# Patient Record
Sex: FEMALE | Race: WHITE | Employment: OTHER | URBAN - METROPOLITAN AREA
[De-identification: names, ages, dates, MRNs, and addresses within clinical notes are randomized per-mention and may not be internally consistent; named-entity substitution may affect disease eponyms.]

---

## 2017-01-05 ENCOUNTER — TELEPHONE (OUTPATIENT)
Dept: FAMILY MEDICINE CLINIC | Age: 47
End: 2017-01-05

## 2017-01-09 NOTE — TELEPHONE ENCOUNTER
Medication: Proventil HFA, dose: 2 puffs, how often: every 4 hours as needed for wheezing, current number of medication days provided: 90, refill per application. Lot# Quantity 4, Lot [id number], EXP 01/2018 . This medication was received and verified for the following 1. Correct Patient, 2. Correct Diagnosis, 3. Correct Drug, 4. Correct route, and no current allergy to medication. Please contact patient to come  their medications.      Edel Mccoy, MSN,RN,[facility]

## 2017-02-14 ENCOUNTER — TELEPHONE (OUTPATIENT)
Dept: SLEEP MEDICINE | Age: 47
End: 2017-02-14

## 2017-02-15 ENCOUNTER — TELEPHONE (OUTPATIENT)
Dept: SLEEP MEDICINE | Age: 47
End: 2017-02-15

## 2017-02-22 ENCOUNTER — TELEPHONE (OUTPATIENT)
Dept: FAMILY MEDICINE CLINIC | Age: 47
End: 2017-02-22

## 2017-02-22 NOTE — TELEPHONE ENCOUNTER
Medication: Caduet 10/10mg, dose: 1 tab, how often: daily, current number of medication days provided: 90, refill per application. Lot #[id number], EXP 04/2019 . This medication was received and verified for the following 1. Correct Patient, 2. Correct Diagnosis, 3. Correct Drug, 4. Correct route, and no current allergy to medication.

## 2017-02-23 DIAGNOSIS — I10 ESSENTIAL HYPERTENSION: Primary | ICD-10-CM

## 2017-04-11 ENCOUNTER — LAB ONLY (OUTPATIENT)
Dept: FAMILY MEDICINE CLINIC | Age: 47
End: 2017-04-11

## 2017-04-11 ENCOUNTER — HOSPITAL ENCOUNTER (OUTPATIENT)
Dept: LAB | Age: 47
Discharge: HOME | End: 2017-04-11

## 2017-04-11 ENCOUNTER — TELEPHONE (OUTPATIENT)
Dept: FAMILY MEDICINE CLINIC | Age: 47
End: 2017-04-11

## 2017-04-11 DIAGNOSIS — E78.5 DYSLIPIDEMIA: Primary | ICD-10-CM

## 2017-04-11 DIAGNOSIS — I10 ESSENTIAL HYPERTENSION: ICD-10-CM

## 2017-04-11 LAB
ANION GAP BLD CALC-SCNC: 7 MMOL/L (ref 3–18)
BUN SERPL-MCNC: 14 MG/DL (ref 7–18)
BUN/CREAT SERPL: 20 (ref 12–20)
CALCIUM SERPL-MCNC: 9.2 MG/DL (ref 8.5–10.1)
CHLORIDE SERPL-SCNC: 103 MMOL/L (ref 100–108)
CHOLEST SERPL-MCNC: 175 MG/DL
CO2 SERPL-SCNC: 29 MMOL/L (ref 21–32)
CREAT SERPL-MCNC: 0.69 MG/DL (ref 0.6–1.3)
GLUCOSE SERPL-MCNC: 86 MG/DL (ref 74–99)
HDLC SERPL-MCNC: 31 MG/DL (ref 40–60)
HDLC SERPL: 5.6 {RATIO} (ref 0–5)
LDLC SERPL CALC-MCNC: ABNORMAL MG/DL (ref 0–100)
LIPID PROFILE,FLP: ABNORMAL
POTASSIUM SERPL-SCNC: 4.1 MMOL/L (ref 3.5–5.5)
SODIUM SERPL-SCNC: 139 MMOL/L (ref 136–145)
TRIGL SERPL-MCNC: 441 MG/DL (ref ?–150)
VLDLC SERPL CALC-MCNC: ABNORMAL MG/DL

## 2017-04-11 PROCEDURE — 80048 BASIC METABOLIC PNL TOTAL CA: CPT | Performed by: NURSE PRACTITIONER

## 2017-04-11 PROCEDURE — 80061 LIPID PANEL: CPT | Performed by: NURSE PRACTITIONER

## 2017-04-11 NOTE — TELEPHONE ENCOUNTER
Medication: Proventil HFA, dose: 2 puffs, how often: every 4 hours as needed for wheezing, current number of medication days provided: 90, refill per application. Lot [id number], EXP 04/2018. This medication was received and verified for the following 1. Correct Patient, 2. Correct Diagnosis, 3. Correct Drug, 4. Correct route, and no current allergy to medication. Please contact patient to come  their medications.      Judy Jerome MSN,RN,AGNP-C     [facility]

## 2017-04-11 NOTE — PROGRESS NOTES
Patient name, date of birth and orders verified before specimen collection. Rt  arm is dry and intact. 23g butterfly  was utilized to collect specimen. Pt tolerated procedure well.

## 2017-04-13 NOTE — PROGRESS NOTES
Will review results with pt at 4/27/17 appt  1. CMP satisfactory   2. Triglycerides up to 441 from 129. Assess ETOH use, drug use, diet!!!  3. HDL low at 31. Down from 48   4. LDL not calculated.

## 2017-04-27 ENCOUNTER — OFFICE VISIT (OUTPATIENT)
Dept: FAMILY MEDICINE CLINIC | Age: 47
End: 2017-04-27

## 2017-04-27 VITALS
SYSTOLIC BLOOD PRESSURE: 152 MMHG | RESPIRATION RATE: 20 BRPM | DIASTOLIC BLOOD PRESSURE: 97 MMHG | HEIGHT: 62 IN | OXYGEN SATURATION: 96 % | BODY MASS INDEX: 36.62 KG/M2 | TEMPERATURE: 98.7 F | WEIGHT: 199 LBS | HEART RATE: 93 BPM

## 2017-04-27 DIAGNOSIS — Z91.14 NON COMPLIANCE W MEDICATION REGIMEN: ICD-10-CM

## 2017-04-27 DIAGNOSIS — I10 HTN, GOAL BELOW 140/90: ICD-10-CM

## 2017-04-27 DIAGNOSIS — F19.10 SUBSTANCE ABUSE (HCC): ICD-10-CM

## 2017-04-27 DIAGNOSIS — Z72.0 TOBACCO ABUSE: ICD-10-CM

## 2017-04-27 DIAGNOSIS — L71.9 ROSACEA: ICD-10-CM

## 2017-04-27 DIAGNOSIS — G47.9 SLEEPING DIFFICULTIES: ICD-10-CM

## 2017-04-27 DIAGNOSIS — I10 UNCONTROLLED HYPERTENSION: Primary | ICD-10-CM

## 2017-04-27 DIAGNOSIS — E78.1 HYPERTRIGLYCERIDEMIA: ICD-10-CM

## 2017-04-27 DIAGNOSIS — F31.10 MANIC BIPOLAR I DISORDER (HCC): ICD-10-CM

## 2017-04-27 RX ORDER — CLINDAMYCIN PHOSPHATE 10 UG/ML
LOTION TOPICAL 2 TIMES DAILY
Qty: 1 BOTTLE | Refills: 0 | Status: SHIPPED | OUTPATIENT
Start: 2017-04-27 | End: 2018-05-07 | Stop reason: SDUPTHER

## 2017-04-27 RX ORDER — MULTIVITAMIN
1 TABLET ORAL DAILY
COMMUNITY
End: 2019-01-30

## 2017-04-27 RX ORDER — CLONIDINE HYDROCHLORIDE 0.3 MG/1
0.3 TABLET ORAL 2 TIMES DAILY
Qty: 60 TAB | Refills: 3 | Status: SHIPPED | OUTPATIENT
Start: 2017-04-27 | End: 2017-08-16 | Stop reason: SDUPTHER

## 2017-04-27 RX ORDER — OMEGA-3-ACID ETHYL ESTERS 1 G/1
2 CAPSULE, LIQUID FILLED ORAL 2 TIMES DAILY
Qty: 360 CAP | Refills: 3 | Status: SHIPPED | COMMUNITY
Start: 2017-04-27 | End: 2017-07-24 | Stop reason: SDUPTHER

## 2017-04-27 RX ORDER — METOPROLOL TARTRATE 25 MG/1
12.5 TABLET, FILM COATED ORAL 2 TIMES DAILY
Qty: 30 TAB | Refills: 3 | Status: SHIPPED | OUTPATIENT
Start: 2017-04-27 | End: 2017-06-08

## 2017-04-27 RX ORDER — LISINOPRIL AND HYDROCHLOROTHIAZIDE 20; 25 MG/1; MG/1
1 TABLET ORAL DAILY
Qty: 30 TAB | Refills: 3 | Status: SHIPPED | OUTPATIENT
Start: 2017-04-27 | End: 2017-08-16 | Stop reason: SDUPTHER

## 2017-04-27 RX ORDER — OMEGA-3-ACID ETHYL ESTERS 1 G/1
2 CAPSULE, LIQUID FILLED ORAL 2 TIMES DAILY
Qty: 360 CAP | Refills: 3 | Status: SHIPPED | COMMUNITY
Start: 2017-04-27 | End: 2019-03-21 | Stop reason: SDUPTHER

## 2017-04-27 NOTE — MR AVS SNAPSHOT
Visit Information Date & Time Provider Department Dept. Phone Encounter #  
 4/27/2017 11:00 AM Ovidio Hunt NP 1998 [facility] [id number] Follow-up Instructions Return in about 2 weeks (around 5/11/2017). Upcoming Health Maintenance Date Due Pneumococcal 19-64 Medium Risk (1 of 1 - PPSV23) 6/22/1988 DTaP/Tdap/Td series (1 - Tdap) 6/22/1990 PAP AKA CERVICAL CYTOLOGY 11/21/2019 Allergies as of 4/27/2017  Review Complete On: 4/27/2017 By: Particia Fairly Severity Noted Reaction Type Reactions Iodinated Contrast Media - Oral And Iv Dye  08/16/2012    Shortness of Breath Current Immunizations  Reviewed on 10/18/2016 Name Date Influenza Vaccine (Quad) PF 10/13/2016 Not reviewed this visit You Were Diagnosed With   
  
 Codes Comments Uncontrolled hypertension    -  Primary ICD-10-CM: I10 
ICD-9-CM: 401.9 Sleeping difficulties     ICD-10-CM: G47.9 ICD-9-CM: 780.50 Substance abuse     ICD-10-CM: F19.10 ICD-9-CM: 305.90 Non compliance w medication regimen     ICD-10-CM: Z91.14 
ICD-9-CM: V15.81 Manic bipolar I disorder ([facility]     ICD-10-CM: F31.10 ICD-9-CM: 296.40 Tobacco abuse     ICD-10-CM: Z72.0 ICD-9-CM: 305.1 HTN, goal below 140/90     ICD-10-CM: I10 
ICD-9-CM: 401.9 Rosacea     ICD-10-CM: L71.9 ICD-9-CM: 695.3 Hypertriglyceridemia     ICD-10-CM: E78.1 ICD-9-CM: 272.1 Vitals BP Pulse Temp Resp Height(growth percentile) Weight(growth percentile) (!) 152/97 93 98.7 °F (37.1 °C) 20 5' 2\" (1.575 m) 199 lb (90.3 kg) SpO2 BMI OB Status Smoking Status 96% 36.4 kg/m2 Having regular periods Current Every Day Smoker BMI and BSA Data Body Mass Index Body Surface Area  
 36.4 kg/m 2 1.99 m 2 Preferred Pharmacy Pharmacy Name Phone [place] [phone] - [facility] 90. [phone] Your Updated Medication List  
  
   
This list is accurate as of: 4/27/17 11:48 AM.  Always use your most recent med list.  
  
  
  
  
 albuterol 90 mcg/actuation inhaler Commonly known as:  PROVENTIL HFA, VENTOLIN HFA, PROAIR HFA Take 2 Puffs by inhalation every four (4) hours as needed for Wheezing. amLODIPine-atorvastatin 10-10 mg per tablet Commonly known as:  CADUET Take 1 Tab by mouth daily. calcium-cholecalciferol (D3) tablet Commonly known as:  CALTRATE 600+D Take 1 Tab by mouth daily. clindamycin 1 % lotion Commonly known as:  CLEOCIN T Apply  to affected area two (2) times a day. use thin film on affected area  
  
 cloNIDine HCl 0.3 mg tablet Commonly known as:  CATAPRES Take 1 Tab by mouth two (2) times a day. Indications: hypertension  
  
 ergocalciferol 50,000 unit capsule Commonly known as:  VITAMIN D2 Take one capsule every 7 days till gone, then start taking over-the-counter Vitamin D3 2,000 units every day  
  
 lisinopril-hydroCHLOROthiazide 20-25 mg per tablet Commonly known as:  Coral Rower Take 1 Tab by mouth daily. Indications: hypertension  
  
 metoprolol tartrate 25 mg tablet Commonly known as:  LOPRESSOR Take 0.5 Tabs by mouth two (2) times a day. * omega-3 acid ethyl esters 1 gram capsule Commonly known as:  Cleophus Kipper Take 2 Caps by mouth two (2) times a day. * omega-3 acid ethyl esters 1 gram capsule Commonly known as:  Cleophus Kipper Take 2 Caps by mouth two (2) times a day. * Notice: This list has 2 medication(s) that are the same as other medications prescribed for you. Read the directions carefully, and ask your doctor or other care provider to review them with you. Prescriptions Printed Refills  
 omega-3 acid ethyl esters (LOVAZA) 1 gram capsule 3 Sig: Take 2 Caps by mouth two (2) times a day. Class: Program  
 Route: Oral  
  
Prescriptions Sent to Mail Order Refills omega-3 acid ethyl esters (LOVAZA) 1 gram capsule 3 Sig: Take 2 Caps by mouth two (2) times a day. Class: Program  
 Pharmacy: [address] Ph #: [phone] Route: Oral  
  
Prescriptions Sent to Pharmacy Refills  
 metoprolol tartrate (LOPRESSOR) 25 mg tablet 3 Sig: Take 0.5 Tabs by mouth two (2) times a day. Class: Normal  
 Pharmacy: [place] [address]. Ph #: [phone] Route: Oral  
 lisinopril-hydroCHLOROthiazide (PRINZIDE, ZESTORETIC) 20-25 mg per tablet 3 Sig: Take 1 Tab by mouth daily. Indications: hypertension Class: Normal  
 Pharmacy: [place] [address]. Ph #: [phone] Route: Oral  
 cloNIDine HCl (CATAPRES) 0.3 mg tablet 3 Sig: Take 1 Tab by mouth two (2) times a day. Indications: hypertension Class: Normal  
 Pharmacy: [place] [address]. Ph #: [phone] Route: Oral  
 clindamycin (CLEOCIN T) 1 % lotion 0 Sig: Apply  to affected area two (2) times a day. use thin film on affected area Class: Normal  
 Pharmacy: [place] [address]. Ph #: [phone] Route: Topical  
 omega-3 acid ethyl esters (LOVAZA) 1 gram capsule 3 Sig: Take 2 Caps by mouth two (2) times a day. Class: Program  
 Pharmacy: [address] Ph #: [phone] Route: Oral  
  
Follow-up Instructions Return in about 2 weeks (around 5/11/2017). Patient Instructions [address] [place] [address] Avenue: [phone] [facility] reminders! Foundation Operating Hours: These may change without notice. Mon- Wed 7am to 5pm. Closed for lunch 12-1pm 
Thurs 7am to 12pm 
Fridays closed NO SHOW POLICY ~ If a patient has 3 no shows for an appointment with the Provider, Mental Health Provider, or the Nurse Navigator in 6 months, they will be discharged from the practice for 6 months. Medication ordering will also be suspended. If the patient is discharged from the [facility], they can go to the [place] [address] where they can be seen for the primary needs plus obtain the same types of medications as they receive at the [facility]. To avoid being discharged, the patient must call the office at [phone] 24 hours prior to their appointment if they need to cancel, arrive to their appointments on time and come to all scheduled appointments. If the patient is discharged from the practice, they can apply to be re-established after 12 months. Lab work:  Unless you are instructed differently, please return to the office between the hours of 7 am and 10:30 am Monday through Thursday to have your labs drawn one week before your next scheduled PROVIDER visit. If you do not have an appointment to follow up on these results, please make one or plan to call the office if you do not hear from us to get the results. No news does not mean good news. Medications: If your medications are new or have changed, and you get your medications from the clinic pharmacy ([facility]), you MUST talk to the pharmacy staff to sign the new prescription applications. If you don't sign the applications we cannot get the medications for you. It usually takes 6-8 weeks for your medications to arrive. The Pharmacy staff will call you when your medications are available. You will have 30 days to come in and  your medications. If you don't  your medicines within those 30 days, those medicines will be placed on the self as samples and you will have to start all over again by completing the applications and waiting the 6-8 weeks for your medicines to arrive. This is firm and there will be no exceptions! ! The [facility] or [facility] will assist you with your medications if available but not all medications are available so some may be obtained through local pharmacies such as [facility] that has a large $4 list and [employer] that has many drugs for free or also for $4.  We will work hard to get the best medications for you that you can also afford. [facility] Medication pick-up times: 
Monday-Tuesday 1:00 -5:00 PM 
Wednesday- Thursday 8:00 -11:30 AM 
 
 
Feet Care: Local [facility] through [facility] Every second Tuesday of the month (except for holidays and election days) from 9am to 1 pm. The services provided by these ministry volunteers are free of charge with the option to donate. They will inspect your feet thoroughly, soak them for 10 minutes, cut and file your nails. They care for diabetics as well. Keep in mind this service is free and will be on a first come first serve basis. Bad teeth? Ask about the Dental Bus to get you in front of a local dentist. The bus leaves every other Wednesday for those on the list. (Ask about availability as these appointments are limited) Eye exams for Diabetics. Please let us know so we can add you to the list to see the eye doctor at [facility]. You will receive a free eye exam and free glasses if needed. Unfortunately, if you are not a diabetic, we do not have a free service for eye exams for you (yet!). We do have information on where to go to get a huge discount on eye exams and glasses. Sick visits: If you are sick and it is not an emergency call the office to see if you can schedule an appointment. Charges and cost items from the clinic:  Most of our orders are covered by [address] but there ARE SOME CHARGES for items such as radiology interpretations and anesthesiology during procedures and surgeries. Please make sure you have contacted the Advanced Patient Advocacy (APA) group to check on your payment options: [email or web address]. or come in and talk to them in person:  On 
 Tuesdays, we have Advanced Patient Advocacy is available Mon - Fri 8-4pm at [facility] on the first floor by the information desk. Their number is [phone]. It is important that you are screened in order to qualify for assistance and to avoid huge medical charges. The [facility] is not responsible for ANY charges you may accrue regardless of who ordered the medication, procedure, treatment or test. If you go to the Emergency Room, you WILL be charged! Behavior and emotional issues! It is stressful to be sick, have an illness, take medications, not have a job, not have medical insurance, have family issues or just getting older! Schedule an appointment with our mental health provider. She is in the office Mondays and Wednesdays from 8am to [place] [address] can also contact the following: The national suicide hotline (0-197-715-TYEQ or 3-387.781.9988) [facility] [address] [facility], [address] Walk- in hours Monday -Wednesday 8:30 am -11:15 AM 
2:15pm -4:15 pm 
 
[employer]) - [facility] [address] [facility], [address] [facility] [place] 
[phone] The [address] [place] [address] for Aging and The [facility], in collaboration with community partners, provides and advocates for resources and services to improve the employment, quality of life, security, and independence of older [address], [address] with disabilities, and their families. Department for Aging and Rehabilitative Services Street Location: [address] [place] [address] [place] Voice: [phone] Toll Free Number:[phone] Toll Free TTY: [phone] E-mail: [email or web address] Immunizations/Vaccination Routine Immunizations are provided for infants, children, teens, and adults at the [facility] FOR PHYSICAL REHABILITATION. Please bring all immunization records with you and present them at the time of registration. Phone [phone] Hours (Walk in) Monday, Tuesday, Wednesday and Friday 8:00 AM to 11:00 AM 
12:30 PM to 3:00 PM 
 
 
Drug and Alcohol Addiction Issues! It is hard to stop a poor habit but there is help out there. Please feel free to attend any other the following support groups to help you kick the habit or go to [facility] Emergency Department to be evaluated by the psychiatric team. Never give up!! AlAnon meetings: Radha [facility] MONDAY 7:30 PM JUST FOR TODAY [employer]-[facility]anjith [facility] [address] [facility] WEDNES[facility] 10:30 AM [employer] Tamie Anadarko [employer] OF [facility], ROOM [address] [place] [address]  
 
[facility] WEDNESDAY 8:00  Linda Richardson [place] [address] [facility] THURSDAY 8:00 PM KEEP IT SIMPLE [employer] [facility]ie [facility] [address] 8:00 PM LET IT BEGIN WITH ME [employer] [facility] Buddhism [facility] [address] [facility] FRIDAY 6;30 PM [employer] Buddhism [facility] [address] [facility] [address] [place] MONDAY 10:30  [place] [address] [facility] SATURDAY 8:00 PM [facility] SATURDAY NIGHT [employer] Al-Ranjith [place] [address] [facility] [place] MONDAY 7:00 PM MONDAY NIGHT AF[employer] Al-Anon SIENNA Kimberly Buddhism [facility] [address]  
 
[place] WEDNESDAY 8:00 PM [employer] Al-Anon [facility] Buddhism [facility] [address] [facility] 
 
  
Bipolar Disorder: Care Instructions Your Care Instructions Bipolar disorder is an illness that causes extreme mood changes, from times of very high energy (manic episodes) to times of depression. But many people with bipolar disorder show only the symptoms of depression. These moods may cause problems with your work, school, family life, friendships, and how well you function. This disease is also called manic-depression. There is no cure for bipolar disorder, but it can be helped with medicines. Counseling may also help. It is important to take your medicines exactly as prescribed, even when you feel well. You may need lifelong treatment. Follow-up care is a key part of your treatment and safety. Be sure to make and go to all appointments, and call your doctor if you are having problems. It's also a good idea to know your test results and keep a list of the medicines you take. How can you care for yourself at home? · Be safe with medicines. Take your medicines exactly as prescribed. Do not stop or change a medicine without talking to your doctor first. Del Lovelace and your doctor may need to try different combinations of medicines to find what works for you. · Take your medicines on schedule to keep your moods even. When you feel good, you may think that you do not need your medicines. But it is important to keep taking them. · Go to your counseling sessions. Call and talk with your counselor if you can't go to a session or if you don't think the sessions are helping. Do not just stop going. · Get at least 30 minutes of activity on most days of the week. Walking is a good choice. You also may want to do other things, such as running, swimming, or cycling. · Get enough sleep. Keep your room dark and quiet. Try to go to bed at the same time every night. · Eat a healthy diet. This includes whole grains, dairy, fruits, vegetables, and protein. Eat foods from each of these groups. · Try to lower your stress. Manage your time, build a strong support system, and lead a healthy lifestyle. To lower your stress, try physical activity, slow deep breathing, or getting a massage. · Do not use alcohol or illegal drugs. · Learn the early signs of your mood changes. You can then take steps to help yourself feel better. · Ask for help from friends and family when you need it.  You may need help with daily chores when you are depressed. When you are manic, you may need support to control your high energy levels. What should you do if someone in your family has bipolar disorder? · Learn about the disease and the signs that it is getting worse. · Remind your family member that you love him or her. · Make a plan with all family members about how to take care of your loved one when his or her symptoms are bad. · Talk about your fears and concerns and those of other family members. Seek counseling if needed. · Do not focus attention only on the person who is in treatment. · Remind yourself that it will take time for changes to occur. · Do not blame yourself for the disease. · Know your legal rights and the legal rights of your family member. Support groups or counselors can help you with this information. · Take care of yourself. Keep up with your own interests, such as your career, hobbies, and friends. Use exercise, positive self-talk, deep breathing, and other relaxing exercises to help lower your stress. · Give yourself time to grieve. You may need to deal with emotions such as anger, fear, and frustration. After you work through your feelings, you will be better able to care for yourself and your family. · If you are having a hard time with your feelings or with your relationship with your family member, talk with a counselor. When should you call for help? Call 911 anytime you think you may need emergency care. For example, call if: 
· You feel like hurting yourself or someone else. · Someone who has bipolar disorder displays dangerous behavior, and you think the person might hurt himself or herself or someone else. Call your doctor now or seek immediate medical care if: 
· You hear voices. · Someone you know has bipolar disorder and talks about suicide.  Keep the numbers for these national suicide hotlines: 4-283-399-TALK (4-741.104.6247) and 1-240-GNIQZCE (5-207.890.5152). If a suicide threat seems real, with a specific plan and a way to carry it out, stay with the person, or ask someone you trust to stay with the person, until you can get help. · Someone you know has bipolar disorder and: 
¨ Starts to give away possessions. ¨ Is using illegal drugs or drinking alcohol heavily. ¨ Talks or writes about death, including writing suicide notes or talking about guns, knives, or pills. ¨ Talks or writes about hurting someone else. ¨ Starts to spend a lot of time alone. ¨ Acts very aggressively or suddenly appears calm. ¨ Talks about beliefs that are not based in reality (delusions). Watch closely for changes in your health, and be sure to contact your doctor if: 
· You cannot go to your counseling sessions. Where can you learn more? Go to http://losCatchSquarezack.info/. Enter K052 in the search box to learn more about \"Bipolar Disorder: Care Instructions. \" Current as of: July 26, 2016 Content Version: 11.2 © 3738-7109 [employer]. Care instructions adapted under license by [employer] (which disclaims liability or warranty for this information). If you have questions about a medical condition or this instruction, always ask your healthcare professional. [address] any warranty or liability for your use of this information. Hyperlipidemia: After Your Visit Your Care Instructions Hyperlipidemia is too much fat in your blood. The body has several kinds of fat, including cholesterol and triglycerides. Your body needs fat for many things, such as making new cells. But too much fat in your blood increases your chances of having a heart attack or stroke. You may be able to lower your cholesterol and triglycerides with a heart-healthy diet, exercise, and if needed, medicine.  Your doctor may want you to try lifestyle changes first to see whether they lower the fat in your blood. You may need to take medicine if lifestyle changes do not lower the fat in your blood enough. Follow-up care is a key part of your treatment and safety. Be sure to make and go to all appointments, and call your doctor if you are having problems. Its also a good idea to know your test results and keep a list of the medicines you take. How can you care for yourself at home? Take your medicines · Take your medicines exactly as prescribed. Call your doctor if you think you are having a problem with your medicine. · If you take medicine to lower your cholesterol, go to follow-up visits. You will need to have blood tests. · Do not take large doses of niacin, which is a B vitamin, while taking medicine called statins. It may increase the chance of muscle pain and liver problems. · Talk to your doctor about avoiding grapefruit juice if you are taking statins. Grapefruit juice can raise the level of this medicine in your blood. This could increase side effects. Eat more fruits, vegetables, and fiber · Fruits and vegetables have lots of nutrients that help protect against heart disease, and they have littleif anyfat. Try to eat at least five servings a day. Dark green, deep orange, or yellow fruits and vegetables are healthy choices. · Keep carrots, celery, and other veggies handy for snacks. Buy fruit that is in season and store it where you can see it so that you will be tempted to eat it. Cook dishes that have a lot of veggies in them, such as stir-fries and soups. · Foods high in fiber may reduce your cholesterol and provide important vitamins and minerals. High-fiber foods include whole-grain cereals and breads, oatmeal, beans, brown rice, citrus fruits, and apples. · Buy whole-grain breads and cereals instead of white bread and pastries. Limit saturated fat · Read food labels and try to avoid saturated fat and trans fat. They increase your risk of heart disease. · Use olive or canola oil when you cook. Try cholesterol-lowering spreads, such as Benecol or Take Control. · Bake, broil, grill, or steam foods instead of frying them. · Limit the amount of high-fat meats you eat, including hot dogs and sausages. Cut out all visible fat when you prepare meat. · Eat fish, skinless poultry, and soy products such as tofu instead of high-fat meats. Soybeans may be especially good for your heart. Eat at least two servings of fish a week. Certain fish, such as salmon, contain omega-3 fatty acids, which may help reduce your risk of heart attack. · Choose low-fat or fat-free milk and dairy products. Get exercise, limit alcohol, and quit smoking · Get more exercise. Work with your doctor to set up an exercise program. Even if you can do only a small amount, exercise will help you get stronger, have more energy, and manage your weight and your stress. Walking is an easy way to get exercise. Gradually increase the amount you walk every day. Aim for at least 30 minutes on most days of the week. You also may want to swim, bike, or do other activities. · Limit alcohol to no more than 2 drinks a day for men and 1 drink a day for women. · Do not smoke. If you need help quitting, talk to your doctor about stop-smoking programs and medicines. These can increase your chances of quitting for good. When should you call for help? Call 911 anytime you think you may need emergency care. For example, call if: 
· You have symptoms of a heart attack. These may include: ¨ Chest pain or pressure, or a strange feeling in the chest. 
¨ Sweating. ¨ Shortness of breath. ¨ Nausea or vomiting. ¨ Pain, pressure, or a strange feeling in the back, neck, jaw, or upper belly or in one or both shoulders or arms. ¨ Lightheadedness or sudden weakness. ¨ A fast or irregular heartbeat.  
After you call 911, the  may tell you to chew 1 adult-strength or 2 to 4 low-dose aspirin. Wait for an ambulance. Do not try to drive yourself. · You have signs of a stroke. These may include: 
¨ Sudden numbness, paralysis, or weakness in your face, arm, or leg, especially on only one side of your body. ¨ New problems with walking or balance. ¨ Sudden vision changes. ¨ Drooling or slurred speech. ¨ New problems speaking or understanding simple statements, or feeling confused. ¨ A sudden, severe headache that is different from past headaches. · You passed out (lost consciousness). Call your doctor now or seek immediate medical care if: 
· You have muscle pain or weakness. Watch closely for changes in your health, and be sure to contact your doctor if: 
· You are very tired. · You have an upset stomach, gas, constipation, or belly pain or cramps. Where can you learn more? Go to Ohanae.be Enter C406 in the search box to learn more about \"Hyperlipidemia: After Your Visit. \"  
© 1549-9618 [employer]wise[employer]. Care instructions adapted under license by [employer] (which disclaims liability or warranty for this information). This care instruction is for use with your licensed healthcare professional. If you have questions about a medical condition or this instruction, always ask your healthcare professional. [address] any warranty or liability for your use of this information. Content Version: 5.1.714914; Last Revised: October 13, 2011 Please provide this summary of care documentation to your next provider. Your primary care clinician is listed as Mikel Reed. If you have any questions after today's visit, please call [phone].
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
## 2017-04-27 NOTE — PATIENT INSTRUCTIONS
[address] [place] [address] Avenue: [address] [place] [address] Operating Hours: These may change without notice. Mon- Wed 7am to 5pm. Closed for lunch 12-1pm  Thurs 7am to 12pm  Fridays closed     NO SHOW POLICY ~ If a patient has 3 no shows for an appointment with the Provider, Mental Health Provider, or the Nurse Navigator in 6 months, they will be discharged from the practice for 6 months. Medication ordering will also be suspended. If the patient is discharged from the [facility], they can go to the [place] [id number] where they can be seen for the primary needs plus obtain the same types of medications as they receive at the [facility]. To avoid being discharged, the patient must call the office at [phone] 24 hours prior to their appointment if they need to cancel, arrive to their appointments on time and come to all scheduled appointments. If the patient is discharged from the practice, they can apply to be re-established after 12 months. Lab work:  Unless you are instructed differently, please return to the office between the hours of 7 am and 10:30 am Monday through Thursday to have your labs drawn one week before your next scheduled PROVIDER visit. If you do not have an appointment to follow up on these results, please make one or plan to call the office if you do not hear from us to get the results. No news does not mean good news. Medications: If your medications are new or have changed, and you get your medications from the clinic pharmacy ([facility]), you MUST talk to the pharmacy staff to sign the new prescription applications. If you don't sign the applications we cannot get the medications for you. It usually takes 6-8 weeks for your medications to arrive. The Pharmacy staff will call you when your medications are available. You will have 30 days to come in and  your medications.  If you don't  your medicines within those 30 days, those medicines will be placed on the self as samples and you will have to start all over again by completing the applications and waiting the 6-8 weeks for your medicines to arrive. This is firm and there will be no exceptions! ! The [facility] or [facility] will assist you with your medications if available but not all medications are available so some may be obtained through local pharmacies such as [facility] that has a large $4 list and [facility] that has many drugs for free or also for $4.  We will work hard to get the best medications for you that you can also afford. [facility] Medication pick-up times:  Monday-Tuesday 1:00 -5:00 PM  Wednesday- Thursday 8:00 -11:30 AM      Feet Care: Local ministry through [facility]  Every second Tuesday of the month (except for holidays and election days) from 9am to 1 pm. The services provided by these ministry volunteers are free of charge with the option to donate. They will inspect your feet thoroughly, soak them for 10 minutes, cut and file your nails. They care for diabetics as well. Keep in mind this service is free and will be on a first come first serve basis. Bad teeth? Ask about the Dental Bus to get you in front of a local dentist. The bus leaves every other Wednesday for those on the list. (Ask about availability as these appointments are limited)    Eye exams for Diabetics. Please let us know so we can add you to the list to see the eye doctor at [facility]. You will receive a free eye exam and free glasses if needed. Unfortunately, if you are not a diabetic, we do not have a free service for eye exams for you (yet!). We do have information on where to go to get a huge discount on eye exams and glasses. Sick visits: If you are sick and it is not an emergency call the office to see if you can schedule an appointment.      Charges and cost items from the clinic:  Most of our orders are covered by [address] [place] [address] [place] but there ARE SOME CHARGES for items such as radiology interpretations and anesthesiology during procedures and surgeries. Please make sure you have contacted the Advanced Patient Advocacy (APA) group to check on your payment options: [email or web address]. or come in and talk to them in person: On  Tuesdays, we have Advanced Patient Advocacy is available Mon - Fri 8-4pm at [facility] on the first floor by the information desk. Their number is [phone]. It is important that you are screened in order to qualify for assistance and to avoid huge medical charges. The [facility] is not responsible for ANY charges you may accrue regardless of who ordered the medication, procedure, treatment or test. If you go to the Emergency Room, you WILL be charged! Behavior and emotional issues! It is stressful to be sick, have an illness, take medications, not have a job, not have medical insurance, have family issues or just getting older! Schedule an appointment with our mental health provider. She is in the office Mondays and Wednesdays from 8am to [address] [facility] can also contact the following: The national suicide hotline (1-120-702-BM or 3-421.904.6212)    [address] [facility], [address] [place] [address]    Walk- in hours Monday -Wednesday   8:30 am -11:15 AM  2:15pm -4:15 pm    [employer] [facility] [place]  [address], [address]  [phone]        The [place] Department for A[facility] and [facility], in collaboration with community partners, provides and advocates for resources and services to improve the employment, quality of life, security, and independence of older [address], [address] with disabilities, and their families.     Department for Aging and Rehabilitative Services  [address] Location: [address]   Voice: [address] [place]: [phone]  E-mail: [email or web address]      Immunizations/Vaccination  Routine Immunizations are provided for infants, children, teens, and adults at the [facility]. Please bring all immunization records with you and present them at the time of registration. Phone [phone] extension [phone]  Hours (Walk in)  Monday, Tuesday, Wednesday and Friday  8:00 AM to 11:00 AM  12:30 PM to 3:00 PM      Drug and Alcohol Addiction Issues! It is hard to stop a poor habit but there is help out there. Please feel free to attend any other the following support groups to help you kick the habit or go to [facility] Emergency Department to be evaluated by the psychiatric team. Never give up!! AlAnon meetings: Areli Stallworth [place]    [place] MONDAY 7:30 PM JUST FOR TODAY [employer]-Sarkisn [facility] [address] [facility] WEDNESDAY 10:30 AM [employer] Al-Ranjith McIntosh [employer] [facility], ROOM [address] [place] [address] WEDNESDAY 8:00  [address] 8:00 PM KEEP IT SIMPLE [employer] Al-Ranjith [facility] [address]     [facility] THURSDAY 8:00 PM LET IT BEGIN WITH ME [employer] Al-Anon [facility] 6;30 PM [place] PARENTS [employer] [address] [facility] [address] [facility]      [place] MONDAY 10:30 AM [employer] Al-Anon [facility] SATURDAY 8:00 PM [facility] SATURDAY NIGHT AFG Al-Anon [facility] 96 [facility] MONDAY 7:00 PM MONDAY NIGHT AFG Al-Anon [facility] [address]     [place] WEDNESDAY 8:00 PM [employer] Al-Anon [facility] [address]       Bipolar Disorder: Care Instructions  Your Care Instructions  Bipolar disorder is an illness that causes extreme mood changes, from times of very high energy (manic episodes) to times of depression. But many people with bipolar disorder show only the symptoms of depression. These moods may cause problems with your work, school, family life, friendships, and how well you function. This disease is also called manic-depression. There is no cure for bipolar disorder, but it can be helped with medicines. Counseling may also help. It is important to take your medicines exactly as prescribed, even when you feel well. You may need lifelong treatment. Follow-up care is a key part of your treatment and safety. Be sure to make and go to all appointments, and call your doctor if you are having problems. It's also a good idea to know your test results and keep a list of the medicines you take. How can you care for yourself at home? · Be safe with medicines. Take your medicines exactly as prescribed. Do not stop or change a medicine without talking to your doctor first. Grisel Campos and your doctor may need to try different combinations of medicines to find what works for you. · Take your medicines on schedule to keep your moods even. When you feel good, you may think that you do not need your medicines. But it is important to keep taking them. · Go to your counseling sessions. Call and talk with your counselor if you can't go to a session or if you don't think the sessions are helping. Do not just stop going. · Get at least 30 minutes of activity on most days of the week. Walking is a good choice. You also may want to do other things, such as running, swimming, or cycling. · Get enough sleep. Keep your room dark and quiet. Try to go to bed at the same time every night. · Eat a healthy diet. This includes whole grains, dairy, fruits, vegetables, and protein. Eat foods from each of these groups. · Try to lower your stress. Manage your time, build a strong support system, and lead a healthy lifestyle.  To lower your stress, try physical activity, slow deep breathing, or getting a massage. · Do not use alcohol or illegal drugs. · Learn the early signs of your mood changes. You can then take steps to help yourself feel better. · Ask for help from friends and family when you need it. You may need help with daily chores when you are depressed. When you are manic, you may need support to control your high energy levels. What should you do if someone in your family has bipolar disorder? · Learn about the disease and the signs that it is getting worse. · Remind your family member that you love him or her. · Make a plan with all family members about how to take care of your loved one when his or her symptoms are bad. · Talk about your fears and concerns and those of other family members. Seek counseling if needed. · Do not focus attention only on the person who is in treatment. · Remind yourself that it will take time for changes to occur. · Do not blame yourself for the disease. · Know your legal rights and the legal rights of your family member. Support groups or counselors can help you with this information. · Take care of yourself. Keep up with your own interests, such as your career, hobbies, and friends. Use exercise, positive self-talk, deep breathing, and other relaxing exercises to help lower your stress. · Give yourself time to grieve. You may need to deal with emotions such as anger, fear, and frustration. After you work through your feelings, you will be better able to care for yourself and your family. · If you are having a hard time with your feelings or with your relationship with your family member, talk with a counselor. When should you call for help? Call 911 anytime you think you may need emergency care. For example, call if:  · You feel like hurting yourself or someone else. · Someone who has bipolar disorder displays dangerous behavior, and you think the person might hurt himself or herself or someone else.   Call your doctor now or seek immediate medical care if:  · You hear voices. · Someone you know has bipolar disorder and talks about suicide. Keep the numbers for these national suicide hotlines: 0-392-115-TALK (9-642.761.7441) and 8-465-QWDJEBR (8-441.664.8706). If a suicide threat seems real, with a specific plan and a way to carry it out, stay with the person, or ask someone you trust to stay with the person, until you can get help. · Someone you know has bipolar disorder and:  ¨ Starts to give away possessions. ¨ Is using illegal drugs or drinking alcohol heavily. ¨ Talks or writes about death, including writing suicide notes or talking about guns, knives, or pills. ¨ Talks or writes about hurting someone else. ¨ Starts to spend a lot of time alone. ¨ Acts very aggressively or suddenly appears calm. ¨ Talks about beliefs that are not based in reality (delusions). Watch closely for changes in your health, and be sure to contact your doctor if:  · You cannot go to your counseling sessions. Where can you learn more? Go to http://losBlueroof 360zack.info/. Enter K052 in the search box to learn more about \"Bipolar Disorder: Care Instructions. \"  Current as of: July 26, 2016  Content Version: 11.2  © 5546-2581 [employer]. Care instructions adapted under license by [employer] (which disclaims liability or warranty for this information). If you have questions about a medical condition or this instruction, always ask your healthcare professional. [place] [address] any warranty or liability for your use of this information. Hyperlipidemia: After Your Visit  Your Care Instructions  Hyperlipidemia is too much fat in your blood. The body has several kinds of fat, including cholesterol and triglycerides. Your body needs fat for many things, such as making new cells. But too much fat in your blood increases your chances of having a heart attack or stroke.   You may be able to lower your cholesterol and triglycerides with a heart-healthy diet, exercise, and if needed, medicine. Your doctor may want you to try lifestyle changes first to see whether they lower the fat in your blood. You may need to take medicine if lifestyle changes do not lower the fat in your blood enough. Follow-up care is a key part of your treatment and safety. Be sure to make and go to all appointments, and call your doctor if you are having problems. Its also a good idea to know your test results and keep a list of the medicines you take. How can you care for yourself at home? Take your medicines  · Take your medicines exactly as prescribed. Call your doctor if you think you are having a problem with your medicine. · If you take medicine to lower your cholesterol, go to follow-up visits. You will need to have blood tests. · Do not take large doses of niacin, which is a B vitamin, while taking medicine called statins. It may increase the chance of muscle pain and liver problems. · Talk to your doctor about avoiding grapefruit juice if you are taking statins. Grapefruit juice can raise the level of this medicine in your blood. This could increase side effects. Eat more fruits, vegetables, and fiber  · Fruits and vegetables have lots of nutrients that help protect against heart disease, and they have little--if any--fat. Try to eat at least five servings a day. Dark green, deep orange, or yellow fruits and vegetables are healthy choices. · Keep carrots, celery, and other veggies handy for snacks. Buy fruit that is in season and store it where you can see it so that you will be tempted to eat it. Cook dishes that have a lot of veggies in them, such as stir-fries and soups. · Foods high in fiber may reduce your cholesterol and provide important vitamins and minerals. High-fiber foods include whole-grain cereals and breads, oatmeal, beans, brown rice, citrus fruits, and apples.   · Buy whole-grain breads and cereals instead of white bread and pastries. Limit saturated fat  · Read food labels and try to avoid saturated fat and trans fat. They increase your risk of heart disease. · Use olive or canola oil when you cook. Try cholesterol-lowering spreads, such as Benecol or Take Control. · Bake, broil, grill, or steam foods instead of frying them. · Limit the amount of high-fat meats you eat, including hot dogs and sausages. Cut out all visible fat when you prepare meat. · Eat fish, skinless poultry, and soy products such as tofu instead of high-fat meats. Soybeans may be especially good for your heart. Eat at least two servings of fish a week. Certain fish, such as salmon, contain omega-3 fatty acids, which may help reduce your risk of heart attack. · Choose low-fat or fat-free milk and dairy products. Get exercise, limit alcohol, and quit smoking  · Get more exercise. Work with your doctor to set up an exercise program. Even if you can do only a small amount, exercise will help you get stronger, have more energy, and manage your weight and your stress. Walking is an easy way to get exercise. Gradually increase the amount you walk every day. Aim for at least 30 minutes on most days of the week. You also may want to swim, bike, or do other activities. · Limit alcohol to no more than 2 drinks a day for men and 1 drink a day for women. · Do not smoke. If you need help quitting, talk to your doctor about stop-smoking programs and medicines. These can increase your chances of quitting for good. When should you call for help? Call 911 anytime you think you may need emergency care. For example, call if:  · You have symptoms of a heart attack. These may include:  ¨ Chest pain or pressure, or a strange feeling in the chest.  ¨ Sweating. ¨ Shortness of breath. ¨ Nausea or vomiting. ¨ Pain, pressure, or a strange feeling in the back, neck, jaw, or upper belly or in one or both shoulders or arms. ¨ Lightheadedness or sudden weakness.   ¨ A fast or irregular heartbeat. After you call 911, the  may tell you to chew 1 adult-strength or 2 to 4 low-dose aspirin. Wait for an ambulance. Do not try to drive yourself. · You have signs of a stroke. These may include:  ¨ Sudden numbness, paralysis, or weakness in your face, arm, or leg, especially on only one side of your body. ¨ New problems with walking or balance. ¨ Sudden vision changes. ¨ Drooling or slurred speech. ¨ New problems speaking or understanding simple statements, or feeling confused. ¨ A sudden, severe headache that is different from past headaches. · You passed out (lost consciousness). Call your doctor now or seek immediate medical care if:  · You have muscle pain or weakness. Watch closely for changes in your health, and be sure to contact your doctor if:  · You are very tired. · You have an upset stomach, gas, constipation, or belly pain or cramps. Where can you learn more? Go to FPSI.be  Enter C406 in the search box to learn more about \"Hyperlipidemia: After Your Visit. \"   © 5526-0970 [employer]. Care instructions adapted under license by Alok Hart (which disclaims liability or warranty for this information). This care instruction is for use with your licensed healthcare professional. If you have questions about a medical condition or this instruction, always ask your healthcare professional. [address] any warranty or liability for your use of this information.   Content Version: 4.4.042867; Last Revised: October 13, 2011

## 2017-04-27 NOTE — LETTER
4/27/2017 [facility] [address] [facility], [address] Ruslan Solis, 1970, is picking up the following medications ordered from the [facility] Program: SAMPLE:  LOVAZA 1  #[address] Patient's Signature: _____________________________ Today's Date: 4/27/2017

## 2017-05-30 ENCOUNTER — TELEPHONE (OUTPATIENT)
Dept: FAMILY MEDICINE CLINIC | Age: 47
End: 2017-05-30

## 2017-05-30 NOTE — TELEPHONE ENCOUNTER
Medication: Lovaza 1gm, dose:2  caps, how often: BID, current number of medication days provided: 90, refill per application. Lot #[id number], EXP 05/2018   This medication was received and verified for the following 1. Correct Patient, 2. Correct Diagnosis, 3. Correct Drug, 4. Correct route, and no current allergy to medication.     Marcy BarretoD

## 2017-06-08 ENCOUNTER — APPOINTMENT (OUTPATIENT)
Dept: GENERAL RADIOLOGY | Age: 47
End: 2017-06-08
Attending: EMERGENCY MEDICINE
Payer: SELF-PAY

## 2017-06-08 ENCOUNTER — OFFICE VISIT (OUTPATIENT)
Dept: FAMILY MEDICINE CLINIC | Age: 47
End: 2017-06-08

## 2017-06-08 ENCOUNTER — HOSPITAL ENCOUNTER (EMERGENCY)
Age: 47
Discharge: HOME | End: 2017-06-08
Attending: EMERGENCY MEDICINE
Payer: SELF-PAY

## 2017-06-08 VITALS
OXYGEN SATURATION: 98 % | BODY MASS INDEX: 37.68 KG/M2 | SYSTOLIC BLOOD PRESSURE: 164 MMHG | RESPIRATION RATE: 16 BRPM | DIASTOLIC BLOOD PRESSURE: 84 MMHG | WEIGHT: 206 LBS | HEART RATE: 50 BPM

## 2017-06-08 VITALS
HEART RATE: 43 BPM | OXYGEN SATURATION: 99 % | WEIGHT: 206 LBS | BODY MASS INDEX: 37.91 KG/M2 | SYSTOLIC BLOOD PRESSURE: 140 MMHG | RESPIRATION RATE: 14 BRPM | DIASTOLIC BLOOD PRESSURE: 63 MMHG | HEIGHT: 62 IN

## 2017-06-08 DIAGNOSIS — R03.0 ELEVATED BLOOD PRESSURE READING: ICD-10-CM

## 2017-06-08 DIAGNOSIS — R79.89 ELEVATED BRAIN NATRIURETIC PEPTIDE (BNP) LEVEL: ICD-10-CM

## 2017-06-08 DIAGNOSIS — R00.1 SINUS BRADYCARDIA: Primary | ICD-10-CM

## 2017-06-08 DIAGNOSIS — R00.1 BRADYCARDIA: Primary | ICD-10-CM

## 2017-06-08 LAB
ANION GAP BLD CALC-SCNC: 4 MMOL/L (ref 3–18)
BASOPHILS # BLD AUTO: 0 K/UL (ref 0–0.1)
BASOPHILS # BLD: 0 % (ref 0–2)
BNP SERPL-MCNC: 987 PG/ML (ref 0–450)
BUN SERPL-MCNC: 10 MG/DL (ref 7–18)
BUN/CREAT SERPL: 18 (ref 12–20)
CALCIUM SERPL-MCNC: 8.7 MG/DL (ref 8.5–10.1)
CHLORIDE SERPL-SCNC: 104 MMOL/L (ref 100–108)
CK MB CFR SERPL CALC: 2.9 % (ref 0–4)
CK MB SERPL-MCNC: 1.9 NG/ML (ref 5–25)
CK SERPL-CCNC: 66 U/L (ref 26–192)
CO2 SERPL-SCNC: 32 MMOL/L (ref 21–32)
CREAT SERPL-MCNC: 0.55 MG/DL (ref 0.6–1.3)
DIFFERENTIAL METHOD BLD: NORMAL
EOSINOPHIL # BLD: 0.1 K/UL (ref 0–0.4)
EOSINOPHIL NFR BLD: 2 % (ref 0–5)
ERYTHROCYTE [DISTWIDTH] IN BLOOD BY AUTOMATED COUNT: 12.5 % (ref 11.6–14.5)
GLUCOSE SERPL-MCNC: 107 MG/DL (ref 74–99)
HCT VFR BLD AUTO: 43.8 % (ref 35–45)
HGB BLD-MCNC: 15.2 G/DL (ref 12–16)
LYMPHOCYTES # BLD AUTO: 34 % (ref 21–52)
LYMPHOCYTES # BLD: 3 K/UL (ref 0.9–3.6)
MAGNESIUM SERPL-MCNC: 2.1 MG/DL (ref 1.6–2.6)
MCH RBC QN AUTO: 32.5 PG (ref 24–34)
MCHC RBC AUTO-ENTMCNC: 34.7 G/DL (ref 31–37)
MCV RBC AUTO: 93.8 FL (ref 74–97)
MONOCYTES # BLD: 0.6 K/UL (ref 0.05–1.2)
MONOCYTES NFR BLD AUTO: 7 % (ref 3–10)
NEUTS SEG # BLD: 5 K/UL (ref 1.8–8)
NEUTS SEG NFR BLD AUTO: 57 % (ref 40–73)
PLATELET # BLD AUTO: 225 K/UL (ref 135–420)
PMV BLD AUTO: 10.4 FL (ref 9.2–11.8)
POTASSIUM SERPL-SCNC: 3.7 MMOL/L (ref 3.5–5.5)
RBC # BLD AUTO: 4.67 M/UL (ref 4.2–5.3)
SODIUM SERPL-SCNC: 140 MMOL/L (ref 136–145)
TROPONIN I SERPL-MCNC: <0.02 NG/ML (ref 0–0.04)
WBC # BLD AUTO: 8.8 K/UL (ref 4.6–13.2)

## 2017-06-08 PROCEDURE — 93005 ELECTROCARDIOGRAM TRACING: CPT

## 2017-06-08 PROCEDURE — 83735 ASSAY OF MAGNESIUM: CPT | Performed by: EMERGENCY MEDICINE

## 2017-06-08 PROCEDURE — 85025 COMPLETE CBC W/AUTO DIFF WBC: CPT | Performed by: EMERGENCY MEDICINE

## 2017-06-08 PROCEDURE — 71020 XR CHEST PA LAT: CPT

## 2017-06-08 PROCEDURE — 83880 ASSAY OF NATRIURETIC PEPTIDE: CPT | Performed by: EMERGENCY MEDICINE

## 2017-06-08 PROCEDURE — 82550 ASSAY OF CK (CPK): CPT | Performed by: EMERGENCY MEDICINE

## 2017-06-08 PROCEDURE — 99284 EMERGENCY DEPT VISIT MOD MDM: CPT

## 2017-06-08 PROCEDURE — 80048 BASIC METABOLIC PNL TOTAL CA: CPT | Performed by: EMERGENCY MEDICINE

## 2017-06-08 NOTE — DISCHARGE INSTRUCTIONS
Elevated Blood Pressure: Care Instructions  Your Care Instructions    Blood pressure is a measure of how hard the blood pushes against the walls of your arteries. It's normal for blood pressure to go up and down throughout the day. But if it stays up over time, you have high blood pressure. Two numbers tell you your blood pressure. The first number is the systolic pressure. It shows how hard the blood pushes when your heart is pumping. The second number is the diastolic pressure. It shows how hard the blood pushes between heartbeats, when your heart is relaxed and filling with blood. An ideal blood pressure in adults is less than 120/80 (say \"120 over 80\"). High blood pressure is 140/90 or higher. You have high blood pressure if your top number is 140 or higher or your bottom number is 90 or higher, or both. The main test for high blood pressure is simple, fast, and painless. To diagnose high blood pressure, your doctor will test your blood pressure at different times. After testing your blood pressure, your doctor may ask you to test it again when you are home. If you are diagnosed with high blood pressure, you can work with your doctor to make a long-term plan to manage it. Follow-up care is a key part of your treatment and safety. Be sure to make and go to all appointments, and call your doctor if you are having problems. It's also a good idea to know your test results and keep a list of the medicines you take. How can you care for yourself at home? · Do not smoke. Smoking increases your risk for heart attack and stroke. If you need help quitting, talk to your doctor about stop-smoking programs and medicines. These can increase your chances of quitting for good. · Stay at a healthy weight. · Try to limit how much sodium you eat to less than 2,300 milligrams (mg) a day. Your doctor may ask you to try to eat less than 1,500 mg a day. · Be physically active.  Get at least 30 minutes of exercise on most days of the week. Walking is a good choice. You also may want to do other activities, such as running, swimming, cycling, or playing tennis or team sports. · Avoid or limit alcohol. Talk to your doctor about whether you can drink any alcohol. · Eat plenty of fruits, vegetables, and low-fat dairy products. Eat less saturated and total fats. · Learn how to check your blood pressure at home. When should you call for help? Call your doctor now or seek immediate medical care if:  · Your blood pressure is much higher than normal (such as 180/110 or higher). · You think high blood pressure is causing symptoms such as:  ¨ Severe headache. ¨ Blurry vision. Watch closely for changes in your health, and be sure to contact your doctor if:  · You do not get better as expected. Where can you learn more? Go to http://losRevettozack.info/. Enter V494 in the search box to learn more about \"Elevated Blood Pressure: Care Instructions. \"  Current as of: October 19, 2016  Content Version: 11.2  © 5522-3401 [employer]. Care instructions adapted under license by [employer] (which disclaims liability or warranty for this information). If you have questions about a medical condition or this instruction, always ask your healthcare professional. [address] any warranty or liability for your use of this information. Bradycardia: Care Instructions  Your Care Instructions    Bradycardia is a slow heart rate. If your heart beats too slowly, it can't supply your body with enough blood. This can make you weak or dizzy. Or it may make you pass out. Sometimes medicine can cause this problem. If this happens, your doctor may have you adjust one of your medicines. If a medicine is not the problem, your doctor may recommend a pacemaker. It is important to treat bradycardia so that you don't get more serious health problems.  Your doctor will want to see you on a routine schedule to make sure that your heartbeat is normal.  Follow-up care is a key part of your treatment and safety. Be sure to make and go to all appointments, and call your doctor if you are having problems. It's also a good idea to know your test results and keep a list of the medicines you take. How can you care for yourself at home? · Take your medicines exactly as prescribed. Call your doctor if you think you are having a problem with your medicine. If your bradycardia is caused by another disease, your doctor will try to treat the disease. If it is caused by heart medicines, he or she will adjust your medicines. · Make lifestyle changes to improve your heart health. ¨ To control your cholesterol, avoid foods with a lot of fat, saturated fat, or sodium. Try to eat more fiber. And if your doctor says it's okay, get some exercise on most days. ¨ Do not smoke. Smoking can make your heart condition worse. If you need help quitting, talk to your doctor about stop-smoking programs and medicines. These can increase your chances of quitting for good. ¨ Limit alcohol to 2 drinks a day for men and 1 drink a day for women. Too much alcohol can cause health problems. Pacemaker  If you have a pacemaker, you will get more specific information about it. Be sure to:  · Check your pulse as your doctor tells you. · Have your pacemaker checked as often as your doctor recommends. You may be able to do this over the phone or computer. · Avoid strong magnetic or electrical fields. These include wand metal detectors used in airports, MRIs, welding equipment, and generators. · You will be checked several times right after you get your pacemaker and when it is time to have the battery changed. Batteries last for 5 to 15 years. · You can talk on a cell phone. But keep it 6 inches away from your pacemaker. · Microwaves, TVs, radios, and kitchen and bathroom appliances won't harm you. When should you call for help?   Call 911 anytime you think you may need emergency care. For example, call if:  · You passed out (lost consciousness). · You have symptoms of a heart attack. These may include:  ¨ Chest pain or pressure, or a strange feeling in the chest.  ¨ Sweating. ¨ Shortness of breath. ¨ Nausea or vomiting. ¨ Pain, pressure, or a strange feeling in the back, neck, jaw, or upper belly or in one or both shoulders or arms. ¨ Lightheadedness or sudden weakness. ¨ A fast or irregular heartbeat. After you call 911, the  may tell you to chew 1 adult-strength or 2 to 4 low-dose aspirin. Wait for an ambulance. Do not try to drive yourself. · You have symptoms of a stroke. These may include:  ¨ Sudden numbness, tingling, weakness, or loss of movement in your face, arm, or leg, especially on only one side of your body. ¨ Sudden vision changes. ¨ Sudden trouble speaking. ¨ Sudden confusion or trouble understanding simple statements. ¨ Sudden problems with walking or balance. ¨ A sudden, severe headache that is different from past headaches. Call your doctor now or seek immediate medical care if:  · You are dizzy or lightheaded, or you feel like you may faint. · You have new or increased shortness of breath. · You had a pacemaker implanted and you have signs of infection, such as:  ¨ Increased pain, swelling, warmth, or redness. ¨ Red streaks leading from the cut (incision). ¨ Pus draining from the incision. ¨ A fever. Watch closely for changes in your health, and be sure to contact your doctor if you have any problems. Where can you learn more? Go to http://los-zack.info/. Enter J853 in the search box to learn more about \"Bradycardia: Care Instructions. \"  Current as of: January 27, 2016  Content Version: 11.2  © 8648-0160 [employer]. Care instructions adapted under license by [employer] (which disclaims liability or warranty for this information).  If you have questions about a medical condition or this instruction, always ask your healthcare professional. [place] [address] any warranty or liability for your use of this information.
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
## 2017-06-08 NOTE — PROGRESS NOTES
Patient here for BP check. Reports today SOB at night when she gets in bed x last couple of nights. She reports she episodes of having to catch her breath \"about 10 times a day\". Also has cough, left back pain and pain in axillary rib area. She admits to smoking crack \"couple of days ago\". She denies these symptoms at present. She has an apical HR of 48 today. Seen and evaluated by Ms Mariajose Jamison who directs Ms. Ozuna to go to ED for CP work up. Patient hesitant at first, but agrees to go to ED before S/S return. Report called to [place] in ED.

## 2017-06-09 LAB
ATRIAL RATE: 45 BPM
CALCULATED P AXIS, ECG09: 49 DEGREES
CALCULATED R AXIS, ECG10: -9 DEGREES
CALCULATED T AXIS, ECG11: 84 DEGREES
DIAGNOSIS, 93000: NORMAL
P-R INTERVAL, ECG05: 170 MS
Q-T INTERVAL, ECG07: 508 MS
QRS DURATION, ECG06: 116 MS
QTC CALCULATION (BEZET), ECG08: 439 MS
VENTRICULAR RATE, ECG03: 45 BPM

## 2017-06-13 ENCOUNTER — OFFICE VISIT (OUTPATIENT)
Dept: FAMILY MEDICINE CLINIC | Age: 47
End: 2017-06-13

## 2017-06-13 VITALS
WEIGHT: 198 LBS | BODY MASS INDEX: 36.44 KG/M2 | HEART RATE: 58 BPM | OXYGEN SATURATION: 98 % | TEMPERATURE: 98.1 F | HEIGHT: 62 IN | DIASTOLIC BLOOD PRESSURE: 78 MMHG | SYSTOLIC BLOOD PRESSURE: 119 MMHG

## 2017-06-13 DIAGNOSIS — I10 HTN, GOAL BELOW 140/90: ICD-10-CM

## 2017-06-13 DIAGNOSIS — R00.1 SINUS BRADYCARDIA: ICD-10-CM

## 2017-06-13 DIAGNOSIS — R79.89 ELEVATED BRAIN NATRIURETIC PEPTIDE (BNP) LEVEL: ICD-10-CM

## 2017-06-13 DIAGNOSIS — R94.31 PROLONGATION OF QRS COMPLEX ON ELECTROCARDIOGRAPHY: ICD-10-CM

## 2017-06-13 DIAGNOSIS — I51.7 LVH (LEFT VENTRICULAR HYPERTROPHY): ICD-10-CM

## 2017-06-13 DIAGNOSIS — Z72.0 TOBACCO ABUSE DISORDER: ICD-10-CM

## 2017-06-13 DIAGNOSIS — Z09 HOSPITAL DISCHARGE FOLLOW-UP: Primary | ICD-10-CM

## 2017-06-13 DIAGNOSIS — F14.10 COCAINE ABUSE (HCC): ICD-10-CM

## 2017-06-13 NOTE — PATIENT INSTRUCTIONS

## 2017-06-13 NOTE — LETTER
6/13/2017 [facility] [address], [address] Suleman Mcneil, 1970, is picking up the following medications ordered from the [facility]: LOVAZA 1  [address] Emoт[address]яна Shaper Patient's Signature: _____________________________ Today's Date: 6/13/2017
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
## 2017-06-13 NOTE — MR AVS SNAPSHOT
Visit Information Date & Time Provider Department Dept. Phone Encounter #  
 6/13/2017  9:30 AM Santana Poole NP [address] [id number] Follow-up Instructions Return if symptoms worsen or fail to improve. Your Appointments 8/2/2017  9:30 AM  
Follow Up with Santana Poole NP [address] [facility] ([address]) Appt Note: 3 mth f/u  
 [address] [facility] [id number]  
[address] [facility] [id number] Upcoming Health Maintenance Date Due Pneumococcal 19-64 Medium Risk (1 of 1 - PPSV23) 6/22/1988 DTaP/Tdap/Td series (1 - Tdap) 6/22/1990 INFLUENZA AGE 9 TO ADULT 8/1/2017 PAP AKA CERVICAL CYTOLOGY 11/21/2019 Allergies as of 6/13/2017  Review Complete On: 6/13/2017 By: Luc Lopez. Sarahi Leon LPN Severity Noted Reaction Type Reactions Iodinated Contrast Media - Oral And Iv Dye  08/16/2012    Shortness of Breath Current Immunizations  Reviewed on 10/18/2016 Name Date Influenza Vaccine (Quad) PF 10/13/2016 Not reviewed this visit You Were Diagnosed With   
  
 Codes Comments Elevated brain natriuretic peptide (BNP) level    -  Primary ICD-10-CM: R79.89 ICD-9-CM: 790.99   
 HTN, goal below 140/90     ICD-10-CM: I10 
ICD-9-CM: 401.9 Cocaine abuse     ICD-10-CM: F14.10 ICD-9-CM: 305.60 Hospital discharge follow-up     ICD-10-CM: [facility] ICD-9-CM: V67.59 Tobacco abuse disorder     ICD-10-CM: Z72.0 ICD-9-CM: 305.1 Sinus bradycardia     ICD-10-CM: R00.1 ICD-9-CM: 427.89 Vitals BP Pulse Temp Height(growth percentile) Weight(growth percentile) LMP  
 119/78 (!) 58 98.1 °F (36.7 °C) (Oral) 5' 2\" (1.575 m) 198 lb (89.8 kg) 05/13/2017 SpO2 BMI OB Status Smoking Status 98% 36.21 kg/m2 Having regular periods Current Every Day Smoker Vitals History BMI and BSA Data Body Mass Index Body Surface Area  
 36.21 kg/m 2 1.98 m 2 Preferred Pharmacy Pharmacy Name Phone [facility] [address] [phone] Your Updated Medication List  
  
   
This list is accurate as of: 6/13/17 10:02 AM.  Always use your most recent med list.  
  
  
  
  
 albuterol 90 mcg/actuation inhaler Commonly known as:  PROVENTIL HFA, VENTOLIN HFA, PROAIR HFA Take 2 Puffs by inhalation every four (4) hours as needed for Wheezing. amLODIPine-atorvastatin 10-10 mg per tablet Commonly known as:  CADUET Take 1 Tab by mouth daily. calcium-cholecalciferol (D3) tablet Commonly known as:  CALTRATE 600+D Take 1 Tab by mouth daily. clindamycin 1 % lotion Commonly known as:  CLEOCIN T Apply  to affected area two (2) times a day. use thin film on affected area  
  
 cloNIDine HCl 0.3 mg tablet Commonly known as:  CATAPRES Take 1 Tab by mouth two (2) times a day. Indications: hypertension  
  
 ergocalciferol 50,000 unit capsule Commonly known as:  VITAMIN D2 Take one capsule every 7 days till gone, then start taking over-the-counter Vitamin D3 2,000 units every day  
  
 lisinopril-hydroCHLOROthiazide 20-25 mg per tablet Commonly known as:  Rosaland Copas Take 1 Tab by mouth daily. Indications: hypertension * omega-3 acid ethyl esters 1 gram capsule Commonly known as:  Pattricia Ny Take 2 Caps by mouth two (2) times a day. * omega-3 acid ethyl esters 1 gram capsule Commonly known as:  Pattricia Ny Take 2 Caps by mouth two (2) times a day. * Notice: This list has 2 medication(s) that are the same as other medications prescribed for you. Read the directions carefully, and ask your doctor or other care provider to review them with you. Follow-up Instructions Return if symptoms worsen or fail to improve. To-Do List   
 06/13/2017 ECHO:  2D ECHO COMPLETE ADULT (TTE) W OR WO CONTR Patient Instructions High Blood Pressure: Care Instructions Your Care Instructions If your blood pressure is usually above 140/90, you have high blood pressure, or hypertension. That means the top number is 140 or higher or the bottom number is 90 or higher, or both. Despite what a lot of people think, high blood pressure usually doesn't cause headaches or make you feel dizzy or lightheaded. It usually has no symptoms. But it does increase your risk for heart attack, stroke, and kidney or eye damage. The higher your blood pressure, the more your risk increases. Your doctor will give you a goal for your blood pressure. Your goal will be based on your health and your age. An example of a goal is to keep your blood pressure below 140/90. Lifestyle changes, such as eating healthy and being active, are always important to help lower blood pressure. You might also take medicine to reach your blood pressure goal. 
Follow-up care is a key part of your treatment and safety. Be sure to make and go to all appointments, and call your doctor if you are having problems. It's also a good idea to know your test results and keep a list of the medicines you take. How can you care for yourself at home? Medical treatment · If you stop taking your medicine, your blood pressure will go back up. You may take one or more types of medicine to lower your blood pressure. Be safe with medicines. Take your medicine exactly as prescribed. Call your doctor if you think you are having a problem with your medicine. · Talk to your doctor before you start taking aspirin every day. Aspirin can help certain people lower their risk of a heart attack or stroke. But taking aspirin isn't right for everyone, because it can cause serious bleeding. · See your doctor regularly. You may need to see the doctor more often at first or until your blood pressure comes down. · If you are taking blood pressure medicine, talk to your doctor before you take decongestants or anti-inflammatory medicine, such as ibuprofen. Some of these medicines can raise blood pressure. · Learn how to check your blood pressure at home. Lifestyle changes · Stay at a healthy weight. This is especially important if you put on weight around the waist. Losing even 10 pounds can help you lower your blood pressure. · If your doctor recommends it, get more exercise. Walking is a good choice. Bit by bit, increase the amount you walk every day. Try for at least 30 minutes on most days of the week. You also may want to swim, bike, or do other activities. · Avoid or limit alcohol. Talk to your doctor about whether you can drink any alcohol. · Try to limit how much sodium you eat to less than 2,300 milligrams (mg) a day. Your doctor may ask you to try to eat less than 1,500 mg a day. · Eat plenty of fruits (such as bananas and oranges), vegetables, legumes, whole grains, and low-fat dairy products. · Lower the amount of saturated fat in your diet. Saturated fat is found in animal products such as milk, cheese, and meat. Limiting these foods may help you lose weight and also lower your risk for heart disease. · Do not smoke. Smoking increases your risk for heart attack and stroke. If you need help quitting, talk to your doctor about stop-smoking programs and medicines. These can increase your chances of quitting for good. When should you call for help? Call 911 anytime you think you may need emergency care. This may mean having symptoms that suggest that your blood pressure is causing a serious heart or blood vessel problem. Your blood pressure may be over 180/110. For example, call 911 if: 
· You have symptoms of a heart attack. These may include: ¨ Chest pain or pressure, or a strange feeling in the chest. 
¨ Sweating. ¨ Shortness of breath. ¨ Nausea or vomiting. ¨ Pain, pressure, or a strange feeling in the back, neck, jaw, or upper belly or in one or both shoulders or arms. ¨ Lightheadedness or sudden weakness. ¨ A fast or irregular heartbeat. · You have symptoms of a stroke. These may include: 
¨ Sudden numbness, tingling, weakness, or loss of movement in your face, arm, or leg, especially on only one side of your body. ¨ Sudden vision changes. ¨ Sudden trouble speaking. ¨ Sudden confusion or trouble understanding simple statements. ¨ Sudden problems with walking or balance. ¨ A sudden, severe headache that is different from past headaches. · You have severe back or belly pain. Do not wait until your blood pressure comes down on its own. Get help right away. Call your doctor now or seek immediate care if: 
· Your blood pressure is much higher than normal (such as 180/110 or higher), but you don't have symptoms. · You think high blood pressure is causing symptoms, such as: ¨ Severe headache. ¨ Blurry vision. Watch closely for changes in your health, and be sure to contact your doctor if: 
· Your blood pressure measures 140/90 or higher at least 2 times. That means the top number is 140 or higher or the bottom number is 90 or higher, or both. · You think you may be having side effects from your blood pressure medicine. · Your blood pressure is usually normal, but it goes above normal at least 2 times. Where can you learn more? Go to http://los-zack.info/. Enter K239 in the search box to learn more about \"High Blood Pressure: Care Instructions. \" Current as of: August 8, 2016 Content Version: 11.2 © 6773-3685 [employer]. Care instructions adapted under license by [employer] (which disclaims liability or warranty for this information).  If you have questions about a medical condition or this instruction, always ask your healthcare professional. [employer] disclaims any warranty or liability for your use of this information. Please provide this summary of care documentation to your next provider. Your primary care clinician is listed as Kandace Gutiérrez. If you have any questions after today's visit, please call [phone].

## 2017-06-16 ENCOUNTER — HOSPITAL ENCOUNTER (OUTPATIENT)
Dept: NON INVASIVE DIAGNOSTICS | Age: 47
Discharge: HOME | End: 2017-06-16
Attending: NURSE PRACTITIONER
Payer: SUBSIDIZED

## 2017-06-16 DIAGNOSIS — I10 HTN, GOAL BELOW 140/90: ICD-10-CM

## 2017-06-16 DIAGNOSIS — F14.10 COCAINE ABUSE (HCC): ICD-10-CM

## 2017-06-16 DIAGNOSIS — R79.89 ELEVATED BRAIN NATRIURETIC PEPTIDE (BNP) LEVEL: ICD-10-CM

## 2017-06-16 PROCEDURE — 93306 TTE W/DOPPLER COMPLETE: CPT

## 2017-06-20 NOTE — PROGRESS NOTES
Please let the patient know that her echocardiogram came back relatively normal. Her heart function is normal, only slightly enlarged which is common in people with history of poorly hypertension. Please f/u with cardiology for further input.

## 2017-06-20 NOTE — PROGRESS NOTES
Patient returned call and I informed her of echocardiogram results as relatively normal and heart function normal.   Informed patient heart is slightly enlarged and common in people with hx of poorly controlled HTN, per provider. Patient agrees to continue with cardiology appointment as scheduled.

## 2017-06-27 ENCOUNTER — TELEPHONE (OUTPATIENT)
Dept: FAMILY MEDICINE CLINIC | Age: 47
End: 2017-06-27

## 2017-06-29 NOTE — TELEPHONE ENCOUNTER
Medication: Proventil HFA, dose: 2 puffs, how often: every 4 hours as needed for wheezing, current number of medication days provided: 90, refill per application. Lot [id number], EXP 06/2018. This medication was received and verified for the following 1. Correct Patient, 2. Correct Diagnosis, 3. Correct Drug, 4. Correct route, and no current allergy to medication. Please contact patient to come  their medications.      Jessica Jon, MSN, RN, [facility]

## 2017-07-11 ENCOUNTER — OFFICE VISIT (OUTPATIENT)
Dept: FAMILY MEDICINE CLINIC | Age: 47
End: 2017-07-11

## 2017-07-11 ENCOUNTER — OFFICE VISIT (OUTPATIENT)
Dept: CARDIOLOGY CLINIC | Age: 47
End: 2017-07-11

## 2017-07-11 VITALS
HEIGHT: 62 IN | DIASTOLIC BLOOD PRESSURE: 63 MMHG | WEIGHT: 197 LBS | SYSTOLIC BLOOD PRESSURE: 117 MMHG | HEART RATE: 61 BPM | BODY MASS INDEX: 36.25 KG/M2

## 2017-07-11 VITALS
HEART RATE: 58 BPM | RESPIRATION RATE: 18 BRPM | SYSTOLIC BLOOD PRESSURE: 133 MMHG | DIASTOLIC BLOOD PRESSURE: 79 MMHG | OXYGEN SATURATION: 98 %

## 2017-07-11 DIAGNOSIS — F14.10 COCAINE ABUSE (HCC): ICD-10-CM

## 2017-07-11 DIAGNOSIS — R07.9 CHEST PAIN, UNSPECIFIED TYPE: Primary | ICD-10-CM

## 2017-07-11 DIAGNOSIS — E78.5 DYSLIPIDEMIA: ICD-10-CM

## 2017-07-11 DIAGNOSIS — Z01.30 BLOOD PRESSURE CHECK: Primary | ICD-10-CM

## 2017-07-11 DIAGNOSIS — R00.1 BRADYCARDIA: ICD-10-CM

## 2017-07-11 DIAGNOSIS — R94.31 ABNORMAL EKG: ICD-10-CM

## 2017-07-11 DIAGNOSIS — I51.7 LVH (LEFT VENTRICULAR HYPERTROPHY): ICD-10-CM

## 2017-07-11 NOTE — LETTER
Melody Michael 1970 7/11/2017 Dear Aggie Odonnell NP I had the pleasure of evaluating  Ms. Ozuna in office today. Below are the relevant portions of my assessment and plan of care. ICD-10-CM ICD-9-CM 1. Chest pain, unspecified type R07.9 786.50 SCHEDULE NUCLEAR STUDY ? angina,chest wall 2. Dyslipidemia E78.5 272.4   
 high tg and low hdl 
on lovaza 3. LVH (left ventricular hypertrophy) I51.7 429.3   
 mild lvh 4. Abnormal EKG R94.31 794.31   
 ? strain 
? ischemia 5. Cocaine abuse F14.10 305.60   
 discussed cessation Current Outpatient Prescriptions Medication Sig Dispense Refill  calcium-cholecalciferol, D3, (CALTRATE 600+D) tablet Take 1 Tab by mouth daily.  lisinopril-hydroCHLOROthiazide (PRINZIDE, ZESTORETIC) 20-25 mg per tablet Take 1 Tab by mouth daily. Indications: hypertension 30 Tab 3  cloNIDine HCl (CATAPRES) 0.3 mg tablet Take 1 Tab by mouth two (2) times a day. Indications: hypertension 60 Tab 3  
 clindamycin (CLEOCIN T) 1 % lotion Apply  to affected area two (2) times a day. use thin film on affected area 1 Bottle 0  
 omega-3 acid ethyl esters (LOVAZA) 1 gram capsule Take 2 Caps by mouth two (2) times a day. 360 Cap 3  
 ergocalciferol (VITAMIN D2) 50,000 unit capsule Take one capsule every 7 days till gone, then start taking over-the-counter Vitamin D3 2,000 units every day 4 Cap 2  
 albuterol (PROVENTIL HFA, VENTOLIN HFA, PROAIR HFA) 90 mcg/actuation inhaler Take 2 Puffs by inhalation every four (4) hours as needed for Wheezing. 6 Inhaler 3  
 amLODIPine-atorvastatin (CADUET) 10-10 mg per tablet Take 1 Tab by mouth daily. 90 Tab 3  
 omega-3 acid ethyl esters (LOVAZA) 1 gram capsule Take 2 Caps by mouth two (2) times a day. [place] Orders Placed This Encounter  SCHEDULE NUCLEAR STUDY  
  lexiscan stress test  
  Standing Status:   Future Standing Expiration Date:   7/11/2018 If you have questions, please do not hesitate to call me. I look forward to following Ms. Hernandezorestes along with you. Sincerely, Latrice Milan MD

## 2017-07-11 NOTE — MR AVS SNAPSHOT
Visit Information Date & Time Provider Department Dept. Phone Encounter #  
 7/11/2017 12:30 PM Margret Jimenez MD [facility] [address] [facility] [id number] Follow-up Instructions Return for F/u after tests. Your Appointments 7/28/2017  7:00 AM  
PROCEDURE with [place] NUC [facility] ([address]) Appt Note: rashida ahmadi matilda [address] [facility] [address]  
[address], [address] [address]  
  
    
 8/2/2017  9:30 AM  
Follow Up with Geetha Mejia NP [address] [facility] ([address]) Appt Note: 3 mth f/u  
 [facility] [address]  
[address] [facility] [address]  
  
    
 8/23/2017  8:30 AM  
Office Visit with Margret Jimenez MD  
[facility]) Appt Note: post nuc [address] [place] [address]  
[address], [address] [facility] [address] [place] [address] Upcoming Health Maintenance Date Due Pneumococcal 19-64 Medium Risk (1 of 1 - PPSV23) 6/22/1988 DTaP/Tdap/Td series (1 - Tdap) 6/22/1990 INFLUENZA AGE 9 TO ADULT 8/1/2017 PAP AKA CERVICAL CYTOLOGY 11/21/2019 Allergies as of 7/11/2017  Review Complete On: 7/11/2017 By: Margret Jimenez MD  
  
 Severity Noted Reaction Type Reactions Iodinated Contrast- Oral And Iv Dye  08/16/2012    Shortness of Breath Current Immunizations  Reviewed on 10/18/2016 Name Date Influenza Vaccine (Quad) PF 10/13/2016 Not reviewed this visit You Were Diagnosed With   
  
 Codes Comments Chest pain, unspecified type    -  Primary ICD-10-CM: R07.9 ICD-9-CM: 786.50 ? angina,chest wall Dyslipidemia     ICD-10-CM: E78.5 ICD-9-CM: 272.4 high tg and low hdl 
on lovaza LVH (left ventricular hypertrophy)     ICD-10-CM: I51.7 ICD-9-CM: 429.3 mild lvh Abnormal EKG     ICD-10-CM: R94.31 
ICD-9-CM: 794.31 ? strain 
? ischemia Cocaine abuse     ICD-10-CM: F14.10 ICD-9-CM: 305.60 discussed cessation Vitals BP Pulse Height(growth percentile) Weight(growth percentile) LMP BMI  
 117/63 61 5' 2\" (1.575 m) 197 lb (89.4 kg) 05/13/2017 36.03 kg/m2 OB Status Smoking Status Having regular periods Current Every Day Smoker Vitals History BMI and BSA Data Body Mass Index Body Surface Area 36.03 kg/m 2 1.98 m 2 Preferred Pharmacy Pharmacy Name Phone [facility] [address] [phone] Your Updated Medication List  
  
   
This list is accurate as of: 7/11/17 12:40 PM.  Always use your most recent med list.  
  
  
  
  
 albuterol 90 mcg/actuation inhaler Commonly known as:  PROVENTIL HFA, VENTOLIN HFA, PROAIR HFA Take 2 Puffs by inhalation every four (4) hours as needed for Wheezing. amLODIPine-atorvastatin 10-10 mg per tablet Commonly known as:  CADUET Take 1 Tab by mouth daily. calcium-cholecalciferol (D3) tablet Commonly known as:  CALTRATE 600+D Take 1 Tab by mouth daily. clindamycin 1 % lotion Commonly known as:  CLEOCIN T Apply  to affected area two (2) times a day. use thin film on affected area  
  
 cloNIDine HCl 0.3 mg tablet Commonly known as:  CATAPRES Take 1 Tab by mouth two (2) times a day. Indications: hypertension  
  
 ergocalciferol 50,000 unit capsule Commonly known as:  VITAMIN D2 Take one capsule every 7 days till gone, then start taking over-the-counter Vitamin D3 2,000 units every day  
  
 lisinopril-hydroCHLOROthiazide 20-25 mg per tablet Commonly known as:  Nate Boswell Take 1 Tab by mouth daily. Indications: hypertension * omega-3 acid ethyl esters 1 gram capsule Commonly known as:  Epimenio Leopoldo Take 2 Caps by mouth two (2) times a day. * omega-3 acid ethyl esters 1 gram capsule Commonly known as:  Fairmary Camel Take 2 Caps by mouth two (2) times a day. * Notice: This list has 2 medication(s) that are the same as other medications prescribed for you. Read the directions carefully, and ask your doctor or other care provider to review them with you. Follow-up Instructions Return for F/u after tests. To-Do List   
 07/18/2017 Nursing:  SCHEDULE NUCLEAR STUDY Please provide this summary of care documentation to your next provider. Your primary care clinician is listed as Mamta Lamar. If you have any questions after today's visit, please call [phone].

## 2017-07-11 NOTE — PROGRESS NOTES
Subjective:   Kenisha Pena is a 49 y.o. female with hypertension. Patient here for blood pressure check. Current Outpatient Prescriptions   Medication Sig Dispense Refill    calcium-cholecalciferol, D3, (CALTRATE 600+D) tablet Take 1 Tab by mouth daily.  lisinopril-hydroCHLOROthiazide (PRINZIDE, ZESTORETIC) 20-25 mg per tablet Take 1 Tab by mouth daily. Indications: hypertension 30 Tab 3    cloNIDine HCl (CATAPRES) 0.3 mg tablet Take 1 Tab by mouth two (2) times a day. Indications: hypertension 60 Tab 3    omega-3 acid ethyl esters (LOVAZA) 1 gram capsule Take 2 Caps by mouth two (2) times a day. 360 Cap 3    albuterol (PROVENTIL HFA, VENTOLIN HFA, PROAIR HFA) 90 mcg/actuation inhaler Take 2 Puffs by inhalation every four (4) hours as needed for Wheezing. 6 Inhaler 3    amLODIPine-atorvastatin (CADUET) 10-10 mg per tablet Take 1 Tab by mouth daily. 90 Tab 3    clindamycin (CLEOCIN T) 1 % lotion Apply  to affected area two (2) times a day. use thin film on affected area 1 Bottle 0    omega-3 acid ethyl esters (LOVAZA) 1 gram capsule Take 2 Caps by mouth two (2) times a day. 360 Cap 3    ergocalciferol (VITAMIN D2) 50,000 unit capsule Take one capsule every 7 days till gone, then start taking over-the-counter Vitamin D3 2,000 units every day 4 Cap 2      Hypertension ROS: taking medications as instructed, no medication side effects noted, no TIA's, no chest pain on exertion, no dyspnea on exertion, no swelling of ankles. New concerns: none. Objective:   Visit Vitals    /79    Pulse (!) 58    Resp 18    LMP 05/13/2017    SpO2 98%      Appearance alert, well appearing, and in no distress, oriented to person, place, and time and overweight. General exam BP noted to be well controlled today in office. Assessment:    Hypertension stable, needs to quit smoking. Plan:   reviewed diet, exercise and weight control. Patient has appt w/ Dr Arcenio Rivas at 12:30 today.

## 2017-07-11 NOTE — PROGRESS NOTES
HISTORY OF PRESENT ILLNESS  Mechelle Sunshine is a 49 y.o. female. New Patient   The history is provided by the patient. Associated symptoms include chest pain. Pertinent negatives include no abdominal pain, no headaches and no shortness of breath. Slow Heart Rate   The history is provided by the patient. This is a recurrent problem. The problem has not changed since onset. Associated symptoms include chest pain. Pertinent negatives include no abdominal pain, no headaches and no shortness of breath. Nothing aggravates the symptoms. Chest Pain    The history is provided by the patient. This is a recurrent problem. The problem has not changed since onset. The problem occurs every several days. The pain is associated with exertion and rest. The pain is present in the substernal region. The pain is mild. The quality of the pain is described as dull. Pertinent negatives include no abdominal pain, no claudication, no cough, no dizziness, no fever, no headaches, no hemoptysis, no nausea, no orthopnea, no palpitations, no PND, no shortness of breath, no sputum production, no vomiting and no weakness. Review of Systems   Constitutional: Negative for chills and fever. HENT: Negative for nosebleeds. Eyes: Negative for blurred vision and double vision. Respiratory: Negative for cough, hemoptysis, sputum production, shortness of breath and wheezing. Cardiovascular: Positive for chest pain. Negative for palpitations, orthopnea, claudication, leg swelling and PND. Gastrointestinal: Negative for abdominal pain, heartburn, nausea and vomiting. Musculoskeletal: Negative for myalgias. Skin: Negative for rash. Neurological: Negative for dizziness, weakness and headaches. Endo/Heme/Allergies: Does not bruise/bleed easily.      Family History   Problem Relation Age of Onset    Hypertension Mother     Heart Attack Father        Past Medical History:   Diagnosis Date    Bronchitis     Cocaine abuse 3/26/2015  Dental caries     Depression     Depression 8/31/2016    Drug abuse     Elevated hematocrit ([facility] [address] 8/31/2016    ETOH abuse     H/O screening mammography 12/06/2016    No evidence of malignancy     HPV (human papilloma virus) infection 11/2016    The patient referred to [facility]     Hypertension     Mood swings (HCC)     Psychiatric disorder     depression, Bipolar    Tobacco abuse     Vitamin D deficiency        Past Surgical History:   Procedure Laterality Date    HX APPENDECTOMY      HX COLPOSCOPY  12/06/2016    Low-grade courtney/mild dysplasia RICK I    HX HERNIA REPAIR         Social History   Substance Use Topics    Smoking status: Current Every Day Smoker     Packs/day: 0.50     Years: 20.00     Types: Cigarettes    Smokeless tobacco: Never Used    Alcohol use 0.0 oz/week     3 - 6 Cans of beer per week      Comment: 3 or 4 beers weekly        Allergies   Allergen Reactions    Iodinated Contrast- Oral And Iv Dye Shortness of Breath       Prior to Admission medications    Medication Sig Start Date End Date Taking? Authorizing Provider   calcium-cholecalciferol, D3, (CALTRATE 600+D) tablet Take 1 Tab by mouth daily. Yes Historical Provider   lisinopril-hydroCHLOROthiazide (PRINZIDE, ZESTORETIC) 20-25 mg per tablet Take 1 Tab by mouth daily. Indications: hypertension 4/27/17  Yes Chuck Villarreal NP   cloNIDine HCl (CATAPRES) 0.3 mg tablet Take 1 Tab by mouth two (2) times a day. Indications: hypertension 4/27/17  Yes Chuck Villarreal NP   clindamycin (CLEOCIN T) 1 % lotion Apply  to affected area two (2) times a day. use thin film on affected area 4/27/17  Yes Chuck Villarreal NP   omega-3 acid ethyl esters (LOVAZA) 1 gram capsule Take 2 Caps by mouth two (2) times a day.  4/27/17  Yes Chuck Villarreal NP   ergocalciferol (VITAMIN D2) 50,000 unit capsule Take one capsule every 7 days till gone, then start taking over-the-counter Vitamin D3 2,000 units every day 11/28/16  Yes Kang MCDONALD [place], NP   albuterol (PROVENTIL HFA, VENTOLIN HFA, PROAIR HFA) 90 mcg/actuation inhaler Take 2 Puffs by inhalation every four (4) hours as needed for Wheezing. 11/28/16  Yes Rosales Prom, NP   amLODIPine-atorvastatin (CADUET) 10-10 mg per tablet Take 1 Tab by mouth daily. 10/13/16  Yes Rosales Prom, NP   omega-3 acid ethyl esters (LOVAZA) 1 gram capsule Take 2 Caps by mouth two (2) times a day. 4/27/17   Rosales Prom, NP         Visit Vitals    /63    Pulse 61    Ht 5' 2\" (1.575 m)    Wt 89.4 kg (197 lb)    LMP 05/13/2017    BMI 36.03 kg/m2         Physical Exam   Constitutional: She is oriented to person, place, and time. She appears well-developed and well-nourished. HENT:   Head: Normocephalic and atraumatic. Eyes: Conjunctivae are normal.   Neck: Neck supple. No JVD present. No tracheal deviation present. No thyromegaly present. Cardiovascular: Normal rate and regular rhythm. PMI is not displaced. Exam reveals no gallop, no S3 and no decreased pulses. No murmur heard. Pulmonary/Chest: No respiratory distress. She has no wheezes. She has no rales. She exhibits no tenderness. Abdominal: Soft. There is no tenderness. Musculoskeletal: She exhibits no edema. Neurological: She is alert and oriented to person, place, and time. Skin: Skin is warm. Psychiatric: She has a normal mood and affect. Ms. Yumiko Garcia has a reminder for a \"due or due soon\" health maintenance. I have asked that she contact her primary care provider for follow-up on this health maintenance. I have personally reviewed patients ekg done at other facility. SUMMARY:6/2017  Left ventricle: Systolic function was normal. Ejection fraction was  estimated in the range of 55 % to 60 %. No obvious wall motion  abnormalities identified in the views obtained. Wall thickness was mildly  to moderately increased. There was mild concentric hypertrophy. Assessment         ICD-10-CM ICD-9-CM    1. Chest pain, unspecified type R07.9 786.50 SCHEDULE NUCLEAR STUDY    ? angina,chest wall   2. Dyslipidemia E78.5 272.4     high tg and low hdl  on lovaza   3. LVH (left ventricular hypertrophy) I51.7 429.3     mild lvh   4. Abnormal EKG R94.31 794.31     ? strain  ? ischemia   5. Cocaine abuse F14.10 305.60     discussed cessation       There are no discontinued medications. Orders Placed This Encounter    SCHEDULE NUCLEAR STUDY     lexiscan stress test     Standing Status:   Future     Standing Expiration Date:   7/11/2018       Follow-up Disposition:  Return for F/u after tests.

## 2017-07-20 ENCOUNTER — TELEPHONE (OUTPATIENT)
Dept: FAMILY MEDICINE CLINIC | Age: 47
End: 2017-07-20

## 2017-07-23 ENCOUNTER — HOSPITAL ENCOUNTER (EMERGENCY)
Age: 47
Discharge: HOME | End: 2017-07-23
Attending: EMERGENCY MEDICINE
Payer: SUBSIDIZED

## 2017-07-23 VITALS
OXYGEN SATURATION: 97 % | RESPIRATION RATE: 17 BRPM | TEMPERATURE: 97.3 F | DIASTOLIC BLOOD PRESSURE: 74 MMHG | SYSTOLIC BLOOD PRESSURE: 118 MMHG | WEIGHT: 193.4 LBS | HEART RATE: 69 BPM | BODY MASS INDEX: 35.37 KG/M2

## 2017-07-23 DIAGNOSIS — R21 RASH: Primary | ICD-10-CM

## 2017-07-23 PROCEDURE — 99282 EMERGENCY DEPT VISIT SF MDM: CPT

## 2017-07-23 RX ORDER — METHYLPREDNISOLONE 4 MG/1
TABLET ORAL
Qty: 1 DOSE PACK | Refills: 0 | Status: SHIPPED | OUTPATIENT
Start: 2017-07-23 | End: 2017-08-16 | Stop reason: ALTCHOICE

## 2017-07-23 RX ORDER — DIPHENHYDRAMINE HCL 25 MG
CAPSULE ORAL
Qty: 16 CAP | Refills: 0 | Status: SHIPPED | OUTPATIENT
Start: 2017-07-23 | End: 2017-08-16
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
## 2017-07-23 NOTE — DISCHARGE INSTRUCTIONS

## 2017-07-24 NOTE — TELEPHONE ENCOUNTER
Medication: Caduet 10/10, dose: 1 tab, how often: daily, current number of medication days provided: 90, refill per application. Lot #:  # [id number], EXP 07/2019. This medication was received and verified for the following 1. Correct Patient, 2. Correct Diagnosis, 3. Correct Drug, 4. Correct route, and no current allergy to medication. Please contact patient to come  their medications.      Shady Shah, MSN,RN,[facility]

## 2017-08-03 DIAGNOSIS — E78.5 DYSLIPIDEMIA: Primary | ICD-10-CM

## 2017-08-03 DIAGNOSIS — I10 HTN, GOAL BELOW 140/90: ICD-10-CM

## 2017-08-08 ENCOUNTER — HOSPITAL ENCOUNTER (OUTPATIENT)
Dept: LAB | Age: 47
Discharge: HOME | End: 2017-08-08

## 2017-08-08 ENCOUNTER — LAB ONLY (OUTPATIENT)
Dept: FAMILY MEDICINE CLINIC | Age: 47
End: 2017-08-08

## 2017-08-08 DIAGNOSIS — E78.5 DYSLIPIDEMIA: ICD-10-CM

## 2017-08-08 DIAGNOSIS — Z91.89 COMPLIANCE WITH MEDICATION REGIMEN: ICD-10-CM

## 2017-08-08 DIAGNOSIS — I10 HTN, GOAL BELOW 140/90: ICD-10-CM

## 2017-08-08 DIAGNOSIS — E78.5 DYSLIPIDEMIA: Primary | ICD-10-CM

## 2017-08-08 LAB
ALBUMIN SERPL BCP-MCNC: 3.6 G/DL (ref 3.4–5)
ALBUMIN/GLOB SERPL: 1.1 {RATIO} (ref 0.8–1.7)
ALP SERPL-CCNC: 69 U/L (ref 45–117)
ALT SERPL-CCNC: 25 U/L (ref 13–56)
ANION GAP BLD CALC-SCNC: 7 MMOL/L (ref 3–18)
AST SERPL W P-5'-P-CCNC: 12 U/L (ref 15–37)
BILIRUB SERPL-MCNC: 0.3 MG/DL (ref 0.2–1)
BUN SERPL-MCNC: 10 MG/DL (ref 7–18)
BUN/CREAT SERPL: 17 (ref 12–20)
CALCIUM SERPL-MCNC: 9.2 MG/DL (ref 8.5–10.1)
CHLORIDE SERPL-SCNC: 100 MMOL/L (ref 100–108)
CHOLEST SERPL-MCNC: 187 MG/DL
CO2 SERPL-SCNC: 32 MMOL/L (ref 21–32)
CREAT SERPL-MCNC: 0.59 MG/DL (ref 0.6–1.3)
GLOBULIN SER CALC-MCNC: 3.2 G/DL (ref 2–4)
GLUCOSE SERPL-MCNC: 80 MG/DL (ref 74–99)
HDLC SERPL-MCNC: 37 MG/DL (ref 40–60)
HDLC SERPL: 5.1 {RATIO} (ref 0–5)
LDLC SERPL CALC-MCNC: 108.4 MG/DL (ref 0–100)
LIPID PROFILE,FLP: ABNORMAL
MAGNESIUM SERPL-MCNC: 2.5 MG/DL (ref 1.6–2.6)
POTASSIUM SERPL-SCNC: 3.7 MMOL/L (ref 3.5–5.5)
PROT SERPL-MCNC: 6.8 G/DL (ref 6.4–8.2)
SODIUM SERPL-SCNC: 139 MMOL/L (ref 136–145)
TRIGL SERPL-MCNC: 208 MG/DL (ref ?–150)
VLDLC SERPL CALC-MCNC: 41.6 MG/DL

## 2017-08-08 PROCEDURE — 83735 ASSAY OF MAGNESIUM: CPT | Performed by: NURSE PRACTITIONER

## 2017-08-08 PROCEDURE — 80053 COMPREHEN METABOLIC PANEL: CPT | Performed by: NURSE PRACTITIONER

## 2017-08-08 PROCEDURE — 80061 LIPID PANEL: CPT | Performed by: NURSE PRACTITIONER

## 2017-08-09 ENCOUNTER — TELEPHONE (OUTPATIENT)
Dept: FAMILY MEDICINE CLINIC | Age: 47
End: 2017-08-09

## 2017-08-10 NOTE — TELEPHONE ENCOUNTER
Medication: Lovaza 1gm, dose: 2 caps, how often: BID, current number of medication days provided: 90, refill per application. Lot #: [id number], EXP 08/2018. This medication was received and verified for the following 1. Correct Patient, 2. Correct Diagnosis, 3. Correct Drug, 4. Correct route, and no current allergy to medication. Please contact patient to come  their medications.      Bhavna Noble MSN, RN, FNP-C     [facility]

## 2017-08-16 ENCOUNTER — CLINICAL SUPPORT (OUTPATIENT)
Dept: CARDIOLOGY CLINIC | Age: 47
End: 2017-08-16

## 2017-08-16 ENCOUNTER — OFFICE VISIT (OUTPATIENT)
Dept: FAMILY MEDICINE CLINIC | Age: 47
End: 2017-08-16

## 2017-08-16 VITALS
DIASTOLIC BLOOD PRESSURE: 74 MMHG | WEIGHT: 192.8 LBS | HEIGHT: 62 IN | RESPIRATION RATE: 20 BRPM | TEMPERATURE: 98.6 F | SYSTOLIC BLOOD PRESSURE: 116 MMHG | BODY MASS INDEX: 35.48 KG/M2 | OXYGEN SATURATION: 98 % | HEART RATE: 53 BPM

## 2017-08-16 DIAGNOSIS — E78.5 DYSLIPIDEMIA: ICD-10-CM

## 2017-08-16 DIAGNOSIS — R07.9 CHEST PAIN, UNSPECIFIED: Primary | ICD-10-CM

## 2017-08-16 DIAGNOSIS — R07.9 CHEST PAIN, UNSPECIFIED: ICD-10-CM

## 2017-08-16 DIAGNOSIS — R94.31 NONSPECIFIC ABNORMAL ELECTROCARDIOGRAM (ECG) (EKG): ICD-10-CM

## 2017-08-16 DIAGNOSIS — E55.9 VITAMIN D DEFICIENCY: ICD-10-CM

## 2017-08-16 DIAGNOSIS — Z72.0 TOBACCO ABUSE: ICD-10-CM

## 2017-08-16 DIAGNOSIS — F31.9 BIPOLAR DISORDER WITH DEPRESSION (HCC): ICD-10-CM

## 2017-08-16 DIAGNOSIS — R29.818 SUSPECTED SLEEP APNEA: ICD-10-CM

## 2017-08-16 DIAGNOSIS — I10 HTN, GOAL BELOW 140/90: Primary | ICD-10-CM

## 2017-08-16 DIAGNOSIS — R06.83 SNORING: ICD-10-CM

## 2017-08-16 DIAGNOSIS — F14.10 COCAINE ABUSE (HCC): ICD-10-CM

## 2017-08-16 DIAGNOSIS — E78.5 OTHER AND UNSPECIFIED HYPERLIPIDEMIA: ICD-10-CM

## 2017-08-16 RX ORDER — OLANZAPINE AND FLUOXETINE 3; 25 MG/1; MG/1
1 CAPSULE ORAL EVERY EVENING
Qty: 90 CAP | Refills: 0 | Status: SHIPPED | COMMUNITY
Start: 2017-08-16 | End: 2017-11-15 | Stop reason: SDUPTHER

## 2017-08-16 RX ORDER — CLONIDINE HYDROCHLORIDE 0.3 MG/1
0.3 TABLET ORAL 2 TIMES DAILY
Qty: 60 TAB | Refills: 3 | Status: SHIPPED | OUTPATIENT
Start: 2017-08-16 | End: 2018-05-07 | Stop reason: SDUPTHER

## 2017-08-16 RX ORDER — OLANZAPINE AND FLUOXETINE 3; 25 MG/1; MG/1
1 CAPSULE ORAL EVERY EVENING
Qty: 90 CAP | Refills: 3 | Status: SHIPPED | COMMUNITY
Start: 2017-08-16 | End: 2018-06-14

## 2017-08-16 RX ORDER — AMLODIPINE BESYLATE AND ATORVASTATIN CALCIUM 10; 20 MG/1; MG/1
1 TABLET, FILM COATED ORAL DAILY
Qty: 90 TAB | Refills: 0 | Status: SHIPPED | COMMUNITY
Start: 2017-08-16 | End: 2017-08-28 | Stop reason: SDUPTHER

## 2017-08-16 RX ORDER — AMLODIPINE BESYLATE AND ATORVASTATIN CALCIUM 10; 20 MG/1; MG/1
1 TABLET, FILM COATED ORAL DAILY
Qty: 90 TAB | Refills: 3 | Status: SHIPPED | COMMUNITY
Start: 2017-08-16 | End: 2017-11-15 | Stop reason: DRUGHIGH

## 2017-08-16 RX ORDER — LISINOPRIL AND HYDROCHLOROTHIAZIDE 20; 25 MG/1; MG/1
1 TABLET ORAL DAILY
Qty: 30 TAB | Refills: 3 | Status: SHIPPED | OUTPATIENT
Start: 2017-08-16 | End: 2017-11-15 | Stop reason: ALTCHOICE

## 2017-08-16 NOTE — MR AVS SNAPSHOT
Visit Information Date & Time Provider Department Dept. Phone Encounter #  
 8/16/2017 11:00 AM Gopal Voss NP [facility] [id number] Follow-up Instructions Return in about 3 months (around 11/16/2017), or if symptoms worsen or fail to improve. Your Appointments 8/23/2017  8:30 AM  
Office Visit with Christopher Gottlieb MD  
[facility]) Appt Note: post nuc [address], [address] [facility] [address]  
[address], [address] [address]  
  
    
 9/8/2017  9:45 AM  
Office Visit with Christopher Gottlieb MD  
[facility]) Appt Note: post nuc [address], [address] [facility] [id number] [phone]  
  
    
 11/15/2017  8:00 AM  
Follow Up with Gopal Voss NP [address] [facility] ([facility]) Appt Note: 3 mth follow up  
 [address] [facility] [address]  
[address] [facility] [address] Upcoming Health Maintenance Date Due DTaP/Tdap/Td series (1 - Tdap) 6/22/1990 INFLUENZA AGE 9 TO ADULT 8/1/2017 Pneumococcal 19-64 Medium Risk (1 of 1 - PPSV23) 12/16/2017* PAP AKA CERVICAL CYTOLOGY 11/21/2019 *Topic was postponed. The date shown is not the original due date. Allergies as of 8/16/2017  Review Complete On: 8/16/2017 By: Gopal Voss NP Severity Noted Reaction Type Reactions Iodinated Contrast- Oral And Iv Dye  08/16/2012    Shortness of Breath Current Immunizations  Reviewed on 10/18/2016 Name Date Influenza Vaccine (Quad) PF 10/13/2016 Not reviewed this visit You Were Diagnosed With   
  
 Codes Comments HTN, goal below 140/90    -  Primary ICD-10-CM: I10 
ICD-9-CM: 401.9 Dyslipidemia     ICD-10-CM: E78.5 ICD-9-CM: 272.4 Bipolar disorder with depression ([facility] [address]     ICD-10-CM: F31.30 ICD-9-CM: 296.50 Cocaine abuse     ICD-10-CM: F14.10 ICD-9-CM: 305.60 Vitamin D deficiency     ICD-10-CM: E55.9 ICD-9-CM: 268.9 Tobacco abuse     ICD-10-CM: Z72.0 ICD-9-CM: 305.1 Snoring     ICD-10-CM: R06.83 
ICD-9-CM: 786.09 Suspected sleep apnea     ICD-10-CM: G47.30 ICD-9-CM: 780.57 Vitals BP Pulse Temp Resp Height(growth percentile) Weight(growth percentile) 116/74 (BP 1 Location: Left arm, BP Patient Position: Sitting) (!) 53 98.6 °F (37 °C) (Oral) 20 5' 2\" (1.575 m) 192 lb 12.8 oz (87.5 kg) LMP SpO2 BMI OB Status Smoking Status 07/20/2017 98% 35.26 kg/m2 Having regular periods Current Every Day Smoker BMI and BSA Data Body Mass Index Body Surface Area  
 35.26 kg/m 2 1.96 m 2 Preferred Pharmacy Pharmacy Name Phone [facility] - [address] [phone] Your Updated Medication List  
  
   
This list is accurate as of: 8/16/17 11:49 AM.  Always use your most recent med list.  
  
  
  
  
 albuterol 90 mcg/actuation inhaler Commonly known as:  PROVENTIL HFA, VENTOLIN HFA, PROAIR HFA Take 2 Puffs by inhalation every four (4) hours as needed for Wheezing. * amLODIPine-atorvastatin 10-20 mg per tablet Commonly known as:  CADUET Take 1 Tab by mouth daily. * amLODIPine-atorvastatin 10-20 mg per tablet Commonly known as:  CADUET Take 1 Tab by mouth daily. calcium-cholecalciferol (D3) tablet Commonly known as:  CALTRATE 600+D Take 1 Tab by mouth daily. clindamycin 1 % lotion Commonly known as:  CLEOCIN T Apply  to affected area two (2) times a day. use thin film on affected area  
  
 cloNIDine HCl 0.3 mg tablet Commonly known as:  CATAPRES Take 1 Tab by mouth two (2) times a day. Indications: hypertension  
  
 ergocalciferol 50,000 unit capsule Commonly known as:  VITAMIN D2  
 Take one capsule every 7 days till gone, then start taking over-the-counter Vitamin D3 2,000 units every day  
  
 lisinopril-hydroCHLOROthiazide 20-25 mg per tablet Commonly known as:  Aura Job Take 1 Tab by mouth daily. Indications: hypertension * OLANZapine-FLUoxetine 3-25 mg per capsule Commonly known as:  SYMBYAX Take 1 Cap by mouth every evening. Indications: DEPRESSION ASSOCIATED WITH BIPOLAR DISORDER  
  
 * OLANZapine-FLUoxetine 3-25 mg per capsule Commonly known as:  SYMBYAX Take 1 Cap by mouth every evening. Indications: DEPRESSION ASSOCIATED WITH BIPOLAR DISORDER  
  
 omega-3 acid ethyl esters 1 gram capsule Commonly known as:  Nick Real Take 2 Caps by mouth two (2) times a day. * Notice: This list has 4 medication(s) that are the same as other medications prescribed for you. Read the directions carefully, and ask your doctor or other care provider to review them with you. Prescriptions Printed Refills OLANZapine-FLUoxetine (SYMBYAX) 3-25 mg per capsule 3 Sig: Take 1 Cap by mouth every evening. Indications: DEPRESSION ASSOCIATED WITH BIPOLAR DISORDER Class: Program  
 Route: Oral  
 amLODIPine-atorvastatin (CADUET) 10-20 mg per tablet 3 Sig: Take 1 Tab by mouth daily. Class: Program  
 Route: Oral  
  
Prescriptions Sent to Mail Order Refills OLANZapine-FLUoxetine (SYMBYAX) 3-25 mg per capsule 3 Sig: Take 1 Cap by mouth every evening. Indications: DEPRESSION ASSOCIATED WITH BIPOLAR DISORDER Class: Program  
 Pharmacy: [address], [address] [facility]. Ph #: [phone] Route: Oral  
 amLODIPine-atorvastatin (CADUET) 10-20 mg per tablet 3 Sig: Take 1 Tab by mouth daily. Class: Program  
 Pharmacy: [address], [address] [facility]. Ph #: [phone] Route: Oral  
  
Prescriptions Sent to Pharmacy Refills OLANZapine-FLUoxetine (SYMBYAX) 3-25 mg per capsule 3 Sig: Take 1 Cap by mouth every evening. Indications: DEPRESSION ASSOCIATED WITH BIPOLAR DISORDER Class: Program  
 Pharmacy: [address]. Ph #: [phone] Route: Oral  
 amLODIPine-atorvastatin (CADUET) 10-20 mg per tablet 3 Sig: Take 1 Tab by mouth daily. Class: Program  
 Pharmacy: [address], [address] [place] [address]. Ph #: [phone] Route: Oral  
 lisinopril-hydroCHLOROthiazide (PRINZIDE, ZESTORETIC) 20-25 mg per tablet 3 Sig: Take 1 Tab by mouth daily. Indications: hypertension Class: Normal  
 Pharmacy: [place] [address]. Ph #: [phone] Route: Oral  
 cloNIDine HCl (CATAPRES) 0.3 mg tablet 3 Sig: Take 1 Tab by mouth two (2) times a day. Indications: hypertension Class: Normal  
 Pharmacy: [place] [address]. Ph #: [phone] Route: Oral  
  
We Performed the Following REFERRAL TO NEUROLOGY [UFH36 Custom] Comments: For sleep stud, positive sleep apnea screening. STOP BANG 5 Follow-up Instructions Return in about 3 months (around 11/16/2017), or if symptoms worsen or fail to improve. Referral Information Referral ID Referred By Referred To  
  
 [id number] Danica MCDONALD Not Available Visits Status Start Date End Date 1 New Request 8/16/17 8/16/18 If your referral has a status of pending review or denied, additional information will be sent to support the outcome of this decision. Patient Instructions High Cholesterol: Care Instructions Your Care Instructions Cholesterol is a type of fat in your blood. It is needed for many body functions, such as making new cells. Cholesterol is made by your body. It also comes from food you eat.  High cholesterol means that you have too much of the fat in your blood. This raises your risk of a heart attack and stroke. LDL and HDL are part of your total cholesterol. LDL is the \"bad\" cholesterol. High LDL can raise your risk for heart disease, heart attack, and stroke. HDL is the \"good\" cholesterol. It helps clear bad cholesterol from the body. High HDL is linked with a lower risk of heart disease, heart attack, and stroke. Your cholesterol levels help your doctor find out your risk for having a heart attack or stroke. You and your doctor can talk about whether you need to lower your risk and what treatment is best for you. A heart-healthy lifestyle along with medicines can help lower your cholesterol and your risk. The way you choose to lower your risk will depend on how high your risk is for heart attack and stroke. It will also depend on how you feel about taking medicines. Follow-up care is a key part of your treatment and safety. Be sure to make and go to all appointments, and call your doctor if you are having problems. It's also a good idea to know your test results and keep a list of the medicines you take. How can you care for yourself at home? · Eat a variety of foods every day. Good choices include fruits, vegetables, whole grains (like oatmeal), dried beans and peas, nuts and seeds, soy products (like tofu), and fat-free or low-fat dairy products. · Replace butter, margarine, and hydrogenated or partially hydrogenated oils with olive and canola oils. (Canola oil margarine without trans fat is fine.) · Replace red meat with fish, poultry, and soy protein (like tofu). · Limit processed and packaged foods like chips, crackers, and cookies. · Bake, broil, or steam foods. Don't cole them. · Be physically active. Get at least 30 minutes of exercise on most days of the week. Walking is a good choice. You also may want to do other activities, such as running, swimming, cycling, or playing tennis or team sports. · Stay at a healthy weight or lose weight by making the changes in eating and physical activity listed above. Losing just a small amount of weight, even 5 to 10 pounds, can reduce your risk for having a heart attack or stroke. · Do not smoke. When should you call for help? Watch closely for changes in your health, and be sure to contact your doctor if: 
· You need help making lifestyle changes. · You have questions about your medicine. Where can you learn more? Go to http://los-zack.info/. Enter A051 in the search box to learn more about \"High Cholesterol: Care Instructions. \" Current as of: April 3, 2017 Content Version: 11.3 © 9523-6717 [employer]. Care instructions adapted under license by [employer] (which disclaims liability or warranty for this information). If you have questions about a medical condition or this instruction, always ask your healthcare professional. [place] [address] any warranty or liability for your use of this information. Olanzapine/Fluoxetine (By mouth) Fluoxetine Hydrochloride (zerc-IA-b-teen chino-droe-KLOR-laya), Olanzapine (wo-NBA-wu-peen) Treats depression. This medicine contains an SSRI. Brand Name(s): Symbyax There may be other brand names for this medicine. When This Medicine Should Not Be Used: This medicine is not right for everyone. Do not use it if you have had an allergic reaction to olanzapine or fluoxetine. How to Use This Medicine:  
Capsule · Take your medicine as directed. Your dose may need to be changed several times to find what works best for you. It is best to take this medicine in the evening. · This medicine should come with a Medication Guide. Ask your pharmacist for a copy if you do not have one. · Missed dose: Take a dose as soon as you remember. If it is almost time for your next dose, wait until then and take a regular dose.  Do not take extra medicine to make up for a missed dose. · Store the medicine in a closed container at room temperature, away from heat, moisture, and direct light. Drugs and Foods to Avoid: Ask your doctor or pharmacist before using any other medicine, including over-the-counter medicines, vitamins, and herbal products. · Do not use this medicine together with pimozide or thioridazine. Do not use this medicine within 14 days of using an MAO inhibitor (MAOI), and do not start an MAOI for at least 5 weeks after you stop using fluoxetine. · Some foods and medicines can affect how olanzapine/fluoxetine works. Tell your doctor if you are also using any of the following: 
¨ Buspirone, digitoxin, dolasetron, fentanyl, levodopa, lithium, mefloquine, methadone, pentamidine, probucol, To's wort, tacrolimus, tramadol, tryptophan, or vinblastine ¨ Other medicine to treat depression (such as fluvoxamine), medicine to treat mental illness, triptan medicine to treat migraine headaches, medicine for seizures (such as carbamazepine, phenytoin), blood pressure medicine, medicine for heart rhythm problems, an antibiotic, a diuretic (water pill), an NSAID pain or arthritis medicine (such as aspirin, celecoxib, diclofenac, ibuprofen, naproxen), or a blood thinner (such as warfarin) · Do not drink alcohol while you are using this medicine. · Tell your doctor if you use anything else that makes you sleepy. Some examples are allergy medicine, narcotic pain medicine, and alcohol. Warnings While Using This Medicine: · Tell your doctor if you are pregnant, or if you have liver disease, bleeding problems, diabetes, high cholesterol, glaucoma, prostate problems, or a history of breast cancer, tasia, seizures, or severe constipation. Make sure your doctor knows if you have had a heart rhythm problem (such as QT prolongation), or if you have had a heart attack, heart failure, low blood pressure, or a stroke. · Do not breastfeed while you are using this medicine. · For some children, teenagers, and young adults, this medicine may increase mental or emotional problems. This may lead to thoughts of suicide and violence. Talk with your doctor right away if you have any thoughts or behavior changes that concern you. Tell your doctor if you or anyone in your family has a history of bipolar disorder or suicide attempts. · This medicine may cause the following problems: 
¨ Neuroleptic malignant syndrome (a nerve and muscle problem) ¨ High blood sugar, cholesterol, or triglyceride levels ¨ Serotonin syndrome (may be life-threatening when used with certain other medicines) ¨ Tardive dyskinesia (a muscle problem that may become permanent) ¨ Higher risk of bleeding ¨ Low sodium levels in the blood ¨ Heart rhythm changes · This medicine may make you dizzy or drowsy. Do not drive or do anything else that could be dangerous until you know how this medicine affects you. · This medicine may make it more difficult for your body to cool down. Be careful to not become overheated during exercise or hot weather, because you could have heat stroke. · Do not stop using this medicine suddenly. Your doctor will need to slowly decrease your dose before you stop it completely. · Your doctor will do lab tests at regular visits to check on the effects of this medicine. Keep all appointments. · Keep all medicine out of the reach of children. Never share your medicine with anyone. Possible Side Effects While Using This Medicine:  
Call your doctor right away if you notice any of these side effects: · Allergic reaction: Itching or hives, swelling in your face or hands, swelling or tingling in your mouth or throat, chest tightness, trouble breathing · Anxiety, restlessness, fever, sweating, muscle spasms, nausea, vomiting, diarrhea, seeing or hearing things that are not there · Confusion, weakness, and muscle stiffness or twitching · Eye pain, trouble seeing · Fast, pounding, or uneven heartbeat · Feeling very thirsty or hungry, change in how much or how often you urinate · Fever, chills, cough, sore throat, and body aches · Jerky muscle movement you cannot control (often in your face, tongue, or jaw) · Lightheadedness, dizziness, or fainting · Seizures or tremors · Trouble sleeping, unusual dreams · Unusual behavior, thoughts of hurting yourself or others · Unusual bleeding or bruising If you notice these less serious side effects, talk with your doctor: · Blurred vision · Sexual problems · Sleepiness or unusual drowsiness · Trouble swallowing · Weight gain, increased appetite If you notice other side effects that you think are caused by this medicine, tell your doctor. Call your doctor for medical advice about side effects. You may report side effects to FDA at 4-295-FDA-6205 © 2017 [facility] Information is for End User's use only and may not be sold, redistributed or otherwise used for commercial purposes. The above information is an  only. It is not intended as medical advice for individual conditions or treatments. Talk to your doctor, nurse or pharmacist before following any medical regimen to see if it is safe and effective for you. Depression Treatment: Care Instructions Your Care Instructions Depression is a condition that affects the way you feel, think, and act. It causes symptoms such as low energy, loss of interest in daily activities, and sadness or grouchiness that goes on for a long time. Depression is very common and affects men and women of all ages. Depression is a medical illness caused by changes in the natural chemicals in your brain. It is not a character flaw, and it does not mean that you are a bad or weak person. It does not mean that you are going crazy. It is important to know that depression can be treated.  Medicines, counseling, and self-care can all help. Many people do not get help because they are embarrassed or think that they will get over the depression on their own. But some people do not get better without treatment. Follow-up care is a key part of your treatment and safety. Be sure to make and go to all appointments, and call your doctor if you are having problems. It's also a good idea to know your test results and keep a list of the medicines you take. How can you care for yourself at home? Learn about antidepressant medicines Antidepressant medicines can improve or end the symptoms of depression. You may need to take the medicine for at least 6 months, and often longer. Keep taking your medicine even if you feel better. If you stop taking it too soon, your symptoms may come back or get worse. You may start to feel better within 1 to 3 weeks of taking antidepressant medicine. But it can take as many as 6 to 8 weeks to see more improvement. Talk to your doctor if you have problems with your medicine or if you do not notice any improvement after 3 weeks. Antidepressants can make you feel tired, dizzy, or nervous. Some people have dry mouth, constipation, headaches, sexual problems, an upset stomach, or diarrhea. Many of these side effects are mild and go away on their own after you take the medicine for a few weeks. Some may last longer. Talk to your doctor if side effects bother you too much. You might be able to try a different medicine. If you are pregnant or breastfeeding, talk to your doctor about what medicines you can take. Learn about counseling In many cases, counseling can work as well as medicines to treat mild to moderate depression. Counseling is done by licensed mental health providers, such as psychologists, social workers, and some types of nurses. It can be done in one-on-one sessions or in a group setting. Many people find group sessions helpful. Cognitive-behavioral therapy is a type of counseling. In this treatment therapy, you learn how to see and change unhelpful thinking styles that may be adding to your depression. Counseling and medicines often work well when used together. To manage depression · Be physically active. Getting 30 minutes of exercise each day is good for your body and your mind. Begin slowly if it is hard for you to get started. If you already exercise, keep it up. · Plan something pleasant for yourself every day. Include activities that you have enjoyed in the past. 
· Get enough sleep. Talk to your doctor if you have problems sleeping. · Eat a balanced diet. If you do not feel hungry, eat small snacks rather than large meals. · Do not drink alcohol, use illegal drugs, or take medicines that your doctor has not prescribed for you. They may interfere with your treatment. · Spend time with family and friends. It may help to speak openly about your depression with people you trust. 
· Take your medicines exactly as prescribed. Call your doctor if you think you are having a problem with your medicine. · Do not make major life decisions while you are depressed. Depression may change the way you think. You will be able to make better decisions after you feel better. · Think positively. Challenge negative thoughts with statements such as \"I am hopeful\"; \"Things will get better\"; and \"I can ask for the help I need. \" Write down these statements and read them often, even if you don't believe them yet. · Be patient with yourself. It took time for your depression to develop, and it will take time for your symptoms to improve. Do not take on too much or be too hard on yourself. · Learn all you can about depression from written and online materials. · Check out behavioral health classes to learn more about dealing with depression.  
· Keep the numbers for these national suicide hotlines: 6-448-821-TALK (9-311-487-405.654.8848) and 7-372-PZWOSAA (5-923.358.5248). If you or someone you know talks about suicide or feeling hopeless, get help right away. When should you call for help? Call 911 anytime you think you may need emergency care. For example, call if: 
· You feel you cannot stop from hurting yourself or someone else. Call your doctor now or seek immediate medical care if: 
· You hear voices. · You feel much more depressed. Watch closely for changes in your health, and be sure to contact your doctor if: 
· You are having problems with your depression medicine. · You are not getting better as expected. Where can you learn more? Go to http://los-zack.info/. Enter R068 in the search box to learn more about \"Depression Treatment: Care Instructions. \" Current as of: July 26, 2016 Content Version: 11.3 © 1094-1308 [employer]. Care instructions adapted under license by [employer] (which disclaims liability or warranty for this information). If you have questions about a medical condition or this instruction, always ask your healthcare professional. [address] any warranty or liability for your use of this information. Please provide this summary of care documentation to your next provider. Your primary care clinician is listed as Christian Ellis. If you have any questions after today's visit, please call [phone].

## 2017-08-16 NOTE — PATIENT INSTRUCTIONS
High Cholesterol: Care Instructions  Your Care Instructions  Cholesterol is a type of fat in your blood. It is needed for many body functions, such as making new cells. Cholesterol is made by your body. It also comes from food you eat. High cholesterol means that you have too much of the fat in your blood. This raises your risk of a heart attack and stroke. LDL and HDL are part of your total cholesterol. LDL is the \"bad\" cholesterol. High LDL can raise your risk for heart disease, heart attack, and stroke. HDL is the \"good\" cholesterol. It helps clear bad cholesterol from the body. High HDL is linked with a lower risk of heart disease, heart attack, and stroke. Your cholesterol levels help your doctor find out your risk for having a heart attack or stroke. You and your doctor can talk about whether you need to lower your risk and what treatment is best for you. A heart-healthy lifestyle along with medicines can help lower your cholesterol and your risk. The way you choose to lower your risk will depend on how high your risk is for heart attack and stroke. It will also depend on how you feel about taking medicines. Follow-up care is a key part of your treatment and safety. Be sure to make and go to all appointments, and call your doctor if you are having problems. It's also a good idea to know your test results and keep a list of the medicines you take. How can you care for yourself at home? · Eat a variety of foods every day. Good choices include fruits, vegetables, whole grains (like oatmeal), dried beans and peas, nuts and seeds, soy products (like tofu), and fat-free or low-fat dairy products. · Replace butter, margarine, and hydrogenated or partially hydrogenated oils with olive and canola oils. (Canola oil margarine without trans fat is fine.)  · Replace red meat with fish, poultry, and soy protein (like tofu). · Limit processed and packaged foods like chips, crackers, and cookies.   · Bake, broil, or steam foods. Don't cole them. · Be physically active. Get at least 30 minutes of exercise on most days of the week. Walking is a good choice. You also may want to do other activities, such as running, swimming, cycling, or playing tennis or team sports. · Stay at a healthy weight or lose weight by making the changes in eating and physical activity listed above. Losing just a small amount of weight, even 5 to 10 pounds, can reduce your risk for having a heart attack or stroke. · Do not smoke. When should you call for help? Watch closely for changes in your health, and be sure to contact your doctor if:  · You need help making lifestyle changes. · You have questions about your medicine. Where can you learn more? Go to http://los-zack.info/. Enter D089 in the search box to learn more about \"High Cholesterol: Care Instructions. \"  Current as of: April 3, 2017  Content Version: 11.3  © 2704-2514 [employer]. Care instructions adapted under license by [employer] (which disclaims liability or warranty for this information). If you have questions about a medical condition or this instruction, always ask your healthcare professional. [place] [address] any warranty or liability for your use of this information. Olanzapine/Fluoxetine (By mouth)   Fluoxetine Hydrochloride (souu-RQ-f-teen chino-droe-KLOR-laya), Olanzapine (lg-CPE-ha-peen)  Treats depression. This medicine contains an SSRI. Brand Name(s): Symbyax   There may be other brand names for this medicine. When This Medicine Should Not Be Used: This medicine is not right for everyone. Do not use it if you have had an allergic reaction to olanzapine or fluoxetine. How to Use This Medicine:   Capsule  · Take your medicine as directed. Your dose may need to be changed several times to find what works best for you. It is best to take this medicine in the evening.   · This medicine should come with a Medication Guide. Ask your pharmacist for a copy if you do not have one. · Missed dose: Take a dose as soon as you remember. If it is almost time for your next dose, wait until then and take a regular dose. Do not take extra medicine to make up for a missed dose. · Store the medicine in a closed container at room temperature, away from heat, moisture, and direct light. Drugs and Foods to Avoid:   Ask your doctor or pharmacist before using any other medicine, including over-the-counter medicines, vitamins, and herbal products. · Do not use this medicine together with pimozide or thioridazine. Do not use this medicine within 14 days of using an MAO inhibitor (MAOI), and do not start an MAOI for at least 5 weeks after you stop using fluoxetine. · Some foods and medicines can affect how olanzapine/fluoxetine works. Tell your doctor if you are also using any of the following:  ¨ Buspirone, digitoxin, dolasetron, fentanyl, levodopa, lithium, mefloquine, methadone, pentamidine, probucol, To's wort, tacrolimus, tramadol, tryptophan, or vinblastine  ¨ Other medicine to treat depression (such as fluvoxamine), medicine to treat mental illness, triptan medicine to treat migraine headaches, medicine for seizures (such as carbamazepine, phenytoin), blood pressure medicine, medicine for heart rhythm problems, an antibiotic, a diuretic (water pill), an NSAID pain or arthritis medicine (such as aspirin, celecoxib, diclofenac, ibuprofen, naproxen), or a blood thinner (such as warfarin)  · Do not drink alcohol while you are using this medicine. · Tell your doctor if you use anything else that makes you sleepy. Some examples are allergy medicine, narcotic pain medicine, and alcohol.   Warnings While Using This Medicine:   · Tell your doctor if you are pregnant, or if you have liver disease, bleeding problems, diabetes, high cholesterol, glaucoma, prostate problems, or a history of breast cancer, tasia, seizures, or severe constipation. Make sure your doctor knows if you have had a heart rhythm problem (such as QT prolongation), or if you have had a heart attack, heart failure, low blood pressure, or a stroke. · Do not breastfeed while you are using this medicine. · For some children, teenagers, and young adults, this medicine may increase mental or emotional problems. This may lead to thoughts of suicide and violence. Talk with your doctor right away if you have any thoughts or behavior changes that concern you. Tell your doctor if you or anyone in your family has a history of bipolar disorder or suicide attempts. · This medicine may cause the following problems:  ¨ Neuroleptic malignant syndrome (a nerve and muscle problem)  ¨ High blood sugar, cholesterol, or triglyceride levels  ¨ Serotonin syndrome (may be life-threatening when used with certain other medicines)  ¨ Tardive dyskinesia (a muscle problem that may become permanent)  ¨ Higher risk of bleeding  ¨ Low sodium levels in the blood  ¨ Heart rhythm changes  · This medicine may make you dizzy or drowsy. Do not drive or do anything else that could be dangerous until you know how this medicine affects you. · This medicine may make it more difficult for your body to cool down. Be careful to not become overheated during exercise or hot weather, because you could have heat stroke. · Do not stop using this medicine suddenly. Your doctor will need to slowly decrease your dose before you stop it completely. · Your doctor will do lab tests at regular visits to check on the effects of this medicine. Keep all appointments. · Keep all medicine out of the reach of children. Never share your medicine with anyone.   Possible Side Effects While Using This Medicine:   Call your doctor right away if you notice any of these side effects:  · Allergic reaction: Itching or hives, swelling in your face or hands, swelling or tingling in your mouth or throat, chest tightness, trouble breathing  · Anxiety, restlessness, fever, sweating, muscle spasms, nausea, vomiting, diarrhea, seeing or hearing things that are not there  · Confusion, weakness, and muscle stiffness or twitching  · Eye pain, trouble seeing  · Fast, pounding, or uneven heartbeat  · Feeling very thirsty or hungry, change in how much or how often you urinate  · Fever, chills, cough, sore throat, and body aches  · Jerky muscle movement you cannot control (often in your face, tongue, or jaw)  · Lightheadedness, dizziness, or fainting  · Seizures or tremors  · Trouble sleeping, unusual dreams  · Unusual behavior, thoughts of hurting yourself or others  · Unusual bleeding or bruising  If you notice these less serious side effects, talk with your doctor:   · Blurred vision  · Sexual problems  · Sleepiness or unusual drowsiness  · Trouble swallowing  · Weight gain, increased appetite  If you notice other side effects that you think are caused by this medicine, tell your doctor. Call your doctor for medical advice about side effects. You may report side effects to FDA at 2-172-FDA-4418  © 2017 [facility] Information is for End User's use only and may not be sold, redistributed or otherwise used for commercial purposes. The above information is an  only. It is not intended as medical advice for individual conditions or treatments. Talk to your doctor, nurse or pharmacist before following any medical regimen to see if it is safe and effective for you. Depression Treatment: Care Instructions  Your Care Instructions  Depression is a condition that affects the way you feel, think, and act. It causes symptoms such as low energy, loss of interest in daily activities, and sadness or grouchiness that goes on for a long time. Depression is very common and affects men and women of all ages. Depression is a medical illness caused by changes in the natural chemicals in your brain.  It is not a character flaw, and it does not mean that you are a bad or weak person. It does not mean that you are going crazy. It is important to know that depression can be treated. Medicines, counseling, and self-care can all help. Many people do not get help because they are embarrassed or think that they will get over the depression on their own. But some people do not get better without treatment. Follow-up care is a key part of your treatment and safety. Be sure to make and go to all appointments, and call your doctor if you are having problems. It's also a good idea to know your test results and keep a list of the medicines you take. How can you care for yourself at home? Learn about antidepressant medicines  Antidepressant medicines can improve or end the symptoms of depression. You may need to take the medicine for at least 6 months, and often longer. Keep taking your medicine even if you feel better. If you stop taking it too soon, your symptoms may come back or get worse. You may start to feel better within 1 to 3 weeks of taking antidepressant medicine. But it can take as many as 6 to 8 weeks to see more improvement. Talk to your doctor if you have problems with your medicine or if you do not notice any improvement after 3 weeks. Antidepressants can make you feel tired, dizzy, or nervous. Some people have dry mouth, constipation, headaches, sexual problems, an upset stomach, or diarrhea. Many of these side effects are mild and go away on their own after you take the medicine for a few weeks. Some may last longer. Talk to your doctor if side effects bother you too much. You might be able to try a different medicine. If you are pregnant or breastfeeding, talk to your doctor about what medicines you can take. Learn about counseling  In many cases, counseling can work as well as medicines to treat mild to moderate depression.  Counseling is done by licensed mental health providers, such as psychologists, social workers, and some types of nurses. It can be done in one-on-one sessions or in a group setting. Many people find group sessions helpful. Cognitive-behavioral therapy is a type of counseling. In this treatment therapy, you learn how to see and change unhelpful thinking styles that may be adding to your depression. Counseling and medicines often work well when used together. To manage depression  · Be physically active. Getting 30 minutes of exercise each day is good for your body and your mind. Begin slowly if it is hard for you to get started. If you already exercise, keep it up. · Plan something pleasant for yourself every day. Include activities that you have enjoyed in the past.  · Get enough sleep. Talk to your doctor if you have problems sleeping. · Eat a balanced diet. If you do not feel hungry, eat small snacks rather than large meals. · Do not drink alcohol, use illegal drugs, or take medicines that your doctor has not prescribed for you. They may interfere with your treatment. · Spend time with family and friends. It may help to speak openly about your depression with people you trust.  · Take your medicines exactly as prescribed. Call your doctor if you think you are having a problem with your medicine. · Do not make major life decisions while you are depressed. Depression may change the way you think. You will be able to make better decisions after you feel better. · Think positively. Challenge negative thoughts with statements such as \"I am hopeful\"; \"Things will get better\"; and \"I can ask for the help I need. \" Write down these statements and read them often, even if you don't believe them yet. · Be patient with yourself. It took time for your depression to develop, and it will take time for your symptoms to improve. Do not take on too much or be too hard on yourself. · Learn all you can about depression from written and online materials.   · Check out behavioral health classes to learn more about dealing with depression. · Keep the numbers for these national suicide hotlines: 7-325-834-TALK (3-982.426.8755) and 6-149-AMASCDV (7-232.451.9256). If you or someone you know talks about suicide or feeling hopeless, get help right away. When should you call for help? Call 911 anytime you think you may need emergency care. For example, call if:  · You feel you cannot stop from hurting yourself or someone else. Call your doctor now or seek immediate medical care if:  · You hear voices. · You feel much more depressed. Watch closely for changes in your health, and be sure to contact your doctor if:  · You are having problems with your depression medicine. · You are not getting better as expected. Where can you learn more? Go to http://los-zack.info/. Enter D446 in the search box to learn more about \"Depression Treatment: Care Instructions. \"  Current as of: July 26, 2016  Content Version: 11.3  © 7173-9121 "[employer]. Care instructions adapted under license by [employer] (which disclaims liability or warranty for this information). If you have questions about a medical condition or this instruction, always ask your healthcare professional. [address] any warranty or liability for your use of this information.

## 2017-08-16 NOTE — LETTER
8/16/2017 [facility] [address] [facility] [place], [address] Dirk Corbett, 1970, is picking up the following medications ordered from the [facility]: LOVAZA 1 GM #360 STOCK:  CADUET 10/20 MG #30 AND SYMBYAX 3/25 MG #30. 
 
Mariza Veloz NILESH Patient's Signature: _____________________________ Today's Date: 8/16/2017
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
## 2017-08-16 NOTE — PROGRESS NOTES
[facility]  [address], [facility], [address] [place] [address], [place], [address] [place] [address]  [phone] [facility]  [phone] Jame       Name: Fermín Martínez         MRN#: [id number]        YOB: 1970   Gender: female Ht:5'2\" Wt:193 lbs       . Date of Rest/Stress Images: 8/16/2017   Referring Physician: Santana Kern NP  Ordering Physician: Mechelle Alamo. Biju Swift MD, [facility]  Technologist: CASEY Cruz, C.N.M.T  Diagnosis:  1. Chest pain, unspecified    2. Nonspecific abnormal electrocardiogram (ECG) (EKG)    3. Other and unspecified hyperlipidemia          Rest/Stress Myoview SPECT Myocardial Perfusion Imaging with  Lexiscan Stress and gated SPECT Imaging      PROCEDURE:      Myocardial perfusion imaging was performed at rest approximately 30 mins following the intravenous injection,(Right hand ) of 12.1 mCi of Tc99m Myoview for evaluation of myocardial function and perfusion at rest.    Baseline Data:    Baseline EKG reveals sinus bradycardia, nonspecific ST-T changes. Baseline heart rate is 50. Baseline blood pressure is 140/82. Procedure: The patient was injected with 0.4 mg IV Lexiscan over a 30 second period. The patient had unusual feeling that improved with 100 mg of IV Aminophylline. No chest pain was noted. Heart rate increased from baseline to a heart rate of 90. Blood pressure increased to 170/90. Electrocardiogram showed baseline ST-T changes. No arrhythmia was noted. Diagnosis:   1. Nondiagnostic EKG portion of Lexiscan stress test.   2. Nuclear imaging report to follow. Pharmacological:  Patient was injected with . 4 mg/mL with Lexiscan intravenously over a period 10 to 20 sec. After pharmacologic stress, the patient was injected intravenously with 38.9 mCi of Tc99m Myoview. Gating post stress tomographic imaging performed approximately 45 minutes post tracer injection.  The data was reconstructed in the short, horizontal long and vertical long axis views and tomographic slices were generated. NUCLEAR IMAGING:    Findings:   1. Post-stress imaging in short axis, horizontal and vertical long axis views reveals normal isotope uptake in all areas. 2. Resting images also show normal isotope uptake in all areas. 3. Gated images show normal left ventricular size, wall motion and systolic function. Ejection fraction is 71%. Diagnosis:   1. Normal scan. 2. No evidence of significant fixed or reversible defect suggesting ischemia or myocardial infarction noted from this nuclear study. 3. Low risk scan. Thank you for the referral.    E-signed and Interpreting Physician:    Shonda Mireles.  Raven Pete MD, [facility] [place]     Date of interpretation: 8/16/2017  Date of final report: 8/16/2017

## 2017-08-17 ENCOUNTER — DOCUMENTATION ONLY (OUTPATIENT)
Dept: FAMILY MEDICINE CLINIC | Age: 47
End: 2017-08-17

## 2017-08-17 NOTE — PROGRESS NOTES
Called Pt to let her know that she is scheduled to see Neurology Dr. Elinor Luu 9/11/17 @ 9:45am.  Pt understand to call [facility]

## 2017-08-28 ENCOUNTER — OFFICE VISIT (OUTPATIENT)
Dept: FAMILY MEDICINE CLINIC | Age: 47
End: 2017-08-28

## 2017-08-28 VITALS
OXYGEN SATURATION: 96 % | HEIGHT: 62 IN | DIASTOLIC BLOOD PRESSURE: 75 MMHG | HEART RATE: 89 BPM | BODY MASS INDEX: 35.88 KG/M2 | TEMPERATURE: 98.1 F | SYSTOLIC BLOOD PRESSURE: 143 MMHG | RESPIRATION RATE: 16 BRPM | WEIGHT: 195 LBS

## 2017-08-28 DIAGNOSIS — I10 ESSENTIAL HYPERTENSION: ICD-10-CM

## 2017-08-28 DIAGNOSIS — B37.0 ORAL THRUSH: Primary | ICD-10-CM

## 2017-08-28 DIAGNOSIS — R05.3 PERSISTENT DRY COUGH: ICD-10-CM

## 2017-08-28 RX ORDER — OLMESARTAN MEDOXOMIL AND HYDROCHLOROTHIAZIDE 40/12.5 40; 12.5 MG/1; MG/1
1 TABLET ORAL DAILY
Qty: 90 TAB | Refills: 3 | Status: SHIPPED | COMMUNITY
Start: 2017-08-28 | End: 2018-05-07 | Stop reason: CLARIF

## 2017-08-28 RX ORDER — NYSTATIN 100000 [USP'U]/ML
500000 SUSPENSION ORAL 4 TIMES DAILY
Qty: 200 ML | Refills: 1 | Status: SHIPPED | OUTPATIENT
Start: 2017-08-28 | End: 2018-06-14
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
## 2017-08-28 NOTE — PATIENT INSTRUCTIONS
1. Return to office in 2 weeks after starting Benicar HCTZ for Blood pressure check. Candidiasis: Care Instructions  Your Care Instructions  Candidiasis (say \"drp-qdm-US-uh-antionette\") is a yeast infection. Yeast normally lives in your body. But it can cause problems if your body's defenses don't work as they should. Some medicines can increase your chance of getting a yeast infection. These include antibiotics, steroids, and cancer drugs. And some diseases like AIDS and diabetes can make you more likely to get yeast infections. There are different types of yeast infections. Yari Precise is a yeast infection in the mouth. It usually occurs in people with weak immune systems. It causes white patches inside the mouth and throat. Yeast infections of the skin usually occur in skin folds where the skin stays moist. They cause red, oozing patches on your skin. Babies can get these infections under the diaper. People who often wear gloves can get them on their hands. Many women get vaginal yeast infections. They are most common when women take antibiotics. These infections can cause the vagina to itch and burn. They also cause white discharge that looks like cottage cheese. In rare cases, yeast infects the blood. This can cause serious disease. This kind of infection is treated with medicine given through a needle into a vein (IV). After you start treatment, a yeast infection usually goes away quickly. But if your immune system is weak, the infection may come back. Tell your doctor if you get yeast infections often. Follow-up care is a key part of your treatment and safety. Be sure to make and go to all appointments, and call your doctor if you are having problems. It's also a good idea to know your test results and keep a list of the medicines you take. How can you care for yourself at home? · Take your medicines exactly as prescribed.  Call your doctor if you think you are having a problem with your medicine. · Use antibiotics only as directed by your doctor. · Eat yogurt with live cultures. It has bacteria called lactobacillus. It may help prevent some types of yeast infections. · Keep your skin clean and dry. Put powder on moist places. · If you are using a cream or suppository to treat a vaginal yeast infection, don't use condoms or a diaphragm. Use a different type of birth control. · Eat a healthy diet and get regular exercise. This will help keep your immune system strong. When should you call for help? Call your doctor now or seek immediate medical care if:  · You have a fever. · You are pregnant and have signs of a vaginal or urinary tract infection such as:  ¨ Severe itching in your vagina. ¨ Pain during sex or when you urinate. ¨ Unusual discharge from your vagina. ¨ A frequent urge to urinate. ¨ Urine that is cloudy or smells bad. Watch closely for changes in your health, and be sure to contact your doctor if:  · You do not get better as expected. Where can you learn more? Go to http://los-zack.info/. Enter M330 in the search box to learn more about \"Candidiasis: Care Instructions. \"  Current as of: October 13, 2016  Content Version: 11.3  © 1766-8270 [employer]. Care instructions adapted under license by [employer] (which disclaims liability or warranty for this information). If you have questions about a medical condition or this instruction, always ask your healthcare professional. [place] [address] any warranty or liability for your use of this information.

## 2017-08-28 NOTE — MR AVS SNAPSHOT
Visit Information Date & Time Provider Department Dept. Phone Encounter #  
 8/28/2017  3:00 PM Santana Kern NP [address] [facility] [id number] Follow-up Instructions Return in about 3 months (around 11/28/2017), or if symptoms worsen or fail to improve. Your Appointments 9/8/2017  9:45 AM  
Office Visit with Iam Edwards MD  
[facility] Appt Note: post nuc [address], [address] [facility] [address] [address], [address] [address] 9/11/2017 10:15 AM  
New Patient with Jody Murcia MD  
[employer] [address]) Appt Note: sleep study [address] [facility] [address] [phone]  
  
   
 [place] [address]  
  
    
 11/15/2017  8:00 AM  
Follow Up with Santana Kern NP [address] [facility] ([address]) Appt Note: 3 mth follow up  
 [address] [facility] [address]  
[address] [facility] [address] Upcoming Health Maintenance Date Due DTaP/Tdap/Td series (1 - Tdap) 6/22/1990 INFLUENZA AGE 9 TO ADULT 8/1/2017 Pneumococcal 19-64 Medium Risk (1 of 1 - PPSV23) 12/16/2017* PAP AKA CERVICAL CYTOLOGY 11/21/2019 *Topic was postponed. The date shown is not the original due date. Allergies as of 8/28/2017  Review Complete On: 8/28/2017 By: Santana Kern NP Severity Noted Reaction Type Reactions Iodinated Contrast- Oral And Iv Dye  08/16/2012    Shortness of Breath Current Immunizations  Reviewed on 10/18/2016 Name Date Influenza Vaccine (Quad) PF 10/13/2016 Not reviewed this visit You Were Diagnosed With   
  
 Codes Comments Oral thrush    -  Primary ICD-10-CM: B37.0 ICD-9-CM: 112.0 Persistent dry cough     ICD-10-CM: R05 ICD-9-CM: 786.2 Essential hypertension     ICD-10-CM: I10 
ICD-9-CM: 401.9 Vitals BP Pulse Temp Resp Height(growth percentile) Weight(growth percentile) 143/75 (BP 1 Location: Left arm, BP Patient Position: Sitting) 89 98.1 °F (36.7 °C) (Oral) 16 5' 2\" (1.575 m) 195 lb (88.5 kg) LMP SpO2 BMI OB Status Smoking Status 08/26/2017 96% 35.67 kg/m2 Having regular periods Current Every Day Smoker BMI and BSA Data Body Mass Index Body Surface Area  
 35.67 kg/m 2 1.97 m 2 Preferred Pharmacy Pharmacy Name Phone [facility] [address] [phone] Your Updated Medication List  
  
   
This list is accurate as of: 8/28/17  4:01 PM.  Always use your most recent med list.  
  
  
  
  
 albuterol 90 mcg/actuation inhaler Commonly known as:  PROVENTIL HFA, VENTOLIN HFA, PROAIR HFA Take 2 Puffs by inhalation every four (4) hours as needed for Wheezing. amLODIPine-atorvastatin 10-20 mg per tablet Commonly known as:  CADUET Take 1 Tab by mouth daily. calcium-cholecalciferol (D3) tablet Commonly known as:  CALTRATE 600+D Take 1 Tab by mouth daily. clindamycin 1 % lotion Commonly known as:  CLEOCIN T Apply  to affected area two (2) times a day. use thin film on affected area  
  
 cloNIDine HCl 0.3 mg tablet Commonly known as:  CATAPRES Take 1 Tab by mouth two (2) times a day. Indications: hypertension  
  
 ergocalciferol 50,000 unit capsule Commonly known as:  VITAMIN D2 Take one capsule every 7 days till gone, then start taking over-the-counter Vitamin D3 2,000 units every day  
  
 lisinopril-hydroCHLOROthiazide 20-25 mg per tablet Commonly known as:  Eulas Wallace Take 1 Tab by mouth daily. Indications: hypertension  
  
 nystatin 100,000 unit/mL suspension Commonly known as:  MYCOSTATIN  
 Take 5 mL by mouth four (4) times daily. swish and spit  Indications: ORAL CANDIDIASIS  
  
 * OLANZapine-FLUoxetine 3-25 mg per capsule Commonly known as:  SYMBYAX Take 1 Cap by mouth every evening. Indications: DEPRESSION ASSOCIATED WITH BIPOLAR DISORDER  
  
 * OLANZapine-FLUoxetine 3-25 mg per capsule Commonly known as:  SYMBYAX Take 1 Cap by mouth every evening. Indications: DEPRESSION ASSOCIATED WITH BIPOLAR DISORDER  
  
 olmesartan-hydroCHLOROthiazide 40-12.5 mg per tablet Commonly known as:  BENICAR HCT Take 1 Tab by mouth daily. omega-3 acid ethyl esters 1 gram capsule Commonly known as:  Williams Mealy Take 2 Caps by mouth two (2) times a day. * Notice: This list has 2 medication(s) that are the same as other medications prescribed for you. Read the directions carefully, and ask your doctor or other care provider to review them with you. Prescriptions Printed Refills  
 olmesartan-hydroCHLOROthiazide (BENICAR HCT) 40-12.5 mg per tablet 3 Sig: Take 1 Tab by mouth daily. Class: Program  
 Route: Oral  
  
Prescriptions Sent to Mail Order Refills  
 olmesartan-hydroCHLOROthiazide (BENICAR HCT) 40-12.5 mg per tablet 3 Sig: Take 1 Tab by mouth daily. Class: Program  
 Pharmacy: [address], [facility]. Ph #: [phone] Route: Oral  
  
Prescriptions Sent to Pharmacy Refills  
 nystatin (MYCOSTATIN) 100,000 unit/mL suspension 1 Sig: Take 5 mL by mouth four (4) times daily. swish and spit  Indications: ORAL CANDIDIASIS Class: Normal  
 [address] Ph #: [phone] Route: Oral  
 olmesartan-hydroCHLOROthiazide (BENICAR HCT) 40-12.5 mg per tablet 3 Sig: Take 1 Tab by mouth daily. Class: Program  
 Pharmacy: [place] [address] [facility]. Ph #: [phone] Route: Oral  
  
Follow-up Instructions Return in about 3 months (around 11/28/2017), or if symptoms worsen or fail to improve. Patient Instructions 1. Return to office in 2 weeks after starting Benicar HCTZ for Blood pressure check. Candidiasis: Care Instructions Your Care Instructions Candidiasis (say \"efj-avc-BT-uh-antionette\") is a yeast infection. Yeast normally lives in your body. But it can cause problems if your body's defenses don't work as they should. Some medicines can increase your chance of getting a yeast infection. These include antibiotics, steroids, and cancer drugs. And some diseases like AIDS and diabetes can make you more likely to get yeast infections. There are different types of yeast infections. Hobert Cellar is a yeast infection in the mouth. It usually occurs in people with weak immune systems. It causes white patches inside the mouth and throat. Yeast infections of the skin usually occur in skin folds where the skin stays moist. They cause red, oozing patches on your skin. Babies can get these infections under the diaper. People who often wear gloves can get them on their hands. Many women get vaginal yeast infections. They are most common when women take antibiotics. These infections can cause the vagina to itch and burn. They also cause white discharge that looks like cottage cheese. In rare cases, yeast infects the blood. This can cause serious disease. This kind of infection is treated with medicine given through a needle into a vein (IV). After you start treatment, a yeast infection usually goes away quickly. But if your immune system is weak, the infection may come back. Tell your doctor if you get yeast infections often. Follow-up care is a key part of your treatment and safety. Be sure to make and go to all appointments, and call your doctor if you are having problems. It's also a good idea to know your test results and keep a list of the medicines you take. How can you care for yourself at home? · Take your medicines exactly as prescribed. Call your doctor if you think you are having a problem with your medicine. · Use antibiotics only as directed by your doctor. · Eat yogurt with live cultures. It has bacteria called lactobacillus. It may help prevent some types of yeast infections. · Keep your skin clean and dry. Put powder on moist places. · If you are using a cream or suppository to treat a vaginal yeast infection, don't use condoms or a diaphragm. Use a different type of birth control. · Eat a healthy diet and get regular exercise. This will help keep your immune system strong. When should you call for help? Call your doctor now or seek immediate medical care if: 
· You have a fever. · You are pregnant and have signs of a vaginal or urinary tract infection such as: ¨ Severe itching in your vagina. ¨ Pain during sex or when you urinate. ¨ Unusual discharge from your vagina. ¨ A frequent urge to urinate. ¨ Urine that is cloudy or smells bad. Watch closely for changes in your health, and be sure to contact your doctor if: 
· You do not get better as expected. Where can you learn more? Go to http://los-zack.info/. Enter N560 in the search box to learn more about \"Candidiasis: Care Instructions. \" Current as of: October 13, 2016 Content Version: 11.3 © 9785-2253 [employer]. Care instructions adapted under license by [employer] (which disclaims liability or warranty for this information). If you have questions about a medical condition or this instruction, always ask your healthcare professional. [address] any warranty or liability for your use of this information. Please provide this summary of care documentation to your next provider. Your primary care clinician is listed as Oly Mclean. If you have any questions after today's visit, please call [phone].

## 2017-08-30 ENCOUNTER — TELEPHONE (OUTPATIENT)
Dept: FAMILY MEDICINE CLINIC | Age: 47
End: 2017-08-30

## 2017-08-30 NOTE — TELEPHONE ENCOUNTER
Medication: Caduet 5-10 mg, dose: 1 tab, how often: daily, current number of medication days provided: 90, refill per application. Lot #:  # [id number], EXP 09/2019. This medication was received and verified for the following 1. Correct Patient, 2. Correct Diagnosis, 3. Correct Drug, 4. Correct route, and no current allergy to medication. Please contact patient to come  their medications.      Pratibha Escobedo, MSN, RN, [facility]

## 2017-08-31 ENCOUNTER — TELEPHONE (OUTPATIENT)
Dept: FAMILY MEDICINE CLINIC | Age: 47
End: 2017-08-31

## 2017-08-31 NOTE — TELEPHONE ENCOUNTER
Medication: Symbyax 3-25 mg, dose: 1 tab, how often: qd , current number of medication days provided: 90, refill per application. Lot #: [id number], EXP 01/2020. This medication was received and verified for the following 1. Correct Patient, 2. Correct Diagnosis, 3. Correct Drug, 4. Correct route, and no current allergy to medication. Please contact patient to come  their medications.      Huber Brito MSN, RN, [facility]

## 2017-09-07 ENCOUNTER — TELEPHONE (OUTPATIENT)
Dept: FAMILY MEDICINE CLINIC | Age: 47
End: 2017-09-07

## 2017-09-07 NOTE — TELEPHONE ENCOUNTER
Medication: Benicar HCT 40-12.5 mg, dose: 1 tab, how often: daily, current number of medication days provided: 90, refill per application. Lot # [id number], EXP 04/2019  . This medication was received and verified for the following 1. Correct Patient, 2. Correct Diagnosis, 3. Correct Drug, 4. Correct route, and no current allergy to medication. Please contact patient to come  their medications.    The patient is to replace Prinzide with  Benicar HCTZ     Lissy Zepeda, MSN, RN, [facility]

## 2017-09-26 ENCOUNTER — HOSPITAL ENCOUNTER (EMERGENCY)
Age: 47
Discharge: LEFT BEFORE DISCHARGE | End: 2017-09-26
Attending: EMERGENCY MEDICINE
Payer: SUBSIDIZED

## 2017-09-26 LAB
ATRIAL RATE: 49 BPM
CALCULATED P AXIS, ECG09: 42 DEGREES
CALCULATED R AXIS, ECG10: -16 DEGREES
CALCULATED T AXIS, ECG11: 120 DEGREES
DIAGNOSIS, 93000: NORMAL
P-R INTERVAL, ECG05: 164 MS
Q-T INTERVAL, ECG07: 476 MS
QRS DURATION, ECG06: 118 MS
QTC CALCULATION (BEZET), ECG08: 429 MS
VENTRICULAR RATE, ECG03: 49 BPM

## 2017-09-26 PROCEDURE — 75810000275 HC EMERGENCY DEPT VISIT NO LEVEL OF CARE

## 2017-09-26 PROCEDURE — 93005 ELECTROCARDIOGRAM TRACING: CPT

## 2017-09-26 NOTE — ED TRIAGE NOTES
Pt arrived complaining of severe chest pain. States she suddenly woke up, jumped up and started having chest pain.  ekg immediately obtained. Pt immediately stating she is going to leave since the ekg does not show anything. Attempted to explain to patient that we need to obtain lab samples to rule out heart attack. Pt refusing states she is not going to lay in bed all day,  Pt continues to insist on leaving    Brought provider to triage to talk to patient. JENNIFER Emerson talked with patient explaining the needs to get lab results.   Pt refused to stay

## 2017-10-09 ENCOUNTER — OFFICE VISIT (OUTPATIENT)
Dept: NEUROLOGY | Age: 47
End: 2017-10-09

## 2017-10-09 VITALS
WEIGHT: 188 LBS | HEIGHT: 62 IN | RESPIRATION RATE: 14 BRPM | HEART RATE: 90 BPM | TEMPERATURE: 98 F | DIASTOLIC BLOOD PRESSURE: 100 MMHG | BODY MASS INDEX: 34.6 KG/M2 | OXYGEN SATURATION: 95 % | SYSTOLIC BLOOD PRESSURE: 148 MMHG

## 2017-10-09 DIAGNOSIS — F31.10 MANIC BIPOLAR I DISORDER (HCC): ICD-10-CM

## 2017-10-09 DIAGNOSIS — I10 ESSENTIAL HYPERTENSION: ICD-10-CM

## 2017-10-09 DIAGNOSIS — F51.3 SLEEP WALKING: ICD-10-CM

## 2017-10-09 DIAGNOSIS — R06.83 SNORING: ICD-10-CM

## 2017-10-09 DIAGNOSIS — F14.10 COCAINE ABUSE (HCC): ICD-10-CM

## 2017-10-09 DIAGNOSIS — G47.30 INSOMNIA W/ SLEEP APNEA: Primary | ICD-10-CM

## 2017-10-09 DIAGNOSIS — G47.00 INSOMNIA W/ SLEEP APNEA: Primary | ICD-10-CM

## 2017-10-09 NOTE — PATIENT INSTRUCTIONS
No driving drowsy     Insomnia: Care Instructions  Your Care Instructions  Insomnia is the inability to sleep well. It is a common problem for most people at some time. Insomnia may make it hard for you to get to sleep, stay asleep, or sleep as long as you need to. This can make you tired and grouchy during the day. It can also make you forgetful, less effective at work, and unhappy. Insomnia can be caused by conditions such as depression or anxiety. Pain can also affect your ability to sleep. When these problems are solved, the insomnia usually clears up. But sometimes bad sleep habits can cause insomnia. If insomnia is affecting your work or your enjoyment of life, you can take steps to improve your sleep. Follow-up care is a key part of your treatment and safety. Be sure to make and go to all appointments, and call your doctor if you are having problems. It's also a good idea to know your test results and keep a list of the medicines you take. How can you care for yourself at home? What to avoid  · Do not have drinks with caffeine, such as coffee or black tea, for 8 hours before bed. · Do not smoke or use other types of tobacco near bedtime. Nicotine is a stimulant and can keep you awake. · Avoid drinking alcohol late in the evening, because it can cause you to wake in the middle of the night. · Do not eat a big meal close to bedtime. If you are hungry, eat a light snack. · Do not drink a lot of water close to bedtime, because the need to urinate may wake you up during the night. · Do not read or watch TV in bed. Use the bed only for sleeping and sexual activity. What to try  · Go to bed at the same time every night, and wake up at the same time every morning. Do not take naps during the day. · Keep your bedroom quiet, dark, and cool. · Sleep on a comfortable pillow and mattress. · If watching the clock makes you anxious, turn it facing away from you so you cannot see the time.   · If you worry when you lie down, start a worry book. Well before bedtime, write down your worries, and then set the book and your concerns aside. · Try meditation or other relaxation techniques before you go to bed. · If you cannot fall asleep, get up and go to another room until you feel sleepy. Do something relaxing. Repeat your bedtime routine before you go to bed again. · Make your house quiet and calm about an hour before bedtime. Turn down the lights, turn off the TV, log off the computer, and turn down the volume on music. This can help you relax after a busy day. When should you call for help? Watch closely for changes in your health, and be sure to contact your doctor if:  · Your efforts to improve your sleep do not work. · Your insomnia gets worse. · You have been feeling down, depressed, or hopeless or have lost interest in things that you usually enjoy. Where can you learn more? Go to http://los-zack.info/. Enter P513 in the search box to learn more about \"Insomnia: Care Instructions. \"  Current as of: July 26, 2016  Content Version: 11.3  © 6598-0050 Healthwise, Incorporated. Care instructions adapted under license by [employer] (which disclaims liability or warranty for this information). If you have questions about a medical condition or this instruction, always ask your healthcare professional. [address] any warranty or liability for your use of this information.

## 2017-10-09 NOTE — MR AVS SNAPSHOT
Visit Information Date & Time Provider Department Dept. Phone Encounter #  
 10/9/2017 11:15 AM Andres Brock  [facility] [address] [address] [id number] Follow-up Instructions Return in about 1 month (around 11/9/2017). Follow-up and Disposition History Your Appointments 11/15/2017  8:00 AM  
Follow Up with Christiano Forbes NP [address] [facility] ([facility]) Appt Note: 3 mth follow up  
 [address] [facility] [id number]  
[address] [facility] [address]  
  
    
 12/13/2017  8:45 AM  
Follow Up with Andres Brock MD  
[facility]) Appt Note: 1mon f/u; sleep study [address] [id number] [phone]  
  
   
 [employer] [address] Upcoming Health Maintenance Date Due DTaP/Tdap/Td series (1 - Tdap) 6/22/1990 INFLUENZA AGE 9 TO ADULT 8/1/2017 Pneumococcal 19-64 Medium Risk (1 of 1 - PPSV23) 12/16/2017* PAP AKA CERVICAL CYTOLOGY 11/21/2019 *Topic was postponed. The date shown is not the original due date. Allergies as of 10/9/2017  Review Complete On: 10/9/2017 By: Gideon Chamberlain LPN Severity Noted Reaction Type Reactions Iodinated Contrast- Oral And Iv Dye  08/16/2012    Shortness of Breath Current Immunizations  Reviewed on 10/18/2016 Name Date Influenza Vaccine (Quad) PF 10/13/2016 Not reviewed this visit You Were Diagnosed With   
  
 Codes Comments Insomnia w/ sleep apnea    -  Primary ICD-10-CM: G47.00, G47.30 ICD-9-CM: 780.51 Snoring     ICD-10-CM: R06.83 
ICD-9-CM: 786.09 Sleep walking     ICD-10-CM: F51.3 ICD-9-CM: 307.46 Cocaine abuse     ICD-10-CM: F14.10 ICD-9-CM: 305.60 Manic bipolar I disorder ([facility]     ICD-10-CM: F31.10 ICD-9-CM: 296.40   
 Essential hypertension     ICD-10-CM: I10 
ICD-9-CM: 401.9 Vitals BP Pulse Temp Resp Height(growth percentile) Weight(growth percentile) (!) 148/100 (BP 1 Location: Left arm, BP Patient Position: Sitting) 90 98 °F (36.7 °C) (Oral) 14 5' 2\" (1.575 m) 188 lb (85.3 kg) LMP SpO2 BMI OB Status Smoking Status 10/05/2017 (Exact Date) 95% 34.39 kg/m2 Having regular periods Current Every Day Smoker Vitals History BMI and BSA Data Body Mass Index Body Surface Area  
 34.39 kg/m 2 1.93 m 2 Preferred Pharmacy Pharmacy Name Phone [facility] [address] [phone] Your Updated Medication List  
  
   
This list is accurate as of: 10/9/17 11:41 AM.  Always use your most recent med list.  
  
  
  
  
 albuterol 90 mcg/actuation inhaler Commonly known as:  PROVENTIL HFA, VENTOLIN HFA, PROAIR HFA Take 2 Puffs by inhalation every four (4) hours as needed for Wheezing. amLODIPine-atorvastatin 10-20 mg per tablet Commonly known as:  CADUET Take 1 Tab by mouth daily. calcium-cholecalciferol (D3) tablet Commonly known as:  CALTRATE 600+D Take 1 Tab by mouth daily. clindamycin 1 % lotion Commonly known as:  CLEOCIN T Apply  to affected area two (2) times a day. use thin film on affected area  
  
 cloNIDine HCl 0.3 mg tablet Commonly known as:  CATAPRES Take 1 Tab by mouth two (2) times a day. Indications: hypertension  
  
 ergocalciferol 50,000 unit capsule Commonly known as:  VITAMIN D2 Take one capsule every 7 days till gone, then start taking over-the-counter Vitamin D3 2,000 units every day  
  
 lisinopril-hydroCHLOROthiazide 20-25 mg per tablet Commonly known as:  Calvin Jeanmarie Take 1 Tab by mouth daily. Indications: hypertension  
  
 nystatin 100,000 unit/mL suspension Commonly known as:  MYCOSTATIN Take 5 mL by mouth four (4) times daily.  swish and spit  Indications: ORAL CANDIDIASIS  
  
 * OLANZapine-FLUoxetine 3-25 mg per capsule Commonly known as:  SYMBYAX Take 1 Cap by mouth every evening. Indications: DEPRESSION ASSOCIATED WITH BIPOLAR DISORDER  
  
 * OLANZapine-FLUoxetine 3-25 mg per capsule Commonly known as:  SYMBYAX Take 1 Cap by mouth every evening. Indications: DEPRESSION ASSOCIATED WITH BIPOLAR DISORDER  
  
 olmesartan-hydroCHLOROthiazide 40-12.5 mg per tablet Commonly known as:  BENICAR HCT Take 1 Tab by mouth daily. omega-3 acid ethyl esters 1 gram capsule Commonly known as:  Heena Chin Take 2 Caps by mouth two (2) times a day. * Notice: This list has 2 medication(s) that are the same as other medications prescribed for you. Read the directions carefully, and ask your doctor or other care provider to review them with you. Follow-up Instructions Return in about 1 month (around 11/9/2017). To-Do List   
 10/09/2017 Sleep Center:  SLEEP STUDY FULL Patient Instructions No driving drowsy Insomnia: Care Instructions Your Care Instructions Insomnia is the inability to sleep well. It is a common problem for most people at some time. Insomnia may make it hard for you to get to sleep, stay asleep, or sleep as long as you need to. This can make you tired and grouchy during the day. It can also make you forgetful, less effective at work, and unhappy. Insomnia can be caused by conditions such as depression or anxiety. Pain can also affect your ability to sleep. When these problems are solved, the insomnia usually clears up. But sometimes bad sleep habits can cause insomnia. If insomnia is affecting your work or your enjoyment of life, you can take steps to improve your sleep. Follow-up care is a key part of your treatment and safety. Be sure to make and go to all appointments, and call your doctor if you are having problems.  It's also a good idea to know your test results and keep a list of the medicines you take. How can you care for yourself at home? What to avoid · Do not have drinks with caffeine, such as coffee or black tea, for 8 hours before bed. · Do not smoke or use other types of tobacco near bedtime. Nicotine is a stimulant and can keep you awake. · Avoid drinking alcohol late in the evening, because it can cause you to wake in the middle of the night. · Do not eat a big meal close to bedtime. If you are hungry, eat a light snack. · Do not drink a lot of water close to bedtime, because the need to urinate may wake you up during the night. · Do not read or watch TV in bed. Use the bed only for sleeping and sexual activity. What to try · Go to bed at the same time every night, and wake up at the same time every morning. Do not take naps during the day. · Keep your bedroom quiet, dark, and cool. · Sleep on a comfortable pillow and mattress. · If watching the clock makes you anxious, turn it facing away from you so you cannot see the time. · If you worry when you lie down, start a worry book. Well before bedtime, write down your worries, and then set the book and your concerns aside. · Try meditation or other relaxation techniques before you go to bed. · If you cannot fall asleep, get up and go to another room until you feel sleepy. Do something relaxing. Repeat your bedtime routine before you go to bed again. · Make your house quiet and calm about an hour before bedtime. Turn down the lights, turn off the TV, log off the computer, and turn down the volume on music. This can help you relax after a busy day. When should you call for help? Watch closely for changes in your health, and be sure to contact your doctor if: 
· Your efforts to improve your sleep do not work. · Your insomnia gets worse. · You have been feeling down, depressed, or hopeless or have lost interest in things that you usually enjoy. Where can you learn more? Go to http://los-zack.info/. Enter P513 in the search box to learn more about \"Insomnia: Care Instructions. \" Current as of: July 26, 2016 Content Version: 11.3 © 1026-5150 [employer]. Care instructions adapted under license by [employer] (which disclaims liability or warranty for this information). If you have questions about a medical condition or this instruction, always ask your healthcare professional. [place] [address] any warranty or liability for your use of this information. Patient Instructions History Please provide this summary of care documentation to your next provider. Your primary care clinician is listed as Mandi Lyman. If you have any questions after today's visit, please call [phone].
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
## 2017-10-16 ENCOUNTER — OFFICE VISIT (OUTPATIENT)
Dept: FAMILY MEDICINE CLINIC | Age: 47
End: 2017-10-16

## 2017-10-16 VITALS
HEART RATE: 74 BPM | OXYGEN SATURATION: 95 % | RESPIRATION RATE: 18 BRPM | SYSTOLIC BLOOD PRESSURE: 137 MMHG | DIASTOLIC BLOOD PRESSURE: 86 MMHG

## 2017-10-16 DIAGNOSIS — Z01.30 BLOOD PRESSURE CHECK: Primary | ICD-10-CM

## 2017-10-16 NOTE — PROGRESS NOTES
Karren Buerger is a 49 y.o. female is here for blood pressure check. She reports she is taking medications daily as prescribed and does not miss doses. The importance of not skipping doses reinforced today if she wants to avoid complications like MI,CVA, renal  Disease and circulation problems. She does smoke.

## 2017-10-26 ENCOUNTER — TELEPHONE (OUTPATIENT)
Dept: FAMILY MEDICINE CLINIC | Age: 47
End: 2017-10-26

## 2017-10-26 NOTE — TELEPHONE ENCOUNTER
Medication: Lovaza 1gm, dose: 2 caps, how often: BID, current number of medication days provided: 90, refill per application. Lot #: [id number], EXP 10/2018. This medication was received and verified for the following 1. Correct Patient, 2. Correct Diagnosis, 3. Correct Drug, 4. Correct route, and no current allergy to medication. Please contact patient to come  their medications.      MARNIE Moran, RN, [facility]

## 2017-11-06 ENCOUNTER — LAB ONLY (OUTPATIENT)
Dept: FAMILY MEDICINE CLINIC | Age: 47
End: 2017-11-06

## 2017-11-06 ENCOUNTER — HOSPITAL ENCOUNTER (OUTPATIENT)
Dept: LAB | Age: 47
Discharge: HOME | End: 2017-11-06

## 2017-11-06 DIAGNOSIS — I10 HTN, GOAL BELOW 140/90: ICD-10-CM

## 2017-11-06 DIAGNOSIS — E78.5 DYSLIPIDEMIA: Primary | ICD-10-CM

## 2017-11-06 DIAGNOSIS — I10 ESSENTIAL HYPERTENSION: ICD-10-CM

## 2017-11-06 DIAGNOSIS — E78.5 DYSLIPIDEMIA: ICD-10-CM

## 2017-11-06 LAB
ALBUMIN SERPL-MCNC: 3.3 G/DL (ref 3.4–5)
ALBUMIN/GLOB SERPL: 1.1 {RATIO} (ref 0.8–1.7)
ALP SERPL-CCNC: 81 U/L (ref 45–117)
ALT SERPL-CCNC: 27 U/L (ref 13–56)
ANION GAP SERPL CALC-SCNC: 7 MMOL/L (ref 3–18)
AST SERPL-CCNC: 14 U/L (ref 15–37)
BILIRUB SERPL-MCNC: 0.1 MG/DL (ref 0.2–1)
BUN SERPL-MCNC: 15 MG/DL (ref 7–18)
BUN/CREAT SERPL: 25 (ref 12–20)
CALCIUM SERPL-MCNC: 8.2 MG/DL (ref 8.5–10.1)
CHLORIDE SERPL-SCNC: 108 MMOL/L (ref 100–108)
CHOLEST SERPL-MCNC: 120 MG/DL
CO2 SERPL-SCNC: 26 MMOL/L (ref 21–32)
CREAT SERPL-MCNC: 0.59 MG/DL (ref 0.6–1.3)
GLOBULIN SER CALC-MCNC: 3 G/DL (ref 2–4)
GLUCOSE SERPL-MCNC: 105 MG/DL (ref 74–99)
HDLC SERPL-MCNC: 38 MG/DL (ref 40–60)
HDLC SERPL: 3.2 {RATIO} (ref 0–5)
LDLC SERPL CALC-MCNC: 29.8 MG/DL (ref 0–100)
LIPID PROFILE,FLP: ABNORMAL
MAGNESIUM SERPL-MCNC: 2.2 MG/DL (ref 1.6–2.6)
POTASSIUM SERPL-SCNC: 4.3 MMOL/L (ref 3.5–5.5)
PROT SERPL-MCNC: 6.3 G/DL (ref 6.4–8.2)
SODIUM SERPL-SCNC: 141 MMOL/L (ref 136–145)
TRIGL SERPL-MCNC: 261 MG/DL (ref ?–150)
VLDLC SERPL CALC-MCNC: 52.2 MG/DL

## 2017-11-06 PROCEDURE — 80053 COMPREHEN METABOLIC PANEL: CPT | Performed by: NURSE PRACTITIONER

## 2017-11-06 PROCEDURE — 80061 LIPID PANEL: CPT | Performed by: NURSE PRACTITIONER

## 2017-11-06 PROCEDURE — 83735 ASSAY OF MAGNESIUM: CPT | Performed by: NURSE PRACTITIONER

## 2017-11-14 ENCOUNTER — TELEPHONE (OUTPATIENT)
Dept: FAMILY MEDICINE CLINIC | Age: 47
End: 2017-11-14

## 2017-11-14 NOTE — TELEPHONE ENCOUNTER
Medication: Benicar HCT 40-12.5 mg, dose: 1 tab, how often: daily, current number of medication days provided: 90, refill per application. Lot # [id number], EXP 04/2019. This medication was received and verified for the following 1. Correct Patient, 2. Correct Diagnosis, 3. Correct Drug, 4. Correct route, and no current allergy to medication. Please contact patient to come  their medications.      Adonay Odom, MSN, RN, FNP-C     [facility]

## 2017-11-15 ENCOUNTER — OFFICE VISIT (OUTPATIENT)
Dept: FAMILY MEDICINE CLINIC | Age: 47
End: 2017-11-15

## 2017-11-15 VITALS
TEMPERATURE: 98.5 F | RESPIRATION RATE: 20 BRPM | OXYGEN SATURATION: 97 % | DIASTOLIC BLOOD PRESSURE: 88 MMHG | BODY MASS INDEX: 35.33 KG/M2 | SYSTOLIC BLOOD PRESSURE: 135 MMHG | HEART RATE: 92 BPM | WEIGHT: 192 LBS | HEIGHT: 62 IN

## 2017-11-15 DIAGNOSIS — I10 HTN, GOAL BELOW 140/90: Primary | ICD-10-CM

## 2017-11-15 DIAGNOSIS — E66.9 OBESITY (BMI 35.0-39.9 WITHOUT COMORBIDITY): ICD-10-CM

## 2017-11-15 DIAGNOSIS — F14.10 COCAINE ABUSE (HCC): ICD-10-CM

## 2017-11-15 DIAGNOSIS — F32.A DEPRESSION, UNSPECIFIED DEPRESSION TYPE: ICD-10-CM

## 2017-11-15 DIAGNOSIS — Z23 ENCOUNTER FOR IMMUNIZATION: ICD-10-CM

## 2017-11-15 DIAGNOSIS — E78.5 DYSLIPIDEMIA: ICD-10-CM

## 2017-11-15 DIAGNOSIS — E78.1 HYPERTRIGLYCERIDEMIA: ICD-10-CM

## 2017-11-15 RX ORDER — AMLODIPINE BESYLATE AND ATORVASTATIN CALCIUM 10; 40 MG/1; MG/1
1 TABLET, FILM COATED ORAL DAILY
Qty: 90 TAB | Refills: 3 | Status: SHIPPED | COMMUNITY
Start: 2017-11-15 | End: 2019-02-21 | Stop reason: ALTCHOICE

## 2017-11-15 NOTE — PATIENT INSTRUCTIONS
Vaccine Information Statement    Influenza (Flu) Vaccine (Inactivated or Recombinant): What you need to know    Many Vaccine Information Statements are available in [place]ian and other languages. See www.immunize.org/vis  Hojas de Información Sobre Vacunas están disponibles en Español y en muchos otros idiomas. Visite www.immunize.org/vis    1. Why get vaccinated? Influenza (flu) is a contagious disease that spreads around the [place] every year, usually between October and May. Flu is caused by influenza viruses, and is spread mainly by coughing, sneezing, and close contact. Anyone can get flu. Flu strikes suddenly and can last several days. Symptoms vary by age, but can include:   fever/chills   sore throat   muscle aches   fatigue   cough   headache    runny or stuffy nose    Flu can also lead to pneumonia and blood infections, and cause diarrhea and seizures in children. If you have a medical condition, such as heart or lung disease, flu can make it worse. Flu is more dangerous for some people. Infants and young children, people 72years of age and older, pregnant women, and people with certain health conditions or a weakened immune system are at greatest risk. Each year thousands of people in the [facility] die from flu, and many more are hospitalized. Flu vaccine can:   keep you from getting flu,   make flu less severe if you do get it, and   keep you from spreading flu to your family and other people. 2. Inactivated and recombinant flu vaccines    A dose of flu vaccine is recommended every flu season. Children 6 months through 6years of age may need two doses during the same flu season. Everyone else needs only one dose each flu season.        Some inactivated flu vaccines contain a very small amount of a mercury-based preservative called thimerosal. Studies have not shown thimerosal in vaccines to be harmful, but flu vaccines that do not contain thimerosal are available. There is no live flu virus in flu shots. They cannot cause the flu. There are many flu viruses, and they are always changing. Each year a new flu vaccine is made to protect against three or four viruses that are likely to cause disease in the upcoming flu season. But even when the vaccine doesnt exactly match these viruses, it may still provide some protection    Flu vaccine cannot prevent:   flu that is caused by a virus not covered by the vaccine, or   illnesses that look like flu but are not. It takes about 2 weeks for protection to develop after vaccination, and protection lasts through the flu season. 3. Some people should not get this vaccine    Tell the person who is giving you the vaccine:     If you have any severe, life-threatening allergies. If you ever had a life-threatening allergic reaction after a dose of flu vaccine, or have a severe allergy to any part of this vaccine, you may be advised not to get vaccinated. Most, but not all, types of flu vaccine contain a small amount of egg protein.  If you ever had Guillain-Barré Syndrome (also called GBS). Some people with a history of GBS should not get this vaccine. This should be discussed with your doctor.  If you are not feeling well. It is usually okay to get flu vaccine when you have a mild illness, but you might be asked to come back when you feel better. 4. Risks of a vaccine reaction    With any medicine, including vaccines, there is a chance of reactions. These are usually mild and go away on their own, but serious reactions are also possible. Most people who get a flu shot do not have any problems with it.      Minor problems following a flu shot include:    soreness, redness, or swelling where the shot was given     hoarseness   sore, red or itchy eyes   cough   fever   aches   headache   itching   fatigue  If these problems occur, they usually begin soon after the shot and last 1 or 2 days. More serious problems following a flu shot can include the following:     There may be a small increased risk of Guillain-Barré Syndrome (GBS) after inactivated flu vaccine. This risk has been estimated at 1 or 2 additional cases per million people vaccinated. This is much lower than the risk of severe complications from flu, which can be prevented by flu vaccine.  Young children who get the flu shot along with pneumococcal vaccine (PCV13) and/or DTaP vaccine at the same time might be slightly more likely to have a seizure caused by fever. Ask your doctor for more information. Tell your doctor if a child who is getting flu vaccine has ever had a seizure. Problems that could happen after any injected vaccine:      People sometimes faint after a medical procedure, including vaccination. Sitting or lying down for about 15 minutes can help prevent fainting, and injuries caused by a fall. Tell your doctor if you feel dizzy, or have vision changes or ringing in the ears.  Some people get severe pain in the shoulder and have difficulty moving the arm where a shot was given. This happens very rarely.  Any medication can cause a severe allergic reaction. Such reactions from a vaccine are very rare, estimated at about 1 in a million doses, and would happen within a few minutes to a few hours after the vaccination. As with any medicine, there is a very remote chance of a vaccine causing a serious injury or death. The safety of vaccines is always being monitored. For more information, visit: www.cdc.gov/vaccinesafety/    5. What if there is a serious reaction? What should I look for?  Look for anything that concerns you, such as signs of a severe allergic reaction, very high fever, or unusual behavior.     Signs of a severe allergic reaction can include hives, swelling of the face and throat, difficulty breathing, a fast heartbeat, dizziness, and weakness - usually within a few minutes to a few hours after the vaccination. What should I do?  If you think it is a severe allergic reaction or other emergency that cant wait, call 9-1-1 and get the person to the nearest hospital. Otherwise, call your doctor.  Reactions should be reported to the Vaccine Adverse Event Reporting System (VAERS). Your doctor should file this report, or you can do it yourself through  the VAERS web site at www.vaers. [facility].gov, or by calling 6-348.536.1998. VAERS does not give medical advice. 6. The National Vaccine Injury Compensation Program    The [facility] Vaccine Injury Compensation Program (VICP) is a federal program that was created to compensate people who may have been injured by certain vaccines. Persons who believe they may have been injured by a vaccine can learn about the program and about filing a claim by calling 4-582.767.5856 or visiting the [employer] website at www.[facility].gov/vaccinecompensation. There is a time limit to file a claim for compensation. 7. How can I learn more?  Ask your healthcare provider. He or she can give you the vaccine package insert or suggest other sources of information.  Call your local or state health department.  Contact the Centers for Disease Control and Prevention (CDC):  - Call 2-463.681.6419 (1-800-CDC-INFO) or  - Visit CDCs website at www.cdc.gov/flu    Vaccine Information Statement   Inactivated Influenza Vaccine   8/7/2015  42 DELISA Henderson [id number]    Department of Health and Human Services  Centers for Disease Control and Prevention    Office Use Only       Depression and Chronic Disease: Care Instructions  Your Care Instructions    A chronic disease is one that you have for a long time. Some chronic diseases can be controlled, but they usually cannot be cured. Depression is common in people with chronic diseases, but it often goes unnoticed. Many people have concerns about seeking treatment for a mental health problem.  You may think it's a sign of weakness, or you don't want people to know about it. It's important to overcome these reasons for not seeking treatment. Treating depression or anxiety is good for your health. Follow-up care is a key part of your treatment and safety. Be sure to make and go to all appointments, and call your doctor if you are having problems. It's also a good idea to know your test results and keep a list of the medicines you take. How can you care for yourself at home? Watch for symptoms of depression  The symptoms of depression are often subtle at first. You may think they are caused by your disease rather than depression. Or you may think it is normal to be depressed when you have a chronic disease. If you are depressed you may:  · Feel sad or hopeless. · Feel guilty or worthless. · Not enjoy the things you used to enjoy. · Feel hopeless, as though life is not worth living. · Have trouble thinking or remembering. · Have low energy, and you may not eat or sleep well. · Pull away from others. · Think often about death or killing yourself. (Keep the numbers for these national suicide hotlines: 2-548-539-TALK [1-603.344.4616] and 2-692-LMHSKHJ [1-690.180.4881]. )  Get treatment  By treating your depression, you can feel more hopeful and have more energy. If you feel better, you may take better care of yourself, so your health may improve. · Talk to your doctor if you have any changes in mood during treatment for your disease. · Ask your doctor for help. Counseling, antidepressant medicine, or a combination of the two can help most people with depression. Often a combination works best. Counseling can also help you cope with having a chronic disease. When should you call for help? Call 911 anytime you think you may need emergency care. For example, call if:  ? · You feel like hurting yourself or someone else. ? · Someone you know has depression and is about to attempt or is attempting suicide.    ?Call your doctor now or seek immediate medical care if:  ? · You hear voices. ? · Someone you know has depression and:  ¨ Starts to give away his or her possessions. ¨ Uses illegal drugs or drinks alcohol heavily. ¨ Talks or writes about death, including writing suicide notes or talking about guns, knives, or pills. ¨ Starts to spend a lot of time alone. ¨ Acts very aggressively or suddenly appears calm. ? Watch closely for changes in your health, and be sure to contact your doctor if:  ? · You do not get better as expected. Where can you learn more? Go to http://los-zack.info/. Enter M758 in the search box to learn more about \"Depression and Chronic Disease: Care Instructions. \"  Current as of: May 12, 2017  Content Version: 11.4  © 9148-7112 Healthwise, Incorporated. Care instructions adapted under license by [employer] (which disclaims liability or warranty for this information). If you have questions about a medical condition or this instruction, always ask your healthcare professional. [address] any warranty or liability for your use of this information.

## 2017-11-15 NOTE — PROGRESS NOTES
11/15/17    PCP: Ida Ramirez NP    Chief Complaint   Patient presents with    Follow Up Chronic Condition    Immunization/Injection        HISTORY OF PRESENT ILLNESS  Nilay Haider  is a 49 y.o. female whom presents for Follow Up Chronic Condition and Immunization/Injection    HPI  Cardiovascular Review:  The patient has hypertension and hyperlipidemia. Diet and Lifestyle: not attempting to follow a low fat, low cholesterol diet, smoker Cigarettes  Home BP Monitoring: is not measured at home. Pertinent ROS: taking medications as instructed, no medication side effects noted, no TIA's, no chest pain on exertion, no dyspnea on exertion, no swelling of ankles. COPD Review:  The patient is being seen for follow up of COPD and tobacco abuse. Oxygen: She currently is not on home oxygen therapy. Symptoms: stable on meds. Patient uses 2 pillows at night. Patient does smoke cigarettes. Depression Review:  Patient is seen for followup of depression. Treatment includes Symbyax and Goes to [facility] for treatment. Ongoing symptoms include depressed mood and psychomotor agitation. She denies recurrent thoughts of death, suicidal thoughts without plan, suicidal thoughts with specific plan and suicidal attempt. She experiences the following side effects from the treatment: none. Current Outpatient Prescriptions   Medication Sig Dispense Refill    amLODIPine-atorvastatin (CADUET) 10-40 mg per tablet Take 1 Tab by mouth daily. 90 Tab 3    olmesartan-hydroCHLOROthiazide (BENICAR HCT) 40-12.5 mg per tablet Take 1 Tab by mouth daily. 90 Tab 3    OLANZapine-FLUoxetine (SYMBYAX) 3-25 mg per capsule Take 1 Cap by mouth every evening. Indications: DEPRESSION ASSOCIATED WITH BIPOLAR DISORDER 90 Cap 3    cloNIDine HCl (CATAPRES) 0.3 mg tablet Take 1 Tab by mouth two (2) times a day. Indications: hypertension 60 Tab 3    calcium-cholecalciferol, D3, (CALTRATE 600+D) tablet Take 1 Tab by mouth daily.       clindamycin (CLEOCIN T) 1 % lotion Apply  to affected area two (2) times a day. use thin film on affected area 1 Bottle 0    omega-3 acid ethyl esters (LOVAZA) 1 gram capsule Take 2 Caps by mouth two (2) times a day. 360 Cap 3    ergocalciferol (VITAMIN D2) 50,000 unit capsule Take one capsule every 7 days till gone, then start taking over-the-counter Vitamin D3 2,000 units every day 4 Cap 2    albuterol (PROVENTIL HFA, VENTOLIN HFA, PROAIR HFA) 90 mcg/actuation inhaler Take 2 Puffs by inhalation every four (4) hours as needed for Wheezing. 6 Inhaler 3    nystatin (MYCOSTATIN) 100,000 unit/mL suspension Take 5 mL by mouth four (4) times daily.  swish and spit  Indications: ORAL CANDIDIASIS 200 mL 1     Allergies   Allergen Reactions    Iodinated Contrast- Oral And Iv Dye Shortness of Breath     Past Medical History:   Diagnosis Date    Bronchitis     Cocaine abuse 3/26/2015    Dental caries     Depression     Depression 8/31/2016    Drug abuse     Elevated hematocrit 8/31/2016    ETOH abuse     H/O screening mammography 12/06/2016    No evidence of malignancy     HPV (human papilloma virus) infection 11/2016    The patient referred to [facility]     Hypertension     Mood swings (HCC)     Psychiatric disorder     depression, Bipolar    Tobacco abuse     Vitamin D deficiency      Past Surgical History:   Procedure Laterality Date    HX APPENDECTOMY      HX COLPOSCOPY  12/06/2016    Low-grade courtney/mild dysplasia RICK I    HX HERNIA REPAIR       Family History   Problem Relation Age of Onset    Hypertension Mother     Heart Attack Father      Social History   Substance Use Topics    Smoking status: Current Every Day Smoker     Packs/day: 0.50     Years: 20.00     Types: Cigarettes    Smokeless tobacco: Never Used    Alcohol use 0.0 oz/week     3 - 6 Cans of beer per week      Comment: 3 or 4 beers weekly       Lab Results  Component Value Date/Time   WBC 8.8 06/08/2017 10:25 AM   HGB 15.2 06/08/2017 10:25 AM   HCT 43.8 06/08/2017 10:25 AM   PLATELET 568 34/20/6245 10:25 AM   MCV 93.8 06/08/2017 10:25 AM   Lab Results  Component Value Date/Time   Hemoglobin A1c 5.2 11/17/2014 07:16 AM   Glucose 105 11/06/2017 07:46 AM   LDL, calculated 29.8 11/06/2017 07:46 AM   Creatinine 0.59 11/06/2017 07:46 AM      Lab Results  Component Value Date/Time   Cholesterol, total 120 11/06/2017 07:46 AM   HDL Cholesterol 38 11/06/2017 07:46 AM   LDL, calculated 29.8 11/06/2017 07:46 AM   Triglyceride 261 11/06/2017 07:46 AM   CHOL/HDL Ratio 3.2 11/06/2017 07:46 AM     Lab Results   Component Value Date/Time    Sodium 141 11/06/2017 07:46 AM    Potassium 4.3 11/06/2017 07:46 AM    Chloride 108 11/06/2017 07:46 AM    CO2 26 11/06/2017 07:46 AM    Anion gap 7 11/06/2017 07:46 AM    Glucose 105 11/06/2017 07:46 AM    BUN 15 11/06/2017 07:46 AM    Creatinine 0.59 11/06/2017 07:46 AM    BUN/Creatinine ratio 25 11/06/2017 07:46 AM    GFR est AA >60 11/06/2017 07:46 AM    GFR est non-AA >60 11/06/2017 07:46 AM    Calcium 8.2 11/06/2017 07:46 AM    Bilirubin, total 0.1 11/06/2017 07:46 AM    ALT (SGPT) 27 11/06/2017 07:46 AM    AST (SGOT) 14 11/06/2017 07:46 AM    Alk. phosphatase 81 11/06/2017 07:46 AM    Protein, total 6.3 11/06/2017 07:46 AM    Albumin 3.3 11/06/2017 07:46 AM    Globulin 3.0 11/06/2017 07:46 AM    A-G Ratio 1.1 11/06/2017 07:46 AM                 Visit Vitals    /88    Pulse 92    Temp 98.5 °F (36.9 °C)    Resp 20    Ht 5' 2\" (1.575 m)    Wt 192 lb (87.1 kg)    SpO2 97%    BMI 35.12 kg/m2       Pain Scale: /10    Pain Location:     Review of Systems   Constitutional: Negative. HENT: Negative. Eyes: Negative. Respiratory: Negative. Cardiovascular: Negative. Gastrointestinal: Negative. Genitourinary: Negative. Musculoskeletal: Negative. Skin: Negative. Neurological: Negative. Endo/Heme/Allergies: Negative.     Psychiatric/Behavioral: Positive for depression (States her mood is up and down. Currently seeing therapist at [place] [id number] and will be seeing psychiatry) and substance abuse (Cocaine). Negative for hallucinations, memory loss and suicidal ideas. The patient is not nervous/anxious and does not have insomnia. Pt expresses that she is losing interest in activities and does not wish to be bothered with people. She is depressed and wishes to be closer to her family in [place], however financial constraints are preventing migration. She does say she is expecting her first grand daughter and is excited about that. She is currently still using cocaine periodically but she reports she is starting to get \"Sick\" of using and is optimistic about quitting. Physical Exam   Constitutional: She is oriented to person, place, and time and well-developed, well-nourished, and in no distress. HENT:   Head: Normocephalic. Eyes: Pupils are equal, round, and reactive to light. Neck: Normal range of motion. Neck supple. Cardiovascular: Normal rate, regular rhythm and normal heart sounds. Pulmonary/Chest: Effort normal and breath sounds normal.   Abdominal: Soft. Bowel sounds are normal.   Neurological: She is alert and oriented to person, place, and time. Skin: Skin is warm and dry. Psychiatric: Affect normal. She exhibits a depressed mood. Vitals reviewed. Radiology reports:     ASSESSMENT and PLAN    ICD-10-CM ICD-9-CM    1. HTN, goal below 140/90 I10 401.9    2. Encounter for immunization Z23 V03.89 INFLUENZA VIRUS VAC QUAD,SPLIT,PRESV FREE SYRINGE IM   3. Dyslipidemia E78.5 272.4 amLODIPine-atorvastatin (CADUET) 10-40 mg per tablet   4. Depression, unspecified depression type F32.9 311    5. Cocaine abuse F14.10 305.60    6. Hypertriglyceridemia E78.1 272.1 amLODIPine-atorvastatin (CADUET) 10-40 mg per tablet   7. Obesity (BMI 35.0-39.9 without comorbidity) E66.9 278.00      Diagnoses and all orders for this visit:    1. HTN, goal below 140/90    2.  Encounter for immunization  -     Influenza virus vaccine (QUADRIVALENT PRES FREE SYRINGE) IM (29166)    3. Dyslipidemia  -     amLODIPine-atorvastatin (CADUET) 10-40 mg per tablet; Take 1 Tab by mouth daily. 4. Depression, unspecified depression type  - Discussed that cocaine use can bring about emotional and mood disorders, and it is in her best interest to stop using if it is affecting her this way. She is encouraged to continue to se therapist at [place] [id number] and Psychiatry as well. 5. Cocaine abuse  -Instructed pt on the importance of avoiding the use of illicit drugs as these drugs may negatively affect his health. Cocaine constricts blood vessels, dilates pupils, and increases body temperature, heart rate, and blood pressure. It can also cause headaches and gastrointestinal complications such as abdominal pain and nausea. Because cocaine tends to decrease appetite, chronic users can become malnourished as well. Heart attacks or strokes and even death can occur when using drugs. Regular snorting of cocaine can lead to loss of sense of smell, nosebleeds, problems with swallowing, hoarseness, and a chronically runny nose. Ingesting cocaine by the mouth can cause severe bowel gangrene as a result of reduced blood flow. Injecting cocaine can bring about severe allergic reactions and increased risk for jayden HIV, hepatitis C, and other blood-borne diseases. 6. Hypertriglyceridemia  -     amLODIPine-atorvastatin (CADUET) 10-40 mg per tablet; Take 1 Tab by mouth daily. 7. Obesity (BMI 35.0-39.9 without comorbidity)      Follow-up Disposition:  Return in about 5 months (around 4/15/2018), or if symptoms worsen or fail to improve.   reviewed diet, exercise and weight control  very strongly urged to quit smoking to reduce cardiovascular risk     Call [facility] for Financial Assistance    Medications Discontinued During This Encounter   Medication Reason    OLANZapine-FLUoxetine (SYMBYAX) 3-25 mg per capsule Duplicate Order    amLODIPine-atorvastatin (CADUET) 10-20 mg per tablet Dose Adjustment    lisinopril-hydroCHLOROthiazide (PRINZIDE, ZESTORETIC) 20-25 mg per tablet Alternate Therapy

## 2017-11-15 NOTE — PROGRESS NOTES
Luiz Caal is a 49 y.o. female who presents for routine immunizations. She denies any symptoms , reactions or allergies that would exclude them from being immunized today. Risks and adverse reactions were discussed and the VIS was given to them. All questions were addressed. She was observed for 15 min post injection. There were no reactions observed.     Checo Méndez

## 2017-11-21 ENCOUNTER — TELEPHONE (OUTPATIENT)
Dept: FAMILY MEDICINE CLINIC | Age: 47
End: 2017-11-21

## 2017-11-21 NOTE — TELEPHONE ENCOUNTER
Medication: Proventil HFA, dose: 2 puffs, how often: every 4 hours as needed for wheezing, current number of medication days provided: 90, refill per application. Lot# [id number], EXP 11/2018 . This medication was received and verified for the following 1. Correct Patient, 2. Correct Diagnosis, 3. Correct Drug, 4. Correct route, and no current allergy to medication. Please contact patient to come  their medications.      Kayy Lara MSN, RN, P-[facility]

## 2017-11-30 ENCOUNTER — TELEPHONE (OUTPATIENT)
Dept: SLEEP MEDICINE | Age: 47
End: 2017-11-30

## 2017-11-30 NOTE — TELEPHONE ENCOUNTER
Cannot leave message on VM and patient contact number not in-service no e-mail will send patient letter in mail.

## 2017-12-04 ENCOUNTER — TELEPHONE (OUTPATIENT)
Dept: FAMILY MEDICINE CLINIC | Age: 47
End: 2017-12-04

## 2017-12-04 NOTE — TELEPHONE ENCOUNTER
Medication: Caduet 10-20 mg, dose: 1 tab, how often: daily, current number of medication days provided: 90, refill per application. Lot #:  # [id number], EXP.11/19. This medication was received and verified for the following 1. Correct Patient, 2. Correct Diagnosis, 3. Correct Drug, 4. Correct route, and no current allergy to medication. Pt to continue 10-20 mg until her Caduet 10-40 mg comes in via patient assistance.

## 2017-12-11 ENCOUNTER — DOCUMENTATION ONLY (OUTPATIENT)
Dept: FAMILY MEDICINE CLINIC | Age: 47
End: 2017-12-11

## 2018-01-10 ENCOUNTER — TELEPHONE (OUTPATIENT)
Dept: FAMILY MEDICINE CLINIC | Age: 48
End: 2018-01-10

## 2018-01-11 NOTE — TELEPHONE ENCOUNTER
Medication: Caduet 10-40 mg, dose: 1 tab, how often: daily, current number of medication days provided: 90, refill per application. Lot #:  # [id number], EXP 03/2020. This medication was received and verified for the following 1. Correct Patient, 2. Correct Diagnosis, 3. Correct Drug, 4. Correct route, and no current allergy to medication. Medication: Symbyax 3-25 mg, dose: 1 tab, how often: qd , current number of medication days provided: 90, refill per application. Lot #: [id number], EXP 01/2020. This medication was received and verified for the following 1. Correct Patient, 2. Correct Diagnosis, 3. Correct Drug, 4. Correct route, and no current allergy to medication. Please contact patient to come  their medications.      Mirian Vaughan, MSN, RN, P-C     [facility]

## 2018-01-27 ENCOUNTER — APPOINTMENT (OUTPATIENT)
Dept: GENERAL RADIOLOGY | Age: 48
End: 2018-01-27
Attending: EMERGENCY MEDICINE
Payer: SUBSIDIZED

## 2018-01-27 ENCOUNTER — APPOINTMENT (OUTPATIENT)
Dept: CT IMAGING | Age: 48
End: 2018-01-27
Attending: EMERGENCY MEDICINE
Payer: SUBSIDIZED

## 2018-01-27 ENCOUNTER — HOSPITAL ENCOUNTER (EMERGENCY)
Age: 48
Discharge: HOME | End: 2018-01-28
Attending: EMERGENCY MEDICINE
Payer: SUBSIDIZED

## 2018-01-27 DIAGNOSIS — R07.9 CHEST PAIN, UNSPECIFIED TYPE: Primary | ICD-10-CM

## 2018-01-27 LAB
ANION GAP SERPL CALC-SCNC: 4 MMOL/L (ref 3–18)
APPEARANCE UR: ABNORMAL
BACTERIA URNS QL MICRO: ABNORMAL /HPF
BASOPHILS # BLD: 0.1 K/UL (ref 0–0.06)
BASOPHILS NFR BLD: 0 % (ref 0–2)
BILIRUB UR QL: NEGATIVE
BNP SERPL-MCNC: 63 PG/ML (ref 0–450)
BUN SERPL-MCNC: 13 MG/DL (ref 7–18)
BUN/CREAT SERPL: 14 (ref 12–20)
CALCIUM SERPL-MCNC: 8.7 MG/DL (ref 8.5–10.1)
CHLORIDE SERPL-SCNC: 104 MMOL/L (ref 100–108)
CK MB CFR SERPL CALC: NORMAL % (ref 0–4)
CK MB SERPL-MCNC: <1 NG/ML (ref 5–25)
CK SERPL-CCNC: 34 U/L (ref 26–192)
CO2 SERPL-SCNC: 31 MMOL/L (ref 21–32)
COLOR UR: YELLOW
CREAT SERPL-MCNC: 0.94 MG/DL (ref 0.6–1.3)
DIFFERENTIAL METHOD BLD: ABNORMAL
EOSINOPHIL # BLD: 0.2 K/UL (ref 0–0.4)
EOSINOPHIL NFR BLD: 1 % (ref 0–5)
EPITH CASTS URNS QL MICRO: ABNORMAL /LPF (ref 0–5)
ERYTHROCYTE [DISTWIDTH] IN BLOOD BY AUTOMATED COUNT: 12.6 % (ref 11.6–14.5)
GLUCOSE BLD STRIP.AUTO-MCNC: 126 MG/DL (ref 70–110)
GLUCOSE SERPL-MCNC: 115 MG/DL (ref 74–99)
GLUCOSE UR STRIP.AUTO-MCNC: NEGATIVE MG/DL
HCG UR QL: NEGATIVE
HCT VFR BLD AUTO: 48.5 % (ref 35–45)
HGB BLD-MCNC: 17 G/DL (ref 12–16)
HGB UR QL STRIP: NEGATIVE
KETONES UR QL STRIP.AUTO: NEGATIVE MG/DL
LEUKOCYTE ESTERASE UR QL STRIP.AUTO: ABNORMAL
LYMPHOCYTES # BLD: 4 K/UL (ref 0.9–3.6)
LYMPHOCYTES NFR BLD: 32 % (ref 21–52)
MAGNESIUM SERPL-MCNC: 2.3 MG/DL (ref 1.6–2.6)
MCH RBC QN AUTO: 32.8 PG (ref 24–34)
MCHC RBC AUTO-ENTMCNC: 35.1 G/DL (ref 31–37)
MCV RBC AUTO: 93.4 FL (ref 74–97)
MONOCYTES # BLD: 1.2 K/UL (ref 0.05–1.2)
MONOCYTES NFR BLD: 9 % (ref 3–10)
MUCOUS THREADS URNS QL MICRO: ABNORMAL /LPF
NEUTS SEG # BLD: 7.2 K/UL (ref 1.8–8)
NEUTS SEG NFR BLD: 58 % (ref 40–73)
NITRITE UR QL STRIP.AUTO: NEGATIVE
PH UR STRIP: 5.5 [PH] (ref 5–8)
PLATELET # BLD AUTO: 247 K/UL (ref 135–420)
PMV BLD AUTO: 9.5 FL (ref 9.2–11.8)
POTASSIUM SERPL-SCNC: 3.7 MMOL/L (ref 3.5–5.5)
PROT UR STRIP-MCNC: NEGATIVE MG/DL
RBC # BLD AUTO: 5.19 M/UL (ref 4.2–5.3)
RBC #/AREA URNS HPF: ABNORMAL /HPF (ref 0–5)
SODIUM SERPL-SCNC: 139 MMOL/L (ref 136–145)
SP GR UR REFRACTOMETRY: 1.02 (ref 1–1.03)
TROPONIN I SERPL-MCNC: <0.02 NG/ML (ref 0–0.04)
UROBILINOGEN UR QL STRIP.AUTO: 1 EU/DL (ref 0.2–1)
WBC # BLD AUTO: 12.5 K/UL (ref 4.6–13.2)
WBC URNS QL MICRO: ABNORMAL /HPF (ref 0–4)

## 2018-01-27 PROCEDURE — 70450 CT HEAD/BRAIN W/O DYE: CPT

## 2018-01-27 PROCEDURE — 99285 EMERGENCY DEPT VISIT HI MDM: CPT

## 2018-01-27 PROCEDURE — 82962 GLUCOSE BLOOD TEST: CPT

## 2018-01-27 PROCEDURE — 84484 ASSAY OF TROPONIN QUANT: CPT | Performed by: EMERGENCY MEDICINE

## 2018-01-27 PROCEDURE — 93005 ELECTROCARDIOGRAM TRACING: CPT

## 2018-01-27 PROCEDURE — 85025 COMPLETE CBC W/AUTO DIFF WBC: CPT | Performed by: EMERGENCY MEDICINE

## 2018-01-27 PROCEDURE — 81001 URINALYSIS AUTO W/SCOPE: CPT | Performed by: EMERGENCY MEDICINE

## 2018-01-27 PROCEDURE — 83880 ASSAY OF NATRIURETIC PEPTIDE: CPT | Performed by: EMERGENCY MEDICINE

## 2018-01-27 PROCEDURE — 71045 X-RAY EXAM CHEST 1 VIEW: CPT

## 2018-01-27 PROCEDURE — 80307 DRUG TEST PRSMV CHEM ANLYZR: CPT | Performed by: EMERGENCY MEDICINE

## 2018-01-27 PROCEDURE — 81025 URINE PREGNANCY TEST: CPT | Performed by: EMERGENCY MEDICINE

## 2018-01-27 PROCEDURE — 83735 ASSAY OF MAGNESIUM: CPT | Performed by: EMERGENCY MEDICINE

## 2018-01-27 PROCEDURE — 80048 BASIC METABOLIC PNL TOTAL CA: CPT | Performed by: EMERGENCY MEDICINE

## 2018-01-28 VITALS
OXYGEN SATURATION: 93 % | DIASTOLIC BLOOD PRESSURE: 45 MMHG | TEMPERATURE: 98.7 F | HEIGHT: 62 IN | RESPIRATION RATE: 12 BRPM | SYSTOLIC BLOOD PRESSURE: 96 MMHG | HEART RATE: 65 BPM | BODY MASS INDEX: 36.68 KG/M2 | WEIGHT: 199.31 LBS

## 2018-01-28 LAB
AMPHET UR QL SCN: NEGATIVE
ATRIAL RATE: 74 BPM
BARBITURATES UR QL SCN: NEGATIVE
BENZODIAZ UR QL: NEGATIVE
CALCULATED P AXIS, ECG09: 52 DEGREES
CALCULATED R AXIS, ECG10: -17 DEGREES
CALCULATED T AXIS, ECG11: 85 DEGREES
CANNABINOIDS UR QL SCN: NEGATIVE
CK MB CFR SERPL CALC: 3.4 % (ref 0–4)
CK MB SERPL-MCNC: 1 NG/ML (ref 5–25)
CK SERPL-CCNC: 29 U/L (ref 26–192)
COCAINE UR QL SCN: POSITIVE
DIAGNOSIS, 93000: NORMAL
HDSCOM,HDSCOM: ABNORMAL
METHADONE UR QL: NEGATIVE
OPIATES UR QL: NEGATIVE
P-R INTERVAL, ECG05: 168 MS
PCP UR QL: NEGATIVE
Q-T INTERVAL, ECG07: 422 MS
QRS DURATION, ECG06: 100 MS
QTC CALCULATION (BEZET), ECG08: 468 MS
TROPONIN I SERPL-MCNC: <0.02 NG/ML (ref 0–0.04)
VENTRICULAR RATE, ECG03: 74 BPM

## 2018-01-28 PROCEDURE — 82550 ASSAY OF CK (CPK): CPT | Performed by: EMERGENCY MEDICINE

## 2018-01-28 NOTE — ED NOTES
Entered room to draw labs. Pt says \"oh no he told me I'd be gone by 1\". Informed pt that additional labs were ordered. Refusing at this time. Requesting to speak with doctor.

## 2018-01-28 NOTE — ED PROVIDER NOTES
EMERGENCY DEPARTMENT HISTORY AND PHYSICAL EXAM    9:43 PM      Date: 1/27/2018  Patient Name: iL Brasher    History of Presenting Illness     Chief Complaint   Patient presents with    Numbness         History Provided By: Patient    Chief Complaint: Numbness  Duration: 1 Days  Timing:  Intermittent  Location: Face and left fingertips   Quality: tingling  Severity: N/A  Modifying Factors: None  Associated Symptoms: nausea and vomiting       Additional History (Context): Li Brasher is a 49 y.o. female with HTN, ETOH abuse, depression, cocaine abuse, and HPV who presents with intermittent numbness in her face and fingertips for the past day. In the last hour the sensation returned so she came to the ED. Pt states that she recently increased her dosage of Caduet from 20 to 40 mg and has since felt \"funny. \" She describes the sensation as numbness and tingling in her face (primarily her lips) and her left fingertips. The only other symptoms pt has are nausea and vomiting. Diaphoresis was denied by pt despite feeling like her feeling like \"my face is on fire. \" The has smoked 3 packs a day for the past 30 years. No other concerns, modifying factors, or symptoms were expressed by the pt at this time. PCP: Aristeo Oconnell NP    Current Outpatient Prescriptions   Medication Sig Dispense Refill    amLODIPine-atorvastatin (CADUET) 10-40 mg per tablet Take 1 Tab by mouth daily. 90 Tab 3    nystatin (MYCOSTATIN) 100,000 unit/mL suspension Take 5 mL by mouth four (4) times daily. swish and spit  Indications: ORAL CANDIDIASIS 200 mL 1    olmesartan-hydroCHLOROthiazide (BENICAR HCT) 40-12.5 mg per tablet Take 1 Tab by mouth daily. 90 Tab 3    OLANZapine-FLUoxetine (SYMBYAX) 3-25 mg per capsule Take 1 Cap by mouth every evening. Indications: DEPRESSION ASSOCIATED WITH BIPOLAR DISORDER 90 Cap 3    cloNIDine HCl (CATAPRES) 0.3 mg tablet Take 1 Tab by mouth two (2) times a day.  Indications: hypertension 60 Tab 3  calcium-cholecalciferol, D3, (CALTRATE 600+D) tablet Take 1 Tab by mouth daily.  clindamycin (CLEOCIN T) 1 % lotion Apply  to affected area two (2) times a day. use thin film on affected area 1 Bottle 0    omega-3 acid ethyl esters (LOVAZA) 1 gram capsule Take 2 Caps by mouth two (2) times a day. 360 Cap 3    ergocalciferol (VITAMIN D2) 50,000 unit capsule Take one capsule every 7 days till gone, then start taking over-the-counter Vitamin D3 2,000 units every day 4 Cap 2    albuterol (PROVENTIL HFA, VENTOLIN HFA, PROAIR HFA) 90 mcg/actuation inhaler Take 2 Puffs by inhalation every four (4) hours as needed for Wheezing. 6 Inhaler 3       Past History     Past Medical History:  Past Medical History:   Diagnosis Date    Bronchitis     Cocaine abuse 3/26/2015    Dental caries     Depression     Depression 8/31/2016    Drug abuse     Elevated hematocrit 8/31/2016    ETOH abuse     H/O screening mammography 12/06/2016    No evidence of malignancy     HPV (human papilloma virus) infection 11/2016    The patient referred to [facility]     Hypertension     Mood swings (HCC)     Psychiatric disorder     depression, Bipolar    Tobacco abuse     Vitamin D deficiency        Past Surgical History:  Past Surgical History:   Procedure Laterality Date    HX APPENDECTOMY      HX COLPOSCOPY  12/06/2016    Low-grade courtney/mild dysplasia RICK I    HX HERNIA REPAIR         Family History:  Family History   Problem Relation Age of Onset    Hypertension Mother     Heart Attack Father        Social History:  Social History   Substance Use Topics    Smoking status: Current Every Day Smoker     Packs/day: 0.50     Years: 20.00     Types: Cigarettes    Smokeless tobacco: Never Used    Alcohol use 0.0 oz/week     3 - 6 Cans of beer per week      Comment: 3 or 4 beers weekly        Allergies:   Allergies   Allergen Reactions    Iodinated Contrast- Oral And Iv Dye Shortness of Breath         Review of Systems     Review of Systems   Constitutional: Negative for chills, diaphoresis, fatigue and fever. HENT: Negative for congestion, ear pain, rhinorrhea and sore throat. Eyes: Negative for pain, redness and itching. Respiratory: Negative for cough, chest tightness and shortness of breath. Cardiovascular: Negative for chest pain, palpitations and leg swelling. Gastrointestinal: Positive for nausea and vomiting. Negative for abdominal pain and diarrhea. Genitourinary: Negative for decreased urine volume, dysuria, flank pain, hematuria and pelvic pain. Musculoskeletal: Negative for arthralgias, back pain, joint swelling and myalgias. Skin: Negative for color change, pallor and rash. Neurological: Positive for numbness (tingling in face and fingertips of left hand). Negative for dizziness, weakness and headaches. Hematological: Negative for adenopathy. Does not bruise/bleed easily. All other systems reviewed and are negative. Physical Exam     Visit Vitals    BP (!) 146/97 (BP 1 Location: Left arm, BP Patient Position: At rest;Sitting)    Pulse 78    Temp 98.3 °F (36.8 °C)    Resp 16    Ht 5' 2\" (1.575 m)    Wt 90.4 kg (199 lb 5 oz)    SpO2 97%    BMI 36.45 kg/m2       Physical Exam   Constitutional: She is oriented to person, place, and time. She appears well-developed and well-nourished. HENT:   Head: Normocephalic and atraumatic. Right Ear: External ear normal.   Left Ear: External ear normal.   Nose: Nose normal.   Mouth/Throat: Oropharynx is clear and moist.   Eyes: Conjunctivae and EOM are normal. Pupils are equal, round, and reactive to light. Neck: Normal range of motion. Neck supple. Cardiovascular: Regular rhythm, normal heart sounds and intact distal pulses. Pulmonary/Chest: Effort normal and breath sounds normal. No respiratory distress. She has no wheezes. She has no rales. She exhibits no tenderness. Lungs clear bilaterally in all fields. Abdominal: Soft.  Bowel sounds are normal. She exhibits no distension and no mass. There is no tenderness. There is no rebound and no guarding. Abdomen soft and nontender upon palpation. Musculoskeletal: Normal range of motion. She exhibits no edema. No edema in extremities. Neurological: She is alert and oriented to person, place, and time. Skin: Skin is warm and dry. No rash noted. No erythema. Psychiatric: She has a normal mood and affect. Her behavior is normal. Judgment normal.   Nursing note and vitals reviewed. Diagnostic Study Results     Labs -  Recent Results (from the past 12 hour(s))   GLUCOSE, POC    Collection Time: 01/27/18  9:08 PM   Result Value Ref Range    Glucose (POC) 126 (H) 70 - 110 mg/dL   CBC WITH AUTOMATED DIFF    Collection Time: 01/27/18  9:09 PM   Result Value Ref Range    WBC 12.5 4.6 - 13.2 K/uL    RBC 5.19 4.20 - 5.30 M/uL    HGB 17.0 (H) 12.0 - 16.0 g/dL    HCT 48.5 (H) 35.0 - 45.0 %    MCV 93.4 74.0 - 97.0 FL    MCH 32.8 24.0 - 34.0 PG    MCHC 35.1 31.0 - 37.0 g/dL    RDW 12.6 11.6 - 14.5 %    PLATELET 695 194 - 961 K/uL    MPV 9.5 9.2 - 11.8 FL    NEUTROPHILS 58 40 - 73 %    LYMPHOCYTES 32 21 - 52 %    MONOCYTES 9 3 - 10 %    EOSINOPHILS 1 0 - 5 %    BASOPHILS 0 0 - 2 %    ABS. NEUTROPHILS 7.2 1.8 - 8.0 K/UL    ABS. LYMPHOCYTES 4.0 (H) 0.9 - 3.6 K/UL    ABS. MONOCYTES 1.2 0.05 - 1.2 K/UL    ABS. EOSINOPHILS 0.2 0.0 - 0.4 K/UL    ABS.  BASOPHILS 0.1 (H) 0.0 - 0.06 K/UL    DF AUTOMATED     METABOLIC PANEL, BASIC    Collection Time: 01/27/18  9:09 PM   Result Value Ref Range    Sodium 139 136 - 145 mmol/L    Potassium 3.7 3.5 - 5.5 mmol/L    Chloride 104 100 - 108 mmol/L    CO2 31 21 - 32 mmol/L    Anion gap 4 3.0 - 18 mmol/L    Glucose 115 (H) 74 - 99 mg/dL    BUN 13 7.0 - 18 MG/DL    Creatinine 0.94 0.6 - 1.3 MG/DL    BUN/Creatinine ratio 14 12 - 20      GFR est AA >60 >60 ml/min/1.73m2    GFR est non-AA >60 >60 ml/min/1.73m2    Calcium 8.7 8.5 - 10.1 MG/DL   MAGNESIUM    Collection Time: 01/27/18 9:09 PM   Result Value Ref Range    Magnesium 2.3 1.6 - 2.6 mg/dL   CARDIAC PANEL,(CK, CKMB & TROPONIN)    Collection Time: 01/27/18  9:09 PM   Result Value Ref Range    CK 34 26 - 192 U/L    CK - MB <1.0 <3.6 ng/ml    CK-MB Index  0.0 - 4.0 %     CALCULATION NOT PERFORMED WHEN RESULT IS BELOW LINEAR LIMIT    Troponin-I, Qt. <0.02 0.0 - 0.045 NG/ML   NT-PRO BNP    Collection Time: 01/27/18  9:09 PM   Result Value Ref Range    NT pro-BNP 63 0 - 450 PG/ML   EKG, 12 LEAD, INITIAL    Collection Time: 01/27/18  9:16 PM   Result Value Ref Range    Ventricular Rate 74 BPM    Atrial Rate 74 BPM    P-R Interval 168 ms    QRS Duration 100 ms    Q-T Interval 422 ms    QTC Calculation (Bezet) 468 ms    Calculated P Axis 52 degrees    Calculated R Axis -17 degrees    Calculated T Axis 85 degrees    Diagnosis       Normal sinus rhythm  Voltage criteria for left ventricular hypertrophy  Cannot rule out Septal infarct , age undetermined  T wave abnormality, consider lateral ischemia  Abnormal ECG  When compared with ECG of 26-SEP-2017 05:26,  Vent. rate has increased BY  25 BPM         Radiologic Studies -   CT HEAD WO CONT   Final Result   IMPRESSION: No acute abnormalities. No acute hemorrhage. XR CHEST PORT    (Results Pending)         Medical Decision Making   I am the first provider for this patient. I reviewed the vital signs, available nursing notes, past medical history, past surgical history, family history and social history. Provider Notes (Medical Decision Making): 49year old female, not feeling well for one day, presents with a one hour history of circumoral numbness, left hand numbness. Hypertensive , smoker, cocaine use , elevated cholesterol are among the risk factors I will begin the workup, obtain labs and diagnostics, treat as indicated and follow the the patient's condition and response. Vital Signs-Reviewed the patient's vital signs.   Visit Vitals    BP (!) 146/97 (BP 1 Location: Left arm, BP Patient Position: At rest;Sitting)    Pulse 78    Temp 98.3 °F (36.8 °C)    Resp 16    Ht 5' 2\" (1.575 m)    Wt 90.4 kg (199 lb 5 oz)    SpO2 97%    BMI 36.45 kg/m2     EKG: Interpreted by the EP. Time Interpreted: 9:16 PM    Rate: 74 BPM   Rhythm: NSR   Interpretation: No STEMI    Records Reviewed: Nursing Notes and Old Medical Records (Time of Review: 9:43 PM)    ED Course: Progress Notes, Reevaluation, and Consults:  9:35 PM Progress note: Cat scan showed no acute abnormalities. 1:26 AM Progress note: Pt doesn't want to stay for 2nd Troponin test. Pt is aware of risks agreed to have labs drawn prior to leaving. Pt has the right to leave. Will call her if labs results are abnormal.    Diagnosis     Clinical Impression: No diagnosis found. Disposition: Discharged    Follow-up Information     None           Patient's Medications   Start Taking    No medications on file   Continue Taking    ALBUTEROL (PROVENTIL HFA, VENTOLIN HFA, PROAIR HFA) 90 MCG/ACTUATION INHALER    Take 2 Puffs by inhalation every four (4) hours as needed for Wheezing. AMLODIPINE-ATORVASTATIN (CADUET) 10-40 MG PER TABLET    Take 1 Tab by mouth daily. CALCIUM-CHOLECALCIFEROL, D3, (CALTRATE 600+D) TABLET    Take 1 Tab by mouth daily. CLINDAMYCIN (CLEOCIN T) 1 % LOTION    Apply  to affected area two (2) times a day. use thin film on affected area    CLONIDINE HCL (CATAPRES) 0.3 MG TABLET    Take 1 Tab by mouth two (2) times a day. Indications: hypertension    ERGOCALCIFEROL (VITAMIN D2) 50,000 UNIT CAPSULE    Take one capsule every 7 days till gone, then start taking over-the-counter Vitamin D3 2,000 units every day    NYSTATIN (MYCOSTATIN) 100,000 UNIT/ML SUSPENSION    Take 5 mL by mouth four (4) times daily. swish and spit  Indications: ORAL CANDIDIASIS    OLANZAPINE-FLUOXETINE (SYMBYAX) 3-25 MG PER CAPSULE    Take 1 Cap by mouth every evening.  Indications: DEPRESSION ASSOCIATED WITH BIPOLAR DISORDER OLMESARTAN-HYDROCHLOROTHIAZIDE (BENICAR HCT) 40-12.5 MG PER TABLET    Take 1 Tab by mouth daily. OMEGA-3 ACID ETHYL ESTERS (LOVAZA) 1 GRAM CAPSULE    Take 2 Caps by mouth two (2) times a day. These Medications have changed    No medications on file   Stop Taking    No medications on file     _______________________________    Attestations:  Herber Antunez MD acting as a scribe for and in the presence of Elizabeth Sanchez MD      January 27, 2018 at 9:43 PM       Provider Attestation:      I personally performed the services described in the documentation, reviewed the documentation, as recorded by the scribe in my presence, and it accurately and completely records my words and actions.  January 27, 2018 at 9:43 PM - Elizabeth Sanchez MD    _______________________________

## 2018-01-28 NOTE — ED TRIAGE NOTES
Patient arrived to ED complaining of numbness in her lips since yesterday. Patient reports the dosage was recently changed on her blood pressure medicine. Patient also reports recently using cocaine.

## 2018-01-28 NOTE — DISCHARGE INSTRUCTIONS
Chest Pain: Care Instructions  Your Care Instructions    There are many things that can cause chest pain. Some are not serious and will get better on their own in a few days. But some kinds of chest pain need more testing and treatment. Your doctor may have recommended a follow-up visit in the next 8 to 12 hours. If you are not getting better, you may need more tests or treatment. Even though your doctor has released you, you still need to watch for any problems. The doctor carefully checked you, but sometimes problems can develop later. If you have new symptoms or if your symptoms do not get better, get medical care right away. If you have worse or different chest pain or pressure that lasts more than 5 minutes or you passed out (lost consciousness), call 911 or seek other emergency help right away. A medical visit is only one step in your treatment. Even if you feel better, you still need to do what your doctor recommends, such as going to all suggested follow-up appointments and taking medicines exactly as directed. This will help you recover and help prevent future problems. How can you care for yourself at home? · Rest until you feel better. · Take your medicine exactly as prescribed. Call your doctor if you think you are having a problem with your medicine. · Do not drive after taking a prescription pain medicine. When should you call for help? Call 911 if:  ? · You passed out (lost consciousness). ? · You have severe difficulty breathing. ? · You have symptoms of a heart attack. These may include:  ¨ Chest pain or pressure, or a strange feeling in your chest.  ¨ Sweating. ¨ Shortness of breath. ¨ Nausea or vomiting. ¨ Pain, pressure, or a strange feeling in your back, neck, jaw, or upper belly or in one or both shoulders or arms. ¨ Lightheadedness or sudden weakness. ¨ A fast or irregular heartbeat.   After you call 911, the  may tell you to chew 1 adult-strength or 2 to 4 low-dose aspirin. Wait for an ambulance. Do not try to drive yourself. ?Call your doctor today if:  ? · You have any trouble breathing. ? · Your chest pain gets worse. ? · You are dizzy or lightheaded, or you feel like you may faint. ? · You are not getting better as expected. ? · You are having new or different chest pain. Where can you learn more? Go to http://los-zack.info/. Enter A120 in the search box to learn more about \"Chest Pain: Care Instructions. \"  Current as of: March 20, 2017  Content Version: 11.4  © 6989-0260 [email or web address]. Care instructions adapted under license by [employer] (which disclaims liability or warranty for this information). If you have questions about a medical condition or this instruction, always ask your healthcare professional. [address] any warranty or liability for your use of this information. Numbness and Tingling: Care Instructions  Your Care Instructions    Many things can cause numbness or tingling. Swelling may put pressure on a nerve. This could cause you to lose feeling or have a pins-and-needles sensation on part of your body. Nerves may be damaged from trauma, toxins, or diseases, such as diabetes or multiple sclerosis (MS). Sometimes, though, the cause is not clear. If there is no clear reason for your symptoms, and you are not having any other symptoms, your doctor may suggest watching and waiting for a while to see if the numbness or tingling goes away on its own. Your doctor may want you to have blood or nerve tests to find the cause of your symptoms. Follow-up care is a key part of your treatment and safety. Be sure to make and go to all appointments, and call your doctor if you are having problems. It's also a good idea to know your test results and keep a list of the medicines you take. How can you care for yourself at home?   · If your doctor prescribes medicine, take it exactly as directed. Call your doctor if you think you are having a problem with your medicine. · If you have any swelling, put ice or a cold pack on the area for 10 to 20 minutes at a time. Put a thin cloth between the ice and your skin. When should you call for help? Call 911 anytime you think you may need emergency care. For example, call if:  ? · You have weakness, numbness, or tingling in both legs. ? · You lose bowel or bladder control. ? · You have symptoms of a stroke. These may include:  ¨ Sudden numbness, tingling, weakness, or loss of movement in your face, arm, or leg, especially on only one side of your body. ¨ Sudden vision changes. ¨ Sudden trouble speaking. ¨ Sudden confusion or trouble understanding simple statements. ¨ Sudden problems with walking or balance. ¨ A sudden, severe headache that is different from past headaches. ? Watch closely for changes in your health, and be sure to contact your doctor if you have any problems, or if:  ? · You do not get better as expected. Where can you learn more? Go to http://los-zack.info/. Enter I411 in the search box to learn more about \"Numbness and Tingling: Care Instructions. \"  Current as of: October 14, 2016  Content Version: 11.4  © 2211-7647 [employer]. Care instructions adapted under license by [employer] (which disclaims liability or warranty for this information). If you have questions about a medical condition or this instruction, always ask your healthcare professional. [place] [address] any warranty or liability for your use of this information.

## 2018-01-29 ENCOUNTER — TELEPHONE (OUTPATIENT)
Dept: FAMILY MEDICINE CLINIC | Age: 48
End: 2018-01-29

## 2018-01-29 NOTE — TELEPHONE ENCOUNTER
Patient states that she went to the ER over the weekend and has stop taking her Caduet because she feels that the higher dosage may have made her sick. She wants to know if she can go back to the lower dosage?

## 2018-01-30 NOTE — TELEPHONE ENCOUNTER
Spoke with patient and advised to keep taking dose as ordered at this time per Ms. Burroughs. Pt declines making appt to have BP check today stating \"I'll call you. \" She denies questions at time of call.

## 2018-02-05 ENCOUNTER — TELEPHONE (OUTPATIENT)
Dept: FAMILY MEDICINE CLINIC | Age: 48
End: 2018-02-05

## 2018-02-06 NOTE — TELEPHONE ENCOUNTER
Medication: Lovaza 1gm, dose: 2 caps, how often: BID, current number of medication days provided: 90, refill per application. Lot #: [id number], EXP 06/2020. This medication was received and verified for the following 1. Correct Patient, 2. Correct Diagnosis, 3. Correct Drug, 4. Correct route, and no current allergy to medication. Please contact patient to come  their medications.      MARNIE Abreu, RN, P-[facility]

## 2018-02-07 ENCOUNTER — TELEPHONE (OUTPATIENT)
Dept: FAMILY MEDICINE CLINIC | Age: 48
End: 2018-02-07

## 2018-02-15 ENCOUNTER — TELEPHONE (OUTPATIENT)
Dept: FAMILY MEDICINE CLINIC | Age: 48
End: 2018-02-15

## 2018-02-15 NOTE — TELEPHONE ENCOUNTER
Medication: Benicar HCT 40-12.5 mg, dose: 1 tab, how often: daily, current number of medication days provided: 90, refill per application. Lot # [id number], EXP 04/2019. This medication was received and verified for the following 1. Correct Patient, 2. Correct Diagnosis, 3. Correct Drug, 4. Correct route, and no current allergy to medication. Please contact patient to come  their medications.      Raman Banks, MSN, RN, FNP-C     [facility]

## 2018-03-28 ENCOUNTER — TELEPHONE (OUTPATIENT)
Dept: FAMILY MEDICINE CLINIC | Age: 48
End: 2018-03-28

## 2018-03-29 NOTE — TELEPHONE ENCOUNTER
Medication: Caduet 10/40, dose: 1 tab, how often: daily, current number of medication days provided: 90, refill per application. Lot #:  # [id number], EXP 11/2019. This medication was received and verified for the following 1. Correct Patient, 2. Correct Diagnosis, 3. Correct Drug, 4. Correct route, and no current allergy to medication. Please contact patient to come  their medications.      Azeem Pickard, MSN,RN,[facility]

## 2018-04-09 ENCOUNTER — TELEPHONE (OUTPATIENT)
Dept: FAMILY MEDICINE CLINIC | Age: 48
End: 2018-04-09

## 2018-04-10 NOTE — TELEPHONE ENCOUNTER
Medication: Benicar HCT 40/12.5 mg, dose: 1 tab, how often: daily, current number of medication days provided: 90, refill per application. Lot # [id number], EXP 04/2019. This medication was received and verified for the following 1. Correct Patient, 2. Correct Diagnosis, 3. Correct Drug, 4. Correct route, and no current allergy to medication. Please contact patient to come  their medications.      Reece Stroud, MSN,RN,[facility]

## 2018-04-30 ENCOUNTER — HOSPITAL ENCOUNTER (OUTPATIENT)
Dept: LAB | Age: 48
Discharge: HOME | End: 2018-04-30

## 2018-04-30 ENCOUNTER — LAB ONLY (OUTPATIENT)
Dept: FAMILY MEDICINE CLINIC | Age: 48
End: 2018-04-30

## 2018-04-30 DIAGNOSIS — F32.A DEPRESSION, UNSPECIFIED DEPRESSION TYPE: ICD-10-CM

## 2018-04-30 DIAGNOSIS — E78.5 DYSLIPIDEMIA: ICD-10-CM

## 2018-04-30 DIAGNOSIS — I10 ESSENTIAL HYPERTENSION: Primary | ICD-10-CM

## 2018-04-30 DIAGNOSIS — I10 HTN, GOAL BELOW 140/90: ICD-10-CM

## 2018-04-30 LAB
ALBUMIN SERPL-MCNC: 3.9 G/DL (ref 3.4–5)
ALBUMIN/GLOB SERPL: 1.3 {RATIO} (ref 0.8–1.7)
ALP SERPL-CCNC: 75 U/L (ref 45–117)
ALT SERPL-CCNC: 27 U/L (ref 13–56)
ANION GAP SERPL CALC-SCNC: 5 MMOL/L (ref 3–18)
AST SERPL-CCNC: 15 U/L (ref 15–37)
BASOPHILS # BLD: 0 K/UL (ref 0–0.1)
BASOPHILS NFR BLD: 0 % (ref 0–2)
BILIRUB SERPL-MCNC: 0.5 MG/DL (ref 0.2–1)
BUN SERPL-MCNC: 13 MG/DL (ref 7–18)
BUN/CREAT SERPL: 22 (ref 12–20)
CALCIUM SERPL-MCNC: 9 MG/DL (ref 8.5–10.1)
CHLORIDE SERPL-SCNC: 107 MMOL/L (ref 100–108)
CHOLEST SERPL-MCNC: 111 MG/DL
CO2 SERPL-SCNC: 27 MMOL/L (ref 21–32)
CREAT SERPL-MCNC: 0.59 MG/DL (ref 0.6–1.3)
DIFFERENTIAL METHOD BLD: ABNORMAL
EOSINOPHIL # BLD: 0.1 K/UL (ref 0–0.4)
EOSINOPHIL NFR BLD: 1 % (ref 0–5)
ERYTHROCYTE [DISTWIDTH] IN BLOOD BY AUTOMATED COUNT: 13 % (ref 11.6–14.5)
GLOBULIN SER CALC-MCNC: 2.9 G/DL (ref 2–4)
GLUCOSE SERPL-MCNC: 96 MG/DL (ref 74–99)
HCG UR QL: NEGATIVE
HCT VFR BLD AUTO: 50.4 % (ref 35–45)
HDLC SERPL-MCNC: 41 MG/DL (ref 40–60)
HDLC SERPL: 2.7 {RATIO} (ref 0–5)
HGB BLD-MCNC: 17.2 G/DL (ref 12–16)
LDLC SERPL CALC-MCNC: 40.6 MG/DL (ref 0–100)
LIPID PROFILE,FLP: NORMAL
LYMPHOCYTES # BLD: 3.3 K/UL (ref 0.9–3.6)
LYMPHOCYTES NFR BLD: 24 % (ref 21–52)
MAGNESIUM SERPL-MCNC: 2.5 MG/DL (ref 1.6–2.6)
MCH RBC QN AUTO: 32 PG (ref 24–34)
MCHC RBC AUTO-ENTMCNC: 34.1 G/DL (ref 31–37)
MCV RBC AUTO: 93.9 FL (ref 74–97)
MONOCYTES # BLD: 1 K/UL (ref 0.05–1.2)
MONOCYTES NFR BLD: 7 % (ref 3–10)
NEUTS SEG # BLD: 9.4 K/UL (ref 1.8–8)
NEUTS SEG NFR BLD: 68 % (ref 40–73)
PLATELET # BLD AUTO: 248 K/UL (ref 135–420)
PMV BLD AUTO: 9.9 FL (ref 9.2–11.8)
POTASSIUM SERPL-SCNC: 4 MMOL/L (ref 3.5–5.5)
PROT SERPL-MCNC: 6.8 G/DL (ref 6.4–8.2)
RBC # BLD AUTO: 5.37 M/UL (ref 4.2–5.3)
SODIUM SERPL-SCNC: 139 MMOL/L (ref 136–145)
TRIGL SERPL-MCNC: 147 MG/DL (ref ?–150)
TSH SERPL DL<=0.05 MIU/L-ACNC: 0.54 UIU/ML (ref 0.36–3.74)
VLDLC SERPL CALC-MCNC: 29.4 MG/DL
WBC # BLD AUTO: 13.9 K/UL (ref 4.6–13.2)

## 2018-04-30 PROCEDURE — 80053 COMPREHEN METABOLIC PANEL: CPT | Performed by: NURSE PRACTITIONER

## 2018-04-30 PROCEDURE — 83735 ASSAY OF MAGNESIUM: CPT | Performed by: NURSE PRACTITIONER

## 2018-04-30 PROCEDURE — 84443 ASSAY THYROID STIM HORMONE: CPT | Performed by: NURSE PRACTITIONER

## 2018-04-30 PROCEDURE — 81025 URINE PREGNANCY TEST: CPT | Performed by: NURSE PRACTITIONER

## 2018-04-30 PROCEDURE — 80061 LIPID PANEL: CPT | Performed by: NURSE PRACTITIONER

## 2018-04-30 PROCEDURE — 85025 COMPLETE CBC W/AUTO DIFF WBC: CPT | Performed by: NURSE PRACTITIONER

## 2018-04-30 NOTE — PROGRESS NOTES
Will review results with pt at 5/2/18 appt  1. Cholesterol controlled  2. Erythrocytosis, consider referral to hematologists.

## 2018-05-07 ENCOUNTER — OFFICE VISIT (OUTPATIENT)
Dept: FAMILY MEDICINE CLINIC | Age: 48
End: 2018-05-07

## 2018-05-07 VITALS
BODY MASS INDEX: 36.66 KG/M2 | HEIGHT: 62 IN | RESPIRATION RATE: 16 BRPM | DIASTOLIC BLOOD PRESSURE: 79 MMHG | HEART RATE: 75 BPM | TEMPERATURE: 97.7 F | OXYGEN SATURATION: 96 % | SYSTOLIC BLOOD PRESSURE: 134 MMHG | WEIGHT: 199.2 LBS

## 2018-05-07 DIAGNOSIS — Z72.0 TOBACCO ABUSE: ICD-10-CM

## 2018-05-07 DIAGNOSIS — L71.9 ROSACEA: ICD-10-CM

## 2018-05-07 DIAGNOSIS — E78.5 DYSLIPIDEMIA: ICD-10-CM

## 2018-05-07 DIAGNOSIS — I10 HTN, GOAL BELOW 140/90: Primary | ICD-10-CM

## 2018-05-07 DIAGNOSIS — D75.1 ERYTHROCYTOSIS: ICD-10-CM

## 2018-05-07 DIAGNOSIS — R01.1 HEART MURMUR: ICD-10-CM

## 2018-05-07 DIAGNOSIS — I49.8 FLUTTERING HEART: ICD-10-CM

## 2018-05-07 DIAGNOSIS — F14.10 COCAINE ABUSE (HCC): ICD-10-CM

## 2018-05-07 RX ORDER — LOSARTAN POTASSIUM AND HYDROCHLOROTHIAZIDE 12.5; 1 MG/1; MG/1
1 TABLET ORAL DAILY
Qty: 90 TAB | Refills: 3 | Status: SHIPPED | OUTPATIENT
Start: 2018-05-07 | End: 2018-06-14

## 2018-05-07 RX ORDER — CLINDAMYCIN PHOSPHATE 10 UG/ML
LOTION TOPICAL 2 TIMES DAILY
Qty: 1 BOTTLE | Refills: 2 | Status: SHIPPED | OUTPATIENT
Start: 2018-05-07 | End: 2018-08-06

## 2018-05-07 RX ORDER — MIRTAZAPINE 30 MG/1
TABLET, FILM COATED ORAL
COMMUNITY
End: 2020-02-21

## 2018-05-07 RX ORDER — CLONIDINE HYDROCHLORIDE 0.3 MG/1
0.3 TABLET ORAL 2 TIMES DAILY
Qty: 60 TAB | Refills: 3 | Status: SHIPPED | OUTPATIENT
Start: 2018-05-07 | End: 2019-02-26

## 2018-05-07 RX ORDER — LAMOTRIGINE 100 MG/1
200 TABLET ORAL DAILY
COMMUNITY

## 2018-05-07 NOTE — LETTER
5/7/2018 [facility] [address], [address] Leonel Mares, 1970, is picking up the following medications ordered from the [facility]: STOCK:  PROVENTIL HFA #1 Padmini Malave Patient's Signature: _____________________________ Today's Date: 5/7/2018

## 2018-05-07 NOTE — MR AVS SNAPSHOT
[address] [facility] [address] 
[phone] Patient: Zandra Lainez MRN: [id number] :1970 Visit Information Date & Time Provider Department Dept. Phone Encounter #  
 2018  2:30 PM Mera Jang NP [address] [facility] [id number] Follow-up Instructions Return in about 3 months (around 2018), or if symptoms worsen or fail to improve. Your Appointments 2018 11:00 AM  
Follow Up with Mera Jang NP [facility] ([facility]) Appt Note: 3 mth follow up  
 [address] [facility] [address]  
[address] [facility] [address] Upcoming Health Maintenance Date Due Pneumococcal 19-64 Medium Risk (1 of 1 - PPSV23) 1988 DTaP/Tdap/Td series (1 - Tdap) 1990 Influenza Age 5 to Adult 2018 PAP AKA CERVICAL CYTOLOGY 2019 Allergies as of 2018  Review Complete On: 2018 By: Nathaly Fiore. Heavenly Clifford, LPN Severity Noted Reaction Type Reactions Iodinated Contrast- Oral And Iv Dye  2012    Shortness of Breath Current Immunizations  Reviewed on 11/15/2017 Name Date Influenza Vaccine (Quad) PF 11/15/2017  7:50 AM, 10/13/2016 Not reviewed this visit You Were Diagnosed With   
  
 Codes Comments HTN, goal below 140/90    -  Primary ICD-10-CM: I10 
ICD-9-CM: 401.9 Erythrocytosis     ICD-10-CM: D75.1 ICD-9-CM: 289.0 Dyslipidemia     ICD-10-CM: E78.5 ICD-9-CM: 272.4 Cocaine abuse     ICD-10-CM: F14.10 ICD-9-CM: 305.60 Tobacco abuse     ICD-10-CM: Z72.0 ICD-9-CM: 305.1 Rosacea     ICD-10-CM: L71.9 ICD-9-CM: 695.3 Heart murmur     ICD-10-CM: R01.1 ICD-9-CM: 242. 2 Vitals BP Pulse Temp Resp Height(growth percentile) Weight(growth percentile) 134/79 (BP 1 Location: Left arm, BP Patient Position: Sitting) 75 97.7 °F (36.5 °C) (Oral) 16 5' 2\" (1.575 m) 199 lb 3.2 oz (90.4 kg) LMP SpO2 BMI OB Status Smoking Status 03/12/2018 96% 36.43 kg/m2 Having regular periods Current Every Day Smoker Vitals History BMI and BSA Data Body Mass Index Body Surface Area  
 36.43 kg/m 2 1.99 m 2 Preferred Pharmacy Pharmacy Name Phone [address] [place] [address]. [phone] Your Updated Medication List  
  
   
This list is accurate as of 5/7/18  3:05 PM.  Always use your most recent med list.  
  
  
  
  
 albuterol 90 mcg/actuation inhaler Commonly known as:  PROVENTIL HFA, VENTOLIN HFA, PROAIR HFA Take 2 Puffs by inhalation every four (4) hours as needed for Wheezing. amLODIPine-atorvastatin 10-40 mg per tablet Commonly known as:  CADUET Take 1 Tab by mouth daily. calcium-cholecalciferol (D3) tablet Commonly known as:  CALTRATE 600+D Take 1 Tab by mouth daily. clindamycin 1 % lotion Commonly known as:  CLEOCIN T Apply  to affected area two (2) times a day. use thin film on affected area  
  
 cloNIDine HCl 0.3 mg tablet Commonly known as:  CATAPRES Take 1 Tab by mouth two (2) times a day. Indications: hypertension  
  
 ergocalciferol 50,000 unit capsule Commonly known as:  VITAMIN D2 Take one capsule every 7 days till gone, then start taking over-the-counter Vitamin D3 2,000 units every day  
  
 lamoTRIgine 100 mg tablet Commonly known as: LaMICtal  
Take 200 mg by mouth daily. losartan-hydroCHLOROthiazide 100-12.5 mg per tablet Commonly known as:  HYZAAR Take 1 Tab by mouth daily. Indications: hypertension  
  
 mirtazapine 30 mg tablet Commonly known as:  Benekristya Abel Take  by mouth nightly. For sleep  
  
 nystatin 100,000 unit/mL suspension Commonly known as:  MYCOSTATIN  
 Take 5 mL by mouth four (4) times daily. swish and spit  Indications: ORAL CANDIDIASIS  
  
 OLANZapine-FLUoxetine 3-25 mg per capsule Commonly known as:  SYMBYAX Take 1 Cap by mouth every evening. Indications: DEPRESSION ASSOCIATED WITH BIPOLAR DISORDER  
  
 omega-3 acid ethyl esters 1 gram capsule Commonly known as:  Mickeal Bessemer Take 2 Caps by mouth two (2) times a day. Prescriptions Printed Refills  
 losartan-hydroCHLOROthiazide (HYZAAR) 100-12.5 mg per tablet 3 Sig: Take 1 Tab by mouth daily. Indications: hypertension Class: Print Route: Oral  
  
Prescriptions Sent to Pharmacy Refills  
 cloNIDine HCl (CATAPRES) 0.3 mg tablet 3 Sig: Take 1 Tab by mouth two (2) times a day. Indications: hypertension Class: Normal  
 Pharmacy: [facility] [address] MIRI MARTINEZ [address] [place] [address]. Ph #: [phone] Route: Oral  
 clindamycin (CLEOCIN T) 1 % lotion 2 Sig: Apply  to affected area two (2) times a day. use thin film on affected area Class: Normal  
 Pharmacy: [facility] [address] MIRI MARTINEZ [address] [place] [address]. Ph #: [phone] Route: Topical  
  
We Performed the Following REFERRAL TO HEMATOLOGY [ZEG30 Custom] Comments:  
 Please evaluate patient for erythrocytosis Follow-up Instructions Return in about 3 months (around 8/7/2018), or if symptoms worsen or fail to improve. Referral Information Referral ID Referred By Referred To  
  
 [id number] Gini MCDONALD Not Available Visits Status Start Date End Date 1 New Request 5/7/18 5/7/19 If your referral has a status of pending review or denied, additional information will be sent to support the outcome of this decision. Introducing [facility]! Willadean Saint introduces [facility] patient portal. Now you can access parts of your medical record, email your doctor's office, and request medication refills online.    
 
1. In your internet browser, go to [email or web address] 2. Click on the First Time User? Click Here link in the Sign In box. You will see the New Member Sign Up page. 3. Enter your [employer] Access Code exactly as it appears below. You will not need to use this code after youve completed the sign-up process. If you do not sign up before the expiration date, you must request a new code. · [employer] Access Code: [email or web address] Expires: 8/5/2018  2:59 PM 
 
4. Enter the last four digits of your Social Security Number (xxxx) and Date of Birth (mm/dd/yyyy) as indicated and click Submit. You will be taken to the next sign-up page. 5. Create a Applauset ID. This will be your [employer] login ID and cannot be changed, so think of one that is secure and easy to remember. 6. Create a [employer] password. You can change your password at any time. 7. Enter your Password Reset Question and Answer. This can be used at a later time if you forget your password. 8. Enter your e-mail address. You will receive e-mail notification when new information is available in [address] 9. Click Sign Up. You can now view and download portions of your medical record. 10. Click the Download Summary menu link to download a portable copy of your medical information. If you have questions, please visit the Frequently Asked Questions section of the [employer] website. Remember, [employer] is NOT to be used for urgent needs. For medical emergencies, dial 911. Now available from your iPhone and Android! Please provide this summary of care documentation to your next provider. Your primary care clinician is listed as Marifer Puri. If you have any questions after today's visit, please call [phone].

## 2018-05-07 NOTE — PROGRESS NOTES
Subjective:     Chloe Angulo is a 49 y.o. female who presents for follow up of hypertension, hyperlipidemia and obesity. Diet and Lifestyle: not attempting to follow a low fat, low cholesterol diet, Current smoker and uses cocaine regularly   Home BP Monitoring: is not measured at home    Cardiovascular ROS: taking medications as instructed, no medication side effects noted, no TIA's, no chest pain on exertion, noting some chest pains described as Fluttering ( Has been evaluated by cardiology and no etiology found), no dyspnea on exertion, no swelling of ankles. New concerns:  Pt with erythrocytosis with elevated Hematocrit and HGB, Complains of fatigue, dizziness, facial flushing, and heart palpitations. She has no previous work up from Hem onc. Risk factors is that she continues to smoke heavily and she does illicit drugs. Benicar HCT no longer available via patient assistance and the patient will need alternative treatment. Current Outpatient Prescriptions   Medication Sig Dispense Refill    lamoTRIgine (LAMICTAL) 100 mg tablet Take 200 mg by mouth daily.  mirtazapine (REMERON) 30 mg tablet Take  by mouth nightly. For sleep      losartan-hydroCHLOROthiazide (HYZAAR) 100-12.5 mg per tablet Take 1 Tab by mouth daily. Indications: hypertension 90 Tab 3    cloNIDine HCl (CATAPRES) 0.3 mg tablet Take 1 Tab by mouth two (2) times a day. Indications: hypertension 60 Tab 3    clindamycin (CLEOCIN T) 1 % lotion Apply  to affected area two (2) times a day. use thin film on affected area 1 Bottle 2    amLODIPine-atorvastatin (CADUET) 10-40 mg per tablet Take 1 Tab by mouth daily. 90 Tab 3    omega-3 acid ethyl esters (LOVAZA) 1 gram capsule Take 2 Caps by mouth two (2) times a day. 360 Cap 3    albuterol (PROVENTIL HFA, VENTOLIN HFA, PROAIR HFA) 90 mcg/actuation inhaler Take 2 Puffs by inhalation every four (4) hours as needed for Wheezing.  6 Inhaler 3    nystatin (MYCOSTATIN) 100,000 unit/mL suspension Take 5 mL by mouth four (4) times daily. swish and spit  Indications: ORAL CANDIDIASIS 200 mL 1    OLANZapine-FLUoxetine (SYMBYAX) 3-25 mg per capsule Take 1 Cap by mouth every evening. Indications: DEPRESSION ASSOCIATED WITH BIPOLAR DISORDER 90 Cap 3    calcium-cholecalciferol, D3, (CALTRATE 600+D) tablet Take 1 Tab by mouth daily.       ergocalciferol (VITAMIN D2) 50,000 unit capsule Take one capsule every 7 days till gone, then start taking over-the-counter Vitamin D3 2,000 units every day 4 Cap 2     Allergies   Allergen Reactions    Iodinated Contrast- Oral And Iv Dye Shortness of Breath     Past Medical History:   Diagnosis Date    Bronchitis     Cocaine abuse 3/26/2015    Dental caries     Depression     Depression 8/31/2016    Drug abuse     Elevated hematocrit 8/31/2016    ETOH abuse     H/O screening mammography 12/06/2016    No evidence of malignancy     HPV (human papilloma virus) infection 11/2016    The patient referred to [facility]     Hypertension     Mood swings (HCC)     Psychiatric disorder     depression, Bipolar    Tobacco abuse     Vitamin D deficiency      Past Surgical History:   Procedure Laterality Date    HX APPENDECTOMY      HX COLPOSCOPY  12/06/2016    Low-grade courtney/mild dysplasia RICK I    HX HERNIA REPAIR       Family History   Problem Relation Age of Onset    Hypertension Mother     Heart Attack Father      Social History   Substance Use Topics    Smoking status: Current Every Day Smoker     Packs/day: 0.50     Years: 20.00     Types: Cigarettes    Smokeless tobacco: Never Used    Alcohol use 0.0 oz/week     3 - 6 Cans of beer per week      Comment: 3 or 4 beers weekly         Lab Results  Component Value Date/Time   WBC 13.9 (H) 04/30/2018 08:57 AM   HGB 17.2 (H) 04/30/2018 08:57 AM   HCT 50.4 (H) 04/30/2018 08:57 AM   PLATELET 680 31/53/8416 08:57 AM   MCV 93.9 04/30/2018 08:57 AM     Lab Results  Component Value Date/Time   Hemoglobin A1c 5.2 11/17/2014 07:16 AM   Glucose 96 04/30/2018 08:57 AM   Glucose (POC) 126 (H) 01/27/2018 09:08 PM   LDL, calculated 40.6 04/30/2018 08:57 AM   Creatinine 0.59 (L) 04/30/2018 08:57 AM      Lab Results  Component Value Date/Time   Cholesterol, total 111 04/30/2018 08:57 AM   HDL Cholesterol 41 04/30/2018 08:57 AM   LDL, calculated 40.6 04/30/2018 08:57 AM   Triglyceride 147 04/30/2018 08:57 AM   CHOL/HDL Ratio 2.7 04/30/2018 08:57 AM     Lab Results  Component Value Date/Time   TSH 0.54 04/30/2018 08:57 AM   T4, Free 0.9 06/07/2010 03:30 PM      Lab Results   Component Value Date/Time    Sodium 139 04/30/2018 08:57 AM    Potassium 4.0 04/30/2018 08:57 AM    Chloride 107 04/30/2018 08:57 AM    CO2 27 04/30/2018 08:57 AM    Anion gap 5 04/30/2018 08:57 AM    Glucose 96 04/30/2018 08:57 AM    BUN 13 04/30/2018 08:57 AM    Creatinine 0.59 (L) 04/30/2018 08:57 AM    BUN/Creatinine ratio 22 (H) 04/30/2018 08:57 AM    GFR est AA >60 04/30/2018 08:57 AM    GFR est non-AA >60 04/30/2018 08:57 AM    Calcium 9.0 04/30/2018 08:57 AM    Bilirubin, total 0.5 04/30/2018 08:57 AM    ALT (SGPT) 27 04/30/2018 08:57 AM    AST (SGOT) 15 04/30/2018 08:57 AM    Alk.  phosphatase 75 04/30/2018 08:57 AM    Protein, total 6.8 04/30/2018 08:57 AM    Albumin 3.9 04/30/2018 08:57 AM    Globulin 2.9 04/30/2018 08:57 AM    A-G Ratio 1.3 04/30/2018 08:57 AM         Review of Systems, additional:  Constitutional: positive for fatigue  Eyes: negative  Ears, nose, mouth, throat, and face: negative  Respiratory: positive for dyspnea on exertion or chronic bronchitis  Cardiovascular: positive for palpitations  Gastrointestinal: negative  Genitourinary:negative  Integument/breast: negative  Hematologic/lymphatic: negative for easy bruising, bleeding, lymphadenopathy, petechiae, blood in stool or urine, coughing up blood and epistaxis  Neurological: negative  Behavioral/Psych: negative for SI/HI    Objective:     Visit Vitals    /79 (BP 1 Location: Left arm, BP Patient Position: Sitting)    Pulse 75    Temp 97.7 °F (36.5 °C) (Oral)    Resp 16    Ht 5' 2\" (1.575 m)    Wt 199 lb 3.2 oz (90.4 kg)    LMP 2018    SpO2 96%    BMI 36.43 kg/m2     Appearance: alert, well appearing, and in no distress. General exam: CVS exam BP noted to be well controlled today in office, CVS exam  - normal rate and regular rhythm, new systolic murmur  [place] 3/6 at 2nd left intercostal space, peripheral vascular exam pedal pulses normal both DP's and PT's, color, temperature, sensation in feet normal, no lesions, neurological exam alert, oriented, normal speech, no focal findings or movement disorder noted. Lab review: labs reviewed, I note that CBC abnormal.     2017  4:41 PM - Vinod, Card Result In   Narrative   [address] [place] [address] Dr  [address] [place], [phone]    Transthoracic Echocardiogram    Patient: Samantha Joaquin  MRN: [id number]  ACCT #: [de-identified]  : 1970  Age: 51 years  Gender: Female  Height: 62 in  Weight: 197.6 lb  BSA: 1.9 m-sq  BP: 119 / 78 mmHg  Study date: 2017  Status: Routine  Location: [address] [place] [address] #: [id number]    Allergies: IODINATED CONTRAST- ORAL AND IV DYE    Referring_Ordering Physician: Tamia Chery. Jose Cline NP  Interpreting Group: [address] [place] [address]  Interpreting Physician: Nella Lew MD  Technologist: Charlene Feldman. Donna Cruz    SUMMARY:  Left ventricle: Systolic function was normal. Ejection fraction was  estimated in the range of 55 % to 60 %. No obvious wall motion  abnormalities identified in the views obtained. Wall thickness was mildly  to moderately increased. There was mild concentric hypertrophy. INDICATIONS: Dyspnea/shortness of breath, Hypertension. HISTORY: Prior history: Substance abuse/Bradycardia Risk factors:  hypertension and a history of current cigarette use (within the last  month). PROCEDURE: This was a routine study.  The study included complete 2D  imaging, M-mode, complete spectral Doppler, and color Doppler. The heart  rate was 67 bpm, at the start of the study. Systolic blood pressure was  119 mmHg, at the start of the study. Diastolic blood pressure was 78 mmHg,  at the start of the study. Images were obtained from the parasternal,  apical, subcostal, and suprasternal notch acoustic windows. Image quality  was adequate. LEFT VENTRICLE: Size was normal. Systolic function was normal. Ejection  fraction was estimated in the range of 55 % to 60 %. No obvious wall  motion abnormalities identified in the views obtained. Wall thickness was  mildly to moderately increased. There was mild concentric hypertrophy. DOPPLER: Left ventricular diastolic function parameters were normal for  the patient's age. RIGHT VENTRICLE: The size was normal. Systolic function was normal.    LEFT ATRIUM: Size was normal. LA volume index was 25 ml/m-sq. RIGHT ATRIUM: Size was normal.    MITRAL VALVE: Normal valve structure. There was minimal calcification of  the posterior leaflet. DOPPLER: There was no evidence for stenosis. There  was no regurgitation. AORTIC VALVE: The valve was trileaflet. Leaflets exhibited normal  thickness and normal cuspal separation. DOPPLER: There was no stenosis. There was no regurgitation. TRICUSPID VALVE: Normal valve structure. DOPPLER: There was no evidence  for tricuspid stenosis. There was no regurgitation. PULMONIC VALVE: Leaflets exhibited normal thickness, no calcification, and  normal cuspal separation. DOPPLER: There was no regurgitation. AORTA: The root exhibited normal size. PULMONARY ARTERY: The size was normal.    SYSTEMIC VEINS: IVC: The inferior vena cava was normal in size. PERICARDIUM: No significant pericardial effusion identified. MEASUREMENT TABLES    2D measurements  Right atrium   (Reference normals)  Area sys   8 cm-sq   (8.3-19. 5)    Doppler measurements  Left ventricle   (Reference normals)  Ea, lat tobias, tiss DP   6 cm/s   (--)  Ea, med tobias, tiss DP   5 cm/s   (--)    SYSTEM MEASUREMENT TABLES    2D  Ao Diam: 2.9 cm  IVSd: 1.52 cm  LVIDd: 4.59 cm  LVIDs: 2.99 cm  LVPWd: 1.39 cm  SV(Teich): 62.23 ml  EDV(Teich): 96.92 ml  ESV(Cube): 26.71 ml  ESV(Teich): 34.69 ml  LAAs A2C: 18.19 cm2  LAAs A4C: 15.04 cm2  LAESV A-L A2C: 50.11 ml  LAESV A-L A4C: 40.59 ml  LAESV Index (A-L): 25.84 ml/m2  LAESV MOD A2C: 49.67 ml  LAESV MOD A4C: 38.33 ml  LAESV(A-L): 49.1 ml  LALs A2C: 5.6 cm  LALs A4C: 4.73 cm  LVOT Diam: 2.11 cm    PW  LVOT VTI: 22.37 cm  LVOT Vmax: 1.15 m/s  LVOT Vmean: 0.84 m/s  MV A Christopher: 0.53 m/s  MV Dec Pershing: 3.78 m/s2  MV DecT: 198.9 ms  MV E Christopher: 0.75 m/s  MV E/A Ratio: 1.41  LVOT Env. Ti: 267.45 ms  LVOT maxP.25 mmHG  LVOT meanPG: 3.07 mmHG  LVSI Dopp: 41.24 ml/m2  LVSV Dopp: 78.36 ml    Prepared and E-signed by    Alen Thomson MD  Signed 2017 16:41:34       2018  6:58 PM - Vinod, Card Result In   Component Results EKG  Component Value Ref Range & Units Status   Ventricular Rate 74 BPM Final   Atrial Rate 74 BPM Final   P-R Interval 168 ms Final   QRS Duration 100 ms Final   Q-T Interval 422 ms Final   QTC Calculation (Bezet) 468 ms Final   Calculated P Axis 52 degrees Final   Calculated R Axis -17 degrees Final   Calculated T Axis 85 degrees Final   Diagnosis   Final   Normal sinus rhythm   Voltage criteria for left ventricular hypertrophy   T wave abnormality, consider lateral ischemia   Abnormal ECG   When compared with ECG of 26-SEP-2017 05:26,   Vent. rate has increased BY  25 BPM   Confirmed by Jade Arzate ([id number]) on 2018 6:58:39 PM        Assessment/Plan:     hypertension stable.   reviewed diet, exercise and weight control  very strongly urged to quit smoking to reduce cardiovascular risk  copy of written low fat low cholesterol diet provided and reviewed  cardiovascular risk and specific lipid/LDL goals reviewed  reviewed medications and side effects in detail  reviewed potential future medication changes and side effects. ICD-10-CM ICD-9-CM    1. HTN, goal below 140/90 I10 401.9 losartan-hydroCHLOROthiazide (HYZAAR) 100-12.5 mg per tablet      cloNIDine HCl (CATAPRES) 0.3 mg tablet   2. Erythrocytosis D75.1 289.0 REFERRAL TO HEMATOLOGY   3. Dyslipidemia E78.5 272.4    4. Cocaine abuse F14.10 305.60    5. Tobacco abuse Z72.0 305.1 cloNIDine HCl (CATAPRES) 0.3 mg tablet   6. Rosacea L71.9 695.3 clindamycin (CLEOCIN T) 1 % lotion   7. Heart murmur R01.1 785.2 REFERRAL TO HEMATOLOGY     Diagnoses and all orders for this visit:    1. HTN, goal below 140/90  - losartan-hydroCHLOROthiazide (HYZAAR) 100-12.5 mg per tablet; Take 1 Tab by mouth daily. Indications: hypertension  Dispense: 90 Tab; Refill: 3  - cloNIDine HCl (CATAPRES) 0.3 mg tablet; Take 1 Tab by mouth two (2) times a day. Indications: hypertension  Dispense: 60 Tab; Refill: 3    2. Erythrocytosis  - Pt symptomatic with fluttering of heart, facial flushing, dizziness, fatigue, and new murmur. Refer to Hem/Onc for treatment   -Recommend smoking cessation and cessation of illicit drugs.   - REFERRAL TO HEMATOLOGY    3. Dyslipidemia  The current medical regimen is effective;  continue present plan and medications. 4. Cocaine abuse  Instructed pt on the importance of avoiding the use of illicit drugs as these drugs may negatively affect his health. Cocaine constricts blood vessels, dilates pupils, and increases body temperature, heart rate, and blood pressure. It can also cause headaches and gastrointestinal complications such as abdominal pain and nausea. Because cocaine tends to decrease appetite, chronic users can become malnourished as well. Heart attacks or strokes and even death can occur when using drugs. Regular snorting of cocaine can lead to loss of sense of smell, nosebleeds, problems with swallowing, hoarseness, and a chronically runny nose.  Ingesting cocaine by the mouth can cause severe bowel gangrene as a result of reduced blood flow. Injecting cocaine can bring about severe allergic reactions and increased risk for jayden HIV, hepatitis C, and other blood-borne diseases. 5. Tobacco abuse  -The patient was counseled on the dangers of tobacco use, and was advised to quit. Reviewed strategies to maximize success, including removing cigarettes and smoking materials from environment, stress management, substitution of other forms of reinforcement and support of family/friends. - cloNIDine HCl (CATAPRES) 0.3 mg tablet; Take 1 Tab by mouth two (2) times a day. Indications: hypertension  Dispense: 60 Tab; Refill: 3    6. Rosacea  - clindamycin (CLEOCIN T) 1 % lotion; Apply  to affected area two (2) times a day. use thin film on affected area  Dispense: 1 Bottle; Refill: 2    7. Heart murmur  - New murmur, ? physiologic secondary to her erythrocytosis. She has had a TTE in 2017 that was normal, normal stress test, and recent ECG in 1/2018 was stable. SHe denies any IV drug use and is typically a good historian. Endocarditis low on suspicion. Will refer to Hem onc for treatment and re-evaluate at next visit. If patient still symptomatic will consider repeat Echocardiogram and EKG  - REFERRAL TO HEMATOLOGY    8. Fluttering heart  - Erythrocytosis vs Cocaine use. -Discussed side effects of illicit drugs and her needing to stop. She is being [facility] at [facility]. Medications Discontinued During This Encounter   Medication Reason    olmesartan-hydroCHLOROthiazide (BENICAR HCT) 40-12.5 mg per tablet Formulary Change    cloNIDine HCl (CATAPRES) 0.3 mg tablet Reorder    clindamycin (CLEOCIN T) 1 % lotion Reorder         Follow-up Disposition:  Return in 3 months (on 8/7/2018). Labs not needed if Hem/Onc draws them within 3-4 weeks of appt.

## 2018-05-15 ENCOUNTER — APPOINTMENT (OUTPATIENT)
Dept: GENERAL RADIOLOGY | Age: 48
End: 2018-05-15
Attending: EMERGENCY MEDICINE
Payer: SUBSIDIZED

## 2018-05-15 ENCOUNTER — HOSPITAL ENCOUNTER (EMERGENCY)
Age: 48
Discharge: HOME | End: 2018-05-15
Attending: EMERGENCY MEDICINE | Admitting: EMERGENCY MEDICINE
Payer: SUBSIDIZED

## 2018-05-15 ENCOUNTER — TELEPHONE (OUTPATIENT)
Dept: FAMILY MEDICINE CLINIC | Age: 48
End: 2018-05-15

## 2018-05-15 VITALS
TEMPERATURE: 98.1 F | HEART RATE: 58 BPM | DIASTOLIC BLOOD PRESSURE: 76 MMHG | OXYGEN SATURATION: 97 % | SYSTOLIC BLOOD PRESSURE: 136 MMHG | RESPIRATION RATE: 12 BRPM

## 2018-05-15 DIAGNOSIS — F12.90 MARIJUANA SMOKER: ICD-10-CM

## 2018-05-15 DIAGNOSIS — R07.89 ATYPICAL CHEST PAIN: Primary | ICD-10-CM

## 2018-05-15 DIAGNOSIS — I10 ELEVATED BLOOD PRESSURE READING WITH DIAGNOSIS OF HYPERTENSION: ICD-10-CM

## 2018-05-15 DIAGNOSIS — F14.10 COCAINE ABUSE (HCC): ICD-10-CM

## 2018-05-15 LAB
AMPHET UR QL SCN: NEGATIVE
ANION GAP SERPL CALC-SCNC: 6 MMOL/L (ref 3–18)
ATRIAL RATE: 74 BPM
BARBITURATES UR QL SCN: NEGATIVE
BASOPHILS # BLD: 0 K/UL (ref 0–0.06)
BASOPHILS NFR BLD: 0 % (ref 0–2)
BENZODIAZ UR QL: NEGATIVE
BUN SERPL-MCNC: 17 MG/DL (ref 7–18)
BUN/CREAT SERPL: 29 (ref 12–20)
CALCIUM SERPL-MCNC: 8.3 MG/DL (ref 8.5–10.1)
CALCULATED P AXIS, ECG09: 45 DEGREES
CALCULATED R AXIS, ECG10: -22 DEGREES
CALCULATED T AXIS, ECG11: 58 DEGREES
CANNABINOIDS UR QL SCN: POSITIVE
CHLORIDE SERPL-SCNC: 107 MMOL/L (ref 100–108)
CK MB CFR SERPL CALC: 3.3 % (ref 0–4)
CK MB SERPL-MCNC: 1.3 NG/ML (ref 5–25)
CK SERPL-CCNC: 39 U/L (ref 26–192)
CO2 SERPL-SCNC: 28 MMOL/L (ref 21–32)
COCAINE UR QL SCN: POSITIVE
CREAT SERPL-MCNC: 0.59 MG/DL (ref 0.6–1.3)
D DIMER PPP FEU-MCNC: <0.27 UG/ML(FEU)
DIAGNOSIS, 93000: NORMAL
DIFFERENTIAL METHOD BLD: ABNORMAL
EOSINOPHIL # BLD: 0.2 K/UL (ref 0–0.4)
EOSINOPHIL NFR BLD: 2 % (ref 0–5)
ERYTHROCYTE [DISTWIDTH] IN BLOOD BY AUTOMATED COUNT: 12.7 % (ref 11.6–14.5)
GLUCOSE SERPL-MCNC: 168 MG/DL (ref 74–99)
HCT VFR BLD AUTO: 46.6 % (ref 35–45)
HDSCOM,HDSCOM: ABNORMAL
HGB BLD-MCNC: 16.7 G/DL (ref 12–16)
LYMPHOCYTES # BLD: 3 K/UL (ref 0.9–3.6)
LYMPHOCYTES NFR BLD: 29 % (ref 21–52)
MCH RBC QN AUTO: 33.1 PG (ref 24–34)
MCHC RBC AUTO-ENTMCNC: 35.8 G/DL (ref 31–37)
MCV RBC AUTO: 92.3 FL (ref 74–97)
METHADONE UR QL: NEGATIVE
MONOCYTES # BLD: 0.9 K/UL (ref 0.05–1.2)
MONOCYTES NFR BLD: 8 % (ref 3–10)
NEUTS SEG # BLD: 6.1 K/UL (ref 1.8–8)
NEUTS SEG NFR BLD: 61 % (ref 40–73)
OPIATES UR QL: NEGATIVE
P-R INTERVAL, ECG05: 158 MS
PCP UR QL: NEGATIVE
PLATELET # BLD AUTO: 247 K/UL (ref 135–420)
PMV BLD AUTO: 9.7 FL (ref 9.2–11.8)
POTASSIUM SERPL-SCNC: 3.7 MMOL/L (ref 3.5–5.5)
Q-T INTERVAL, ECG07: 412 MS
QRS DURATION, ECG06: 108 MS
QTC CALCULATION (BEZET), ECG08: 457 MS
RBC # BLD AUTO: 5.05 M/UL (ref 4.2–5.3)
SODIUM SERPL-SCNC: 141 MMOL/L (ref 136–145)
TROPONIN I SERPL-MCNC: <0.02 NG/ML (ref 0–0.04)
TROPONIN I SERPL-MCNC: <0.02 NG/ML (ref 0–0.04)
VENTRICULAR RATE, ECG03: 74 BPM
WBC # BLD AUTO: 10.1 K/UL (ref 4.6–13.2)

## 2018-05-15 PROCEDURE — 80307 DRUG TEST PRSMV CHEM ANLYZR: CPT | Performed by: EMERGENCY MEDICINE

## 2018-05-15 PROCEDURE — 71045 X-RAY EXAM CHEST 1 VIEW: CPT

## 2018-05-15 PROCEDURE — 74011000250 HC RX REV CODE- 250: Performed by: EMERGENCY MEDICINE

## 2018-05-15 PROCEDURE — 85379 FIBRIN DEGRADATION QUANT: CPT | Performed by: EMERGENCY MEDICINE

## 2018-05-15 PROCEDURE — 96375 TX/PRO/DX INJ NEW DRUG ADDON: CPT

## 2018-05-15 PROCEDURE — 85025 COMPLETE CBC W/AUTO DIFF WBC: CPT | Performed by: EMERGENCY MEDICINE

## 2018-05-15 PROCEDURE — 82550 ASSAY OF CK (CPK): CPT | Performed by: EMERGENCY MEDICINE

## 2018-05-15 PROCEDURE — 80048 BASIC METABOLIC PNL TOTAL CA: CPT | Performed by: EMERGENCY MEDICINE

## 2018-05-15 PROCEDURE — 93005 ELECTROCARDIOGRAM TRACING: CPT

## 2018-05-15 PROCEDURE — 74011250636 HC RX REV CODE- 250/636: Performed by: EMERGENCY MEDICINE

## 2018-05-15 PROCEDURE — 99285 EMERGENCY DEPT VISIT HI MDM: CPT

## 2018-05-15 PROCEDURE — 96374 THER/PROPH/DIAG INJ IV PUSH: CPT

## 2018-05-15 RX ORDER — FAMOTIDINE 10 MG/ML
20 INJECTION INTRAVENOUS
Status: COMPLETED | OUTPATIENT
Start: 2018-05-15 | End: 2018-05-15

## 2018-05-15 RX ORDER — KETOROLAC TROMETHAMINE 30 MG/ML
30 INJECTION, SOLUTION INTRAMUSCULAR; INTRAVENOUS
Status: COMPLETED | OUTPATIENT
Start: 2018-05-15 | End: 2018-05-15

## 2018-05-15 RX ADMIN — FAMOTIDINE 20 MG: 10 INJECTION, SOLUTION INTRAVENOUS at 10:48

## 2018-05-15 RX ADMIN — KETOROLAC TROMETHAMINE 30 MG: 30 INJECTION, SOLUTION INTRAMUSCULAR; INTRAVENOUS at 10:48

## 2018-05-15 NOTE — ED TRIAGE NOTES
Patient complains of continuous chest pressure for one week and chest pain that started this morning.

## 2018-05-15 NOTE — DISCHARGE INSTRUCTIONS
Learning About High Blood Pressure  What is high blood pressure? Blood pressure is a measure of how hard the blood pushes against the walls of your arteries. It's normal for blood pressure to go up and down throughout the day, but if it stays up, you have high blood pressure. Another name for high blood pressure is hypertension. Two numbers tell you your blood pressure. The first number is the systolic pressure. It shows how hard the blood pushes when your heart is pumping. The second number is the diastolic pressure. It shows how hard the blood pushes between heartbeats, when your heart is relaxed and filling with blood. A blood pressure of less than 120/80 (say \"120 over 80\") is ideal for an adult. High blood pressure is 140/90 or higher. You have high blood pressure if your top number is 140 or higher or your bottom number is 90 or higher, or both. Many people fall into the category in between, called prehypertension. People with prehypertension need to make lifestyle changes to bring their blood pressure down and help prevent or delay high blood pressure. What happens when you have high blood pressure? · Blood flows through your arteries with too much force. Over time, this damages the walls of your arteries. But you can't feel it. High blood pressure usually doesn't cause symptoms. · Fat and calcium start to build up in your arteries. This buildup is called plaque. Plaque makes your arteries narrower and stiffer. Blood can't flow through them as easily. · This lack of good blood flow starts to damage some of the organs in your body. This can lead to problems such as coronary artery disease and heart attack, heart failure, stroke, kidney failure, and eye damage. How can you prevent high blood pressure? · Stay at a healthy weight. · Try to limit how much sodium you eat to less than 2,300 milligrams (mg) a day.  If you limit your sodium to 1,500 mg a day, you can lower your blood pressure even more.  ¨ Buy foods that are labeled \"unsalted,\" \"sodium-free,\" or \"low-sodium. \" Foods labeled \"reduced-sodium\" and \"light sodium\" may still have too much sodium. ¨ Flavor your food with garlic, lemon juice, onion, vinegar, herbs, and spices instead of salt. Do not use soy sauce, steak sauce, onion salt, garlic salt, mustard, or ketchup on your food. ¨ Use less salt (or none) when recipes call for it. You can often use half the salt a recipe calls for without losing flavor. · Be physically active. Get at least 30 minutes of exercise on most days of the week. Walking is a good choice. You also may want to do other activities, such as running, swimming, cycling, or playing tennis or team sports. · Limit alcohol to 2 drinks a day for men and 1 drink a day for women. · Eat plenty of fruits, vegetables, and low-fat dairy products. Eat less saturated and total fats. How is high blood pressure treated? · Your doctor will suggest making lifestyle changes. For example, your doctor may ask you to eat healthy foods, quit smoking, lose extra weight, and be more active. · If lifestyle changes don't help enough or your blood pressure is very high, you will have to take medicine every day. Follow-up care is a key part of your treatment and safety. Be sure to make and go to all appointments, and call your doctor if you are having problems. It's also a good idea to know your test results and keep a list of the medicines you take. Where can you learn more? Go to http://los-zack.info/. Enter P501 in the search box to learn more about \"Learning About High Blood Pressure. \"  Current as of: September 21, 2016  Content Version: 11.4  © 4174-3591 [employer]. Care instructions adapted under license by [employer] (which disclaims liability or warranty for this information).  If you have questions about a medical condition or this instruction, always ask your healthcare professional. [employer], [facility] disclaims any warranty or liability for your use of this information. High Blood Pressure: Care Instructions  Your Care Instructions    If your blood pressure is usually above 140/90, you have high blood pressure, or hypertension. That means the top number is 140 or higher or the bottom number is 90 or higher, or both. Despite what a lot of people think, high blood pressure usually doesn't cause headaches or make you feel dizzy or lightheaded. It usually has no symptoms. But it does increase your risk for heart attack, stroke, and kidney or eye damage. The higher your blood pressure, the more your risk increases. Your doctor will give you a goal for your blood pressure. Your goal will be based on your health and your age. An example of a goal is to keep your blood pressure below 140/90. Lifestyle changes, such as eating healthy and being active, are always important to help lower blood pressure. You might also take medicine to reach your blood pressure goal.  Follow-up care is a key part of your treatment and safety. Be sure to make and go to all appointments, and call your doctor if you are having problems. It's also a good idea to know your test results and keep a list of the medicines you take. How can you care for yourself at home? Medical treatment  · If you stop taking your medicine, your blood pressure will go back up. You may take one or more types of medicine to lower your blood pressure. Be safe with medicines. Take your medicine exactly as prescribed. Call your doctor if you think you are having a problem with your medicine. · Talk to your doctor before you start taking aspirin every day. Aspirin can help certain people lower their risk of a heart attack or stroke. But taking aspirin isn't right for everyone, because it can cause serious bleeding. · See your doctor regularly.  You may need to see the doctor more often at first or until your blood pressure comes down.  · If you are taking blood pressure medicine, talk to your doctor before you take decongestants or anti-inflammatory medicine, such as ibuprofen. Some of these medicines can raise blood pressure. · Learn how to check your blood pressure at home. Lifestyle changes  · Stay at a healthy weight. This is especially important if you put on weight around the waist. Losing even 10 pounds can help you lower your blood pressure. · If your doctor recommends it, get more exercise. Walking is a good choice. Bit by bit, increase the amount you walk every day. Try for at least 30 minutes on most days of the week. You also may want to swim, bike, or do other activities. · Avoid or limit alcohol. Talk to your doctor about whether you can drink any alcohol. · Try to limit how much sodium you eat to less than 2,300 milligrams (mg) a day. Your doctor may ask you to try to eat less than 1,500 mg a day. · Eat plenty of fruits (such as bananas and oranges), vegetables, legumes, whole grains, and low-fat dairy products. · Lower the amount of saturated fat in your diet. Saturated fat is found in animal products such as milk, cheese, and meat. Limiting these foods may help you lose weight and also lower your risk for heart disease. · Do not smoke. Smoking increases your risk for heart attack and stroke. If you need help quitting, talk to your doctor about stop-smoking programs and medicines. These can increase your chances of quitting for good. When should you call for help? Call 911 anytime you think you may need emergency care. This may mean having symptoms that suggest that your blood pressure is causing a serious heart or blood vessel problem. Your blood pressure may be over 180/110. ? For example, call 911 if:  ? · You have symptoms of a heart attack. These may include:  ¨ Chest pain or pressure, or a strange feeling in the chest.  ¨ Sweating. ¨ Shortness of breath. ¨ Nausea or vomiting.   ¨ Pain, pressure, or a strange feeling in the back, neck, jaw, or upper belly or in one or both shoulders or arms. ¨ Lightheadedness or sudden weakness. ¨ A fast or irregular heartbeat. ? · You have symptoms of a stroke. These may include:  ¨ Sudden numbness, tingling, weakness, or loss of movement in your face, arm, or leg, especially on only one side of your body. ¨ Sudden vision changes. ¨ Sudden trouble speaking. ¨ Sudden confusion or trouble understanding simple statements. ¨ Sudden problems with walking or balance. ¨ A sudden, severe headache that is different from past headaches. ? · You have severe back or belly pain. ?Do not wait until your blood pressure comes down on its own. Get help right away. ?Call your doctor now or seek immediate care if:  ? · Your blood pressure is much higher than normal (such as 180/110 or higher), but you don't have symptoms. ? · You think high blood pressure is causing symptoms, such as:  ¨ Severe headache. ¨ Blurry vision. ? Watch closely for changes in your health, and be sure to contact your doctor if:  ? · Your blood pressure measures 140/90 or higher at least 2 times. That means the top number is 140 or higher or the bottom number is 90 or higher, or both. ? · You think you may be having side effects from your blood pressure medicine. ? · Your blood pressure is usually normal, but it goes above normal at least 2 times. Where can you learn more? Go to http://los-zack.info/. Enter M120 in the search box to learn more about \"High Blood Pressure: Care Instructions. \"  Current as of: September 21, 2016  Content Version: 11.4  © 7237-3204 "[employer]. Care instructions adapted under license by [employer] (which disclaims liability or warranty for this information).  If you have questions about a medical condition or this instruction, always ask your healthcare professional. [facility] any warranty or liability for your use of this information. Chest Pain: Care Instructions  Your Care Instructions    There are many things that can cause chest pain. Some are not serious and will get better on their own in a few days. But some kinds of chest pain need more testing and treatment. Your doctor may have recommended a follow-up visit in the next 8 to 12 hours. If you are not getting better, you may need more tests or treatment. Even though your doctor has released you, you still need to watch for any problems. The doctor carefully checked you, but sometimes problems can develop later. If you have new symptoms or if your symptoms do not get better, get medical care right away. If you have worse or different chest pain or pressure that lasts more than 5 minutes or you passed out (lost consciousness), call 911 or seek other emergency help right away. A medical visit is only one step in your treatment. Even if you feel better, you still need to do what your doctor recommends, such as going to all suggested follow-up appointments and taking medicines exactly as directed. This will help you recover and help prevent future problems. How can you care for yourself at home? · Rest until you feel better. · Take your medicine exactly as prescribed. Call your doctor if you think you are having a problem with your medicine. · Do not drive after taking a prescription pain medicine. When should you call for help? Call 911 if:  ? · You passed out (lost consciousness). ? · You have severe difficulty breathing. ? · You have symptoms of a heart attack. These may include:  ¨ Chest pain or pressure, or a strange feeling in your chest.  ¨ Sweating. ¨ Shortness of breath. ¨ Nausea or vomiting. ¨ Pain, pressure, or a strange feeling in your back, neck, jaw, or upper belly or in one or both shoulders or arms. ¨ Lightheadedness or sudden weakness. ¨ A fast or irregular heartbeat.   After you call 911, the  may tell you to chew 1 adult-strength or 2 to 4 low-dose aspirin. Wait for an ambulance. Do not try to drive yourself. ?Call your doctor today if:  ? · You have any trouble breathing. ? · Your chest pain gets worse. ? · You are dizzy or lightheaded, or you feel like you may faint. ? · You are not getting better as expected. ? · You are having new or different chest pain. Where can you learn more? Go to http://los-zack.info/. Enter A120 in the search box to learn more about \"Chest Pain: Care Instructions. \"  Current as of: March 20, 2017  Content Version: 11.4  © 7236-1051 [employer]. Care instructions adapted under license by [employer] (which disclaims liability or warranty for this information). If you have questions about a medical condition or this instruction, always ask your healthcare professional. [place] [address] any warranty or liability for your use of this information. Marijuana Use: Care Instructions  Your Care Instructions  During your exam, traces of marijuana were found in your body. The active chemical in marijuana is THC. THC usually can be found in urine for a few days after marijuana is used. If use is heavy, THC may be found for weeks after use has stopped. In the [place], marijuana laws vary widely. The drug is legal in some states, mainly as a medical treatment. But marijuana possession is still a crime under federal law. Many employers have strict rules about drug use. Many do drug testing. A positive drug test might cause you to lose your job. Or it might keep you from getting hired. Follow-up care is a key part of your treatment and safety. Be sure to make and go to all appointments, and call your doctor if you are having problems. It's also a good idea to know your test results and keep a list of the medicines you take. How can you care for yourself at home? · If you use marijuana, use it safely.  Do not drive or operate heavy equipment. Using marijuana may affect your judgment and coordination. It can also increase your risk of being in a car crash. · Make sure you understand the laws in your area. Using marijuana may cause legal problems. · Know your employer's policies. When should you call for help? Watch closely for changes in your health, and contact your doctor if you think you have a problem with marijuana use. Where can you learn more? Go to http://los-zack.info/. Enter T587 in the search box to learn more about \"Marijuana Use: Care Instructions. \"  Current as of: November 3, 2016  Content Version: 11.4  © 8528-1551 [employer]. Care instructions adapted under license by [employer] (which disclaims liability or warranty for this information). If you have questions about a medical condition or this instruction, always ask your healthcare professional. [address] any warranty or liability for your use of this information.

## 2018-05-15 NOTE — TELEPHONE ENCOUNTER
Medication: Symbyax 3-25 mg, dose: 1 tab, how often: qd , current number of medication days provided: 90, refill per application. Lot #: [id number], EXP 01/2020. This medication was received and verified for the following 1. Correct Patient, 2. Correct Diagnosis, 3. Correct Drug, 4. Correct route, and no current allergy to medication. Please contact patient to come  their medications.      Hailee So, MSN, RN, P-C     [facility]

## 2018-05-15 NOTE — ED PROVIDER NOTES
EMERGENCY DEPARTMENT HISTORY AND PHYSICAL EXAM    9:13 AM      Date: 5/15/2018  Patient Name: Chloe Angulo    History of Presenting Illness     Chief Complaint   Patient presents with    Chest Pain     chest pressure for one week          History Provided By: Patient    Chief Complaint: CP, SOB, dizziness  Duration:  Days (x5-6)  Timing:  Intermittent and Constant  Location: Chest  Quality: Sharp and Pressure  Severity: N/A  Modifying Factors: SOB exacerbated by exertion  Associated Symptoms: Exhibits chronic cough; denies vertigo, lightheadedness    Additional History (Context): Chloe Angulo is a 49 y.o. female with HTN, depresion, and bipolar who presents to the ED c/o constant chest pressure onset 5-6 days ago with intermittent episodes of sharp pain. Pt has been to the ED for similar sx and is followed by a cardiologist (Dr. Emmett Prado) however at her last appointment she was told a cardiac follow up was not needed. Pt had an abnormally high RBC however and was instructed to follow up on that this week. Pt notes episodes of sharp pain last a few minutes and are accompanied by SOB and dizziness. Pt was in the shower this morning when the SOB and dizziness began, both of which are worse today and intermittent. Dizziness is described as being \"of balance\"; pt denies vertigo. SOB is exacerbated by exertion. Pt experienced mild acid reflux last night but has never been diagnosed with GERD. Pt additionally has a chronic, constant, productive cough however she denies lightheadedness, abdominal pain, nausea, emesis, and diarrhea. Pt admits to smoking cigarettes.     PCP: Herber Barksdale NP      Past History     Past Medical History:  Past Medical History:   Diagnosis Date    Bronchitis     Cocaine abuse 3/26/2015    Dental caries     Depression     Depression 8/31/2016    Drug abuse     Elevated hematocrit 8/31/2016    ETOH abuse     H/O screening mammography 12/06/2016    No evidence of malignancy     HPV (human papilloma virus) infection 11/2016    The patient referred to [facility]     Hypertension     Mood swings (HCC)     Psychiatric disorder     depression, Bipolar    Tobacco abuse     Vitamin D deficiency        Past Surgical History:  Past Surgical History:   Procedure Laterality Date    HX APPENDECTOMY      HX COLPOSCOPY  12/06/2016    Low-grade courtney/mild dysplasia RICK I    HX HERNIA REPAIR         Family History:  Family History   Problem Relation Age of Onset    Hypertension Mother     Heart Attack Father        Social History:  Social History   Substance Use Topics    Smoking status: Current Every Day Smoker     Packs/day: 0.50     Years: 20.00     Types: Cigarettes    Smokeless tobacco: Never Used    Alcohol use 0.0 oz/week     3 - 6 Cans of beer per week      Comment: 3 or 4 beers weekly        Allergies: Allergies   Allergen Reactions    Iodinated Contrast- Oral And Iv Dye Shortness of Breath         Review of Systems       Review of Systems   Constitutional: Negative for fever. HENT: Negative for sore throat. Eyes: Negative for redness. Respiratory: Positive for cough and shortness of breath. Negative for wheezing. Cardiovascular: Positive for chest pain. Gastrointestinal: Negative for abdominal pain, diarrhea, nausea and vomiting. Genitourinary: Negative for dysuria. Musculoskeletal: Negative for neck stiffness. Skin: Negative for pallor. Neurological: Positive for dizziness. Negative for light-headedness and headaches. Hematological: Does not bruise/bleed easily. All other systems reviewed and are negative. Physical Exam     Visit Vitals    /76    Pulse (!) 58    Temp 98.1 °F (36.7 °C)    Resp 12    SpO2 97%       Physical Exam   Constitutional: She is oriented to person, place, and time. She appears well-developed and well-nourished. No distress. HENT:   Head: Normocephalic and atraumatic.    Mouth/Throat: Oropharynx is clear and moist.   Eyes: Conjunctivae are normal. Pupils are equal, round, and reactive to light. No scleral icterus. Neck: Normal range of motion. Neck supple. Cardiovascular: Intact distal pulses. Murmur heard. Systolic murmur is present with a grade of 2/6   Capillary refill < 3 seconds  No lower extremity edema   Pulmonary/Chest: Effort normal and breath sounds normal. No respiratory distress. She has no wheezes. She exhibits no tenderness. Lungs clear, equal breath sounds bilaterally   Abdominal: Soft. Bowel sounds are normal. She exhibits no distension. There is no tenderness. Musculoskeletal: Normal range of motion. She exhibits no edema. Right lower leg: She exhibits no tenderness. Left lower leg: She exhibits no tenderness. Lymphadenopathy:     She has no cervical adenopathy. Neurological: She is alert and oriented to person, place, and time. No cranial nerve deficit. She exhibits normal muscle tone. Coordination normal.   Strength 5/5  EOMI  No nystagmus  No facial droop   Skin: Skin is warm and dry. She is not diaphoretic. Psychiatric: Thought content normal.   Nursing note and vitals reviewed.         Diagnostic Study Results     Labs -  Recent Results (from the past 12 hour(s))   EKG, 12 LEAD, INITIAL    Collection Time: 05/15/18  9:00 AM   Result Value Ref Range    Ventricular Rate 74 BPM    Atrial Rate 74 BPM    P-R Interval 158 ms    QRS Duration 108 ms    Q-T Interval 412 ms    QTC Calculation (Bezet) 457 ms    Calculated P Axis 45 degrees    Calculated R Axis -22 degrees    Calculated T Axis 58 degrees    Diagnosis       Normal sinus rhythm  Voltage criteria for left ventricular hypertrophy Abnormal ECG  Abnormal ECG  When compared with ECG of 27-JAN-2018 21:16,  No significant change was found    Confirmed by Matthew Herring ([id number]) on 5/15/2018 9:31:23 AM     CBC WITH AUTOMATED DIFF    Collection Time: 05/15/18  9:40 AM   Result Value Ref Range    WBC 10.1 4.6 - 13.2 K/uL    RBC 5.05 4.20 - 5.30 M/uL    HGB 16.7 (H) 12.0 - 16.0 g/dL    HCT 46.6 (H) 35.0 - 45.0 %    MCV 92.3 74.0 - 97.0 FL    MCH 33.1 24.0 - 34.0 PG    MCHC 35.8 31.0 - 37.0 g/dL    RDW 12.7 11.6 - 14.5 %    PLATELET 447 538 - 713 K/uL    MPV 9.7 9.2 - 11.8 FL    NEUTROPHILS 61 40 - 73 %    LYMPHOCYTES 29 21 - 52 %    MONOCYTES 8 3 - 10 %    EOSINOPHILS 2 0 - 5 %    BASOPHILS 0 0 - 2 %    ABS. NEUTROPHILS 6.1 1.8 - 8.0 K/UL    ABS. LYMPHOCYTES 3.0 0.9 - 3.6 K/UL    ABS. MONOCYTES 0.9 0.05 - 1.2 K/UL    ABS. EOSINOPHILS 0.2 0.0 - 0.4 K/UL    ABS. BASOPHILS 0.0 0.0 - 0.06 K/UL    DF AUTOMATED     METABOLIC PANEL, BASIC    Collection Time: 05/15/18  9:40 AM   Result Value Ref Range    Sodium 141 136 - 145 mmol/L    Potassium 3.7 3.5 - 5.5 mmol/L    Chloride 107 100 - 108 mmol/L    CO2 28 21 - 32 mmol/L    Anion gap 6 3.0 - 18 mmol/L    Glucose 168 (H) 74 - 99 mg/dL    BUN 17 7.0 - 18 MG/DL    Creatinine 0.59 (L) 0.6 - 1.3 MG/DL    BUN/Creatinine ratio 29 (H) 12 - 20      GFR est AA >60 >60 ml/min/1.73m2    GFR est non-AA >60 >60 ml/min/1.73m2    Calcium 8.3 (L) 8.5 - 10.1 MG/DL   CARDIAC PANEL,(CK, CKMB & TROPONIN)    Collection Time: 05/15/18  9:40 AM   Result Value Ref Range    CK 39 26 - 192 U/L    CK - MB 1.3 <3.6 ng/ml    CK-MB Index 3.3 0.0 - 4.0 %    Troponin-I, Qt. <0.02 0.0 - 0.045 NG/ML   D DIMER    Collection Time: 05/15/18  9:40 AM   Result Value Ref Range    D DIMER <0.27 <0.46 ug/ml(FEU)   DRUG SCREEN, URINE    Collection Time: 05/15/18 11:49 AM   Result Value Ref Range    BENZODIAZEPINES NEGATIVE  NEG      BARBITURATES NEGATIVE  NEG      THC (TH-CANNABINOL) POSITIVE (A) NEG      OPIATES NEGATIVE  NEG      PCP(PHENCYCLIDINE) NEGATIVE  NEG      COCAINE POSITIVE (A) NEG      AMPHETAMINES NEGATIVE  NEG      METHADONE NEGATIVE  NEG      HDSCOM (NOTE)        Radiologic Studies -   XR CHEST PORT   Final Result   IMPRESSION:    Hypoinflation, without evidence of acute cardiopulmonary process.          Medical Decision Making   I am the first provider for this patient. I reviewed the vital signs, available nursing notes, past medical history, past surgical history, family history and social history. Vital Signs-Reviewed the patient's vital signs. Pulse Oximetry Analysis -  96% on room air    Cardiac Monitor:  Rate: 71  Rhythm:  Normal Sinus Rhythm     EKG: Interpreted by the EP. Initially viewed by other ED physician   Time Interpreted: 9:20 AM   Rate: 74   Rhythm: Normal Sinus Rhythm    Interpretation: Left axis deviation. No ST elevation or depression. Has criteria for LVH    Records Reviewed: Nursing Notes and Old Medical Records (Time of Review: 9:13 AM)    Provider Notes (Medical Decision Making): ddx musculoskeletal, gerd, anxiety, history of cocaine abuse, cardiac, pe, metabolic, neoplasm    Labs, ekg, cxr, give toradol and pepcid  MDM    Medications   famotidine (PF) (PEPCID) injection 20 mg (20 mg IntraVENous Given 5/15/18 1048)   ketorolac (TORADOL) injection 30 mg (30 mg IntraVENous Given 5/15/18 1048)       Procedures:    8/16/17 Stress test:  Diagnosis:   1. Nondiagnostic EKG portion of Lexiscan stress test.   2. Nuclear imaging report to follow. Impression   Findings:   1. Post-stress imaging in short axis, horizontal and vertical long axis views reveals normal isotope uptake in all areas. 2. Resting images also show normal isotope uptake in all areas. 3. Gated images show normal left ventricular size, wall motion and systolic function. Ejection fraction is 71%. Diagnosis:   1. Normal scan. 2. No evidence of significant fixed or reversible defect suggesting ischemia or myocardial infarction noted from this nuclear study. 3. Low risk scan. ED Course: Progress Notes, Reevaluation, and Consults:  WBC wnl  Cr wnl  ddimer wnl    +cocaine, and THC    Labs otw reassuring  Repeat trop (-)    Discussed risks of drug abuse including MI and death. She fully understood. Will give fu CSB, pcp and cardio.     I have reassessed the patient. I have discussed the workup, results and plan with the patient and patient is in agreement. Patient is feeling much better, asking to go home. Patient was discharge in stable condition. Patient was given outpatient follow up. Patient is to return to emergency department if any new or worsening condition. Diagnosis     Clinical Impression:   1. Atypical chest pain    2. Cocaine abuse    3. Marijuana smoker    4. Elevated blood pressure reading with diagnosis of hypertension        Disposition: Discharge    Follow-up Information     Follow up With Details Comments [address] CLAUDY Bauman Call in 2 days  [address] [address]      Migel Madden MD Call in 1 day  [address] [facility] [address] [address]  [address] Call in 1 day  [address] [place] [facility] EMERGENCY DEPT  As needed, If symptoms worsen [address] [address]  [phone]           Patient's Medications   Start Taking    No medications on file   Continue Taking    ALBUTEROL (PROVENTIL HFA, VENTOLIN HFA, PROAIR HFA) 90 MCG/ACTUATION INHALER    Take 2 Puffs by inhalation every four (4) hours as needed for Wheezing. AMLODIPINE-ATORVASTATIN (CADUET) 10-40 MG PER TABLET    Take 1 Tab by mouth daily. CALCIUM-CHOLECALCIFEROL, D3, (CALTRATE 600+D) TABLET    Take 1 Tab by mouth daily. CLINDAMYCIN (CLEOCIN T) 1 % LOTION    Apply  to affected area two (2) times a day. use thin film on affected area    CLONIDINE HCL (CATAPRES) 0.3 MG TABLET    Take 1 Tab by mouth two (2) times a day. Indications: hypertension    ERGOCALCIFEROL (VITAMIN D2) 50,000 UNIT CAPSULE    Take one capsule every 7 days till gone, then start taking over-the-counter Vitamin D3 2,000 units every day    LAMOTRIGINE (LAMICTAL) 100 MG TABLET    Take 200 mg by mouth daily. LOSARTAN-HYDROCHLOROTHIAZIDE (HYZAAR) 100-12.5 MG PER TABLET    Take 1 Tab by mouth daily. Indications: hypertension    MIRTAZAPINE (REMERON) 30 MG TABLET    Take  by mouth nightly. For sleep    NYSTATIN (MYCOSTATIN) 100,000 UNIT/ML SUSPENSION    Take 5 mL by mouth four (4) times daily. swish and spit  Indications: ORAL CANDIDIASIS    OLANZAPINE-FLUOXETINE (SYMBYAX) 3-25 MG PER CAPSULE    Take 1 Cap by mouth every evening. Indications: DEPRESSION ASSOCIATED WITH BIPOLAR DISORDER    OMEGA-3 ACID ETHYL ESTERS (LOVAZA) 1 GRAM CAPSULE    Take 2 Caps by mouth two (2) times a day. These Medications have changed    No medications on file   Stop Taking    No medications on file     _______________________________    Attestations:  [address] [place] [address] acting as a scribe for and in the presence of Judi Cleary DO      May 15, 2018 at 9:24 AM       Provider Attestation:      I personally performed the services described in the documentation, reviewed the documentation, as recorded by the scribe in my presence, and it accurately and completely records my words and actions.  May 15, 2018 at 9:24 AM - Judi Cleary DO    _______________________________

## 2018-05-22 ENCOUNTER — OFFICE VISIT (OUTPATIENT)
Dept: ONCOLOGY | Age: 48
End: 2018-05-22

## 2018-05-22 ENCOUNTER — HOSPITAL ENCOUNTER (OUTPATIENT)
Dept: LAB | Age: 48
Discharge: HOME | End: 2018-05-22
Payer: SUBSIDIZED

## 2018-05-22 ENCOUNTER — HOSPITAL ENCOUNTER (OUTPATIENT)
Dept: ONCOLOGY | Age: 48
Discharge: HOME | End: 2018-05-22

## 2018-05-22 VITALS
WEIGHT: 197 LBS | BODY MASS INDEX: 36.25 KG/M2 | HEART RATE: 78 BPM | DIASTOLIC BLOOD PRESSURE: 80 MMHG | HEIGHT: 62 IN | SYSTOLIC BLOOD PRESSURE: 133 MMHG | TEMPERATURE: 97.8 F

## 2018-05-22 DIAGNOSIS — D75.1 SECONDARY POLYCYTHEMIA: ICD-10-CM

## 2018-05-22 DIAGNOSIS — D75.1 ERYTHROCYTOSIS: ICD-10-CM

## 2018-05-22 DIAGNOSIS — D75.1 ERYTHROCYTOSIS: Primary | ICD-10-CM

## 2018-05-22 LAB
ALBUMIN SERPL-MCNC: 4.1 G/DL (ref 3.4–5)
ALBUMIN/GLOB SERPL: 1.2 {RATIO} (ref 0.8–1.7)
ALP SERPL-CCNC: 88 U/L (ref 45–117)
ALT SERPL-CCNC: 28 U/L (ref 13–56)
ANION GAP SERPL CALC-SCNC: 8 MMOL/L (ref 3–18)
AST SERPL-CCNC: 16 U/L (ref 15–37)
BASO+EOS+MONOS # BLD AUTO: 0.8 K/UL (ref 0–2.3)
BASO+EOS+MONOS # BLD AUTO: 7 % (ref 0.1–17)
BILIRUB SERPL-MCNC: 0.5 MG/DL (ref 0.2–1)
BUN SERPL-MCNC: 11 MG/DL (ref 7–18)
BUN/CREAT SERPL: 16 (ref 12–20)
CALCIUM SERPL-MCNC: 9.2 MG/DL (ref 8.5–10.1)
CHLORIDE SERPL-SCNC: 105 MMOL/L (ref 100–108)
CO2 SERPL-SCNC: 26 MMOL/L (ref 21–32)
CREAT SERPL-MCNC: 0.67 MG/DL (ref 0.6–1.3)
DIFFERENTIAL METHOD BLD: ABNORMAL
ERYTHROCYTE [DISTWIDTH] IN BLOOD BY AUTOMATED COUNT: 12.1 % (ref 11.5–14.5)
FERRITIN SERPL-MCNC: 94 NG/ML (ref 8–388)
GLOBULIN SER CALC-MCNC: 3.3 G/DL (ref 2–4)
GLUCOSE SERPL-MCNC: 148 MG/DL (ref 74–99)
HCT VFR BLD AUTO: 50.5 % (ref 36–48)
HGB BLD-MCNC: 17.5 G/DL (ref 12–16)
IRON SATN MFR SERPL: 43 %
IRON SERPL-MCNC: 130 UG/DL (ref 50–175)
LYMPHOCYTES # BLD: 3.6 K/UL (ref 1.1–5.9)
LYMPHOCYTES NFR BLD: 32 % (ref 14–44)
MCH RBC QN AUTO: 32.3 PG (ref 25–35)
MCHC RBC AUTO-ENTMCNC: 34.7 G/DL (ref 31–37)
MCV RBC AUTO: 93.3 FL (ref 78–102)
NEUTS SEG # BLD: 6.8 K/UL (ref 1.8–9.5)
NEUTS SEG NFR BLD: 61 % (ref 40–70)
PLATELET # BLD AUTO: 287 K/UL (ref 135–420)
POTASSIUM SERPL-SCNC: 3.7 MMOL/L (ref 3.5–5.5)
PROT SERPL-MCNC: 7.4 G/DL (ref 6.4–8.2)
RBC # BLD AUTO: 5.41 M/UL (ref 4.1–5.1)
SODIUM SERPL-SCNC: 139 MMOL/L (ref 136–145)
TIBC SERPL-MCNC: 304 UG/DL (ref 250–450)
WBC # BLD AUTO: 11.2 K/UL (ref 4.5–13)

## 2018-05-22 PROCEDURE — 81270 JAK2 GENE: CPT | Performed by: INTERNAL MEDICINE

## 2018-05-22 PROCEDURE — 82728 ASSAY OF FERRITIN: CPT | Performed by: INTERNAL MEDICINE

## 2018-05-22 PROCEDURE — 80053 COMPREHEN METABOLIC PANEL: CPT | Performed by: INTERNAL MEDICINE

## 2018-05-22 PROCEDURE — 83540 ASSAY OF IRON: CPT | Performed by: INTERNAL MEDICINE

## 2018-05-22 PROCEDURE — 36415 COLL VENOUS BLD VENIPUNCTURE: CPT | Performed by: INTERNAL MEDICINE

## 2018-05-22 PROCEDURE — 82668 ASSAY OF ERYTHROPOIETIN: CPT | Performed by: INTERNAL MEDICINE

## 2018-05-22 NOTE — PATIENT INSTRUCTIONS
Polycythemia: Care Instructions  Your Care Instructions    Polycythemia (say \"paw-david-sy-THEE-mariama-uh) is an abnormal increase in red blood cells. It happens when the tissue inside your bones (bone marrow) makes too much blood. It also can occur if your blood does not have enough liquid, or plasma. This can make the number of red blood cells seem higher than normal. The extra red blood cells make your blood thicker than normal. This may raise your risk for blood clots that can cause heart attacks or strokes. Clots can form in the deep veins of the body, a condition called deep vein thrombosis. Or, a clot can travel through the blood to a lung (a pulmonary embolism). Your doctor may treat you by taking out some of your blood (phlebotomy). The process is like donating blood. Your doctor may even recommend that you donate blood. You may take pills to stop your body from making red blood cells. You also will get treatment for any other conditions that may cause your body to make too many red blood cells. Follow-up care is a key part of your treatment and safety. Be sure to make and go to all appointments, and call your doctor if you are having problems. It's also a good idea to know your test results and keep a list of the medicines you take. How can you care for yourself at home? · Be safe with medicines. Take your medicines exactly as prescribed. Call your doctor if you think you are having a problem with your medicine. · Drink plenty of fluids, enough so that your urine is light yellow or clear like water, before and after you have blood removed. If you have kidney, heart, or liver disease and have to limit fluids, talk with your doctor before you increase the amount of fluids you drink. · Take it easy after you have had blood removed. Do not do vigorous exercise. · If your doctor recommends aspirin, take it exactly as prescribed.  Call your doctor if you think you are having a problem with your medicine. · Do not smoke. Smoking increases the risk of blood clots and may reduce the amount of oxygen in your blood. If you need help quitting, talk to your doctor about stop-smoking programs and medicines. These can increase your chances of quitting for good. · Take an antihistamine, such as a nondrowsy one like loratadine (Claritin) or one that might make you sleepy like diphenhydramine (Benadryl), if your skin is itchy. Some people who have this condition have itching. · Wear medical alert jewelry that lists your clotting problem. You can buy this at most drugstores. When should you call for help? Call 911 anytime you think you may need emergency care. For example, call if:  ? · You have sudden chest pain and shortness of breath, or you cough up blood. ? · You have symptoms of a stroke. These may include:  ¨ Sudden numbness, tingling, weakness, or loss of movement in your face, arm, or leg, especially on only one side of your body. ¨ Sudden vision changes. ¨ Sudden trouble speaking. ¨ Sudden confusion or trouble understanding simple statements. ¨ Sudden problems with walking or balance. ¨ A sudden, severe headache that is different from past headaches. ? · You have symptoms of a heart attack. These may include:  ¨ Chest pain or pressure, or a strange feeling in the chest.  ¨ Sweating. ¨ Shortness of breath. ¨ Nausea or vomiting. ¨ Pain, pressure, or a strange feeling in the back, neck, jaw, or upper belly or in one or both shoulders or arms. ¨ Lightheadedness or sudden weakness. ¨ A fast or irregular heartbeat. After you call 911, the  may tell you to chew 1 adult-strength or 2 to 4 low-dose aspirin. Wait for an ambulance. Do not try to drive yourself. ?Call your doctor now or seek immediate medical care if:  ? · You have signs of a blood clot, such as:  ¨ Pain in your calf, back of knee, thigh, or groin. ¨ Redness and swelling in your leg or groin. ? Watch closely for changes in your health, and be sure to contact your doctor if you have any problems. Where can you learn more? Go to http://los-zack.info/. Enter P169 in the search box to learn more about \"Polycythemia: Care Instructions. \"  Current as of: October 13, 2016  Content Version: 11.4  © 0096-9165 [employer]. Care instructions adapted under license by [employer] (which disclaims liability or warranty for this information). If you have questions about a medical condition or this instruction, always ask your healthcare professional. [address] any warranty or liability for your use of this information.
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
## 2018-05-22 NOTE — MR AVS SNAPSHOT
Sarina Rivera 
 
 
 [facility] [address] [facility] [address] 
[phone] Patient: Michael Greenberg MRN: [id number] :1970 Visit Information Date & Time Provider Department Dept. Phone Encounter #  
 2018 11:30 AM Vince Vizcaino 71 Office [phone] Your Appointments 2018 12:45 PM  
Office Visit with Bob Merlos MD  
[facility]) Appt Note: mv er f/u for chest pressure/offered sooner in suff but refused [address] [facility], [address] [facility] [address]  
[address] [facility], [address] [address]  
  
    
 2018 10:45 AM  
Office Visit with Shruti Fernandes MD  
[place] [address] ([facility]) Appt Note: LAB RESULTS  
 [facility] [address] [facility] [address]  
[address] [facility] [address]  
  
    
 2018 11:00 AM  
Follow Up with Zay Pritchett NP [address] [facility] ([facility]) Appt Note: 3 mth follow up  
 [address] [facility] [address]  
[address] [facility] [address] Upcoming Health Maintenance Date Due DTaP/Tdap/Td series (1 - Tdap) 1990 Pneumococcal 19-64 Medium Risk (1 of 1 - PPSV23) 2019* Influenza Age 5 to Adult 2018 PAP AKA CERVICAL CYTOLOGY 2019 *Topic was postponed. The date shown is not the original due date. Allergies as of 2018  Review Complete On: 5/15/2018 By: Austyn Norwood RN Severity Noted Reaction Type Reactions Iodinated Contrast- Oral And Iv Dye  2012    Shortness of Breath Current Immunizations  Reviewed on 11/15/2017 Name Date Influenza Vaccine (Quad) PF 11/15/2017  7:50 AM, 10/13/2016 Not reviewed this visit You Were Diagnosed With   
  
 Codes Comments Erythrocytosis    -  Primary ICD-10-CM: D75.1 ICD-9-CM: 289.0 Secondary polycythemia     ICD-10-CM: D75.1 ICD-9-CM: 086. 0 Vitals BP Pulse Temp Height(growth percentile) Weight(growth percentile) BMI  
 133/80 78 97.8 °F (36.6 °C) (Oral) 5' 2\" (1.575 m) 197 lb (89.4 kg) 36.03 kg/m2 OB Status Smoking Status Having regular periods Current Every Day Smoker Vitals History BMI and BSA Data Body Mass Index Body Surface Area 36.03 kg/m 2 1.98 m 2 Preferred Pharmacy Pharmacy Name Phone [address] [phone] Your Updated Medication List  
  
   
This list is accurate as of 5/22/18 12:26 PM.  Always use your most recent med list.  
  
  
  
  
 albuterol 90 mcg/actuation inhaler Commonly known as:  PROVENTIL HFA, VENTOLIN HFA, PROAIR HFA Take 2 Puffs by inhalation every four (4) hours as needed for Wheezing. amLODIPine-atorvastatin 10-40 mg per tablet Commonly known as:  CADUET Take 1 Tab by mouth daily. calcium-cholecalciferol (D3) tablet Commonly known as:  CALTRATE 600+D Take 1 Tab by mouth daily. clindamycin 1 % lotion Commonly known as:  CLEOCIN T Apply  to affected area two (2) times a day. use thin film on affected area  
  
 cloNIDine HCl 0.3 mg tablet Commonly known as:  CATAPRES Take 1 Tab by mouth two (2) times a day. Indications: hypertension  
  
 ergocalciferol 50,000 unit capsule Commonly known as:  VITAMIN D2 Take one capsule every 7 days till gone, then start taking over-the-counter Vitamin D3 2,000 units every day  
  
 lamoTRIgine 100 mg tablet Commonly known as: LaMICtal  
Take 200 mg by mouth daily. losartan-hydroCHLOROthiazide 100-12.5 mg per tablet Commonly known as:  HYZAAR Take 1 Tab by mouth daily. Indications: hypertension  
  
 mirtazapine 30 mg tablet Commonly known as:  Rito Jimenez  
 Take  by mouth nightly. For sleep  
  
 multivitamin, tx-iron-ca-min 9 mg iron-400 mcg Tab tablet Commonly known as:  THERA-M w/ IRON Take 1 Tab by mouth daily. nystatin 100,000 unit/mL suspension Commonly known as:  MYCOSTATIN Take 5 mL by mouth four (4) times daily. swish and spit  Indications: ORAL CANDIDIASIS  
  
 OLANZapine-FLUoxetine 3-25 mg per capsule Commonly known as:  SYMBYAX Take 1 Cap by mouth every evening. Indications: DEPRESSION ASSOCIATED WITH BIPOLAR DISORDER  
  
 omega-3 acid ethyl esters 1 gram capsule Commonly known as:  Nick Real Take 2 Caps by mouth two (2) times a day. To-Do List   
 05/22/2018 Lab:  ERYTHROPOIETIN   
  
 05/22/2018 Lab:  FERRITIN   
  
 05/22/2018 Lab:  IRON PROFILE   
  
 05/22/2018 Lab:  JAK2 MUTATION ANALYSIS   
  
 05/22/2018 Lab:  METABOLIC PANEL, COMPREHENSIVE Patient Instructions Polycythemia: Care Instructions Your Care Instructions Polycythemia (say \"paw-david-sy-THEE-mariama-uh) is an abnormal increase in red blood cells. It happens when the tissue inside your bones (bone marrow) makes too much blood. It also can occur if your blood does not have enough liquid, or plasma. This can make the number of red blood cells seem higher than normal. The extra red blood cells make your blood thicker than normal. This may raise your risk for blood clots that can cause heart attacks or strokes. Clots can form in the deep veins of the body, a condition called deep vein thrombosis. Or, a clot can travel through the blood to a lung (a pulmonary embolism). Your doctor may treat you by taking out some of your blood (phlebotomy). The process is like donating blood. Your doctor may even recommend that you donate blood. You may take pills to stop your body from making red blood cells. You also will get treatment for any other conditions that may cause your body to make too many red blood cells. Follow-up care is a key part of your treatment and safety. Be sure to make and go to all appointments, and call your doctor if you are having problems. It's also a good idea to know your test results and keep a list of the medicines you take. How can you care for yourself at home? · Be safe with medicines. Take your medicines exactly as prescribed. Call your doctor if you think you are having a problem with your medicine. · Drink plenty of fluids, enough so that your urine is light yellow or clear like water, before and after you have blood removed. If you have kidney, heart, or liver disease and have to limit fluids, talk with your doctor before you increase the amount of fluids you drink. · Take it easy after you have had blood removed. Do not do vigorous exercise. · If your doctor recommends aspirin, take it exactly as prescribed. Call your doctor if you think you are having a problem with your medicine. · Do not smoke. Smoking increases the risk of blood clots and may reduce the amount of oxygen in your blood. If you need help quitting, talk to your doctor about stop-smoking programs and medicines. These can increase your chances of quitting for good. · Take an antihistamine, such as a nondrowsy one like loratadine (Claritin) or one that might make you sleepy like diphenhydramine (Benadryl), if your skin is itchy. Some people who have this condition have itching. · Wear medical alert jewelry that lists your clotting problem. You can buy this at most drugstores. When should you call for help? Call 911 anytime you think you may need emergency care. For example, call if: 
? · You have sudden chest pain and shortness of breath, or you cough up blood. ? · You have symptoms of a stroke. These may include: 
¨ Sudden numbness, tingling, weakness, or loss of movement in your face, arm, or leg, especially on only one side of your body. ¨ Sudden vision changes. ¨ Sudden trouble speaking. ¨ Sudden confusion or trouble understanding simple statements. ¨ Sudden problems with walking or balance. ¨ A sudden, severe headache that is different from past headaches. ? · You have symptoms of a heart attack. These may include: ¨ Chest pain or pressure, or a strange feeling in the chest. 
¨ Sweating. ¨ Shortness of breath. ¨ Nausea or vomiting. ¨ Pain, pressure, or a strange feeling in the back, neck, jaw, or upper belly or in one or both shoulders or arms. ¨ Lightheadedness or sudden weakness. ¨ A fast or irregular heartbeat. After you call 911, the  may tell you to chew 1 adult-strength or 2 to 4 low-dose aspirin. Wait for an ambulance. Do not try to drive yourself. ?Call your doctor now or seek immediate medical care if: 
? · You have signs of a blood clot, such as: 
¨ Pain in your calf, back of knee, thigh, or groin. ¨ Redness and swelling in your leg or groin. ? Watch closely for changes in your health, and be sure to contact your doctor if you have any problems. Where can you learn more? Go to http://los-zack.info/. Enter Q614 in the search box to learn more about \"Polycythemia: Care Instructions. \" Current as of: October 13, 2016 Content Version: 11.4 © 2981-7832 [employer]. Care instructions adapted under license by [employer] (which disclaims liability or warranty for this information). If you have questions about a medical condition or this instruction, always ask your healthcare professional. [place] [address] any warranty or liability for your use of this information. Introducing [facility]! Bebe Ovalle introduces [employer] patient portal. Now you can access parts of your medical record, email your doctor's office, and request medication refills online. 1. In your internet browser, go to [email or web address] 2. Click on the First Time User? Click Here link in the Sign In box. You will see the New Member Sign Up page. 3. Enter your [employer] Access Code exactly as it appears below. You will not need to use this code after youve completed the sign-up process. If you do not sign up before the expiration date, you must request a new code. · [employer] Access Code: [email or web address] Expires: 8/5/2018  2:59 PM 
 
4. Enter the last four digits of your Social Security Number (xxxx) and Date of Birth (mm/dd/yyyy) as indicated and click Submit. You will be taken to the next sign-up page. 5. Create a [employer] ID. This will be your [employer] login ID and cannot be changed, so think of one that is secure and easy to remember. 6. Create a [employer] password. You can change your password at any time. 7. Enter your Password Reset Question and Answer. This can be used at a later time if you forget your password. 8. Enter your e-mail address. You will receive e-mail notification when new information is available in [address] 9. Click Sign Up. You can now view and download portions of your medical record. 10. Click the Download Summary menu link to download a portable copy of your medical information. If you have questions, please visit the Frequently Asked Questions section of the [employer] website. Remember, [employer] is NOT to be used for urgent needs. For medical emergencies, dial 911. Now available from your iPhone and Android! Please provide this summary of care documentation to your next provider. Your primary care clinician is listed as Christian Ellis. If you have any questions after today's visit, please call [phone].

## 2018-05-23 ENCOUNTER — TELEPHONE (OUTPATIENT)
Dept: FAMILY MEDICINE CLINIC | Age: 48
End: 2018-05-23

## 2018-05-23 LAB — EPO SERPL-ACNC: 6.4 MIU/ML (ref 2.6–18.5)

## 2018-05-23 NOTE — TELEPHONE ENCOUNTER
Patients would like to have a fit test done, she has a family history of colon cancer and she is concerned.

## 2018-05-25 ENCOUNTER — HOSPITAL ENCOUNTER (EMERGENCY)
Age: 48
Discharge: LEFT BEFORE DISCHARGE | End: 2018-05-25
Attending: EMERGENCY MEDICINE
Payer: SUBSIDIZED

## 2018-05-25 VITALS
BODY MASS INDEX: 36.03 KG/M2 | RESPIRATION RATE: 16 BRPM | WEIGHT: 197 LBS | SYSTOLIC BLOOD PRESSURE: 139 MMHG | DIASTOLIC BLOOD PRESSURE: 99 MMHG | OXYGEN SATURATION: 95 % | HEART RATE: 92 BPM | TEMPERATURE: 98.4 F

## 2018-05-25 LAB
BACKGROUND: 489207: NORMAL
DIRECTOR REVIEW: 489204: NORMAL
JAK2 P.V617F BLD/T QL: NORMAL

## 2018-05-25 PROCEDURE — 93005 ELECTROCARDIOGRAM TRACING: CPT

## 2018-05-25 PROCEDURE — 75810000275 HC EMERGENCY DEPT VISIT NO LEVEL OF CARE

## 2018-05-25 NOTE — ED TRIAGE NOTES
C/o chest pain X 2 weeks and abdominal pain that started  yesterday that pt states radiates across her abdomen

## 2018-05-26 LAB
ATRIAL RATE: 82 BPM
CALCULATED P AXIS, ECG09: 47 DEGREES
CALCULATED R AXIS, ECG10: -47 DEGREES
CALCULATED T AXIS, ECG11: 95 DEGREES
DIAGNOSIS, 93000: NORMAL
P-R INTERVAL, ECG05: 162 MS
Q-T INTERVAL, ECG07: 422 MS
QRS DURATION, ECG06: 100 MS
QTC CALCULATION (BEZET), ECG08: 493 MS
VENTRICULAR RATE, ECG03: 82 BPM

## 2018-05-28 ENCOUNTER — APPOINTMENT (OUTPATIENT)
Dept: GENERAL RADIOLOGY | Age: 48
End: 2018-05-28
Attending: PHYSICIAN ASSISTANT
Payer: SUBSIDIZED

## 2018-05-28 ENCOUNTER — HOSPITAL ENCOUNTER (EMERGENCY)
Age: 48
Discharge: HOME | End: 2018-05-29
Attending: EMERGENCY MEDICINE
Payer: SUBSIDIZED

## 2018-05-28 DIAGNOSIS — E87.6 HYPOKALEMIA: ICD-10-CM

## 2018-05-28 DIAGNOSIS — R07.9 ACUTE CHEST PAIN: Primary | ICD-10-CM

## 2018-05-28 LAB
ANION GAP SERPL CALC-SCNC: 9 MMOL/L (ref 3–18)
BASOPHILS # BLD: 0 K/UL (ref 0–0.1)
BASOPHILS NFR BLD: 0 % (ref 0–2)
BUN SERPL-MCNC: 9 MG/DL (ref 7–18)
BUN/CREAT SERPL: 13 (ref 12–20)
CALCIUM SERPL-MCNC: 8.7 MG/DL (ref 8.5–10.1)
CHLORIDE SERPL-SCNC: 103 MMOL/L (ref 100–108)
CK MB CFR SERPL CALC: 3.7 % (ref 0–4)
CK MB SERPL-MCNC: 1.7 NG/ML (ref 5–25)
CK SERPL-CCNC: 46 U/L (ref 26–192)
CO2 SERPL-SCNC: 28 MMOL/L (ref 21–32)
CREAT SERPL-MCNC: 0.68 MG/DL (ref 0.6–1.3)
DIFFERENTIAL METHOD BLD: ABNORMAL
EOSINOPHIL # BLD: 0.2 K/UL (ref 0–0.4)
EOSINOPHIL NFR BLD: 1 % (ref 0–5)
ERYTHROCYTE [DISTWIDTH] IN BLOOD BY AUTOMATED COUNT: 12.5 % (ref 11.6–14.5)
GLUCOSE SERPL-MCNC: 105 MG/DL (ref 74–99)
HCT VFR BLD AUTO: 46.7 % (ref 35–45)
HGB BLD-MCNC: 16.8 G/DL (ref 12–16)
LYMPHOCYTES # BLD: 4.3 K/UL (ref 0.9–3.6)
LYMPHOCYTES NFR BLD: 26 % (ref 21–52)
MCH RBC QN AUTO: 32.8 PG (ref 24–34)
MCHC RBC AUTO-ENTMCNC: 36 G/DL (ref 31–37)
MCV RBC AUTO: 91.2 FL (ref 74–97)
MONOCYTES # BLD: 1.1 K/UL (ref 0.05–1.2)
MONOCYTES NFR BLD: 7 % (ref 3–10)
NEUTS SEG # BLD: 10.9 K/UL (ref 1.8–8)
NEUTS SEG NFR BLD: 66 % (ref 40–73)
PLATELET # BLD AUTO: 284 K/UL (ref 135–420)
PMV BLD AUTO: 9.7 FL (ref 9.2–11.8)
POTASSIUM SERPL-SCNC: 3 MMOL/L (ref 3.5–5.5)
RBC # BLD AUTO: 5.12 M/UL (ref 4.2–5.3)
SODIUM SERPL-SCNC: 140 MMOL/L (ref 136–145)
TROPONIN I SERPL-MCNC: <0.02 NG/ML (ref 0–0.04)
WBC # BLD AUTO: 16.4 K/UL (ref 4.6–13.2)

## 2018-05-28 PROCEDURE — 99284 EMERGENCY DEPT VISIT MOD MDM: CPT

## 2018-05-28 PROCEDURE — 96360 HYDRATION IV INFUSION INIT: CPT

## 2018-05-28 PROCEDURE — 85025 COMPLETE CBC W/AUTO DIFF WBC: CPT | Performed by: PHYSICIAN ASSISTANT

## 2018-05-28 PROCEDURE — 74011250637 HC RX REV CODE- 250/637: Performed by: EMERGENCY MEDICINE

## 2018-05-28 PROCEDURE — 93005 ELECTROCARDIOGRAM TRACING: CPT

## 2018-05-28 PROCEDURE — 82550 ASSAY OF CK (CPK): CPT | Performed by: PHYSICIAN ASSISTANT

## 2018-05-28 PROCEDURE — 74011250636 HC RX REV CODE- 250/636: Performed by: EMERGENCY MEDICINE

## 2018-05-28 PROCEDURE — 71046 X-RAY EXAM CHEST 2 VIEWS: CPT

## 2018-05-28 PROCEDURE — 80048 BASIC METABOLIC PNL TOTAL CA: CPT | Performed by: PHYSICIAN ASSISTANT

## 2018-05-28 RX ORDER — IPRATROPIUM BROMIDE AND ALBUTEROL SULFATE 2.5; .5 MG/3ML; MG/3ML
3 SOLUTION RESPIRATORY (INHALATION)
Status: DISCONTINUED | OUTPATIENT
Start: 2018-05-28 | End: 2018-05-29 | Stop reason: HOSPADM

## 2018-05-28 RX ORDER — POTASSIUM CHLORIDE 20 MEQ/1
40 TABLET, EXTENDED RELEASE ORAL
Status: COMPLETED | OUTPATIENT
Start: 2018-05-28 | End: 2018-05-28

## 2018-05-28 RX ORDER — GUAIFENESIN 100 MG/5ML
324 LIQUID (ML) ORAL
Status: DISCONTINUED | OUTPATIENT
Start: 2018-05-28 | End: 2018-05-28

## 2018-05-28 RX ORDER — POTASSIUM CHLORIDE 20 MEQ/1
20 TABLET, EXTENDED RELEASE ORAL 2 TIMES DAILY
Qty: 6 TAB | Refills: 0 | Status: SHIPPED | OUTPATIENT
Start: 2018-05-28 | End: 2018-05-31

## 2018-05-28 RX ADMIN — SODIUM CHLORIDE 1000 ML: 900 INJECTION, SOLUTION INTRAVENOUS at 22:57

## 2018-05-28 RX ADMIN — POTASSIUM CHLORIDE 40 MEQ: 1500 TABLET, EXTENDED RELEASE ORAL at 22:57

## 2018-05-29 VITALS
SYSTOLIC BLOOD PRESSURE: 132 MMHG | BODY MASS INDEX: 36.25 KG/M2 | HEART RATE: 73 BPM | OXYGEN SATURATION: 95 % | TEMPERATURE: 97.1 F | DIASTOLIC BLOOD PRESSURE: 77 MMHG | WEIGHT: 197 LBS | RESPIRATION RATE: 16 BRPM | HEIGHT: 62 IN

## 2018-05-29 LAB
ATRIAL RATE: 75 BPM
CALCULATED P AXIS, ECG09: 50 DEGREES
CALCULATED R AXIS, ECG10: -26 DEGREES
CALCULATED T AXIS, ECG11: 91 DEGREES
DIAGNOSIS, 93000: NORMAL
P-R INTERVAL, ECG05: 170 MS
Q-T INTERVAL, ECG07: 428 MS
QRS DURATION, ECG06: 110 MS
QTC CALCULATION (BEZET), ECG08: 477 MS
VENTRICULAR RATE, ECG03: 75 BPM

## 2018-05-29 NOTE — ED NOTES
EDMD in and reviewed all testing results and poc for discharge to home. Questions encouraged and answered.   Patient verbalized an understanding

## 2018-05-29 NOTE — ED TRIAGE NOTES
Pt states that her chest feels full with a lot of pressure on the left side with onset 2 hours PTA. Pt reports that she has been having intermittent chest  Pain. Pt is seeing Dr. Medhat Sung.

## 2018-05-29 NOTE — ED NOTES
Patient resting quietly on stretcher. Awaiting completion of testing to be resulted.   Patient is currently without complaints or needs

## 2018-05-29 NOTE — ED PROVIDER NOTES
EMERGENCY DEPARTMENT HISTORY AND PHYSICAL EXAM    9:46 PM      Date: 5/28/2018  Patient Name: Minal Sears    History of Presenting Illness     Chief Complaint   Patient presents with    Chest Pain         History Provided By: Patient    Chief Complaint: Chest Pain  Duration: 2 Weeks  Timing:  Intermittent  Location: N/A  Quality: Pressure  Severity: Moderate  Modifying Factors: No worsening or alleviating factors. Pt tried nothing for this PTA. Associated Symptoms: SOB, left flank pain (not currently), lower back pain      Additional History (Context): Minal Sears is a 49 y.o. female with hypertension who presents with moderate, intermittent, pressure type chest pain, onset 2 weeks ago, with associated symptoms of SOB, left flank pain (not currently), lower back pain. No worsening or alleviating factors. Pt tried nothing for this PTA. Pt states that she was sitting in bed tonight when she had sudden chest pain and SOB, Pt states that this has been going on the past couple of weeks. Pt states she had a small amount of steak for dinner, and denies food being related to current symptoms. Pt stated she was recently referred to an Oncologist for elevated Red Blood Cell count. Pt has NKDA. Pt denies any dysuria, urination frequency vaginal discharge or any other symptoms or complaints at this time. Pt is a current smoker at 1/2 pack per day, drinks 3-4 cans of beer per week, and uses cocaine. PCP: Jocelyn Miller NP    Current Facility-Administered Medications   Medication Dose Route Frequency Provider Last Rate Last Dose    albuterol-ipratropium (DUO-NEB) 2.5 MG-0.5 MG/3 ML  3 mL Nebulization NOW Britt Cowart MD         Current Outpatient Prescriptions   Medication Sig Dispense Refill    potassium chloride (K-DUR, KLOR-CON) 20 mEq tablet Take 1 Tab by mouth two (2) times a day for 3 days.  6 Tab 0    multivitamin, tx-iron-ca-min (THERA-M W/ IRON) 9 mg iron-400 mcg tab tablet Take 1 Tab by mouth daily.      lamoTRIgine (LAMICTAL) 100 mg tablet Take 200 mg by mouth daily.  mirtazapine (REMERON) 30 mg tablet Take  by mouth nightly. For sleep      losartan-hydroCHLOROthiazide (HYZAAR) 100-12.5 mg per tablet Take 1 Tab by mouth daily. Indications: hypertension 90 Tab 3    cloNIDine HCl (CATAPRES) 0.3 mg tablet Take 1 Tab by mouth two (2) times a day. Indications: hypertension 60 Tab 3    clindamycin (CLEOCIN T) 1 % lotion Apply  to affected area two (2) times a day. use thin film on affected area 1 Bottle 2    amLODIPine-atorvastatin (CADUET) 10-40 mg per tablet Take 1 Tab by mouth daily. 90 Tab 3    nystatin (MYCOSTATIN) 100,000 unit/mL suspension Take 5 mL by mouth four (4) times daily. swish and spit  Indications: ORAL CANDIDIASIS 200 mL 1    OLANZapine-FLUoxetine (SYMBYAX) 3-25 mg per capsule Take 1 Cap by mouth every evening. Indications: DEPRESSION ASSOCIATED WITH BIPOLAR DISORDER 90 Cap 3    calcium-cholecalciferol, D3, (CALTRATE 600+D) tablet Take 1 Tab by mouth daily.  omega-3 acid ethyl esters (LOVAZA) 1 gram capsule Take 2 Caps by mouth two (2) times a day. 360 Cap 3    ergocalciferol (VITAMIN D2) 50,000 unit capsule Take one capsule every 7 days till gone, then start taking over-the-counter Vitamin D3 2,000 units every day 4 Cap 2    albuterol (PROVENTIL HFA, VENTOLIN HFA, PROAIR HFA) 90 mcg/actuation inhaler Take 2 Puffs by inhalation every four (4) hours as needed for Wheezing.  6 Inhaler 3       Past History     Past Medical History:  Past Medical History:   Diagnosis Date    Bronchitis     Cocaine abuse 3/26/2015    Dental caries     Depression     Depression 8/31/2016    Drug abuse     Elevated hematocrit 8/31/2016    ETOH abuse     H/O screening mammography 12/06/2016    No evidence of malignancy     HPV (human papilloma virus) infection 11/2016    The patient [facility]     Hypertension     Ill-defined condition     Mood swings ([facility] [address]     Psychiatric disorder     depression, Bipolar    Tobacco abuse     Vitamin D deficiency        Past Surgical History:  Past Surgical History:   Procedure Laterality Date    HX APPENDECTOMY      HX COLPOSCOPY  12/06/2016    Low-grade courtney/mild dysplasia RICK I    HX HERNIA REPAIR         Family History:  Family History   Problem Relation Age of Onset    Hypertension Mother     Heart Attack Father     Hypertension Father     Cancer Father      skin    Cancer Sister      skin    Bipolar Disorder Sister     Cancer Brother      skin    Cancer Maternal Aunt      colon       Social History:  Social History   Substance Use Topics    Smoking status: Current Every Day Smoker     Packs/day: 0.50     Years: 20.00     Types: Cigarettes    Smokeless tobacco: Never Used    Alcohol use 0.0 oz/week     3 - 6 Cans of beer per week      Comment: 3 or 4 beers weekly        Allergies: Allergies   Allergen Reactions    Iodinated Contrast- Oral And Iv Dye Shortness of Breath         Review of Systems       Review of Systems   Constitutional: Negative for chills and fever. Respiratory: Positive for shortness of breath. Negative for cough. Cardiovascular: Positive for chest pain. Gastrointestinal: Negative for diarrhea, nausea and vomiting. Genitourinary: Positive for flank pain (left flank (not currently)). Negative for difficulty urinating, dysuria, frequency and vaginal discharge. Musculoskeletal: Positive for back pain (lower back pain). Neurological: Negative for light-headedness and headaches. All other systems reviewed and are negative.         Physical Exam     Visit Vitals    /77    Pulse 73    Temp 97.1 °F (36.2 °C)    Resp 16    Ht 5' 2\" (1.575 m)    Wt 89.4 kg (197 lb)    SpO2 95%    BMI 36.03 kg/m2         Physical Exam      Diagnostic Study Results     Labs -  Recent Results (from the past 12 hour(s))   EKG, 12 LEAD, INITIAL    Collection Time: 05/28/18  9:22 PM   Result Value Ref Range Ventricular Rate 75 BPM    Atrial Rate 75 BPM    P-R Interval 170 ms    QRS Duration 110 ms    Q-T Interval 428 ms    QTC Calculation (Bezet) 477 ms    Calculated P Axis 50 degrees    Calculated R Axis -26 degrees    Calculated T Axis 91 degrees    Diagnosis       Normal sinus rhythm  Moderate voltage criteria for LVH, may be normal variant  T wave abnormality, consider lateral ischemia  Abnormal ECG  When compared with ECG of 25-MAY-2018 15:46,  Minimal criteria for Septal infarct are no longer present     CBC WITH AUTOMATED DIFF    Collection Time: 05/28/18  9:29 PM   Result Value Ref Range    WBC 16.4 (H) 4.6 - 13.2 K/uL    RBC 5.12 4.20 - 5.30 M/uL    HGB 16.8 (H) 12.0 - 16.0 g/dL    HCT 46.7 (H) 35.0 - 45.0 %    MCV 91.2 74.0 - 97.0 FL    MCH 32.8 24.0 - 34.0 PG    MCHC 36.0 31.0 - 37.0 g/dL    RDW 12.5 11.6 - 14.5 %    PLATELET 711 003 - 759 K/uL    MPV 9.7 9.2 - 11.8 FL    NEUTROPHILS 66 40 - 73 %    LYMPHOCYTES 26 21 - 52 %    MONOCYTES 7 3 - 10 %    EOSINOPHILS 1 0 - 5 %    BASOPHILS 0 0 - 2 %    ABS. NEUTROPHILS 10.9 (H) 1.8 - 8.0 K/UL    ABS. LYMPHOCYTES 4.3 (H) 0.9 - 3.6 K/UL    ABS. MONOCYTES 1.1 0.05 - 1.2 K/UL    ABS. EOSINOPHILS 0.2 0.0 - 0.4 K/UL    ABS.  BASOPHILS 0.0 0.0 - 0.1 K/UL    DF AUTOMATED     METABOLIC PANEL, BASIC    Collection Time: 05/28/18  9:29 PM   Result Value Ref Range    Sodium 140 136 - 145 mmol/L    Potassium 3.0 (L) 3.5 - 5.5 mmol/L    Chloride 103 100 - 108 mmol/L    CO2 28 21 - 32 mmol/L    Anion gap 9 3.0 - 18 mmol/L    Glucose 105 (H) 74 - 99 mg/dL    BUN 9 7.0 - 18 MG/DL    Creatinine 0.68 0.6 - 1.3 MG/DL    BUN/Creatinine ratio 13 12 - 20      GFR est AA >60 >60 ml/min/1.73m2    GFR est non-AA >60 >60 ml/min/1.73m2    Calcium 8.7 8.5 - 10.1 MG/DL   CARDIAC PANEL,(CK, CKMB & TROPONIN)    Collection Time: 05/28/18  9:29 PM   Result Value Ref Range    CK 46 26 - 192 U/L    CK - MB 1.7 <3.6 ng/ml    CK-MB Index 3.7 0.0 - 4.0 %    Troponin-I, Qt. <0.02 0.0 - 0.045 NG/ML Radiologic Studies -   XR CHEST PA LAT    (Results Pending)         Medical Decision Making   I am the first provider for this patient. I reviewed the vital signs, available nursing notes, past medical history, past surgical history, family history and social history. Vital Signs-Reviewed the patient's vital signs. Pulse Oximetry Analysis -  94% on room air (Interpretation)non hypoxic      Records Reviewed: Nursing Notes and Old Medical Records (Time of Review: 9:46 PM)    ED Course: Progress Notes, Reevaluation, and Consults:    Physical exam:  General:  Well-developed, well-nourished, no apparent distress. Head:  Normocephalic atraumatic. Eyes:  Pupils midrange extraocular movements intact. No pallor or conjunctival injection. Nose:  No rhinorrhea, inspection grossly normal.    Ears:  Grossly normal to inspection, no discharge. Mouth:  Mucous membranes moist, no appreciable intraoral lesion. Neck/Back:  Trachea midline, no asymmetry. Chest:  Grossly normal inspection, symmetric chest rise. Pulmonary:  Clear to auscultation bilaterally no wheezes rhonchi or rales. Cardiovascular:  S1-S2 no murmurs rubs or gallops. Abdomen: Soft, nontender, nondistended no guarding rebound or peritoneal signs. Extremities:  Grossly normal to inspection, peripheral pulses intact   no pain on palpation of calves, no asymmetry  Neurologic:  Alert and oriented no appreciable focal neurologic deficit. Skin:  Warm and dry  Psychiatric:  Grossly normal mood and affect. Nursing note reviewed, vital signs reviewed. ED course:  Patient presents with chest pressure, nonradiating and intermittent and not provoked by exercise, may have been provoked by a small piece of steak today.   Atypical for ACS per will evaluate, no shortness breath, no concern for PE, she does have possible polycythemia currently managed by hematology and was referred to cardiology    EKG done at 2120 hrs.: Normal sinus rhythm heart rate 75 intervals are significant for prolonged restoration 110 nonspecific intraventricular delay, there is LVH with repolarization abnormality and no ST changes, there are T-wave inversions high lateral leads, these T-wave inversions were present on prior EKG May 25, 2018    Troponin negative, labs unremarkable except for potassium 3.0 placed orally. She was reevaluated, her symptoms have slightly decreased. He the persistence of the symptoms, and unlikely to require repeat troponin which was offered a further observation and repeat troponin, chest risks and benefits of the CTA for ruling out blood clot however she has no tachycardia and hypoxia no pleuritic chest pains was unlikely, she did not feel that a CT a was necessary and I agree with her. She was referred to cardiology we'll offer breathing treatment the rest of her IV fluids and she'll be discharged   with strict return precautions    Patient's history, physical exam and laboratory evaluations were reviewed. Had discussion with the patient about the possible etiologies of chest pain. Heart score: 3    Patient was felt to be low risk for major adverse cardiac event, was  informed about the risks and benefits and alternatives of inpatient versus outpatient workup. At this time patient is stable for outpatient management including follow-up with primary care physician/cardiology for reevaluation within 72 hours. Patient is aware that no evaluation in the emergency department can ensure 0% risk, patient will return to the emergency department with any concerns. Disposition:    Discharged home      Portions of this chart were created with Dragon medical speech to text program.   Unrecognized errors may be present. Diagnosis     Clinical Impression:   1. Acute chest pain    2.  Hypokalemia        Disposition: Discharged    Follow-up Information     Follow up With Details Comments Contact Info    Migel Madden MD Call in 2 days  [address]      [facility] EMERGENCY DEPT  As needed, If symptoms worsen [address] [facility] [address] [place] [address]           Discharge Medication List as of 5/28/2018 11:22 PM      START taking these medications    Details   potassium chloride (K-DUR, KLOR-CON) 20 mEq tablet Take 1 Tab by mouth two (2) times a day for 3 days. , Print, Disp-6 Tab, R-0         CONTINUE these medications which have NOT CHANGED    Details   multivitamin, tx-iron-ca-min (THERA-M W/ IRON) 9 mg iron-400 mcg tab tablet Take 1 Tab by mouth daily. , Historical Med      lamoTRIgine (LAMICTAL) 100 mg tablet Take 200 mg by mouth daily. , Historical Med      mirtazapine (REMERON) 30 mg tablet Take  by mouth nightly. For sleep, Historical Med      losartan-hydroCHLOROthiazide (HYZAAR) 100-12.5 mg per tablet Take 1 Tab by mouth daily. Indications: hypertension, Print, Disp-90 Tab, R-3      cloNIDine HCl (CATAPRES) 0.3 mg tablet Take 1 Tab by mouth two (2) times a day. Indications: hypertension, Normal, Disp-60 Tab, R-3      clindamycin (CLEOCIN T) 1 % lotion Apply  to affected area two (2) times a day. use thin film on affected area, Normal, Disp-1 Bottle, R-2      amLODIPine-atorvastatin (CADUET) 10-40 mg per tablet Take 1 Tab by mouth daily. , Program, Disp-90 Tab, R-3      nystatin (MYCOSTATIN) 100,000 unit/mL suspension Take 5 mL by mouth four (4) times daily. swish and spit  Indications: ORAL CANDIDIASIS, Normal, Disp-200 mL, R-1      OLANZapine-FLUoxetine (SYMBYAX) 3-25 mg per capsule Take 1 Cap by mouth every evening. Indications: DEPRESSION ASSOCIATED WITH BIPOLAR DISORDER, Program, Disp-90 Cap, R-3      calcium-cholecalciferol, D3, (CALTRATE 600+D) tablet Take 1 Tab by mouth daily. , Historical Med      omega-3 acid ethyl esters (LOVAZA) 1 gram capsule Take 2 Caps by mouth two (2) times a day., Program, Disp-360 Cap, R-3      ergocalciferol (VITAMIN D2) 50,000 unit capsule Take one capsule every 7 days till gone, then start taking over-the-counter Vitamin D3 2,000 units every day, Normal, Disp-4 Cap, R-2      albuterol (PROVENTIL HFA, VENTOLIN HFA, PROAIR HFA) 90 mcg/actuation inhaler Take 2 Puffs by inhalation every four (4) hours as needed for Wheezing., Program, Disp-6 Inhaler, R-3           _______________________________    Attestations:  Brittany Hudson Dr acting as a scribe for and in the presence of Donald Pena MD      May 28, 2018 at 9:46 PM       Provider Attestation:      I personally performed the services described in the documentation, reviewed the documentation, as recorded by the scribe in my presence, and it accurately and completely records my words and actions.  May 28, 2018 at 9:46 PM - Donald Pena MD    _______________________________

## 2018-05-29 NOTE — ED NOTES
I performed a brief evaluation, including history and physical, of the patient here in triage and I have determined that pt will need further treatment and evaluation from the main side ER physician. I have placed initial orders to help in expediting patients care. CP and SOB started two hours ago. Used cocaine today. Smokes and has HTN.    May 28, 2018 at 9:16 PM - JENNIFER Delgado

## 2018-05-29 NOTE — ED NOTES
Reviewed discharge instructions with patient including new medications. Questions encouraged and answered.   Patient verbalized an  understanding

## 2018-05-29 NOTE — DISCHARGE INSTRUCTIONS
Chest Pain: Care Instructions  Your Care Instructions    There are many things that can cause chest pain. Some are not serious and will get better on their own in a few days. But some kinds of chest pain need more testing and treatment. Your doctor may have recommended a follow-up visit in the next 8 to 12 hours. If you are not getting better, you may need more tests or treatment. Even though your doctor has released you, you still need to watch for any problems. The doctor carefully checked you, but sometimes problems can develop later. If you have new symptoms or if your symptoms do not get better, get medical care right away. If you have worse or different chest pain or pressure that lasts more than 5 minutes or you passed out (lost consciousness), call 911 or seek other emergency help right away. A medical visit is only one step in your treatment. Even if you feel better, you still need to do what your doctor recommends, such as going to all suggested follow-up appointments and taking medicines exactly as directed. This will help you recover and help prevent future problems. How can you care for yourself at home? · Rest until you feel better. · Take your medicine exactly as prescribed. Call your doctor if you think you are having a problem with your medicine. · Do not drive after taking a prescription pain medicine. When should you call for help? Call 911 if:  ? · You passed out (lost consciousness). ? · You have severe difficulty breathing. ? · You have symptoms of a heart attack. These may include:  ¨ Chest pain or pressure, or a strange feeling in your chest.  ¨ Sweating. ¨ Shortness of breath. ¨ Nausea or vomiting. ¨ Pain, pressure, or a strange feeling in your back, neck, jaw, or upper belly or in one or both shoulders or arms. ¨ Lightheadedness or sudden weakness. ¨ A fast or irregular heartbeat.   After you call 911, the  may tell you to chew 1 adult-strength or 2 to 4 low-dose aspirin. Wait for an ambulance. Do not try to drive yourself. ?Call your doctor today if:  ? · You have any trouble breathing. ? · Your chest pain gets worse. ? · You are dizzy or lightheaded, or you feel like you may faint. ? · You are not getting better as expected. ? · You are having new or different chest pain. Where can you learn more? Go to http://los-zack.info/. Enter A120 in the search box to learn more about \"Chest Pain: Care Instructions. \"  Current as of: March 20, 2017  Content Version: 11.4  © 7791-8298 [employer]. Care instructions adapted under license by [employer] (which disclaims liability or warranty for this information). If you have questions about a medical condition or this instruction, always ask your healthcare professional. [address] any warranty or liability for your use of this information.

## 2018-05-29 NOTE — TELEPHONE ENCOUNTER
Pt has been informed that she would be responsible for any test regarding fit test and colon screening.

## 2018-05-29 NOTE — ED NOTES
Received patient in room 20. Patient appears in no distress. Cardiac monitor applied. Bedside ekg in progress. Reviewed pov with patient. Questions encouraged and answered.   Patient verbalized an understanding

## 2018-06-05 ENCOUNTER — OFFICE VISIT (OUTPATIENT)
Dept: ONCOLOGY | Age: 48
End: 2018-06-05

## 2018-06-05 VITALS
WEIGHT: 198.8 LBS | DIASTOLIC BLOOD PRESSURE: 78 MMHG | TEMPERATURE: 98.4 F | BODY MASS INDEX: 36.36 KG/M2 | HEART RATE: 76 BPM | SYSTOLIC BLOOD PRESSURE: 130 MMHG

## 2018-06-05 DIAGNOSIS — D75.1 SECONDARY POLYCYTHEMIA: Primary | ICD-10-CM

## 2018-06-05 DIAGNOSIS — D75.1 ERYTHROCYTOSIS: ICD-10-CM

## 2018-06-05 NOTE — PATIENT INSTRUCTIONS
Polycythemia: Care Instructions  Your Care Instructions    Polycythemia (say \"paw-david-sy-THEE-mariama-uh) is an abnormal increase in red blood cells. It happens when the tissue inside your bones (bone marrow) makes too much blood. It also can occur if your blood does not have enough liquid, or plasma. This can make the number of red blood cells seem higher than normal. The extra red blood cells make your blood thicker than normal. This may raise your risk for blood clots that can cause heart attacks or strokes. Clots can form in the deep veins of the body, a condition called deep vein thrombosis. Or, a clot can travel through the blood to a lung (a pulmonary embolism). Your doctor may treat you by taking out some of your blood (phlebotomy). The process is like donating blood. Your doctor may even recommend that you donate blood. You may take pills to stop your body from making red blood cells. You also will get treatment for any other conditions that may cause your body to make too many red blood cells. Follow-up care is a key part of your treatment and safety. Be sure to make and go to all appointments, and call your doctor if you are having problems. It's also a good idea to know your test results and keep a list of the medicines you take. How can you care for yourself at home? · Be safe with medicines. Take your medicines exactly as prescribed. Call your doctor if you think you are having a problem with your medicine. · Drink plenty of fluids, enough so that your urine is light yellow or clear like water, before and after you have blood removed. If you have kidney, heart, or liver disease and have to limit fluids, talk with your doctor before you increase the amount of fluids you drink. · Take it easy after you have had blood removed. Do not do vigorous exercise. · If your doctor recommends aspirin, take it exactly as prescribed.  Call your doctor if you think you are having a problem with your medicine. · Do not smoke. Smoking increases the risk of blood clots and may reduce the amount of oxygen in your blood. If you need help quitting, talk to your doctor about stop-smoking programs and medicines. These can increase your chances of quitting for good. · Take an antihistamine, such as a nondrowsy one like loratadine (Claritin) or one that might make you sleepy like diphenhydramine (Benadryl), if your skin is itchy. Some people who have this condition have itching. · Wear medical alert jewelry that lists your clotting problem. You can buy this at most drugstores. When should you call for help? Call 911 anytime you think you may need emergency care. For example, call if:  ? · You have sudden chest pain and shortness of breath, or you cough up blood. ? · You have symptoms of a stroke. These may include:  ¨ Sudden numbness, tingling, weakness, or loss of movement in your face, arm, or leg, especially on only one side of your body. ¨ Sudden vision changes. ¨ Sudden trouble speaking. ¨ Sudden confusion or trouble understanding simple statements. ¨ Sudden problems with walking or balance. ¨ A sudden, severe headache that is different from past headaches. ? · You have symptoms of a heart attack. These may include:  ¨ Chest pain or pressure, or a strange feeling in the chest.  ¨ Sweating. ¨ Shortness of breath. ¨ Nausea or vomiting. ¨ Pain, pressure, or a strange feeling in the back, neck, jaw, or upper belly or in one or both shoulders or arms. ¨ Lightheadedness or sudden weakness. ¨ A fast or irregular heartbeat. After you call 911, the  may tell you to chew 1 adult-strength or 2 to 4 low-dose aspirin. Wait for an ambulance. Do not try to drive yourself. ?Call your doctor now or seek immediate medical care if:  ? · You have signs of a blood clot, such as:  ¨ Pain in your calf, back of knee, thigh, or groin. ¨ Redness and swelling in your leg or groin. ? Watch closely for changes in your health, and be sure to contact your doctor if you have any problems. Where can you learn more? Go to http://los-zack.info/. Enter B703 in the search box to learn more about \"Polycythemia: Care Instructions. \"  Current as of: October 13, 2016  Content Version: 11.4  © 6461-4544 [employer]. Care instructions adapted under license by [employer] (which disclaims liability or warranty for this information). If you have questions about a medical condition or this instruction, always ask your healthcare professional. [address] any warranty or liability for your use of this information.

## 2018-06-05 NOTE — MR AVS SNAPSHOT
[address] [facility] [address] with [facility] [address] 
[phone] Patient: Mago Barrera MRN: [id number] :1970 Visit Information Date & Time Provider Department Dept. Phone Encounter #  
 2018 10:45 AM Krystyna Sidhu MD [facility]  [phone] [id number] Follow-up Instructions Return in about 8 weeks (around 2018). Your Appointments 2018  9:30 AM  
Office Visit with Latrice Milan MD  
[facility]) Appt Note: mv er f/u for chest pressure/offered sooner in suff but refused; mv er f/u for chest pressure/offered sooner in suff but refused pt r/s  
 [address] [facility], [address] Merg[address] with [facility] [address]  
[address], [address] [address]  
  
    
 2018 11:00 AM  
Office Visit with MD Jasmin Peacock [facility]  [facility]) Appt Note: OV  
 [facility] [address] with [facility] [address]  
[address] [address]  
  
    
 2018 11:00 AM  
Follow Up with Daja Downing NP [address] [facility] ([facility]) Appt Note: 3 mth follow up  
 [address] [facility] [address]  
[address] [facility] [address] Upcoming Health Maintenance Date Due DTaP/Tdap/Td series (1 - Tdap) 1990 Pneumococcal 19-64 Medium Risk (1 of 1 - PPSV23) 2019* Influenza Age 5 to Adult 2018 PAP AKA CERVICAL CYTOLOGY 2019 *Topic was postponed. The date shown is not the original due date. Allergies as of 2018  Review Complete On: 2018 By: Krystyna Sidhu MD  
  
 Severity Noted Reaction Type Reactions Iodinated Contrast- Oral And Iv Dye  08/16/2012    Shortness of Breath Current Immunizations  Reviewed on 11/15/2017 Name Date Influenza Vaccine (Quad) PF 11/15/2017  7:50 AM, 10/13/2016 Not reviewed this visit You Were Diagnosed With   
  
 Codes Comments Secondary polycythemia    -  Primary ICD-10-CM: D75.1 ICD-9-CM: 289.0 Erythrocytosis     ICD-10-CM: D75.1 ICD-9-CM: 494. 0 Vitals BP Pulse Temp Weight(growth percentile) BMI OB Status 130/78 76 98.4 °F (36.9 °C) (Oral) 198 lb 12.8 oz (90.2 kg) 36.36 kg/m2 Having regular periods Smoking Status Current Every Day Smoker Vitals History BMI and BSA Data Body Mass Index Body Surface Area  
 36.36 kg/m 2 1.99 m 2 Preferred Pharmacy Pharmacy Name Phone [address] [facility] [address] [phone] Your Updated Medication List  
  
   
This list is accurate as of 6/5/18 11:43 AM.  Always use your most recent med list.  
  
  
  
  
 albuterol 90 mcg/actuation inhaler Commonly known as:  PROVENTIL HFA, VENTOLIN HFA, PROAIR HFA Take 2 Puffs by inhalation every four (4) hours as needed for Wheezing. amLODIPine-atorvastatin 10-40 mg per tablet Commonly known as:  CADUET Take 1 Tab by mouth daily. calcium-cholecalciferol (D3) tablet Commonly known as:  CALTRATE 600+D Take 1 Tab by mouth daily. clindamycin 1 % lotion Commonly known as:  CLEOCIN T Apply  to affected area two (2) times a day. use thin film on affected area  
  
 cloNIDine HCl 0.3 mg tablet Commonly known as:  CATAPRES Take 1 Tab by mouth two (2) times a day. Indications: hypertension  
  
 ergocalciferol 50,000 unit capsule Commonly known as:  VITAMIN D2 Take one capsule every 7 days till gone, then start taking over-the-counter Vitamin D3 2,000 units every day  
  
 lamoTRIgine 100 mg tablet Commonly known as:   LaMICtal  
 Take 200 mg by mouth daily. losartan-hydroCHLOROthiazide 100-12.5 mg per tablet Commonly known as:  HYZAAR Take 1 Tab by mouth daily. Indications: hypertension  
  
 mirtazapine 30 mg tablet Commonly known as:  Unk Lust Take  by mouth nightly. For sleep  
  
 multivitamin, tx-iron-ca-min 9 mg iron-400 mcg Tab tablet Commonly known as:  THERA-M w/ IRON Take 1 Tab by mouth daily. nystatin 100,000 unit/mL suspension Commonly known as:  MYCOSTATIN Take 5 mL by mouth four (4) times daily. swish and spit  Indications: ORAL CANDIDIASIS  
  
 OLANZapine-FLUoxetine 3-25 mg per capsule Commonly known as:  SYMBYAX Take 1 Cap by mouth every evening. Indications: DEPRESSION ASSOCIATED WITH BIPOLAR DISORDER  
  
 omega-3 acid ethyl esters 1 gram capsule Commonly known as:  Raydell Bane Take 2 Caps by mouth two (2) times a day. Follow-up Instructions Return in about 8 weeks (around 7/31/2018). Patient Instructions Polycythemia: Care Instructions Your Care Instructions Polycythemia (say \"paw-david-sy-THEE-mariama-uh) is an abnormal increase in red blood cells. It happens when the tissue inside your bones (bone marrow) makes too much blood. It also can occur if your blood does not have enough liquid, or plasma. This can make the number of red blood cells seem higher than normal. The extra red blood cells make your blood thicker than normal. This may raise your risk for blood clots that can cause heart attacks or strokes. Clots can form in the deep veins of the body, a condition called deep vein thrombosis. Or, a clot can travel through the blood to a lung (a pulmonary embolism). Your doctor may treat you by taking out some of your blood (phlebotomy). The process is like donating blood. Your doctor may even recommend that you donate blood. You may take pills to stop your body from making red blood cells.  You also will get treatment for any other conditions that may cause your body to make too many red blood cells. Follow-up care is a key part of your treatment and safety. Be sure to make and go to all appointments, and call your doctor if you are having problems. It's also a good idea to know your test results and keep a list of the medicines you take. How can you care for yourself at home? · Be safe with medicines. Take your medicines exactly as prescribed. Call your doctor if you think you are having a problem with your medicine. · Drink plenty of fluids, enough so that your urine is light yellow or clear like water, before and after you have blood removed. If you have kidney, heart, or liver disease and have to limit fluids, talk with your doctor before you increase the amount of fluids you drink. · Take it easy after you have had blood removed. Do not do vigorous exercise. · If your doctor recommends aspirin, take it exactly as prescribed. Call your doctor if you think you are having a problem with your medicine. · Do not smoke. Smoking increases the risk of blood clots and may reduce the amount of oxygen in your blood. If you need help quitting, talk to your doctor about stop-smoking programs and medicines. These can increase your chances of quitting for good. · Take an antihistamine, such as a nondrowsy one like loratadine (Claritin) or one that might make you sleepy like diphenhydramine (Benadryl), if your skin is itchy. Some people who have this condition have itching. · Wear medical alert jewelry that lists your clotting problem. You can buy this at most drugstores. When should you call for help? Call 911 anytime you think you may need emergency care. For example, call if: 
? · You have sudden chest pain and shortness of breath, or you cough up blood. ? · You have symptoms of a stroke. These may include: 
¨ Sudden numbness, tingling, weakness, or loss of movement in your face, arm, or leg, especially on only one side of your body. ¨ Sudden vision changes. ¨ Sudden trouble speaking. ¨ Sudden confusion or trouble understanding simple statements. ¨ Sudden problems with walking or balance. ¨ A sudden, severe headache that is different from past headaches. ? · You have symptoms of a heart attack. These may include: ¨ Chest pain or pressure, or a strange feeling in the chest. 
¨ Sweating. ¨ Shortness of breath. ¨ Nausea or vomiting. ¨ Pain, pressure, or a strange feeling in the back, neck, jaw, or upper belly or in one or both shoulders or arms. ¨ Lightheadedness or sudden weakness. ¨ A fast or irregular heartbeat. After you call 911, the  may tell you to chew 1 adult-strength or 2 to 4 low-dose aspirin. Wait for an ambulance. Do not try to drive yourself. ?Call your doctor now or seek immediate medical care if: 
? · You have signs of a blood clot, such as: 
¨ Pain in your calf, back of knee, thigh, or groin. ¨ Redness and swelling in your leg or groin. ? Watch closely for changes in your health, and be sure to contact your doctor if you have any problems. Where can you learn more? Go to http://los-zack.info/. Enter U096 in the search box to learn more about \"Polycythemia: Care Instructions. \" Current as of: October 13, 2016 Content Version: 11.4 © 1470-0438 [employer]. Care instructions adapted under license by [employer] (which disclaims liability or warranty for this information). If you have questions about a medical condition or this instruction, always ask your healthcare professional. [place] [address] any warranty or liability for your use of this information. Introducing [facility]! Willadean Saint introduces Allclasses patient portal. Now you can access parts of your medical record, email your doctor's office, and request medication refills online. 1. In your internet browser, go to [email or web address] 2. Click on the First Time User? Click Here link in the Sign In box. You will see the New Member Sign Up page. 3. Enter your [email or web address] Access Code exactly as it appears below. You will not need to use this code after youve completed the sign-up process. If you do not sign up before the expiration date, you must request a new code. · [email or web address] Access Code: [email or web address] Expires: 8/5/2018  2:59 PM 
 
4. Enter the last four digits of your Social Security Number (xxxx) and Date of Birth (mm/dd/yyyy) as indicated and click Submit. You will be taken to the next sign-up page. 5. Create a [email or web address] ID. This will be your [email or web address] login ID and cannot be changed, so think of one that is secure and easy to remember. 6. Create a [email or web address] password. You can change your password at any time. 7. Enter your Password Reset Question and Answer. This can be used at a later time if you forget your password. 8. Enter your e-mail address. You will receive e-mail notification when new information is available in [address] 9. Click Sign Up. You can now view and download portions of your medical record. 10. Click the Download Summary menu link to download a portable copy of your medical information. If you have questions, please visit the Frequently Asked Questions section of the [email or web address] website. Remember, [email or web address] is NOT to be used for urgent needs. For medical emergencies, dial 911. Now available from your iPhone and Android! Please provide this summary of care documentation to your next provider. Your primary care clinician is listed as Jocelyn Miller. If you have any questions after today's visit, please call [phone].

## 2018-06-05 NOTE — PROGRESS NOTES
Hematology/medical oncology progress note    6/5/2018  Ruth Freitas  YOB: 1970    PCP:Irais Nazario NP    Diagnosis: Secondary polycythemia/erythrocytosis    I have explained to Ms. Ozuna that her CBC on 5/22/2018 revealed a WBC count of 11.2, hemoglobin 17.5 g/dL, hematocrit 50%, and the platelet count was 645,690. Iron studies revealed that she had a ferritin level which was normal and 94 ng/mL, iron was 130 mcg/dL and her iron saturation was 43%. The erythropoietin level was normal at 6.4. A JA K2 mutation analysis was negative. The patient is a chronic user of tobacco in the form of cigarettes stating that she smokes about a half pack of cigarettes per day. I have explained to the patient that the findings are suggestive of secondary polycythemia most likely related to her chronic use of tobacco.  Therapeutic intervention is warranted since her hematocrit exceeds 50%. Phlebotomy will be offered and started as soon as possible with plans to provide therapeutic phlebotomy with removal of about 300 cc of whole blood once weekly until we reduce her hematocrit down to about 42%. Following each therapeutic phlebotomy session she will receive an equivalent volume of IV normal saline. The patient had her questions answered to her satisfaction. I have reviewed in detail the risk and benefit profile of the therapeutic phlebotomy procedure with the patient. Total time 30 minutes, greater than 50% of the time was in counseling and coordination of care. Adrián Whitney MD, [address]

## 2018-06-06 ENCOUNTER — TELEPHONE (OUTPATIENT)
Dept: ONCOLOGY | Age: 48
End: 2018-06-06

## 2018-06-06 NOTE — TELEPHONE ENCOUNTER
Patient called requesting DX & TREATMENT PLAN to provide to [employer]. Patient said she was told by the [facility] office she must obtain this herself. Notes are not yet signed so they cannot be printed and given to the patient yet, so patient will wait and call again in a few days.

## 2018-06-07 ENCOUNTER — TELEPHONE (OUTPATIENT)
Dept: ONCOLOGY | Age: 48
End: 2018-06-07

## 2018-06-14 ENCOUNTER — CLINICAL SUPPORT (OUTPATIENT)
Dept: ONCOLOGY | Age: 48
End: 2018-06-14

## 2018-06-14 ENCOUNTER — HOSPITAL ENCOUNTER (OUTPATIENT)
Dept: INFUSION THERAPY | Age: 48
Discharge: HOME | End: 2018-06-14
Payer: SUBSIDIZED

## 2018-06-14 ENCOUNTER — HOSPITAL ENCOUNTER (OUTPATIENT)
Dept: ONCOLOGY | Age: 48
Discharge: HOME | End: 2018-06-14

## 2018-06-14 VITALS
HEART RATE: 76 BPM | SYSTOLIC BLOOD PRESSURE: 142 MMHG | RESPIRATION RATE: 16 BRPM | DIASTOLIC BLOOD PRESSURE: 83 MMHG | TEMPERATURE: 97.1 F

## 2018-06-14 DIAGNOSIS — D75.1 POLYCYTHEMIA: Primary | ICD-10-CM

## 2018-06-14 DIAGNOSIS — D75.1 POLYCYTHEMIA: ICD-10-CM

## 2018-06-14 LAB
BASO+EOS+MONOS # BLD AUTO: 1.2 K/UL (ref 0–2.3)
BASO+EOS+MONOS # BLD AUTO: 14 % (ref 0.1–17)
DIFFERENTIAL METHOD BLD: ABNORMAL
ERYTHROCYTE [DISTWIDTH] IN BLOOD BY AUTOMATED COUNT: 12.6 % (ref 11.5–14.5)
HCT VFR BLD AUTO: 50.7 % (ref 36–48)
HGB BLD-MCNC: 17.7 G/DL (ref 12–16)
LYMPHOCYTES # BLD: 3.4 K/UL (ref 1.1–5.9)
LYMPHOCYTES NFR BLD: 39 % (ref 14–44)
MCH RBC QN AUTO: 32.6 PG (ref 25–35)
MCHC RBC AUTO-ENTMCNC: 34.9 G/DL (ref 31–37)
MCV RBC AUTO: 93.4 FL (ref 78–102)
NEUTS SEG # BLD: 4.1 K/UL (ref 1.8–9.5)
NEUTS SEG NFR BLD: 47 % (ref 40–70)
PLATELET # BLD AUTO: 226 K/UL (ref 140–440)
RBC # BLD AUTO: 5.43 M/UL (ref 4.1–5.1)
WBC # BLD AUTO: 8.7 K/UL (ref 4.5–13)

## 2018-06-14 PROCEDURE — 36415 COLL VENOUS BLD VENIPUNCTURE: CPT

## 2018-06-14 PROCEDURE — 74011250636 HC RX REV CODE- 250/636: Performed by: INTERNAL MEDICINE

## 2018-06-14 PROCEDURE — 99195 PHLEBOTOMY: CPT

## 2018-06-14 RX ORDER — SODIUM CHLORIDE 9 MG/ML
500 INJECTION, SOLUTION INTRAVENOUS CONTINUOUS
Status: DISPENSED | OUTPATIENT
Start: 2018-06-14 | End: 2018-06-15

## 2018-06-14 RX ADMIN — SODIUM CHLORIDE 500 ML: 900 INJECTION, SOLUTION INTRAVENOUS at 14:36

## 2018-06-21 ENCOUNTER — CLINICAL SUPPORT (OUTPATIENT)
Dept: ONCOLOGY | Age: 48
End: 2018-06-21

## 2018-06-21 ENCOUNTER — HOSPITAL ENCOUNTER (OUTPATIENT)
Dept: INFUSION THERAPY | Age: 48
Discharge: HOME | End: 2018-06-21
Payer: SUBSIDIZED

## 2018-06-21 ENCOUNTER — HOSPITAL ENCOUNTER (OUTPATIENT)
Dept: ONCOLOGY | Age: 48
Discharge: HOME | End: 2018-06-21

## 2018-06-21 VITALS
SYSTOLIC BLOOD PRESSURE: 105 MMHG | HEART RATE: 78 BPM | TEMPERATURE: 97.1 F | RESPIRATION RATE: 16 BRPM | DIASTOLIC BLOOD PRESSURE: 72 MMHG

## 2018-06-21 DIAGNOSIS — D75.1 POLYCYTHEMIA: ICD-10-CM

## 2018-06-21 DIAGNOSIS — D75.1 POLYCYTHEMIA: Primary | ICD-10-CM

## 2018-06-21 LAB
BASO+EOS+MONOS # BLD AUTO: 1.1 K/UL (ref 0–2.3)
BASO+EOS+MONOS # BLD AUTO: 10 % (ref 0.1–17)
DIFFERENTIAL METHOD BLD: ABNORMAL
ERYTHROCYTE [DISTWIDTH] IN BLOOD BY AUTOMATED COUNT: 12.6 % (ref 11.5–14.5)
HCT VFR BLD AUTO: 47.4 % (ref 36–48)
HGB BLD-MCNC: 16.3 G/DL (ref 12–16)
LYMPHOCYTES # BLD: 3.9 K/UL (ref 1.1–5.9)
LYMPHOCYTES NFR BLD: 35 % (ref 14–44)
MCH RBC QN AUTO: 32.2 PG (ref 25–35)
MCHC RBC AUTO-ENTMCNC: 34.4 G/DL (ref 31–37)
MCV RBC AUTO: 93.7 FL (ref 78–102)
NEUTS SEG # BLD: 6.2 K/UL (ref 1.8–9.5)
NEUTS SEG NFR BLD: 55 % (ref 40–70)
PLATELET # BLD AUTO: 307 K/UL (ref 140–440)
RBC # BLD AUTO: 5.06 M/UL (ref 4.1–5.1)
WBC # BLD AUTO: 11.2 K/UL (ref 4.5–13)

## 2018-06-21 PROCEDURE — 74011250636 HC RX REV CODE- 250/636: Performed by: INTERNAL MEDICINE

## 2018-06-21 PROCEDURE — 99195 PHLEBOTOMY: CPT

## 2018-06-21 PROCEDURE — 36415 COLL VENOUS BLD VENIPUNCTURE: CPT

## 2018-06-21 RX ORDER — SODIUM CHLORIDE 9 MG/ML
500 INJECTION, SOLUTION INTRAVENOUS CONTINUOUS
Status: DISPENSED | OUTPATIENT
Start: 2018-06-21 | End: 2018-06-22

## 2018-06-21 RX ORDER — SODIUM CHLORIDE 0.9 % (FLUSH) 0.9 %
10-40 SYRINGE (ML) INJECTION AS NEEDED
Status: DISCONTINUED | OUTPATIENT
Start: 2018-06-21 | End: 2018-06-25 | Stop reason: HOSPADM

## 2018-06-21 RX ADMIN — SODIUM CHLORIDE 500 ML: 900 INJECTION, SOLUTION INTRAVENOUS at 14:03

## 2018-06-21 NOTE — PROGRESS NOTES
[facility] Progress Note    Date: 2018    Name: Cortney Larson    MRN: [id number]         : 1970      Ms. Ozuna was assessed and education was provided. Ms. Ozuna's vitals were reviewed and patient was observed for 5 minutes prior to treatment. Visit Vitals    /64 (BP 1 Location: Left arm, BP Patient Position: At rest;Sitting)    Pulse 78    Temp 97.6 °F (36.4 °C)    Resp 16       Lab results were obtained and reviewed. Recent Results (from the past 12 hour(s))   CBC WITH 3 PART DIFF    Collection Time: 18  1:19 PM   Result Value Ref Range    WBC 11.2 4.5 - 13.0 K/uL    RBC 5.06 4.10 - 5.10 M/uL    HGB 16.3 (HH) 12.0 - 16.0 g/dL    HCT 47.4 36 - 48 %    MCV 93.7 78 - 102 FL    MCH 32.2 25.0 - 35.0 PG    MCHC 34.4 31 - 37 g/dL    RDW 12.6 11.5 - 14.5 %    PLATELET 592 362 - 771 K/uL    NEUTROPHILS 55 40 - 70 %    MIXED CELLS 10 0.1 - 17 %    LYMPHOCYTES 35 14 - 44 %    ABS. NEUTROPHILS 6.2 1.8 - 9.5 K/UL    ABS. MIXED CELLS 1.1 0.0 - 2.3 K/uL    ABS. LYMPHOCYTES 3.9 1.1 - 5.9 K/UL    DF AUTOMATED         Critical HBG 16.3/16.4 reported to Jolene Moise NP, and no orders given. Therapeutic phlebotomy started at 1340 and stopped at 1403. Approx 500 ml blood removed from patient. Pt given 500 ml NS IV.    Ms. Naeem Martin tolerated the infusion, and had no complaints. Patient armband removed and shredded. Ms. Naeem Martin was discharged from [place] [address] in stable condition at 1450. She is to return on 18 at 1300 for her next appointment for CBC/therapeutic phlebotomy weekly. She will not be at her appt next week on 18 because she will be out of town.     Marcus Ivan RN  2018

## 2018-06-23 ENCOUNTER — APPOINTMENT (OUTPATIENT)
Dept: GENERAL RADIOLOGY | Age: 48
End: 2018-06-23
Attending: EMERGENCY MEDICINE
Payer: SUBSIDIZED

## 2018-06-23 ENCOUNTER — HOSPITAL ENCOUNTER (EMERGENCY)
Age: 48
Discharge: LEFT BEFORE DISCHARGE | End: 2018-06-23
Attending: EMERGENCY MEDICINE
Payer: SUBSIDIZED

## 2018-06-23 VITALS
OXYGEN SATURATION: 99 % | SYSTOLIC BLOOD PRESSURE: 128 MMHG | RESPIRATION RATE: 20 BRPM | WEIGHT: 190 LBS | HEART RATE: 82 BPM | HEIGHT: 62 IN | BODY MASS INDEX: 34.96 KG/M2 | TEMPERATURE: 98.2 F | DIASTOLIC BLOOD PRESSURE: 84 MMHG

## 2018-06-23 LAB
ALBUMIN SERPL-MCNC: 3.8 G/DL (ref 3.4–5)
ALBUMIN/GLOB SERPL: 1.1 {RATIO} (ref 0.8–1.7)
ALP SERPL-CCNC: 72 U/L (ref 45–117)
ALT SERPL-CCNC: 28 U/L (ref 13–56)
ANION GAP SERPL CALC-SCNC: 7 MMOL/L (ref 3–18)
AST SERPL-CCNC: 16 U/L (ref 15–37)
ATRIAL RATE: 95 BPM
BASOPHILS # BLD: 0.1 K/UL (ref 0–0.06)
BASOPHILS NFR BLD: 0 % (ref 0–2)
BILIRUB SERPL-MCNC: 0.5 MG/DL (ref 0.2–1)
BUN SERPL-MCNC: 15 MG/DL (ref 7–18)
BUN/CREAT SERPL: 18 (ref 12–20)
CALCIUM SERPL-MCNC: 8.8 MG/DL (ref 8.5–10.1)
CALCULATED P AXIS, ECG09: 51 DEGREES
CALCULATED R AXIS, ECG10: -16 DEGREES
CALCULATED T AXIS, ECG11: 68 DEGREES
CHLORIDE SERPL-SCNC: 106 MMOL/L (ref 100–108)
CK MB CFR SERPL CALC: 2.6 % (ref 0–4)
CK MB SERPL-MCNC: 2.4 NG/ML (ref 5–25)
CK SERPL-CCNC: 94 U/L (ref 26–192)
CO2 SERPL-SCNC: 27 MMOL/L (ref 21–32)
CREAT SERPL-MCNC: 0.82 MG/DL (ref 0.6–1.3)
DIAGNOSIS, 93000: NORMAL
DIFFERENTIAL METHOD BLD: ABNORMAL
EOSINOPHIL # BLD: 0.2 K/UL (ref 0–0.4)
EOSINOPHIL NFR BLD: 1 % (ref 0–5)
ERYTHROCYTE [DISTWIDTH] IN BLOOD BY AUTOMATED COUNT: 12.9 % (ref 11.6–14.5)
GLOBULIN SER CALC-MCNC: 3.6 G/DL (ref 2–4)
GLUCOSE SERPL-MCNC: 151 MG/DL (ref 74–99)
HCT VFR BLD AUTO: 44.4 % (ref 35–45)
HGB BLD-MCNC: 15.3 G/DL (ref 12–16)
LYMPHOCYTES # BLD: 4 K/UL (ref 0.9–3.6)
LYMPHOCYTES NFR BLD: 32 % (ref 21–52)
MCH RBC QN AUTO: 32.1 PG (ref 24–34)
MCHC RBC AUTO-ENTMCNC: 34.5 G/DL (ref 31–37)
MCV RBC AUTO: 93.3 FL (ref 74–97)
MONOCYTES # BLD: 1.1 K/UL (ref 0.05–1.2)
MONOCYTES NFR BLD: 8 % (ref 3–10)
NEUTS SEG # BLD: 7.2 K/UL (ref 1.8–8)
NEUTS SEG NFR BLD: 59 % (ref 40–73)
P-R INTERVAL, ECG05: 158 MS
PLATELET # BLD AUTO: 312 K/UL (ref 135–420)
PMV BLD AUTO: 9.5 FL (ref 9.2–11.8)
POTASSIUM SERPL-SCNC: 3.4 MMOL/L (ref 3.5–5.5)
PROT SERPL-MCNC: 7.4 G/DL (ref 6.4–8.2)
Q-T INTERVAL, ECG07: 406 MS
QRS DURATION, ECG06: 100 MS
QTC CALCULATION (BEZET), ECG08: 510 MS
RBC # BLD AUTO: 4.76 M/UL (ref 4.2–5.3)
SODIUM SERPL-SCNC: 140 MMOL/L (ref 136–145)
TROPONIN I SERPL-MCNC: <0.02 NG/ML (ref 0–0.04)
TSH SERPL DL<=0.05 MIU/L-ACNC: 0.45 UIU/ML (ref 0.36–3.74)
VENTRICULAR RATE, ECG03: 95 BPM
WBC # BLD AUTO: 12.5 K/UL (ref 4.6–13.2)

## 2018-06-23 PROCEDURE — 82550 ASSAY OF CK (CPK): CPT | Performed by: EMERGENCY MEDICINE

## 2018-06-23 PROCEDURE — 80053 COMPREHEN METABOLIC PANEL: CPT | Performed by: EMERGENCY MEDICINE

## 2018-06-23 PROCEDURE — 75810000275 HC EMERGENCY DEPT VISIT NO LEVEL OF CARE

## 2018-06-23 PROCEDURE — 93005 ELECTROCARDIOGRAM TRACING: CPT

## 2018-06-23 PROCEDURE — 85025 COMPLETE CBC W/AUTO DIFF WBC: CPT | Performed by: EMERGENCY MEDICINE

## 2018-06-23 PROCEDURE — 84443 ASSAY THYROID STIM HORMONE: CPT | Performed by: EMERGENCY MEDICINE

## 2018-06-23 NOTE — ED NOTES
Attempted to bring patient back to [address] [place] [address] room 5. Pt does not answer. Pt is currently not in the lobby or in XRAY or RW.

## 2018-06-23 NOTE — ED TRIAGE NOTES
intermittetn heart racing with activity .  Pt crying, pt states \"i think its related to my polycythemia\"

## 2018-06-27 ENCOUNTER — TELEPHONE (OUTPATIENT)
Dept: ONCOLOGY | Age: 48
End: 2018-06-27

## 2018-06-27 NOTE — TELEPHONE ENCOUNTER
Pt wants to be sure it's safe for her to continue taking LORAZEPAM 1mg which was prescribed by her psych doctor. .. , she is concerned about any drug interaction and wants someone to call her back at [phone] she is in [place] for a time. Please call her as soon as you can, she said.

## 2018-06-28 ENCOUNTER — APPOINTMENT (OUTPATIENT)
Dept: INFUSION THERAPY | Age: 48
End: 2018-06-28
Payer: SUBSIDIZED

## 2018-07-02 ENCOUNTER — TELEPHONE (OUTPATIENT)
Dept: FAMILY MEDICINE CLINIC | Age: 48
End: 2018-07-02

## 2018-07-02 NOTE — TELEPHONE ENCOUNTER
Medication: Proventil HFA, dose: 2 puffs, how often: every 4 hours as needed for wheezing, current number of medication days provided: 90, refill per application. Lot# [id number], EXP 07/2019   . This medication was received and verified for the following 1. Correct Patient, 2. Correct Diagnosis, 3. Correct Drug, 4. Correct route, and no current allergy to medication. Please contact patient to come  their medications.      MARNIE Tolentino, RN, [facility]

## 2018-07-03 NOTE — PROGRESS NOTES
Ms. Quyen Tanner was called and made aware, that her [facility] appointment that was scheduled for Thursday, 7-5-18 at 1300, has been changed to Friday, 7-6-18 at 0900, and she agreed to the new appointment date and time.

## 2018-07-05 ENCOUNTER — HOSPITAL ENCOUNTER (OUTPATIENT)
Dept: INFUSION THERAPY | Age: 48
End: 2018-07-05
Payer: SELF-PAY

## 2018-07-06 ENCOUNTER — HOSPITAL ENCOUNTER (OUTPATIENT)
Dept: INFUSION THERAPY | Age: 48
Discharge: HOME | End: 2018-07-06
Payer: SELF-PAY

## 2018-07-06 ENCOUNTER — HOSPITAL ENCOUNTER (OUTPATIENT)
Dept: ONCOLOGY | Age: 48
Discharge: HOME | End: 2018-07-06

## 2018-07-06 ENCOUNTER — CLINICAL SUPPORT (OUTPATIENT)
Dept: ONCOLOGY | Age: 48
End: 2018-07-06

## 2018-07-06 VITALS
DIASTOLIC BLOOD PRESSURE: 74 MMHG | HEART RATE: 66 BPM | SYSTOLIC BLOOD PRESSURE: 108 MMHG | TEMPERATURE: 97.7 F | RESPIRATION RATE: 16 BRPM

## 2018-07-06 DIAGNOSIS — D75.1 POLYCYTHEMIA: ICD-10-CM

## 2018-07-06 DIAGNOSIS — D75.1 POLYCYTHEMIA: Primary | ICD-10-CM

## 2018-07-06 LAB
BASO+EOS+MONOS # BLD AUTO: 0.3 K/UL (ref 0–2.3)
BASO+EOS+MONOS # BLD AUTO: 3 % (ref 0.1–17)
DIFFERENTIAL METHOD BLD: NORMAL
ERYTHROCYTE [DISTWIDTH] IN BLOOD BY AUTOMATED COUNT: 12.5 % (ref 11.5–14.5)
HCT VFR BLD AUTO: 43.1 % (ref 36–48)
HGB BLD-MCNC: 14.7 G/DL (ref 12–16)
LYMPHOCYTES # BLD: 3 K/UL (ref 1.1–5.9)
LYMPHOCYTES NFR BLD: 31 % (ref 14–44)
MCH RBC QN AUTO: 32.2 PG (ref 25–35)
MCHC RBC AUTO-ENTMCNC: 34.1 G/DL (ref 31–37)
MCV RBC AUTO: 94.5 FL (ref 78–102)
NEUTS SEG # BLD: 6.6 K/UL (ref 1.8–9.5)
NEUTS SEG NFR BLD: 66 % (ref 40–70)
PLATELET # BLD AUTO: 259 K/UL (ref 140–440)
RBC # BLD AUTO: 4.56 M/UL (ref 4.1–5.1)
WBC # BLD AUTO: 9.9 K/UL (ref 4.5–13)

## 2018-07-06 PROCEDURE — 99195 PHLEBOTOMY: CPT

## 2018-07-06 PROCEDURE — 36415 COLL VENOUS BLD VENIPUNCTURE: CPT

## 2018-07-06 PROCEDURE — 74011250636 HC RX REV CODE- 250/636: Performed by: INTERNAL MEDICINE

## 2018-07-06 RX ORDER — SODIUM CHLORIDE 9 MG/ML
500 INJECTION, SOLUTION INTRAVENOUS CONTINUOUS
Status: DISPENSED | OUTPATIENT
Start: 2018-07-06 | End: 2018-07-07

## 2018-07-06 RX ADMIN — SODIUM CHLORIDE 500 ML: 900 INJECTION, SOLUTION INTRAVENOUS at 11:34

## 2018-07-06 NOTE — PROGRESS NOTES
[facility] Progress Note    Date: 2018    Name: Samantha Seen    MRN: [id number]         : 1970      Ms. Ozuna was assessed and education was provided. Ms. Ozuna's vitals were reviewed and patient was observed for 5 minutes prior to treatment. Visit Vitals    /70 (BP 1 Location: Right arm, BP Patient Position: At rest;Sitting)    Pulse 72    Temp 97.8 °F (36.6 °C)    Resp 16    Breastfeeding No       Lab results were obtained and reviewed. Recent Results (from the past 12 hour(s))   CBC WITH 3 PART DIFF    Collection Time: 18 10:40 AM   Result Value Ref Range    WBC 9.9 4.5 - 13.0 K/uL    RBC 4.56 4.10 - 5.10 M/uL    HGB 14.7 12.0 - 16.0 g/dL    HCT 43.1 36 - 48 %    MCV 94.5 78 - 102 FL    MCH 32.2 25.0 - 35.0 PG    MCHC 34.1 31 - 37 g/dL    RDW 12.5 11.5 - 14.5 %    PLATELET 452 390 - 092 K/uL    NEUTROPHILS 66 40 - 70 %    MIXED CELLS 3 0.1 - 17 %    LYMPHOCYTES 31 14 - 44 %    ABS. NEUTROPHILS 6.6 1.8 - 9.5 K/UL    ABS. MIXED CELLS 0.3 0.0 - 2.3 K/uL    ABS. LYMPHOCYTES 3.0 1.1 - 5.9 K/UL    DF AUTOMATED       Today's HCT = 43.1. Therapeutic phlebotomy started at 1055 and ended at 1134 removing approx 500 ml blood from patient. 500 ml NS IV given to patient. Patient armband removed and shredded. Ms. Latoya Aleman was discharged from [place] [address] in stable condition at 1215. She is to return on 18 at 1300 for her next appointment for CBC/Phlebotomy.     Ira Fofana RN  2018

## 2018-07-12 ENCOUNTER — HOSPITAL ENCOUNTER (OUTPATIENT)
Dept: INFUSION THERAPY | Age: 48
End: 2018-07-12
Payer: SELF-PAY

## 2018-07-20 ENCOUNTER — HOSPITAL ENCOUNTER (OUTPATIENT)
Dept: ONCOLOGY | Age: 48
Discharge: HOME | End: 2018-07-20

## 2018-07-20 ENCOUNTER — HOSPITAL ENCOUNTER (OUTPATIENT)
Dept: INFUSION THERAPY | Age: 48
Discharge: HOME | End: 2018-07-20
Payer: SELF-PAY

## 2018-07-20 ENCOUNTER — CLINICAL SUPPORT (OUTPATIENT)
Dept: ONCOLOGY | Age: 48
End: 2018-07-20

## 2018-07-20 VITALS
SYSTOLIC BLOOD PRESSURE: 111 MMHG | RESPIRATION RATE: 16 BRPM | HEART RATE: 77 BPM | TEMPERATURE: 98.4 F | DIASTOLIC BLOOD PRESSURE: 72 MMHG

## 2018-07-20 VITALS — RESPIRATION RATE: 16 BRPM | TEMPERATURE: 98.4 F

## 2018-07-20 DIAGNOSIS — D75.1 POLYCYTHEMIA: Primary | ICD-10-CM

## 2018-07-20 DIAGNOSIS — D75.1 POLYCYTHEMIA: ICD-10-CM

## 2018-07-20 LAB
BASO+EOS+MONOS # BLD AUTO: 0.8 K/UL (ref 0–2.3)
BASO+EOS+MONOS # BLD AUTO: 7 % (ref 0.1–17)
DIFFERENTIAL METHOD BLD: NORMAL
ERYTHROCYTE [DISTWIDTH] IN BLOOD BY AUTOMATED COUNT: 12.3 % (ref 11.5–14.5)
HCT VFR BLD AUTO: 43.2 % (ref 36–48)
HGB BLD-MCNC: 14.8 G/DL (ref 12–16)
LYMPHOCYTES # BLD: 4.1 K/UL (ref 1.1–5.9)
LYMPHOCYTES NFR BLD: 36 % (ref 14–44)
MCH RBC QN AUTO: 32.2 PG (ref 25–35)
MCHC RBC AUTO-ENTMCNC: 34.3 G/DL (ref 31–37)
MCV RBC AUTO: 94.1 FL (ref 78–102)
NEUTS SEG # BLD: 6.7 K/UL (ref 1.8–9.5)
NEUTS SEG NFR BLD: 57 % (ref 40–70)
PLATELET # BLD AUTO: 309 K/UL (ref 140–440)
RBC # BLD AUTO: 4.59 M/UL (ref 4.1–5.1)
WBC # BLD AUTO: 11.6 K/UL (ref 4.5–13)

## 2018-07-20 PROCEDURE — 36415 COLL VENOUS BLD VENIPUNCTURE: CPT

## 2018-07-20 PROCEDURE — 99195 PHLEBOTOMY: CPT

## 2018-07-20 PROCEDURE — 74011250636 HC RX REV CODE- 250/636: Performed by: INTERNAL MEDICINE

## 2018-07-20 RX ORDER — SODIUM CHLORIDE 9 MG/ML
500 INJECTION, SOLUTION INTRAVENOUS CONTINUOUS
Status: DISCONTINUED | OUTPATIENT
Start: 2018-07-20 | End: 2018-07-20

## 2018-07-20 RX ADMIN — SODIUM CHLORIDE 500 ML: 900 INJECTION, SOLUTION INTRAVENOUS at 13:50

## 2018-07-20 NOTE — PROGRESS NOTES
[facility] Progress Note    Date: 2018    Name: Mandy Krueger    MRN: [id number]         : 1970      Ms. Ozuna was assessed and education was provided. Ms. Ozuna's vitals were reviewed and patient was observed for 5 minutes prior to treatment. Visit Vitals    /72 (BP 1 Location: Left arm, BP Patient Position: At rest;Sitting)    Pulse 77    Temp 98.4 °F (36.9 °C)    Resp 16       Lab results were obtained and reviewed. Recent Results (from the past 12 hour(s))   CBC WITH 3 PART DIFF    Collection Time: 18  1:16 PM   Result Value Ref Range    WBC 11.6 4.5 - 13.0 K/uL    RBC 4.59 4. 10 - 5.10 M/uL    HGB 14.8 12.0 - 16.0 g/dL    HCT 43.2 36 - 48 %    MCV 94.1 78 - 102 FL    MCH 32.2 25.0 - 35.0 PG    MCHC 34.3 31 - 37 g/dL    RDW 12.3 11.5 - 14.5 %    PLATELET 318 536 - 238 K/uL    NEUTROPHILS 57 40 - 70 %    MIXED CELLS 7 0.1 - 17 %    LYMPHOCYTES 36 14 - 44 %    ABS. NEUTROPHILS 6.7 1.8 - 9.5 K/UL    ABS. MIXED CELLS 0.8 0.0 - 2.3 K/uL    ABS. LYMPHOCYTES 4.1 1.1 - 5.9 K/UL    DF AUTOMATED         Today's HCT = 43.2. Therapeutic phlebotomy started at 1334 and stopped at 1349 removing approx 500 ml blood from patient. 500 ml NS IV given to patient. Patient armband removed and shredded. Ms. Shaniqua Ivan was discharged from [place] [address] in stable condition at 1430. She is to return on 18 at 1000 for her next appointment for CBC/Phlebotomy.     Anmol Cherry RN  2018  5:08 PM

## 2018-07-24 ENCOUNTER — HOSPITAL ENCOUNTER (EMERGENCY)
Age: 48
Discharge: HOME | End: 2018-07-24
Attending: EMERGENCY MEDICINE
Payer: SELF-PAY

## 2018-07-24 ENCOUNTER — APPOINTMENT (OUTPATIENT)
Dept: GENERAL RADIOLOGY | Age: 48
End: 2018-07-24
Attending: EMERGENCY MEDICINE
Payer: SELF-PAY

## 2018-07-24 VITALS
DIASTOLIC BLOOD PRESSURE: 64 MMHG | HEIGHT: 62 IN | RESPIRATION RATE: 13 BRPM | BODY MASS INDEX: 34.96 KG/M2 | OXYGEN SATURATION: 94 % | WEIGHT: 190 LBS | SYSTOLIC BLOOD PRESSURE: 104 MMHG | HEART RATE: 72 BPM | TEMPERATURE: 98 F

## 2018-07-24 DIAGNOSIS — R07.89 ATYPICAL CHEST PAIN: Primary | ICD-10-CM

## 2018-07-24 LAB
ALBUMIN SERPL-MCNC: 3.6 G/DL (ref 3.4–5)
ALBUMIN/GLOB SERPL: 1.2 {RATIO} (ref 0.8–1.7)
ALP SERPL-CCNC: 70 U/L (ref 45–117)
ALT SERPL-CCNC: 20 U/L (ref 13–56)
AMPHET UR QL SCN: NEGATIVE
ANION GAP SERPL CALC-SCNC: 7 MMOL/L (ref 3–18)
AST SERPL-CCNC: 12 U/L (ref 15–37)
ATRIAL RATE: 59 BPM
ATRIAL RATE: 80 BPM
BARBITURATES UR QL SCN: NEGATIVE
BASOPHILS # BLD: 0 K/UL (ref 0–0.1)
BASOPHILS NFR BLD: 0 % (ref 0–2)
BENZODIAZ UR QL: NEGATIVE
BILIRUB SERPL-MCNC: 0.2 MG/DL (ref 0.2–1)
BUN SERPL-MCNC: 12 MG/DL (ref 7–18)
BUN/CREAT SERPL: 20 (ref 12–20)
CALCIUM SERPL-MCNC: 8.1 MG/DL (ref 8.5–10.1)
CALCULATED P AXIS, ECG09: 45 DEGREES
CALCULATED P AXIS, ECG09: 54 DEGREES
CALCULATED R AXIS, ECG10: -21 DEGREES
CALCULATED R AXIS, ECG10: -4 DEGREES
CALCULATED T AXIS, ECG11: 69 DEGREES
CALCULATED T AXIS, ECG11: 78 DEGREES
CANNABINOIDS UR QL SCN: POSITIVE
CHLORIDE SERPL-SCNC: 106 MMOL/L (ref 100–108)
CK MB CFR SERPL CALC: 2.6 % (ref 0–4)
CK MB CFR SERPL CALC: NORMAL % (ref 0–4)
CK MB SERPL-MCNC: 1.5 NG/ML (ref 5–25)
CK MB SERPL-MCNC: <1 NG/ML (ref 5–25)
CK SERPL-CCNC: 46 U/L (ref 26–192)
CK SERPL-CCNC: 57 U/L (ref 26–192)
CO2 SERPL-SCNC: 28 MMOL/L (ref 21–32)
COCAINE UR QL SCN: POSITIVE
CREAT SERPL-MCNC: 0.6 MG/DL (ref 0.6–1.3)
DIAGNOSIS, 93000: NORMAL
DIAGNOSIS, 93000: NORMAL
DIFFERENTIAL METHOD BLD: ABNORMAL
EOSINOPHIL # BLD: 0.1 K/UL (ref 0–0.4)
EOSINOPHIL NFR BLD: 2 % (ref 0–5)
ERYTHROCYTE [DISTWIDTH] IN BLOOD BY AUTOMATED COUNT: 12.7 % (ref 11.6–14.5)
ETHANOL SERPL-MCNC: <3 MG/DL (ref 0–3)
GLOBULIN SER CALC-MCNC: 3.1 G/DL (ref 2–4)
GLUCOSE SERPL-MCNC: 131 MG/DL (ref 74–99)
HCG UR QL: NEGATIVE
HCT VFR BLD AUTO: 37.7 % (ref 35–45)
HDSCOM,HDSCOM: ABNORMAL
HGB BLD-MCNC: 12.8 G/DL (ref 12–16)
LYMPHOCYTES # BLD: 2.6 K/UL (ref 0.9–3.6)
LYMPHOCYTES NFR BLD: 27 % (ref 21–52)
MAGNESIUM SERPL-MCNC: 2.4 MG/DL (ref 1.6–2.6)
MCH RBC QN AUTO: 32.1 PG (ref 24–34)
MCHC RBC AUTO-ENTMCNC: 34 G/DL (ref 31–37)
MCV RBC AUTO: 94.5 FL (ref 74–97)
METHADONE UR QL: NEGATIVE
MONOCYTES # BLD: 0.9 K/UL (ref 0.05–1.2)
MONOCYTES NFR BLD: 9 % (ref 3–10)
NEUTS SEG # BLD: 6 K/UL (ref 1.8–8)
NEUTS SEG NFR BLD: 62 % (ref 40–73)
OPIATES UR QL: NEGATIVE
P-R INTERVAL, ECG05: 166 MS
P-R INTERVAL, ECG05: 178 MS
PCP UR QL: NEGATIVE
PLATELET # BLD AUTO: 277 K/UL (ref 135–420)
PMV BLD AUTO: 9.2 FL (ref 9.2–11.8)
POTASSIUM SERPL-SCNC: 3.3 MMOL/L (ref 3.5–5.5)
PROT SERPL-MCNC: 6.7 G/DL (ref 6.4–8.2)
Q-T INTERVAL, ECG07: 420 MS
Q-T INTERVAL, ECG07: 460 MS
QRS DURATION, ECG06: 106 MS
QRS DURATION, ECG06: 110 MS
QTC CALCULATION (BEZET), ECG08: 455 MS
QTC CALCULATION (BEZET), ECG08: 484 MS
RBC # BLD AUTO: 3.99 M/UL (ref 4.2–5.3)
SODIUM SERPL-SCNC: 141 MMOL/L (ref 136–145)
TROPONIN I SERPL-MCNC: <0.02 NG/ML (ref 0–0.04)
TROPONIN I SERPL-MCNC: <0.02 NG/ML (ref 0–0.04)
VENTRICULAR RATE, ECG03: 59 BPM
VENTRICULAR RATE, ECG03: 80 BPM
WBC # BLD AUTO: 9.6 K/UL (ref 4.6–13.2)

## 2018-07-24 PROCEDURE — 80307 DRUG TEST PRSMV CHEM ANLYZR: CPT | Performed by: EMERGENCY MEDICINE

## 2018-07-24 PROCEDURE — 85025 COMPLETE CBC W/AUTO DIFF WBC: CPT | Performed by: EMERGENCY MEDICINE

## 2018-07-24 PROCEDURE — 96360 HYDRATION IV INFUSION INIT: CPT

## 2018-07-24 PROCEDURE — 93005 ELECTROCARDIOGRAM TRACING: CPT

## 2018-07-24 PROCEDURE — 82550 ASSAY OF CK (CPK): CPT | Performed by: EMERGENCY MEDICINE

## 2018-07-24 PROCEDURE — 99284 EMERGENCY DEPT VISIT MOD MDM: CPT

## 2018-07-24 PROCEDURE — 81025 URINE PREGNANCY TEST: CPT | Performed by: EMERGENCY MEDICINE

## 2018-07-24 PROCEDURE — 74011250636 HC RX REV CODE- 250/636: Performed by: EMERGENCY MEDICINE

## 2018-07-24 PROCEDURE — 71045 X-RAY EXAM CHEST 1 VIEW: CPT

## 2018-07-24 PROCEDURE — 83735 ASSAY OF MAGNESIUM: CPT | Performed by: EMERGENCY MEDICINE

## 2018-07-24 PROCEDURE — 80053 COMPREHEN METABOLIC PANEL: CPT | Performed by: EMERGENCY MEDICINE

## 2018-07-24 RX ORDER — NAPROXEN 500 MG/1
500 TABLET ORAL 2 TIMES DAILY WITH MEALS
Qty: 20 TAB | Refills: 0 | Status: SHIPPED | OUTPATIENT
Start: 2018-07-24 | End: 2018-08-06

## 2018-07-24 RX ADMIN — SODIUM CHLORIDE 1000 ML: 900 INJECTION, SOLUTION INTRAVENOUS at 06:05

## 2018-07-24 NOTE — DISCHARGE INSTRUCTIONS
Chest Pain: Care Instructions  Your Care Instructions    There are many things that can cause chest pain. Some are not serious and will get better on their own in a few days. But some kinds of chest pain need more testing and treatment. Your doctor may have recommended a follow-up visit in the next 8 to 12 hours. If you are not getting better, you may need more tests or treatment. Even though your doctor has released you, you still need to watch for any problems. The doctor carefully checked you, but sometimes problems can develop later. If you have new symptoms or if your symptoms do not get better, get medical care right away. If you have worse or different chest pain or pressure that lasts more than 5 minutes or you passed out (lost consciousness), call 911 or seek other emergency help right away. A medical visit is only one step in your treatment. Even if you feel better, you still need to do what your doctor recommends, such as going to all suggested follow-up appointments and taking medicines exactly as directed. This will help you recover and help prevent future problems. How can you care for yourself at home? · Rest until you feel better. · Take your medicine exactly as prescribed. Call your doctor if you think you are having a problem with your medicine. · Do not drive after taking a prescription pain medicine. When should you call for help? Call 911 if:    · You passed out (lost consciousness).     · You have severe difficulty breathing.     · You have symptoms of a heart attack. These may include:  ¨ Chest pain or pressure, or a strange feeling in your chest.  ¨ Sweating. ¨ Shortness of breath. ¨ Nausea or vomiting. ¨ Pain, pressure, or a strange feeling in your back, neck, jaw, or upper belly or in one or both shoulders or arms. ¨ Lightheadedness or sudden weakness. ¨ A fast or irregular heartbeat.   After you call 911, the  may tell you to chew 1 adult-strength or 2 to 4 low-dose aspirin. Wait for an ambulance. Do not try to drive yourself.    Call your doctor today if:    · You have any trouble breathing.     · Your chest pain gets worse.     · You are dizzy or lightheaded, or you feel like you may faint.     · You are not getting better as expected.     · You are having new or different chest pain. Where can you learn more? Go to http://los-zack.info/. Enter A120 in the search box to learn more about \"Chest Pain: Care Instructions. \"  Current as of: November 20, 2017  Content Version: 11.7  © 1196-4520 [employer]. Care instructions adapted under license by [employer] (which disclaims liability or warranty for this information). If you have questions about a medical condition or this instruction, always ask your healthcare professional. [address] any warranty or liability for your use of this information.

## 2018-07-24 NOTE — ED TRIAGE NOTES
Patient arrived to the ED complaining of chest pain x 1 day. Patient reported to ED staff that she tried using marijuana and cocaine today to relieve her pain without relief.

## 2018-07-24 NOTE — ED NOTES
Bedside and Verbal shift change report given to Lucy Ford RN  (oncoming nurse) by Eddi Lawson RN  (offgoing nurse). Report included the following information SBAR, ED Summary and MAR.
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
## 2018-07-24 NOTE — ED NOTES
7:12 AM :Pt care assumed from Dr. Cristo Shaffer , ED provider. Pt complaint(s), current treatment plan, progression and available diagnostic results have been discussed thoroughly. Rounding occurred: yes  Intended Disposition: TBD   Pending diagnostic reports and/or labs (please list): Repeat Cardiac Enzymes     Repeat-EKG showed Sinus Bradycardia with a rate of 59bpm. With no ST elevations or depression and non specific T wave changes. 8:55 AM 7/24/2018    9:40 AM  Patient's repeat-Troponin was negative. Patient was discharged in stable condition. Patient was reassessed and feeling improved. Patient was discharged with Naprosyn. Patient is to return to emergency department if any new or worsening condition. 9:42 AM, 7/24/2018       Scribe Attestation     [employer] acting as a scribe for and in the presence of Bessie Boyer DO      July 24, 2018 at [facility] AM       Provider Attestation:      I personally performed the services described in the documentation, reviewed the documentation, as recorded by the scribe in my presence, and it accurately and completely records my words and actions.  July 24, 2018 at [address] [place] [address], DO

## 2018-07-26 ENCOUNTER — DOCUMENTATION ONLY (OUTPATIENT)
Dept: FAMILY MEDICINE CLINIC | Age: 48
End: 2018-07-26

## 2018-07-27 ENCOUNTER — HOSPITAL ENCOUNTER (OUTPATIENT)
Dept: INFUSION THERAPY | Age: 48
End: 2018-07-27
Payer: SELF-PAY

## 2018-08-06 ENCOUNTER — HOSPITAL ENCOUNTER (OUTPATIENT)
Dept: LAB | Age: 48
Discharge: HOME | End: 2018-08-06

## 2018-08-06 ENCOUNTER — OFFICE VISIT (OUTPATIENT)
Dept: FAMILY MEDICINE CLINIC | Age: 48
End: 2018-08-06

## 2018-08-06 VITALS
HEART RATE: 80 BPM | WEIGHT: 194 LBS | TEMPERATURE: 98.4 F | RESPIRATION RATE: 16 BRPM | DIASTOLIC BLOOD PRESSURE: 81 MMHG | HEIGHT: 62 IN | OXYGEN SATURATION: 98 % | SYSTOLIC BLOOD PRESSURE: 131 MMHG | BODY MASS INDEX: 35.7 KG/M2

## 2018-08-06 DIAGNOSIS — R73.9 BLOOD GLUCOSE ELEVATED: ICD-10-CM

## 2018-08-06 DIAGNOSIS — R00.2 HEART PALPITATIONS: ICD-10-CM

## 2018-08-06 DIAGNOSIS — F41.9 ANXIETY: ICD-10-CM

## 2018-08-06 DIAGNOSIS — K21.9 GASTROESOPHAGEAL REFLUX DISEASE, ESOPHAGITIS PRESENCE NOT SPECIFIED: ICD-10-CM

## 2018-08-06 DIAGNOSIS — I10 ESSENTIAL HYPERTENSION: Primary | ICD-10-CM

## 2018-08-06 DIAGNOSIS — I10 HTN, GOAL BELOW 140/90: ICD-10-CM

## 2018-08-06 DIAGNOSIS — H10.33 ACUTE CONJUNCTIVITIS OF BOTH EYES, UNSPECIFIED ACUTE CONJUNCTIVITIS TYPE: ICD-10-CM

## 2018-08-06 DIAGNOSIS — E87.6 HYPOKALEMIA: ICD-10-CM

## 2018-08-06 DIAGNOSIS — Z72.0 TOBACCO ABUSE: ICD-10-CM

## 2018-08-06 DIAGNOSIS — F14.10 COCAINE ABUSE (HCC): ICD-10-CM

## 2018-08-06 LAB
EST. AVERAGE GLUCOSE BLD GHB EST-MCNC: 103 MG/DL
HBA1C MFR BLD: 5.2 % (ref 4.2–5.6)

## 2018-08-06 PROCEDURE — 83036 HEMOGLOBIN GLYCOSYLATED A1C: CPT | Performed by: NURSE PRACTITIONER

## 2018-08-06 RX ORDER — POLYMYXIN B SULFATE AND TRIMETHOPRIM 1; 10000 MG/ML; [USP'U]/ML
1 SOLUTION OPHTHALMIC EVERY 4 HOURS
Qty: 1 BOTTLE | Refills: 0 | Status: SHIPPED | OUTPATIENT
Start: 2018-08-06 | End: 2018-08-16

## 2018-08-06 RX ORDER — LOSARTAN POTASSIUM AND HYDROCHLOROTHIAZIDE 12.5; 1 MG/1; MG/1
1 TABLET ORAL DAILY
COMMUNITY
End: 2018-09-17 | Stop reason: SDUPTHER

## 2018-08-06 RX ORDER — LORAZEPAM 1 MG/1
TABLET ORAL
COMMUNITY
End: 2022-02-04

## 2018-08-06 RX ORDER — RANITIDINE 150 MG/1
150 TABLET, FILM COATED ORAL 2 TIMES DAILY
Qty: 60 TAB | Refills: 0 | Status: SHIPPED | OUTPATIENT
Start: 2018-08-06 | End: 2018-08-06 | Stop reason: SDUPTHER

## 2018-08-06 RX ORDER — POTASSIUM CHLORIDE 20 MEQ/1
20 TABLET, EXTENDED RELEASE ORAL DAILY
Qty: 30 TAB | Refills: 3 | Status: SHIPPED | OUTPATIENT
Start: 2018-08-06 | End: 2018-08-27 | Stop reason: SDUPTHER

## 2018-08-06 RX ORDER — RANITIDINE 150 MG/1
150 TABLET, FILM COATED ORAL 2 TIMES DAILY
Qty: 60 TAB | Refills: 0 | Status: SHIPPED | OUTPATIENT
Start: 2018-08-06 | End: 2018-08-27 | Stop reason: SDUPTHER

## 2018-08-06 NOTE — LETTER
8/6/2018 [facility] [address], [address] Jorge Bolls, 1970, is picking up the following medications ordered from the [facility]: 
 
Geronimo Delarosa #4 Stacey Almanzar Patient's Signature: _____________________________ Today's Date: 8/6/2018
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
## 2018-08-07 ENCOUNTER — TELEPHONE (OUTPATIENT)
Dept: FAMILY MEDICINE CLINIC | Age: 48
End: 2018-08-07

## 2018-08-07 NOTE — TELEPHONE ENCOUNTER
Medication: Caduet 10-40 mg, dose: 1 tab, how often: daily, current number of medication days provided: 90, refill per application. Lot #:  # [id number], EXP 10/2020. This medication was received and verified for the following 1. Correct Patient, 2. Correct Diagnosis, 3. Correct Drug, 4. Correct route, and no current allergy to medication. Please contact patient to come  their medications.      Johan Brantley, MSN, RN, FNP-C     [facility]

## 2018-08-21 ENCOUNTER — OFFICE VISIT (OUTPATIENT)
Dept: ONCOLOGY | Age: 48
End: 2018-08-21

## 2018-08-21 ENCOUNTER — HOSPITAL ENCOUNTER (OUTPATIENT)
Dept: ONCOLOGY | Age: 48
Discharge: HOME | End: 2018-08-21

## 2018-08-21 DIAGNOSIS — D75.1 SECONDARY POLYCYTHEMIA: ICD-10-CM

## 2018-08-21 DIAGNOSIS — D75.1 SECONDARY POLYCYTHEMIA: Primary | ICD-10-CM

## 2018-08-24 ENCOUNTER — HOSPITAL ENCOUNTER (EMERGENCY)
Age: 48
Discharge: HOME | End: 2018-08-24
Attending: EMERGENCY MEDICINE
Payer: SUBSIDIZED

## 2018-08-24 ENCOUNTER — HOSPITAL ENCOUNTER (OUTPATIENT)
Dept: INFUSION THERAPY | Age: 48
End: 2018-08-24

## 2018-08-24 VITALS
HEART RATE: 95 BPM | TEMPERATURE: 98.9 F | DIASTOLIC BLOOD PRESSURE: 73 MMHG | SYSTOLIC BLOOD PRESSURE: 128 MMHG | OXYGEN SATURATION: 99 % | RESPIRATION RATE: 16 BRPM

## 2018-08-24 DIAGNOSIS — B37.0 THRUSH, ORAL: Primary | ICD-10-CM

## 2018-08-24 PROCEDURE — 99282 EMERGENCY DEPT VISIT SF MDM: CPT

## 2018-08-24 RX ORDER — NYSTATIN 100000 [USP'U]/ML
500000 SUSPENSION ORAL 4 TIMES DAILY
Qty: 140 ML | Refills: 0 | Status: SHIPPED | OUTPATIENT
Start: 2018-08-24 | End: 2018-08-31

## 2018-08-24 RX ORDER — LIDOCAINE HYDROCHLORIDE 20 MG/ML
15 SOLUTION OROPHARYNGEAL AS NEEDED
Qty: 1 BOTTLE | Refills: 0 | Status: SHIPPED | OUTPATIENT
Start: 2018-08-24 | End: 2018-10-15
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
## 2018-08-24 NOTE — DISCHARGE INSTRUCTIONS
Candidiasis: Care Instructions  Your Care Instructions  Candidiasis (say \"vcv-hya-ET-uh-antionette\") is a yeast infection. Yeast normally lives in your body. But it can cause problems if your body's defenses don't work as they should. Some medicines can increase your chance of getting a yeast infection. These include antibiotics, steroids, and cancer drugs. And some diseases like AIDS and diabetes can make you more likely to get yeast infections. There are different types of yeast infections. Francisco Hart is a yeast infection in the mouth. It usually occurs in people with weak immune systems. It causes white patches inside the mouth and throat. Yeast infections of the skin usually occur in skin folds where the skin stays moist. They cause red, oozing patches on your skin. Babies can get these infections under the diaper. People who often wear gloves can get them on their hands. Many women get vaginal yeast infections. They are most common when women take antibiotics. These infections can cause the vagina to itch and burn. They also cause white discharge that looks like cottage cheese. In rare cases, yeast infects the blood. This can cause serious disease. This kind of infection is treated with medicine given through a needle into a vein (IV). After you start treatment, a yeast infection usually goes away quickly. But if your immune system is weak, the infection may come back. Tell your doctor if you get yeast infections often. Follow-up care is a key part of your treatment and safety. Be sure to make and go to all appointments, and call your doctor if you are having problems. It's also a good idea to know your test results and keep a list of the medicines you take. How can you care for yourself at home? · Take your medicines exactly as prescribed. Call your doctor if you think you are having a problem with your medicine. · Use antibiotics only as directed by your doctor. · Eat yogurt with live cultures.  It has bacteria called lactobacillus. It may help prevent some types of yeast infections. · Keep your skin clean and dry. Put powder on moist places. · If you are using a cream or suppository to treat a vaginal yeast infection, don't use condoms or a diaphragm. Use a different type of birth control. · Eat a healthy diet and get regular exercise. This will help keep your immune system strong. When should you call for help? Watch closely for changes in your health, and be sure to contact your doctor if:    · You do not get better as expected. Where can you learn more? Go to http://los-zack.info/. Enter W697 in the search box to learn more about \"Candidiasis: Care Instructions. \"  Current as of: October 6, 2017  Content Version: 11.7  © 2269-7655 [employer]. Care instructions adapted under license by [employer] (which disclaims liability or warranty for this information). If you have questions about a medical condition or this instruction, always ask your healthcare professional. [address] any warranty or liability for your use of this information.

## 2018-08-24 NOTE — PROGRESS NOTES
I called Ms. Ozuna on Friday, 8-24-18 at 1700, and left her a message on her voicemail to make her aware, that her hospitals appointment scheduled for Tuesday, 8-28-18 has been changed from 0900 to 1400. (And, her office visit with Dr. Osmany Del Rio will follow the [facility] appointment at [phone]).

## 2018-08-26 ENCOUNTER — APPOINTMENT (OUTPATIENT)
Dept: GENERAL RADIOLOGY | Age: 48
End: 2018-08-26
Attending: PHYSICIAN ASSISTANT
Payer: SUBSIDIZED

## 2018-08-26 ENCOUNTER — HOSPITAL ENCOUNTER (EMERGENCY)
Age: 48
Discharge: HOME | End: 2018-08-26
Attending: EMERGENCY MEDICINE
Payer: SUBSIDIZED

## 2018-08-26 ENCOUNTER — HOSPITAL ENCOUNTER (EMERGENCY)
Age: 48
Discharge: HOME | End: 2018-08-26
Attending: EMERGENCY MEDICINE | Admitting: EMERGENCY MEDICINE
Payer: SUBSIDIZED

## 2018-08-26 VITALS
BODY MASS INDEX: 34.75 KG/M2 | RESPIRATION RATE: 14 BRPM | TEMPERATURE: 98.1 F | WEIGHT: 190 LBS | SYSTOLIC BLOOD PRESSURE: 127 MMHG | DIASTOLIC BLOOD PRESSURE: 52 MMHG | HEART RATE: 50 BPM | OXYGEN SATURATION: 98 %

## 2018-08-26 VITALS
HEART RATE: 59 BPM | TEMPERATURE: 98.2 F | SYSTOLIC BLOOD PRESSURE: 139 MMHG | OXYGEN SATURATION: 100 % | RESPIRATION RATE: 18 BRPM | DIASTOLIC BLOOD PRESSURE: 96 MMHG

## 2018-08-26 DIAGNOSIS — R10.13 ABDOMINAL PAIN, EPIGASTRIC: ICD-10-CM

## 2018-08-26 DIAGNOSIS — N30.00 ACUTE CYSTITIS WITHOUT HEMATURIA: ICD-10-CM

## 2018-08-26 DIAGNOSIS — E87.6 HYPOKALEMIA: ICD-10-CM

## 2018-08-26 DIAGNOSIS — R07.89 ATYPICAL CHEST PAIN: Primary | ICD-10-CM

## 2018-08-26 DIAGNOSIS — R00.2 PALPITATIONS: Primary | ICD-10-CM

## 2018-08-26 LAB
ALBUMIN SERPL-MCNC: 3.4 G/DL (ref 3.4–5)
ALBUMIN SERPL-MCNC: 3.7 G/DL (ref 3.4–5)
ALBUMIN/GLOB SERPL: 1 {RATIO} (ref 0.8–1.7)
ALBUMIN/GLOB SERPL: 1 {RATIO} (ref 0.8–1.7)
ALP SERPL-CCNC: 68 U/L (ref 45–117)
ALP SERPL-CCNC: 74 U/L (ref 45–117)
ALT SERPL-CCNC: 21 U/L (ref 13–56)
ALT SERPL-CCNC: 21 U/L (ref 13–56)
AMPHET UR QL SCN: NEGATIVE
ANION GAP SERPL CALC-SCNC: 7 MMOL/L (ref 3–18)
ANION GAP SERPL CALC-SCNC: 8 MMOL/L (ref 3–18)
APPEARANCE UR: ABNORMAL
AST SERPL-CCNC: 11 U/L (ref 15–37)
AST SERPL-CCNC: 13 U/L (ref 15–37)
ATRIAL RATE: 69 BPM
BACTERIA URNS QL MICRO: ABNORMAL /HPF
BARBITURATES UR QL SCN: NEGATIVE
BASOPHILS # BLD: 0 K/UL (ref 0–0.1)
BASOPHILS # BLD: 0.1 K/UL (ref 0–0.1)
BASOPHILS NFR BLD: 0 % (ref 0–2)
BASOPHILS NFR BLD: 1 % (ref 0–2)
BENZODIAZ UR QL: NEGATIVE
BILIRUB SERPL-MCNC: 0.2 MG/DL (ref 0.2–1)
BILIRUB SERPL-MCNC: 0.2 MG/DL (ref 0.2–1)
BILIRUB UR QL: NEGATIVE
BUN SERPL-MCNC: 8 MG/DL (ref 7–18)
BUN SERPL-MCNC: 9 MG/DL (ref 7–18)
BUN/CREAT SERPL: 12 (ref 12–20)
BUN/CREAT SERPL: 15 (ref 12–20)
CALCIUM SERPL-MCNC: 8.3 MG/DL (ref 8.5–10.1)
CALCIUM SERPL-MCNC: 8.4 MG/DL (ref 8.5–10.1)
CALCULATED P AXIS, ECG09: 34 DEGREES
CALCULATED R AXIS, ECG10: -27 DEGREES
CALCULATED T AXIS, ECG11: 45 DEGREES
CANNABINOIDS UR QL SCN: POSITIVE
CHLORIDE SERPL-SCNC: 101 MMOL/L (ref 100–108)
CHLORIDE SERPL-SCNC: 106 MMOL/L (ref 100–108)
CK MB CFR SERPL CALC: 3 % (ref 0–4)
CK MB CFR SERPL CALC: 3.3 % (ref 0–4)
CK MB SERPL-MCNC: 1.1 NG/ML (ref 5–25)
CK MB SERPL-MCNC: 1.4 NG/ML (ref 5–25)
CK SERPL-CCNC: 37 U/L (ref 26–192)
CK SERPL-CCNC: 43 U/L (ref 26–192)
CO2 SERPL-SCNC: 29 MMOL/L (ref 21–32)
CO2 SERPL-SCNC: 30 MMOL/L (ref 21–32)
COCAINE UR QL SCN: POSITIVE
COLOR UR: YELLOW
CREAT SERPL-MCNC: 0.61 MG/DL (ref 0.6–1.3)
CREAT SERPL-MCNC: 0.67 MG/DL (ref 0.6–1.3)
DIAGNOSIS, 93000: NORMAL
DIFFERENTIAL METHOD BLD: ABNORMAL
DIFFERENTIAL METHOD BLD: ABNORMAL
EOSINOPHIL # BLD: 0.1 K/UL (ref 0–0.4)
EOSINOPHIL # BLD: 0.2 K/UL (ref 0–0.4)
EOSINOPHIL NFR BLD: 1 % (ref 0–5)
EOSINOPHIL NFR BLD: 2 % (ref 0–5)
EPITH CASTS URNS QL MICRO: ABNORMAL /LPF (ref 0–5)
ERYTHROCYTE [DISTWIDTH] IN BLOOD BY AUTOMATED COUNT: 12.5 % (ref 11.6–14.5)
ERYTHROCYTE [DISTWIDTH] IN BLOOD BY AUTOMATED COUNT: 12.6 % (ref 11.6–14.5)
GLOBULIN SER CALC-MCNC: 3.5 G/DL (ref 2–4)
GLOBULIN SER CALC-MCNC: 3.8 G/DL (ref 2–4)
GLUCOSE SERPL-MCNC: 135 MG/DL (ref 74–99)
GLUCOSE SERPL-MCNC: 142 MG/DL (ref 74–99)
GLUCOSE UR STRIP.AUTO-MCNC: NEGATIVE MG/DL
HCT VFR BLD AUTO: 42.3 % (ref 35–45)
HCT VFR BLD AUTO: 46.1 % (ref 35–45)
HDSCOM,HDSCOM: ABNORMAL
HGB BLD-MCNC: 14.4 G/DL (ref 12–16)
HGB BLD-MCNC: 15.4 G/DL (ref 12–16)
HGB UR QL STRIP: NEGATIVE
KETONES UR QL STRIP.AUTO: NEGATIVE MG/DL
LEUKOCYTE ESTERASE UR QL STRIP.AUTO: ABNORMAL
LYMPHOCYTES # BLD: 2.8 K/UL (ref 0.9–3.6)
LYMPHOCYTES # BLD: 3.8 K/UL (ref 0.9–3.6)
LYMPHOCYTES NFR BLD: 31 % (ref 21–52)
LYMPHOCYTES NFR BLD: 39 % (ref 21–52)
MAGNESIUM SERPL-MCNC: 2.3 MG/DL (ref 1.6–2.6)
MAGNESIUM SERPL-MCNC: 2.3 MG/DL (ref 1.6–2.6)
MCH RBC QN AUTO: 30.3 PG (ref 24–34)
MCH RBC QN AUTO: 30.3 PG (ref 24–34)
MCHC RBC AUTO-ENTMCNC: 33.4 G/DL (ref 31–37)
MCHC RBC AUTO-ENTMCNC: 34 G/DL (ref 31–37)
MCV RBC AUTO: 88.9 FL (ref 74–97)
MCV RBC AUTO: 90.6 FL (ref 74–97)
METHADONE UR QL: NEGATIVE
MONOCYTES # BLD: 0.8 K/UL (ref 0.05–1.2)
MONOCYTES # BLD: 0.9 K/UL (ref 0.05–1.2)
MONOCYTES NFR BLD: 10 % (ref 3–10)
MONOCYTES NFR BLD: 9 % (ref 3–10)
NEUTS SEG # BLD: 4.8 K/UL (ref 1.8–8)
NEUTS SEG # BLD: 5.3 K/UL (ref 1.8–8)
NEUTS SEG NFR BLD: 48 % (ref 40–73)
NEUTS SEG NFR BLD: 59 % (ref 40–73)
NITRITE UR QL STRIP.AUTO: NEGATIVE
OPIATES UR QL: POSITIVE
P-R INTERVAL, ECG05: 166 MS
PCP UR QL: NEGATIVE
PH UR STRIP: 7 [PH] (ref 5–8)
PLATELET # BLD AUTO: 320 K/UL (ref 135–420)
PLATELET # BLD AUTO: 337 K/UL (ref 135–420)
PMV BLD AUTO: 9.7 FL (ref 9.2–11.8)
PMV BLD AUTO: 9.8 FL (ref 9.2–11.8)
POTASSIUM SERPL-SCNC: 3 MMOL/L (ref 3.5–5.5)
POTASSIUM SERPL-SCNC: 3.1 MMOL/L (ref 3.5–5.5)
PROT SERPL-MCNC: 6.9 G/DL (ref 6.4–8.2)
PROT SERPL-MCNC: 7.5 G/DL (ref 6.4–8.2)
PROT UR STRIP-MCNC: NEGATIVE MG/DL
Q-T INTERVAL, ECG07: 448 MS
QRS DURATION, ECG06: 118 MS
QTC CALCULATION (BEZET), ECG08: 480 MS
RBC # BLD AUTO: 4.76 M/UL (ref 4.2–5.3)
RBC # BLD AUTO: 5.09 M/UL (ref 4.2–5.3)
RBC #/AREA URNS HPF: ABNORMAL /HPF (ref 0–5)
SODIUM SERPL-SCNC: 139 MMOL/L (ref 136–145)
SODIUM SERPL-SCNC: 142 MMOL/L (ref 136–145)
SP GR UR REFRACTOMETRY: 1.01 (ref 1–1.03)
TROPONIN I BLD-MCNC: <0.04 NG/ML (ref 0–0.08)
TROPONIN I SERPL-MCNC: <0.02 NG/ML (ref 0–0.04)
TROPONIN I SERPL-MCNC: <0.02 NG/ML (ref 0–0.04)
UROBILINOGEN UR QL STRIP.AUTO: 0.2 EU/DL (ref 0.2–1)
VENTRICULAR RATE, ECG03: 69 BPM
WBC # BLD AUTO: 9.2 K/UL (ref 4.6–13.2)
WBC # BLD AUTO: 9.7 K/UL (ref 4.6–13.2)
WBC URNS QL MICRO: ABNORMAL /HPF (ref 0–4)

## 2018-08-26 PROCEDURE — 96360 HYDRATION IV INFUSION INIT: CPT

## 2018-08-26 PROCEDURE — 83735 ASSAY OF MAGNESIUM: CPT | Performed by: PHYSICIAN ASSISTANT

## 2018-08-26 PROCEDURE — 99285 EMERGENCY DEPT VISIT HI MDM: CPT

## 2018-08-26 PROCEDURE — 81001 URINALYSIS AUTO W/SCOPE: CPT | Performed by: PHYSICIAN ASSISTANT

## 2018-08-26 PROCEDURE — 74011250637 HC RX REV CODE- 250/637: Performed by: EMERGENCY MEDICINE

## 2018-08-26 PROCEDURE — 96374 THER/PROPH/DIAG INJ IV PUSH: CPT

## 2018-08-26 PROCEDURE — 80307 DRUG TEST PRSMV CHEM ANLYZR: CPT | Performed by: PHYSICIAN ASSISTANT

## 2018-08-26 PROCEDURE — 93005 ELECTROCARDIOGRAM TRACING: CPT

## 2018-08-26 PROCEDURE — 96361 HYDRATE IV INFUSION ADD-ON: CPT

## 2018-08-26 PROCEDURE — 84484 ASSAY OF TROPONIN QUANT: CPT | Performed by: PHYSICIAN ASSISTANT

## 2018-08-26 PROCEDURE — 83735 ASSAY OF MAGNESIUM: CPT | Performed by: EMERGENCY MEDICINE

## 2018-08-26 PROCEDURE — 80053 COMPREHEN METABOLIC PANEL: CPT | Performed by: PHYSICIAN ASSISTANT

## 2018-08-26 PROCEDURE — 80053 COMPREHEN METABOLIC PANEL: CPT | Performed by: EMERGENCY MEDICINE

## 2018-08-26 PROCEDURE — 71046 X-RAY EXAM CHEST 2 VIEWS: CPT

## 2018-08-26 PROCEDURE — 74011250636 HC RX REV CODE- 250/636: Performed by: EMERGENCY MEDICINE

## 2018-08-26 PROCEDURE — 82550 ASSAY OF CK (CPK): CPT | Performed by: PHYSICIAN ASSISTANT

## 2018-08-26 PROCEDURE — 85025 COMPLETE CBC W/AUTO DIFF WBC: CPT | Performed by: PHYSICIAN ASSISTANT

## 2018-08-26 PROCEDURE — 74011250636 HC RX REV CODE- 250/636: Performed by: PHYSICIAN ASSISTANT

## 2018-08-26 RX ORDER — SODIUM CHLORIDE 9 MG/ML
1000 INJECTION, SOLUTION INTRAVENOUS ONCE
Status: COMPLETED | OUTPATIENT
Start: 2018-08-26 | End: 2018-08-26

## 2018-08-26 RX ORDER — NITROFURANTOIN 25; 75 MG/1; MG/1
100 CAPSULE ORAL 2 TIMES DAILY
Qty: 6 CAP | Refills: 0 | Status: SHIPPED | OUTPATIENT
Start: 2018-08-26 | End: 2018-08-29

## 2018-08-26 RX ORDER — POTASSIUM CHLORIDE 20 MEQ/1
40 TABLET, EXTENDED RELEASE ORAL
Status: COMPLETED | OUTPATIENT
Start: 2018-08-26 | End: 2018-08-26

## 2018-08-26 RX ORDER — POTASSIUM CHLORIDE 1.5 G/1.77G
20 POWDER, FOR SOLUTION ORAL DAILY
Qty: 7 PACKET | Refills: 0 | Status: SHIPPED | OUTPATIENT
Start: 2018-08-26 | End: 2018-08-27

## 2018-08-26 RX ORDER — MORPHINE SULFATE 2 MG/ML
2 INJECTION, SOLUTION INTRAMUSCULAR; INTRAVENOUS
Status: COMPLETED | OUTPATIENT
Start: 2018-08-26 | End: 2018-08-26

## 2018-08-26 RX ADMIN — POTASSIUM CHLORIDE 40 MEQ: 20 TABLET, EXTENDED RELEASE ORAL at 18:04

## 2018-08-26 RX ADMIN — Medication 2 MG: at 09:40

## 2018-08-26 RX ADMIN — SODIUM CHLORIDE 1000 ML/HR: 900 INJECTION, SOLUTION INTRAVENOUS at 09:40

## 2018-08-26 RX ADMIN — SODIUM CHLORIDE 1000 ML: 900 INJECTION, SOLUTION INTRAVENOUS at 16:51

## 2018-08-26 NOTE — ED TRIAGE NOTES
Patient arrived from home c/o chest pain under left breast. Patient admits to using cocaine and some alcohol for the past few days.  Patient denies cardiac hx at this time

## 2018-08-26 NOTE — ED PROVIDER NOTES
EMERGENCY DEPARTMENT HISTORY AND PHYSICAL EXAM    Date: 8/26/2018  Patient Name: Christopher Lucas    History of Presenting Illness     Chief Complaint   Patient presents with    Chest Pain         History Provided By: Patient    Chief Complaint: chest apin  Duration: 1 Days  Timing:  Acute  Location: chest upper abdomen  Quality: Burning  Severity: Moderate  Modifying Factors: started after '3 days of coke and weed'  Associated Symptoms: denies any other associated signs or symptoms      Additional History (Context): Christopher Lucas is a 51 y.o. female with hypertension and drug adn alcohol abuse who presents with chest pain and upper abdominal pain x1 day. PT states she ahs recent [place] of 3 days of coke and weed. PCP: Kev Murdock NP    Current Outpatient Prescriptions   Medication Sig Dispense Refill    nitrofurantoin, macrocrystal-monohydrate, (MACROBID) 100 mg capsule Take 1 Cap by mouth two (2) times a day for 3 days. 6 Cap 0    nystatin (MYCOSTATIN) 100,000 unit/mL suspension Take 5 mL by mouth four (4) times daily for 7 days. swish and spit 140 mL 0    lidocaine (LIDOCAINE VISCOUS) 2 % solution Take 15 mL by mouth as needed for Pain. 1 Bottle 0    LORazepam (ATIVAN) 1 mg tablet Take  by mouth every four (4) hours as needed for Anxiety.  losartan-hydroCHLOROthiazide (HYZAAR) 100-12.5 mg per tablet Take 1 Tab by mouth daily.  raNITIdine (ZANTAC) 150 mg tablet Take 1 Tab by mouth two (2) times a day. 60 Tab 0    potassium chloride (K-DUR, KLOR-CON) 20 mEq tablet Take 1 Tab by mouth daily. 30 Tab 3    multivitamin, tx-iron-ca-min (THERA-M W/ IRON) 9 mg iron-400 mcg tab tablet Take 1 Tab by mouth daily.  lamoTRIgine (LAMICTAL) 100 mg tablet Take 200 mg by mouth daily.  mirtazapine (REMERON) 30 mg tablet Take  by mouth nightly. For sleep      cloNIDine HCl (CATAPRES) 0.3 mg tablet Take 1 Tab by mouth two (2) times a day.  Indications: hypertension 60 Tab 3    amLODIPine-atorvastatin (CADUET) 10-40 mg per tablet Take 1 Tab by mouth daily. 90 Tab 3    calcium-cholecalciferol, D3, (CALTRATE 600+D) tablet Take 1 Tab by mouth daily.  omega-3 acid ethyl esters (LOVAZA) 1 gram capsule Take 2 Caps by mouth two (2) times a day. 360 Cap 3    ergocalciferol (VITAMIN D2) 50,000 unit capsule Take one capsule every 7 days till gone, then start taking over-the-counter Vitamin D3 2,000 units every day 4 Cap 2    albuterol (PROVENTIL HFA, VENTOLIN HFA, PROAIR HFA) 90 mcg/actuation inhaler Take 2 Puffs by inhalation every four (4) hours as needed for Wheezing.  6 Inhaler 3       Past History     Past Medical History:  Past Medical History:   Diagnosis Date    Bronchitis     Cocaine abuse 3/26/2015    Dental caries     Depression     Depression 8/31/2016    Drug abuse     Elevated hematocrit 8/31/2016    ETOH abuse     H/O screening mammography 12/06/2016    No evidence of malignancy     HPV (human papilloma virus) infection 11/2016    The patient referred to [facility]     Hypertension     Ill-defined condition     Mood swings (HCC)     Polycythemia 2018    Psychiatric disorder     depression, Bipolar    Tobacco abuse     Vitamin D deficiency        Past Surgical History:  Past Surgical History:   Procedure Laterality Date    HX APPENDECTOMY      HX COLPOSCOPY  12/06/2016    Low-grade courtnye/mild dysplasia RICK I    HX HERNIA REPAIR         Family History:  Family History   Problem Relation Age of Onset    Hypertension Mother     Heart Attack Father     Hypertension Father     Cancer Father      skin    Cancer Sister      skin    Bipolar Disorder Sister     Cancer Brother      skin    Cancer Maternal Aunt      colon       Social History:  Social History   Substance Use Topics    Smoking status: Current Every Day Smoker     Packs/day: 0.50     Years: 20.00     Types: Cigarettes    Smokeless tobacco: Never Used    Alcohol use 0.0 oz/week     3 - 6 Cans of beer per week      Comment: 3 or 4 beers weekly        Allergies: Allergies   Allergen Reactions    Iodinated Contrast- Oral And Iv Dye Shortness of Breath         Review of Systems   Review of Systems   Constitutional: Negative for fatigue and fever. HENT: Negative for congestion. Eyes: Negative for pain. Respiratory: Negative for cough and shortness of breath. Cardiovascular: Positive for chest pain. Gastrointestinal: Positive for abdominal pain. Negative for nausea and vomiting. Genitourinary: Negative for dysuria. Musculoskeletal: Negative for back pain. Skin: Negative for wound. Neurological: Negative for dizziness and headaches. All other systems reviewed and are negative. All Other Systems Negative  Physical Exam     Vitals:    08/26/18 1100 08/26/18 1115 08/26/18 1130 08/26/18 1138   BP: 144/81  150/66 150/66   Pulse: (!) 51 (!) 54 74 (!) 50   Resp: 14 14 14 13   Temp:    98.2 °F (36.8 °C)   SpO2: 98% 100% 99% 98%     Physical Exam   Constitutional: She is oriented to person, place, and time. She appears well-developed and well-nourished. No distress. Pt is tearful on exam   HENT:   Head: Normocephalic. Nose: Nose normal.   Eyes: Conjunctivae are normal. Pupils are equal, round, and reactive to light. Neck: Normal range of motion. Cardiovascular: Normal rate, regular rhythm and normal heart sounds. No murmur heard. Pulmonary/Chest: Effort normal and breath sounds normal. No respiratory distress. She has no wheezes. Abdominal: Normal appearance and bowel sounds are normal. There is tenderness in the epigastric area. There is no rigidity, no rebound, no guarding, no CVA tenderness, no tenderness at McBurney's point and negative Dominguez's sign. Musculoskeletal: Normal range of motion. Neurological: She is alert and oriented to person, place, and time. Skin: Skin is warm and dry. Psychiatric: She has a normal mood and affect.  Her behavior is normal.            Diagnostic Study Results     Labs -     Recent Results (from the past 12 hour(s))   CARDIAC PANEL,(CK, CKMB & TROPONIN)    Collection Time: 08/26/18  9:28 AM   Result Value Ref Range    CK 37 26 - 192 U/L    CK - MB 1.1 <3.6 ng/ml    CK-MB Index 3.0 0.0 - 4.0 %    Troponin-I, Qt. <0.02 0.0 - 0.045 NG/ML   CBC WITH AUTOMATED DIFF    Collection Time: 08/26/18  9:28 AM   Result Value Ref Range    WBC 9.2 4.6 - 13.2 K/uL    RBC 5.09 4. 20 - 5.30 M/uL    HGB 15.4 12.0 - 16.0 g/dL    HCT 46.1 (H) 35.0 - 45.0 %    MCV 90.6 74.0 - 97.0 FL    MCH 30.3 24.0 - 34.0 PG    MCHC 33.4 31.0 - 37.0 g/dL    RDW 12.5 11.6 - 14.5 %    PLATELET 491 414 - 464 K/uL    MPV 9.8 9.2 - 11.8 FL    NEUTROPHILS 59 40 - 73 %    LYMPHOCYTES 31 21 - 52 %    MONOCYTES 9 3 - 10 %    EOSINOPHILS 1 0 - 5 %    BASOPHILS 0 0 - 2 %    ABS. NEUTROPHILS 5.3 1.8 - 8.0 K/UL    ABS. LYMPHOCYTES 2.8 0.9 - 3.6 K/UL    ABS. MONOCYTES 0.8 0.05 - 1.2 K/UL    ABS. EOSINOPHILS 0.1 0.0 - 0.4 K/UL    ABS. BASOPHILS 0.0 0.0 - 0.1 K/UL    DF AUTOMATED     METABOLIC PANEL, COMPREHENSIVE    Collection Time: 08/26/18  9:28 AM   Result Value Ref Range    Sodium 139 136 - 145 mmol/L    Potassium 3.1 (L) 3.5 - 5.5 mmol/L    Chloride 101 100 - 108 mmol/L    CO2 30 21 - 32 mmol/L    Anion gap 8 3.0 - 18 mmol/L    Glucose 135 (H) 74 - 99 mg/dL    BUN 9 7.0 - 18 MG/DL    Creatinine 0.61 0.6 - 1.3 MG/DL    BUN/Creatinine ratio 15 12 - 20      GFR est AA >60 >60 ml/min/1.73m2    GFR est non-AA >60 >60 ml/min/1.73m2    Calcium 8.4 (L) 8.5 - 10.1 MG/DL    Bilirubin, total 0.2 0.2 - 1.0 MG/DL    ALT (SGPT) 21 13 - 56 U/L    AST (SGOT) 13 (L) 15 - 37 U/L    Alk.  phosphatase 74 45 - 117 U/L    Protein, total 7.5 6.4 - 8.2 g/dL    Albumin 3.7 3.4 - 5.0 g/dL    Globulin 3.8 2.0 - 4.0 g/dL    A-G Ratio 1.0 0.8 - 1.7     MAGNESIUM    Collection Time: 08/26/18  9:28 AM   Result Value Ref Range    Magnesium 2.3 1.6 - 2.6 mg/dL   URINALYSIS W/ RFLX MICROSCOPIC    Collection Time: 08/26/18  9:32 AM   Result Value Ref Range    Color YELLOW      Appearance CLOUDY      Specific gravity 1.006 1.005 - 1.030      pH (UA) 7.0 5.0 - 8.0      Protein NEGATIVE  NEG mg/dL    Glucose NEGATIVE  NEG mg/dL    Ketone NEGATIVE  NEG mg/dL    Bilirubin NEGATIVE  NEG      Blood NEGATIVE  NEG      Urobilinogen 0.2 0.2 - 1.0 EU/dL    Nitrites NEGATIVE  NEG      Leukocyte Esterase MODERATE (A) NEG     DRUG SCREEN, URINE    Collection Time: 08/26/18  9:32 AM   Result Value Ref Range    BENZODIAZEPINES NEGATIVE  NEG      BARBITURATES NEGATIVE  NEG      THC (TH-CANNABINOL) POSITIVE (A) NEG      OPIATES POSITIVE (A) NEG      PCP(PHENCYCLIDINE) NEGATIVE  NEG      COCAINE POSITIVE (A) NEG      AMPHETAMINES NEGATIVE  NEG      METHADONE NEGATIVE  NEG      HDSCOM (NOTE)    URINE MICROSCOPIC ONLY    Collection Time: 08/26/18  9:32 AM   Result Value Ref Range    WBC 15 to 25 0 - 4 /hpf    RBC 0 to 1 0 - 5 /hpf    Epithelial cells 3+ 0 - 5 /lpf    Bacteria 1+ (A) NEG /hpf   POC TROPONIN-I    Collection Time: 08/26/18 11:38 AM   Result Value Ref Range    Troponin-I (POC) <0.04 0.00 - 0.08 ng/mL       Radiologic Studies -   XR CHEST PA LAT   Final Result        CT Results  (Last 48 hours)    None        CXR Results  (Last 48 hours)               08/26/18 0956  XR CHEST PA LAT Final result    Impression:  IMPRESSION:       Negative chest.       Narrative:  EXAM: CHEST PA AND LATERAL       CLINICAL HISTORY/INDICATION:  chest pain       COMPARISON: Chest x-ray July 24, 2018. TECHNIQUE: PA and lateral views        FINDINGS:         The cardiac and mediastinal silhouette is normal.  The lungs are clear. The   costophrenic angles are sharply defined. Pulmonary vascularity is normal. No   bony abnormalities are seen. Medical Decision Making   I am the first provider for this patient. I reviewed the vital signs, available nursing notes, past medical history, past surgical history, family history and social history.     Vital Signs-Reviewed the patient's vital signs. Pulse Oximetry Analysis - 98% on RA  EKG Results     Procedure 720 Value Units Date/Time    EKG, 12 LEAD, INITIAL [[id number]]     Order Status:  Sent         Labs Reviewed   CBC WITH AUTOMATED DIFF - Abnormal; Notable for the following:        Result Value    HCT 46.1 (*)     All other components within normal limits   METABOLIC PANEL, COMPREHENSIVE - Abnormal; Notable for the following:     Potassium 3.1 (*)     Glucose 135 (*)     Calcium 8.4 (*)     AST (SGOT) 13 (*)     All other components within normal limits   URINALYSIS W/ RFLX MICROSCOPIC - Abnormal; Notable for the following:     Leukocyte Esterase MODERATE (*)     All other components within normal limits   DRUG SCREEN, URINE - Abnormal; Notable for the following:     THC (TH-CANNABINOL) POSITIVE (*)     OPIATES POSITIVE (*)     COCAINE POSITIVE (*)     All other components within normal limits   URINE MICROSCOPIC ONLY - Abnormal; Notable for the following:     Bacteria 1+ (*)     All other components within normal limits   CARDIAC PANEL,(CK, CKMB & TROPONIN)   MAGNESIUM   POC TROPONIN-I       Records Reviewed: Old Medical Records    Procedures:  Procedures    Provider Notes (Medical Decision Making):     Pt  requests to leave stating she has significant personal problems at home. Dr Cassidy Gomez evaluated pt and agrees with pland to discharge pt now. WIll treat for UTI and encourage follow up for drug/alcohol abuse. MED RECONCILIATION:  No current facility-administered medications for this encounter. Current Outpatient Prescriptions   Medication Sig    nitrofurantoin, macrocrystal-monohydrate, (MACROBID) 100 mg capsule Take 1 Cap by mouth two (2) times a day for 3 days.  nystatin (MYCOSTATIN) 100,000 unit/mL suspension Take 5 mL by mouth four (4) times daily for 7 days. swish and spit    lidocaine (LIDOCAINE VISCOUS) 2 % solution Take 15 mL by mouth as needed for Pain.     LORazepam (ATIVAN) 1 mg tablet Take  by mouth every four (4) hours as needed for Anxiety.  losartan-hydroCHLOROthiazide (HYZAAR) 100-12.5 mg per tablet Take 1 Tab by mouth daily.  raNITIdine (ZANTAC) 150 mg tablet Take 1 Tab by mouth two (2) times a day.  potassium chloride (K-DUR, KLOR-CON) 20 mEq tablet Take 1 Tab by mouth daily.  multivitamin, tx-iron-ca-min (THERA-M W/ IRON) 9 mg iron-400 mcg tab tablet Take 1 Tab by mouth daily.  lamoTRIgine (LAMICTAL) 100 mg tablet Take 200 mg by mouth daily.  mirtazapine (REMERON) 30 mg tablet Take  by mouth nightly. For sleep    cloNIDine HCl (CATAPRES) 0.3 mg tablet Take 1 Tab by mouth two (2) times a day. Indications: hypertension    amLODIPine-atorvastatin (CADUET) 10-40 mg per tablet Take 1 Tab by mouth daily.  calcium-cholecalciferol, D3, (CALTRATE 600+D) tablet Take 1 Tab by mouth daily.  omega-3 acid ethyl esters (LOVAZA) 1 gram capsule Take 2 Caps by mouth two (2) times a day.  ergocalciferol (VITAMIN D2) 50,000 unit capsule Take one capsule every 7 days till gone, then start taking over-the-counter Vitamin D3 2,000 units every day    albuterol (PROVENTIL HFA, VENTOLIN HFA, PROAIR HFA) 90 mcg/actuation inhaler Take 2 Puffs by inhalation every four (4) hours as needed for Wheezing. Disposition:  discharge    DISCHARGE NOTE:     Pt has been reexamined. Patient has no new complaints, changes, or physical findings. Care plan outlined and precautions discussed. Results of visitwere reviewed with the patient. All medications were reviewed with the patient; will d/c home with antibiotics. All of pt's questions and concerns were addressed. Patient was instructed and agrees to follow up with PCP, as well as to return to the ED upon further deterioration. Patient is ready to go home.     Follow-up Information     Follow up With Details Comments [address] NP Call As needed, follow up [address]  [phone] [facility] EMERGENCY DEPT  If symptoms worsen [address] Call As needed [address]  [phone]          Current Discharge Medication List      START taking these medications    Details   nitrofurantoin, macrocrystal-monohydrate, (MACROBID) 100 mg capsule Take 1 Cap by mouth two (2) times a day for 3 days. Qty: 6 Cap, Refills: 0         CONTINUE these medications which have NOT CHANGED    Details   nystatin (MYCOSTATIN) 100,000 unit/mL suspension Take 5 mL by mouth four (4) times daily for 7 days. swish and spit  Qty: 140 mL, Refills: 0      lidocaine (LIDOCAINE VISCOUS) 2 % solution Take 15 mL by mouth as needed for Pain. Qty: 1 Bottle, Refills: 0      LORazepam (ATIVAN) 1 mg tablet Take  by mouth every four (4) hours as needed for Anxiety. losartan-hydroCHLOROthiazide (HYZAAR) 100-12.5 mg per tablet Take 1 Tab by mouth daily. raNITIdine (ZANTAC) 150 mg tablet Take 1 Tab by mouth two (2) times a day. Qty: 60 Tab, Refills: 0    Associated Diagnoses: Gastroesophageal reflux disease, esophagitis presence not specified      potassium chloride (K-DUR, KLOR-CON) 20 mEq tablet Take 1 Tab by mouth daily. Qty: 30 Tab, Refills: 3    Associated Diagnoses: Heart palpitations; Hypokalemia      multivitamin, tx-iron-ca-min (THERA-M W/ IRON) 9 mg iron-400 mcg tab tablet Take 1 Tab by mouth daily. lamoTRIgine (LAMICTAL) 100 mg tablet Take 200 mg by mouth daily. mirtazapine (REMERON) 30 mg tablet Take  by mouth nightly. For sleep      cloNIDine HCl (CATAPRES) 0.3 mg tablet Take 1 Tab by mouth two (2) times a day. Indications: hypertension  Qty: 60 Tab, Refills: 3    Associated Diagnoses: HTN, goal below 140/90; Tobacco abuse      amLODIPine-atorvastatin (CADUET) 10-40 mg per tablet Take 1 Tab by mouth daily. Qty: 90 Tab, Refills: 3    Associated Diagnoses: Dyslipidemia;  Hypertriglyceridemia calcium-cholecalciferol, D3, (CALTRATE 600+D) tablet Take 1 Tab by mouth daily. omega-3 acid ethyl esters (LOVAZA) 1 gram capsule Take 2 Caps by mouth two (2) times a day. Qty: 360 Cap, Refills: 3    Associated Diagnoses: Hypertriglyceridemia      ergocalciferol (VITAMIN D2) 50,000 unit capsule Take one capsule every 7 days till gone, then start taking over-the-counter Vitamin D3 2,000 units every day  Qty: 4 Cap, Refills: 2    Associated Diagnoses: Vitamin D deficiency      albuterol (PROVENTIL HFA, VENTOLIN HFA, PROAIR HFA) 90 mcg/actuation inhaler Take 2 Puffs by inhalation every four (4) hours as needed for Wheezing. Qty: 6 Inhaler, Refills: 3    Associated Diagnoses: Tobacco abuse; Shortness of breath           I   Diagnosis     Clinical Impression:   1. Atypical chest pain    2. Abdominal pain, epigastric    3.  Acute cystitis without hematuria

## 2018-08-26 NOTE — DISCHARGE INSTRUCTIONS
Hypokalemia: Care Instructions  Your Care Instructions    Hypokalemia (say \"[id number]-SANDEEP-mariama-uh\") is a low level of potassium. The heart, muscles, kidneys, and nervous system all need potassium to work well. This problem has many different causes. Kidney problems, diet, and medicines like diuretics and laxatives can cause it. So can vomiting or diarrhea. In some cases, cancer is the cause. Your doctor may do tests to find the cause of your low potassium levels. You may need medicines to bring your potassium levels back to normal. You may also need regular blood tests to check your potassium. If you have very low potassium, you may need intravenous (IV) medicines. You also may need tests to check the electrical activity of your heart. Heart problems caused by low potassium levels can be very serious. Follow-up care is a key part of your treatment and safety. Be sure to make and go to all appointments, and call your doctor if you are having problems. It's also a good idea to know your test results and keep a list of the medicines you take. How can you care for yourself at home? · If your doctor recommends it, eat foods that have a lot of potassium. These include fresh fruits, juices, and vegetables. They also include nuts, beans, and milk. · Be safe with medicines. If your doctor prescribes medicines or potassium supplements, take them exactly as directed. Call your doctor if you have any problems with your medicines. · Get your potassium levels tested as often as your doctor tells you. When should you call for help? Call 911 anytime you think you may need emergency care. For example, call if:    · You feel like your heart is missing beats. Heart problems caused by low potassium can cause death.     · You passed out (lost consciousness).     · You have a seizure.    Call your doctor now or seek immediate medical care if:    · You feel weak or unusually tired.     · You have severe arm or leg cramps.   · You have tingling or numbness.     · You feel sick to your stomach, or you vomit.     · You have belly cramps.     · You feel bloated or constipated.     · You have to urinate a lot.     · You feel very thirsty most of the time.     · You are dizzy or lightheaded, or you feel like you may faint.     · You feel depressed, or you lose touch with reality.    Watch closely for changes in your health, and be sure to contact your doctor if:    · You do not get better as expected. Where can you learn more? Go to http://los-zack.info/. Enter G358 in the search box to learn more about \"Hypokalemia: Care Instructions. \"  Current as of: May 12, 2017  Content Version: 11.7  © 1066-8250 [employer]. Care instructions adapted under license by [employer] (which disclaims liability or warranty for this information). If you have questions about a medical condition or this instruction, always ask your healthcare professional. [place] [address] any warranty or liability for your use of this information. Palpitations: Care Instructions  Your Care Instructions    Heart palpitations are the uncomfortable sensation that your heart is beating fast or irregularly. You might feel pounding or fluttering in your chest. It might feel like your heart is skipping a beat. Although palpitations may be caused by a heart problem, they also occur because of stress, fatigue, or use of alcohol, caffeine, or nicotine. Many medicines, including diet pills, antihistamines, decongestants, and some herbal products, can cause heart palpitations. Nearly everyone has palpitations from time to time. Depending on your symptoms, your doctor may need to do more tests to try to find the cause of your palpitations. Follow-up care is a key part of your treatment and safety. Be sure to make and go to all appointments, and call your doctor if you are having problems.  It's also a good idea to know your test results and keep a list of the medicines you take. How can you care for yourself at home? · Avoid caffeine, nicotine, and excess alcohol. · Do not take illegal drugs, such as methamphetamines and cocaine. · Do not take weight loss or diet medicines unless you talk with your doctor first.  · Get plenty of sleep. · Do not overeat. · If you have palpitations again, take deep breaths and try to relax. This may slow a racing heart. · If you start to feel lightheaded, lie down to avoid injuries that might result if you pass out and fall down. · Keep a record of your palpitations and bring it to your next doctor's appointment. Write down:  ¨ The date and time. ¨ Your pulse. (If your heart is beating fast, it may be hard to count your pulse.)  ¨ What you were doing when the palpitations started. ¨ How long the palpitations lasted. ¨ Any other symptoms. · If an activity causes palpitations, slow down or stop. Talk to your doctor before you do that activity again. · Take your medicines exactly as prescribed. Call your doctor if you think you are having a problem with your medicine. When should you call for help? Call 911 anytime you think you may need emergency care. For example, call if:    · You passed out (lost consciousness).     · You have symptoms of a heart attack. These may include:  ¨ Chest pain or pressure, or a strange feeling in the chest.  ¨ Sweating. ¨ Shortness of breath. ¨ Pain, pressure, or a strange feeling in the back, neck, jaw, or upper belly or in one or both shoulders or arms. ¨ Lightheadedness or sudden weakness. ¨ A fast or irregular heartbeat. After you call 911, the  may tell you to chew 1 adult-strength or 2 to 4 low-dose aspirin. Wait for an ambulance. Do not try to drive yourself.     · You have symptoms of a stroke.  These may include:  ¨ Sudden numbness, tingling, weakness, or loss of movement in your face, arm, or leg, especially on only one side of your body. ¨ Sudden vision changes. ¨ Sudden trouble speaking. ¨ Sudden confusion or trouble understanding simple statements. ¨ Sudden problems with walking or balance. ¨ A sudden, severe headache that is different from past headaches.    Call your doctor now or seek immediate medical care if:    · You have heart palpitations and:  ¨ Are dizzy or lightheaded, or you feel like you may faint. ¨ Have new or increased shortness of breath.    Watch closely for changes in your health, and be sure to contact your doctor if:    · You continue to have heart palpitations. Where can you learn more? Go to http://los-zack.info/. Enter R508 in the search box to learn more about \"Palpitations: Care Instructions. \"  Current as of: December 6, 2017  Content Version: 11.7  © 0219-2286 [employer]. Care instructions adapted under license by [employer] (which disclaims liability or warranty for this information). If you have questions about a medical condition or this instruction, always ask your healthcare professional. [place] [address] any warranty or liability for your use of this information.

## 2018-08-26 NOTE — DISCHARGE INSTRUCTIONS
Abdominal Pain: Care Instructions  Your Care Instructions    Abdominal pain has many possible causes. Some aren't serious and get better on their own in a few days. Others need more testing and treatment. If your pain continues or gets worse, you need to be rechecked and may need more tests to find out what is wrong. You may need surgery to correct the problem. Don't ignore new symptoms, such as fever, nausea and vomiting, urination problems, pain that gets worse, and dizziness. These may be signs of a more serious problem. Your doctor may have recommended a follow-up visit in the next 8 to 12 hours. If you are not getting better, you may need more tests or treatment. The doctor has checked you carefully, but problems can develop later. If you notice any problems or new symptoms, get medical treatment right away. Follow-up care is a key part of your treatment and safety. Be sure to make and go to all appointments, and call your doctor if you are having problems. It's also a good idea to know your test results and keep a list of the medicines you take. How can you care for yourself at home? · Rest until you feel better. · To prevent dehydration, drink plenty of fluids, enough so that your urine is light yellow or clear like water. Choose water and other caffeine-free clear liquids until you feel better. If you have kidney, heart, or liver disease and have to limit fluids, talk with your doctor before you increase the amount of fluids you drink. · If your stomach is upset, eat mild foods, such as rice, dry toast or crackers, bananas, and applesauce. Try eating several small meals instead of two or three large ones. · Wait until 48 hours after all symptoms have gone away before you have spicy foods, alcohol, and drinks that contain caffeine. · Do not eat foods that are high in fat. · Avoid anti-inflammatory medicines such as aspirin, ibuprofen (Advil, Motrin), and naproxen (Aleve).  These can cause stomach upset. Talk to your doctor if you take daily aspirin for another health problem. When should you call for help? Call 911 anytime you think you may need emergency care. For example, call if:    · You passed out (lost consciousness).     · You pass maroon or very bloody stools.     · You vomit blood or what looks like coffee grounds.     · You have new, severe belly pain.    Call your doctor now or seek immediate medical care if:    · Your pain gets worse, especially if it becomes focused in one area of your belly.     · You have a new or higher fever.     · Your stools are black and look like tar, or they have streaks of blood.     · You have unexpected vaginal bleeding.     · You have symptoms of a urinary tract infection. These may include:  ¨ Pain when you urinate. ¨ Urinating more often than usual.  ¨ Blood in your urine.     · You are dizzy or lightheaded, or you feel like you may faint.    Watch closely for changes in your health, and be sure to contact your doctor if:    · You are not getting better after 1 day (24 hours). Where can you learn more? Go to http://losAirtimezack.info/. Enter L198 in the search box to learn more about \"Abdominal Pain: Care Instructions. \"  Current as of: November 20, 2017  Content Version: 11.7  © 7954-5746 [employer]. Care instructions adapted under license by [employer] (which disclaims liability or warranty for this information). If you have questions about a medical condition or this instruction, always ask your healthcare professional. [place] [address] any warranty or liability for your use of this information. Chest Pain: Care Instructions  Your Care Instructions    There are many things that can cause chest pain. Some are not serious and will get better on their own in a few days. But some kinds of chest pain need more testing and treatment.  Your doctor may have recommended a follow-up visit in the next 8 to 12 hours. If you are not getting better, you may need more tests or treatment. Even though your doctor has released you, you still need to watch for any problems. The doctor carefully checked you, but sometimes problems can develop later. If you have new symptoms or if your symptoms do not get better, get medical care right away. If you have worse or different chest pain or pressure that lasts more than 5 minutes or you passed out (lost consciousness), call 911 or seek other emergency help right away. A medical visit is only one step in your treatment. Even if you feel better, you still need to do what your doctor recommends, such as going to all suggested follow-up appointments and taking medicines exactly as directed. This will help you recover and help prevent future problems. How can you care for yourself at home? · Rest until you feel better. · Take your medicine exactly as prescribed. Call your doctor if you think you are having a problem with your medicine. · Do not drive after taking a prescription pain medicine. When should you call for help? Call 911 if:    · You passed out (lost consciousness).     · You have severe difficulty breathing.     · You have symptoms of a heart attack. These may include:  ¨ Chest pain or pressure, or a strange feeling in your chest.  ¨ Sweating. ¨ Shortness of breath. ¨ Nausea or vomiting. ¨ Pain, pressure, or a strange feeling in your back, neck, jaw, or upper belly or in one or both shoulders or arms. ¨ Lightheadedness or sudden weakness. ¨ A fast or irregular heartbeat. After you call 911, the  may tell you to chew 1 adult-strength or 2 to 4 low-dose aspirin. Wait for an ambulance.  Do not try to drive yourself.    Call your doctor today if:    · You have any trouble breathing.     · Your chest pain gets worse.     · You are dizzy or lightheaded, or you feel like you may faint.     · You are not getting better as expected.     · You are having new or different chest pain. Where can you learn more? Go to http://los-zack.info/. Enter A120 in the search box to learn more about \"Chest Pain: Care Instructions. \"  Current as of: November 20, 2017  Content Version: 11.7  © 3453-7341 [employer]. Care instructions adapted under license by [facility] (which disclaims liability or warranty for this information). If you have questions about a medical condition or this instruction, always ask your healthcare professional. [place] [address] any warranty or liability for your use of this information. Urinary Tract Infection in Women: Care Instructions  Your Care Instructions    A urinary tract infection, or UTI, is a general term for an infection anywhere between the kidneys and the urethra (where urine comes out). Most UTIs are bladder infections. They often cause pain or burning when you urinate. UTIs are caused by bacteria and can be cured with antibiotics. Be sure to complete your treatment so that the infection goes away. Follow-up care is a key part of your treatment and safety. Be sure to make and go to all appointments, and call your doctor if you are having problems. It's also a good idea to know your test results and keep a list of the medicines you take. How can you care for yourself at home? · Take your antibiotics as directed. Do not stop taking them just because you feel better. You need to take the full course of antibiotics. · Drink extra water and other fluids for the next day or two. This may help wash out the bacteria that are causing the infection. (If you have kidney, heart, or liver disease and have to limit fluids, talk with your doctor before you increase your fluid intake.)  · Avoid drinks that are carbonated or have caffeine. They can irritate the bladder. · Urinate often. Try to empty your bladder each time.   · To relieve pain, take a hot bath or lay a heating pad set on low over your lower belly or genital area. Never go to sleep with a heating pad in place. To prevent UTIs  · Drink plenty of water each day. This helps you urinate often, which clears bacteria from your system. (If you have kidney, heart, or liver disease and have to limit fluids, talk with your doctor before you increase your fluid intake.)  · Urinate when you need to. · Urinate right after you have sex. · Change sanitary pads often. · Avoid douches, bubble baths, feminine hygiene sprays, and other feminine hygiene products that have deodorants. · After going to the bathroom, wipe from front to back. When should you call for help? Call your doctor now or seek immediate medical care if:    · Symptoms such as fever, chills, nausea, or vomiting get worse or appear for the first time.     · You have new pain in your back just below your rib cage. This is called flank pain.     · There is new blood or pus in your urine.     · You have any problems with your antibiotic medicine.    Watch closely for changes in your health, and be sure to contact your doctor if:    · You are not getting better after taking an antibiotic for 2 days.     · Your symptoms go away but then come back. Where can you learn more? Go to http://los-zack.info/. Enter O872 in the search box to learn more about \"Urinary Tract Infection in Women: Care Instructions. \"  Current as of: May 12, 2017  Content Version: 11.7  © 2145-2927 [employer], [facility]. Care instructions adapted under license by [employer] (which disclaims liability or warranty for this information). If you have questions about a medical condition or this instruction, always ask your healthcare professional. [address] any warranty or liability for your use of this information.

## 2018-08-26 NOTE — ED PROVIDER NOTES
EMERGENCY DEPARTMENT HISTORY AND PHYSICAL EXAM    4:51 PM      Date: 8/26/2018  Patient Name: Rossy Matos    History of Presenting Illness     Chief Complaint   Patient presents with    Palpitations    Nausea             Additional History (Context): 4:55 PM Rossy Matos is a 51 y.o. female with h/o HTN, etoh abuse, depression, bipolar disorder, cocaine abuse, HPV who presents to ED complaining of moderate intermittent episodes of palpitations with associated sx of nausea and lightheadedness. Pt denies smoking or drinking since her last visit. She says at bedside \"It is getting to be a lot today. \" No modifying or aggravating factors were reported. No other concerns or symptoms at this time. PCP: Dale Eisenberg NP    Chief Complaint: Palpitations  Duration: n/a  Timing:  Intermittent Episodes, \"couple times an hour\"  Location: n/a  Quality: \"flutter\"  Severity: Moderate  Modifying Factors: No modifying or aggravating factors were reported. Associated Symptoms: nausea, lightheadedness      Current Facility-Administered Medications   Medication Dose Route Frequency Provider Last Rate Last Dose    sodium chloride 0.9 % bolus infusion 1,000 mL  1,000 mL IntraVENous ONCE Sasha Carroll MD         Current Outpatient Prescriptions   Medication Sig Dispense Refill    nitrofurantoin, macrocrystal-monohydrate, (MACROBID) 100 mg capsule Take 1 Cap by mouth two (2) times a day for 3 days. 6 Cap 0    nystatin (MYCOSTATIN) 100,000 unit/mL suspension Take 5 mL by mouth four (4) times daily for 7 days. swish and spit 140 mL 0    lidocaine (LIDOCAINE VISCOUS) 2 % solution Take 15 mL by mouth as needed for Pain. 1 Bottle 0    LORazepam (ATIVAN) 1 mg tablet Take  by mouth every four (4) hours as needed for Anxiety.  losartan-hydroCHLOROthiazide (HYZAAR) 100-12.5 mg per tablet Take 1 Tab by mouth daily.  raNITIdine (ZANTAC) 150 mg tablet Take 1 Tab by mouth two (2) times a day.  60 Tab 0    potassium chloride (K-DUR, KLOR-CON) 20 mEq tablet Take 1 Tab by mouth daily. 30 Tab 3    multivitamin, tx-iron-ca-min (THERA-M W/ IRON) 9 mg iron-400 mcg tab tablet Take 1 Tab by mouth daily.  lamoTRIgine (LAMICTAL) 100 mg tablet Take 200 mg by mouth daily.  mirtazapine (REMERON) 30 mg tablet Take  by mouth nightly. For sleep      cloNIDine HCl (CATAPRES) 0.3 mg tablet Take 1 Tab by mouth two (2) times a day. Indications: hypertension 60 Tab 3    amLODIPine-atorvastatin (CADUET) 10-40 mg per tablet Take 1 Tab by mouth daily. 90 Tab 3    calcium-cholecalciferol, D3, (CALTRATE 600+D) tablet Take 1 Tab by mouth daily.  omega-3 acid ethyl esters (LOVAZA) 1 gram capsule Take 2 Caps by mouth two (2) times a day. 360 Cap 3    ergocalciferol (VITAMIN D2) 50,000 unit capsule Take one capsule every 7 days till gone, then start taking over-the-counter Vitamin D3 2,000 units every day 4 Cap 2    albuterol (PROVENTIL HFA, VENTOLIN HFA, PROAIR HFA) 90 mcg/actuation inhaler Take 2 Puffs by inhalation every four (4) hours as needed for Wheezing.  6 Inhaler 3       Past History     Past Medical History:  Past Medical History:   Diagnosis Date    Bronchitis     Cocaine abuse 3/26/2015    Dental caries     Depression     Depression 8/31/2016    Drug abuse     Elevated hematocrit 8/31/2016    ETOH abuse     H/O screening mammography 12/06/2016    No evidence of malignancy     HPV (human papilloma virus) infection 11/2016    The patient [facility]     Hypertension     Ill-defined condition     Mood swings (HCC)     Polycythemia 2018    Psychiatric disorder     depression, Bipolar    Tobacco abuse     Vitamin D deficiency        Past Surgical History:  Past Surgical History:   Procedure Laterality Date    HX APPENDECTOMY      HX COLPOSCOPY  12/06/2016    Low-grade courtney/mild dysplasia RICK I    HX HERNIA REPAIR         Family History:  Family History   Problem Relation Age of Onset    Hypertension Mother     Heart Attack Father     Hypertension Father     Cancer Father      skin    Cancer Sister      skin    Bipolar Disorder Sister     Cancer Brother      skin    Cancer Maternal Aunt      colon       Social History:  Social History   Substance Use Topics    Smoking status: Current Every Day Smoker     Packs/day: 0.50     Years: 20.00     Types: Cigarettes    Smokeless tobacco: Never Used    Alcohol use 0.0 oz/week     3 - 6 Cans of beer per week      Comment: 3 or 4 beers weekly        Allergies: Allergies   Allergen Reactions    Iodinated Contrast- Oral And Iv Dye Shortness of Breath         Review of Systems     Review of Systems   Constitutional: Negative for fever. Cardiovascular: Positive for palpitations. Gastrointestinal: Positive for nausea. Neurological: Positive for light-headedness. All other systems reviewed and are negative. Physical Exam     Visit Vitals    /82 (BP 1 Location: Left arm, BP Patient Position: At rest)    Pulse 62    Temp 98.1 °F (36.7 °C)    Resp 18    Wt 86.2 kg (190 lb)    SpO2 97%    BMI 34.75 kg/m2       Physical Exam   Constitutional: She is oriented to person, place, and time. She appears well-developed. HENT:   Head: Normocephalic and atraumatic. Eyes: EOM are normal. Pupils are equal, round, and reactive to light. Neck: Normal range of motion. Neck supple. Cardiovascular: Normal rate, regular rhythm and normal heart sounds. Exam reveals no friction rub. No murmur heard. Pulmonary/Chest: Effort normal and breath sounds normal. No respiratory distress. She has no wheezes. Abdominal: Soft. She exhibits no distension. There is no tenderness. There is no rebound and no guarding. Musculoskeletal: Normal range of motion. Neurological: She is alert and oriented to person, place, and time. Skin: Skin is warm and dry. Psychiatric: She has a normal mood and affect.  Her behavior is normal. Thought content normal.         Diagnostic Study Results         Medical Decision Making     Patient is after using marijuana and cocaine. She has had persistent chest pain overnight troponin was negative. The pain was unrelenting  At that time no suspicion of PE or dissection. She now presents with palpitations and some nausea no pain. We will recheck troponin and EKG     she did EKG shows sinus at 69 with normal axis normal intervals there is no ST elevation or depression or hypertrophy QRS is 118 QTC of 480. Patient mildly low potassium was repleted here and put on 20 twice a day for a week kidney  function is normal palpitatins here with nl HR. Diagnosis     No diagnosis found. _______________________________    Attestations:  Scribe Attestation     Amelia Augustine acting as a scribe for and in the presence of Mee Lozano MD      August 26, 2018 at 4:51 PM       Provider Attestation:      I personally performed the services described in the documentation, reviewed the documentation, as recorded by the scribe in my presence, and it accurately and completely records my words and actions.  August 26, 2018 at 4:51 PM - Mee Lozano MD    _______________________________

## 2018-08-27 ENCOUNTER — OFFICE VISIT (OUTPATIENT)
Dept: FAMILY MEDICINE CLINIC | Age: 48
End: 2018-08-27

## 2018-08-27 VITALS
RESPIRATION RATE: 18 BRPM | HEART RATE: 70 BPM | OXYGEN SATURATION: 93 % | SYSTOLIC BLOOD PRESSURE: 118 MMHG | TEMPERATURE: 98.6 F | DIASTOLIC BLOOD PRESSURE: 80 MMHG | WEIGHT: 192.2 LBS | BODY MASS INDEX: 35.37 KG/M2 | HEIGHT: 62 IN

## 2018-08-27 DIAGNOSIS — E87.6 HYPOKALEMIA: ICD-10-CM

## 2018-08-27 DIAGNOSIS — Z72.0 TOBACCO ABUSE: Primary | ICD-10-CM

## 2018-08-27 DIAGNOSIS — R00.2 HEART PALPITATIONS: ICD-10-CM

## 2018-08-27 DIAGNOSIS — R13.10 DYSPHAGIA, UNSPECIFIED TYPE: ICD-10-CM

## 2018-08-27 DIAGNOSIS — F14.10 COCAINE ABUSE (HCC): ICD-10-CM

## 2018-08-27 DIAGNOSIS — F12.10 MARIJUANA ABUSE: ICD-10-CM

## 2018-08-27 DIAGNOSIS — K21.9 GASTROESOPHAGEAL REFLUX DISEASE, ESOPHAGITIS PRESENCE NOT SPECIFIED: ICD-10-CM

## 2018-08-27 DIAGNOSIS — Z91.14 NONCOMPLIANCE WITH MEDICATION REGIMEN: ICD-10-CM

## 2018-08-27 LAB
ATRIAL RATE: 53 BPM
CALCULATED P AXIS, ECG09: 53 DEGREES
CALCULATED R AXIS, ECG10: -12 DEGREES
CALCULATED T AXIS, ECG11: 42 DEGREES
DIAGNOSIS, 93000: NORMAL
P-R INTERVAL, ECG05: 170 MS
Q-T INTERVAL, ECG07: 466 MS
QRS DURATION, ECG06: 114 MS
QTC CALCULATION (BEZET), ECG08: 437 MS
VENTRICULAR RATE, ECG03: 53 BPM

## 2018-08-27 RX ORDER — POTASSIUM CHLORIDE 20 MEQ/1
20 TABLET, EXTENDED RELEASE ORAL DAILY
Qty: 30 TAB | Refills: 3 | Status: SHIPPED | OUTPATIENT
Start: 2018-08-27 | End: 2019-09-11

## 2018-08-27 RX ORDER — RANITIDINE 150 MG/1
150 TABLET, FILM COATED ORAL 2 TIMES DAILY
Qty: 60 TAB | Refills: 1 | Status: SHIPPED | OUTPATIENT
Start: 2018-08-27 | End: 2018-10-15

## 2018-08-27 NOTE — MR AVS SNAPSHOT
[address] [facility] [address] [phone] Patient: Rossy Matos MRN: [id number] :1970 Visit Information Date & Time Provider Department Dept. Phone Encounter #  
 2018 10:00 AM Dale Eisenberg NP  [facility] [id number] Follow-up Instructions Return in about 1 week (around 9/3/2018), or if symptoms worsen or fail to improve. Your Appointments 2018  9:00 AM  
Nurse Visit with Jasimn BEG[address] ([address] [facility]) Appt Note: [address] [facility] [address] [facility] [address]  
[phone]  
  
   
 [address]  
  
    
 2018  2:45 PM  
Office Visit with MD Jasmin Gonzalez Doctors  [facility]) Appt Note: OV; O/V 8 WEEK CHECK; R/S from 18 out of [place] [address] [address] [facility] [address]  
[phone]  
  
   
 [address] 2018 12:45 PM  
Office Visit with Antonieta Child MD  
[facility]) Appt Note: past due [address] [facility], [address] [facility] [address]  
[address] [facility], [address] [place] [address] Upcoming Health Maintenance Date Due DTaP/Tdap/Td series (1 - Tdap) 1990 Influenza Age 5 to Adult 2018 Pneumococcal 19-64 Medium Risk (1 of 1 - PPSV23) 2019* PAP AKA CERVICAL CYTOLOGY 2019 *Topic was postponed. The date shown is not the original due date. Allergies as of 2018  Review Complete On: 2018 By: Sandy Tim. Wendi Mitchell LPN Severity Noted Reaction Type Reactions Iodinated Contrast- Oral And Iv Dye  2012    Shortness of Breath Current Immunizations  Reviewed on 7/20/2018 Name Date Influenza Vaccine (Quad) PF 11/15/2017  7:50 AM, 10/13/2016 Not reviewed this visit You Were Diagnosed With   
  
 Codes Comments Tobacco abuse    -  Primary ICD-10-CM: Z72.0 ICD-9-CM: 305.1 Cocaine abuse     ICD-10-CM: F14.10 ICD-9-CM: 305.60 Marijuana abuse     ICD-10-CM: F12.10 ICD-9-CM: 305.20 Hypokalemia     ICD-10-CM: E87.6 ICD-9-CM: 276.8 Gastroesophageal reflux disease, esophagitis presence not specified     ICD-10-CM: K21.9 ICD-9-CM: 530.81 Dysphagia, unspecified type     ICD-10-CM: R13.10 ICD-9-CM: 787.20 Noncompliance with medication regimen     ICD-10-CM: Z91.14 
ICD-9-CM: V15.81 Heart palpitations     ICD-10-CM: R00.2 ICD-9-CM: 785.1 Vitals BP Pulse Temp Resp Height(growth percentile) Weight(growth percentile) 118/80 (BP 1 Location: Left arm, BP Patient Position: Sitting) 70 98.6 °F (37 °C) (Oral) 18 5' 2\" (1.575 m) 192 lb 3.2 oz (87.2 kg) LMP SpO2 BMI OB Status Smoking Status 08/10/2018 93% 35.15 kg/m2 Having regular periods Current Every Day Smoker Vitals History BMI and BSA Data Body Mass Index Body Surface Area  
 35.15 kg/m 2 1.95 m 2 Preferred Pharmacy Pharmacy Name Phone [facility] [address] [place], [place] [phone] Your Updated Medication List  
  
   
This list is accurate as of 8/27/18 10:32 AM.  Always use your most recent med list.  
  
  
  
  
 albuterol 90 mcg/actuation inhaler Commonly known as:  PROVENTIL HFA, VENTOLIN HFA, PROAIR HFA Take 2 Puffs by inhalation every four (4) hours as needed for Wheezing. amLODIPine-atorvastatin 10-40 mg per tablet Commonly known as:  CADUET Take 1 Tab by mouth daily. calcium-cholecalciferol (D3) tablet Commonly known as:  CALTRATE 600+D Take 1 Tab by mouth daily. cloNIDine HCl 0.3 mg tablet Commonly known as:  CATAPRES Take 1 Tab by mouth two (2) times a day. Indications: hypertension  
  
 ergocalciferol 50,000 unit capsule Commonly known as:  VITAMIN D2 Take one capsule every 7 days till gone, then start taking over-the-counter Vitamin D3 2,000 units every day  
  
 lamoTRIgine 100 mg tablet Commonly known as: LaMICtal  
Take 200 mg by mouth daily. lidocaine 2 % solution Commonly known as:  LIDOCAINE VISCOUS Take 15 mL by mouth as needed for Pain. LORazepam 1 mg tablet Commonly known as:  ATIVAN Take  by mouth every four (4) hours as needed for Anxiety. losartan-hydroCHLOROthiazide 100-12.5 mg per tablet Commonly known as:  HYZAAR Take 1 Tab by mouth daily. mirtazapine 30 mg tablet Commonly known as:  Antione Crisostomo Take  by mouth nightly. For sleep  
  
 multivitamin, tx-iron-ca-min 9 mg iron-400 mcg Tab tablet Commonly known as:  THERA-M w/ IRON Take 1 Tab by mouth daily. nitrofurantoin (macrocrystal-monohydrate) 100 mg capsule Commonly known as:  MACROBID Take 1 Cap by mouth two (2) times a day for 3 days. nystatin 100,000 unit/mL suspension Commonly known as:  MYCOSTATIN Take 5 mL by mouth four (4) times daily for 7 days. swish and spit  
  
 omega-3 acid ethyl esters 1 gram capsule Commonly known as:  Luellen Marines Take 2 Caps by mouth two (2) times a day. potassium chloride 20 mEq tablet Commonly known as:  K-DUR, KLOR-CON Take 1 Tab by mouth daily. raNITIdine 150 mg tablet Commonly known as:  ZANTAC Take 1 Tab by mouth two (2) times a day. Prescriptions Sent to Pharmacy Refills  
 raNITIdine (ZANTAC) 150 mg tablet 1 Sig: Take 1 Tab by mouth two (2) times a day. Class: Normal  
 Pharmacy: [address] . Ph #: [phone] Route: Oral  
 potassium chloride (K-DUR, KLOR-CON) 20 mEq tablet 3 Sig: Take 1 Tab by mouth daily. Class: Normal  
 Pharmacy: [address] [facility] Nani Hernandez [address] [facility] #: [phone] Route: Oral  
  
Follow-up Instructions Return in about 1 week (around 9/3/2018), or if symptoms worsen or fail to improve. To-Do List   
 08/27/2018 Imaging:  XR BA SWALLOW ESOPHOGRAM   
  
 08/28/2018  2:00 PM  
  Appointment with [address] at [place] [address] ([phone])  
  
 08/30/2018 8:30 AM  
  Appointment with [facility] DX RM 1 at [address] ([phone]) OUTSIDE FILMS  - Any outside films related to the study being scheduled should be brought with you on the day of the exam.  If this cannot be done there may be a delay in the reading of the study. MEDICATIONS  - Patient must bring a complete list of all medications currently taking to include prescriptions, over-the-counter meds, herbals, vitamins & any dietary supplements  NPO  - Patient must be NPO (Nothing to eat/drink) after Midnight day prior to exam except water sips with meds. ORAL CONTRAST/PREP  - Oral contrast is administered at the time of service Patient Instructions Learning About Swallowing Problems What are swallowing problems? Certain health problems that affect the nervous system can cause trouble swallowing. These conditions include stroke, ALS (also known as Pao Gehrig's disease), Parkinson's disease, and multiple sclerosis. The muscles and nerves that help move food through the throat and esophagus may not work right. Growths, such as cancer, and other problems with your esophagus can also make it hard to swallow. The esophagus is the tube that leads from your throat to your stomach. How are swallowing problems diagnosed? A doctor or speech therapist will examine you to check for swallowing problems. You may get swallowing tests to check how well your throat muscles work.  For these tests, you swallow a special liquid that helps the doctor see your throat and esophagus on an X-ray or video screen. Other tests use a thin, flexible tube called a scope to check for problems with your esophagus. The doctor puts the scope in your mouth and down your throat to look at your esophagus. What are the symptoms? Symptoms of swallowing problems may include: · Trouble getting food or liquids to go down on the first try. · Gagging, choking, or coughing when you swallow. · Having food or liquids come back up through your throat, mouth, or nose after you swallow. · Feeling like foods or liquids are stuck in some part of your throat or chest. 
· Pain when you swallow. How are swallowing problems treated? How swallowing problems are treated depends on the cause. The main goals of treatment will be to help you eat and swallow safely and get good nutrition. This is important for your health and quality of life. You may learn exercises to train your throat muscles to work together so you're able to swallow better. Learning certain ways to put food in your mouth or to position your head while eating may also help. Your doctor or a speech therapist may recommend changes to your diet to help make it easier to swallow. You may need to avoid certain foods or liquids. You also may need to change the thickness of foods or liquids in your diet. To eat and swallow safely, follow any instructions you get from your doctor or therapist. These ideas may help: 
· Sit upright when eating, drinking, and taking pills. · Take small bites of food. Chew completely and swallow before taking another bite. · Take small sips of liquids. · If eating makes you tired, eat smaller but more frequent meals. · Tip your chin down when there is food in your mouth. Where can you learn more? Go to http://los-zack.info/. Enter [id number] in the search box to learn more about \"Learning About Swallowing Problems. \" Current as of: November 20, 2017 Content Version: 11.7 © 9005-3828 [employer]. Care instructions adapted under license by [employer] (which disclaims liability or warranty for this information). If you have questions about a medical condition or this instruction, always ask your healthcare professional. [address] any warranty or liability for your use of this information. Introducing [facility]! Albino Marian introduces [employer] patient portal. Now you can access parts of your medical record, email your doctor's office, and request medication refills online. 1. In your internet browser, go to [email or web address] 2. Click on the First Time User? Click Here link in the Sign In box. You will see the New Member Sign Up page. 3. Enter your [employer] Access Code exactly as it appears below. You will not need to use this code after youve completed the sign-up process. If you do not sign up before the expiration date, you must request a new code. · [employer] Access Code: [email or web address] Expires: 11/1/2018  5:17 AM 
 
4. Enter the last four digits of your Social Security Number (xxxx) and Date of Birth (mm/dd/yyyy) as indicated and click Submit. You will be taken to the next sign-up page. 5. Create a [employer] ID. This will be your [employer] login ID and cannot be changed, so think of one that is secure and easy to remember. 6. Create a [employer] password. You can change your password at any time. 7. Enter your Password Reset Question and Answer. This can be used at a later time if you forget your password. 8. Enter your e-mail address. You will receive e-mail notification when new information is available in [address] 9. Click Sign Up. You can now view and download portions of your medical record. 10. Click the Download Summary menu link to download a portable copy of your medical information. If you have questions, please visit the Frequently Asked Questions section of the [employer] website. Remember, [employer] is NOT to be used for urgent needs. For medical emergencies, dial 911. Now available from your iPhone and Android! Please provide this summary of care documentation to your next provider. Your primary care clinician is listed as Quyen Galarza. If you have any questions after today's visit, please call [phone].

## 2018-08-27 NOTE — PROGRESS NOTES
HISTORY OF PRESENT ILLNESS    Janene Yu is a 51y.o. year old female comes in today to be evaluated and treated for:  Dysphagia     Ms. Honey Montgomery returns today to f/u on her dysphagia. She has a history of Tobacco abuse, Polycythemia, Cocaine use, HTN, and bipolar disorder. She states that she has difficulty swallowing sometimes. She denies any wt loss. Associated symptoms include nausea, and epigastric discomfort. She reports her epigastric symptoms were worse at night. She presented to ED 1 week ago and was treated for thrush for tongue discomfort. She was prescribed Zantac at last appt, however she did not fill medication. She also had a recent admission to ED for palpitations and CP after using cocaine and marijuana. She was also hypokalemia, she has not been compliant with Potassium supplementation. She states she never filled the prescription but has been eating bananas. ED work up was negative for cardiac event, but K was 3.0 and UDS was positive for cocaine and THC. Allergies   Allergen Reactions    Iodinated Contrast- Oral And Iv Dye Shortness of Breath     Current Outpatient Prescriptions   Medication Sig Dispense Refill    raNITIdine (ZANTAC) 150 mg tablet Take 1 Tab by mouth two (2) times a day. 60 Tab 1    potassium chloride (K-DUR, KLOR-CON) 20 mEq tablet Take 1 Tab by mouth daily. 30 Tab 3    nystatin (MYCOSTATIN) 100,000 unit/mL suspension Take 5 mL by mouth four (4) times daily for 7 days. swish and spit 140 mL 0    lidocaine (LIDOCAINE VISCOUS) 2 % solution Take 15 mL by mouth as needed for Pain. 1 Bottle 0    LORazepam (ATIVAN) 1 mg tablet Take  by mouth every four (4) hours as needed for Anxiety.  losartan-hydroCHLOROthiazide (HYZAAR) 100-12.5 mg per tablet Take 1 Tab by mouth daily.  multivitamin, tx-iron-ca-min (THERA-M W/ IRON) 9 mg iron-400 mcg tab tablet Take 1 Tab by mouth daily.  lamoTRIgine (LAMICTAL) 100 mg tablet Take 200 mg by mouth daily.       mirtazapine (REMERON) 30 mg tablet Take  by mouth nightly. For sleep      cloNIDine HCl (CATAPRES) 0.3 mg tablet Take 1 Tab by mouth two (2) times a day. Indications: hypertension 60 Tab 3    amLODIPine-atorvastatin (CADUET) 10-40 mg per tablet Take 1 Tab by mouth daily. 90 Tab 3    omega-3 acid ethyl esters (LOVAZA) 1 gram capsule Take 2 Caps by mouth two (2) times a day. 360 Cap 3    ergocalciferol (VITAMIN D2) 50,000 unit capsule Take one capsule every 7 days till gone, then start taking over-the-counter Vitamin D3 2,000 units every day 4 Cap 2    albuterol (PROVENTIL HFA, VENTOLIN HFA, PROAIR HFA) 90 mcg/actuation inhaler Take 2 Puffs by inhalation every four (4) hours as needed for Wheezing. 6 Inhaler 3    nitrofurantoin, macrocrystal-monohydrate, (MACROBID) 100 mg capsule Take 1 Cap by mouth two (2) times a day for 3 days. 6 Cap 0    calcium-cholecalciferol, D3, (CALTRATE 600+D) tablet Take 1 Tab by mouth daily.        Past Medical History:   Diagnosis Date    Bronchitis     Cocaine abuse 3/26/2015    Dental caries     Depression     Depression 8/31/2016    Drug abuse     Elevated hematocrit 8/31/2016    ETOH abuse     H/O screening mammography 12/06/2016    No evidence of malignancy     HPV (human papilloma virus) infection 11/2016    The patient referred to [facility]     Hypertension     Ill-defined condition     Mood swings (HCC)     Polycythemia 2018    Psychiatric disorder     depression, Bipolar    Tobacco abuse     Vitamin D deficiency        ROS:  Review of Systems - History obtained from chart review and the patient  General ROS: negative for - weight gain or weight loss  Psychological ROS: negative  Ophthalmic ROS: negative  ENT ROS: negative for - sore throat or vocal changes  Respiratory ROS: no cough, shortness of breath, or wheezing  negative for - stridor  Cardiovascular ROS: no chest pain or dyspnea on exertion  Gastrointestinal ROS: positive for - heartburn and swallowing difficulty/pain  negative for - abdominal pain, appetite loss, blood in stools, change in bowel habits or change in stools  Genito-Urinary ROS: no dysuria, trouble voiding, or hematuria  Neurological ROS: no TIA or stroke symptoms        Objective:  Visit Vitals    /80 (BP 1 Location: Left arm, BP Patient Position: Sitting)    Pulse 70    Temp 98.6 °F (37 °C) (Oral)    Resp 18    Ht 5' 2\" (1.575 m)    Wt 192 lb 3.2 oz (87.2 kg)    LMP 08/10/2018    SpO2 93%    BMI 35.15 kg/m2     General appearance - alert, well appearing, and in no distress  Neck - supple, no significant adenopathy  Chest - clear to auscultation, no wheezes, rales or rhonchi, symmetric air entry  Heart - normal rate, regular rhythm, normal S1, S2, no murmurs, rubs, clicks or gallops        Assessment/Plan:     ICD-10-CM ICD-9-CM    1. Tobacco abuse Z72.0 305.1    2. Cocaine abuse F14.10 305.60    3. Marijuana abuse F12.10 305.20    4. Hypokalemia E87.6 276.8 potassium chloride (K-DUR, KLOR-CON) 20 mEq tablet   5. Gastroesophageal reflux disease, esophagitis presence not specified K21.9 530.81 raNITIdine (ZANTAC) 150 mg tablet   6. Dysphagia, unspecified type R13.10 787.20 XR BA SWALLOW ESOPHOGRAM   7. Noncompliance with medication regimen Z91.14 V15.81    8. Heart palpitations R00.2 785.1 potassium chloride (K-DUR, KLOR-CON) 20 mEq tablet     - Discussed with patient that her palpitations may be related to her hypokalemia but also her continue cocaine use. She was given a new script of Potassium Chloride to fill. Stressed the importance of compliance with treatment.      - Pt with dysphagia, believe some of her symptoms are related to GERD/gastritis, she was given a nother RX for Zantac and discussed benefits of smoking cessation, avoidance of irritants, as well as small meals.     - Discussed with patient that with her continued drug an tobacco use, we are only putting a temporary bandaid on issues and that she may continuously have complaints despite our efforts due to her not stopping the aggravating factor.     -We will get a barium esophagram to check for structural issues and to see if there is some sore of obstruction. RTO in 1 week to discuss results. I have discussed the diagnosis with the patient and the intended plan as seen in the above orders. The patient has received an after-visit summary and questions were answered concerning future plans. I have discussed medication side effects and warnings with the patient as well. Patient agreeable with above plan and verbalizes understanding. Follow-up Disposition:  Return in about 1 week (around 9/3/2018), or if symptoms worsen or fail to improve.

## 2018-08-27 NOTE — PATIENT INSTRUCTIONS
Learning About Swallowing Problems  What are swallowing problems? Certain health problems that affect the nervous system can cause trouble swallowing. These conditions include stroke, ALS (also known as Pao Gehrig's disease), Parkinson's disease, and multiple sclerosis. The muscles and nerves that help move food through the throat and esophagus may not work right. Growths, such as cancer, and other problems with your esophagus can also make it hard to swallow. The esophagus is the tube that leads from your throat to your stomach. How are swallowing problems diagnosed? A doctor or speech therapist will examine you to check for swallowing problems. You may get swallowing tests to check how well your throat muscles work. For these tests, you swallow a special liquid that helps the doctor see your throat and esophagus on an X-ray or video screen. Other tests use a thin, flexible tube called a scope to check for problems with your esophagus. The doctor puts the scope in your mouth and down your throat to look at your esophagus. What are the symptoms? Symptoms of swallowing problems may include:  · Trouble getting food or liquids to go down on the first try. · Gagging, choking, or coughing when you swallow. · Having food or liquids come back up through your throat, mouth, or nose after you swallow. · Feeling like foods or liquids are stuck in some part of your throat or chest.  · Pain when you swallow. How are swallowing problems treated? How swallowing problems are treated depends on the cause. The main goals of treatment will be to help you eat and swallow safely and get good nutrition. This is important for your health and quality of life. You may learn exercises to train your throat muscles to work together so you're able to swallow better. Learning certain ways to put food in your mouth or to position your head while eating may also help.   Your doctor or a speech therapist may recommend changes to your diet to help make it easier to swallow. You may need to avoid certain foods or liquids. You also may need to change the thickness of foods or liquids in your diet. To eat and swallow safely, follow any instructions you get from your doctor or therapist. These ideas may help:  · Sit upright when eating, drinking, and taking pills. · Take small bites of food. Chew completely and swallow before taking another bite. · Take small sips of liquids. · If eating makes you tired, eat smaller but more frequent meals. · Tip your chin down when there is food in your mouth. Where can you learn more? Go to http://los-zack.info/. Enter [id number] in the search box to learn more about \"Learning About Swallowing Problems. \"  Current as of: November 20, 2017  Content Version: 11.7  © 5310-1751 [employer]. Care instructions adapted under license by "[employer]." (which disclaims liability or warranty for this information). If you have questions about a medical condition or this instruction, always ask your healthcare professional. [place] [address] any warranty or liability for your use of this information.

## 2018-08-27 NOTE — LETTER
8/27/2018 [facility] [address] [facility], [address] Luiz Caal, 1970, is picking up the following medications ordered from the [facility]: 
 
CADUET 10/40 MG #90 Guanaco Dejesus Patient's Signature: _____________________________ Today's Date: 8/27/2018

## 2018-08-28 ENCOUNTER — TELEPHONE (OUTPATIENT)
Dept: FAMILY MEDICINE CLINIC | Age: 48
End: 2018-08-28

## 2018-08-28 ENCOUNTER — HOSPITAL ENCOUNTER (OUTPATIENT)
Dept: LAB | Age: 48
Discharge: HOME | End: 2018-08-28
Payer: SUBSIDIZED

## 2018-08-28 ENCOUNTER — HOSPITAL ENCOUNTER (OUTPATIENT)
Dept: INFUSION THERAPY | Age: 48
Discharge: HOME | End: 2018-08-28
Payer: SELF-PAY

## 2018-08-28 ENCOUNTER — OFFICE VISIT (OUTPATIENT)
Dept: ONCOLOGY | Age: 48
End: 2018-08-28

## 2018-08-28 ENCOUNTER — HOSPITAL ENCOUNTER (OUTPATIENT)
Dept: ONCOLOGY | Age: 48
Discharge: HOME | End: 2018-08-28

## 2018-08-28 VITALS
RESPIRATION RATE: 18 BRPM | OXYGEN SATURATION: 98 % | TEMPERATURE: 97 F | HEART RATE: 70 BPM | DIASTOLIC BLOOD PRESSURE: 83 MMHG | SYSTOLIC BLOOD PRESSURE: 137 MMHG

## 2018-08-28 VITALS
WEIGHT: 195 LBS | DIASTOLIC BLOOD PRESSURE: 83 MMHG | HEART RATE: 70 BPM | RESPIRATION RATE: 17 BRPM | SYSTOLIC BLOOD PRESSURE: 137 MMHG | BODY MASS INDEX: 35.67 KG/M2 | TEMPERATURE: 97 F

## 2018-08-28 DIAGNOSIS — D75.1 ERYTHROCYTOSIS: ICD-10-CM

## 2018-08-28 DIAGNOSIS — D75.1 POLYCYTHEMIA: Primary | ICD-10-CM

## 2018-08-28 DIAGNOSIS — D75.1 SECONDARY POLYCYTHEMIA: ICD-10-CM

## 2018-08-28 DIAGNOSIS — D75.1 POLYCYTHEMIA: ICD-10-CM

## 2018-08-28 LAB
ALBUMIN SERPL-MCNC: 3.7 G/DL (ref 3.4–5)
ALBUMIN/GLOB SERPL: 1.3 {RATIO} (ref 0.8–1.7)
ALP SERPL-CCNC: 70 U/L (ref 45–117)
ALT SERPL-CCNC: 21 U/L (ref 13–56)
ANION GAP SERPL CALC-SCNC: 10 MMOL/L (ref 3–18)
AST SERPL-CCNC: 8 U/L (ref 15–37)
BASO+EOS+MONOS # BLD AUTO: 0.6 K/UL (ref 0–2.3)
BASO+EOS+MONOS # BLD AUTO: 8 % (ref 0.1–17)
BILIRUB SERPL-MCNC: 0.2 MG/DL (ref 0.2–1)
BUN SERPL-MCNC: 5 MG/DL (ref 7–18)
BUN/CREAT SERPL: 7 (ref 12–20)
CALCIUM SERPL-MCNC: 9 MG/DL (ref 8.5–10.1)
CHLORIDE SERPL-SCNC: 104 MMOL/L (ref 100–108)
CO2 SERPL-SCNC: 30 MMOL/L (ref 21–32)
CREAT SERPL-MCNC: 0.67 MG/DL (ref 0.6–1.3)
DIFFERENTIAL METHOD BLD: NORMAL
ERYTHROCYTE [DISTWIDTH] IN BLOOD BY AUTOMATED COUNT: 11.8 % (ref 11.5–14.5)
GLOBULIN SER CALC-MCNC: 2.9 G/DL (ref 2–4)
GLUCOSE SERPL-MCNC: 103 MG/DL (ref 74–99)
HCT VFR BLD AUTO: 43.6 % (ref 36–48)
HGB BLD-MCNC: 14.1 G/DL (ref 12–16)
LYMPHOCYTES # BLD: 3.4 K/UL (ref 1.1–5.9)
LYMPHOCYTES NFR BLD: 44 % (ref 14–44)
MCH RBC QN AUTO: 29.7 PG (ref 25–35)
MCHC RBC AUTO-ENTMCNC: 32.3 G/DL (ref 31–37)
MCV RBC AUTO: 92 FL (ref 78–102)
NEUTS SEG # BLD: 3.6 K/UL (ref 1.8–9.5)
NEUTS SEG NFR BLD: 48 % (ref 40–70)
PLATELET # BLD AUTO: 340 K/UL (ref 140–440)
POTASSIUM SERPL-SCNC: 3.7 MMOL/L (ref 3.5–5.5)
PROT SERPL-MCNC: 6.6 G/DL (ref 6.4–8.2)
RBC # BLD AUTO: 4.74 M/UL (ref 4.1–5.1)
SODIUM SERPL-SCNC: 144 MMOL/L (ref 136–145)
WBC # BLD AUTO: 7.6 K/UL (ref 4.5–13)

## 2018-08-28 PROCEDURE — 36415 COLL VENOUS BLD VENIPUNCTURE: CPT

## 2018-08-28 PROCEDURE — 80053 COMPREHEN METABOLIC PANEL: CPT | Performed by: INTERNAL MEDICINE

## 2018-08-28 RX ORDER — SODIUM CHLORIDE 0.9 % (FLUSH) 0.9 %
10-40 SYRINGE (ML) INJECTION AS NEEDED
Status: DISCONTINUED | OUTPATIENT
Start: 2018-08-28 | End: 2018-09-01 | Stop reason: HOSPADM

## 2018-08-28 RX ORDER — SODIUM CHLORIDE 9 MG/ML
500 INJECTION, SOLUTION INTRAVENOUS ONCE
Status: DISPENSED | OUTPATIENT
Start: 2018-08-28 | End: 2018-08-29

## 2018-08-28 NOTE — PROGRESS NOTES
[facility] Progress Note    Date: 2018    Name: Risa Live    MRN: [id number]         : 1970      Ms. Ozuna was assessed and education was provided. Ms. Ozuna's vitals were reviewed and patient was observed for 5 minutes prior to treatment. Visit Vitals    /83 (BP 1 Location: Left arm, BP Patient Position: Sitting)    Pulse 70    Temp 97 °F (36.1 °C)    Resp 18    SpO2 98%    Breastfeeding No      CBC obtained via right AC x 1 attempt. Lab results were obtained and reviewed. Recent Results (from the past 12 hour(s))   CBC WITH 3 PART DIFF    Collection Time: 18  2:15 PM   Result Value Ref Range    WBC 7.6 4.5 - 13.0 K/uL    RBC 4.74 4.10 - 5.10 M/uL    HGB 14.1 12.0 - 16.0 g/dL    HCT 43.6 36 - 48 %    MCV 92.0 78 - 102 FL    MCH 29.7 25.0 - 35.0 PG    MCHC 32.3 31 - 37 g/dL    RDW 11.8 11.5 - 14.5 %    PLATELET 281 307 - 316 K/uL    NEUTROPHILS 48 40 - 70 %    MIXED CELLS 8 0.1 - 17 %    LYMPHOCYTES 44 14 - 44 %    ABS. NEUTROPHILS 3.6 1.8 - 9.5 K/UL    ABS. MIXED CELLS 0.6 0.0 - 2.3 K/uL    ABS. LYMPHOCYTES 3.4 1.1 - 5.9 K/UL    DF AUTOMATED         Today's HCT = 43.6. Therapeutic phlebotomy HELD per orders. Patients HCT on 18 37.7 at her ER visit. Patient armband removed and shredded. Ms. Maximus Velazco was discharged from [place] [address] in stable condition at 1450. She is to return on 18 at 1300 for her next appointment for CBC/Phlebotomy.     Aurea Moyer  2018  5:08 PM
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
## 2018-08-28 NOTE — PATIENT INSTRUCTIONS
Polycythemia: Care Instructions  Your Care Instructions    Polycythemia (say \"paw-david-sy-THEE-mariama-uh) is an abnormal increase in red blood cells. It happens when the tissue inside your bones (bone marrow) makes too much blood. It also can occur if your blood does not have enough liquid, or plasma. This can make the number of red blood cells seem higher than normal. The extra red blood cells make your blood thicker than normal. This may raise your risk for blood clots that can cause heart attacks or strokes. Clots can form in the deep veins of the body, a condition called deep vein thrombosis. Or, a clot can travel through the blood to a lung (a pulmonary embolism). Your doctor may treat you by taking out some of your blood (phlebotomy). The process is like donating blood. Your doctor may even recommend that you donate blood. You may take pills to stop your body from making red blood cells. You also will get treatment for any other conditions that may cause your body to make too many red blood cells. Follow-up care is a key part of your treatment and safety. Be sure to make and go to all appointments, and call your doctor if you are having problems. It's also a good idea to know your test results and keep a list of the medicines you take. How can you care for yourself at home? · Be safe with medicines. Take your medicines exactly as prescribed. Call your doctor if you think you are having a problem with your medicine. · Drink plenty of fluids, enough so that your urine is light yellow or clear like water, before and after you have blood removed. If you have kidney, heart, or liver disease and have to limit fluids, talk with your doctor before you increase the amount of fluids you drink. · Take it easy after you have had blood removed. Do not do vigorous exercise. · If your doctor recommends aspirin, take it exactly as prescribed.  Call your doctor if you think you are having a problem with your medicine. · Do not smoke. Smoking increases the risk of blood clots and may reduce the amount of oxygen in your blood. If you need help quitting, talk to your doctor about stop-smoking programs and medicines. These can increase your chances of quitting for good. · Take an antihistamine, such as a nondrowsy one like loratadine (Claritin) or one that might make you sleepy like diphenhydramine (Benadryl), if your skin is itchy. Some people who have this condition have itching. · Wear medical alert jewelry that lists your clotting problem. You can buy this at most drugstores. When should you call for help? Call 911 anytime you think you may need emergency care. For example, call if:    · You have sudden chest pain and shortness of breath, or you cough up blood.     · You have symptoms of a stroke. These may include:  ¨ Sudden numbness, tingling, weakness, or loss of movement in your face, arm, or leg, especially on only one side of your body. ¨ Sudden vision changes. ¨ Sudden trouble speaking. ¨ Sudden confusion or trouble understanding simple statements. ¨ Sudden problems with walking or balance. ¨ A sudden, severe headache that is different from past headaches.     · You have symptoms of a heart attack. These may include:  ¨ Chest pain or pressure, or a strange feeling in the chest.  ¨ Sweating. ¨ Shortness of breath. ¨ Nausea or vomiting. ¨ Pain, pressure, or a strange feeling in the back, neck, jaw, or upper belly or in one or both shoulders or arms. ¨ Lightheadedness or sudden weakness. ¨ A fast or irregular heartbeat. After you call 911, the  may tell you to chew 1 adult-strength or 2 to 4 low-dose aspirin. Wait for an ambulance. Do not try to drive yourself.    Call your doctor now or seek immediate medical care if:    · You have signs of a blood clot, such as:  ¨ Pain in your calf, back of knee, thigh, or groin.   ¨ Redness and swelling in your leg or groin.    Watch closely for changes in your health, and be sure to contact your doctor if you have any problems. Where can you learn more? Go to http://los-zack.info/. Enter D528 in the search box to learn more about \"Polycythemia: Care Instructions. \"  Current as of: October 9, 2017  Content Version: 11.7  © 8623-2775 [employer]. Care instructions adapted under license by [employer] (which disclaims liability or warranty for this information). If you have questions about a medical condition or this instruction, always ask your healthcare professional. [address] any warranty or liability for your use of this information.

## 2018-08-28 NOTE — TELEPHONE ENCOUNTER
Returned patient's call and informed her that the most recent HA1C resulted in 5.2% with reference range of 4.2-5.6 %. Per provider result note, informed patient that A1C came back normal. She does not have diabetes. The patient voiced understanding.

## 2018-08-28 NOTE — MR AVS SNAPSHOT
[address] [facility] [address] [facility] [address] 
[phone] Patient: Josseline Reddy MRN: [id number] :1970 Visit Information Date & Time Provider Department Dept. Phone Encounter #  
 2018  2:45 PM Adeola Peng MD [facility]  [phone] [id number] Follow-up Instructions Return in about 2 months (around 10/28/2018). Your Appointments 2018  2:00 PM  
Follow Up with Tatiana Olson NP [address] [facility] ([facility]) Appt Note: 1 week follow up/ No labs needed/Test results [address] [facility] [id number] [address] [facility] [address]  
  
    
 2018 12:45 PM  
Office Visit with Frantz Davis MD  
[facility]) Appt Note: past due [address] [facility], [address] [facility] [address]  
[phone]  
  
   
 [address]  
  
    
 10/30/2018  1:30 PM  
Office Visit with MD Jasmin Man[facility]  [facility]) Appt Note: OV  
 [facility] [address] [facility] [address]  
[phone]  
  
   
 [facility] [address] Upcoming Health Maintenance Date Due DTaP/Tdap/Td series (1 - Tdap) 1990 Influenza Age 5 to Adult 2018 Pneumococcal 19-64 Medium Risk (1 of 1 - PPSV23) 2019* PAP AKA CERVICAL CYTOLOGY 2019 *Topic was postponed. The date shown is not the original due date. Allergies as of 2018  Review Complete On: 2018 By: Adeola Peng MD  
  
 Severity Noted Reaction Type Reactions Iodinated Contrast- Oral And Iv Dye  2012    Shortness of Breath Current Immunizations  Reviewed on 2018 Name Date Influenza Vaccine (Quad) PF 11/15/2017  7:50 AM, 10/13/2016 Not reviewed this visit You Were Diagnosed With   
  
 Codes Comments Polycythemia    -  Primary ICD-10-CM: D75.1 ICD-9-CM: 238.4 Secondary polycythemia     ICD-10-CM: D75.1 ICD-9-CM: 289.0 Erythrocytosis     ICD-10-CM: D75.1 ICD-9-CM: 588. 0 Vitals BP Pulse Temp Resp Weight(growth percentile) LMP  
 137/83 (BP 1 Location: Left arm, BP Patient Position: Sitting) 70 97 °F (36.1 °C) (Oral) 17 195 lb (88.5 kg) 08/10/2018 BMI OB Status Smoking Status 35.67 kg/m2 Having regular periods Current Every Day Smoker BMI and BSA Data Body Mass Index Body Surface Area  
 35.67 kg/m 2 1.97 m 2 Preferred Pharmacy Pharmacy Name Phone [facility] Dejesus [address] [place] [address]. [phone] Your Updated Medication List  
  
   
This list is accurate as of 8/28/18  3:52 PM.  Always use your most recent med list.  
  
  
  
  
 albuterol 90 mcg/actuation inhaler Commonly known as:  PROVENTIL HFA, VENTOLIN HFA, PROAIR HFA Take 2 Puffs by inhalation every four (4) hours as needed for Wheezing. amLODIPine-atorvastatin 10-40 mg per tablet Commonly known as:  CADUET Take 1 Tab by mouth daily. calcium-cholecalciferol (D3) tablet Commonly known as:  CALTRATE 600+D Take 1 Tab by mouth daily. cloNIDine HCl 0.3 mg tablet Commonly known as:  CATAPRES Take 1 Tab by mouth two (2) times a day. Indications: hypertension  
  
 ergocalciferol 50,000 unit capsule Commonly known as:  VITAMIN D2 Take one capsule every 7 days till gone, then start taking over-the-counter Vitamin D3 2,000 units every day  
  
 lamoTRIgine 100 mg tablet Commonly known as: LaMICtal  
Take 200 mg by mouth daily. lidocaine 2 % solution Commonly known as:  LIDOCAINE VISCOUS Take 15 mL by mouth as needed for Pain. LORazepam 1 mg tablet Commonly known as:  ATIVAN Take  by mouth every four (4) hours as needed for Anxiety. losartan-hydroCHLOROthiazide 100-12.5 mg per tablet Commonly known as:  HYZAAR Take 1 Tab by mouth daily. mirtazapine 30 mg tablet Commonly known as:  Charyl Belfast Take  by mouth nightly. For sleep  
  
 multivitamin, tx-iron-ca-min 9 mg iron-400 mcg Tab tablet Commonly known as:  THERA-M w/ IRON Take 1 Tab by mouth daily. nitrofurantoin (macrocrystal-monohydrate) 100 mg capsule Commonly known as:  MACROBID Take 1 Cap by mouth two (2) times a day for 3 days. nystatin 100,000 unit/mL suspension Commonly known as:  MYCOSTATIN Take 5 mL by mouth four (4) times daily for 7 days. swish and spit  
  
 omega-3 acid ethyl esters 1 gram capsule Commonly known as:  Brynda Kirks Take 2 Caps by mouth two (2) times a day. potassium chloride 20 mEq tablet Commonly known as:  K-DUR, KLOR-CON Take 1 Tab by mouth daily. raNITIdine 150 mg tablet Commonly known as:  ZANTAC Take 1 Tab by mouth two (2) times a day. We Performed the Following COMPLETE CBC & AUTO DIFF WBC [53770 CPT(R)] Follow-up Instructions Return in about 2 months (around 10/28/2018). To-Do List   
 08/28/2018 Lab:  CBC WITH 3 PART DIFF   
  
 08/28/2018 Lab:  METABOLIC PANEL, COMPREHENSIVE   
  
 08/30/2018  8:30 AM  
  Appointment with [facility] 1 at [address] [facility] ([phone]) OUTSIDE FILMS  - Any outside films related to the study being scheduled should be brought with you on the day of the exam.  If this cannot be done there may be a delay in the reading of the study. MEDICATIONS  - Patient must bring a complete list of all medications currently taking to include prescriptions, over-the-counter meds, herbals, vitamins & any dietary supplements  NPO  - Patient must be NPO (Nothing to eat/drink) after Midnight day prior to exam except water sips with meds.   ORAL CONTRAST/PREP  - Oral contrast is administered at the time of service 09/25/2018 1:00 PM  
  Appointment with [address] at [place] [address] ([phone]) Patient Instructions Polycythemia: Care Instructions Your Care Instructions Polycythemia (say \"paw-david-sy-JEAN-PIERRE-mariama-uh) is an abnormal increase in red blood cells. It happens when the tissue inside your bones (bone marrow) makes too much blood. It also can occur if your blood does not have enough liquid, or plasma. This can make the number of red blood cells seem higher than normal. The extra red blood cells make your blood thicker than normal. This may raise your risk for blood clots that can cause heart attacks or strokes. Clots can form in the deep veins of the body, a condition called deep vein thrombosis. Or, a clot can travel through the blood to a lung (a pulmonary embolism). Your doctor may treat you by taking out some of your blood (phlebotomy). The process is like donating blood. Your doctor may even recommend that you donate blood. You may take pills to stop your body from making red blood cells. You also will get treatment for any other conditions that may cause your body to make too many red blood cells. Follow-up care is a key part of your treatment and safety. Be sure to make and go to all appointments, and call your doctor if you are having problems. It's also a good idea to know your test results and keep a list of the medicines you take. How can you care for yourself at home? · Be safe with medicines. Take your medicines exactly as prescribed. Call your doctor if you think you are having a problem with your medicine. · Drink plenty of fluids, enough so that your urine is light yellow or clear like water, before and after you have blood removed. If you have kidney, heart, or liver disease and have to limit fluids, talk with your doctor before you increase the amount of fluids you drink. · Take it easy after you have had blood removed. Do not do vigorous exercise. · If your doctor recommends aspirin, take it exactly as prescribed. Call your doctor if you think you are having a problem with your medicine. · Do not smoke. Smoking increases the risk of blood clots and may reduce the amount of oxygen in your blood. If you need help quitting, talk to your doctor about stop-smoking programs and medicines. These can increase your chances of quitting for good. · Take an antihistamine, such as a nondrowsy one like loratadine (Claritin) or one that might make you sleepy like diphenhydramine (Benadryl), if your skin is itchy. Some people who have this condition have itching. · Wear medical alert jewelry that lists your clotting problem. You can buy this at most drugstores. When should you call for help? Call 911 anytime you think you may need emergency care. For example, call if: 
  · You have sudden chest pain and shortness of breath, or you cough up blood.  
  · You have symptoms of a stroke. These may include: 
¨ Sudden numbness, tingling, weakness, or loss of movement in your face, arm, or leg, especially on only one side of your body. ¨ Sudden vision changes. ¨ Sudden trouble speaking. ¨ Sudden confusion or trouble understanding simple statements. ¨ Sudden problems with walking or balance. ¨ A sudden, severe headache that is different from past headaches.  
  · You have symptoms of a heart attack. These may include: ¨ Chest pain or pressure, or a strange feeling in the chest. 
¨ Sweating. ¨ Shortness of breath. ¨ Nausea or vomiting. ¨ Pain, pressure, or a strange feeling in the back, neck, jaw, or upper belly or in one or both shoulders or arms. ¨ Lightheadedness or sudden weakness. ¨ A fast or irregular heartbeat. After you call 911, the  may tell you to chew 1 adult-strength or 2 to 4 low-dose aspirin. Wait for an ambulance. Do not try to drive yourself.  Call your doctor now or seek immediate medical care if: 
  · You have signs of a blood clot, such as: 
¨ Pain in your calf, back of knee, thigh, or groin. ¨ Redness and swelling in your leg or groin.  
 Watch closely for changes in your health, and be sure to contact your doctor if you have any problems. Where can you learn more? Go to http://los-zack.info/. Enter W797 in the search box to learn more about \"Polycythemia: Care Instructions. \" Current as of: October 9, 2017 Content Version: 11.7 © 1987-6197 [employer]. Care instructions adapted under license by [employer] (which disclaims liability or warranty for this information). If you have questions about a medical condition or this instruction, always ask your healthcare professional. [address] any warranty or liability for your use of this information. Introducing [facility] [place] [facility]! Margie Daniel introduces [employer] Touch patient portal. Now you can access parts of your medical record, email your doctor's office, and request medication refills online. 1. In your internet browser, go to [email or web address] 2. Click on the First Time User? Click Here link in the Sign In box. You will see the New Member Sign Up page. 3. Enter your G[employer] Touch Access Code exactly as it appears below. You will not need to use this code after youve completed the sign-up process. If you do not sign up before the expiration date, you must request a new code. · [employer] Touch Access Code: [email or web address] Expires: 11/1/2018  5:17 AM 
 
4. Enter the last four digits of your Social Security Number (xxxx) and Date of Birth (mm/dd/yyyy) as indicated and click Submit. You will be taken to the next sign-up page. 5. Create a Shadow Puppett ID. This will be your [employer] Touch login ID and cannot be changed, so think of one that is secure and easy to remember. 6. Create a WorldMate password. You can change your password at any time. 7. Enter your Password Reset Question and Answer. This can be used at a later time if you forget your password. 8. Enter your e-mail address. You will receive e-mail notification when new information is available in [address] 9. Click Sign Up. You can now view and download portions of your medical record. 10. Click the Download Summary menu link to download a portable copy of your medical information. If you have questions, please visit the Frequently Asked Questions section of the [employer] website. Remember, WorldMate is NOT to be used for urgent needs. For medical emergencies, dial 911. Now available from your iPhone and Android! Please provide this summary of care documentation to your next provider. Your primary care clinician is listed as Paul Hernandez. If you have any questions after today's visit, please call [phone].

## 2018-08-30 ENCOUNTER — HOSPITAL ENCOUNTER (OUTPATIENT)
Dept: GENERAL RADIOLOGY | Age: 48
Discharge: HOME | End: 2018-08-30
Attending: NURSE PRACTITIONER

## 2018-08-30 DIAGNOSIS — R13.10 DYSPHAGIA, UNSPECIFIED TYPE: ICD-10-CM

## 2018-08-30 PROCEDURE — 74011000255 HC RX REV CODE- 255: Performed by: NURSE PRACTITIONER

## 2018-08-30 PROCEDURE — 74011000250 HC RX REV CODE- 250: Performed by: NURSE PRACTITIONER

## 2018-08-30 PROCEDURE — 74220 X-RAY XM ESOPHAGUS 1CNTRST: CPT

## 2018-08-30 RX ADMIN — BARIUM SULFATE 20 G: 960 POWDER, FOR SUSPENSION ORAL at 09:00

## 2018-08-30 RX ADMIN — ANTACID/ANTIFLATULENT 4 G: 380; 550; 10; 10 GRANULE, EFFERVESCENT ORAL at 09:00

## 2018-08-30 RX ADMIN — BARIUM SULFATE 135 ML: 980 POWDER, FOR SUSPENSION ORAL at 09:00

## 2018-08-30 RX ADMIN — BARIUM SULFATE 700 MG: 700 TABLET ORAL at 09:00

## 2018-09-04 ENCOUNTER — OFFICE VISIT (OUTPATIENT)
Dept: FAMILY MEDICINE CLINIC | Age: 48
End: 2018-09-04

## 2018-09-04 VITALS
DIASTOLIC BLOOD PRESSURE: 88 MMHG | HEIGHT: 62 IN | BODY MASS INDEX: 35.3 KG/M2 | RESPIRATION RATE: 18 BRPM | TEMPERATURE: 98.7 F | SYSTOLIC BLOOD PRESSURE: 146 MMHG | WEIGHT: 191.8 LBS | HEART RATE: 91 BPM | OXYGEN SATURATION: 97 %

## 2018-09-04 DIAGNOSIS — K44.9 HIATAL HERNIA: ICD-10-CM

## 2018-09-04 DIAGNOSIS — R13.10 DYSPHAGIA, UNSPECIFIED TYPE: ICD-10-CM

## 2018-09-04 DIAGNOSIS — K22.2 SCHATZKI'S RING: Primary | ICD-10-CM

## 2018-09-04 NOTE — MR AVS SNAPSHOT
[address] [facility] [address] [phone] Patient: Shelli Villa MRN: [id number] :1970 Visit Information Date & Time Provider Department Dept. Phone Encounter #  
 2018  2:00 PM Yamini Jones NP [facility] [phone] [id number] Follow-up Instructions Return if symptoms worsen or fail to improve. Your Appointments 2018 12:45 PM  
Office Visit with Maggie Castaneda MD  
[facility]) Appt Note: past due [address], [address] [facility] [address] [phone]  
  
   
 [address], [address]  
  
    
 10/30/2018  1:30 PM  
Office Visit with Nata Everett MD  
[address] [facility]) Appt Note: OV  
 [facility] [address] [facility] [address] [facility] [address]  
[phone]  
  
   
 [facility] [address]  
  
    
 2019 11:00 AM  
Follow Up with Yamini Jones NP [address] [facility]) Appt Note: 5 month follow up  
 [address] [facility] [address]  
[address] [facility] [address] Upcoming Health Maintenance Date Due DTaP/Tdap/Td series (1 - Tdap) 1990 Influenza Age 5 to Adult 2018 Pneumococcal 19-64 Medium Risk (1 of 1 - PPSV23) 2019* PAP AKA CERVICAL CYTOLOGY 2019 *Topic was postponed. The date shown is not the original due date. Allergies as of 2018  Review Complete On: 2018 By: Bayard Kehr. [place] Shelling, LPN Severity Noted Reaction Type Reactions Iodinated Contrast- Oral And Iv Dye  2012    Shortness of Breath Current Immunizations  Reviewed on 2018 Name Date Influenza Vaccine (Quad) PF 11/15/2017  7:50 AM, 10/13/2016 Not reviewed this visit You Were Diagnosed With   
  
 Codes Comments Schatzki's ring    -  Primary ICD-10-CM: K22.2 ICD-9-CM: 750.3 Hiatal hernia     ICD-10-CM: K44.9 ICD-9-CM: 867. 3 Dysphagia, unspecified type     ICD-10-CM: R13.10 ICD-9-CM: 787.20 Vitals BP Pulse Temp Resp Height(growth percentile) Weight(growth percentile) 146/88 (BP 1 Location: Left arm, BP Patient Position: Sitting) 91 98.7 °F (37.1 °C) (Oral) 18 5' 2\" (1.575 m) 191 lb 12.8 oz (87 kg) LMP SpO2 BMI OB Status Smoking Status 08/10/2018 97% 35.08 kg/m2 Having regular periods Current Every Day Smoker BMI and BSA Data Body Mass Index Body Surface Area 35.08 kg/m 2 1.95 m 2 Preferred Pharmacy Pharmacy Name Phone [address] [phone] Your Updated Medication List  
  
   
This list is accurate as of 9/4/18  2:21 PM.  Always use your most recent med list.  
  
  
  
  
 albuterol 90 mcg/actuation inhaler Commonly known as:  PROVENTIL HFA, VENTOLIN HFA, PROAIR HFA Take 2 Puffs by inhalation every four (4) hours as needed for Wheezing. amLODIPine-atorvastatin 10-40 mg per tablet Commonly known as:  CADUET Take 1 Tab by mouth daily. calcium-cholecalciferol (D3) tablet Commonly known as:  CALTRATE 600+D Take 1 Tab by mouth daily. cloNIDine HCl 0.3 mg tablet Commonly known as:  CATAPRES Take 1 Tab by mouth two (2) times a day. Indications: hypertension  
  
 ergocalciferol 50,000 unit capsule Commonly known as:  VITAMIN D2 Take one capsule every 7 days till gone, then start taking over-the-counter Vitamin D3 2,000 units every day  
  
 lamoTRIgine 100 mg tablet Commonly known as: LaMICtal  
Take 200 mg by mouth daily. lidocaine 2 % solution Commonly known as:  LIDOCAINE VISCOUS  
 Take 15 mL by mouth as needed for Pain. LORazepam 1 mg tablet Commonly known as:  ATIVAN Take  by mouth every four (4) hours as needed for Anxiety. losartan-hydroCHLOROthiazide 100-12.5 mg per tablet Commonly known as:  HYZAAR Take 1 Tab by mouth daily. mirtazapine 30 mg tablet Commonly known as:  Vill[place]ueva Sitter Take  by mouth nightly. For sleep  
  
 multivitamin, tx-iron-ca-min 9 mg iron-400 mcg Tab tablet Commonly known as:  THERA-M w/ IRON Take 1 Tab by mouth daily. omega-3 acid ethyl esters 1 gram capsule Commonly known as:  Marielos [place] Take 2 Caps by mouth two (2) times a day. potassium chloride 20 mEq tablet Commonly known as:  K-DUR, KLOR-CON Take 1 Tab by mouth daily. raNITIdine 150 mg tablet Commonly known as:  ZANTAC Take 1 Tab by mouth two (2) times a day. We Performed the Following REFERRAL TO GASTROENTEROLOGY [MKK20 Custom] Follow-up Instructions Return if symptoms worsen or fail to improve. To-Do List   
 09/25/2018 1:00 PM  
  Appointment with [address] [place] [address] [address] at [place] [address] ([phone])  
  
 03/23/2019 Lab:  METABOLIC PANEL, BASIC Referral Information Referral ID Referred By Referred To  
  
 [id number] Jay MCDONALD Not Available Visits Status Start Date End Date 1 New Request 9/4/18 9/4/19 If your referral has a status of pending review or denied, additional information will be sent to support the outcome of this decision. Patient Instructions Hiatal Hernia: Care Instructions Your Care Instructions A hiatal hernia occurs when part of the stomach bulges into the chest cavity. A hiatal hernia may allow stomach acid and juices to back up into the esophagus (acid reflux). This can cause a feeling of burning, warmth, heat, or pain behind the breastbone.  This feeling may often occur after you eat, soon after you lie down, or when you bend forward, and it may come and go. You also may have a sour taste in your mouth. These symptoms are commonly known as heartburn or reflux. But not all hiatal hernias cause symptoms. Follow-up care is a key part of your treatment and safety. Be sure to make and go to all appointments, and call your doctor if you are having problems. It's also a good idea to know your test results and keep a list of the medicines you take. How can you care for yourself at home? · Take your medicines exactly as prescribed. Call your doctor if you think you are having a problem with your medicine. · Do not take aspirin or other nonsteroidal anti-inflammatory drugs (NSAIDs), such as ibuprofen (Advil, Motrin) or naproxen (Aleve), unless your doctor says it is okay. Ask your doctor what you can take for pain. · Your doctor may recommend over-the-counter medicine. For mild or occasional indigestion, antacids such as Tums, Gaviscon, Maalox, or Mylanta may help. Your doctor also may recommend over-the-counter acid reducers, such as famotidine (Pepcid AC), cimetidine (Tagamet HB), ranitidine (Zantac 75 and Zantac 150), or omeprazole (Prilosec). Read and follow all instructions on the label. If you use these medicines often, talk with your doctor. · Change your eating habits. ¨ It's best to eat several small meals instead of two or three large meals. ¨ After you eat, wait 2 to 3 hours before you lie down. Late-night snacks aren't a good idea. ¨ Chocolate, mint, and alcohol can make heartburn worse. They relax the valve between the esophagus and the stomach. ¨ Spicy foods, foods that have a lot of acid (like tomatoes and oranges), and coffee can make heartburn symptoms worse in some people. If your symptoms are worse after you eat a certain food, you may want to stop eating that food to see if your symptoms get better.  
· Do not smoke or chew tobacco. 
 · If you get heartburn at night, raise the head of your bed 6 to 8 inches by putting the frame on blocks or placing a foam wedge under the head of your mattress. (Adding extra pillows does not work.) · Do not wear tight clothing around your middle. · Lose weight if you need to. Losing just 5 to 10 pounds can help. When should you call for help? Call your doctor now or seek immediate medical care if: 
  · You have new or worse belly pain.  
  · You are vomiting.  
 Watch closely for changes in your health, and be sure to contact your doctor if: 
  · You have new or worse symptoms of indigestion.  
  · You have trouble or pain swallowing.  
  · You are losing weight.  
  · You do not get better as expected. Where can you learn more? Go to http://los-zack.info/. Enter Q582 in the search box to learn more about \"Hiatal Hernia: Care Instructions. \" Current as of: May 12, 2017 Content Version: 11.7 © 0054-5552 [employer]. Care instructions adapted under license by [employer] (which disclaims liability or warranty for this information). If you have questions about a medical condition or this instruction, always ask your healthcare professional. [address] any warranty or liability for your use of this information. Learning About Schatzki's Ring What is Schatzki's ring? A Schatzki's ring is a ring of tissue that forms inside the esophagus, the tube that carries food and liquid to your stomach. This ring makes the esophagus narrow in one area, close to where it meets the stomach. It can make it hard to swallow. You may feel like food gets stuck in your esophagus. Doctors aren't sure exactly what causes these rings. The ring is also something you can be born with. How is Schatzki's ring diagnosed?  
Your doctor may check your esophagus if you are having trouble swallowing or if you feel like food is getting stuck. The doctor will use a tool called an endoscope, or scope. It's a thin, flexible, lighted viewing tool. It goes into the mouth and down the throat. Your doctor can use it to check for any problems. The scope can also be used to take a sample of tissue to test (biopsy). You might need an X-ray. For the X-ray, you may need to swallow a substance, such as barium, that makes it easier to see what happens in your esophagus. How is Schatzki's ring treated? A Schatzki's ring is usually treated with a procedure called esophageal dilation. Dilation can open up narrow areas of the esophagus. Before the procedure, you will get medicines through a needle in your vein (IV) in your arm or hand. These medicines reduce pain and will make you feel relaxed and drowsy. Your throat will also be numbed. You may not remember much about the treatment. The doctor will guide a balloon or a plastic tool for widening (dilator) down your throat and into your esophagus. The dilator is used to widen any narrow area. To guide the dilator, the doctor may use a scope. Or he or she may use a thin wire as a guide. After the procedure, you will be observed for 1 to 2 hours until the medicines wear off. If your throat was numbed before the test, you should not eat or drink until your throat is no longer numb. When you are fully recovered, you can go home. You will not be able to drive or operate machinery for 12 hours after the test. Your doctor will tell you when you can go back to your usual diet and activities. Do not drink alcohol for 12 to 24 hours after the test. 
You may still need to treat some symptoms of GERD. Your doctor may give you information about that. Follow-up care is a key part of your treatment and safety. Be sure to make and go to all appointments, and call your doctor if you are having problems.  It's also a good idea to know your test results and keep a list of the medicines you take. Where can you learn more? Go to http://los-zack.info/. Enter [id number] in the search box to learn more about \"Learning About Stevie's Ring. \" Current as of: September 28, 2017 Content Version: 11.7 © 6728-7618 [employer]. Care instructions adapted under license by [employer] (which disclaims liability or warranty for this information). If you have questions about a medical condition or this instruction, always ask your healthcare professional. [address] any warranty or liability for your use of this information. Introducing [facility]! [employer] introduces eigital patient portal. Now you can access parts of your medical record, email your doctor's office, and request medication refills online. 1. In your internet browser, go to [email or web address] 2. Click on the First Time User? Click Here link in the Sign In box. You will see the New Member Sign Up page. 3. Enter your eigital Access Code exactly as it appears below. You will not need to use this code after youve completed the sign-up process. If you do not sign up before the expiration date, you must request a new code. · eigital Access Code: [email or web address] Expires: 11/1/2018  5:17 AM 
 
4. Enter the last four digits of your Social Security Number (xxxx) and Date of Birth (mm/dd/yyyy) as indicated and click Submit. You will be taken to the next sign-up page. 5. Create a [employer] ID. This will be your eigital login ID and cannot be changed, so think of one that is secure and easy to remember. 6. Create a eigital password. You can change your password at any time. 7. Enter your Password Reset Question and Answer. This can be used at a later time if you forget your password. 8. Enter your e-mail address. You will receive e-mail notification when new information is available in [address] 9. Click Sign Up. You can now view and download portions of your medical record. 10. Click the Download Summary menu link to download a portable copy of your medical information. If you have questions, please visit the Frequently Asked Questions section of the Divergence website. Remember, Divergence is NOT to be used for urgent needs. For medical emergencies, dial 911. Now available from your iPhone and Android! Please provide this summary of care documentation to your next provider. Your primary care clinician is listed as Tara Pope. If you have any questions after today's visit, please call [phone].
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
## 2018-09-04 NOTE — PATIENT INSTRUCTIONS
Hiatal Hernia: Care Instructions  Your Care Instructions  A hiatal hernia occurs when part of the stomach bulges into the chest cavity. A hiatal hernia may allow stomach acid and juices to back up into the esophagus (acid reflux). This can cause a feeling of burning, warmth, heat, or pain behind the breastbone. This feeling may often occur after you eat, soon after you lie down, or when you bend forward, and it may come and go. You also may have a sour taste in your mouth. These symptoms are commonly known as heartburn or reflux. But not all hiatal hernias cause symptoms. Follow-up care is a key part of your treatment and safety. Be sure to make and go to all appointments, and call your doctor if you are having problems. It's also a good idea to know your test results and keep a list of the medicines you take. How can you care for yourself at home? · Take your medicines exactly as prescribed. Call your doctor if you think you are having a problem with your medicine. · Do not take aspirin or other nonsteroidal anti-inflammatory drugs (NSAIDs), such as ibuprofen (Advil, Motrin) or naproxen (Aleve), unless your doctor says it is okay. Ask your doctor what you can take for pain. · Your doctor may recommend over-the-counter medicine. For mild or occasional indigestion, antacids such as Tums, Gaviscon, Maalox, or Mylanta may help. Your doctor also may recommend over-the-counter acid reducers, such as famotidine (Pepcid AC), cimetidine (Tagamet HB), ranitidine (Zantac 75 and Zantac 150), or omeprazole (Prilosec). Read and follow all instructions on the label. If you use these medicines often, talk with your doctor. · Change your eating habits. ¨ It's best to eat several small meals instead of two or three large meals. ¨ After you eat, wait 2 to 3 hours before you lie down. Late-night snacks aren't a good idea. ¨ Chocolate, mint, and alcohol can make heartburn worse.  They relax the valve between the esophagus and the stomach. ¨ Spicy foods, foods that have a lot of acid (like tomatoes and oranges), and coffee can make heartburn symptoms worse in some people. If your symptoms are worse after you eat a certain food, you may want to stop eating that food to see if your symptoms get better. · Do not smoke or chew tobacco.  · If you get heartburn at night, raise the head of your bed 6 to 8 inches by putting the frame on blocks or placing a foam wedge under the head of your mattress. (Adding extra pillows does not work.)  · Do not wear tight clothing around your middle. · Lose weight if you need to. Losing just 5 to 10 pounds can help. When should you call for help? Call your doctor now or seek immediate medical care if:    · You have new or worse belly pain.     · You are vomiting.    Watch closely for changes in your health, and be sure to contact your doctor if:    · You have new or worse symptoms of indigestion.     · You have trouble or pain swallowing.     · You are losing weight.     · You do not get better as expected. Where can you learn more? Go to http://los-zack.info/. Enter A606 in the search box to learn more about \"Hiatal Hernia: Care Instructions. \"  Current as of: May 12, 2017  Content Version: 11.7  © 0994-5128 [employer]. Care instructions adapted under license by [employer] (which disclaims liability or warranty for this information). If you have questions about a medical condition or this instruction, always ask your healthcare professional. [place] [address] any warranty or liability for your use of this information. Learning About Schatzki's Ring  What is Schatzki's ring? A Schatzki's ring is a ring of tissue that forms inside the esophagus, the tube that carries food and liquid to your stomach. This ring makes the esophagus narrow in one area, close to where it meets the stomach. It can make it hard to swallow.  You may feel like food gets stuck in your esophagus. Doctors aren't sure exactly what causes these rings. The ring is also something you can be born with. How is Schatzki's ring diagnosed? Your doctor may check your esophagus if you are having trouble swallowing or if you feel like food is getting stuck. The doctor will use a tool called an endoscope, or scope. It's a thin, flexible, lighted viewing tool. It goes into the mouth and down the throat. Your doctor can use it to check for any problems. The scope can also be used to take a sample of tissue to test (biopsy). You might need an X-ray. For the X-ray, you may need to swallow a substance, such as barium, that makes it easier to see what happens in your esophagus. How is Schatzki's ring treated? A Schatzki's ring is usually treated with a procedure called esophageal dilation. Dilation can open up narrow areas of the esophagus. Before the procedure, you will get medicines through a needle in your vein (IV) in your arm or hand. These medicines reduce pain and will make you feel relaxed and drowsy. Your throat will also be numbed. You may not remember much about the treatment. The doctor will guide a balloon or a plastic tool for widening (dilator) down your throat and into your esophagus. The dilator is used to widen any narrow area. To guide the dilator, the doctor may use a scope. Or he or she may use a thin wire as a guide. After the procedure, you will be observed for 1 to 2 hours until the medicines wear off. If your throat was numbed before the test, you should not eat or drink until your throat is no longer numb. When you are fully recovered, you can go home. You will not be able to drive or operate machinery for 12 hours after the test. Your doctor will tell you when you can go back to your usual diet and activities. Do not drink alcohol for 12 to 24 hours after the test.  You may still need to treat some symptoms of GERD.  Your doctor may give you information about that. Follow-up care is a key part of your treatment and safety. Be sure to make and go to all appointments, and call your doctor if you are having problems. It's also a good idea to know your test results and keep a list of the medicines you take. Where can you learn more? Go to http://los-zack.info/. Enter [id number] in the search box to learn more about \"Learning About Schatzki's Ring. \"  Current as of: September 28, 2017  Content Version: 11.7  © 7381-5461 [employer]. Care instructions adapted under license by [employer] (which disclaims liability or warranty for this information). If you have questions about a medical condition or this instruction, always ask your healthcare professional. [address] any warranty or liability for your use of this information.

## 2018-09-08 ENCOUNTER — HOSPITAL ENCOUNTER (EMERGENCY)
Age: 48
Discharge: HOME | End: 2018-09-08
Attending: EMERGENCY MEDICINE
Payer: SUBSIDIZED

## 2018-09-08 VITALS
BODY MASS INDEX: 34.96 KG/M2 | TEMPERATURE: 98.1 F | OXYGEN SATURATION: 95 % | WEIGHT: 190 LBS | HEIGHT: 62 IN | SYSTOLIC BLOOD PRESSURE: 124 MMHG | HEART RATE: 70 BPM | DIASTOLIC BLOOD PRESSURE: 76 MMHG | RESPIRATION RATE: 15 BRPM

## 2018-09-08 DIAGNOSIS — R00.2 PALPITATIONS: Primary | ICD-10-CM

## 2018-09-08 DIAGNOSIS — E87.6 HYPOKALEMIA: ICD-10-CM

## 2018-09-08 DIAGNOSIS — D75.1 SECONDARY POLYCYTHEMIA: ICD-10-CM

## 2018-09-08 LAB
ANION GAP SERPL CALC-SCNC: 5 MMOL/L (ref 3–18)
BASOPHILS # BLD: 0 K/UL (ref 0–0.1)
BASOPHILS NFR BLD: 0 % (ref 0–2)
BUN SERPL-MCNC: 10 MG/DL (ref 7–18)
BUN/CREAT SERPL: 14 (ref 12–20)
CALCIUM SERPL-MCNC: 8.3 MG/DL (ref 8.5–10.1)
CHLORIDE SERPL-SCNC: 105 MMOL/L (ref 100–108)
CO2 SERPL-SCNC: 31 MMOL/L (ref 21–32)
CREAT SERPL-MCNC: 0.72 MG/DL (ref 0.6–1.3)
DIFFERENTIAL METHOD BLD: ABNORMAL
EOSINOPHIL # BLD: 0.2 K/UL (ref 0–0.4)
EOSINOPHIL NFR BLD: 1 % (ref 0–5)
ERYTHROCYTE [DISTWIDTH] IN BLOOD BY AUTOMATED COUNT: 12.9 % (ref 11.6–14.5)
GLUCOSE SERPL-MCNC: 138 MG/DL (ref 74–99)
HCT VFR BLD AUTO: 43.7 % (ref 35–45)
HGB BLD-MCNC: 14.7 G/DL (ref 12–16)
LYMPHOCYTES # BLD: 4.1 K/UL (ref 0.9–3.6)
LYMPHOCYTES NFR BLD: 29 % (ref 21–52)
MAGNESIUM SERPL-MCNC: 2.1 MG/DL (ref 1.6–2.6)
MCH RBC QN AUTO: 29.7 PG (ref 24–34)
MCHC RBC AUTO-ENTMCNC: 33.6 G/DL (ref 31–37)
MCV RBC AUTO: 88.3 FL (ref 74–97)
MONOCYTES # BLD: 0.9 K/UL (ref 0.05–1.2)
MONOCYTES NFR BLD: 6 % (ref 3–10)
NEUTS SEG # BLD: 9 K/UL (ref 1.8–8)
NEUTS SEG NFR BLD: 64 % (ref 40–73)
PLATELET # BLD AUTO: 332 K/UL (ref 135–420)
PMV BLD AUTO: 9.9 FL (ref 9.2–11.8)
POTASSIUM SERPL-SCNC: 3.3 MMOL/L (ref 3.5–5.5)
RBC # BLD AUTO: 4.95 M/UL (ref 4.2–5.3)
SODIUM SERPL-SCNC: 141 MMOL/L (ref 136–145)
WBC # BLD AUTO: 14.2 K/UL (ref 4.6–13.2)

## 2018-09-08 PROCEDURE — 93005 ELECTROCARDIOGRAM TRACING: CPT

## 2018-09-08 PROCEDURE — 85025 COMPLETE CBC W/AUTO DIFF WBC: CPT

## 2018-09-08 PROCEDURE — 80048 BASIC METABOLIC PNL TOTAL CA: CPT

## 2018-09-08 PROCEDURE — 99285 EMERGENCY DEPT VISIT HI MDM: CPT

## 2018-09-08 PROCEDURE — 74011250637 HC RX REV CODE- 250/637: Performed by: EMERGENCY MEDICINE

## 2018-09-08 PROCEDURE — 83735 ASSAY OF MAGNESIUM: CPT

## 2018-09-08 RX ORDER — POTASSIUM CHLORIDE 20 MEQ/1
40 TABLET, EXTENDED RELEASE ORAL
Status: COMPLETED | OUTPATIENT
Start: 2018-09-08 | End: 2018-09-08

## 2018-09-08 RX ADMIN — POTASSIUM CHLORIDE 40 MEQ: 20 TABLET, EXTENDED RELEASE ORAL at 23:10

## 2018-09-09 LAB
ATRIAL RATE: 72 BPM
CALCULATED P AXIS, ECG09: 53 DEGREES
CALCULATED R AXIS, ECG10: -25 DEGREES
CALCULATED T AXIS, ECG11: 65 DEGREES
DIAGNOSIS, 93000: NORMAL
P-R INTERVAL, ECG05: 166 MS
Q-T INTERVAL, ECG07: 430 MS
QRS DURATION, ECG06: 114 MS
QTC CALCULATION (BEZET), ECG08: 470 MS
VENTRICULAR RATE, ECG03: 72 BPM

## 2018-09-09 NOTE — ED NOTES
I have reviewed discharge instructions with the patient. The patient verbalized understanding. No further questions at this time. Patient ambulated to the [facility] with a steady gait for discharge.

## 2018-09-09 NOTE — DISCHARGE INSTRUCTIONS
Continue to take your potassium supplementation and be sure to make your consult appointment with the heart doctor.

## 2018-09-09 NOTE — ED TRIAGE NOTES
Pt complains of palpitations that started a couple hours ago while lying down. Also complains of pain to left upper quadrant where she has a hernia.

## 2018-09-09 NOTE — ED PROVIDER NOTES
EMERGENCY DEPARTMENT HISTORY AND PHYSICAL EXAM    8:36 PM      Date: 9/8/2018  Patient Name: Tara Gagnon    History of Presenting Illness     Chief Complaint   Patient presents with    Palpitations         History Provided By: Patient    Chief Complaint: Palpitations  Duration:  Hours  Timing:  Intermittent, now resolved  Quality: Fluttering  Severity: Moderate  Modifying Factors: Palpitations seem to occur with hernia bulging. Associated Symptoms: denies any other associated signs or symptoms      Additional History (Context): Tara Gagnon is a 51 y.o. female with a history of HTN, depression, polycythemia, appendectomy, and hiatal hernia, who presents to the ED with complaint of palpitations onset two hours. Patient describes her palpitations as a fluttering sensation, which has resolved since presenting to the ED. She states that her symptoms began while napping this evening. Patient reports that she has had intermittent episodes of palpitations over the past two months. She has not yet followed up with a Cardiologist, but states that she has an appointment with Dr. Nga Peng at the end of September. Patient notes that she was recently diagnosed with a hiatal hernia and her palpitations seem to occur when her hernia bulges out. Patient denies associated chest pain, shortness of breath, nausea, vomiting, or diaphoresis. She makes no further complaints. PCP: Nayely Hurd NP    Current Facility-Administered Medications   Medication Dose Route Frequency Provider Last Rate Last Dose    potassium chloride (K-DUR, KLOR-CON) SR tablet 40 mEq  40 mEq Oral NOW Ban Juárez MD         Current Outpatient Prescriptions   Medication Sig Dispense Refill    raNITIdine (ZANTAC) 150 mg tablet Take 1 Tab by mouth two (2) times a day. 60 Tab 1    potassium chloride (K-DUR, KLOR-CON) 20 mEq tablet Take 1 Tab by mouth daily.  30 Tab 3    lidocaine (LIDOCAINE VISCOUS) 2 % solution Take 15 mL by mouth as needed for Pain. 1 Bottle 0    LORazepam (ATIVAN) 1 mg tablet Take  by mouth every four (4) hours as needed for Anxiety.  losartan-hydroCHLOROthiazide (HYZAAR) 100-12.5 mg per tablet Take 1 Tab by mouth daily.  multivitamin, tx-iron-ca-min (THERA-M W/ IRON) 9 mg iron-400 mcg tab tablet Take 1 Tab by mouth daily.  lamoTRIgine (LAMICTAL) 100 mg tablet Take 200 mg by mouth daily.  mirtazapine (REMERON) 30 mg tablet Take  by mouth nightly. For sleep      cloNIDine HCl (CATAPRES) 0.3 mg tablet Take 1 Tab by mouth two (2) times a day. Indications: hypertension 60 Tab 3    amLODIPine-atorvastatin (CADUET) 10-40 mg per tablet Take 1 Tab by mouth daily. 90 Tab 3    calcium-cholecalciferol, D3, (CALTRATE 600+D) tablet Take 1 Tab by mouth daily.  omega-3 acid ethyl esters (LOVAZA) 1 gram capsule Take 2 Caps by mouth two (2) times a day. 360 Cap 3    ergocalciferol (VITAMIN D2) 50,000 unit capsule Take one capsule every 7 days till gone, then start taking over-the-counter Vitamin D3 2,000 units every day 4 Cap 2    albuterol (PROVENTIL HFA, VENTOLIN HFA, PROAIR HFA) 90 mcg/actuation inhaler Take 2 Puffs by inhalation every four (4) hours as needed for Wheezing.  6 Inhaler 3       Past History     Past Medical History:  Past Medical History:   Diagnosis Date    Bronchitis     Cocaine abuse 3/26/2015    Dental caries     Depression     Depression 8/31/2016    Drug abuse     Elevated hematocrit 8/31/2016    ETOH abuse     H/O screening mammography 12/06/2016    No evidence of malignancy     HPV (human papilloma virus) infection 11/2016    The patient referred to [facility]     Hypertension     Ill-defined condition     Mood swings ([facility] [address]     Polycythemia 2018    Psychiatric disorder     depression, Bipolar    Tobacco abuse     Vitamin D deficiency        Past Surgical History:  Past Surgical History:   Procedure Laterality Date    HX APPENDECTOMY      HX COLPOSCOPY  12/06/2016    Low-grade courtney/mild dysplasia RICK I    HX HERNIA REPAIR         Family History:  Family History   Problem Relation Age of Onset    Hypertension Mother     Heart Attack Father     Hypertension Father     Cancer Father      skin    Cancer Sister      skin    Bipolar Disorder Sister     Cancer Brother      skin    Cancer Maternal Aunt      colon       Social History:  Social History   Substance Use Topics    Smoking status: Current Every Day Smoker     Packs/day: 0.50     Years: 20.00     Types: Cigarettes    Smokeless tobacco: Never Used    Alcohol use 0.0 oz/week     3 - 6 Cans of beer per week      Comment: 3 or 4 beers weekly        Allergies: Allergies   Allergen Reactions    Iodinated Contrast- Oral And Iv Dye Shortness of Breath         Review of Systems       Review of Systems   Constitutional: Negative for diaphoresis and fever. Respiratory: Negative for shortness of breath. Cardiovascular: Positive for palpitations. Negative for chest pain and leg swelling. Gastrointestinal: Negative for abdominal pain, nausea and vomiting. All other systems reviewed and are negative. Physical Exam     Visit Vitals    /76    Pulse 70    Temp 98.1 °F (36.7 °C)    Resp 15    Ht 5' 2\" (1.575 m)    Wt 86.2 kg (190 lb)    LMP 08/10/2018    SpO2 95%    BMI 34.75 kg/m2         Physical Exam   Constitutional: She is oriented to person, place, and time. She appears well-developed and well-nourished. No distress. HENT:   Head: Normocephalic and atraumatic. Right Ear: External ear normal. No tenderness. Left Ear: Hearing and external ear normal. No tenderness. Nose: Nose normal. No rhinorrhea. Mouth/Throat: Oropharynx is clear and moist and mucous membranes are normal. No oropharyngeal exudate. Eyes: Conjunctivae are normal. Pupils are equal, round, and reactive to light. Neck: Normal range of motion. Neck supple. No tracheal deviation present. No thyromegaly present.    Cardiovascular: Normal rate, regular rhythm, S1 normal, S2 normal, normal heart sounds and intact distal pulses. Exam reveals no gallop and no friction rub. No murmur heard. Pulmonary/Chest: Effort normal and breath sounds normal. No respiratory distress. She has no wheezes. She has no rhonchi. She has no rales. Right breast exhibits no tenderness. Left breast exhibits no tenderness. Abdominal: Soft. Normal appearance and bowel sounds are normal. She exhibits no distension. There is no tenderness. Musculoskeletal: Normal range of motion. She exhibits no edema, tenderness or deformity. Neurological: She is alert and oriented to person, place, and time. She has normal strength and normal reflexes. No cranial nerve deficit or sensory deficit. Gait normal.   Skin: Skin is warm, dry and intact. No rash noted. No pallor. Psychiatric: She has a normal mood and affect. Her behavior is normal. Thought content normal. She expresses no homicidal and no suicidal ideation. Nursing note and vitals reviewed. Diagnostic Study Results     Labs -  Recent Results (from the past 12 hour(s))   CBC WITH AUTOMATED DIFF    Collection Time: 09/08/18  8:15 PM   Result Value Ref Range    WBC 14.2 (H) 4.6 - 13.2 K/uL    RBC 4.95 4.20 - 5.30 M/uL    HGB 14.7 12.0 - 16.0 g/dL    HCT 43.7 35.0 - 45.0 %    MCV 88.3 74.0 - 97.0 FL    MCH 29.7 24.0 - 34.0 PG    MCHC 33.6 31.0 - 37.0 g/dL    RDW 12.9 11.6 - 14.5 %    PLATELET 020 217 - 776 K/uL    MPV 9.9 9.2 - 11.8 FL    NEUTROPHILS 64 40 - 73 %    LYMPHOCYTES 29 21 - 52 %    MONOCYTES 6 3 - 10 %    EOSINOPHILS 1 0 - 5 %    BASOPHILS 0 0 - 2 %    ABS. NEUTROPHILS 9.0 (H) 1.8 - 8.0 K/UL    ABS. LYMPHOCYTES 4.1 (H) 0.9 - 3.6 K/UL    ABS. MONOCYTES 0.9 0.05 - 1.2 K/UL    ABS. EOSINOPHILS 0.2 0.0 - 0.4 K/UL    ABS.  BASOPHILS 0.0 0.0 - 0.1 K/UL    DF AUTOMATED     METABOLIC PANEL, BASIC    Collection Time: 09/08/18  8:15 PM   Result Value Ref Range    Sodium 141 136 - 145 mmol/L    Potassium 3.3 (L) 3.5 - 5.5 mmol/L    Chloride 105 100 - 108 mmol/L    CO2 31 21 - 32 mmol/L    Anion gap 5 3.0 - 18 mmol/L    Glucose 138 (H) 74 - 99 mg/dL    BUN 10 7.0 - 18 MG/DL    Creatinine 0.72 0.6 - 1.3 MG/DL    BUN/Creatinine ratio 14 12 - 20      GFR est AA >60 >60 ml/min/1.73m2    GFR est non-AA >60 >60 ml/min/1.73m2    Calcium 8.3 (L) 8.5 - 10.1 MG/DL   MAGNESIUM    Collection Time: 09/08/18  8:15 PM   Result Value Ref Range    Magnesium 2.1 1.6 - 2.6 mg/dL       Radiologic Studies -   No orders to display         Medical Decision Making   I am the first provider for this patient. I reviewed the vital signs, available nursing notes, past medical history, past surgical history, family history and social history. Vital Signs-Reviewed the patient's vital signs. EKG: Interpreted by the EP. Time Interpreted: 8:08 PM   Rate: 72 bpm    Rhythm: Normal Sinus Rhythm   Interpretation: Does not appear ischemic. Records Reviewed: Nursing Notes, Old Medical Records, Previous electrocardiograms and Previous Laboratory Studies (Time of Review: 8:36 PM)    ED Course: Progress Notes, Reevaluation, and Consults:    Palpitations x months, no chest pain  Unremarkable exam  Labs reassuring. Replete K  No arrhtymia on monitor or EKG    Keep appointment with cards  Precautions for return        Diagnosis     Clinical Impression:   1. Palpitations    2. Hypokalemia    3. Secondary polycythemia        Disposition: Discharge    Follow-up Information     Follow up With Details Comments [address] Bashir NP   [address] [facility] [address]      [facility] EMERGENCY DEPT  As needed, If symptoms worsen [address] [facility] [address]  [phone]           Patient's Medications   Start Taking    No medications on file   Continue Taking    ALBUTEROL (PROVENTIL HFA, VENTOLIN HFA, PROAIR HFA) 90 MCG/ACTUATION INHALER    Take 2 Puffs by inhalation every four (4) hours as needed for Wheezing. AMLODIPINE-ATORVASTATIN (CADUET) 10-40 MG PER TABLET    Take 1 Tab by mouth daily. CALCIUM-CHOLECALCIFEROL, D3, (CALTRATE 600+D) TABLET    Take 1 Tab by mouth daily. CLONIDINE HCL (CATAPRES) 0.3 MG TABLET    Take 1 Tab by mouth two (2) times a day. Indications: hypertension    ERGOCALCIFEROL (VITAMIN D2) 50,000 UNIT CAPSULE    Take one capsule every 7 days till gone, then start taking over-the-counter Vitamin D3 2,000 units every day    LAMOTRIGINE (LAMICTAL) 100 MG TABLET    Take 200 mg by mouth daily. LIDOCAINE (LIDOCAINE VISCOUS) 2 % SOLUTION    Take 15 mL by mouth as needed for Pain. LORAZEPAM (ATIVAN) 1 MG TABLET    Take  by mouth every four (4) hours as needed for Anxiety. LOSARTAN-HYDROCHLOROTHIAZIDE (HYZAAR) 100-12.5 MG PER TABLET    Take 1 Tab by mouth daily. MIRTAZAPINE (REMERON) 30 MG TABLET    Take  by mouth nightly. For sleep    MULTIVITAMIN, TX-IRON-CA-MIN (THERA-M W/ IRON) 9 MG IRON-400 MCG TAB TABLET    Take 1 Tab by mouth daily. OMEGA-3 ACID ETHYL ESTERS (LOVAZA) 1 GRAM CAPSULE    Take 2 Caps by mouth two (2) times a day. POTASSIUM CHLORIDE (K-DUR, KLOR-CON) 20 MEQ TABLET    Take 1 Tab by mouth daily. RANITIDINE (ZANTAC) 150 MG TABLET    Take 1 Tab by mouth two (2) times a day. These Medications have changed    No medications on file   Stop Taking    No medications on file     _______________________________    Scribe Attestation:     Uma Lares, acting as a scribe for and in the presence of Toni Acosta MD      September 08, 2018 at 8:36 PM       Provider Attestation:      I personally performed the services described in the documentation, reviewed the documentation, as recorded by the scribe in my presence, and it accurately and completely records my words and actions.  September 08, 2018 at 8:36 PM - Toni Acosta MD      _______________________________

## 2018-09-17 DIAGNOSIS — I10 ESSENTIAL HYPERTENSION: Primary | ICD-10-CM

## 2018-09-17 RX ORDER — LOSARTAN POTASSIUM AND HYDROCHLOROTHIAZIDE 12.5; 1 MG/1; MG/1
1 TABLET ORAL DAILY
Qty: 30 TAB | Refills: 0 | Status: SHIPPED | OUTPATIENT
Start: 2018-09-17 | End: 2019-05-15 | Stop reason: SDUPTHER

## 2018-09-17 NOTE — TELEPHONE ENCOUNTER
Requested Prescriptions     Signed Prescriptions Disp Refills    losartan-hydroCHLOROthiazide (HYZAAR) 100-12.5 mg per tablet 30 Tab 0     Sig: Take 1 Tab by mouth daily. Authorizing Provider: Amy      Will give 30 day supply only. Will send this note to Danni Jha to address if she wants to continue medication therapy and to order refills.

## 2018-09-26 ENCOUNTER — APPOINTMENT (OUTPATIENT)
Dept: GENERAL RADIOLOGY | Age: 48
End: 2018-09-26
Attending: EMERGENCY MEDICINE
Payer: SUBSIDIZED

## 2018-09-26 ENCOUNTER — HOSPITAL ENCOUNTER (EMERGENCY)
Age: 48
Discharge: HOME | End: 2018-09-27
Attending: EMERGENCY MEDICINE
Payer: SUBSIDIZED

## 2018-09-26 DIAGNOSIS — R00.2 PALPITATIONS: Primary | ICD-10-CM

## 2018-09-26 LAB
ALBUMIN SERPL-MCNC: 3.5 G/DL (ref 3.4–5)
ALBUMIN/GLOB SERPL: 1.1 {RATIO} (ref 0.8–1.7)
ALP SERPL-CCNC: 73 U/L (ref 45–117)
ALT SERPL-CCNC: 15 U/L (ref 13–56)
ANION GAP SERPL CALC-SCNC: 7 MMOL/L (ref 3–18)
AST SERPL-CCNC: 8 U/L (ref 15–37)
BASOPHILS # BLD: 0 K/UL (ref 0–0.1)
BASOPHILS NFR BLD: 0 % (ref 0–2)
BILIRUB SERPL-MCNC: 0.2 MG/DL (ref 0.2–1)
BUN SERPL-MCNC: 11 MG/DL (ref 7–18)
BUN/CREAT SERPL: 18 (ref 12–20)
CALCIUM SERPL-MCNC: 8.4 MG/DL (ref 8.5–10.1)
CHLORIDE SERPL-SCNC: 108 MMOL/L (ref 100–108)
CK MB CFR SERPL CALC: 2 % (ref 0–4)
CK MB SERPL-MCNC: 1.4 NG/ML (ref 5–25)
CK SERPL-CCNC: 70 U/L (ref 26–192)
CO2 SERPL-SCNC: 26 MMOL/L (ref 21–32)
CREAT SERPL-MCNC: 0.6 MG/DL (ref 0.6–1.3)
DIFFERENTIAL METHOD BLD: ABNORMAL
EOSINOPHIL # BLD: 0.2 K/UL (ref 0–0.4)
EOSINOPHIL NFR BLD: 2 % (ref 0–5)
ERYTHROCYTE [DISTWIDTH] IN BLOOD BY AUTOMATED COUNT: 13.6 % (ref 11.6–14.5)
GLOBULIN SER CALC-MCNC: 3.2 G/DL (ref 2–4)
GLUCOSE SERPL-MCNC: 122 MG/DL (ref 74–99)
HCT VFR BLD AUTO: 42.3 % (ref 35–45)
HGB BLD-MCNC: 14.3 G/DL (ref 12–16)
LIPASE SERPL-CCNC: 112 U/L (ref 73–393)
LYMPHOCYTES # BLD: 3.7 K/UL (ref 0.9–3.6)
LYMPHOCYTES NFR BLD: 34 % (ref 21–52)
MCH RBC QN AUTO: 29.1 PG (ref 24–34)
MCHC RBC AUTO-ENTMCNC: 33.8 G/DL (ref 31–37)
MCV RBC AUTO: 86.2 FL (ref 74–97)
MONOCYTES # BLD: 1.1 K/UL (ref 0.05–1.2)
MONOCYTES NFR BLD: 10 % (ref 3–10)
NEUTS SEG # BLD: 5.8 K/UL (ref 1.8–8)
NEUTS SEG NFR BLD: 54 % (ref 40–73)
PLATELET # BLD AUTO: 273 K/UL (ref 135–420)
PMV BLD AUTO: 9.8 FL (ref 9.2–11.8)
POTASSIUM SERPL-SCNC: 3.4 MMOL/L (ref 3.5–5.5)
PROT SERPL-MCNC: 6.7 G/DL (ref 6.4–8.2)
RBC # BLD AUTO: 4.91 M/UL (ref 4.2–5.3)
SODIUM SERPL-SCNC: 141 MMOL/L (ref 136–145)
TROPONIN I SERPL-MCNC: <0.02 NG/ML (ref 0–0.04)
WBC # BLD AUTO: 10.9 K/UL (ref 4.6–13.2)

## 2018-09-26 PROCEDURE — 83690 ASSAY OF LIPASE: CPT | Performed by: EMERGENCY MEDICINE

## 2018-09-26 PROCEDURE — 80053 COMPREHEN METABOLIC PANEL: CPT | Performed by: EMERGENCY MEDICINE

## 2018-09-26 PROCEDURE — 93005 ELECTROCARDIOGRAM TRACING: CPT

## 2018-09-26 PROCEDURE — 85025 COMPLETE CBC W/AUTO DIFF WBC: CPT | Performed by: EMERGENCY MEDICINE

## 2018-09-26 PROCEDURE — 82550 ASSAY OF CK (CPK): CPT | Performed by: EMERGENCY MEDICINE

## 2018-09-26 PROCEDURE — 71045 X-RAY EXAM CHEST 1 VIEW: CPT

## 2018-09-26 PROCEDURE — 99285 EMERGENCY DEPT VISIT HI MDM: CPT

## 2018-09-27 ENCOUNTER — TELEPHONE (OUTPATIENT)
Dept: ONCOLOGY | Age: 48
End: 2018-09-27

## 2018-09-27 VITALS
HEART RATE: 78 BPM | DIASTOLIC BLOOD PRESSURE: 82 MMHG | TEMPERATURE: 98.2 F | OXYGEN SATURATION: 95 % | RESPIRATION RATE: 22 BRPM | SYSTOLIC BLOOD PRESSURE: 143 MMHG

## 2018-09-27 LAB
ATRIAL RATE: 65 BPM
CALCULATED P AXIS, ECG09: 54 DEGREES
CALCULATED R AXIS, ECG10: -27 DEGREES
CALCULATED T AXIS, ECG11: 65 DEGREES
DIAGNOSIS, 93000: NORMAL
P-R INTERVAL, ECG05: 162 MS
Q-T INTERVAL, ECG07: 452 MS
QRS DURATION, ECG06: 120 MS
QTC CALCULATION (BEZET), ECG08: 470 MS
TROPONIN I SERPL-MCNC: <0.02 NG/ML (ref 0–0.04)
VENTRICULAR RATE, ECG03: 65 BPM

## 2018-09-27 PROCEDURE — 84484 ASSAY OF TROPONIN QUANT: CPT | Performed by: EMERGENCY MEDICINE

## 2018-09-27 NOTE — ED PROVIDER NOTES
EMERGENCY DEPARTMENT HISTORY AND PHYSICAL EXAM    9:27 PM      Date: 9/26/2018  Patient Name: Tomy Stroud    History of Presenting Illness     Chief Complaint   Patient presents with    Palpitations         History Provided By: Patient    Chief Complaint: palpitations  Duration:  all day  Timing:  Worsening  Location: heart  Severity: Mild  Associated Symptoms: left sided numbness and back pain. Denies dysuria      Additional History (Context): Tomy Stroud is a 51 y.o. female with HTN, HPV,, CHF who presents with worsening mild heart palpitations that has been going on all day. Pt smokes. Associated sx are left sided numbness and back pain. Denies dysuria. PCP: Gus Prieto NP    Current Outpatient Prescriptions   Medication Sig Dispense Refill    losartan-hydroCHLOROthiazide (HYZAAR) 100-12.5 mg per tablet Take 1 Tab by mouth daily. 30 Tab 0    raNITIdine (ZANTAC) 150 mg tablet Take 1 Tab by mouth two (2) times a day. 60 Tab 1    potassium chloride (K-DUR, KLOR-CON) 20 mEq tablet Take 1 Tab by mouth daily. 30 Tab 3    lidocaine (LIDOCAINE VISCOUS) 2 % solution Take 15 mL by mouth as needed for Pain. 1 Bottle 0    LORazepam (ATIVAN) 1 mg tablet Take  by mouth every four (4) hours as needed for Anxiety.  multivitamin, tx-iron-ca-min (THERA-M W/ IRON) 9 mg iron-400 mcg tab tablet Take 1 Tab by mouth daily.  lamoTRIgine (LAMICTAL) 100 mg tablet Take 200 mg by mouth daily.  mirtazapine (REMERON) 30 mg tablet Take  by mouth nightly. For sleep      cloNIDine HCl (CATAPRES) 0.3 mg tablet Take 1 Tab by mouth two (2) times a day. Indications: hypertension 60 Tab 3    amLODIPine-atorvastatin (CADUET) 10-40 mg per tablet Take 1 Tab by mouth daily. 90 Tab 3    calcium-cholecalciferol, D3, (CALTRATE 600+D) tablet Take 1 Tab by mouth daily.  omega-3 acid ethyl esters (LOVAZA) 1 gram capsule Take 2 Caps by mouth two (2) times a day.  360 Cap 3    ergocalciferol (VITAMIN D2) 50,000 unit capsule Take one capsule every 7 days till gone, then start taking over-the-counter Vitamin D3 2,000 units every day 4 Cap 2    albuterol (PROVENTIL HFA, VENTOLIN HFA, PROAIR HFA) 90 mcg/actuation inhaler Take 2 Puffs by inhalation every four (4) hours as needed for Wheezing. 6 Inhaler 3       Past History     Past Medical History:  Past Medical History:   Diagnosis Date    Bronchitis     Cocaine abuse 3/26/2015    Dental caries     Depression     Depression 8/31/2016    Drug abuse     Elevated hematocrit 8/31/2016    ETOH abuse     H/O screening mammography 12/06/2016    No evidence of malignancy     HPV (human papilloma virus) infection 11/2016    The patient [facility]     Hypertension     Ill-defined condition     Mood swings (HCC)     Polycythemia 2018    Psychiatric disorder     depression, Bipolar    Tobacco abuse     Vitamin D deficiency        Past Surgical History:  Past Surgical History:   Procedure Laterality Date    HX APPENDECTOMY      HX COLPOSCOPY  12/06/2016    Low-grade courtney/mild dysplasia RICK I    HX HERNIA REPAIR         Family History:  Family History   Problem Relation Age of Onset    Hypertension Mother     Heart Attack Father     Hypertension Father     Cancer Father      skin    Cancer Sister      skin    Bipolar Disorder Sister     Cancer Brother      skin    Cancer Maternal Aunt      colon       Social History:  Social History   Substance Use Topics    Smoking status: Current Every Day Smoker     Packs/day: 0.50     Years: 20.00     Types: Cigarettes    Smokeless tobacco: Never Used    Alcohol use 0.0 oz/week     3 - 6 Cans of beer per week      Comment: 3 or 4 beers weekly        Allergies: Allergies   Allergen Reactions    Iodinated Contrast- Oral And Iv Dye Shortness of Breath         Review of Systems       Review of Systems   Cardiovascular: Positive for palpitations. Genitourinary: Negative for dysuria.    Musculoskeletal: Positive for back pain.   Neurological: Positive for numbness. All other systems reviewed and are negative. Physical Exam     Visit Vitals    /76    Pulse 74    Resp 15    SpO2 97%         Physical Exam  Physical Exam:  . Patient Vitals for the past 12 hrs:   Pulse Resp BP SpO2   09/26/18 2130 74 15 139/76 97 %   09/26/18 2120 70 11 146/75 98 %     Gen: Well developed, well nourished 51 y.o. female Hypertensive  HEENT: Normocephalic, atraumatic  Respiratory: No accessory muscle use No wheeze, No rales, No rhonchi. Normal chest wall excursion. No subcutaneous air, no rib crepitus  Cardiovascular: Regular rhythm and rate, Normal pulses, Normal perfusion. No edema  Gastrointestinal: Non distended, Non tender, No masses. No ascites. No organomegaly. No evidence of trauma  Musculoskeletal: Full range of motion at all other tested joints. No joint effusions. Neuro: Normal strength, Normal sensation. Normal speech. No ataxia. Cranial Nerves II-XII normal as tested. Skin: No rash, petechia or purpura. Warm and dry Peritoneal dialysis catheter  Psyche: No suicidal ideation, No homicidal ideation. No hallucinations. Organized thoughts.   Heme: Normal  : Deferred        Diagnostic Study Results     Labs -  Recent Results (from the past 12 hour(s))   EKG, 12 LEAD, INITIAL    Collection Time: 09/26/18  9:17 PM   Result Value Ref Range    Ventricular Rate 65 BPM    Atrial Rate 65 BPM    P-R Interval 162 ms    QRS Duration 120 ms    Q-T Interval 452 ms    QTC Calculation (Bezet) 470 ms    Calculated P Axis 54 degrees    Calculated R Axis -27 degrees    Calculated T Axis 65 degrees    Diagnosis       Normal sinus rhythm with sinus arrhythmia  Left ventricular hypertrophy with QRS widening  Abnormal ECG  When compared with ECG of 08-SEP-2018 20:08,  No significant change was found     CBC WITH AUTOMATED DIFF    Collection Time: 09/26/18  9:24 PM   Result Value Ref Range    WBC 10.9 4.6 - 13.2 K/uL    RBC 4.91 4.20 - 5.30 M/uL HGB 14.3 12.0 - 16.0 g/dL    HCT 42.3 35.0 - 45.0 %    MCV 86.2 74.0 - 97.0 FL    MCH 29.1 24.0 - 34.0 PG    MCHC 33.8 31.0 - 37.0 g/dL    RDW 13.6 11.6 - 14.5 %    PLATELET 485 478 - 526 K/uL    MPV 9.8 9.2 - 11.8 FL    NEUTROPHILS 54 40 - 73 %    LYMPHOCYTES 34 21 - 52 %    MONOCYTES 10 3 - 10 %    EOSINOPHILS 2 0 - 5 %    BASOPHILS 0 0 - 2 %    ABS. NEUTROPHILS 5.8 1.8 - 8.0 K/UL    ABS. LYMPHOCYTES 3.7 (H) 0.9 - 3.6 K/UL    ABS. MONOCYTES 1.1 0.05 - 1.2 K/UL    ABS. EOSINOPHILS 0.2 0.0 - 0.4 K/UL    ABS. BASOPHILS 0.0 0.0 - 0.1 K/UL    DF AUTOMATED     METABOLIC PANEL, COMPREHENSIVE    Collection Time: 09/26/18  9:24 PM   Result Value Ref Range    Sodium 141 136 - 145 mmol/L    Potassium 3.4 (L) 3.5 - 5.5 mmol/L    Chloride 108 100 - 108 mmol/L    CO2 26 21 - 32 mmol/L    Anion gap 7 3.0 - 18 mmol/L    Glucose 122 (H) 74 - 99 mg/dL    BUN 11 7.0 - 18 MG/DL    Creatinine 0.60 0.6 - 1.3 MG/DL    BUN/Creatinine ratio 18 12 - 20      GFR est AA >60 >60 ml/min/1.73m2    GFR est non-AA >60 >60 ml/min/1.73m2    Calcium 8.4 (L) 8.5 - 10.1 MG/DL    Bilirubin, total 0.2 0.2 - 1.0 MG/DL    ALT (SGPT) 15 13 - 56 U/L    AST (SGOT) 8 (L) 15 - 37 U/L    Alk. phosphatase 73 45 - 117 U/L    Protein, total 6.7 6.4 - 8.2 g/dL    Albumin 3.5 3.4 - 5.0 g/dL    Globulin 3.2 2.0 - 4.0 g/dL    A-G Ratio 1.1 0.8 - 1.7     CARDIAC PANEL,(CK, CKMB & TROPONIN)    Collection Time: 09/26/18  9:24 PM   Result Value Ref Range    CK 70 26 - 192 U/L    CK - MB 1.4 <3.6 ng/ml    CK-MB Index 2.0 0.0 - 4.0 %    Troponin-I, Qt. <0.02 0.0 - 0.045 NG/ML   LIPASE    Collection Time: 09/26/18  9:24 PM   Result Value Ref Range    Lipase 112 73 - 393 U/L       Radiologic Studies -   XR CHEST SNGL V   Final Result            Medical Decision Making   I am the first provider for this patient. I reviewed the vital signs, available nursing notes, past medical history, past surgical history, family history and social history.     Vital Signs-Reviewed the patient's vital signs. EKG: Interpreted by the EP. Time Interpreted: 9:13   Rate: 65   Rhythm: Normal Sinus Rhythm    Interpretation:. LVH     Records Reviewed: Nursing Notes (Time of Review: 9:27 PM)    ED Course: Progress Notes, Reevaluation, and Consults:  Patient refused further treatment and observation. I answered all outstanding questions, and counseled the patient that they may return at any time if they change their mind about wanting further treatment and observation. Risks explained and voiced understanding of those risks. Low risk for outpatient management. Diagnosis     Clinical Impression:   Problem List Items Addressed This Visit     None      Visit Diagnoses     Palpitations    -  Primary        Home or Self Care  9/27/2018  2:06 AM    Discharge Medications:   Discharge Medication List as of 9/27/2018  1:38 AM            Disposition:   Home or Self Care  9/27/2018  2:06 AM    Discharge Medications:   Discharge Medication List as of 9/27/2018  1:38 AM              Follow-up Information     None           Patient's Medications   Start Taking    No medications on file   Continue Taking    ALBUTEROL (PROVENTIL HFA, VENTOLIN HFA, PROAIR HFA) 90 MCG/ACTUATION INHALER    Take 2 Puffs by inhalation every four (4) hours as needed for Wheezing. AMLODIPINE-ATORVASTATIN (CADUET) 10-40 MG PER TABLET    Take 1 Tab by mouth daily. CALCIUM-CHOLECALCIFEROL, D3, (CALTRATE 600+D) TABLET    Take 1 Tab by mouth daily. CLONIDINE HCL (CATAPRES) 0.3 MG TABLET    Take 1 Tab by mouth two (2) times a day. Indications: hypertension    ERGOCALCIFEROL (VITAMIN D2) 50,000 UNIT CAPSULE    Take one capsule every 7 days till gone, then start taking over-the-counter Vitamin D3 2,000 units every day    LAMOTRIGINE (LAMICTAL) 100 MG TABLET    Take 200 mg by mouth daily. LIDOCAINE (LIDOCAINE VISCOUS) 2 % SOLUTION    Take 15 mL by mouth as needed for Pain.     LORAZEPAM (ATIVAN) 1 MG TABLET    Take  by mouth every four (4) hours as needed for Anxiety. LOSARTAN-HYDROCHLOROTHIAZIDE (HYZAAR) 100-12.5 MG PER TABLET    Take 1 Tab by mouth daily. MIRTAZAPINE (REMERON) 30 MG TABLET    Take  by mouth nightly. For sleep    MULTIVITAMIN, TX-IRON-CA-MIN (THERA-M W/ IRON) 9 MG IRON-400 MCG TAB TABLET    Take 1 Tab by mouth daily. OMEGA-3 ACID ETHYL ESTERS (LOVAZA) 1 GRAM CAPSULE    Take 2 Caps by mouth two (2) times a day. POTASSIUM CHLORIDE (K-DUR, KLOR-CON) 20 MEQ TABLET    Take 1 Tab by mouth daily. RANITIDINE (ZANTAC) 150 MG TABLET    Take 1 Tab by mouth two (2) times a day. These Medications have changed    No medications on file   Stop Taking    No medications on file     _______________________________    Attestations:  [address] acting as a scribe for and in the presence of Mae Nolen MD      September 26, 2018 at 9:27 PM       Provider Attestation:      I personally performed the services described in the documentation, reviewed the documentation, as recorded by the scribe in my presence, and it accurately and completely records my words and actions.  September 26, 2018 at 9:27 PM - Geronimo Still MD    _______________________________

## 2018-09-27 NOTE — DISCHARGE INSTRUCTIONS
Palpitations: Care Instructions  Your Care Instructions    Heart palpitations are the uncomfortable sensation that your heart is beating fast or irregularly. You might feel pounding or fluttering in your chest. It might feel like your heart is skipping a beat. Although palpitations may be caused by a heart problem, they also occur because of stress, fatigue, or use of alcohol, caffeine, or nicotine. Many medicines, including diet pills, antihistamines, decongestants, and some herbal products, can cause heart palpitations. Nearly everyone has palpitations from time to time. Depending on your symptoms, your doctor may need to do more tests to try to find the cause of your palpitations. Follow-up care is a key part of your treatment and safety. Be sure to make and go to all appointments, and call your doctor if you are having problems. It's also a good idea to know your test results and keep a list of the medicines you take. How can you care for yourself at home? · Avoid caffeine, nicotine, and excess alcohol. · Do not take illegal drugs, such as methamphetamines and cocaine. · Do not take weight loss or diet medicines unless you talk with your doctor first.  · Get plenty of sleep. · Do not overeat. · If you have palpitations again, take deep breaths and try to relax. This may slow a racing heart. · If you start to feel lightheaded, lie down to avoid injuries that might result if you pass out and fall down. · Keep a record of your palpitations and bring it to your next doctor's appointment. Write down:  ¨ The date and time. ¨ Your pulse. (If your heart is beating fast, it may be hard to count your pulse.)  ¨ What you were doing when the palpitations started. ¨ How long the palpitations lasted. ¨ Any other symptoms. · If an activity causes palpitations, slow down or stop. Talk to your doctor before you do that activity again. · Take your medicines exactly as prescribed.  Call your doctor if you think you are having a problem with your medicine. When should you call for help? Call 911 anytime you think you may need emergency care. For example, call if:    · You passed out (lost consciousness).     · You have symptoms of a heart attack. These may include:  ¨ Chest pain or pressure, or a strange feeling in the chest.  ¨ Sweating. ¨ Shortness of breath. ¨ Pain, pressure, or a strange feeling in the back, neck, jaw, or upper belly or in one or both shoulders or arms. ¨ Lightheadedness or sudden weakness. ¨ A fast or irregular heartbeat. After you call 911, the  may tell you to chew 1 adult-strength or 2 to 4 low-dose aspirin. Wait for an ambulance. Do not try to drive yourself.     · You have symptoms of a stroke. These may include:  ¨ Sudden numbness, tingling, weakness, or loss of movement in your face, arm, or leg, especially on only one side of your body. ¨ Sudden vision changes. ¨ Sudden trouble speaking. ¨ Sudden confusion or trouble understanding simple statements. ¨ Sudden problems with walking or balance. ¨ A sudden, severe headache that is different from past headaches.    Call your doctor now or seek immediate medical care if:    · You have heart palpitations and:  ¨ Are dizzy or lightheaded, or you feel like you may faint. ¨ Have new or increased shortness of breath.    Watch closely for changes in your health, and be sure to contact your doctor if:    · You continue to have heart palpitations. Where can you learn more? Go to http://los-zack.info/. Enter R508 in the search box to learn more about \"Palpitations: Care Instructions. \"  Current as of: December 6, 2017  Content Version: 11.7  © 9570-9459 [employer]. Care instructions adapted under license by [employer] (which disclaims liability or warranty for this information).  If you have questions about a medical condition or this instruction, always ask your healthcare professional. [employer] disclaims any warranty or liability for your use of this information.

## 2018-09-27 NOTE — TELEPHONE ENCOUNTER
Returned call, NA. [place] [facility] [place] not been set up.

## 2018-09-27 NOTE — TELEPHONE ENCOUNTER
Pt asked about her diagnosis, she said she was already out of work on a disability for a prior diagnosis, then when she came to see Dr. Olesya Shipman she was diagnosed with something else and has questions about this. She is trying to distinguish between the two, and wants to know how long this second dx would have kept her out of work on its own. Please call her at [phone] she said.

## 2018-09-27 NOTE — ED TRIAGE NOTES
Patient arrived to the ED complaining of palpitations throughout the day, getting worse this evening. Patient also complaining of pain from her hernia and burning and watering to her left eye.

## 2018-09-30 ENCOUNTER — HOSPITAL ENCOUNTER (EMERGENCY)
Age: 48
Discharge: LEFT BEFORE DISCHARGE | End: 2018-09-30
Attending: EMERGENCY MEDICINE
Payer: SUBSIDIZED

## 2018-09-30 VITALS
DIASTOLIC BLOOD PRESSURE: 104 MMHG | OXYGEN SATURATION: 97 % | RESPIRATION RATE: 18 BRPM | WEIGHT: 190 LBS | TEMPERATURE: 98.2 F | SYSTOLIC BLOOD PRESSURE: 148 MMHG | HEART RATE: 79 BPM | BODY MASS INDEX: 34.75 KG/M2

## 2018-09-30 PROCEDURE — 75810000275 HC EMERGENCY DEPT VISIT NO LEVEL OF CARE

## 2018-09-30 PROCEDURE — 93005 ELECTROCARDIOGRAM TRACING: CPT

## 2018-09-30 NOTE — ED TRIAGE NOTES
Patient complains of chest pain x 2 months but it is getting worse. Patient states that her left arm pain and tingling and cramping in yesterday.

## 2018-10-01 ENCOUNTER — HOSPITAL ENCOUNTER (EMERGENCY)
Age: 48
Discharge: HOME | End: 2018-10-02
Attending: EMERGENCY MEDICINE | Admitting: EMERGENCY MEDICINE
Payer: SUBSIDIZED

## 2018-10-01 ENCOUNTER — APPOINTMENT (OUTPATIENT)
Dept: GENERAL RADIOLOGY | Age: 48
End: 2018-10-01
Attending: EMERGENCY MEDICINE
Payer: SUBSIDIZED

## 2018-10-01 DIAGNOSIS — R07.9 ACUTE CHEST PAIN: Primary | ICD-10-CM

## 2018-10-01 DIAGNOSIS — F19.10 POLYSUBSTANCE ABUSE (HCC): ICD-10-CM

## 2018-10-01 LAB
ALBUMIN SERPL-MCNC: 4 G/DL (ref 3.4–5)
ALBUMIN/GLOB SERPL: 1.1 {RATIO} (ref 0.8–1.7)
ALP SERPL-CCNC: 83 U/L (ref 45–117)
ALT SERPL-CCNC: 18 U/L (ref 13–56)
ANION GAP SERPL CALC-SCNC: 7 MMOL/L (ref 3–18)
AST SERPL-CCNC: 12 U/L (ref 15–37)
BASOPHILS # BLD: 0 K/UL (ref 0–0.1)
BASOPHILS NFR BLD: 0 % (ref 0–2)
BILIRUB SERPL-MCNC: 0.2 MG/DL (ref 0.2–1)
BUN SERPL-MCNC: 10 MG/DL (ref 7–18)
BUN/CREAT SERPL: 14 (ref 12–20)
CALCIUM SERPL-MCNC: 8.8 MG/DL (ref 8.5–10.1)
CHLORIDE SERPL-SCNC: 104 MMOL/L (ref 100–108)
CK MB CFR SERPL CALC: 2.4 % (ref 0–4)
CK MB SERPL-MCNC: 1.2 NG/ML (ref 5–25)
CK SERPL-CCNC: 49 U/L (ref 26–192)
CO2 SERPL-SCNC: 29 MMOL/L (ref 21–32)
CREAT SERPL-MCNC: 0.7 MG/DL (ref 0.6–1.3)
DIFFERENTIAL METHOD BLD: ABNORMAL
EOSINOPHIL # BLD: 0.1 K/UL (ref 0–0.4)
EOSINOPHIL NFR BLD: 1 % (ref 0–5)
ERYTHROCYTE [DISTWIDTH] IN BLOOD BY AUTOMATED COUNT: 13.8 % (ref 11.6–14.5)
GLOBULIN SER CALC-MCNC: 3.7 G/DL (ref 2–4)
GLUCOSE SERPL-MCNC: 98 MG/DL (ref 74–99)
HCT VFR BLD AUTO: 48 % (ref 35–45)
HGB BLD-MCNC: 16 G/DL (ref 12–16)
LYMPHOCYTES # BLD: 3.3 K/UL (ref 0.9–3.6)
LYMPHOCYTES NFR BLD: 29 % (ref 21–52)
MCH RBC QN AUTO: 28.7 PG (ref 24–34)
MCHC RBC AUTO-ENTMCNC: 33.3 G/DL (ref 31–37)
MCV RBC AUTO: 86.2 FL (ref 74–97)
MONOCYTES # BLD: 0.9 K/UL (ref 0.05–1.2)
MONOCYTES NFR BLD: 8 % (ref 3–10)
NEUTS SEG # BLD: 7.1 K/UL (ref 1.8–8)
NEUTS SEG NFR BLD: 62 % (ref 40–73)
PLATELET # BLD AUTO: 295 K/UL (ref 135–420)
PMV BLD AUTO: 9.8 FL (ref 9.2–11.8)
POTASSIUM SERPL-SCNC: 3.8 MMOL/L (ref 3.5–5.5)
PROT SERPL-MCNC: 7.7 G/DL (ref 6.4–8.2)
RBC # BLD AUTO: 5.57 M/UL (ref 4.2–5.3)
SODIUM SERPL-SCNC: 140 MMOL/L (ref 136–145)
TROPONIN I SERPL-MCNC: <0.02 NG/ML (ref 0–0.04)
WBC # BLD AUTO: 11.3 K/UL (ref 4.6–13.2)

## 2018-10-01 PROCEDURE — 71045 X-RAY EXAM CHEST 1 VIEW: CPT

## 2018-10-01 PROCEDURE — 74011250637 HC RX REV CODE- 250/637: Performed by: EMERGENCY MEDICINE

## 2018-10-01 PROCEDURE — 85025 COMPLETE CBC W/AUTO DIFF WBC: CPT | Performed by: EMERGENCY MEDICINE

## 2018-10-01 PROCEDURE — 82550 ASSAY OF CK (CPK): CPT | Performed by: EMERGENCY MEDICINE

## 2018-10-01 PROCEDURE — 93005 ELECTROCARDIOGRAM TRACING: CPT

## 2018-10-01 PROCEDURE — 80053 COMPREHEN METABOLIC PANEL: CPT | Performed by: EMERGENCY MEDICINE

## 2018-10-01 PROCEDURE — 99284 EMERGENCY DEPT VISIT MOD MDM: CPT

## 2018-10-01 RX ORDER — GUAIFENESIN 100 MG/5ML
81 LIQUID (ML) ORAL
Status: COMPLETED | OUTPATIENT
Start: 2018-10-01 | End: 2018-10-01

## 2018-10-01 RX ORDER — GUAIFENESIN 100 MG/5ML
324 LIQUID (ML) ORAL
Status: DISCONTINUED | OUTPATIENT
Start: 2018-10-01 | End: 2018-10-01

## 2018-10-01 RX ADMIN — ASPIRIN 81 MG CHEWABLE TABLET 81 MG: 81 TABLET CHEWABLE at 22:59

## 2018-10-02 VITALS
HEIGHT: 62 IN | WEIGHT: 190 LBS | DIASTOLIC BLOOD PRESSURE: 69 MMHG | BODY MASS INDEX: 34.96 KG/M2 | SYSTOLIC BLOOD PRESSURE: 142 MMHG | RESPIRATION RATE: 19 BRPM | HEART RATE: 72 BPM | TEMPERATURE: 97.6 F | OXYGEN SATURATION: 100 %

## 2018-10-02 LAB
ATRIAL RATE: 74 BPM
ATRIAL RATE: 84 BPM
CALCULATED P AXIS, ECG09: 47 DEGREES
CALCULATED P AXIS, ECG09: 48 DEGREES
CALCULATED R AXIS, ECG10: -27 DEGREES
CALCULATED R AXIS, ECG10: -36 DEGREES
CALCULATED T AXIS, ECG11: 81 DEGREES
CALCULATED T AXIS, ECG11: 82 DEGREES
DIAGNOSIS, 93000: NORMAL
DIAGNOSIS, 93000: NORMAL
P-R INTERVAL, ECG05: 156 MS
P-R INTERVAL, ECG05: 162 MS
Q-T INTERVAL, ECG07: 412 MS
Q-T INTERVAL, ECG07: 434 MS
QRS DURATION, ECG06: 116 MS
QRS DURATION, ECG06: 98 MS
QTC CALCULATION (BEZET), ECG08: 481 MS
QTC CALCULATION (BEZET), ECG08: 486 MS
VENTRICULAR RATE, ECG03: 74 BPM
VENTRICULAR RATE, ECG03: 84 BPM

## 2018-10-02 NOTE — ED NOTES
Yady Umanzor RN reviewed discharge instructions with the patient. The patient verbalized understanding. Patient armband removed and given to patient to take home.   Patient was informed of the privacy risks if armband lost or stolen

## 2018-10-02 NOTE — DISCHARGE INSTRUCTIONS
Chest Pain: Care Instructions  Your Care Instructions    There are many things that can cause chest pain. Some are not serious and will get better on their own in a few days. But some kinds of chest pain need more testing and treatment. Your doctor may have recommended a follow-up visit in the next 8 to 12 hours. If you are not getting better, you may need more tests or treatment. Even though your doctor has released you, you still need to watch for any problems. The doctor carefully checked you, but sometimes problems can develop later. If you have new symptoms or if your symptoms do not get better, get medical care right away. If you have worse or different chest pain or pressure that lasts more than 5 minutes or you passed out (lost consciousness), call 911 or seek other emergency help right away. A medical visit is only one step in your treatment. Even if you feel better, you still need to do what your doctor recommends, such as going to all suggested follow-up appointments and taking medicines exactly as directed. This will help you recover and help prevent future problems. How can you care for yourself at home? · Rest until you feel better. · Take your medicine exactly as prescribed. Call your doctor if you think you are having a problem with your medicine. · Do not drive after taking a prescription pain medicine. When should you call for help? Call 911 if:    · You passed out (lost consciousness).     · You have severe difficulty breathing.     · You have symptoms of a heart attack. These may include:  ¨ Chest pain or pressure, or a strange feeling in your chest.  ¨ Sweating. ¨ Shortness of breath. ¨ Nausea or vomiting. ¨ Pain, pressure, or a strange feeling in your back, neck, jaw, or upper belly or in one or both shoulders or arms. ¨ Lightheadedness or sudden weakness. ¨ A fast or irregular heartbeat.   After you call 911, the  may tell you to chew 1 adult-strength or 2 to 4 low-dose aspirin. Wait for an ambulance. Do not try to drive yourself.    Call your doctor today if:    · You have any trouble breathing.     · Your chest pain gets worse.     · You are dizzy or lightheaded, or you feel like you may faint.     · You are not getting better as expected.     · You are having new or different chest pain. Where can you learn more? Go to http://los-zack.info/. Enter A120 in the search box to learn more about \"Chest Pain: Care Instructions. \"  Current as of: November 20, 2017  Content Version: 11.7  © 3982-3968 [employer]. Care instructions adapted under license by [employer] (which disclaims liability or warranty for this information). If you have questions about a medical condition or this instruction, always ask your healthcare professional. [address] any warranty or liability for your use of this information.
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
## 2018-10-02 NOTE — ED PROVIDER NOTES
EMERGENCY DEPARTMENT HISTORY AND PHYSICAL EXAM    10:50 PM      Date: 10/1/2018  Patient Name: Stepan Day    History of Presenting Illness     Chief Complaint   Patient presents with    Chest Pain         History Provided By: Patient    Chief Complaint: left sided CP  Duration:  Hours  Timing:  Intermittent  Location: chest  Quality: Aching and Sharp  Severity: Moderate  Modifying Factors: none   Associated Symptoms: productive cough      Additional History (Context): Stepan Day is a 51 y.o. female with HTN, ETOH abuse, cocaine abuse, and HPV who presents with intermittent episodes of left sided CP that started about 2 hrs PTA. Also notes having productive cough with \"icky\" sputum for the past couple days. He CP started after being in argument. Took 3 81mg Asprin with some mild relief. Had some beer tonight. Hx of drug abuse; last used cocaine mixed with marijuana a couple days ago. Reports fullness under her chest but she believes this is due to her hernia that needs to be prepared; has been having this sensation intermittently for a while. Has an appointment with cardiologist in 1.5 weeks. Before this episode of pain she was fine. Current smoker. Denies SOB, fever, chills, N/V, abdominal pain, back pain, neck pain, weakness, numbness, or any other associated sx. No other complaints or concerns. PCP: Shukri Roberts NP    Current Outpatient Prescriptions   Medication Sig Dispense Refill    losartan-hydroCHLOROthiazide (HYZAAR) 100-12.5 mg per tablet Take 1 Tab by mouth daily. 30 Tab 0    raNITIdine (ZANTAC) 150 mg tablet Take 1 Tab by mouth two (2) times a day. 60 Tab 1    potassium chloride (K-DUR, KLOR-CON) 20 mEq tablet Take 1 Tab by mouth daily. 30 Tab 3    lidocaine (LIDOCAINE VISCOUS) 2 % solution Take 15 mL by mouth as needed for Pain. 1 Bottle 0    LORazepam (ATIVAN) 1 mg tablet Take  by mouth every four (4) hours as needed for Anxiety.       multivitamin, tx-iron-ca-min (THERA-M W/ IRON) 9 mg iron-400 mcg tab tablet Take 1 Tab by mouth daily.  lamoTRIgine (LAMICTAL) 100 mg tablet Take 200 mg by mouth daily.  mirtazapine (REMERON) 30 mg tablet Take  by mouth nightly. For sleep      cloNIDine HCl (CATAPRES) 0.3 mg tablet Take 1 Tab by mouth two (2) times a day. Indications: hypertension 60 Tab 3    amLODIPine-atorvastatin (CADUET) 10-40 mg per tablet Take 1 Tab by mouth daily. 90 Tab 3    calcium-cholecalciferol, D3, (CALTRATE 600+D) tablet Take 1 Tab by mouth daily.  omega-3 acid ethyl esters (LOVAZA) 1 gram capsule Take 2 Caps by mouth two (2) times a day. 360 Cap 3    ergocalciferol (VITAMIN D2) 50,000 unit capsule Take one capsule every 7 days till gone, then start taking over-the-counter Vitamin D3 2,000 units every day 4 Cap 2    albuterol (PROVENTIL HFA, VENTOLIN HFA, PROAIR HFA) 90 mcg/actuation inhaler Take 2 Puffs by inhalation every four (4) hours as needed for Wheezing.  6 Inhaler 3       Past History     Past Medical History:  Past Medical History:   Diagnosis Date    Bronchitis     Cocaine abuse ([facility] [address] 3/26/2015    Dental caries     Depression     Depression 8/31/2016    Drug abuse ([facility] [address]     Elevated hematocrit 8/31/2016    ETOH abuse     H/O screening mammography 12/06/2016    No evidence of malignancy     HPV (human papilloma virus) infection 11/2016    The patient referred to [facility]     Hypertension     Ill-defined condition     Mood swings     Polycythemia 2018    Psychiatric disorder     depression, Bipolar    Tobacco abuse     Vitamin D deficiency        Past Surgical History:  Past Surgical History:   Procedure Laterality Date    HX APPENDECTOMY      HX COLPOSCOPY  12/06/2016    Low-grade courtney/mild dysplasia RICK I    HX HERNIA REPAIR         Family History:  Family History   Problem Relation Age of Onset    Hypertension Mother     Heart Attack Father     Hypertension Father     Cancer Father      skin    Cancer Sister      skin    Bipolar Disorder Sister     Cancer Brother      skin    Cancer Maternal Aunt      colon       Social History:  Social History   Substance Use Topics    Smoking status: Current Every Day Smoker     Packs/day: 0.50     Years: 20.00     Types: Cigarettes    Smokeless tobacco: Never Used    Alcohol use 0.0 oz/week     3 - 6 Cans of beer per week      Comment: 3 or 4 beers weekly        Allergies: Allergies   Allergen Reactions    Iodinated Contrast- Oral And Iv Dye Shortness of Breath         Review of Systems     Review of Systems   Constitutional: Negative for chills, fatigue and fever. HENT: Negative. Negative for sore throat. Eyes: Negative. Respiratory: Positive for cough. Negative for shortness of breath. Cardiovascular: Positive for chest pain. Negative for palpitations. Gastrointestinal: Negative for abdominal pain, nausea and vomiting. Genitourinary: Negative for dysuria. Musculoskeletal: Negative. Skin: Negative. Neurological: Negative for dizziness, weakness, light-headedness, numbness and headaches. Psychiatric/Behavioral: Negative. All other systems reviewed and are negative. Visit Vitals    /69    Pulse 72    Temp 97.6 °F (36.4 °C)    Resp 19    Ht 5' 2\" (1.575 m)    Wt 86.2 kg (190 lb)    SpO2 100%    BMI 34.75 kg/m2           Physical Exam / Medical Decision Making   I am the first provider for this patient. I reviewed the vital signs, available nursing notes, past medical history, past surgical history, family history and social history. Vital Signs-Reviewed the patient's vital signs. Physical exam:  General:  Well-developed, well-nourished, no respiratory distress    Head:  Normocephalic atraumatic. Eyes:  Pupils midrange extraocular movements intact. No pallor or conjunctival injection. Nose:  No rhinorrhea, inspection grossly normal.    Ears:  Grossly normal to inspection, no discharge.     Mouth:  Mucous membranes moist, no appreciable intraoral lesion. Neck/Back:  Trachea midline, no asymmetry. Chest:  Grossly normal inspection, symmetric chest rise. Pulmonary:  Clear to auscultation bilaterally no wheezes rhonchi or rales. Cardiovascular:  S1-S2 no murmurs rubs or gallops. Abdomen: Soft, nontender, nondistended no guarding rebound or peritoneal signs. Extremities:  Grossly normal to inspection, peripheral pulses intact    Neurologic:  Alert and oriented no appreciable focal neurologic deficit    Skin:  Warm and dry  Psychiatric: Tearful  Nursing note reviewed, vital signs reviewed. ED course:  Patient presents with recurrent chest pain center of the chest, atypical.  History of polysubstance abuse, she had onset of pain approximately 30 minutes after a verbal argument. Here she is afebrile and not tachycardic saturation normal on room air, doubtful pneumonia or pneumothorax or pulmonary embolism, will rule out atypical ACS    Monitor normal sinus rhythm    EKG done at 2018 hrs. normal sinus rhythm heart rate 84 intervals within normal limits no ST changes, T-wave inversions in aVL with poor R-wave progression, typical LVH pattern    Chest x-ray per my interpretation no pneumothorax or infiltrate    Labs unremarkable troponin negative    Patient's history, physical exam and laboratory evaluations were reviewed. Had discussion with the patient about the possible etiologies of chest pain. Heart score: 2    Patient was felt to be low risk for major adverse cardiac event, was  informed about the risks and benefits and alternatives of inpatient versus outpatient workup. At this time patient is stable for outpatient management including follow-up with primary care physician/cardiology for reevaluation within 72 hours. Patient is aware that no evaluation in the emergency department can ensure 0% risk, patient will return to the emergency department with any concerns.     Disposition:    Discharged home      Portions of this chart were created with Dragon medical speech to text program.   Unrecognized errors may be present. Diagnostic Study Results     Labs -  Recent Results (from the past 12 hour(s))   EKG, 12 LEAD, INITIAL    Collection Time: 10/01/18 10:18 PM   Result Value Ref Range    Ventricular Rate 84 BPM    Atrial Rate 84 BPM    P-R Interval 162 ms    QRS Duration 98 ms    Q-T Interval 412 ms    QTC Calculation (Bezet) 486 ms    Calculated P Axis 48 degrees    Calculated R Axis -27 degrees    Calculated T Axis 81 degrees    Diagnosis       Normal sinus rhythm  Voltage criteria for left ventricular hypertrophy  Cannot rule out Septal infarct , age undetermined  Abnormal ECG  When compared with ECG of 30-SEP-2018 19:04,  QRS duration has decreased  Minimal criteria for Septal infarct are now present     CARDIAC PANEL,(CK, CKMB & TROPONIN)    Collection Time: 10/01/18 10:23 PM   Result Value Ref Range    CK 49 26 - 192 U/L    CK - MB 1.2 <3.6 ng/ml    CK-MB Index 2.4 0.0 - 4.0 %    Troponin-I, Qt. <0.02 0.0 - 2.015 NG/ML   METABOLIC PANEL, COMPREHENSIVE    Collection Time: 10/01/18 10:23 PM   Result Value Ref Range    Sodium 140 136 - 145 mmol/L    Potassium 3.8 3.5 - 5.5 mmol/L    Chloride 104 100 - 108 mmol/L    CO2 29 21 - 32 mmol/L    Anion gap 7 3.0 - 18 mmol/L    Glucose 98 74 - 99 mg/dL    BUN 10 7.0 - 18 MG/DL    Creatinine 0.70 0.6 - 1.3 MG/DL    BUN/Creatinine ratio 14 12 - 20      GFR est AA >60 >60 ml/min/1.73m2    GFR est non-AA >60 >60 ml/min/1.73m2    Calcium 8.8 8.5 - 10.1 MG/DL    Bilirubin, total 0.2 0.2 - 1.0 MG/DL    ALT (SGPT) 18 13 - 56 U/L    AST (SGOT) 12 (L) 15 - 37 U/L    Alk.  phosphatase 83 45 - 117 U/L    Protein, total 7.7 6.4 - 8.2 g/dL    Albumin 4.0 3.4 - 5.0 g/dL    Globulin 3.7 2.0 - 4.0 g/dL    A-G Ratio 1.1 0.8 - 1.7     CBC WITH AUTOMATED DIFF    Collection Time: 10/01/18 10:23 PM   Result Value Ref Range    WBC 11.3 4.6 - 13.2 K/uL    RBC 5.57 (H) 4.20 - 5.30 M/uL    HGB 16.0 12.0 - 16.0 g/dL    HCT 48.0 (H) 35.0 - 45.0 %    MCV 86.2 74.0 - 97.0 FL    MCH 28.7 24.0 - 34.0 PG    MCHC 33.3 31.0 - 37.0 g/dL    RDW 13.8 11.6 - 14.5 %    PLATELET 252 544 - 763 K/uL    MPV 9.8 9.2 - 11.8 FL    NEUTROPHILS 62 40 - 73 %    LYMPHOCYTES 29 21 - 52 %    MONOCYTES 8 3 - 10 %    EOSINOPHILS 1 0 - 5 %    BASOPHILS 0 0 - 2 %    ABS. NEUTROPHILS 7.1 1.8 - 8.0 K/UL    ABS. LYMPHOCYTES 3.3 0.9 - 3.6 K/UL    ABS. MONOCYTES 0.9 0.05 - 1.2 K/UL    ABS. EOSINOPHILS 0.1 0.0 - 0.4 K/UL    ABS. BASOPHILS 0.0 0.0 - 0.1 K/UL    DF AUTOMATED         Radiologic Studies -   XR CHEST PORT    (Results Pending)       Diagnosis     Clinical Impression:   1. Acute chest pain    2. Polysubstance abuse ([address]            Follow-up Information     Follow up With Details Comments Contact Info    Yamil Kate MD Call in 2 days  [address] [facility] [address] [facility] EMERGENCY DEPT  As needed, If symptoms worsen [phone] [id number]  [phone]           Discharge Medication List as of 10/1/2018 11:30 PM      CONTINUE these medications which have NOT CHANGED    Details   losartan-hydroCHLOROthiazide (HYZAAR) 100-12.5 mg per tablet Take 1 Tab by mouth daily. , Normal, Disp-30 Tab, R-0      raNITIdine (ZANTAC) 150 mg tablet Take 1 Tab by mouth two (2) times a day., Normal, Disp-60 Tab, R-1      potassium chloride (K-DUR, KLOR-CON) 20 mEq tablet Take 1 Tab by mouth daily. , Normal, Disp-30 Tab, R-3      lidocaine (LIDOCAINE VISCOUS) 2 % solution Take 15 mL by mouth as needed for Pain., Print, Disp-1 Bottle, R-0      LORazepam (ATIVAN) 1 mg tablet Take  by mouth every four (4) hours as needed for Anxiety. , Historical Med      multivitamin, tx-iron-ca-min (THERA-M W/ IRON) 9 mg iron-400 mcg tab tablet Take 1 Tab by mouth daily. , Historical Med      lamoTRIgine (LAMICTAL) 100 mg tablet Take 200 mg by mouth daily. , Historical Med mirtazapine (REMERON) 30 mg tablet Take  by mouth nightly. For sleep, Historical Med      cloNIDine HCl (CATAPRES) 0.3 mg tablet Take 1 Tab by mouth two (2) times a day. Indications: hypertension, Normal, Disp-60 Tab, R-3      amLODIPine-atorvastatin (CADUET) 10-40 mg per tablet Take 1 Tab by mouth daily. , Program, Disp-90 Tab, R-3      calcium-cholecalciferol, D3, (CALTRATE 600+D) tablet Take 1 Tab by mouth daily. , Historical Med      omega-3 acid ethyl esters (LOVAZA) 1 gram capsule Take 2 Caps by mouth two (2) times a day., Program, Disp-360 Cap, R-3      ergocalciferol (VITAMIN D2) 50,000 unit capsule Take one capsule every 7 days till gone, then start taking over-the-counter Vitamin D3 2,000 units every day, Normal, Disp-4 Cap, R-2      albuterol (PROVENTIL HFA, VENTOLIN HFA, PROAIR HFA) 90 mcg/actuation inhaler Take 2 Puffs by inhalation every four (4) hours as needed for Wheezing., Program, Disp-6 Inhaler, R-3           _______________________________    Attestations:  Rudy Vidales [address] acting as a scribe for and in the presence of Ariel Gupta MD      October 01, 2018 at 10:50 PM       Provider Attestation:      I personally performed the services described in the documentation, reviewed the documentation, as recorded by the scribe in my presence, and it accurately and completely records my words and actions.  October 01, 2018 at 10:50 PM - Ariel Gupta MD    _______________________________

## 2018-10-02 NOTE — ED NOTES
Pt alert and oriented in bed pt states that she took 3 baby asprin and those help with the chest pain in the past

## 2018-10-05 ENCOUNTER — HOSPITAL ENCOUNTER (EMERGENCY)
Age: 48
Discharge: HOME | End: 2018-10-05
Attending: EMERGENCY MEDICINE
Payer: SUBSIDIZED

## 2018-10-05 ENCOUNTER — APPOINTMENT (OUTPATIENT)
Dept: GENERAL RADIOLOGY | Age: 48
End: 2018-10-05
Attending: EMERGENCY MEDICINE
Payer: SUBSIDIZED

## 2018-10-05 VITALS
RESPIRATION RATE: 14 BRPM | OXYGEN SATURATION: 97 % | HEART RATE: 72 BPM | DIASTOLIC BLOOD PRESSURE: 76 MMHG | TEMPERATURE: 98.7 F | SYSTOLIC BLOOD PRESSURE: 134 MMHG

## 2018-10-05 DIAGNOSIS — R07.9 ACUTE CHEST PAIN: ICD-10-CM

## 2018-10-05 DIAGNOSIS — R10.13 ABDOMINAL PAIN, EPIGASTRIC: Primary | ICD-10-CM

## 2018-10-05 LAB
ALBUMIN SERPL-MCNC: 3.6 G/DL (ref 3.4–5)
ALBUMIN/GLOB SERPL: 1.1 {RATIO} (ref 0.8–1.7)
ALP SERPL-CCNC: 80 U/L (ref 45–117)
ALT SERPL-CCNC: 17 U/L (ref 13–56)
ANION GAP SERPL CALC-SCNC: 7 MMOL/L (ref 3–18)
AST SERPL-CCNC: 11 U/L (ref 15–37)
ATRIAL RATE: 89 BPM
BASOPHILS # BLD: 0 K/UL (ref 0–0.1)
BASOPHILS NFR BLD: 0 % (ref 0–2)
BILIRUB DIRECT SERPL-MCNC: 0.1 MG/DL (ref 0–0.2)
BILIRUB SERPL-MCNC: 0.3 MG/DL (ref 0.2–1)
BUN SERPL-MCNC: 14 MG/DL (ref 7–18)
BUN/CREAT SERPL: 24 (ref 12–20)
CALCIUM SERPL-MCNC: 8.8 MG/DL (ref 8.5–10.1)
CALCULATED P AXIS, ECG09: 54 DEGREES
CALCULATED R AXIS, ECG10: -50 DEGREES
CALCULATED T AXIS, ECG11: 81 DEGREES
CHLORIDE SERPL-SCNC: 106 MMOL/L (ref 100–108)
CK MB CFR SERPL CALC: NORMAL % (ref 0–4)
CK MB SERPL-MCNC: <1 NG/ML (ref 5–25)
CK SERPL-CCNC: 39 U/L (ref 26–192)
CO2 SERPL-SCNC: 27 MMOL/L (ref 21–32)
CREAT SERPL-MCNC: 0.58 MG/DL (ref 0.6–1.3)
DIAGNOSIS, 93000: NORMAL
DIFFERENTIAL METHOD BLD: ABNORMAL
EOSINOPHIL # BLD: 0.1 K/UL (ref 0–0.4)
EOSINOPHIL NFR BLD: 1 % (ref 0–5)
ERYTHROCYTE [DISTWIDTH] IN BLOOD BY AUTOMATED COUNT: 13.8 % (ref 11.6–14.5)
GLOBULIN SER CALC-MCNC: 3.4 G/DL (ref 2–4)
GLUCOSE SERPL-MCNC: 118 MG/DL (ref 74–99)
HCG SERPL QL: NEGATIVE
HCT VFR BLD AUTO: 46.8 % (ref 35–45)
HGB BLD-MCNC: 15.7 G/DL (ref 12–16)
LACTATE BLD-SCNC: 0.7 MMOL/L (ref 0.4–2)
LIPASE SERPL-CCNC: 105 U/L (ref 73–393)
LYMPHOCYTES # BLD: 2.7 K/UL (ref 0.9–3.6)
LYMPHOCYTES NFR BLD: 28 % (ref 21–52)
MCH RBC QN AUTO: 29.5 PG (ref 24–34)
MCHC RBC AUTO-ENTMCNC: 33.5 G/DL (ref 31–37)
MCV RBC AUTO: 88 FL (ref 74–97)
MONOCYTES # BLD: 0.7 K/UL (ref 0.05–1.2)
MONOCYTES NFR BLD: 7 % (ref 3–10)
NEUTS SEG # BLD: 6.2 K/UL (ref 1.8–8)
NEUTS SEG NFR BLD: 64 % (ref 40–73)
P-R INTERVAL, ECG05: 154 MS
PLATELET # BLD AUTO: 254 K/UL (ref 135–420)
PMV BLD AUTO: 10 FL (ref 9.2–11.8)
POTASSIUM SERPL-SCNC: 3.5 MMOL/L (ref 3.5–5.5)
PROT SERPL-MCNC: 7 G/DL (ref 6.4–8.2)
Q-T INTERVAL, ECG07: 404 MS
QRS DURATION, ECG06: 116 MS
QTC CALCULATION (BEZET), ECG08: 491 MS
RBC # BLD AUTO: 5.32 M/UL (ref 4.2–5.3)
SODIUM SERPL-SCNC: 140 MMOL/L (ref 136–145)
TROPONIN I SERPL-MCNC: <0.02 NG/ML (ref 0–0.04)
VENTRICULAR RATE, ECG03: 89 BPM
WBC # BLD AUTO: 9.7 K/UL (ref 4.6–13.2)

## 2018-10-05 PROCEDURE — 84703 CHORIONIC GONADOTROPIN ASSAY: CPT | Performed by: EMERGENCY MEDICINE

## 2018-10-05 PROCEDURE — 85025 COMPLETE CBC W/AUTO DIFF WBC: CPT | Performed by: EMERGENCY MEDICINE

## 2018-10-05 PROCEDURE — 74011250637 HC RX REV CODE- 250/637: Performed by: EMERGENCY MEDICINE

## 2018-10-05 PROCEDURE — 83605 ASSAY OF LACTIC ACID: CPT

## 2018-10-05 PROCEDURE — 82550 ASSAY OF CK (CPK): CPT | Performed by: EMERGENCY MEDICINE

## 2018-10-05 PROCEDURE — 93005 ELECTROCARDIOGRAM TRACING: CPT

## 2018-10-05 PROCEDURE — 99285 EMERGENCY DEPT VISIT HI MDM: CPT

## 2018-10-05 PROCEDURE — 83690 ASSAY OF LIPASE: CPT | Performed by: EMERGENCY MEDICINE

## 2018-10-05 PROCEDURE — 80076 HEPATIC FUNCTION PANEL: CPT | Performed by: EMERGENCY MEDICINE

## 2018-10-05 PROCEDURE — 80048 BASIC METABOLIC PNL TOTAL CA: CPT | Performed by: EMERGENCY MEDICINE

## 2018-10-05 PROCEDURE — 71045 X-RAY EXAM CHEST 1 VIEW: CPT

## 2018-10-05 RX ORDER — PANTOPRAZOLE SODIUM 40 MG/1
40 TABLET, DELAYED RELEASE ORAL DAILY
Qty: 20 TAB | Refills: 0 | Status: SHIPPED | OUTPATIENT
Start: 2018-10-05 | End: 2018-10-15

## 2018-10-05 RX ORDER — GUAIFENESIN 100 MG/5ML
324 LIQUID (ML) ORAL
Status: COMPLETED | OUTPATIENT
Start: 2018-10-05 | End: 2018-10-05

## 2018-10-05 RX ADMIN — ASPIRIN 81 MG CHEWABLE TABLET 324 MG: 81 TABLET CHEWABLE at 07:23

## 2018-10-05 NOTE — DISCHARGE INSTRUCTIONS
Abdominal Pain: Care Instructions  Your Care Instructions    Abdominal pain has many possible causes. Some aren't serious and get better on their own in a few days. Others need more testing and treatment. If your pain continues or gets worse, you need to be rechecked and may need more tests to find out what is wrong. You may need surgery to correct the problem. Don't ignore new symptoms, such as fever, nausea and vomiting, urination problems, pain that gets worse, and dizziness. These may be signs of a more serious problem. Your doctor may have recommended a follow-up visit in the next 8 to 12 hours. If you are not getting better, you may need more tests or treatment. The doctor has checked you carefully, but problems can develop later. If you notice any problems or new symptoms, get medical treatment right away. Follow-up care is a key part of your treatment and safety. Be sure to make and go to all appointments, and call your doctor if you are having problems. It's also a good idea to know your test results and keep a list of the medicines you take. How can you care for yourself at home? · Rest until you feel better. · To prevent dehydration, drink plenty of fluids, enough so that your urine is light yellow or clear like water. Choose water and other caffeine-free clear liquids until you feel better. If you have kidney, heart, or liver disease and have to limit fluids, talk with your doctor before you increase the amount of fluids you drink. · If your stomach is upset, eat mild foods, such as rice, dry toast or crackers, bananas, and applesauce. Try eating several small meals instead of two or three large ones. · Wait until 48 hours after all symptoms have gone away before you have spicy foods, alcohol, and drinks that contain caffeine. · Do not eat foods that are high in fat. · Avoid anti-inflammatory medicines such as aspirin, ibuprofen (Advil, Motrin), and naproxen (Aleve).  These can cause stomach upset. Talk to your doctor if you take daily aspirin for another health problem. When should you call for help? Call 911 anytime you think you may need emergency care. For example, call if:    · You passed out (lost consciousness).     · You pass maroon or very bloody stools.     · You vomit blood or what looks like coffee grounds.     · You have new, severe belly pain.    Call your doctor now or seek immediate medical care if:    · Your pain gets worse, especially if it becomes focused in one area of your belly.     · You have a new or higher fever.     · Your stools are black and look like tar, or they have streaks of blood.     · You have unexpected vaginal bleeding.     · You have symptoms of a urinary tract infection. These may include:  ¨ Pain when you urinate. ¨ Urinating more often than usual.  ¨ Blood in your urine.     · You are dizzy or lightheaded, or you feel like you may faint.    Watch closely for changes in your health, and be sure to contact your doctor if:    · You are not getting better after 1 day (24 hours). Where can you learn more? Go to http://losSkuRunzack.info/. Enter T224 in the search box to learn more about \"Abdominal Pain: Care Instructions. \"  Current as of: November 20, 2017  Content Version: 11.8  © 6132-2383 [employer]. Care instructions adapted under license by [employer] (which disclaims liability or warranty for this information). If you have questions about a medical condition or this instruction, always ask your healthcare professional. [place] [address] any warranty or liability for your use of this information. Chest Pain: Care Instructions  Your Care Instructions    There are many things that can cause chest pain. Some are not serious and will get better on their own in a few days. But some kinds of chest pain need more testing and treatment.  Your doctor may have recommended a follow-up visit in the next 8 to 12 hours. If you are not getting better, you may need more tests or treatment. Even though your doctor has released you, you still need to watch for any problems. The doctor carefully checked you, but sometimes problems can develop later. If you have new symptoms or if your symptoms do not get better, get medical care right away. If you have worse or different chest pain or pressure that lasts more than 5 minutes or you passed out (lost consciousness), call 911 or seek other emergency help right away. A medical visit is only one step in your treatment. Even if you feel better, you still need to do what your doctor recommends, such as going to all suggested follow-up appointments and taking medicines exactly as directed. This will help you recover and help prevent future problems. How can you care for yourself at home? · Rest until you feel better. · Take your medicine exactly as prescribed. Call your doctor if you think you are having a problem with your medicine. · Do not drive after taking a prescription pain medicine. When should you call for help? Call 911 if:    · You passed out (lost consciousness).     · You have severe difficulty breathing.     · You have symptoms of a heart attack. These may include:  ¨ Chest pain or pressure, or a strange feeling in your chest.  ¨ Sweating. ¨ Shortness of breath. ¨ Nausea or vomiting. ¨ Pain, pressure, or a strange feeling in your back, neck, jaw, or upper belly or in one or both shoulders or arms. ¨ Lightheadedness or sudden weakness. ¨ A fast or irregular heartbeat. After you call 911, the  may tell you to chew 1 adult-strength or 2 to 4 low-dose aspirin. Wait for an ambulance.  Do not try to drive yourself.    Call your doctor today if:    · You have any trouble breathing.     · Your chest pain gets worse.     · You are dizzy or lightheaded, or you feel like you may faint.     · You are not getting better as expected.     · You are having new or different chest pain. Where can you learn more? Go to http://los-zack.info/. Enter A120 in the search box to learn more about \"Chest Pain: Care Instructions. \"  Current as of: November 20, 2017  Content Version: 11.8  © 9982-7596 [employer]. Care instructions adapted under license by [email or web address] (which disclaims liability or warranty for this information). If you have questions about a medical condition or this instruction, always ask your healthcare professional. [address] any warranty or liability for your use of this information.

## 2018-10-05 NOTE — ED NOTES
Assumed care of patient, pt resting comfortably on stretcher at this itme, states pain is 6/10, pt states pain is epigastric and states she was recently dx with hiatal hernia, a&ox4, attached to cardiac monitor, lights turned off and door closed for comfort, call bell within reach.

## 2018-10-05 NOTE — ED PROVIDER NOTES
EMERGENCY DEPARTMENT HISTORY AND PHYSICAL EXAM    Date: 10/5/2018  Patient Name: Laura Patten    History of Presenting Illness     Chief Complaint   Patient presents with    Chest Pain    Abdominal Pain         History Provided By: Patient    Chief Complaint: abdominal and chest pain  Duration: 3 Days  Timing:  Acute and Constant  Location: upper abd and central chest  Quality: Aching  Severity: Moderate  Modifying Factors: has hiatal hernia  Associated Symptoms: nausea      Additional History (Context): Laura Patten is a 51 y.o. female with hypertension, hyperlipidemia, obesity and polysubstance abuse who presents with upper abd pain and chest pain that has been present intermittently for the past several weeks, now having same pains constantly for past few days. Has been to ED previously for these symptoms but returned today b/c symptoms are persistent which is changed for her. Does have h/o HTN, hypercholesterolemia. Last stress test over a year ago; has appt with clinic cardiologist 10/15/18. Takes ASA for past few days. Is compliant with her regular meds. Takes Zantac and has been using NSAIDS lately. Denies melena, hematochezia, dysuria, fever. Denies h/o pancreatitis, recent ETOH. Smokes 1 ppd. LNMP last week. PCP: Fili Roblero NP    Current Outpatient Prescriptions   Medication Sig Dispense Refill    pantoprazole (PROTONIX) 40 mg tablet Take 1 Tab by mouth daily for 20 days. 20 Tab 0    losartan-hydroCHLOROthiazide (HYZAAR) 100-12.5 mg per tablet Take 1 Tab by mouth daily. 30 Tab 0    raNITIdine (ZANTAC) 150 mg tablet Take 1 Tab by mouth two (2) times a day. 60 Tab 1    potassium chloride (K-DUR, KLOR-CON) 20 mEq tablet Take 1 Tab by mouth daily. 30 Tab 3    lidocaine (LIDOCAINE VISCOUS) 2 % solution Take 15 mL by mouth as needed for Pain. 1 Bottle 0    LORazepam (ATIVAN) 1 mg tablet Take  by mouth every four (4) hours as needed for Anxiety.       multivitamin, tx-iron-ca-min (THERA-M W/ IRON) 9 mg iron-400 mcg tab tablet Take 1 Tab by mouth daily.  lamoTRIgine (LAMICTAL) 100 mg tablet Take 200 mg by mouth daily.  mirtazapine (REMERON) 30 mg tablet Take  by mouth nightly. For sleep      cloNIDine HCl (CATAPRES) 0.3 mg tablet Take 1 Tab by mouth two (2) times a day. Indications: hypertension 60 Tab 3    amLODIPine-atorvastatin (CADUET) 10-40 mg per tablet Take 1 Tab by mouth daily. 90 Tab 3    calcium-cholecalciferol, D3, (CALTRATE 600+D) tablet Take 1 Tab by mouth daily.  omega-3 acid ethyl esters (LOVAZA) 1 gram capsule Take 2 Caps by mouth two (2) times a day. 360 Cap 3    ergocalciferol (VITAMIN D2) 50,000 unit capsule Take one capsule every 7 days till gone, then start taking over-the-counter Vitamin D3 2,000 units every day 4 Cap 2    albuterol (PROVENTIL HFA, VENTOLIN HFA, PROAIR HFA) 90 mcg/actuation inhaler Take 2 Puffs by inhalation every four (4) hours as needed for Wheezing.  6 Inhaler 3       Past History     Past Medical History:  Past Medical History:   Diagnosis Date    Bronchitis     Cocaine abuse ([facility] [address] 3/26/2015    Dental caries     Depression     Depression 8/31/2016    Drug abuse ([facility] [address]     Elevated hematocrit 8/31/2016    ETOH abuse     H/O screening mammography 12/06/2016    No evidence of malignancy     HPV (human papilloma virus) infection 11/2016    The patient [facility]     Hypertension     Ill-defined condition     Mood swings     Polycythemia 2018    Psychiatric disorder     depression, Bipolar    Tobacco abuse     Vitamin D deficiency        Past Surgical History:  Past Surgical History:   Procedure Laterality Date    HX APPENDECTOMY      HX COLPOSCOPY  12/06/2016    Low-grade courtney/mild dysplasia RICK I    HX HERNIA REPAIR         Family History:  Family History   Problem Relation Age of Onset    Hypertension Mother     Heart Attack Father     Hypertension Father     Cancer Father      skin    Cancer Sister      skin    Bipolar Disorder Sister     Cancer Brother      skin    Cancer Maternal Aunt      colon       Social History:  Social History   Substance Use Topics    Smoking status: Current Every Day Smoker     Packs/day: 0.50     Years: 20.00     Types: Cigarettes    Smokeless tobacco: Never Used    Alcohol use 0.0 oz/week     3 - 6 Cans of beer per week      Comment: 3 or 4 beers weekly        Allergies: Allergies   Allergen Reactions    Iodinated Contrast- Oral And Iv Dye Shortness of Breath         Review of Systems   Review of Systems   Constitutional: Negative for appetite change and fever. Respiratory: Negative for cough and shortness of breath. Cardiovascular: Positive for chest pain. Gastrointestinal: Positive for abdominal pain. Negative for constipation, diarrhea, nausea and vomiting. All other systems reviewed and are negative. All Other Systems Negative  Physical Exam     Vitals:    10/05/18 0720 10/05/18 0721 10/05/18 0722 10/05/18 0730   BP: 125/75  125/75 (!) 131/91   Pulse:  65 69 65   Resp:  14 14 14   Temp:       SpO2:  96% 95% 96%     Physical Exam   Constitutional: She is oriented to person, place, and time. She appears well-developed. HENT:   Head: Normocephalic and atraumatic. Eyes: Pupils are equal, round, and reactive to light. Neck: No JVD present. No tracheal deviation present. No thyromegaly present. Cardiovascular: Normal rate, regular rhythm, normal heart sounds and intact distal pulses. Exam reveals no gallop and no friction rub. No murmur heard. Equal radial pulses   Pulmonary/Chest: Effort normal and breath sounds normal. No stridor. No respiratory distress. She has no wheezes. She has no rales. She exhibits no tenderness. Abdominal: Soft. She exhibits no distension and no mass. There is tenderness. There is no rebound and no guarding. +epigastric TTP   Musculoskeletal: She exhibits no edema or tenderness.    Lymphadenopathy:     She has no cervical adenopathy. Neurological: She is alert and oriented to person, place, and time. Skin: Skin is warm and dry. No rash noted. No erythema. No pallor. Psychiatric: She has a normal mood and affect. Her behavior is normal. Thought content normal.   Nursing note and vitals reviewed. Diagnostic Study Results     Labs -     Recent Results (from the past 12 hour(s))   EKG, 12 LEAD, INITIAL    Collection Time: 10/05/18  6:00 AM   Result Value Ref Range    Ventricular Rate 89 BPM    Atrial Rate 89 BPM    P-R Interval 154 ms    QRS Duration 116 ms    Q-T Interval 404 ms    QTC Calculation (Bezet) 491 ms    Calculated P Axis 54 degrees    Calculated R Axis -50 degrees    Calculated T Axis 81 degrees    Diagnosis       Normal sinus rhythm  Left anterior fascicular block  Left ventricular hypertrophy with QRS widening  Cannot rule out Septal infarct (cited on or before 01-OCT-2018)  Abnormal ECG  When compared with ECG of 01-OCT-2018 22:18,  No significant change was found     CBC WITH AUTOMATED DIFF    Collection Time: 10/05/18  7:20 AM   Result Value Ref Range    WBC 9.7 4.6 - 13.2 K/uL    RBC 5.32 (H) 4.20 - 5.30 M/uL    HGB 15.7 12.0 - 16.0 g/dL    HCT 46.8 (H) 35.0 - 45.0 %    MCV 88.0 74.0 - 97.0 FL    MCH 29.5 24.0 - 34.0 PG    MCHC 33.5 31.0 - 37.0 g/dL    RDW 13.8 11.6 - 14.5 %    PLATELET 300 644 - 915 K/uL    MPV 10.0 9.2 - 11.8 FL    NEUTROPHILS 64 40 - 73 %    LYMPHOCYTES 28 21 - 52 %    MONOCYTES 7 3 - 10 %    EOSINOPHILS 1 0 - 5 %    BASOPHILS 0 0 - 2 %    ABS. NEUTROPHILS 6.2 1.8 - 8.0 K/UL    ABS. LYMPHOCYTES 2.7 0.9 - 3.6 K/UL    ABS. MONOCYTES 0.7 0.05 - 1.2 K/UL    ABS. EOSINOPHILS 0.1 0.0 - 0.4 K/UL    ABS.  BASOPHILS 0.0 0.0 - 0.1 K/UL    DF AUTOMATED     METABOLIC PANEL, BASIC    Collection Time: 10/05/18  7:20 AM   Result Value Ref Range    Sodium 140 136 - 145 mmol/L    Potassium 3.5 3.5 - 5.5 mmol/L    Chloride 106 100 - 108 mmol/L    CO2 27 21 - 32 mmol/L    Anion gap 7 3.0 - 18 mmol/L    Glucose 118 (H) 74 - 99 mg/dL    BUN 14 7.0 - 18 MG/DL    Creatinine 0.58 (L) 0.6 - 1.3 MG/DL    BUN/Creatinine ratio 24 (H) 12 - 20      GFR est AA >60 >60 ml/min/1.73m2    GFR est non-AA >60 >60 ml/min/1.73m2    Calcium 8.8 8.5 - 10.1 MG/DL   HEPATIC FUNCTION PANEL    Collection Time: 10/05/18  7:20 AM   Result Value Ref Range    Protein, total 7.0 6.4 - 8.2 g/dL    Albumin 3.6 3.4 - 5.0 g/dL    Globulin 3.4 2.0 - 4.0 g/dL    A-G Ratio 1.1 0.8 - 1.7      Bilirubin, total 0.3 0.2 - 1.0 MG/DL    Bilirubin, direct 0.1 0.0 - 0.2 MG/DL    Alk. phosphatase 80 45 - 117 U/L    AST (SGOT) 11 (L) 15 - 37 U/L    ALT (SGPT) 17 13 - 56 U/L   CARDIAC PANEL,(CK, CKMB & TROPONIN)    Collection Time: 10/05/18  7:20 AM   Result Value Ref Range    CK 39 26 - 192 U/L    CK - MB <1.0 <3.6 ng/ml    CK-MB Index  0.0 - 4.0 %     CALCULATION NOT PERFORMED WHEN RESULT IS BELOW LINEAR LIMIT    Troponin-I, Qt. <0.02 0.0 - 0.045 NG/ML   LIPASE    Collection Time: 10/05/18  7:20 AM   Result Value Ref Range    Lipase 105 73 - 393 U/L   HCG QL SERUM    Collection Time: 10/05/18  7:20 AM   Result Value Ref Range    HCG, Ql. NEGATIVE  NEG     POC LACTIC ACID    Collection Time: 10/05/18  7:29 AM   Result Value Ref Range    Lactic Acid (POC) 0.7 0.4 - 2.0 mmol/L       Radiologic Studies -   XR CHEST PORT   Final Result        CT Results  (Last 48 hours)    None        CXR Results  (Last 48 hours)               10/05/18 0645  XR CHEST PORT Final result    Impression:  IMPRESSION:     Mild bibasilar streaky atelectasis. Otherwise, no evidence of acute   cardiopulmonary process. Narrative:  AP CHEST, PORTABLE       INDICATION: Chest pain       COMPARISON: Prior chest x-rays, most recent 10/1/2018       FINDINGS:       The cardiac silhouette is normal in size. The pulmonary vasculature is   unremarkable. Mild bibasilar streaky atelectasis. No focal consolidation,   pleural effusion, or pneumothorax.  No acute osseous abnormalities are   identified. Medical Decision Making   I am the first provider for this patient. I reviewed the vital signs, available nursing notes, past medical history, past surgical history, family history and social history. Vital Signs-Reviewed the patient's vital signs. Records Reviewed: Nursing Notes, Old Medical Records, Previous electrocardiograms, Previous Radiology Studies and Previous Laboratory Studies    Procedures:  EKG  Date/Time: 10/5/2018 6:38 AM  Performed by: Pearl Cortes by: Von Watkins     Interpretation:     Interpretation: abnormal    Rate:     ECG rate:  89    ECG rate assessment: normal    Rhythm:     Rhythm: sinus rhythm    Ectopy:     Ectopy: none    QRS:     QRS axis:  Normal    QRS intervals:  Normal  Conduction:     Conduction: abnormal      Abnormal conduction: LAFB    ST segments:     ST segments:  Normal  T waves:     T waves: normal    Other findings:     Other findings: LVH          Provider Notes (Medical Decision Making): get labs, treat pain. CXR, labs show nothing acute for constant pain in epigastrium. Instructed to stop NSAIDS and take PPI till f/up with cardiology on 10/15/18. MED RECONCILIATION:  No current facility-administered medications for this encounter. Current Outpatient Prescriptions   Medication Sig    pantoprazole (PROTONIX) 40 mg tablet Take 1 Tab by mouth daily for 20 days.  losartan-hydroCHLOROthiazide (HYZAAR) 100-12.5 mg per tablet Take 1 Tab by mouth daily.  raNITIdine (ZANTAC) 150 mg tablet Take 1 Tab by mouth two (2) times a day.  potassium chloride (K-DUR, KLOR-CON) 20 mEq tablet Take 1 Tab by mouth daily.  lidocaine (LIDOCAINE VISCOUS) 2 % solution Take 15 mL by mouth as needed for Pain.  LORazepam (ATIVAN) 1 mg tablet Take  by mouth every four (4) hours as needed for Anxiety.     multivitamin, tx-iron-ca-min (THERA-M W/ IRON) 9 mg iron-400 mcg tab tablet Take 1 Tab by mouth daily.  lamoTRIgine (LAMICTAL) 100 mg tablet Take 200 mg by mouth daily.  mirtazapine (REMERON) 30 mg tablet Take  by mouth nightly. For sleep    cloNIDine HCl (CATAPRES) 0.3 mg tablet Take 1 Tab by mouth two (2) times a day. Indications: hypertension    amLODIPine-atorvastatin (CADUET) 10-40 mg per tablet Take 1 Tab by mouth daily.  calcium-cholecalciferol, D3, (CALTRATE 600+D) tablet Take 1 Tab by mouth daily.  omega-3 acid ethyl esters (LOVAZA) 1 gram capsule Take 2 Caps by mouth two (2) times a day.  ergocalciferol (VITAMIN D2) 50,000 unit capsule Take one capsule every 7 days till gone, then start taking over-the-counter Vitamin D3 2,000 units every day    albuterol (PROVENTIL HFA, VENTOLIN HFA, PROAIR HFA) 90 mcg/actuation inhaler Take 2 Puffs by inhalation every four (4) hours as needed for Wheezing. Disposition:  home    DISCHARGE NOTE:   8:08 AM    Pt has been reexamined. Patient has no new complaints, changes, or physical findings. Care plan outlined and precautions discussed. Results of labs, CXR, EKG were reviewed with the patient. All medications were reviewed with the patient; will d/c home with protonix. All of pt's questions and concerns were addressed. Patient was instructed and agrees to follow up with PCP, as well as to return to the ED upon further deterioration. Patient is ready to go home. Follow-up Information     Follow up With Details Comments [address] Abernathy NP Schedule an appointment as soon as possible for a visit today  [address] [place] [address] [phone]      [facility] EMERGENCY DEPT  If symptoms worsen return immediately [address] [place] [address]  [phone]          Current Discharge Medication List      START taking these medications    Details   pantoprazole (PROTONIX) 40 mg tablet Take 1 Tab by mouth daily for 20 days.   Qty: 20 Tab, Refills: 0                 Diagnosis     Clinical Impression: 1. Abdominal pain, epigastric    2.  Acute chest pain

## 2018-10-05 NOTE — ED NOTES
Pt arrived to the ED with co CP and or ABD pain on the left side that radiates to her right side. Pt rates the pain 8/10 described as a pressure that started 3 days ago that worsened tonight affecting pt's sleep. Pt A&Ox4 and ambulatory.

## 2018-10-05 NOTE — ED NOTES
Pt discharged per MD order. Pt verbalized understanding of discharge instructions and follow up care. Prescription education given. Soft diet discussed. Pt ambulated independently out of ED.

## 2018-10-12 ENCOUNTER — HOSPITAL ENCOUNTER (OUTPATIENT)
Dept: ONCOLOGY | Age: 48
Discharge: HOME | End: 2018-10-12

## 2018-10-12 ENCOUNTER — CLINICAL SUPPORT (OUTPATIENT)
Dept: ONCOLOGY | Age: 48
End: 2018-10-12

## 2018-10-12 ENCOUNTER — HOSPITAL ENCOUNTER (OUTPATIENT)
Dept: INFUSION THERAPY | Age: 48
Discharge: HOME | End: 2018-10-12
Payer: SUBSIDIZED

## 2018-10-12 VITALS
TEMPERATURE: 97.1 F | RESPIRATION RATE: 16 BRPM | DIASTOLIC BLOOD PRESSURE: 80 MMHG | SYSTOLIC BLOOD PRESSURE: 128 MMHG | HEART RATE: 72 BPM

## 2018-10-12 DIAGNOSIS — D75.1 POLYCYTHEMIA: ICD-10-CM

## 2018-10-12 DIAGNOSIS — D75.1 POLYCYTHEMIA: Primary | ICD-10-CM

## 2018-10-12 LAB
BASO+EOS+MONOS # BLD AUTO: 0.7 K/UL (ref 0–2.3)
BASO+EOS+MONOS # BLD AUTO: 6 % (ref 0.1–17)
DIFFERENTIAL METHOD BLD: ABNORMAL
ERYTHROCYTE [DISTWIDTH] IN BLOOD BY AUTOMATED COUNT: 13.5 % (ref 11.5–14.5)
HCT VFR BLD AUTO: 46.2 % (ref 36–48)
HGB BLD-MCNC: 15 G/DL (ref 12–16)
LYMPHOCYTES # BLD: 3.4 K/UL
LYMPHOCYTES NFR BLD: 34 % (ref 14–44)
MCH RBC QN AUTO: 28.5 PG (ref 25–35)
MCHC RBC AUTO-ENTMCNC: 32.5 G/DL (ref 31–37)
MCV RBC AUTO: 87.7 FL (ref 78–102)
NEUTS SEG # BLD: 5.9 K/UL (ref 1.8–9.5)
NEUTS SEG NFR BLD: 60 % (ref 40–70)
PLATELET # BLD AUTO: 292 K/UL (ref 140–440)
RBC # BLD AUTO: 5.27 M/UL (ref 4.1–5.1)
WBC # BLD AUTO: 10 K/UL (ref 4.5–13)

## 2018-10-12 PROCEDURE — 36415 COLL VENOUS BLD VENIPUNCTURE: CPT

## 2018-10-12 PROCEDURE — 99195 PHLEBOTOMY: CPT

## 2018-10-12 PROCEDURE — 74011250636 HC RX REV CODE- 250/636: Performed by: INTERNAL MEDICINE

## 2018-10-12 RX ORDER — SODIUM CHLORIDE 9 MG/ML
500 INJECTION, SOLUTION INTRAVENOUS CONTINUOUS
Status: DISPENSED | OUTPATIENT
Start: 2018-10-12 | End: 2018-10-13

## 2018-10-12 RX ADMIN — SODIUM CHLORIDE 500 ML: 9 INJECTION, SOLUTION INTRAVENOUS at 11:07

## 2018-10-12 NOTE — PROGRESS NOTES
[facility] Progress Note    Date: 2018    Name: Prema Edmonds    MRN: [id number]         : 1970      Ms. Ozuna was assessed and education was provided. Ms. Ozuna's vitals were reviewed and patient was observed for 5 minutes prior to treatment. Visit Vitals    /80 (BP 1 Location: Left arm, BP Patient Position: At rest;Sitting)    Pulse 72    Temp 97.1 °F (36.2 °C)    Resp 16    Breastfeeding No       Lab results were obtained and reviewed. Recent Results (from the past 12 hour(s))   CBC WITH 3 PART DIFF    Collection Time: 10/12/18 10:20 AM   Result Value Ref Range    WBC 10.0 4.5 - 13.0 K/uL    RBC 5.27 (H) 4.10 - 5.10 M/uL    HGB 15.0 12.0 - 16.0 g/dL    HCT 46.2 36 - 48 %    MCV 87.7 78 - 102 FL    MCH 28.5 25.0 - 35.0 PG    MCHC 32.5 31 - 37 g/dL    RDW 13.5 11.5 - 14.5 %    PLATELET 741 192 - 384 K/uL    NEUTROPHILS 60 40 - 70 %    MIXED CELLS 6 0.1 - 17 %    LYMPHOCYTES 34 14 - 44 %    ABS. NEUTROPHILS 5.9 1.8 - 9.5 K/UL    ABS. MIXED CELLS 0.7 0.0 - 2.3 K/uL    ABS. LYMPHOCYTES PENDING K/UL    DF AUTOMATED         Today's HCT = 46.2. Therapeutic phlebotomy started at 1041 and ended at 1107. Approx 500 ml blood removed from patient. 500 ml NS IV given to patient. Patient armband removed and shredded. Ms. Michelet Gómez was discharged from [place] [address] in stable condition at 1150. She is to return on 10/18/18 at 1500 for her next appointment for CBC/Phlebotomy.     Manolo Bhagat RN  2018  1:34 PM

## 2018-10-15 ENCOUNTER — OFFICE VISIT (OUTPATIENT)
Dept: CARDIOLOGY CLINIC | Age: 48
End: 2018-10-15

## 2018-10-15 VITALS
BODY MASS INDEX: 34.96 KG/M2 | DIASTOLIC BLOOD PRESSURE: 67 MMHG | SYSTOLIC BLOOD PRESSURE: 143 MMHG | HEART RATE: 68 BPM | HEIGHT: 62 IN | WEIGHT: 190 LBS

## 2018-10-15 DIAGNOSIS — I10 ESSENTIAL HYPERTENSION: Primary | ICD-10-CM

## 2018-10-15 DIAGNOSIS — E78.5 DYSLIPIDEMIA: ICD-10-CM

## 2018-10-15 DIAGNOSIS — I11.9 HYPERTENSIVE LEFT VENTRICULAR HYPERTROPHY, WITHOUT HEART FAILURE: ICD-10-CM

## 2018-10-15 DIAGNOSIS — R00.2 PALPITATION: ICD-10-CM

## 2018-10-15 NOTE — PROGRESS NOTES
Patient didn't bring medications, verbally reviewed    1. Have you been to the ER, urgent care clinic since your last visit? Hospitalized since your last visit? Yes When: 10/18 Where: [facility] Reason for visit: Hernia    2. Have you seen or consulted any other health care providers outside of the [address] [place] [address] since your last visit? Include any pap smears or colon screening.  No

## 2018-10-15 NOTE — MR AVS SNAPSHOT
[address] [facility] 
 
 
 [address] [facility], [address] [facility] [address] 
[phone] Patient: Ashley Figueredo MRN: [id number] :1970 Visit Information Date & Time Provider Department Dept. Phone Encounter #  
 10/15/2018 11:45 AM Miguel Angel Alarcon MD [facility] [address] [place] [address] [id number] Follow-up Instructions Return in about 3 months (around 1/15/2019). Your Appointments 10/30/2018  1:30 PM  
Office Visit with Corine Bernard MD  
[address] [facility]) Appt Note: OV  
 [address] [facility] [address]  
[address]  
  
    
 2019 11:30 AM  
Office Visit with Miguel Angel Alarcon MD  
[facility]) Appt Note: 3 months [address], [address] [facility] [address]  
[address], [address] [address]  
  
    
 2019 11:00 AM  
Follow Up with Gilles Kelley NP [address] [facility] ([address] [facility]) Appt Note: 5 month follow up  
 [address] [facility] [address]  
[address] [facility] [address] Upcoming Health Maintenance Date Due DTaP/Tdap/Td series (1 - Tdap) 1990 Influenza Age 5 to Adult 2018 Pneumococcal 19-64 Medium Risk (1 of 1 - PPSV23) 2019* PAP AKA CERVICAL CYTOLOGY 2019 *Topic was postponed. The date shown is not the original due date. Allergies as of 10/15/2018  Review Complete On: 10/15/2018 By: Miguel Angel Alarcon MD  
  
 Severity Noted Reaction Type Reactions Iodinated Contrast- Oral And Iv Dye  2012    Shortness of Breath Current Immunizations  Reviewed on 10/12/2018 Name Date Influenza Vaccine (Quad) PF 11/15/2017  7:50 AM, 10/13/2016 Not reviewed this visit You Were Diagnosed With   
  
 Codes Comments Essential hypertension    -  Primary ICD-10-CM: I10 
ICD-9-CM: 401.9 stable Dyslipidemia     ICD-10-CM: E78.5 ICD-9-CM: 272.4 on statibn 
lab with pcp Palpitation     ICD-10-CM: R00.2 ICD-9-CM: 785.1 discussed reduction in caffein and smokimg cessation Hypertensive left ventricular hypertrophy, without heart failure     ICD-10-CM: I11.9 ICD-9-CM: 402.90 mild to moderate lvh 
continue bp meds Vitals BP Pulse Height(growth percentile) Weight(growth percentile) BMI OB Status 143/67 68 5' 2\" (1.575 m) 190 lb (86.2 kg) 34.75 kg/m2 Having regular periods Smoking Status Current Every Day Smoker Vitals History BMI and BSA Data Body Mass Index Body Surface Area 34.75 kg/m 2 1.94 m 2 Preferred Pharmacy Pharmacy Name Phone [facility] [address] [facility] [phone] Your Updated Medication List  
  
   
This list is accurate as of 10/15/18 12:02 PM.  Always use your most recent med list.  
  
  
  
  
 albuterol 90 mcg/actuation inhaler Commonly known as:  PROVENTIL HFA, VENTOLIN HFA, PROAIR HFA Take 2 Puffs by inhalation every four (4) hours as needed for Wheezing. amLODIPine-atorvastatin 10-40 mg per tablet Commonly known as:  CADUET Take 1 Tab by mouth daily. calcium-cholecalciferol (D3) tablet Commonly known as:  CALTRATE 600+D Take 1 Tab by mouth daily. cloNIDine HCl 0.3 mg tablet Commonly known as:  CATAPRES Take 1 Tab by mouth two (2) times a day. Indications: hypertension  
  
 ergocalciferol 50,000 unit capsule Commonly known as:  VITAMIN D2 Take one capsule every 7 days till gone, then start taking over-the-counter Vitamin D3 2,000 units every day  
  
 lamoTRIgine 100 mg tablet Commonly known as: LaMICtal  
Take 200 mg by mouth daily. LORazepam 1 mg tablet Commonly known as:  ATIVAN Take  by mouth every four (4) hours as needed for Anxiety. losartan-hydroCHLOROthiazide 100-12.5 mg per tablet Commonly known as:  HYZAAR Take 1 Tab by mouth daily. mirtazapine 30 mg tablet Commonly known as:  Lynn Erp Take  by mouth nightly. For sleep  
  
 multivitamin, tx-iron-ca-min 9 mg iron-400 mcg Tab tablet Commonly known as:  THERA-M w/ IRON Take 1 Tab by mouth daily. omega-3 acid ethyl esters 1 gram capsule Commonly known as:  Jimbo Icelandic Take 2 Caps by mouth two (2) times a day. potassium chloride 20 mEq tablet Commonly known as:  K-DUR, KLOR-CON Take 1 Tab by mouth daily. Follow-up Instructions Return in about 3 months (around 1/15/2019). To-Do List   
 10/18/2018 3:00 PM  
  Appointment with [address] [place] [address] [address] at [place] [address] ([phone]) Introducing [facility] & HEALTH [facility]! [facility] introduces [employer] patient portal. Now you can access parts of your medical record, email your doctor's office, and request medication refills online. 1. In your internet browser, go to [email or web address] 2. Click on the First Time User? Click Here link in the Sign In box. You will see the New Member Sign Up page. 3. Enter your [employer] Access Code exactly as it appears below. You will not need to use this code after youve completed the sign-up process. If you do not sign up before the expiration date, you must request a new code. · [employer] Access Code: [email or web address] Expires: 11/1/2018  5:17 AM 
 
4. Enter the last four digits of your Social Security Number (xxxx) and Date of Birth (mm/dd/yyyy) as indicated and click Submit. You will be taken to the next sign-up page. 5. Create a [employer] ID. This will be your [employer] login ID and cannot be changed, so think of one that is secure and easy to remember. 6. Create a [employer] password. You can change your password at any time. 7. Enter your Password Reset Question and Answer. This can be used at a later time if you forget your password. 8. Enter your e-mail address. You will receive e-mail notification when new information is available in [address] 9. Click Sign Up. You can now view and download portions of your medical record. 10. Click the Download Summary menu link to download a portable copy of your medical information. If you have questions, please visit the Frequently Asked Questions section of the [employer] website. Remember, [employer] is NOT to be used for urgent needs. For medical emergencies, dial 911. Now available from your iPhone and Android! Please provide this summary of care documentation to your next provider. Your primary care clinician is listed as Reyna Tan. If you have any questions after today's visit, please call [phone].

## 2018-10-15 NOTE — PROGRESS NOTES
HISTORY OF PRESENT ILLNESS  Johan Reese is a 51 y.o. female. HPI Comments: Recent er visit with atypical non anginal pain. Hypertension   The history is provided by the patient. This is a chronic problem. The problem occurs constantly. The problem has not changed since onset. Chest Pain (Angina)    The history is provided by the patient. This is a recurrent problem. The problem has not changed since onset. The problem occurs every several days. The pain is associated with exertion and rest. The pain is present in the substernal region. The pain is mild. The quality of the pain is described as dull. Pertinent negatives include no claudication, no cough, no dizziness, no fever, no hemoptysis, no nausea, no orthopnea, no palpitations, no PND, no sputum production, no vomiting and no weakness. Palpitations    The history is provided by the patient. This is a recurrent problem. The problem has not changed since onset. The problem occurs every several days. The problem is associated with nothing. Pertinent negatives include no fever, no claudication, no orthopnea, no PND, no nausea, no vomiting, no dizziness, no weakness, no cough, no hemoptysis and no sputum production. Her past medical history is significant for hypertension. Review of Systems   Constitutional: Negative for chills and fever. HENT: Negative for nosebleeds. Eyes: Negative for blurred vision and double vision. Respiratory: Negative for cough, hemoptysis, sputum production and wheezing. Cardiovascular: Negative for palpitations, orthopnea, claudication, leg swelling and PND. Gastrointestinal: Negative for heartburn, nausea and vomiting. Musculoskeletal: Negative for myalgias. Skin: Negative for rash. Neurological: Negative for dizziness and weakness. Endo/Heme/Allergies: Does not bruise/bleed easily.      Family History   Problem Relation Age of Onset    Hypertension Mother     Heart Attack Father     Hypertension Father     Cancer Father      skin    Cancer Sister      skin    Bipolar Disorder Sister     Cancer Brother      skin    Cancer Maternal Aunt      colon       Past Medical History:   Diagnosis Date    Bronchitis     Cocaine abuse ([facility] [address] 3/26/2015    Dental caries     Depression     Depression 8/31/2016    Drug abuse ([facility] [address]     Elevated hematocrit 8/31/2016    ETOH abuse     H/O screening mammography 12/06/2016    No evidence of malignancy     HPV (human papilloma virus) infection 11/2016    The patient referred to L     Hypertension     Hypertensive left ventricular hypertrophy, without heart failure 10/15/2018    mild to moderate lvh continue bp meds    Ill-defined condition     Mood swings     Polycythemia 2018    Psychiatric disorder     depression, Bipolar    Tobacco abuse     Vitamin D deficiency        Past Surgical History:   Procedure Laterality Date    HX APPENDECTOMY      HX COLPOSCOPY  12/06/2016    Low-grade courtney/mild dysplasia RICK I    HX HERNIA REPAIR         Social History   Substance Use Topics    Smoking status: Current Every Day Smoker     Packs/day: 0.50     Years: 20.00     Types: Cigarettes    Smokeless tobacco: Never Used    Alcohol use No       Allergies   Allergen Reactions    Iodinated Contrast- Oral And Iv Dye Shortness of Breath       Prior to Admission medications    Medication Sig Start Date End Date Taking? Authorizing Provider   losartan-hydroCHLOROthiazide (HYZAAR) 100-12.5 mg per tablet Take 1 Tab by mouth daily. 9/17/18  Yes Ogdenroxanne Calderon, CLAUDY   potassium chloride (K-DUR, KLOR-CON) 20 mEq tablet Take 1 Tab by mouth daily. 8/27/18  Yes Fabian Donovan NP   LORazepam (ATIVAN) 1 mg tablet Take  by mouth every four (4) hours as needed for Anxiety. Yes Historical Provider   multivitamin, tx-iron-ca-min (THERA-M W/ IRON) 9 mg iron-400 mcg tab tablet Take 1 Tab by mouth daily.    Yes Historical Provider   lamoTRIgine (LAMICTAL) 100 mg tablet Take 200 mg by mouth daily. Yes Historical Provider   mirtazapine (REMERON) 30 mg tablet Take  by mouth nightly. For sleep   Yes Historical Provider   cloNIDine HCl (CATAPRES) 0.3 mg tablet Take 1 Tab by mouth two (2) times a day. Indications: hypertension 5/7/18  Yes Adair Lamar NP   amLODIPine-atorvastatin (CADUET) 10-40 mg per tablet Take 1 Tab by mouth daily. 11/15/17  Yes Adair Lamar NP   calcium-cholecalciferol, D3, (CALTRATE 600+D) tablet Take 1 Tab by mouth daily. Yes Historical Provider   omega-3 acid ethyl esters (LOVAZA) 1 gram capsule Take 2 Caps by mouth two (2) times a day. 4/27/17  Yes Adair Lamar NP   ergocalciferol (VITAMIN D2) 50,000 unit capsule Take one capsule every 7 days till gone, then start taking over-the-counter Vitamin D3 2,000 units every day 11/28/16  Yes Adair Lamar NP   albuterol (PROVENTIL HFA, VENTOLIN HFA, PROAIR HFA) 90 mcg/actuation inhaler Take 2 Puffs by inhalation every four (4) hours as needed for Wheezing. 11/28/16  Yes Adair Lamar NP         Visit Vitals    /67    Pulse 68    Ht 5' 2\" (1.575 m)    Wt 86.2 kg (190 lb)    BMI 34.75 kg/m2         Physical Exam   Constitutional: She is oriented to person, place, and time. She appears well-developed and well-nourished. HENT:   Head: Normocephalic and atraumatic. Eyes: Conjunctivae are normal.   Neck: Neck supple. No JVD present. No tracheal deviation present. No thyromegaly present. Cardiovascular: Normal rate and regular rhythm. PMI is not displaced. Exam reveals no gallop, no S3 and no decreased pulses. No murmur heard. Pulmonary/Chest: No respiratory distress. She has no wheezes. She has no rales. She exhibits no tenderness. Abdominal: Soft. There is no tenderness. Musculoskeletal: She exhibits no edema. Neurological: She is alert and oriented to person, place, and time. Skin: Skin is warm. Psychiatric: She has a normal mood and affect.        Ms. Flory Hoover has a reminder for a \"due or due soon\" health maintenance. I have asked that she contact her primary care provider for follow-up on this health maintenance. I have personally reviewed patients ekg done at other facility. SUMMARY:2017  Left ventricle: Systolic function was normal. Ejection fraction was  estimated in the range of 55 % to 60 %. No obvious wall motion  abnormalities identified in the views obtained. Wall thickness was mildly  to moderately increased. There was mild concentric hypertrophy. NUCLEAR IMAGIN2017    Findings:   1. Post-stress imaging in short axis, horizontal and vertical long axis views reveals normal isotope uptake in all areas. 2. Resting images also show normal isotope uptake in all areas. 3. Gated images show normal left ventricular size, wall motion and systolic function. Ejection fraction is 71%. Diagnosis:   1. Normal scan. 2. No evidence of significant fixed or reversible defect suggesting ischemia or myocardial infarction noted from this nuclear study. 3. Low risk scan. Assessment         ICD-10-CM ICD-9-CM    1. Essential hypertension I10 401.9     stable   2. Dyslipidemia E78.5 272.4     on statibn  lab with pcp   3. Palpitation R00.2 785.1     discussed reduction in caffein and smokimg cessation   4. Hypertensive left ventricular hypertrophy, without heart failure I11.9 402.90     mild to moderate lvh  continue bp meds       Medications Discontinued During This Encounter   Medication Reason    lidocaine (LIDOCAINE VISCOUS) 2 % solution Not A Current Medication    raNITIdine (ZANTAC) 150 mg tablet Not A Current Medication    pantoprazole (PROTONIX) 40 mg tablet Not A Current Medication       No orders of the defined types were placed in this encounter. Follow-up Disposition:  Return in about 3 months (around 1/15/2019).

## 2018-10-15 NOTE — LETTER
Kaileyl Fleischer 1970 
 
10/15/2018 Dear Yassine Fontaine NP I had the pleasure of evaluating  Ms. Ozuna in office today. Below are the relevant portions of my assessment and plan of care. ICD-10-CM ICD-9-CM 1. Essential hypertension I10 401.9   
 stable 2. Dyslipidemia E78.5 272.4   
 on statibn 
lab with pcp 3. Palpitation R00.2 785.1   
 discussed reduction in caffein and smokimg cessation 4. Hypertensive left ventricular hypertrophy, without heart failure I11.9 402.90   
 mild to moderate lvh 
continue bp meds Current Outpatient Prescriptions Medication Sig Dispense Refill  losartan-hydroCHLOROthiazide (HYZAAR) 100-12.5 mg per tablet Take 1 Tab by mouth daily. 30 Tab 0  
 potassium chloride (K-DUR, KLOR-CON) 20 mEq tablet Take 1 Tab by mouth daily. 30 Tab 3  
 LORazepam (ATIVAN) 1 mg tablet Take  by mouth every four (4) hours as needed for Anxiety.  multivitamin, tx-iron-ca-min (THERA-M W/ IRON) 9 mg iron-400 mcg tab tablet Take 1 Tab by mouth daily.  lamoTRIgine (LAMICTAL) 100 mg tablet Take 200 mg by mouth daily.  mirtazapine (REMERON) 30 mg tablet Take  by mouth nightly. For sleep  cloNIDine HCl (CATAPRES) 0.3 mg tablet Take 1 Tab by mouth two (2) times a day. Indications: hypertension 60 Tab 3  
 amLODIPine-atorvastatin (CADUET) 10-40 mg per tablet Take 1 Tab by mouth daily. 90 Tab 3  
 calcium-cholecalciferol, D3, (CALTRATE 600+D) tablet Take 1 Tab by mouth daily.  omega-3 acid ethyl esters (LOVAZA) 1 gram capsule Take 2 Caps by mouth two (2) times a day. 360 Cap 3  
 ergocalciferol (VITAMIN D2) 50,000 unit capsule Take one capsule every 7 days till gone, then start taking over-the-counter Vitamin D3 2,000 units every day 4 Cap 2  
 albuterol (PROVENTIL HFA, VENTOLIN HFA, PROAIR HFA) 90 mcg/actuation inhaler Take 2 Puffs by inhalation every four (4) hours as needed for Wheezing.  6 Inhaler 3  
 
 
 No orders of the defined types were placed in this encounter. If you have questions, please do not hesitate to call me. I look forward to following Ms. Ozuna along with you. Sincerely, Isaak Hamilton MD

## 2018-10-23 ENCOUNTER — TELEPHONE (OUTPATIENT)
Dept: FAMILY MEDICINE CLINIC | Age: 48
End: 2018-10-23

## 2018-10-24 NOTE — TELEPHONE ENCOUNTER
Medication: Proventil HFA, dose: 2 puffs, how often: every 4 hours as needed for wheezing, current number of medication days provided: 90, refill per application. Lot# [id number], EXP 10/2019 . This medication was received and verified for the following 1. Correct Patient, 2. Correct Diagnosis, 3. Correct Drug, 4. Correct route, and no current allergy to medication. Please contact patient to come  their medications.      Marybeth Ceja, MSN, RN, FNP-C     [facility]

## 2018-10-26 ENCOUNTER — HOSPITAL ENCOUNTER (OUTPATIENT)
Dept: ONCOLOGY | Age: 48
Discharge: HOME | End: 2018-10-26

## 2018-10-26 ENCOUNTER — CLINICAL SUPPORT (OUTPATIENT)
Dept: ONCOLOGY | Age: 48
End: 2018-10-26

## 2018-10-26 ENCOUNTER — HOSPITAL ENCOUNTER (OUTPATIENT)
Dept: INFUSION THERAPY | Age: 48
Discharge: HOME | End: 2018-10-26
Payer: SUBSIDIZED

## 2018-10-26 VITALS
DIASTOLIC BLOOD PRESSURE: 92 MMHG | SYSTOLIC BLOOD PRESSURE: 138 MMHG | HEART RATE: 64 BPM | TEMPERATURE: 97.8 F | RESPIRATION RATE: 16 BRPM

## 2018-10-26 DIAGNOSIS — D75.1 POLYCYTHEMIA: Primary | ICD-10-CM

## 2018-10-26 DIAGNOSIS — D75.1 POLYCYTHEMIA: ICD-10-CM

## 2018-10-26 LAB
BASO+EOS+MONOS # BLD AUTO: 1.3 K/UL (ref 0–2.3)
BASO+EOS+MONOS # BLD AUTO: 14 % (ref 0.1–17)
DIFFERENTIAL METHOD BLD: NORMAL
ERYTHROCYTE [DISTWIDTH] IN BLOOD BY AUTOMATED COUNT: 13.8 % (ref 11.5–14.5)
HCT VFR BLD AUTO: 44.5 % (ref 36–48)
HGB BLD-MCNC: 14 G/DL (ref 12–16)
LYMPHOCYTES # BLD: 3 K/UL (ref 1.1–5.9)
LYMPHOCYTES NFR BLD: 32 % (ref 14–44)
MCH RBC QN AUTO: 27.6 PG (ref 25–35)
MCHC RBC AUTO-ENTMCNC: 31.5 G/DL (ref 31–37)
MCV RBC AUTO: 87.6 FL (ref 78–102)
NEUTS SEG # BLD: 5.1 K/UL (ref 1.8–9.5)
NEUTS SEG NFR BLD: 55 % (ref 40–70)
PLATELET # BLD AUTO: 325 K/UL (ref 140–440)
RBC # BLD AUTO: 5.08 M/UL (ref 4.1–5.1)
WBC # BLD AUTO: 9.4 K/UL (ref 4.5–13)

## 2018-10-26 PROCEDURE — 74011250636 HC RX REV CODE- 250/636: Performed by: INTERNAL MEDICINE

## 2018-10-26 PROCEDURE — 99195 PHLEBOTOMY: CPT

## 2018-10-26 PROCEDURE — 36415 COLL VENOUS BLD VENIPUNCTURE: CPT

## 2018-10-26 RX ORDER — SODIUM CHLORIDE 9 MG/ML
500 INJECTION, SOLUTION INTRAVENOUS CONTINUOUS
Status: DISPENSED | OUTPATIENT
Start: 2018-10-26 | End: 2018-10-27

## 2018-10-26 RX ADMIN — SODIUM CHLORIDE 500 ML: 900 INJECTION, SOLUTION INTRAVENOUS at 11:42

## 2018-10-26 NOTE — PROGRESS NOTES
[facility] Progress Note    Date: 2018    Name: Hosea Marinelli    MRN: [id number]         : 1970      Ms. Ozuna was assessed and education was provided. Ms. Ozuna's vitals were reviewed and patient was observed for 5 minutes prior to treatment. Visit Vitals  /84 (BP 1 Location: Left arm, BP Patient Position: At rest;Sitting)   Pulse 75   Temp 99 °F (37.2 °C)   Resp 16       Lab results were obtained and reviewed. Recent Results (from the past 12 hour(s))   CBC WITH 3 PART DIFF    Collection Time: 10/26/18 11:08 AM   Result Value Ref Range    WBC 9.4 4.5 - 13.0 K/uL    RBC 5.08 4.10 - 5.10 M/uL    HGB 14.0 12.0 - 16.0 g/dL    HCT 44.5 36 - 48 %    MCV 87.6 78 - 102 FL    MCH 27.6 25.0 - 35.0 PG    MCHC 31.5 31 - 37 g/dL    RDW 13.8 11.5 - 14.5 %    PLATELET 291 811 - 196 K/uL    NEUTROPHILS 55 40 - 70 %    MIXED CELLS 14 0.1 - 17 %    LYMPHOCYTES 32 14 - 44 %    ABS. NEUTROPHILS 5.1 1.8 - 9.5 K/UL    ABS. MIXED CELLS 1.3 0.0 - 2.3 K/uL    ABS. LYMPHOCYTES 3.0 1.1 - 5.9 K/UL    DF AUTOMATED       Today's HCT = 44.5. Therapeutic phlebotomy started at 1123 and ended at 1142. Approx 500 ml blood removed from patient. 500 ml NS IV given to patient. Ms. Janet Adames tolerated the infusion, and had no complaints. Patient armband removed and shredded. Ms. Janet Adames was discharged from [place] [address] in stable condition at 1220. She is to return on 18 at 1000 for her next appointment for CBC/Phlebotomy, weekly status.     Yasmani Anthony RN  2018

## 2018-11-06 ENCOUNTER — TELEPHONE (OUTPATIENT)
Dept: FAMILY MEDICINE CLINIC | Age: 48
End: 2018-11-06

## 2018-11-07 NOTE — TELEPHONE ENCOUNTER
Medication: Caduet 10-40 mg, dose: 1 tab, how often: daily, current number of medication days provided: 90, refill per application. Lot #:  # [id number], EXP 04/2021. This medication was received and verified for the following 1. Correct Patient, 2. Correct Diagnosis, 3. Correct Drug, 4. Correct route, and no current allergy to medication. Please contact patient to come  their medications.      Adán Valle MSN, RN, FNP-C     [facility]

## 2018-11-09 ENCOUNTER — HOSPITAL ENCOUNTER (OUTPATIENT)
Dept: ONCOLOGY | Age: 48
Discharge: HOME | End: 2018-11-09

## 2018-11-09 ENCOUNTER — HOSPITAL ENCOUNTER (OUTPATIENT)
Dept: INFUSION THERAPY | Age: 48
Discharge: HOME | End: 2018-11-09
Payer: SUBSIDIZED

## 2018-11-09 ENCOUNTER — CLINICAL SUPPORT (OUTPATIENT)
Dept: ONCOLOGY | Age: 48
End: 2018-11-09

## 2018-11-09 VITALS
DIASTOLIC BLOOD PRESSURE: 87 MMHG | RESPIRATION RATE: 16 BRPM | HEART RATE: 82 BPM | SYSTOLIC BLOOD PRESSURE: 143 MMHG | TEMPERATURE: 97.9 F | OXYGEN SATURATION: 96 %

## 2018-11-09 DIAGNOSIS — D75.1 POLYCYTHEMIA: Primary | ICD-10-CM

## 2018-11-09 DIAGNOSIS — D75.1 POLYCYTHEMIA: ICD-10-CM

## 2018-11-09 LAB
BASO+EOS+MONOS # BLD AUTO: 0.8 K/UL (ref 0–2.3)
BASO+EOS+MONOS # BLD AUTO: 8 % (ref 0.1–17)
DIFFERENTIAL METHOD BLD: NORMAL
ERYTHROCYTE [DISTWIDTH] IN BLOOD BY AUTOMATED COUNT: 13.9 % (ref 11.5–14.5)
HCT VFR BLD AUTO: 38.9 % (ref 36–48)
HGB BLD-MCNC: 12.3 G/DL (ref 12–16)
LYMPHOCYTES # BLD: 2.8 K/UL (ref 1.1–5.9)
LYMPHOCYTES NFR BLD: 27 % (ref 14–44)
MCH RBC QN AUTO: 27.3 PG (ref 25–35)
MCHC RBC AUTO-ENTMCNC: 31.6 G/DL (ref 31–37)
MCV RBC AUTO: 86.3 FL (ref 78–102)
NEUTS SEG # BLD: 6.7 K/UL (ref 1.8–9.5)
NEUTS SEG NFR BLD: 65 % (ref 40–70)
PLATELET # BLD AUTO: 384 K/UL (ref 140–440)
RBC # BLD AUTO: 4.51 M/UL (ref 4.1–5.1)
WBC # BLD AUTO: 10.3 K/UL (ref 4.5–13)

## 2018-11-09 PROCEDURE — 99211 OFF/OP EST MAY X REQ PHY/QHP: CPT

## 2018-11-09 NOTE — PROGRESS NOTES
[facility] Progress Note    Date: 2018    Name: Char Milton    MRN: [id number]         : 1970      Ms. Ozuna was assessed and education was provided. Ms. Ozuna's vitals were reviewed and patient was observed for 5 minutes prior to treatment. Visit Vitals  /87 (BP 1 Location: Left arm, BP Patient Position: At rest;Sitting)   Pulse 82   Temp 97.9 °F (36.6 °C)   Resp 16   SpO2 96%   Breastfeeding? No       Lab results were obtained and reviewed. Recent Results (from the past 12 hour(s))   CBC WITH 3 PART DIFF    Collection Time: 18 10:12 AM   Result Value Ref Range    WBC 10.3 4.5 - 13.0 K/uL    RBC 4.51 4.10 - 5.10 M/uL    HGB 12.3 12.0 - 16.0 g/dL    HCT 38.9 36 - 48 %    MCV 86.3 78 - 102 FL    MCH 27.3 25.0 - 35.0 PG    MCHC 31.6 31 - 37 g/dL    RDW 13.9 11.5 - 14.5 %    PLATELET 847 356 - 213 K/uL    NEUTROPHILS 65 40 - 70 %    MIXED CELLS 8 0.1 - 17 %    LYMPHOCYTES 27 14 - 44 %    ABS. NEUTROPHILS 6.7 1.8 - 9.5 K/UL    ABS. MIXED CELLS 0.8 0.0 - 2.3 K/uL    ABS. LYMPHOCYTES 2.8 1.1 - 5.9 K/UL    DF AUTOMATED       PIV placed in right AC x1 attempt, blood drawn for CBC. Today's HCT = 38.9 Therapeutic phlebotomy held today, return to monthly status. PIV removed, gauze and coban placed. Ms. René Mijares tolerated had no complaints. Patient armband removed and shredded. Ms. René Mijares was discharged from [place] [address] in stable condition at 1030. She is to return on 18 at 1000 for her next appointment for CBC/Phlebotomy, monthly status.     Pretty Acevedo RN  2018  1056

## 2018-11-17 ENCOUNTER — HOSPITAL ENCOUNTER (EMERGENCY)
Age: 48
Discharge: LEFT BEFORE DISCHARGE | End: 2018-11-17
Attending: EMERGENCY MEDICINE
Payer: SUBSIDIZED

## 2018-11-17 VITALS
HEART RATE: 80 BPM | RESPIRATION RATE: 16 BRPM | SYSTOLIC BLOOD PRESSURE: 140 MMHG | BODY MASS INDEX: 36.8 KG/M2 | DIASTOLIC BLOOD PRESSURE: 85 MMHG | WEIGHT: 200 LBS | TEMPERATURE: 98.1 F | HEIGHT: 62 IN | OXYGEN SATURATION: 97 %

## 2018-11-17 LAB
ATRIAL RATE: 71 BPM
CALCULATED P AXIS, ECG09: 49 DEGREES
CALCULATED R AXIS, ECG10: -34 DEGREES
CALCULATED T AXIS, ECG11: 63 DEGREES
DIAGNOSIS, 93000: NORMAL
P-R INTERVAL, ECG05: 156 MS
Q-T INTERVAL, ECG07: 424 MS
QRS DURATION, ECG06: 108 MS
QTC CALCULATION (BEZET), ECG08: 460 MS
VENTRICULAR RATE, ECG03: 71 BPM

## 2018-11-17 PROCEDURE — 75810000275 HC EMERGENCY DEPT VISIT NO LEVEL OF CARE

## 2018-11-17 PROCEDURE — 93005 ELECTROCARDIOGRAM TRACING: CPT

## 2018-11-17 PROCEDURE — 99282 EMERGENCY DEPT VISIT SF MDM: CPT
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
## 2018-11-17 NOTE — ED TRIAGE NOTES
Pt arrives with c/o sob, able to speak in full sentences w/o any distress, pt is a 1/2ppd smoker, no wheezing or retractions, , also scheduled for procedure for hernia on 12/17

## 2018-11-18 NOTE — ED NOTES
Was informed by nurse that pt left without being seen by me. Nurse apparently placed discharge order with kinder discharge instructions; however I never saw this patient. She was not discharged, she eloped without being seen.      Signed By: Shailesh Reeder MD     November 17, 2018

## 2018-11-29 ENCOUNTER — HOSPITAL ENCOUNTER (EMERGENCY)
Age: 48
Discharge: LEFT BEFORE DISCHARGE | End: 2018-11-30
Attending: EMERGENCY MEDICINE | Admitting: EMERGENCY MEDICINE
Payer: SUBSIDIZED

## 2018-11-29 ENCOUNTER — CLINICAL SUPPORT (OUTPATIENT)
Dept: FAMILY MEDICINE CLINIC | Age: 48
End: 2018-11-29

## 2018-11-29 VITALS
TEMPERATURE: 98.1 F | SYSTOLIC BLOOD PRESSURE: 164 MMHG | BODY MASS INDEX: 34.75 KG/M2 | DIASTOLIC BLOOD PRESSURE: 95 MMHG | OXYGEN SATURATION: 97 % | WEIGHT: 190 LBS | RESPIRATION RATE: 16 BRPM | HEART RATE: 89 BPM

## 2018-11-29 DIAGNOSIS — Z23 ENCOUNTER FOR IMMUNIZATION: Primary | ICD-10-CM

## 2018-11-29 PROCEDURE — 93005 ELECTROCARDIOGRAM TRACING: CPT

## 2018-11-29 PROCEDURE — 75810000275 HC EMERGENCY DEPT VISIT NO LEVEL OF CARE

## 2018-11-29 NOTE — PROGRESS NOTES
Lorena Chacon is a 51 y.o. female who presents for routine immunizations. She denies any symptoms , reactions or allergies that would exclude them from being immunized today. Risks and adverse reactions were discussed and the VIS was given to them. All questions were addressed. She was observed for 15 min post injection. There were no reactions observed. Edwardo Sneed LPN

## 2018-11-29 NOTE — PATIENT INSTRUCTIONS
Vaccine Information Statement    Influenza (Flu) Vaccine (Inactivated or Recombinant): What you need to know    Many Vaccine Information Statements are available in Serbian and other languages. See www.immunize.org/vis  Hojas de Información Sobre Vacunas están disponibles en Español y en muchos otros idiomas. Visite www.immunize.org/vis    1. Why get vaccinated? Influenza (flu) is a contagious disease that spreads around the [place] every year, usually between October and May. Flu is caused by influenza viruses, and is spread mainly by coughing, sneezing, and close contact. Anyone can get flu. Flu strikes suddenly and can last several days. Symptoms vary by age, but can include:   fever/chills   sore throat   muscle aches   fatigue   cough   headache    runny or stuffy nose    Flu can also lead to pneumonia and blood infections, and cause diarrhea and seizures in children. If you have a medical condition, such as heart or lung disease, flu can make it worse. Flu is more dangerous for some people. Infants and young children, people 72years of age and older, pregnant women, and people with certain health conditions or a weakened immune system are at greatest risk. Each year thousands of people in the [facility] die from flu, and many more are hospitalized. Flu vaccine can:   keep you from getting flu,   make flu less severe if you do get it, and   keep you from spreading flu to your family and other people. 2. Inactivated and recombinant flu vaccines    A dose of flu vaccine is recommended every flu season. Children 6 months through 6years of age may need two doses during the same flu season. Everyone else needs only one dose each flu season.        Some inactivated flu vaccines contain a very small amount of a mercury-based preservative called thimerosal. Studies have not shown thimerosal in vaccines to be harmful, but flu vaccines that do not contain thimerosal are available. There is no live flu virus in flu shots. They cannot cause the flu. There are many flu viruses, and they are always changing. Each year a new flu vaccine is made to protect against three or four viruses that are likely to cause disease in the upcoming flu season. But even when the vaccine doesnt exactly match these viruses, it may still provide some protection    Flu vaccine cannot prevent:   flu that is caused by a virus not covered by the vaccine, or   illnesses that look like flu but are not. It takes about 2 weeks for protection to develop after vaccination, and protection lasts through the flu season. 3. Some people should not get this vaccine    Tell the person who is giving you the vaccine:     If you have any severe, life-threatening allergies. If you ever had a life-threatening allergic reaction after a dose of flu vaccine, or have a severe allergy to any part of this vaccine, you may be advised not to get vaccinated. Most, but not all, types of flu vaccine contain a small amount of egg protein.  If you ever had Guillain-Barré Syndrome (also called GBS). Some people with a history of GBS should not get this vaccine. This should be discussed with your doctor.  If you are not feeling well. It is usually okay to get flu vaccine when you have a mild illness, but you might be asked to come back when you feel better. 4. Risks of a vaccine reaction    With any medicine, including vaccines, there is a chance of reactions. These are usually mild and go away on their own, but serious reactions are also possible. Most people who get a flu shot do not have any problems with it.      Minor problems following a flu shot include:    soreness, redness, or swelling where the shot was given     hoarseness   sore, red or itchy eyes   cough   fever   aches   headache   itching   fatigue  If these problems occur, they usually begin soon after the shot and last 1 or 2 days. More serious problems following a flu shot can include the following:     There may be a small increased risk of Guillain-Barré Syndrome (GBS) after inactivated flu vaccine. This risk has been estimated at 1 or 2 additional cases per million people vaccinated. This is much lower than the risk of severe complications from flu, which can be prevented by flu vaccine.  Young children who get the flu shot along with pneumococcal vaccine (PCV13) and/or DTaP vaccine at the same time might be slightly more likely to have a seizure caused by fever. Ask your doctor for more information. Tell your doctor if a child who is getting flu vaccine has ever had a seizure. Problems that could happen after any injected vaccine:      People sometimes faint after a medical procedure, including vaccination. Sitting or lying down for about 15 minutes can help prevent fainting, and injuries caused by a fall. Tell your doctor if you feel dizzy, or have vision changes or ringing in the ears.  Some people get severe pain in the shoulder and have difficulty moving the arm where a shot was given. This happens very rarely.  Any medication can cause a severe allergic reaction. Such reactions from a vaccine are very rare, estimated at about 1 in a million doses, and would happen within a few minutes to a few hours after the vaccination. As with any medicine, there is a very remote chance of a vaccine causing a serious injury or death. The safety of vaccines is always being monitored. For more information, visit: www.cdc.gov/vaccinesafety/    5. What if there is a serious reaction? What should I look for?  Look for anything that concerns you, such as signs of a severe allergic reaction, very high fever, or unusual behavior.     Signs of a severe allergic reaction can include hives, swelling of the face and throat, difficulty breathing, a fast heartbeat, dizziness, and weakness - usually within a few minutes to a few hours after the vaccination. What should I do?  If you think it is a severe allergic reaction or other emergency that cant wait, call 9-1-1 and get the person to the nearest hospital. Otherwise, call your doctor.  Reactions should be reported to the Vaccine Adverse Event Reporting System (VAERS). Your doctor should file this report, or you can do it yourself through  the VAERS web site at www.vaers. [facility].gov, or by calling 1-494.124.2682. VAERS does not give medical advice. 6. The National Vaccine Injury Compensation Program    The [facility] Vaccine Injury Compensation Program (VICP) is a federal program that was created to compensate people who may have been injured by certain vaccines. Persons who believe they may have been injured by a vaccine can learn about the program and about filing a claim by calling 2-134.464.6976 or visiting the [employer] website at www.[facility].gov/vaccinecompensation. There is a time limit to file a claim for compensation. 7. How can I learn more?  Ask your healthcare provider. He or she can give you the vaccine package insert or suggest other sources of information.  Call your local or state health department.  Contact the Centers for Disease Control and Prevention (CDC):  - Call 6-496.319.6350 (1-800-CDC-INFO) or  - Visit CDCs website at www.cdc.gov/flu    Vaccine Information Statement   Inactivated Influenza Vaccine   8/7/2015  42 DELISA Desai [id number]    Department of Health and Human Services  Centers for Disease Control and Prevention    Office Use Only

## 2018-11-30 LAB
ATRIAL RATE: 83 BPM
CALCULATED P AXIS, ECG09: 42 DEGREES
CALCULATED R AXIS, ECG10: -24 DEGREES
CALCULATED T AXIS, ECG11: 68 DEGREES
DIAGNOSIS, 93000: NORMAL
P-R INTERVAL, ECG05: 158 MS
Q-T INTERVAL, ECG07: 400 MS
QRS DURATION, ECG06: 106 MS
QTC CALCULATION (BEZET), ECG08: 470 MS
VENTRICULAR RATE, ECG03: 83 BPM

## 2018-12-03 ENCOUNTER — OFFICE VISIT (OUTPATIENT)
Dept: FAMILY MEDICINE CLINIC | Age: 48
End: 2018-12-03

## 2018-12-03 VITALS
DIASTOLIC BLOOD PRESSURE: 85 MMHG | TEMPERATURE: 98 F | RESPIRATION RATE: 20 BRPM | HEART RATE: 91 BPM | HEIGHT: 62 IN | BODY MASS INDEX: 35.26 KG/M2 | OXYGEN SATURATION: 97 % | SYSTOLIC BLOOD PRESSURE: 147 MMHG | WEIGHT: 191.6 LBS

## 2018-12-03 DIAGNOSIS — E78.5 HYPERLIPIDEMIA, UNSPECIFIED HYPERLIPIDEMIA TYPE: ICD-10-CM

## 2018-12-03 DIAGNOSIS — Z72.0 TOBACCO ABUSE DISORDER: ICD-10-CM

## 2018-12-03 DIAGNOSIS — I73.9 LEFT LEG CLAUDICATION (HCC): ICD-10-CM

## 2018-12-03 DIAGNOSIS — R25.2 LEG CRAMPING: Primary | ICD-10-CM

## 2018-12-03 RX ORDER — LANOLIN ALCOHOL/MO/W.PET/CERES
400 CREAM (GRAM) TOPICAL DAILY
Qty: 30 TAB | Refills: 3 | Status: SHIPPED | OUTPATIENT
Start: 2018-12-03 | End: 2019-06-06 | Stop reason: SDDI

## 2018-12-06 ENCOUNTER — APPOINTMENT (OUTPATIENT)
Dept: GENERAL RADIOLOGY | Age: 48
End: 2018-12-06
Attending: PHYSICIAN ASSISTANT
Payer: SUBSIDIZED

## 2018-12-06 ENCOUNTER — HOSPITAL ENCOUNTER (EMERGENCY)
Age: 48
Discharge: LEFT BEFORE DISCHARGE | End: 2018-12-07
Attending: EMERGENCY MEDICINE
Payer: SUBSIDIZED

## 2018-12-06 VITALS
TEMPERATURE: 98.6 F | OXYGEN SATURATION: 95 % | DIASTOLIC BLOOD PRESSURE: 82 MMHG | BODY MASS INDEX: 34.96 KG/M2 | RESPIRATION RATE: 18 BRPM | HEIGHT: 62 IN | HEART RATE: 91 BPM | SYSTOLIC BLOOD PRESSURE: 134 MMHG | WEIGHT: 190 LBS

## 2018-12-06 DIAGNOSIS — Z53.21 ELOPED FROM EMERGENCY DEPARTMENT: ICD-10-CM

## 2018-12-06 DIAGNOSIS — M54.9 ACUTE BACK PAIN, UNSPECIFIED BACK LOCATION, UNSPECIFIED BACK PAIN LATERALITY: ICD-10-CM

## 2018-12-06 DIAGNOSIS — R07.9 CHEST PAIN, UNSPECIFIED TYPE: Primary | ICD-10-CM

## 2018-12-06 PROCEDURE — 93005 ELECTROCARDIOGRAM TRACING: CPT

## 2018-12-06 PROCEDURE — 99282 EMERGENCY DEPT VISIT SF MDM: CPT

## 2018-12-06 PROCEDURE — 71046 X-RAY EXAM CHEST 2 VIEWS: CPT

## 2018-12-06 RX ORDER — ALBUTEROL SULFATE 0.83 MG/ML
SOLUTION RESPIRATORY (INHALATION)
Status: DISCONTINUED
Start: 2018-12-06 | End: 2018-12-07 | Stop reason: HOSPADM

## 2018-12-06 RX ORDER — IPRATROPIUM BROMIDE AND ALBUTEROL SULFATE 2.5; .5 MG/3ML; MG/3ML
3 SOLUTION RESPIRATORY (INHALATION) ONCE
Status: DISCONTINUED | OUTPATIENT
Start: 2018-12-06 | End: 2018-12-07 | Stop reason: HOSPADM

## 2018-12-06 RX ORDER — MORPHINE SULFATE 2 MG/ML
2 INJECTION, SOLUTION INTRAMUSCULAR; INTRAVENOUS ONCE
Status: DISCONTINUED | OUTPATIENT
Start: 2018-12-06 | End: 2018-12-07 | Stop reason: HOSPADM

## 2018-12-07 ENCOUNTER — CLINICAL SUPPORT (OUTPATIENT)
Dept: ONCOLOGY | Age: 48
End: 2018-12-07

## 2018-12-07 ENCOUNTER — HOSPITAL ENCOUNTER (OUTPATIENT)
Dept: ONCOLOGY | Age: 48
Discharge: HOME | End: 2018-12-07

## 2018-12-07 ENCOUNTER — HOSPITAL ENCOUNTER (OUTPATIENT)
Dept: INFUSION THERAPY | Age: 48
Discharge: HOME | End: 2018-12-07
Payer: SUBSIDIZED

## 2018-12-07 VITALS
HEART RATE: 77 BPM | RESPIRATION RATE: 16 BRPM | DIASTOLIC BLOOD PRESSURE: 73 MMHG | TEMPERATURE: 98.6 F | SYSTOLIC BLOOD PRESSURE: 117 MMHG

## 2018-12-07 DIAGNOSIS — D75.1 POLYCYTHEMIA: Primary | ICD-10-CM

## 2018-12-07 DIAGNOSIS — D75.1 POLYCYTHEMIA: ICD-10-CM

## 2018-12-07 LAB
ATRIAL RATE: 92 BPM
BASO+EOS+MONOS # BLD AUTO: 0.8 K/UL (ref 0–2.3)
BASO+EOS+MONOS # BLD AUTO: 6 % (ref 0.1–17)
CALCULATED P AXIS, ECG09: 49 DEGREES
CALCULATED R AXIS, ECG10: -48 DEGREES
CALCULATED T AXIS, ECG11: 84 DEGREES
DIAGNOSIS, 93000: NORMAL
DIFFERENTIAL METHOD BLD: ABNORMAL
ERYTHROCYTE [DISTWIDTH] IN BLOOD BY AUTOMATED COUNT: 14.6 % (ref 11.5–14.5)
HCT VFR BLD AUTO: 41.6 % (ref 36–48)
HGB BLD-MCNC: 13.2 G/DL (ref 12–16)
LYMPHOCYTES # BLD: 2.8 K/UL (ref 1.1–5.9)
LYMPHOCYTES NFR BLD: 22 % (ref 14–44)
MCH RBC QN AUTO: 26.9 PG (ref 25–35)
MCHC RBC AUTO-ENTMCNC: 31.7 G/DL (ref 31–37)
MCV RBC AUTO: 84.7 FL (ref 78–102)
NEUTS SEG # BLD: 9.3 K/UL (ref 1.8–9.5)
NEUTS SEG NFR BLD: 72 % (ref 40–70)
P-R INTERVAL, ECG05: 156 MS
PLATELET # BLD AUTO: 359 K/UL (ref 140–440)
Q-T INTERVAL, ECG07: 386 MS
QRS DURATION, ECG06: 100 MS
QTC CALCULATION (BEZET), ECG08: 477 MS
RBC # BLD AUTO: 4.91 M/UL (ref 4.1–5.1)
VENTRICULAR RATE, ECG03: 92 BPM
WBC # BLD AUTO: 12.9 K/UL (ref 4.5–13)

## 2018-12-07 PROCEDURE — 36415 COLL VENOUS BLD VENIPUNCTURE: CPT

## 2018-12-07 NOTE — ED TRIAGE NOTES
Pt. States \"I woke up last night with very sharp chest pain that went away; then today it started back up with both shoulders hurting too\".  Denies any N/V.

## 2018-12-07 NOTE — PROGRESS NOTES
[facility] OPIC Progress Note    Date: 2018    Name: Ct Golden    MRN: [id number]         : 1970      Ms. Ozuna was assessed and education was provided. Ms. Ozuna's vitals were reviewed and patient was observed for 5 minutes prior to treatment. Visit Vitals  /73 (BP 1 Location: Right arm, BP Patient Position: At rest;Sitting)   Pulse 77   Temp 98.6 °F (37 °C)   Resp 16   Breastfeeding? No       Lab results were obtained and reviewed. Recent Results (from the past 12 hour(s))   CBC WITH 3 PART DIFF    Collection Time: 18 10:03 AM   Result Value Ref Range    WBC 12.9 4.5 - 13.0 K/uL    RBC 4.91 4.10 - 5.10 M/uL    HGB 13.2 12.0 - 16.0 g/dL    HCT 41.6 36 - 48 %    MCV 84.7 78 - 102 FL    MCH 26.9 25.0 - 35.0 PG    MCHC 31.7 31 - 37 g/dL    RDW 14.6 (H) 11.5 - 14.5 %    PLATELET 616 683 - 588 K/uL    NEUTROPHILS 72 (H) 40 - 70 %    MIXED CELLS 6 0.1 - 17 %    LYMPHOCYTES 22 14 - 44 %    ABS. NEUTROPHILS 9.3 1.8 - 9.5 K/UL    ABS. MIXED CELLS 0.8 0.0 - 2.3 K/uL    ABS. LYMPHOCYTES 2.8 1.1 - 5.9 K/UL    DF AUTOMATED         Today's HCT = 41.6. Therapeutic phlebotomy not indicated. Patient armband removed and shredded. Ms. Kerri House was discharged from [place] [address] in stable condition at 1030. She is to return on 19 at 1400 for her next appointment for CBC/Phlebotomy.     Sridevi Rivera RN  2018  10:33 AM

## 2018-12-07 NOTE — ED PROVIDER NOTES
Bianca Kev  [facility] EMERGENCY DEPT      10:39 PM    Date: 12/6/2018  Patient Name: Janes Melton    History of Presenting Illness     Chief Complaint   Patient presents with    Chest Pain     along w/shoulder and back pain     51 y.o. female with noted PMH as noted including Cocaine abuse, HTN, Hiatal hernia, CHF, and Bronchitis presents to the ED c/o chest pain and back pain onset 3 hours ago. Pt states the pain is sharp, moderate, and constant in her chest and mid back. She says she also has some soreness in her posterior neck. She has not taken anything for it. Notes having been woken up at midnight last night with chest pain that resolved spontaneously. Pt last used cocaine 6 days ago. States she currently feels nauseas. Denies numbness, weakness, fever, chills, cough, congestion, vomiting, abdominal pain, or other symptoms at this time. No other complaints. Nursing notes regarding the HPI and triage nursing notes were reviewed. Prior medical records were reviewed. Current Facility-Administered Medications   Medication Dose Route Frequency Provider Last Rate Last Dose    albuterol (PROVENTIL VENTOLIN) 2.5 mg /3 mL (0.083 %) nebulizer solution             albuterol-ipratropium (DUO-NEB) 2.5 MG-0.5 MG/3 ML  3 mL Nebulization ONCE Nithya Xiong PA        methylPREDNISolone (PF) (Solu-MEDROL) injection 125 mg  125 mg IntraVENous NOW Nithya Xiong PA        morphine injection 2 mg  2 mg IntraVENous ONCE Nithya Xiong PA         Current Outpatient Medications   Medication Sig Dispense Refill    magnesium oxide (MAG-OX) 400 mg tablet Take 1 Tab by mouth daily. 30 Tab 3    losartan-hydroCHLOROthiazide (HYZAAR) 100-12.5 mg per tablet Take 1 Tab by mouth daily. 30 Tab 0    potassium chloride (K-DUR, KLOR-CON) 20 mEq tablet Take 1 Tab by mouth daily. 30 Tab 3    LORazepam (ATIVAN) 1 mg tablet Take  by mouth every four (4) hours as needed for Anxiety.       multivitamin, tx-iron-ca-min (THERA-M W/ IRON) 9 mg iron-400 mcg tab tablet Take 1 Tab by mouth daily.  lamoTRIgine (LAMICTAL) 100 mg tablet Take 200 mg by mouth daily.  mirtazapine (REMERON) 30 mg tablet Take  by mouth nightly. For sleep      cloNIDine HCl (CATAPRES) 0.3 mg tablet Take 1 Tab by mouth two (2) times a day. Indications: hypertension 60 Tab 3    amLODIPine-atorvastatin (CADUET) 10-40 mg per tablet Take 1 Tab by mouth daily. 90 Tab 3    calcium-cholecalciferol, D3, (CALTRATE 600+D) tablet Take 1 Tab by mouth daily.  omega-3 acid ethyl esters (LOVAZA) 1 gram capsule Take 2 Caps by mouth two (2) times a day. 360 Cap 3    ergocalciferol (VITAMIN D2) 50,000 unit capsule Take one capsule every 7 days till gone, then start taking over-the-counter Vitamin D3 2,000 units every day 4 Cap 2    albuterol (PROVENTIL HFA, VENTOLIN HFA, PROAIR HFA) 90 mcg/actuation inhaler Take 2 Puffs by inhalation every four (4) hours as needed for Wheezing.  6 Inhaler 3       Past History     Past Medical History:  Past Medical History:   Diagnosis Date    Bronchitis     [facility] [address]     Cocaine abuse ([facility] [address] 3/26/2015    Dental caries     Depression     Depression 8/31/2016    Drug abuse ([facility] [address]     Dysphasia 10/17/2018    Elevated hematocrit 8/31/2016    ETOH abuse     H/O screening mammography 12/06/2016    No evidence of malignancy     HPV (human papilloma virus) infection 11/2016    The patient [facility] to [facility]     Hypertension     Hypertensive left ventricular hypertrophy, without heart failure 10/15/2018    mild to moderate lvh continue bp meds    Ill-defined condition     Marijuana use 10/17/2018    Mood swings     Polycythemia 2018    Psychiatric disorder     depression, Bipolar    Schatzki's ring 08/30/2018    Found on Barium Swallow    Sliding hiatal hernia 10/17/2018    Tobacco abuse     Vitamin D deficiency        Past Surgical History:  Past Surgical History:   Procedure Laterality Date    HX ADENOIDECTOMY      HX APPENDECTOMY      HX COLPOSCOPY  12/06/2016    Low-grade courtney/mild dysplasia RICK I    HX HERNIA REPAIR         Family History:  Family History   Problem Relation Age of Onset    Hypertension Mother     Heart Attack Father     Hypertension Father     Cancer Father         skin    Cancer Sister         skin    Bipolar Disorder Sister     Cancer Brother         skin    Cancer Maternal Aunt         colon       Social History:  Social History     Tobacco Use    Smoking status: Current Every Day Smoker     Packs/day: 0.50     Years: 20.00     Pack years: 10.00     Types: Cigarettes    Smokeless tobacco: Never Used   Substance Use Topics    Alcohol use: No    Drug use: Yes     Types: Cocaine     Comment: smokes cocaine-current user       Allergies: Allergies   Allergen Reactions    Iodinated Contrast- Oral And Iv Dye Shortness of Breath       Patient's primary care provider (as noted in EPIC):  Rodney Doran NP    Review of Systems   Constitutional:  Denies malaise, fever, chills. Head:  Denies injury. Neck:  + posterior neck pain. Chest:  Denies injury. Cardiac: + chest pain. Denies palpitations. Respiratory:  Denies cough, wheezing, difficulty breathing, shortness of breath. GI/ABD:  Denies injury, pain, distention, nausea, vomiting, diarrhea. :  Denies injury, pain, dysuria or urgency. Back: + mid back pain. Pelvis:  Denies injury or pain. Extremity/MS:  Denies injury or pain. Neuro:  Denies headache, LOC, dizziness, neurologic symptoms/deficits/paresthesias. Skin: Denies injury, rash, itching or skin changes. All other systems negative as reviewed.      Visit Vitals  /82 (BP 1 Location: Left arm, BP Patient Position: Sitting)   Pulse 91   Temp 98.6 °F (37 °C)   Resp 18   Ht 5' 2\" (1.575 m)   Wt 86.2 kg (190 lb)   SpO2 95%   BMI 34.75 kg/m²       PHYSICAL EXAM:    CONSTITUTIONAL:  Alert, in no apparent distress;  well developed;  well nourished. HEAD:  Normocephalic, atraumatic. EYES:  EOMI. Non-icteric sclera. Normal conjunctiva. ENTM:  Mouth: mucous membranes moist.  NECK:  Supple  RESPIRATORY:  Chest clear, equal breath sounds, good air movement. CARDIOVASCULAR:  Regular rate and rhythm. No murmurs, rubs, or gallops. Chest:  No rash, lesions, bruising. Focal reproducible tenderness to palpation. GI:  Normal bowel sounds, abdomen soft and non-tender. No rebound or guarding. BACK:  Non-tender. UPPER EXT:  Normal inspection. LOWER EXT:  No edema, no calf tenderness. Distal pulses intact. NEURO:  Moves all four extremities, and grossly normal motor exam.  SKIN:  No rashes;  Normal for age. PSYCH:  Alert and normal affect. DIFFERENTIAL DIAGNOSES/ MEDICAL DECISION MAKING:  Chest pain etiologies include acute cardiac events to include possible acute myocardial infarction, acute coronary syndrome, pneumonia, chest wall pain (myofascial/ musculoskeletal etiology), dissection, chronic obstructive pulmonary disease (copd), acute asthma exacerbation, congestive heart failure, acute bronchitis, pulmonary embolism, upper respiratory infection, referred abdominal pain, other etiologies, versus combination of the above.     ED COURSE:    Recent Results (from the past 12 hour(s))   EKG, 12 LEAD, INITIAL    Collection Time: 12/06/18  9:06 PM   Result Value Ref Range    Ventricular Rate 92 BPM    Atrial Rate 92 BPM    P-R Interval 156 ms    QRS Duration 100 ms    Q-T Interval 386 ms    QTC Calculation (Bezet) 477 ms    Calculated P Axis 49 degrees    Calculated R Axis -48 degrees    Calculated T Axis 84 degrees    Diagnosis       Normal sinus rhythm  Possible Left atrial enlargement  Left anterior fascicular block  Left ventricular hypertrophy with repolarization abnormality  Cannot rule out Septal infarct , age undetermined  Abnormal ECG  When compared with ECG of 29-NOV-2018 21:58,  Minimal criteria for Septal infarct are now present        22:30 informed by Marianna Mix RN that patient has eloped. I have checked the room as well and she is no longer present. Plan was to obtain labs and r/o several diagnoses including aortic dissection and a PE. Diagnosis:   1. Chest pain, unspecified type    2. Acute back pain, unspecified back location, unspecified back pain laterality    3. Eloped from emergency department      Disposition: Eloped       Medication List      ASK your doctor about these medications    albuterol 90 mcg/actuation inhaler  Commonly known as:  PROVENTIL HFA, VENTOLIN HFA, PROAIR HFA  Take 2 Puffs by inhalation every four (4) hours as needed for Wheezing. amLODIPine-atorvastatin 10-40 mg per tablet  Commonly known as:  CADUET  Take 1 Tab by mouth daily. calcium-cholecalciferol (D3) tablet  Commonly known as:  CALTRATE 600+D     cloNIDine HCl 0.3 mg tablet  Commonly known as:  CATAPRES  Take 1 Tab by mouth two (2) times a day. Indications: hypertension     ergocalciferol 50,000 unit capsule  Commonly known as:  VITAMIN D2  Take one capsule every 7 days till gone, then start taking over-the-counter Vitamin D3 2,000 units every day     lamoTRIgine 100 mg tablet  Commonly known as: LaMICtal     LORazepam 1 mg tablet  Commonly known as:  ATIVAN     losartan-hydroCHLOROthiazide 100-12.5 mg per tablet  Commonly known as:  HYZAAR  Take 1 Tab by mouth daily. magnesium oxide 400 mg tablet  Commonly known as:  MAG-OX  Take 1 Tab by mouth daily. mirtazapine 30 mg tablet  Commonly known as:  REMERON     multivitamin, tx-iron-ca-min 9 mg iron-400 mcg Tab tablet  Commonly known as:  THERA-M w/ IRON     omega-3 acid ethyl esters 1 gram capsule  Commonly known as:  LOVAZA  Take 2 Caps by mouth two (2) times a day. potassium chloride 20 mEq tablet  Commonly known as:  K-DUR, KLOR-CON  Take 1 Tab by mouth daily.           JENNIFER Dao

## 2018-12-11 ENCOUNTER — HOSPITAL ENCOUNTER (OUTPATIENT)
Dept: VASCULAR SURGERY | Age: 48
Discharge: HOME | End: 2018-12-11
Attending: NURSE PRACTITIONER

## 2018-12-11 DIAGNOSIS — I73.9 LEFT LEG CLAUDICATION (HCC): ICD-10-CM

## 2018-12-11 DIAGNOSIS — R25.2 LEG CRAMPING: ICD-10-CM

## 2018-12-11 LAB
LEFT ABI: 1.44
LEFT ANTERIOR TIBIAL: 180 MMHG
LEFT ARM BP: 141 MMHG
LEFT CALF PRESSURE: 183 MMHG
LEFT POSTERIOR TIBIAL: 203 MMHG
RIGHT ABI: 1.39
RIGHT ANTERIOR TIBIAL: 189 MMHG
RIGHT ARM BP: 138 MMHG
RIGHT CALF PRESSURE: 181 MMHG
RIGHT POSTERIOR TIBIAL: 196 MMHG

## 2018-12-11 PROCEDURE — 93923 UPR/LXTR ART STDY 3+ LVLS: CPT

## 2018-12-13 ENCOUNTER — OFFICE VISIT (OUTPATIENT)
Dept: FAMILY MEDICINE CLINIC | Age: 48
End: 2018-12-13

## 2018-12-13 VITALS
HEIGHT: 62 IN | TEMPERATURE: 98.8 F | OXYGEN SATURATION: 97 % | BODY MASS INDEX: 35.04 KG/M2 | DIASTOLIC BLOOD PRESSURE: 84 MMHG | WEIGHT: 190.4 LBS | SYSTOLIC BLOOD PRESSURE: 146 MMHG | HEART RATE: 84 BPM | RESPIRATION RATE: 20 BRPM

## 2018-12-13 DIAGNOSIS — R25.2 MUSCLE CRAMPING: Primary | ICD-10-CM

## 2018-12-13 NOTE — PATIENT INSTRUCTIONS
Muscle Cramps: Care Instructions  Your Care Instructions    A muscle cramp occurs when a muscle tightens up suddenly. A cramp often happens in the legs. A muscle cramp is also called a muscle spasm or a charley horse. Muscle cramps usually last less than a minute. However, the pain may last for several minutes. Leg cramps that occur at night may wake you up. Heavy exercise, dehydration, and being overweight can increase your risk of getting cramps. An imbalance of certain chemicals in your blood, called electrolytes, can also lead to muscle cramps. Pregnant women sometimes get muscle cramps during sleep. Muscle cramps can be treated by stretching and massaging the muscle. If cramps keep coming back, your doctor may prescribe medicine that relaxes your muscles. Follow-up care is a key part of your treatment and safety. Be sure to make and go to all appointments, and call your doctor if you are having problems. It's also a good idea to know your test results and keep a list of the medicines you take. How can you care for yourself at home? · Drink plenty of fluids to prevent dehydration. Choose water and other caffeine-free clear liquids until you feel better. If you have kidney, heart, or liver disease and have to limit fluids, talk with your doctor before you increase the amount of fluids you drink. · Stretch your muscles every day, especially before and after exercise and at bedtime. Regular stretching can relax your muscles and may prevent cramps. · Do not suddenly increase the amount of exercise you get. Increase your exercise a little each week. · When you get a cramp, stretch and massage the muscle. You can also take a warm shower or bath to relax the muscle. A heating pad placed on the muscle can also help. · Take a daily multivitamin supplement. · Ask your doctor if you can take an over-the-counter pain medicine, such as acetaminophen (Tylenol), ibuprofen (Advil, Motrin), or naproxen (Aleve). Be safe with medicines. Read and follow all instructions on the label. When should you call for help? Watch closely for changes in your health, and be sure to contact your doctor if:    · You get muscle cramps often that do not go away after home treatment.     · Your muscle cramps often wake you up at night.     · You do not get better as expected. Where can you learn more? Go to http://los-zack.info/. Enter K509 in the search box to learn more about \"Muscle Cramps: Care Instructions. \"  Current as of: November 29, 2017  Content Version: 11.8  © 1235-2156 [employer]. Care instructions adapted under license by [employer] (which disclaims liability or warranty for this information). If you have questions about a medical condition or this instruction, always ask your healthcare professional. [address] any warranty or liability for your use of this information.

## 2018-12-13 NOTE — PROGRESS NOTES
HISTORY OF PRESENT ILLNESS    Loli Sanchez is a 51y.o. year old female comes in today to discuss test results. Patient was sent for Arterial Duplex to evaluate for PAD for muscle cramping in Left lower extremity given her risk factors of HTN, Smoker, and HLD. Her tests results came back normal. She reports she never picked up Mag Ox. Other risk factors for her cramping may be related to her HCTZ use along with frequent soda/caffeine intake causing dehydration, frequent albuterol use, and statin therapy discussed with patient. Allergies   Allergen Reactions    Iodinated Contrast- Oral And Iv Dye Shortness of Breath     Current Outpatient Medications   Medication Sig Dispense Refill    losartan-hydroCHLOROthiazide (HYZAAR) 100-12.5 mg per tablet Take 1 Tab by mouth daily. 30 Tab 0    potassium chloride (K-DUR, KLOR-CON) 20 mEq tablet Take 1 Tab by mouth daily. 30 Tab 3    LORazepam (ATIVAN) 1 mg tablet Take  by mouth every four (4) hours as needed for Anxiety.  multivitamin, tx-iron-ca-min (THERA-M W/ IRON) 9 mg iron-400 mcg tab tablet Take 1 Tab by mouth daily.  lamoTRIgine (LAMICTAL) 100 mg tablet Take 200 mg by mouth daily.  mirtazapine (REMERON) 30 mg tablet Take  by mouth nightly. For sleep      cloNIDine HCl (CATAPRES) 0.3 mg tablet Take 1 Tab by mouth two (2) times a day. Indications: hypertension 60 Tab 3    amLODIPine-atorvastatin (CADUET) 10-40 mg per tablet Take 1 Tab by mouth daily. 90 Tab 3    calcium-cholecalciferol, D3, (CALTRATE 600+D) tablet Take 1 Tab by mouth daily.  omega-3 acid ethyl esters (LOVAZA) 1 gram capsule Take 2 Caps by mouth two (2) times a day.  360 Cap 3    ergocalciferol (VITAMIN D2) 50,000 unit capsule Take one capsule every 7 days till gone, then start taking over-the-counter Vitamin D3 2,000 units every day 4 Cap 2    albuterol (PROVENTIL HFA, VENTOLIN HFA, PROAIR HFA) 90 mcg/actuation inhaler Take 2 Puffs by inhalation every four (4) hours as needed for Wheezing. 6 Inhaler 3    magnesium oxide (MAG-OX) 400 mg tablet Take 1 Tab by mouth daily. 27 Tab 3     Past Medical History:   Diagnosis Date    Asthma     Bronchitis     [facility]     Cocaine abuse ([facility] [address] 3/26/2015    Dental caries     Depression     Depression 8/31/2016    Drug abuse ([facility] [address]     Dysphasia 10/17/2018    Elevated hematocrit 8/31/2016    ETOH abuse     H/O screening mammography 12/06/2016    No evidence of malignancy     HPV (human papilloma virus) infection 11/2016    The patient referred to [facility]     Hypertension     Hypertensive left ventricular hypertrophy, without heart failure 10/15/2018    mild to moderate lvh continue bp meds    Ill-defined condition     Marijuana use 10/17/2018    Mood swings     Polycythemia 2018    Polycythemia     Psychiatric disorder     depression, Bipolar    Schatzki's ring 08/30/2018    Found on Barium Swallow    Sliding hiatal hernia 10/17/2018    Tobacco abuse     Vitamin D deficiency        Objective:  Visit Vitals  /84   Pulse 84   Temp 98.8 °F (37.1 °C)   Resp 20   Ht 5' 2\" (1.575 m)   Wt 190 lb 6.4 oz (86.4 kg)   SpO2 97%   BMI 34.82 kg/m²     Physical Exam   Constitutional: She is well-developed, well-nourished, and in no distress. Cardiovascular: Normal rate. Pulmonary/Chest: Effort normal and breath sounds normal.   Musculoskeletal: She exhibits no edema. Neurological: She has normal motor skills. She displays normal reflexes. No sensory deficit. Lower Extremity Arterial Findings     Right Lower Arterial     Triphasic Doppler waveforms in the right proximal common femoral, proximal superficial femoral, proximal popliteal, posterior tibial and dorsalis pedis artery. Left Lower Arterial     Triphasic Doppler waveforms in the left proximal common femoral, proximal superficial femoral, posterior tibial and dorsalis pedis artery.    NAEL     The right resting NAEL is normal. The left resting NAEL is normal.         Assessment/Plan:     ICD-10-CM ICD-9-CM    1. Muscle cramping R25.2 729.82      Diagnoses and all orders for this visit:    1. Muscle cramping      Patient advised to stay hydrated, use albuterol when needed. And increase potassium in diet. Mag Ox will help with muscle cramping as well. I have discussed the diagnosis with the patient and the intended plan as seen in the above orders. The patient has received an after-visit summary and questions were answered concerning future plans. I have discussed medication side effects and warnings with the patient as well. Patient agreeable with above plan and verbalizes understanding. Follow-up Disposition:  Return if symptoms worsen or fail to improve.

## 2018-12-31 ENCOUNTER — APPOINTMENT (OUTPATIENT)
Dept: GENERAL RADIOLOGY | Age: 48
End: 2018-12-31
Attending: EMERGENCY MEDICINE
Payer: SUBSIDIZED

## 2018-12-31 ENCOUNTER — HOSPITAL ENCOUNTER (EMERGENCY)
Age: 48
Discharge: HOME | End: 2019-01-01
Attending: EMERGENCY MEDICINE | Admitting: EMERGENCY MEDICINE
Payer: SUBSIDIZED

## 2018-12-31 DIAGNOSIS — R07.9 CHEST PAIN, UNSPECIFIED TYPE: Primary | ICD-10-CM

## 2018-12-31 LAB
ALBUMIN SERPL-MCNC: 3.8 G/DL (ref 3.4–5)
ALBUMIN/GLOB SERPL: 1 {RATIO} (ref 0.8–1.7)
ALP SERPL-CCNC: 96 U/L (ref 45–117)
ALT SERPL-CCNC: 26 U/L (ref 13–56)
ANION GAP SERPL CALC-SCNC: 9 MMOL/L (ref 3–18)
AST SERPL-CCNC: 13 U/L (ref 15–37)
ATRIAL RATE: 80 BPM
BASOPHILS # BLD: 0.1 K/UL (ref 0–0.1)
BASOPHILS NFR BLD: 0 % (ref 0–2)
BILIRUB SERPL-MCNC: 0.4 MG/DL (ref 0.2–1)
BUN SERPL-MCNC: 15 MG/DL (ref 7–18)
BUN/CREAT SERPL: 22 (ref 12–20)
CALCIUM SERPL-MCNC: 8.6 MG/DL (ref 8.5–10.1)
CALCULATED R AXIS, ECG10: -153 DEGREES
CALCULATED T AXIS, ECG11: 107 DEGREES
CHLORIDE SERPL-SCNC: 105 MMOL/L (ref 100–108)
CO2 SERPL-SCNC: 26 MMOL/L (ref 21–32)
CREAT SERPL-MCNC: 0.68 MG/DL (ref 0.6–1.3)
DIAGNOSIS, 93000: NORMAL
DIFFERENTIAL METHOD BLD: ABNORMAL
EOSINOPHIL # BLD: 0.2 K/UL (ref 0–0.4)
EOSINOPHIL NFR BLD: 2 % (ref 0–5)
ERYTHROCYTE [DISTWIDTH] IN BLOOD BY AUTOMATED COUNT: 15.3 % (ref 11.6–14.5)
GLOBULIN SER CALC-MCNC: 3.7 G/DL (ref 2–4)
GLUCOSE SERPL-MCNC: 103 MG/DL (ref 74–99)
HCT VFR BLD AUTO: 44.4 % (ref 35–45)
HGB BLD-MCNC: 14.3 G/DL (ref 12–16)
LYMPHOCYTES # BLD: 4 K/UL (ref 0.9–3.6)
LYMPHOCYTES NFR BLD: 32 % (ref 21–52)
MCH RBC QN AUTO: 27.6 PG (ref 24–34)
MCHC RBC AUTO-ENTMCNC: 32.2 G/DL (ref 31–37)
MCV RBC AUTO: 85.5 FL (ref 74–97)
MONOCYTES # BLD: 1.2 K/UL (ref 0.05–1.2)
MONOCYTES NFR BLD: 10 % (ref 3–10)
NEUTS SEG # BLD: 7.1 K/UL (ref 1.8–8)
NEUTS SEG NFR BLD: 56 % (ref 40–73)
P-R INTERVAL, ECG05: 158 MS
PLATELET # BLD AUTO: 323 K/UL (ref 135–420)
PMV BLD AUTO: 10 FL (ref 9.2–11.8)
POTASSIUM SERPL-SCNC: 3.4 MMOL/L (ref 3.5–5.5)
PROT SERPL-MCNC: 7.5 G/DL (ref 6.4–8.2)
Q-T INTERVAL, ECG07: 410 MS
QRS DURATION, ECG06: 110 MS
QTC CALCULATION (BEZET), ECG08: 472 MS
RBC # BLD AUTO: 5.19 M/UL (ref 4.2–5.3)
SODIUM SERPL-SCNC: 140 MMOL/L (ref 136–145)
TROPONIN I SERPL-MCNC: <0.02 NG/ML (ref 0–0.04)
VENTRICULAR RATE, ECG03: 80 BPM
WBC # BLD AUTO: 12.6 K/UL (ref 4.6–13.2)

## 2018-12-31 PROCEDURE — 85025 COMPLETE CBC W/AUTO DIFF WBC: CPT

## 2018-12-31 PROCEDURE — 71045 X-RAY EXAM CHEST 1 VIEW: CPT

## 2018-12-31 PROCEDURE — 93005 ELECTROCARDIOGRAM TRACING: CPT

## 2018-12-31 PROCEDURE — 99283 EMERGENCY DEPT VISIT LOW MDM: CPT

## 2018-12-31 PROCEDURE — 80053 COMPREHEN METABOLIC PANEL: CPT

## 2018-12-31 PROCEDURE — 84484 ASSAY OF TROPONIN QUANT: CPT

## 2019-01-01 VITALS
TEMPERATURE: 97.1 F | RESPIRATION RATE: 16 BRPM | DIASTOLIC BLOOD PRESSURE: 88 MMHG | HEART RATE: 72 BPM | SYSTOLIC BLOOD PRESSURE: 145 MMHG | OXYGEN SATURATION: 98 %

## 2019-01-01 NOTE — ED TRIAGE NOTES
Pt arrived to ED for c/o left sided arm, chest, leg pain that started today at 12 am.  Pt has history of hypertension, blood thickening disorder.

## 2019-01-01 NOTE — ED PROVIDER NOTES
EMERGENCY DEPARTMENT HISTORY AND PHYSICAL EXAM    10:06 PM      Date: 12/31/2018  Patient Name: Jacinto Loco    History of Presenting Illness     Chief Complaint   Patient presents with    Chest Pain    Arm Pain    Leg Pain         History Provided By: Patient    Chief Complaint: Chest pain  Duration: 23 Hours  Timing:  Constant  Location: Left chest  Quality: Aching and radiating to back  Severity: Moderate  Modifying Factors: No modifying or aggravating factors were reported. Associated Symptoms: SOB, left upper back pain, left shoulder and arm pain, left leg pain      Additional History (Context): Jacinto Loco is a 51 y.o. female with hypertension and CHF, HPV, and Asthma who presents with constant, moderate, aching left chest pain onset 23 hours ago. She reports the pain radiates to her upper left back. Other associated symptoms include SOB, left shoulder and arm pain, and left leg pain. She states she has had similar symptoms intermittently for hte past 6-8 months. She does not report any modifying factors or medications taken for pain. She reports she regularly goes to infusion center and states she was told she has a hiatal hernia. She states she is not currently taking lasix. No other concerns or symptoms at this time. PCP: Yunior MCDONALD, NP    Current Outpatient Medications   Medication Sig Dispense Refill    magnesium oxide (MAG-OX) 400 mg tablet Take 1 Tab by mouth daily. 30 Tab 3    losartan-hydroCHLOROthiazide (HYZAAR) 100-12.5 mg per tablet Take 1 Tab by mouth daily. 30 Tab 0    potassium chloride (K-DUR, KLOR-CON) 20 mEq tablet Take 1 Tab by mouth daily. 30 Tab 3    LORazepam (ATIVAN) 1 mg tablet Take  by mouth every four (4) hours as needed for Anxiety.  multivitamin, tx-iron-ca-min (THERA-M W/ IRON) 9 mg iron-400 mcg tab tablet Take 1 Tab by mouth daily.  lamoTRIgine (LAMICTAL) 100 mg tablet Take 200 mg by mouth daily.       mirtazapine (REMERON) 30 mg tablet Take by mouth nightly. For sleep      cloNIDine HCl (CATAPRES) 0.3 mg tablet Take 1 Tab by mouth two (2) times a day. Indications: hypertension 60 Tab 3    amLODIPine-atorvastatin (CADUET) 10-40 mg per tablet Take 1 Tab by mouth daily. 90 Tab 3    calcium-cholecalciferol, D3, (CALTRATE 600+D) tablet Take 1 Tab by mouth daily.  omega-3 acid ethyl esters (LOVAZA) 1 gram capsule Take 2 Caps by mouth two (2) times a day. 360 Cap 3    ergocalciferol (VITAMIN D2) 50,000 unit capsule Take one capsule every 7 days till gone, then start taking over-the-counter Vitamin D3 2,000 units every day 4 Cap 2    albuterol (PROVENTIL HFA, VENTOLIN HFA, PROAIR HFA) 90 mcg/actuation inhaler Take 2 Puffs by inhalation every four (4) hours as needed for Wheezing.  6 Inhaler 3       Past History     Past Medical History:  Past Medical History:   Diagnosis Date    Asthma     Bronchitis     [facility]     Cocaine abuse ([facility] [address] 3/26/2015    Dental caries     Depression     Depression 8/31/2016    Drug abuse ([facility] [address]     Dysphasia 10/17/2018    Elevated hematocrit 8/31/2016    ETOH abuse     H/O screening mammography 12/06/2016    No evidence of malignancy     HPV (human papilloma virus) infection 11/2016    The patient referred to [facility]     Hypertension     Hypertensive left ventricular hypertrophy, without heart failure 10/15/2018    mild to moderate lvh continue bp meds    Ill-defined condition     Marijuana use 10/17/2018    Mood swings     Polycythemia 2018    Polycythemia     Psychiatric disorder     depression, Bipolar    Schatzki's ring 08/30/2018    Found on Barium Swallow    Sliding hiatal hernia 10/17/2018    Tobacco abuse     Vitamin D deficiency        Past Surgical History:  Past Surgical History:   Procedure Laterality Date    HX ADENOIDECTOMY      denies    HX APPENDECTOMY      HX COLPOSCOPY  12/06/2016    Low-grade courtney/mild dysplasia RICK I    HX HERNIA REPAIR      3x Family History:  Family History   Problem Relation Age of Onset    Hypertension Mother     Heart Attack Father     Hypertension Father     Cancer Father         skin    Cancer Sister         skin    Bipolar Disorder Sister     Cancer Brother         skin    Cancer Maternal Aunt         colon       Social History:  Social History     Tobacco Use    Smoking status: Current Every Day Smoker     Packs/day: 0.50     Years: 20.00     Pack years: 10.00     Types: Cigarettes    Smokeless tobacco: Never Used   Substance Use Topics    Alcohol use: No    Drug use: Yes     Types: Cocaine     Comment: smokes cocaine-current user       Allergies: Allergies   Allergen Reactions    Iodinated Contrast- Oral And Iv Dye Shortness of Breath         Review of Systems       Review of Systems   Constitutional: Negative for activity change and appetite change. HENT: Negative for congestion. Eyes: Negative for visual disturbance. Respiratory: Positive for shortness of breath. Negative for cough. Cardiovascular: Positive for chest pain. Gastrointestinal: Negative for abdominal pain, diarrhea, nausea and vomiting. Genitourinary: Negative for dysuria. Musculoskeletal: Positive for back pain (left upper). Negative for arthralgias and myalgias. Positive for Left arm and shoulder pain. Positive for left leg pain. Skin: Negative for rash. Neurological: Negative for weakness and numbness. All other systems reviewed and are negative. Physical Exam     Visit Vitals  /90 (BP 1 Location: Left arm, BP Patient Position: At rest)   Pulse 78   Temp 97.1 °F (36.2 °C)   Resp 16   SpO2 98%         Physical Exam   Constitutional: She is oriented to person, place, and time. She appears well-developed and well-nourished. HENT:   Head: Normocephalic and atraumatic. Mouth/Throat: Oropharynx is clear and moist.   Eyes: Conjunctivae are normal.   Neck: Normal range of motion. Neck supple.  No JVD present. Cardiovascular: Normal rate, regular rhythm, normal heart sounds and intact distal pulses. No murmur heard. Pulmonary/Chest: Effort normal and breath sounds normal.   Abdominal: Soft. Bowel sounds are normal. She exhibits no distension. There is no tenderness. Musculoskeletal: Normal range of motion. She exhibits no deformity. Lymphadenopathy:     She has no cervical adenopathy. Neurological: She is alert and oriented to person, place, and time. Coordination normal.   Skin: Skin is warm and dry. No rash noted. Psychiatric: She has a normal mood and affect. Nursing note and vitals reviewed. Diagnostic Study Results     Labs -  Recent Results (from the past 12 hour(s))   EKG, 12 LEAD, INITIAL    Collection Time: 12/31/18  8:57 PM   Result Value Ref Range    Ventricular Rate 80 BPM    Atrial Rate 80 BPM    P-R Interval 158 ms    QRS Duration 110 ms    Q-T Interval 410 ms    QTC Calculation (Bezet) 472 ms    Calculated R Axis -153 degrees    Calculated T Axis 107 degrees    Diagnosis       Normal sinus rhythm  likely limb lead reversal  Right superior axis deviation  Pulmonary disease pattern  Abnormal ECG  When compared with ECG of 06-DEC-2018 21:06,  Left anterior fascicular block is no longer present  Minimal criteria for Septal infarct are no longer present  Confirmed by Mily Lora ([id number]) on 12/31/2018 9:26:52 PM     CBC WITH AUTOMATED DIFF    Collection Time: 12/31/18  9:15 PM   Result Value Ref Range    WBC 12.6 4.6 - 13.2 K/uL    RBC 5.19 4.20 - 5.30 M/uL    HGB 14.3 12.0 - 16.0 g/dL    HCT 44.4 35.0 - 45.0 %    MCV 85.5 74.0 - 97.0 FL    MCH 27.6 24.0 - 34.0 PG    MCHC 32.2 31.0 - 37.0 g/dL    RDW 15.3 (H) 11.6 - 14.5 %    PLATELET 915 122 - 170 K/uL    MPV 10.0 9.2 - 11.8 FL    NEUTROPHILS 56 40 - 73 %    LYMPHOCYTES 32 21 - 52 %    MONOCYTES 10 3 - 10 %    EOSINOPHILS 2 0 - 5 %    BASOPHILS 0 0 - 2 %    ABS. NEUTROPHILS 7.1 1.8 - 8.0 K/UL    ABS.  LYMPHOCYTES 4.0 (H) 0.9 - 3.6 K/UL    ABS. MONOCYTES 1.2 0.05 - 1.2 K/UL    ABS. EOSINOPHILS 0.2 0.0 - 0.4 K/UL    ABS. BASOPHILS 0.1 0.0 - 0.1 K/UL    DF AUTOMATED     METABOLIC PANEL, COMPREHENSIVE    Collection Time: 12/31/18  9:15 PM   Result Value Ref Range    Sodium 140 136 - 145 mmol/L    Potassium 3.4 (L) 3.5 - 5.5 mmol/L    Chloride 105 100 - 108 mmol/L    CO2 26 21 - 32 mmol/L    Anion gap 9 3.0 - 18 mmol/L    Glucose 103 (H) 74 - 99 mg/dL    BUN 15 7.0 - 18 MG/DL    Creatinine 0.68 0.6 - 1.3 MG/DL    BUN/Creatinine ratio 22 (H) 12 - 20      GFR est AA >60 >60 ml/min/1.73m2    GFR est non-AA >60 >60 ml/min/1.73m2    Calcium 8.6 8.5 - 10.1 MG/DL    Bilirubin, total 0.4 0.2 - 1.0 MG/DL    ALT (SGPT) 26 13 - 56 U/L    AST (SGOT) 13 (L) 15 - 37 U/L    Alk. phosphatase 96 45 - 117 U/L    Protein, total 7.5 6.4 - 8.2 g/dL    Albumin 3.8 3.4 - 5.0 g/dL    Globulin 3.7 2.0 - 4.0 g/dL    A-G Ratio 1.0 0.8 - 1.7     TROPONIN I    Collection Time: 12/31/18  9:15 PM   Result Value Ref Range    Troponin-I, QT <0.02 0.0 - 0.045 NG/ML       Radiologic Studies -   XR CHEST PORT    (Results Pending)         Medical Decision Making   I am the first provider for this patient. I reviewed the vital signs, available nursing notes, past medical history, past surgical history, family history and social history. Vital Signs-Reviewed the patient's vital signs. Pulse Oximetry Analysis -  98% on room air, normal    Cardiac Monitor:  Rate: 78 BPM    EKG: Interpreted by the EP. Time Interpreted: 20:57  Normal sinus rhythm, rate 80 BPM, , QTc 472. No acute ST or T wave changes, no STEMI. Records Reviewed: Nursing Notes and Old Medical Records (Time of Review: 10:06 PM)    ED Course: Progress Notes, Reevaluation, and Consults:      Provider Notes (Medical Decision Making):  Camlia Boyer is 51 y.o. Female with hx of CHF who presents with left sided chest pain radiating to the back and associated left arm and leg pain.  She reports similar symptoms intermittently for the past 6-8 months. She has been seen by multiple specialists and in the ED for the same problem, with no significant findings. She has normal, symmetric pulses bilaterally, appears well, is not hypertensive at thsi time. Differential Diagnosis: Will consider ACS, GERD, Pericarditis/myocarditis, pleuritis, bronchitis or other respiratory infection, pneumothorax, pneumonia, MSK pain. Given the patient's history and exam, I do not suspect PE, aortic dissection, pancreatitis, esophageal rupture, pericardial tamponate, mediastenitis. Testing: CBC, CMP, Troponin I, EKG, CXR    Treatments: patient feels better here, no acute treatment needed at this time. All studies unremarkable. Patient will continue to f/u with outpatient providers. The patient will be discharged home. Findings were discussed at length and questions were answered. Information on all newly prescribed medications was given. the patient was instructed to follow-up with her PCP, or return to the Emergency Department with any worsened symptoms or concerns. Return precautions were given. Diagnosis     Clinical Impression:   1. Chest pain, unspecified type        Disposition: Discharge    Follow-up Information     Follow up With Specialties Details Why Contact Meg Yang NP Nurse Practitioner Schedule an appointment as soon as possible for a visit  [address] [place] [address]  [phone]      [facility] EMERGENCY DEPT Emergency Medicine  If symptoms worsen [address]  [phone]              Medication List      ASK your doctor about these medications    albuterol 90 mcg/actuation inhaler  Commonly known as:  PROVENTIL HFA, VENTOLIN HFA, PROAIR HFA  Take 2 Puffs by inhalation every four (4) hours as needed for Wheezing. amLODIPine-atorvastatin 10-40 mg per tablet  Commonly known as:  CADUET  Take 1 Tab by mouth daily.      calcium-cholecalciferol (D3) tablet  Commonly known as:  CALTRATE 600+D     cloNIDine HCl 0.3 mg tablet  Commonly known as:  CATAPRES  Take 1 Tab by mouth two (2) times a day. Indications: hypertension     ergocalciferol 50,000 unit capsule  Commonly known as:  VITAMIN D2  Take one capsule every 7 days till gone, then start taking over-the-counter Vitamin D3 2,000 units every day     lamoTRIgine 100 mg tablet  Commonly known as: LaMICtal     LORazepam 1 mg tablet  Commonly known as:  ATIVAN     losartan-hydroCHLOROthiazide 100-12.5 mg per tablet  Commonly known as:  HYZAAR  Take 1 Tab by mouth daily. magnesium oxide 400 mg tablet  Commonly known as:  MAG-OX  Take 1 Tab by mouth daily. mirtazapine 30 mg tablet  Commonly known as:  REMERON     multivitamin, tx-iron-ca-min 9 mg iron-400 mcg Tab tablet  Commonly known as:  THERA-M w/ IRON     omega-3 acid ethyl esters 1 gram capsule  Commonly known as:  LOVAZA  Take 2 Caps by mouth two (2) times a day. potassium chloride 20 mEq tablet  Commonly known as:  K-DUR, KLOR-CON  Take 1 Tab by mouth daily. _______________________________    Attestations:  Scribe Attestation     Julianna Cadena acting as a scribe for and in the presence of Paulina Whitney MD      December 31, 2018 at 10:06 PM       Provider Attestation:      I personally performed the services described in the documentation, reviewed the documentation, as recorded by the scribe in my presence, and it accurately and completely records my words and actions.  December 31, 2018 at 10:06 PM - Paulina Whitney MD    ______________________________  Rajani Sousa

## 2019-01-01 NOTE — DISCHARGE INSTRUCTIONS

## 2019-01-04 ENCOUNTER — HOSPITAL ENCOUNTER (OUTPATIENT)
Dept: ONCOLOGY | Age: 49
Discharge: HOME | End: 2019-01-04

## 2019-01-04 DIAGNOSIS — D75.1 POLYCYTHEMIA: ICD-10-CM

## 2019-01-07 ENCOUNTER — TELEPHONE (OUTPATIENT)
Dept: FAMILY MEDICINE CLINIC | Age: 49
End: 2019-01-07

## 2019-01-09 NOTE — TELEPHONE ENCOUNTER
Medication: Proventil HFA, dose: 2 puffs, how often: every 4 hours as needed for wheezing, current number of medication days provided: 90, refill per application. Lot# [id number], EXP 01/2020 . This medication was received and verified for the following 1. Correct Patient, 2. Correct Diagnosis, 3. Correct Drug, 4. Correct route, and no current allergy to medication. Please contact patient to come  their medications.      Audrey Holley MSN, RN, FNP-C     [facility]

## 2019-01-11 ENCOUNTER — HOSPITAL ENCOUNTER (OUTPATIENT)
Dept: INFUSION THERAPY | Age: 49
Discharge: HOME | End: 2019-01-11
Payer: MEDICAID

## 2019-01-11 ENCOUNTER — CLINICAL SUPPORT (OUTPATIENT)
Dept: ONCOLOGY | Age: 49
End: 2019-01-11

## 2019-01-11 ENCOUNTER — HOSPITAL ENCOUNTER (OUTPATIENT)
Dept: ONCOLOGY | Age: 49
Discharge: HOME | End: 2019-01-11

## 2019-01-11 VITALS
SYSTOLIC BLOOD PRESSURE: 109 MMHG | HEART RATE: 75 BPM | TEMPERATURE: 98.4 F | RESPIRATION RATE: 16 BRPM | DIASTOLIC BLOOD PRESSURE: 72 MMHG

## 2019-01-11 DIAGNOSIS — D75.1 POLYCYTHEMIA: ICD-10-CM

## 2019-01-11 DIAGNOSIS — D75.1 POLYCYTHEMIA: Primary | ICD-10-CM

## 2019-01-11 LAB
BASO+EOS+MONOS # BLD AUTO: 1 K/UL (ref 0–2.3)
BASO+EOS+MONOS NFR BLD AUTO: 8 % (ref 0.1–17)
DIFFERENTIAL METHOD BLD: ABNORMAL
ERYTHROCYTE [DISTWIDTH] IN BLOOD BY AUTOMATED COUNT: 14.4 % (ref 11.5–14.5)
HCT VFR BLD AUTO: 46.3 % (ref 36–48)
HGB BLD-MCNC: 14.6 G/DL (ref 12–16)
LYMPHOCYTES # BLD: 3.2 K/UL (ref 1.1–5.9)
LYMPHOCYTES NFR BLD: 28 % (ref 14–44)
MCH RBC QN AUTO: 27 PG (ref 25–35)
MCHC RBC AUTO-ENTMCNC: 31.5 G/DL (ref 31–37)
MCV RBC AUTO: 85.6 FL (ref 78–102)
NEUTS SEG # BLD: 7.6 K/UL (ref 1.8–9.5)
NEUTS SEG NFR BLD: 64 % (ref 40–70)
PLATELET # BLD AUTO: 304 K/UL (ref 140–440)
RBC # BLD AUTO: 5.41 M/UL (ref 4.1–5.1)
WBC # BLD AUTO: 11.8 K/UL (ref 4.5–13)

## 2019-01-11 PROCEDURE — 74011250636 HC RX REV CODE- 250/636: Performed by: INTERNAL MEDICINE

## 2019-01-11 PROCEDURE — 36415 COLL VENOUS BLD VENIPUNCTURE: CPT

## 2019-01-11 PROCEDURE — 99195 PHLEBOTOMY: CPT

## 2019-01-11 RX ORDER — SODIUM CHLORIDE 9 MG/ML
500 INJECTION, SOLUTION INTRAVENOUS AS NEEDED
Status: DISPENSED | OUTPATIENT
Start: 2019-01-11 | End: 2019-01-12

## 2019-01-11 RX ADMIN — SODIUM CHLORIDE 500 ML: 900 INJECTION, SOLUTION INTRAVENOUS at 11:05

## 2019-01-11 NOTE — PROGRESS NOTES
[facility] Progress Note    Date: 2019    Name: Alice Draper    MRN: [id number]         : 1970      Ms. Ozuna was assessed and education was provided. Ms. Ozuna's vitals were reviewed and patient was observed for 5 minutes prior to treatment. Visit Vitals  /72 (BP 1 Location: Right arm, BP Patient Position: At rest;Sitting)   Pulse 75   Temp 99.1 °F (37.3 °C)   Resp 16   Breastfeeding? No       Lab results were obtained and reviewed. Recent Results (from the past 12 hour(s))   CBC WITH 3 PART DIFF    Collection Time: 19 10:08 AM   Result Value Ref Range    WBC 11.8 4.5 - 13.0 K/uL    RBC 5.41 (H) 4.10 - 5.10 M/uL    HGB 14.6 12.0 - 16.0 g/dL    HCT 46.3 36 - 48 %    MCV 85.6 78 - 102 FL    MCH 27.0 25.0 - 35.0 PG    MCHC 31.5 31 - 37 g/dL    RDW 14.4 11.5 - 14.5 %    PLATELET 799 892 - 922 K/uL    NEUTROPHILS 64 40 - 70 %    MIXED CELLS 8 0.1 - 17 %    LYMPHOCYTES 28 14 - 44 %    ABS. NEUTROPHILS 7.6 1.8 - 9.5 K/UL    ABS. MIXED CELLS 1.0 0.0 - 2.3 K/uL    ABS. LYMPHOCYTES 3.2 1.1 - 5.9 K/UL    DF AUTOMATED       Today's HCT = 46.3. Therapeutic phlebotomy started at 1027 and stopped at 1105. Approx 500 ml blood removed from patient. 500 ml NS IV given to patient. Ms. Jannet Vasquez tolerated the [facility] phlebotomy, and had no complaints. Patient armband removed and shredded. Ms. Jannet Vasquez was discharged from [place] [address] in stable condition at 1145. She is to return on 19 at 1600 for her next appointment for CBC/Phlebotomy.     Jessika Portillo RN  2019

## 2019-01-18 ENCOUNTER — HOSPITAL ENCOUNTER (EMERGENCY)
Age: 49
Discharge: HOME | End: 2019-01-18
Attending: EMERGENCY MEDICINE
Payer: MEDICAID

## 2019-01-18 ENCOUNTER — APPOINTMENT (OUTPATIENT)
Dept: CT IMAGING | Age: 49
End: 2019-01-18
Attending: PHYSICIAN ASSISTANT
Payer: MEDICAID

## 2019-01-18 ENCOUNTER — HOSPITAL ENCOUNTER (EMERGENCY)
Dept: ONCOLOGY | Age: 49
Discharge: HOME | End: 2019-01-18
Payer: MEDICAID

## 2019-01-18 ENCOUNTER — HOSPITAL ENCOUNTER (OUTPATIENT)
Dept: INFUSION THERAPY | Age: 49
Discharge: HOME | End: 2019-01-18
Payer: MEDICAID

## 2019-01-18 VITALS
DIASTOLIC BLOOD PRESSURE: 83 MMHG | TEMPERATURE: 98.6 F | RESPIRATION RATE: 16 BRPM | SYSTOLIC BLOOD PRESSURE: 135 MMHG | HEART RATE: 60 BPM

## 2019-01-18 VITALS
TEMPERATURE: 98.8 F | DIASTOLIC BLOOD PRESSURE: 62 MMHG | RESPIRATION RATE: 16 BRPM | SYSTOLIC BLOOD PRESSURE: 148 MMHG | OXYGEN SATURATION: 97 % | HEART RATE: 80 BPM

## 2019-01-18 DIAGNOSIS — D75.1 SECONDARY POLYCYTHEMIA: ICD-10-CM

## 2019-01-18 DIAGNOSIS — N39.0 URINARY TRACT INFECTION, ACUTE: Primary | ICD-10-CM

## 2019-01-18 DIAGNOSIS — K80.20 CALCULUS OF GALLBLADDER WITHOUT CHOLECYSTITIS WITHOUT OBSTRUCTION: ICD-10-CM

## 2019-01-18 DIAGNOSIS — R10.9 LEFT FLANK PAIN: ICD-10-CM

## 2019-01-18 LAB
ALBUMIN SERPL-MCNC: 3.9 G/DL (ref 3.4–5)
ALBUMIN/GLOB SERPL: 1 {RATIO} (ref 0.8–1.7)
ALP SERPL-CCNC: 114 U/L (ref 45–117)
ALT SERPL-CCNC: 21 U/L (ref 13–56)
ANION GAP SERPL CALC-SCNC: 5 MMOL/L (ref 3–18)
APPEARANCE UR: ABNORMAL
AST SERPL-CCNC: 12 U/L (ref 15–37)
BACTERIA URNS QL MICRO: ABNORMAL /HPF
BASOPHILS # BLD: 0 K/UL (ref 0–0.1)
BASOPHILS NFR BLD: 0 % (ref 0–2)
BILIRUB SERPL-MCNC: 0.3 MG/DL (ref 0.2–1)
BILIRUB UR QL: NEGATIVE
BUN SERPL-MCNC: 11 MG/DL (ref 7–18)
BUN/CREAT SERPL: 15 (ref 12–20)
CALCIUM SERPL-MCNC: 8.9 MG/DL (ref 8.5–10.1)
CHLORIDE SERPL-SCNC: 105 MMOL/L (ref 100–108)
CO2 SERPL-SCNC: 29 MMOL/L (ref 21–32)
COLOR UR: YELLOW
CREAT SERPL-MCNC: 0.74 MG/DL (ref 0.6–1.3)
DIFFERENTIAL METHOD BLD: ABNORMAL
EOSINOPHIL # BLD: 0.1 K/UL (ref 0–0.4)
EOSINOPHIL NFR BLD: 1 % (ref 0–5)
EPITH CASTS URNS QL MICRO: ABNORMAL /LPF (ref 0–5)
ERYTHROCYTE [DISTWIDTH] IN BLOOD BY AUTOMATED COUNT: 15 % (ref 11.6–14.5)
GLOBULIN SER CALC-MCNC: 3.9 G/DL (ref 2–4)
GLUCOSE SERPL-MCNC: 124 MG/DL (ref 74–99)
GLUCOSE UR STRIP.AUTO-MCNC: NEGATIVE MG/DL
HCG UR QL: NEGATIVE
HCT VFR BLD AUTO: 44.3 % (ref 35–45)
HGB BLD-MCNC: 14.6 G/DL (ref 12–16)
HGB UR QL STRIP: ABNORMAL
KETONES UR QL STRIP.AUTO: NEGATIVE MG/DL
LEUKOCYTE ESTERASE UR QL STRIP.AUTO: ABNORMAL
LIPASE SERPL-CCNC: 97 U/L (ref 73–393)
LYMPHOCYTES # BLD: 1.7 K/UL (ref 0.9–3.6)
LYMPHOCYTES NFR BLD: 21 % (ref 21–52)
MCH RBC QN AUTO: 27.7 PG (ref 24–34)
MCHC RBC AUTO-ENTMCNC: 33 G/DL (ref 31–37)
MCV RBC AUTO: 83.9 FL (ref 74–97)
MONOCYTES # BLD: 0.5 K/UL (ref 0.05–1.2)
MONOCYTES NFR BLD: 6 % (ref 3–10)
MUCOUS THREADS URNS QL MICRO: ABNORMAL /LPF
NEUTS SEG # BLD: 5.6 K/UL (ref 1.8–8)
NEUTS SEG NFR BLD: 72 % (ref 40–73)
NITRITE UR QL STRIP.AUTO: NEGATIVE
PH UR STRIP: 5.5 [PH] (ref 5–8)
PLATELET # BLD AUTO: 331 K/UL (ref 135–420)
PMV BLD AUTO: 9.7 FL (ref 9.2–11.8)
POTASSIUM SERPL-SCNC: 3.7 MMOL/L (ref 3.5–5.5)
PROT SERPL-MCNC: 7.8 G/DL (ref 6.4–8.2)
PROT UR STRIP-MCNC: NEGATIVE MG/DL
RBC # BLD AUTO: 5.28 M/UL (ref 4.2–5.3)
RBC #/AREA URNS HPF: ABNORMAL /HPF (ref 0–5)
SODIUM SERPL-SCNC: 139 MMOL/L (ref 136–145)
SP GR UR REFRACTOMETRY: 1.02 (ref 1–1.03)
UROBILINOGEN UR QL STRIP.AUTO: 1 EU/DL (ref 0.2–1)
WBC # BLD AUTO: 7.9 K/UL (ref 4.6–13.2)
WBC URNS QL MICRO: ABNORMAL /HPF (ref 0–5)

## 2019-01-18 PROCEDURE — 81001 URINALYSIS AUTO W/SCOPE: CPT

## 2019-01-18 PROCEDURE — 99283 EMERGENCY DEPT VISIT LOW MDM: CPT

## 2019-01-18 PROCEDURE — 83690 ASSAY OF LIPASE: CPT

## 2019-01-18 PROCEDURE — 74176 CT ABD & PELVIS W/O CONTRAST: CPT

## 2019-01-18 PROCEDURE — 80053 COMPREHEN METABOLIC PANEL: CPT

## 2019-01-18 PROCEDURE — 96375 TX/PRO/DX INJ NEW DRUG ADDON: CPT

## 2019-01-18 PROCEDURE — 96361 HYDRATE IV INFUSION ADD-ON: CPT

## 2019-01-18 PROCEDURE — 81025 URINE PREGNANCY TEST: CPT

## 2019-01-18 PROCEDURE — 74011250636 HC RX REV CODE- 250/636: Performed by: INTERNAL MEDICINE

## 2019-01-18 PROCEDURE — 99195 PHLEBOTOMY: CPT

## 2019-01-18 PROCEDURE — 87086 URINE CULTURE/COLONY COUNT: CPT

## 2019-01-18 PROCEDURE — 85025 COMPLETE CBC W/AUTO DIFF WBC: CPT

## 2019-01-18 PROCEDURE — 74011250636 HC RX REV CODE- 250/636: Performed by: PHYSICIAN ASSISTANT

## 2019-01-18 PROCEDURE — 96374 THER/PROPH/DIAG INJ IV PUSH: CPT

## 2019-01-18 RX ORDER — FAMOTIDINE 20 MG/1
20 TABLET, FILM COATED ORAL 2 TIMES DAILY
Qty: 20 TAB | Refills: 0 | Status: SHIPPED | OUTPATIENT
Start: 2019-01-18 | End: 2019-01-28

## 2019-01-18 RX ORDER — KETOROLAC TROMETHAMINE 30 MG/ML
30 INJECTION, SOLUTION INTRAMUSCULAR; INTRAVENOUS
Status: COMPLETED | OUTPATIENT
Start: 2019-01-18 | End: 2019-01-18

## 2019-01-18 RX ORDER — SODIUM CHLORIDE 9 MG/ML
250 INJECTION, SOLUTION INTRAVENOUS CONTINUOUS
Status: DISCONTINUED | OUTPATIENT
Start: 2019-01-18 | End: 2019-01-18

## 2019-01-18 RX ORDER — ONDANSETRON 2 MG/ML
4 INJECTION INTRAMUSCULAR; INTRAVENOUS
Status: COMPLETED | OUTPATIENT
Start: 2019-01-18 | End: 2019-01-18

## 2019-01-18 RX ORDER — SODIUM CHLORIDE 9 MG/ML
500 INJECTION, SOLUTION INTRAVENOUS CONTINUOUS
Status: DISPENSED | OUTPATIENT
Start: 2019-01-18 | End: 2019-01-19

## 2019-01-18 RX ORDER — TRAMADOL HYDROCHLORIDE 50 MG/1
50 TABLET ORAL
Qty: 6 TAB | Refills: 0 | Status: SHIPPED | OUTPATIENT
Start: 2019-01-18 | End: 2019-02-26

## 2019-01-18 RX ORDER — CEPHALEXIN 250 MG/1
500 CAPSULE ORAL
Status: DISCONTINUED | OUTPATIENT
Start: 2019-01-18 | End: 2019-01-18 | Stop reason: HOSPADM

## 2019-01-18 RX ORDER — PROMETHAZINE HYDROCHLORIDE 25 MG/1
25 TABLET ORAL
Qty: 12 TAB | Refills: 0 | Status: SHIPPED | OUTPATIENT
Start: 2019-01-18 | End: 2019-01-30

## 2019-01-18 RX ORDER — FAMOTIDINE 10 MG/ML
20 INJECTION INTRAVENOUS
Status: COMPLETED | OUTPATIENT
Start: 2019-01-18 | End: 2019-01-18

## 2019-01-18 RX ORDER — CEPHALEXIN 500 MG/1
500 CAPSULE ORAL 2 TIMES DAILY
Qty: 14 CAP | Refills: 0 | Status: SHIPPED | OUTPATIENT
Start: 2019-01-18 | End: 2019-01-25

## 2019-01-18 RX ORDER — SODIUM CHLORIDE 9 MG/ML
1000 INJECTION, SOLUTION INTRAVENOUS ONCE
Status: COMPLETED | OUTPATIENT
Start: 2019-01-18 | End: 2019-01-18

## 2019-01-18 RX ADMIN — ONDANSETRON 4 MG: 2 INJECTION INTRAMUSCULAR; INTRAVENOUS at 07:03

## 2019-01-18 RX ADMIN — FAMOTIDINE 20 MG: 10 INJECTION INTRAVENOUS at 07:03

## 2019-01-18 RX ADMIN — KETOROLAC TROMETHAMINE 30 MG: 30 INJECTION, SOLUTION INTRAMUSCULAR; INTRAVENOUS at 08:11

## 2019-01-18 RX ADMIN — SODIUM CHLORIDE 1000 ML: 900 INJECTION, SOLUTION INTRAVENOUS at 07:03

## 2019-01-18 RX ADMIN — SODIUM CHLORIDE 500 ML: 9 INJECTION, SOLUTION INTRAVENOUS at 14:36

## 2019-01-18 NOTE — PROGRESS NOTES
[facility] Progress Note    Date: 2019    Name: Je Lainez    MRN: [id number]         : 1970      Ms. Ozuna was assessed and education was provided. Ms. Ozuna's vitals were reviewed and patient was observed for 5 minutes prior to treatment. Visit Vitals  /84 (BP 1 Location: Left arm, BP Patient Position: At rest;Sitting)   Pulse 70   Temp 98.9 °F (37.2 °C)   Resp 16       Lab results were obtained and reviewed. Recent Results (from the past 12 hour(s))   CBC WITH AUTOMATED DIFF    Collection Time: 19  6:00 AM   Result Value Ref Range    WBC 7.9 4.6 - 13.2 K/uL    RBC 5.28 4.20 - 5.30 M/uL    HGB 14.6 12.0 - 16.0 g/dL    HCT 44.3 35.0 - 45.0 %    MCV 83.9 74.0 - 97.0 FL    MCH 27.7 24.0 - 34.0 PG    MCHC 33.0 31.0 - 37.0 g/dL    RDW 15.0 (H) 11.6 - 14.5 %    PLATELET 580 071 - 858 K/uL    MPV 9.7 9.2 - 11.8 FL    NEUTROPHILS 72 40 - 73 %    LYMPHOCYTES 21 21 - 52 %    MONOCYTES 6 3 - 10 %    EOSINOPHILS 1 0 - 5 %    BASOPHILS 0 0 - 2 %    ABS. NEUTROPHILS 5.6 1.8 - 8.0 K/UL    ABS. LYMPHOCYTES 1.7 0.9 - 3.6 K/UL    ABS. MONOCYTES 0.5 0.05 - 1.2 K/UL    ABS. EOSINOPHILS 0.1 0.0 - 0.4 K/UL    ABS. BASOPHILS 0.0 0.0 - 0.1 K/UL    DF AUTOMATED     METABOLIC PANEL, COMPREHENSIVE    Collection Time: 19  6:00 AM   Result Value Ref Range    Sodium 139 136 - 145 mmol/L    Potassium 3.7 3.5 - 5.5 mmol/L    Chloride 105 100 - 108 mmol/L    CO2 29 21 - 32 mmol/L    Anion gap 5 3.0 - 18 mmol/L    Glucose 124 (H) 74 - 99 mg/dL    BUN 11 7.0 - 18 MG/DL    Creatinine 0.74 0.6 - 1.3 MG/DL    BUN/Creatinine ratio 15 12 - 20      GFR est AA >60 >60 ml/min/1.73m2    GFR est non-AA >60 >60 ml/min/1.73m2    Calcium 8.9 8.5 - 10.1 MG/DL    Bilirubin, total 0.3 0.2 - 1.0 MG/DL    ALT (SGPT) 21 13 - 56 U/L    AST (SGOT) 12 (L) 15 - 37 U/L    Alk.  phosphatase 114 45 - 117 U/L    Protein, total 7.8 6.4 - 8.2 g/dL    Albumin 3.9 3.4 - 5.0 g/dL    Globulin 3.9 2.0 - 4.0 g/dL    A-G Ratio 1.0 0.8 - 1.7     LIPASE    Collection Time: 01/18/19  6:00 AM   Result Value Ref Range    Lipase 97 73 - 393 U/L   URINALYSIS W/ RFLX MICROSCOPIC    Collection Time: 01/18/19  6:00 AM   Result Value Ref Range    Color YELLOW      Appearance CLOUDY      Specific gravity 1.020 1.005 - 1.030      pH (UA) 5.5 5.0 - 8.0      Protein NEGATIVE  NEG mg/dL    Glucose NEGATIVE  NEG mg/dL    Ketone NEGATIVE  NEG mg/dL    Bilirubin NEGATIVE  NEG      Blood LARGE (A) NEG      Urobilinogen 1.0 0.2 - 1.0 EU/dL    Nitrites NEGATIVE  NEG      Leukocyte Esterase MODERATE (A) NEG     HCG URINE, QL    Collection Time: 01/18/19  6:00 AM   Result Value Ref Range    HCG urine, QL NEGATIVE  NEG     URINE MICROSCOPIC ONLY    Collection Time: 01/18/19  6:00 AM   Result Value Ref Range    WBC 10 to 13 0 - 5 /hpf    RBC 1 to 2 0 - 5 /hpf    Epithelial cells 3+ 0 - 5 /lpf    Bacteria 1+ (A) NEG /hpf    Mucus 1+ (A) NEG /lpf     HCT today at 0600 was 44.3  Therapeutic phlebotomy started at 1401 and stopped at 1436. Approx 500 ml blood removed from patient. 500 ml NS IV given. Ms. Erica Martinez tolerated the procedure, and had no complaints. Patient armband removed and shredded. Ms. Erica Martinez was discharged from [place] [address] in stable condition at 1515. She is to return on 1/29/19 at 0900 for her next appointment.     Brooks Trevino RN  January 18, 2019

## 2019-01-18 NOTE — ED TRIAGE NOTES
Pt arrived to ED for c/o left sided abdominal pain, constipation x 2 days. Pt states that her pain has been constant, she's been having constipation had small bowel movements over the past 2 days with no relief after using OTC stool softeners.

## 2019-01-18 NOTE — DISCHARGE INSTRUCTIONS
Patient Education        Low-Fat Diet for Gallbladder Disease: Care Instructions  Your Care Instructions    When you eat, the gallbladder releases bile, which helps you digest the fat in food. If you have an inflamed gallbladder, this may cause pain. A low-fat diet may give your gallbladder a rest so you can start to heal. Your doctor and dietitian can help you make an eating plan that does not irritate your digestive system. Always talk with your doctor or dietitian before you make changes in your diet. Follow-up care is a key part of your treatment and safety. Be sure to make and go to all appointments, and call your doctor if you are having problems. It's also a good idea to know your test results and keep a list of the medicines you take. How can you care for yourself at home? · Eat many small meals and snacks each day instead of three large meals. · Choose lean meats. ? Eat no more than 5 to 6½ ounces of meat a day. ? Cut off all fat you can see. ? Eat chicken and turkey without the skin. ? Many types of fish, such as salmon, lake trout, tuna, and herring, provide healthy omega-3 fat. But, avoid fish canned in oil, such as sardines in olive oil. ? Bake, broil, or grill meats, poultry, or fish instead of frying them in butter or fat. · Drink or eat nonfat or low-fat milk, yogurt, cheese, or other milk products each day. ? Read the labels on cheeses, and choose those with less than 5 grams of fat an ounce. ? Try fat-free sour cream, cream cheese, or yogurt. ? Avoid cream soups and cream sauces on pasta. ? Eat low-fat ice cream, frozen yogurt, or sorbet. Avoid regular ice cream.  · Eat whole-grain cereals, breads, crackers, rice, or pasta. Avoid high-fat foods such as croissants, scones, biscuits, waffles, doughnuts, muffins, granola, and high-fat breads. · Flavor your foods with herbs and spices (such as basil, tarragon, or mint), fat-free sauces, or lemon juice instead of butter.  You can also use butter substitutes, fat-free mayonnaise, or fat-free dressing. · Try applesauce, prune puree, or mashed bananas to replace some or all of the fat when you bake. · Limit fats and oils, such as butter, margarine, mayonnaise, and salad dressing, to no more than 1 tablespoon a meal.  · Avoid high-fat foods, such as:  ? Chocolate, whole milk, ice cream, and processed cheese. ? Fried or buttered foods. ? Sausage, salami, and coffey. ? Cinnamon rolls, cakes, pies, cookies, and other pastries. ? Prepared snack foods, such as potato chips, nut and granola bars, and mixed nuts. ? Coconut and avocado. · Learn how to read food labels for serving sizes and ingredients. Fast-food and convenience-food meals often have lots of fat. Where can you learn more? Go to http://los-zack.info/. Enter N528 in the search box to learn more about \"Low-Fat Diet for Gallbladder Disease: Care Instructions. \"  Current as of: March 28, 2018  Content Version: 11.9  © 0793-3703 [employer]. Care instructions adapted under license by [employer] (which disclaims liability or warranty for this information). If you have questions about a medical condition or this instruction, always ask your healthcare professional. [place] [address] any warranty or liability for your use of this information. Patient Education        Urinary Tract Infection in Women: Care Instructions  Your Care Instructions    A urinary tract infection, or UTI, is a general term for an infection anywhere between the kidneys and the urethra (where urine comes out). Most UTIs are bladder infections. They often cause pain or burning when you urinate. UTIs are caused by bacteria and can be cured with antibiotics. Be sure to complete your treatment so that the infection goes away. Follow-up care is a key part of your treatment and safety.  Be sure to make and go to all appointments, and call your doctor if you are having problems. It's also a good idea to know your test results and keep a list of the medicines you take. How can you care for yourself at home? · Take your antibiotics as directed. Do not stop taking them just because you feel better. You need to take the full course of antibiotics. · Drink extra water and other fluids for the next day or two. This may help wash out the bacteria that are causing the infection. (If you have kidney, heart, or liver disease and have to limit fluids, talk with your doctor before you increase your fluid intake.)  · Avoid drinks that are carbonated or have caffeine. They can irritate the bladder. · Urinate often. Try to empty your bladder each time. · To relieve pain, take a hot bath or lay a heating pad set on low over your lower belly or genital area. Never go to sleep with a heating pad in place. To prevent UTIs  · Drink plenty of water each day. This helps you urinate often, which clears bacteria from your system. (If you have kidney, heart, or liver disease and have to limit fluids, talk with your doctor before you increase your fluid intake.)  · Urinate when you need to. · Urinate right after you have sex. · Change sanitary pads often. · Avoid douches, bubble baths, feminine hygiene sprays, and other feminine hygiene products that have deodorants. · After going to the bathroom, wipe from front to back. When should you call for help? Call your doctor now or seek immediate medical care if:    · Symptoms such as fever, chills, nausea, or vomiting get worse or appear for the first time.     · You have new pain in your back just below your rib cage.  This is called flank pain.     · There is new blood or pus in your urine.     · You have any problems with your antibiotic medicine.    Watch closely for changes in your health, and be sure to contact your doctor if:    · You are not getting better after taking an antibiotic for 2 days.     · Your symptoms go away but then come back. Where can you learn more? Go to http://los-zack.info/. Enter D142 in the search box to learn more about \"Urinary Tract Infection in Women: Care Instructions. \"  Current as of: March 20, 2018  Content Version: 11.9  © 7123-8940 [employer]. Care instructions adapted under license by [employer] (which disclaims liability or warranty for this information). If you have questions about a medical condition or this instruction, always ask your healthcare professional. [address] any warranty or liability for your use of this information.

## 2019-01-18 NOTE — ED PROVIDER NOTES
EMERGENCY DEPARTMENT HISTORY AND PHYSICAL EXAM    Date: 1/18/2019  Patient Name: Hayes Gaston    History of Presenting Illness     Chief Complaint   Patient presents with    Abdominal Pain    Constipation         History Provided By: Patient    Chief Complaint: flank pain  Duration: 12 Hours  Timing:  Intermittent  Location: left flank  Quality: Aching and Dull  Severity: 8 out of 10  Modifying Factors: none  Associated Symptoms: constipation      Additional History (Context): Hayes Gatson is a 51 y.o. female with asthma, bronchitis, CHF, depression, constipation, HTN who presents with left flank pain, constipation x last night. Denies N/V/D, hematuria, dysuria, fever, chills, hx of kidney stones, dizziness, or any other complaints or symptoms. Pt states she drank prune juice last night to help her go and had a small, soft BM. Denies etoh use. PCP: Prince Diego NP    Current Facility-Administered Medications   Medication Dose Route Frequency Provider Last Rate Last Dose    cephALEXin (KEFLEX) capsule 500 mg  500 mg Oral NOW Keily Chaidez PA         Current Outpatient Medications   Medication Sig Dispense Refill    cephALEXin (KEFLEX) 500 mg capsule Take 1 Cap by mouth two (2) times a day for 7 days. 14 Cap 0    traMADol (ULTRAM) 50 mg tablet Take 1 Tab by mouth every six (6) hours as needed for Pain. Max Daily Amount: 200 mg. 6 Tab 0    famotidine (PEPCID) 20 mg tablet Take 1 Tab by mouth two (2) times a day for 10 days. 20 Tab 0    promethazine (PHENERGAN) 25 mg tablet Take 1 Tab by mouth every six (6) hours as needed. 12 Tab 0    magnesium oxide (MAG-OX) 400 mg tablet Take 1 Tab by mouth daily. 30 Tab 3    losartan-hydroCHLOROthiazide (HYZAAR) 100-12.5 mg per tablet Take 1 Tab by mouth daily. 30 Tab 0    potassium chloride (K-DUR, KLOR-CON) 20 mEq tablet Take 1 Tab by mouth daily. 30 Tab 3    LORazepam (ATIVAN) 1 mg tablet Take  by mouth every four (4) hours as needed for Anxiety.  multivitamin, tx-iron-ca-min (THERA-M W/ IRON) 9 mg iron-400 mcg tab tablet Take 1 Tab by mouth daily.  lamoTRIgine (LAMICTAL) 100 mg tablet Take 200 mg by mouth daily.  mirtazapine (REMERON) 30 mg tablet Take  by mouth nightly. For sleep      cloNIDine HCl (CATAPRES) 0.3 mg tablet Take 1 Tab by mouth two (2) times a day. Indications: hypertension 60 Tab 3    amLODIPine-atorvastatin (CADUET) 10-40 mg per tablet Take 1 Tab by mouth daily. 90 Tab 3    calcium-cholecalciferol, D3, (CALTRATE 600+D) tablet Take 1 Tab by mouth daily.  omega-3 acid ethyl esters (LOVAZA) 1 gram capsule Take 2 Caps by mouth two (2) times a day. 360 Cap 3    ergocalciferol (VITAMIN D2) 50,000 unit capsule Take one capsule every 7 days till gone, then start taking over-the-counter Vitamin D3 2,000 units every day 4 Cap 2    albuterol (PROVENTIL HFA, VENTOLIN HFA, PROAIR HFA) 90 mcg/actuation inhaler Take 2 Puffs by inhalation every four (4) hours as needed for Wheezing.  6 Inhaler 3       Past History     Past Medical History:  Past Medical History:   Diagnosis Date    Asthma     Bronchitis     [facility]     Cocaine abuse ([facility] [address] 3/26/2015    Dental caries     Depression     Depression 8/31/2016    Drug abuse ([facility] [address]     Dysphasia 10/17/2018    Elevated hematocrit 8/31/2016    ETOH abuse     H/O screening mammography 12/06/2016    No evidence of malignancy     HPV (human papilloma virus) infection 11/2016    The patient referred to [facility]     Hypertension     Hypertensive left ventricular hypertrophy, without heart failure 10/15/2018    mild to moderate lvh continue bp meds    Ill-defined condition     Marijuana use 10/17/2018    Mood swings     Polycythemia 2018    Polycythemia     Psychiatric disorder     depression, Bipolar    Schatzki's ring 08/30/2018    Found on Barium Swallow    Sliding hiatal hernia 10/17/2018    Tobacco abuse     Vitamin D deficiency        Past Surgical History:  Past Surgical History:   Procedure Laterality Date    HX ADENOIDECTOMY      denies    HX APPENDECTOMY      HX COLPOSCOPY  12/06/2016    Low-grade courtney/mild dysplasia RICK I    HX HERNIA REPAIR      3x       Family History:  Family History   Problem Relation Age of Onset    Hypertension Mother     Heart Attack Father     Hypertension Father     Cancer Father         skin    Cancer Sister         skin    Bipolar Disorder Sister     Cancer Brother         skin    Cancer Maternal Aunt         colon       Social History:  Social History     Tobacco Use    Smoking status: Current Every Day Smoker     Packs/day: 0.50     Years: 20.00     Pack years: 10.00     Types: Cigarettes    Smokeless tobacco: Never Used   Substance Use Topics    Alcohol use: No    Drug use: Yes     Types: Cocaine     Comment: smokes cocaine-current user       Allergies: Allergies   Allergen Reactions    Iodinated Contrast- Oral And Iv Dye Shortness of Breath         Review of Systems   Review of Systems   Constitutional: Negative for chills and fever. HENT: Negative. Respiratory: Negative. Cardiovascular: Negative. Gastrointestinal: Positive for abdominal pain and constipation. Genitourinary: Negative. Neurological: Negative. All other systems reviewed and are negative. All Other Systems Negative  Physical Exam     Vitals:    01/18/19 0549 01/18/19 1130 01/18/19 1140   BP: 163/87 148/62    Pulse: 80     Resp: 16     Temp: 98 °F (36.7 °C)  98.8 °F (37.1 °C)   SpO2: 97% 97%      Physical Exam   Constitutional: She is oriented to person, place, and time. She appears well-developed and well-nourished. No distress. HENT:   Head: Normocephalic and atraumatic. Right Ear: External ear normal.   Left Ear: External ear normal.   Mouth/Throat: Oropharynx is clear and moist.   Eyes: Conjunctivae are normal.   Neck: Normal range of motion. Neck supple. Cardiovascular: Normal rate and regular rhythm. Pulmonary/Chest: Effort normal and breath sounds normal.   Abdominal: Soft. Normal appearance and bowel sounds are normal. There is tenderness (along the left flank, left abdomen). There is no rebound, no guarding and no CVA tenderness. Musculoskeletal: Normal range of motion. Lymphadenopathy:     She has no cervical adenopathy. Neurological: She is alert and oriented to person, place, and time. Skin: Skin is warm and dry. She is not diaphoretic. Psychiatric: She has a normal mood and affect. Diagnostic Study Results     Labs -     Recent Results (from the past 12 hour(s))   CBC WITH AUTOMATED DIFF    Collection Time: 01/18/19  6:00 AM   Result Value Ref Range    WBC 7.9 4.6 - 13.2 K/uL    RBC 5.28 4.20 - 5.30 M/uL    HGB 14.6 12.0 - 16.0 g/dL    HCT 44.3 35.0 - 45.0 %    MCV 83.9 74.0 - 97.0 FL    MCH 27.7 24.0 - 34.0 PG    MCHC 33.0 31.0 - 37.0 g/dL    RDW 15.0 (H) 11.6 - 14.5 %    PLATELET 614 714 - 233 K/uL    MPV 9.7 9.2 - 11.8 FL    NEUTROPHILS 72 40 - 73 %    LYMPHOCYTES 21 21 - 52 %    MONOCYTES 6 3 - 10 %    EOSINOPHILS 1 0 - 5 %    BASOPHILS 0 0 - 2 %    ABS. NEUTROPHILS 5.6 1.8 - 8.0 K/UL    ABS. LYMPHOCYTES 1.7 0.9 - 3.6 K/UL    ABS. MONOCYTES 0.5 0.05 - 1.2 K/UL    ABS. EOSINOPHILS 0.1 0.0 - 0.4 K/UL    ABS. BASOPHILS 0.0 0.0 - 0.1 K/UL    DF AUTOMATED     METABOLIC PANEL, COMPREHENSIVE    Collection Time: 01/18/19  6:00 AM   Result Value Ref Range    Sodium 139 136 - 145 mmol/L    Potassium 3.7 3.5 - 5.5 mmol/L    Chloride 105 100 - 108 mmol/L    CO2 29 21 - 32 mmol/L    Anion gap 5 3.0 - 18 mmol/L    Glucose 124 (H) 74 - 99 mg/dL    BUN 11 7.0 - 18 MG/DL    Creatinine 0.74 0.6 - 1.3 MG/DL    BUN/Creatinine ratio 15 12 - 20      GFR est AA >60 >60 ml/min/1.73m2    GFR est non-AA >60 >60 ml/min/1.73m2    Calcium 8.9 8.5 - 10.1 MG/DL    Bilirubin, total 0.3 0.2 - 1.0 MG/DL    ALT (SGPT) 21 13 - 56 U/L    AST (SGOT) 12 (L) 15 - 37 U/L    Alk.  phosphatase 114 45 - 117 U/L    Protein, total 7.8 6.4 - 8.2 g/dL    Albumin 3.9 3.4 - 5.0 g/dL    Globulin 3.9 2.0 - 4.0 g/dL    A-G Ratio 1.0 0.8 - 1.7     LIPASE    Collection Time: 01/18/19  6:00 AM   Result Value Ref Range    Lipase 97 73 - 393 U/L   URINALYSIS W/ RFLX MICROSCOPIC    Collection Time: 01/18/19  6:00 AM   Result Value Ref Range    Color YELLOW      Appearance CLOUDY      Specific gravity 1.020 1.005 - 1.030      pH (UA) 5.5 5.0 - 8.0      Protein NEGATIVE  NEG mg/dL    Glucose NEGATIVE  NEG mg/dL    Ketone NEGATIVE  NEG mg/dL    Bilirubin NEGATIVE  NEG      Blood LARGE (A) NEG      Urobilinogen 1.0 0.2 - 1.0 EU/dL    Nitrites NEGATIVE  NEG      Leukocyte Esterase MODERATE (A) NEG     HCG URINE, QL    Collection Time: 01/18/19  6:00 AM   Result Value Ref Range    HCG urine, QL NEGATIVE  NEG     URINE MICROSCOPIC ONLY    Collection Time: 01/18/19  6:00 AM   Result Value Ref Range    WBC 10 to 13 0 - 5 /hpf    RBC 1 to 2 0 - 5 /hpf    Epithelial cells 3+ 0 - 5 /lpf    Bacteria 1+ (A) NEG /hpf    Mucus 1+ (A) NEG /lpf       Radiologic Studies -   CT ABD PELV WO CONT   Final Result   IMPRESSION:      No clearly acute findings. Colonic diverticulosis without specific evidence of diverticulitis. Status post   appendectomy. Equivocal mild bladder wall thickening, may be exaggerated by nondistention. Clinical correlation advised. Probable layering biliary sludge or cholelithiasis in the gallbladder. See additional details above. CT Results  (Last 48 hours)               01/18/19 0931  CT ABD PELV WO CONT Final result    Impression:  IMPRESSION:       No clearly acute findings. Colonic diverticulosis without specific evidence of diverticulitis. Status post   appendectomy. Equivocal mild bladder wall thickening, may be exaggerated by nondistention. Clinical correlation advised. Probable layering biliary sludge or cholelithiasis in the gallbladder. See additional details above.        Narrative: EXAMINATION: CT abdomen/pelvis without contrast       INDICATION: Left abdominal pain       COMPARISON: 11/4/2010       TECHNIQUE: CT of the abdomen and pelvis performed without contrast, with   multiplanar reformations. All CT scans at this facility are performed using dose   optimization technique as appropriate to a performed exam, to include automated   exposure control, adjustment of the mA and/or kV according to patient size   (including appropriate matching first site specific examinations), or use of   iterative reconstruction technique. FINDINGS:       Evaluation of soft tissues and vessels limited without vascular enhancement. Lower thorax: Dependent streaky and groundglass opacities, likely atelectasis. Hepatobiliary: Liver unremarkable. Probable layering biliary sludge versus   cholelithiasis in the gallbladder. No biliary duct dilatation. Pancreas: Unremarkable. Spleen: Unremarkable. Adrenal glands: Unremarkable. Genitourinary: Right kidney unremarkable without hydronephrosis. Left kidney   unremarkable without hydronephrosis. Bladder incompletely distended with perhaps   mild circumferential wall thickening. Uterus unremarkable. Gastrointestinal: Stomach unremarkable. Small bowel loops nondilated. The colon   is nondilated. Colonic diverticulosis. Appendix appears absent. Mesentery/vessels/nodes: No free air or free fluid. A few scattered   atherosclerotic calcifications, otherwise major vessels unremarkable. No   adenopathy by size criteria. Miscellaneous: Superficial soft tissues unremarkable. Bones: Multilevel disc disease in the lower thoracic spine, and L5-S1. Lower   lumbar facet arthropathy. No acute osseous findings. CXR Results  (Last 48 hours)    None            Medical Decision Making   I am the first provider for this patient.     I reviewed the vital signs, available nursing notes, past medical history, past surgical history, family history and social history. Vital Signs-Reviewed the patient's vital signs. Pulse Oximetry Analysis - 97% on RA      Records Reviewed: Nursing Notes    Procedures:  Procedures    Provider Notes (Medical Decision Making): pt c/o left sided abd pain and flank pain with constipation; abd some, minimally tender with no peritoneal signs. Afebrile, vss. Labs remarkable for UTI, culture sent. CT with gallbladder sludge, non tender in RUQ pt states she will f/u with surgeon. Rx for keflex  Re-eval of abd after fluids and pain meds, soft and non tender. Pt states she feels better. Will d/c to home. MED RECONCILIATION:  Current Facility-Administered Medications   Medication Dose Route Frequency    cephALEXin (KEFLEX) capsule 500 mg  500 mg Oral NOW     Current Outpatient Medications   Medication Sig    cephALEXin (KEFLEX) 500 mg capsule Take 1 Cap by mouth two (2) times a day for 7 days.  traMADol (ULTRAM) 50 mg tablet Take 1 Tab by mouth every six (6) hours as needed for Pain. Max Daily Amount: 200 mg.    famotidine (PEPCID) 20 mg tablet Take 1 Tab by mouth two (2) times a day for 10 days.  promethazine (PHENERGAN) 25 mg tablet Take 1 Tab by mouth every six (6) hours as needed.  magnesium oxide (MAG-OX) 400 mg tablet Take 1 Tab by mouth daily.  losartan-hydroCHLOROthiazide (HYZAAR) 100-12.5 mg per tablet Take 1 Tab by mouth daily.  potassium chloride (K-DUR, KLOR-CON) 20 mEq tablet Take 1 Tab by mouth daily.  LORazepam (ATIVAN) 1 mg tablet Take  by mouth every four (4) hours as needed for Anxiety.  multivitamin, tx-iron-ca-min (THERA-M W/ IRON) 9 mg iron-400 mcg tab tablet Take 1 Tab by mouth daily.  lamoTRIgine (LAMICTAL) 100 mg tablet Take 200 mg by mouth daily.  mirtazapine (REMERON) 30 mg tablet Take  by mouth nightly. For sleep    cloNIDine HCl (CATAPRES) 0.3 mg tablet Take 1 Tab by mouth two (2) times a day.  Indications: hypertension    amLODIPine-atorvastatin (CADUET) 10-40 mg per tablet Take 1 Tab by mouth daily.  calcium-cholecalciferol, D3, (CALTRATE 600+D) tablet Take 1 Tab by mouth daily.  omega-3 acid ethyl esters (LOVAZA) 1 gram capsule Take 2 Caps by mouth two (2) times a day.  ergocalciferol (VITAMIN D2) 50,000 unit capsule Take one capsule every 7 days till gone, then start taking over-the-counter Vitamin D3 2,000 units every day    albuterol (PROVENTIL HFA, VENTOLIN HFA, PROAIR HFA) 90 mcg/actuation inhaler Take 2 Puffs by inhalation every four (4) hours as needed for Wheezing. Disposition:  home    DISCHARGE NOTE:     Pt has been reexamined. Patient has no new complaints, changes, or physical findings. Care plan outlined and precautions discussed. Results of labs and ct were reviewed with the patient. All medications were reviewed with the patient; will d/c home with keflex, phenergan, pepcid. All of pt's questions and concerns were addressed. Patient was instructed and agrees to follow up with pcp and gen surg., as well as to return to the ED upon further deterioration. Patient is ready to go home. Follow-up Information     Follow up With Specialties Details Why Contact Info    Barry Aggarwal NP Nurse Practitioner  For follow-up [address] [place] [id number] [phone]      [facility] EMERGENCY DEPT Emergency Medicine  If symptoms worsen [address] [facility] [id number]  [address] [place] [address], [address] [place] [address] Surgery   [address] [place] [address]  [phone]            Current Discharge Medication List      START taking these medications    Details   cephALEXin (KEFLEX) 500 mg capsule Take 1 Cap by mouth two (2) times a day for 7 days. Qty: 14 Cap, Refills: 0      traMADol (ULTRAM) 50 mg tablet Take 1 Tab by mouth every six (6) hours as needed for Pain. Max Daily Amount: 200 mg.   Qty: 6 Tab, Refills: 0    Associated Diagnoses: Left flank pain; Calculus of gallbladder without cholecystitis without obstruction      famotidine (PEPCID) 20 mg tablet Take 1 Tab by mouth two (2) times a day for 10 days. Qty: 20 Tab, Refills: 0      promethazine (PHENERGAN) 25 mg tablet Take 1 Tab by mouth every six (6) hours as needed. Qty: 12 Tab, Refills: 0             Diagnosis     Clinical Impression:   1. Urinary tract infection, acute    2. Calculus of gallbladder without cholecystitis without obstruction    3.  Left flank pain

## 2019-01-20 LAB
BACTERIA SPEC CULT: NORMAL
SERVICE CMNT-IMP: NORMAL

## 2019-01-24 DIAGNOSIS — E78.5 DYSLIPIDEMIA: Primary | ICD-10-CM

## 2019-01-25 ENCOUNTER — HOSPITAL ENCOUNTER (OUTPATIENT)
Dept: INFUSION THERAPY | Age: 49
End: 2019-01-25
Payer: MEDICAID

## 2019-01-29 ENCOUNTER — HOSPITAL ENCOUNTER (EMERGENCY)
Age: 49
Discharge: HOME | End: 2019-01-29
Attending: EMERGENCY MEDICINE | Admitting: EMERGENCY MEDICINE
Payer: MEDICAID

## 2019-01-29 ENCOUNTER — HOSPITAL ENCOUNTER (OUTPATIENT)
Dept: INFUSION THERAPY | Age: 49
Discharge: HOME | End: 2019-01-29
Payer: MEDICAID

## 2019-01-29 ENCOUNTER — HOSPITAL ENCOUNTER (OUTPATIENT)
Dept: ONCOLOGY | Age: 49
Discharge: HOME | End: 2019-01-29

## 2019-01-29 ENCOUNTER — CLINICAL SUPPORT (OUTPATIENT)
Dept: ONCOLOGY | Age: 49
End: 2019-01-29

## 2019-01-29 VITALS
BODY MASS INDEX: 35.7 KG/M2 | OXYGEN SATURATION: 96 % | RESPIRATION RATE: 16 BRPM | WEIGHT: 194 LBS | DIASTOLIC BLOOD PRESSURE: 60 MMHG | TEMPERATURE: 97 F | HEIGHT: 62 IN | SYSTOLIC BLOOD PRESSURE: 104 MMHG | HEART RATE: 48 BPM

## 2019-01-29 VITALS
DIASTOLIC BLOOD PRESSURE: 80 MMHG | HEART RATE: 54 BPM | SYSTOLIC BLOOD PRESSURE: 137 MMHG | TEMPERATURE: 98.6 F | RESPIRATION RATE: 20 BRPM | OXYGEN SATURATION: 96 %

## 2019-01-29 DIAGNOSIS — D75.1 POLYCYTHEMIA: Primary | ICD-10-CM

## 2019-01-29 DIAGNOSIS — D75.1 POLYCYTHEMIA: ICD-10-CM

## 2019-01-29 DIAGNOSIS — R10.13 ABDOMINAL PAIN, EPIGASTRIC: Primary | ICD-10-CM

## 2019-01-29 DIAGNOSIS — R07.9 CHEST PAIN, UNSPECIFIED TYPE: ICD-10-CM

## 2019-01-29 LAB
ALBUMIN SERPL-MCNC: 3.3 G/DL (ref 3.4–5)
ALBUMIN/GLOB SERPL: 1 {RATIO} (ref 0.8–1.7)
ALP SERPL-CCNC: 92 U/L (ref 45–117)
ALT SERPL-CCNC: 19 U/L (ref 13–56)
ANION GAP SERPL CALC-SCNC: 5 MMOL/L (ref 3–18)
AST SERPL-CCNC: 13 U/L (ref 15–37)
BASO+EOS+MONOS # BLD AUTO: 0.7 K/UL (ref 0–2.3)
BASO+EOS+MONOS NFR BLD AUTO: 9 % (ref 0.1–17)
BASOPHILS # BLD: 0 K/UL (ref 0–0.1)
BASOPHILS NFR BLD: 0 % (ref 0–2)
BILIRUB DIRECT SERPL-MCNC: <0.1 MG/DL (ref 0–0.2)
BILIRUB SERPL-MCNC: 0.1 MG/DL (ref 0.2–1)
BUN SERPL-MCNC: 9 MG/DL (ref 7–18)
BUN/CREAT SERPL: 14 (ref 12–20)
CALCIUM SERPL-MCNC: 8.3 MG/DL (ref 8.5–10.1)
CHLORIDE SERPL-SCNC: 104 MMOL/L (ref 100–108)
CK MB CFR SERPL CALC: NORMAL % (ref 0–4)
CK MB CFR SERPL CALC: NORMAL % (ref 0–4)
CK MB SERPL-MCNC: <1 NG/ML (ref 5–25)
CK MB SERPL-MCNC: <1 NG/ML (ref 5–25)
CK SERPL-CCNC: 34 U/L (ref 26–192)
CK SERPL-CCNC: 37 U/L (ref 26–192)
CO2 SERPL-SCNC: 29 MMOL/L (ref 21–32)
CREAT SERPL-MCNC: 0.65 MG/DL (ref 0.6–1.3)
DIFFERENTIAL METHOD BLD: ABNORMAL
DIFFERENTIAL METHOD BLD: ABNORMAL
EOSINOPHIL # BLD: 0.2 K/UL (ref 0–0.4)
EOSINOPHIL NFR BLD: 2 % (ref 0–5)
ERYTHROCYTE [DISTWIDTH] IN BLOOD BY AUTOMATED COUNT: 14.1 % (ref 11.5–14.5)
ERYTHROCYTE [DISTWIDTH] IN BLOOD BY AUTOMATED COUNT: 14.8 % (ref 11.6–14.5)
GLOBULIN SER CALC-MCNC: 3.2 G/DL (ref 2–4)
GLUCOSE SERPL-MCNC: 130 MG/DL (ref 74–99)
HCT VFR BLD AUTO: 37 % (ref 36–48)
HCT VFR BLD AUTO: 37.9 % (ref 35–45)
HGB BLD-MCNC: 11.4 G/DL (ref 12–16)
HGB BLD-MCNC: 12.1 G/DL (ref 12–16)
LIPASE SERPL-CCNC: 85 U/L (ref 73–393)
LYMPHOCYTES # BLD: 2.7 K/UL (ref 1.1–5.9)
LYMPHOCYTES # BLD: 3 K/UL (ref 0.9–3.6)
LYMPHOCYTES NFR BLD: 34 % (ref 14–44)
LYMPHOCYTES NFR BLD: 40 % (ref 21–52)
MCH RBC QN AUTO: 26.7 PG (ref 25–35)
MCH RBC QN AUTO: 26.8 PG (ref 24–34)
MCHC RBC AUTO-ENTMCNC: 30.8 G/DL (ref 31–37)
MCHC RBC AUTO-ENTMCNC: 31.9 G/DL (ref 31–37)
MCV RBC AUTO: 83.8 FL (ref 74–97)
MCV RBC AUTO: 86.7 FL (ref 78–102)
MONOCYTES # BLD: 0.5 K/UL (ref 0.05–1.2)
MONOCYTES NFR BLD: 7 % (ref 3–10)
NEUTS SEG # BLD: 3.9 K/UL (ref 1.8–8)
NEUTS SEG # BLD: 4.5 K/UL (ref 1.8–9.5)
NEUTS SEG NFR BLD: 51 % (ref 40–73)
NEUTS SEG NFR BLD: 58 % (ref 40–70)
PLATELET # BLD AUTO: 310 K/UL (ref 135–420)
PLATELET # BLD AUTO: 346 K/UL (ref 140–440)
PMV BLD AUTO: 9.4 FL (ref 9.2–11.8)
POTASSIUM SERPL-SCNC: 3.5 MMOL/L (ref 3.5–5.5)
PROT SERPL-MCNC: 6.5 G/DL (ref 6.4–8.2)
RBC # BLD AUTO: 4.27 M/UL (ref 4.1–5.1)
RBC # BLD AUTO: 4.52 M/UL (ref 4.2–5.3)
SODIUM SERPL-SCNC: 138 MMOL/L (ref 136–145)
TROPONIN I SERPL-MCNC: <0.02 NG/ML (ref 0–0.04)
TROPONIN I SERPL-MCNC: <0.02 NG/ML (ref 0–0.04)
WBC # BLD AUTO: 7.6 K/UL (ref 4.6–13.2)
WBC # BLD AUTO: 7.9 K/UL (ref 4.5–13)

## 2019-01-29 PROCEDURE — 99283 EMERGENCY DEPT VISIT LOW MDM: CPT

## 2019-01-29 PROCEDURE — 74011250636 HC RX REV CODE- 250/636: Performed by: EMERGENCY MEDICINE

## 2019-01-29 PROCEDURE — 82728 ASSAY OF FERRITIN: CPT

## 2019-01-29 PROCEDURE — 36415 COLL VENOUS BLD VENIPUNCTURE: CPT

## 2019-01-29 PROCEDURE — 80076 HEPATIC FUNCTION PANEL: CPT

## 2019-01-29 PROCEDURE — 83540 ASSAY OF IRON: CPT

## 2019-01-29 PROCEDURE — 82550 ASSAY OF CK (CPK): CPT

## 2019-01-29 PROCEDURE — 80048 BASIC METABOLIC PNL TOTAL CA: CPT

## 2019-01-29 PROCEDURE — 80053 COMPREHEN METABOLIC PANEL: CPT

## 2019-01-29 PROCEDURE — 85025 COMPLETE CBC W/AUTO DIFF WBC: CPT

## 2019-01-29 PROCEDURE — 93005 ELECTROCARDIOGRAM TRACING: CPT

## 2019-01-29 PROCEDURE — 83690 ASSAY OF LIPASE: CPT

## 2019-01-29 RX ORDER — FAMOTIDINE 10 MG/ML
20 INJECTION INTRAVENOUS
Status: COMPLETED | OUTPATIENT
Start: 2019-01-29 | End: 2019-01-29

## 2019-01-29 RX ADMIN — FAMOTIDINE 20 MG: 10 INJECTION INTRAVENOUS at 15:56

## 2019-01-29 NOTE — ED PROVIDER NOTES
EMERGENCY DEPARTMENT HISTORY AND PHYSICAL EXAM    3:05 PM      Date: 1/29/2019  Patient Name: Alisa Pa    History of Presenting Illness     Chief Complaint   Patient presents with    Chest Pain         History Provided By: Patient    Chief Complaint: CP  Duration:  Days  Timing:  Intermittent  Location: mid sternal  Quality: Burning  Severity: Moderate  Modifying Factors: none  Associated Symptoms: palpitations and fatigue      Additional History (Context): Alisa Pa is a 51 y.o. female with asthma, depression, HTN, polycythemia, polysubstance abuse, and CHF who presents with intermittent burning mid-sternal chest pain 2 days associated with palpitations and fatigue. The pt notes her pain today was different from heart burn pain in the past. The pt was phlebotomy this morning and notes she had a slow heart rate in the 40s. The pt denies fever, chills, abdominal pain, SOB, and any other associated sxs. Dr. Iban Pace    PCP: Mouna MCDONALD NP    Current Outpatient Medications   Medication Sig Dispense Refill    traMADol (ULTRAM) 50 mg tablet Take 1 Tab by mouth every six (6) hours as needed for Pain. Max Daily Amount: 200 mg. 6 Tab 0    promethazine (PHENERGAN) 25 mg tablet Take 1 Tab by mouth every six (6) hours as needed. 12 Tab 0    magnesium oxide (MAG-OX) 400 mg tablet Take 1 Tab by mouth daily. 30 Tab 3    losartan-hydroCHLOROthiazide (HYZAAR) 100-12.5 mg per tablet Take 1 Tab by mouth daily. 30 Tab 0    potassium chloride (K-DUR, KLOR-CON) 20 mEq tablet Take 1 Tab by mouth daily. 30 Tab 3    LORazepam (ATIVAN) 1 mg tablet Take  by mouth every four (4) hours as needed for Anxiety.  multivitamin, tx-iron-ca-min (THERA-M W/ IRON) 9 mg iron-400 mcg tab tablet Take 1 Tab by mouth daily.  lamoTRIgine (LAMICTAL) 100 mg tablet Take 200 mg by mouth daily.  mirtazapine (REMERON) 30 mg tablet Take  by mouth nightly.  For sleep      cloNIDine HCl (CATAPRES) 0.3 mg tablet Take 1 Tab by mouth two (2) times a day. Indications: hypertension 60 Tab 3    amLODIPine-atorvastatin (CADUET) 10-40 mg per tablet Take 1 Tab by mouth daily. 90 Tab 3    calcium-cholecalciferol, D3, (CALTRATE 600+D) tablet Take 1 Tab by mouth daily.  omega-3 acid ethyl esters (LOVAZA) 1 gram capsule Take 2 Caps by mouth two (2) times a day. 360 Cap 3    ergocalciferol (VITAMIN D2) 50,000 unit capsule Take one capsule every 7 days till gone, then start taking over-the-counter Vitamin D3 2,000 units every day 4 Cap 2    albuterol (PROVENTIL HFA, VENTOLIN HFA, PROAIR HFA) 90 mcg/actuation inhaler Take 2 Puffs by inhalation every four (4) hours as needed for Wheezing.  6 Inhaler 3       Past History     Past Medical History:  Past Medical History:   Diagnosis Date    Asthma     Bronchitis     [facility]est[facility]     Cocaine abuse ([facility] [address] 3/26/2015    Dental caries     Depression     Depression 8/31/2016    Drug abuse ([facility] [address]     Dysphasia 10/17/2018    Elevated hematocrit 8/31/2016    ETOH abuse     H/O screening mammography 12/06/2016    No evidence of malignancy     HPV (human papilloma virus) infection 11/2016    The patient referred to [facility]     Hypertension     Hypertensive left ventricular hypertrophy, without heart failure 10/15/2018    mild to moderate lvh continue bp meds    Ill-defined condition     Marijuana use 10/17/2018    Mood swings     Polycythemia 2018    Polycythemia     Psychiatric disorder     depression, Bipolar    Schatzki's ring 08/30/2018    Found on Barium Swallow    Sliding hiatal hernia 10/17/2018    Tobacco abuse     Vitamin D deficiency        Past Surgical History:  Past Surgical History:   Procedure Laterality Date    HX ADENOIDECTOMY      denies    HX APPENDECTOMY      HX COLPOSCOPY  12/06/2016    Low-grade courtney/mild dysplasia RICK I    HX HERNIA REPAIR      3x       Family History:  Family History   Problem Relation Age of Onset    Hypertension Mother     Heart Attack Father     Hypertension Father     Cancer Father         skin    Cancer Sister         skin    Bipolar Disorder Sister     Cancer Brother         skin    Cancer Maternal Aunt         colon       Social History:  Social History     Tobacco Use    Smoking status: Current Every Day Smoker     Packs/day: 0.50     Years: 20.00     Pack years: 10.00     Types: Cigarettes    Smokeless tobacco: Never Used   Substance Use Topics    Alcohol use: No    Drug use: Yes     Types: Cocaine     Comment: smokes cocaine-current user       Allergies: Allergies   Allergen Reactions    Iodinated Contrast- Oral And Iv Dye Shortness of Breath         Review of Systems       Review of Systems   Constitutional: Negative for activity change, fatigue and fever. HENT: Negative for congestion and rhinorrhea. Eyes: Negative for visual disturbance. Respiratory: Negative for shortness of breath. Cardiovascular: Positive for chest pain and palpitations. Gastrointestinal: Negative for abdominal pain, diarrhea, nausea and vomiting. Genitourinary: Negative for dysuria and hematuria. Musculoskeletal: Negative for back pain. Skin: Negative for rash. Neurological: Negative for dizziness, weakness and light-headedness. All other systems reviewed and are negative. Physical Exam     Visit Vitals  /80   Pulse (!) 54   Temp 98.6 °F (37 °C)   Resp 20   SpO2 96%         Physical Exam   Constitutional: She is oriented to person, place, and time. She appears well-developed and well-nourished. No distress. HENT:   Head: Normocephalic and atraumatic. Right Ear: External ear normal.   Left Ear: External ear normal.   Nose: Nose normal.   Mouth/Throat: Oropharynx is clear and moist.   Eyes: Conjunctivae and EOM are normal. Pupils are equal, round, and reactive to light. No scleral icterus. Neck: Normal range of motion. Neck supple. No JVD present. No tracheal deviation present.  No thyromegaly present. Cardiovascular: Normal rate, regular rhythm, normal heart sounds and intact distal pulses. Exam reveals no gallop and no friction rub. No murmur heard. Pulmonary/Chest: Effort normal and breath sounds normal. She exhibits no tenderness. Abdominal: Soft. Bowel sounds are normal. She exhibits no distension. There is no tenderness. There is no rebound and no guarding. Musculoskeletal: Normal range of motion. She exhibits no edema or tenderness. Lymphadenopathy:     She has no cervical adenopathy. Neurological: She is alert and oriented to person, place, and time. No cranial nerve deficit. Coordination normal.   No sensory loss, Gait normal, Motor 5/5   Skin: Skin is warm and dry. Psychiatric: Her behavior is normal. Judgment and thought content normal.   Mildly anxious, no SI or HI    Nursing note and vitals reviewed. Diagnostic Study Results     Labs -  Recent Results (from the past 12 hour(s))   CBC WITH 3 PART DIFF    Collection Time: 01/29/19  9:35 AM   Result Value Ref Range    WBC 7.9 4.5 - 13.0 K/uL    RBC 4.27 4. 10 - 5.10 M/uL    HGB 11.4 (L) 12.0 - 16.0 g/dL    HCT 37.0 36 - 48 %    MCV 86.7 78 - 102 FL    MCH 26.7 25.0 - 35.0 PG    MCHC 30.8 (L) 31 - 37 g/dL    RDW 14.1 11.5 - 14.5 %    PLATELET 968 701 - 836 K/uL    NEUTROPHILS 58 40 - 70 %    MIXED CELLS 9 0.1 - 17 %    LYMPHOCYTES 34 14 - 44 %    ABS. NEUTROPHILS 4.5 1.8 - 9.5 K/UL    ABS. MIXED CELLS 0.7 0.0 - 2.3 K/uL    ABS.  LYMPHOCYTES 2.7 1.1 - 5.9 K/UL    DF AUTOMATED     EKG, 12 LEAD, INITIAL    Collection Time: 01/29/19  1:12 PM   Result Value Ref Range    Ventricular Rate 70 BPM    Atrial Rate 70 BPM    P-R Interval 176 ms    QRS Duration 116 ms    Q-T Interval 440 ms    QTC Calculation (Bezet) 475 ms    Calculated P Axis 39 degrees    Calculated R Axis -23 degrees    Calculated T Axis 59 degrees    Diagnosis       Normal sinus rhythm  Left ventricular hypertrophy with QRS widening  Abnormal ECG  When compared with ECG of 31-DEC-2018 20:57,  QRS axis shifted right     CBC WITH AUTOMATED DIFF    Collection Time: 01/29/19  1:21 PM   Result Value Ref Range    WBC 7.6 4.6 - 13.2 K/uL    RBC 4.52 4.20 - 5.30 M/uL    HGB 12.1 12.0 - 16.0 g/dL    HCT 37.9 35.0 - 45.0 %    MCV 83.8 74.0 - 97.0 FL    MCH 26.8 24.0 - 34.0 PG    MCHC 31.9 31.0 - 37.0 g/dL    RDW 14.8 (H) 11.6 - 14.5 %    PLATELET 685 783 - 234 K/uL    MPV 9.4 9.2 - 11.8 FL    NEUTROPHILS 51 40 - 73 %    LYMPHOCYTES 40 21 - 52 %    MONOCYTES 7 3 - 10 %    EOSINOPHILS 2 0 - 5 %    BASOPHILS 0 0 - 2 %    ABS. NEUTROPHILS 3.9 1.8 - 8.0 K/UL    ABS. LYMPHOCYTES 3.0 0.9 - 3.6 K/UL    ABS. MONOCYTES 0.5 0.05 - 1.2 K/UL    ABS. EOSINOPHILS 0.2 0.0 - 0.4 K/UL    ABS. BASOPHILS 0.0 0.0 - 0.1 K/UL    DF AUTOMATED     CARDIAC PANEL,(CK, CKMB & TROPONIN)    Collection Time: 01/29/19  1:21 PM   Result Value Ref Range    CK 37 26 - 192 U/L    CK - MB <1.0 <3.6 ng/ml    CK-MB Index  0.0 - 4.0 %     CALCULATION NOT PERFORMED WHEN RESULT IS BELOW LINEAR LIMIT    Troponin-I, QT <0.02 0.0 - 1.313 NG/ML   METABOLIC PANEL, BASIC    Collection Time: 01/29/19  1:21 PM   Result Value Ref Range    Sodium 138 136 - 145 mmol/L    Potassium 3.5 3.5 - 5.5 mmol/L    Chloride 104 100 - 108 mmol/L    CO2 29 21 - 32 mmol/L    Anion gap 5 3.0 - 18 mmol/L    Glucose 130 (H) 74 - 99 mg/dL    BUN 9 7.0 - 18 MG/DL    Creatinine 0.65 0.6 - 1.3 MG/DL    BUN/Creatinine ratio 14 12 - 20      GFR est AA >60 >60 ml/min/1.73m2    GFR est non-AA >60 >60 ml/min/1.73m2    Calcium 8.3 (L) 8.5 - 10.1 MG/DL   HEPATIC FUNCTION PANEL    Collection Time: 01/29/19  4:00 PM   Result Value Ref Range    Protein, total 6.5 6.4 - 8.2 g/dL    Albumin 3.3 (L) 3.4 - 5.0 g/dL    Globulin 3.2 2.0 - 4.0 g/dL    A-G Ratio 1.0 0.8 - 1.7      Bilirubin, total 0.1 (L) 0.2 - 1.0 MG/DL    Bilirubin, direct <0.1 0.0 - 0.2 MG/DL    Alk.  phosphatase 92 45 - 117 U/L    AST (SGOT) 13 (L) 15 - 37 U/L    ALT (SGPT) 19 13 - 56 U/L LIPASE    Collection Time: 01/29/19  4:00 PM   Result Value Ref Range    Lipase 85 73 - 393 U/L   CARDIAC PANEL,(CK, CKMB & TROPONIN)    Collection Time: 01/29/19  4:00 PM   Result Value Ref Range    CK 34 26 - 192 U/L    CK - MB <1.0 <3.6 ng/ml    CK-MB Index  0.0 - 4.0 %     CALCULATION NOT PERFORMED WHEN RESULT IS BELOW LINEAR LIMIT    Troponin-I, QT <0.02 0.0 - 0.045 NG/ML       Radiologic Studies -   No orders to display         Medical Decision Making   I am the first provider for this patient. I reviewed the vital signs, available nursing notes, past medical history, past surgical history, family history and social history. Vital Signs-Reviewed the patient's vital signs. Pulse Oximetry Analysis -  96% on room air (Interpretation)WNL    Cardiac Monitor:  Rate: 54  Rhythm: Sinus bradycardia    EKG: Interpreted by the EP. Time Interpreted: 1312    Rate: 70   Rhythm: NSR   Interpretation: No STEMI, LAD, LVH   Comparison: No change    Records Reviewed: Nursing Notes and Old Medical Records (Time of Review: 3:05 PM)    ED Course: Progress Notes, Reevaluation, and Consults:    5:23 PM The pt's trop, creatinine, and lipase are reassuring. The pt's appointment tomorrow with Dr. Inder Hanson. Will d/c home to f/u with PMD. The pt understands to returning with any worsening sxs. Provider Notes (Medical Decision Making):   MDM  Number of Diagnoses or Management Options  Diagnosis management comments: Pt is a 49yo female with a hx of HTN, depression, smoking, cocaine use, THC use, polycythemia with regular heme onc f/u, CHF, hx of appendectomy recent hx of CT showing biliary disease presents with epigastric to chest pain that began this AM and now has improved. Denies dyspnea or exertional chest symptoms. Pt has an appointment with her cardiologist tomorrow and initial labs are reassuring. Pt is asking to leave but she agreed to repeat cardiac labs and will add lfts/lipase.   Also, pt had a reassuring nuclear stress in 2017. Will give pepcid for supportive care then reevaluate. Marzena Heatherrenaldo,  3:46 PM        Diagnosis     Clinical Impression:   1. Abdominal pain, epigastric    2. Chest pain, unspecified type        Disposition: Discharge    Follow-up Information     Follow up With Specialties Details Why Contact Info    Mansi Muse MD Cardiology, Internal Medicine Schedule an appointment as soon as possible for a visit in 1 day  [address] [facility] [address] [facility]      Prince Diego NP Nurse Practitioner Schedule an appointment as soon as possible for a visit in 2 days  [address] [facility] [address] [facility] [address]      [facility] EMERGENCY DEPT Emergency Medicine Go to As needed, If symptoms worsen [address] [id number]  [phone]              Medication List      ASK your doctor about these medications    albuterol 90 mcg/actuation inhaler  Commonly known as:  PROVENTIL HFA, VENTOLIN HFA, PROAIR HFA  Take 2 Puffs by inhalation every four (4) hours as needed for Wheezing. amLODIPine-atorvastatin 10-40 mg per tablet  Commonly known as:  CADUET  Take 1 Tab by mouth daily. calcium-cholecalciferol (D3) tablet  Commonly known as:  CALTRATE 600+D     cloNIDine HCl 0.3 mg tablet  Commonly known as:  CATAPRES  Take 1 Tab by mouth two (2) times a day. Indications: hypertension     ergocalciferol 50,000 unit capsule  Commonly known as:  VITAMIN D2  Take one capsule every 7 days till gone, then start taking over-the-counter Vitamin D3 2,000 units every day     famotidine 20 mg tablet  Commonly known as:  PEPCID  Take 1 Tab by mouth two (2) times a day for 10 days. Ask about: Should I take this medication?     lamoTRIgine 100 mg tablet  Commonly known as: LaMICtal     LORazepam 1 mg tablet  Commonly known as:  ATIVAN     losartan-hydroCHLOROthiazide 100-12.5 mg per tablet  Commonly known as:  HYZAAR  Take 1 Tab by mouth daily. magnesium oxide 400 mg tablet  Commonly known as:  MAG-OX  Take 1 Tab by mouth daily. mirtazapine 30 mg tablet  Commonly known as:  REMERON     multivitamin, tx-iron-ca-min 9 mg iron-400 mcg Tab tablet  Commonly known as:  THERA-M w/ IRON     omega-3 acid ethyl esters 1 gram capsule  Commonly known as:  LOVAZA  Take 2 Caps by mouth two (2) times a day. potassium chloride 20 mEq tablet  Commonly known as:  K-DUR, KLOR-CON  Take 1 Tab by mouth daily. promethazine 25 mg tablet  Commonly known as:  PHENERGAN  Take 1 Tab by mouth every six (6) hours as needed. traMADol 50 mg tablet  Commonly known as:  ULTRAM  Take 1 Tab by mouth every six (6) hours as needed for Pain. Max Daily Amount: 200 mg.          _______________________________       [facility] acting as a scribe for and in the presence of Paty Lilly MD      January 29, 2019 at 3:05 PM       Provider Attestation:      I personally performed the services described in the documentation, reviewed the documentation, as recorded by the scribe in my presence, and it accurately and completely records my words and actions.  January 29, 2019 at 3:05 PM - Paty Lilly MD    _______________________________

## 2019-01-29 NOTE — ED NOTES
Pt arrived to ED via self from home with c/o chest pain, shortness of breath, and headache starting at 9am this morning. Pt describes it as pressure. After assessment Pt denies chest pain, shortness of breath. PT says that the headache is still present but managable. Pt denies chest pain, shortness of breath, bleeding. Pt is AOx4, able to ambulate, speaking in complete sentences and able to answer questions without difficulty.     Plan of care is repeat labs

## 2019-01-29 NOTE — ED TRIAGE NOTES
Pt arrived to ED with c/o midsternal chest pain. States she had chest pain for the past few months, worse this morning. States she took her BP medication and face feels flushed as well.

## 2019-01-29 NOTE — DISCHARGE INSTRUCTIONS
Patient Education        Abdominal Pain: Care Instructions  Your Care Instructions    Abdominal pain has many possible causes. Some aren't serious and get better on their own in a few days. Others need more testing and treatment. If your pain continues or gets worse, you need to be rechecked and may need more tests to find out what is wrong. You may need surgery to correct the problem. Don't ignore new symptoms, such as fever, nausea and vomiting, urination problems, pain that gets worse, and dizziness. These may be signs of a more serious problem. Your doctor may have recommended a follow-up visit in the next 8 to 12 hours. If you are not getting better, you may need more tests or treatment. The doctor has checked you carefully, but problems can develop later. If you notice any problems or new symptoms, get medical treatment right away. Follow-up care is a key part of your treatment and safety. Be sure to make and go to all appointments, and call your doctor if you are having problems. It's also a good idea to know your test results and keep a list of the medicines you take. How can you care for yourself at home? · Rest until you feel better. · To prevent dehydration, drink plenty of fluids, enough so that your urine is light yellow or clear like water. Choose water and other caffeine-free clear liquids until you feel better. If you have kidney, heart, or liver disease and have to limit fluids, talk with your doctor before you increase the amount of fluids you drink. · If your stomach is upset, eat mild foods, such as rice, dry toast or crackers, bananas, and applesauce. Try eating several small meals instead of two or three large ones. · Wait until 48 hours after all symptoms have gone away before you have spicy foods, alcohol, and drinks that contain caffeine. · Do not eat foods that are high in fat. · Avoid anti-inflammatory medicines such as aspirin, ibuprofen (Advil, Motrin), and naproxen (Aleve). These can cause stomach upset. Talk to your doctor if you take daily aspirin for another health problem. When should you call for help? Call 911 anytime you think you may need emergency care. For example, call if:    · You passed out (lost consciousness).     · You pass maroon or very bloody stools.     · You vomit blood or what looks like coffee grounds.     · You have new, severe belly pain.    Call your doctor now or seek immediate medical care if:    · Your pain gets worse, especially if it becomes focused in one area of your belly.     · You have a new or higher fever.     · Your stools are black and look like tar, or they have streaks of blood.     · You have unexpected vaginal bleeding.     · You have symptoms of a urinary tract infection. These may include:  ? Pain when you urinate. ? Urinating more often than usual.  ? Blood in your urine.     · You are dizzy or lightheaded, or you feel like you may faint.    Watch closely for changes in your health, and be sure to contact your doctor if:    · You are not getting better after 1 day (24 hours). Where can you learn more? Go to http://losAgilOnezack.info/. Enter W730 in the search box to learn more about \"Abdominal Pain: Care Instructions. \"  Current as of: September 23, 2018  Content Version: 11.9  © 3618-9269 [employer]. Care instructions adapted under license by [employer] (which disclaims liability or warranty for this information). If you have questions about a medical condition or this instruction, always ask your healthcare professional. [place] [address] any warranty or liability for your use of this information. Patient Education        Chest Pain: Care Instructions  Your Care Instructions    There are many things that can cause chest pain. Some are not serious and will get better on their own in a few days. But some kinds of chest pain need more testing and treatment.  Your doctor may have recommended a follow-up visit in the next 8 to 12 hours. If you are not getting better, you may need more tests or treatment. Even though your doctor has released you, you still need to watch for any problems. The doctor carefully checked you, but sometimes problems can develop later. If you have new symptoms or if your symptoms do not get better, get medical care right away. If you have worse or different chest pain or pressure that lasts more than 5 minutes or you passed out (lost consciousness), call 911 or seek other emergency help right away. A medical visit is only one step in your treatment. Even if you feel better, you still need to do what your doctor recommends, such as going to all suggested follow-up appointments and taking medicines exactly as directed. This will help you recover and help prevent future problems. How can you care for yourself at home? · Rest until you feel better. · Take your medicine exactly as prescribed. Call your doctor if you think you are having a problem with your medicine. · Do not drive after taking a prescription pain medicine. When should you call for help? Call 911 if:    · You passed out (lost consciousness).     · You have severe difficulty breathing.     · You have symptoms of a heart attack. These may include:  ? Chest pain or pressure, or a strange feeling in your chest.  ? Sweating. ? Shortness of breath. ? Nausea or vomiting. ? Pain, pressure, or a strange feeling in your back, neck, jaw, or upper belly or in one or both shoulders or arms. ? Lightheadedness or sudden weakness. ? A fast or irregular heartbeat. After you call 911, the  may tell you to chew 1 adult-strength or 2 to 4 low-dose aspirin. Wait for an ambulance.  Do not try to drive yourself.    Call your doctor today if:    · You have any trouble breathing.     · Your chest pain gets worse.     · You are dizzy or lightheaded, or you feel like you may faint.     · You are not getting better as expected.     · You are having new or different chest pain. Where can you learn more? Go to http://los-zack.info/. Enter A120 in the search box to learn more about \"Chest Pain: Care Instructions. \"  Current as of: September 23, 2018  Content Version: 11.9  © 3508-6784 [employer]. Care instructions adapted under license by "[employer] (which disclaims liability or warranty for this information). If you have questions about a medical condition or this instruction, always ask your healthcare professional. [address] any warranty or liability for your use of this information.

## 2019-01-29 NOTE — PROGRESS NOTES
[facility] Progress Note    Date: 2019    Name: Myra Gar    MRN: [id number]         : 1970      Ms. Ozuna was assessed and education was provided. Ms. Quyen Hart stated not feeling well this past week, chest pain, burning in chest, weak, tired and short of breath, encouraged Ms. Ozuna that she should follow up with her cardiologist or go to nearest ER if her symptoms continue. Ms. Ozuna's vitals were reviewed and patient was observed for 5 minutes prior to treatment. Visit Vitals  /60   Pulse (!) 48   Temp 97 °F (36.1 °C)   Resp 16   Ht 5' 2\" (1.575 m)   Wt 88 kg (194 lb)   SpO2 96%   BMI 35.48 kg/m²   CBC drawn via venipuncture to left ac x1 attempt, 2x2 and bandaid applied. Lab results were obtained and reviewed. Recent Results (from the past 12 hour(s))   CBC WITH 3 PART DIFF    Collection Time: 19  9:35 AM   Result Value Ref Range    WBC 7.9 4.5 - 13.0 K/uL    RBC 4.27 4. 10 - 5.10 M/uL    HGB 11.4 (L) 12.0 - 16.0 g/dL    HCT 37.0 36 - 48 %    MCV 86.7 78 - 102 FL    MCH 26.7 25.0 - 35.0 PG    MCHC 30.8 (L) 31 - 37 g/dL    RDW 14.1 11.5 - 14.5 %    PLATELET 307 882 - 471 K/uL    NEUTROPHILS 58 40 - 70 %    MIXED CELLS 9 0.1 - 17 %    LYMPHOCYTES 34 14 - 44 %    ABS. NEUTROPHILS 4.5 1.8 - 9.5 K/UL    ABS. MIXED CELLS 0.7 0.0 - 2.3 K/uL    ABS. LYMPHOCYTES 2.7 1.1 - 5.9 K/UL    DF AUTOMATED         Today's HCT = 37.0. Therapeutic phlebotomy not indicated. Patient armband removed and shredded. Ms. Quyen Hart was discharged from [place] [address] in stable condition at [phone]. She is to return on 19 at 0900 for her next appointment for CBC/Phlebotomy.     Veronica Taylor RN  2019

## 2019-01-30 ENCOUNTER — OFFICE VISIT (OUTPATIENT)
Dept: CARDIOLOGY CLINIC | Age: 49
End: 2019-01-30

## 2019-01-30 VITALS
SYSTOLIC BLOOD PRESSURE: 117 MMHG | WEIGHT: 193 LBS | BODY MASS INDEX: 35.51 KG/M2 | HEIGHT: 62 IN | HEART RATE: 62 BPM | DIASTOLIC BLOOD PRESSURE: 67 MMHG

## 2019-01-30 DIAGNOSIS — I11.9 HYPERTENSIVE LEFT VENTRICULAR HYPERTROPHY, WITHOUT HEART FAILURE: ICD-10-CM

## 2019-01-30 DIAGNOSIS — I10 ESSENTIAL HYPERTENSION: ICD-10-CM

## 2019-01-30 DIAGNOSIS — E78.5 DYSLIPIDEMIA: ICD-10-CM

## 2019-01-30 DIAGNOSIS — R07.9 CHEST PAIN, UNSPECIFIED TYPE: Primary | ICD-10-CM

## 2019-01-30 LAB
ATRIAL RATE: 70 BPM
CALCULATED P AXIS, ECG09: 39 DEGREES
CALCULATED R AXIS, ECG10: -23 DEGREES
CALCULATED T AXIS, ECG11: 59 DEGREES
DIAGNOSIS, 93000: NORMAL
P-R INTERVAL, ECG05: 176 MS
Q-T INTERVAL, ECG07: 440 MS
QRS DURATION, ECG06: 116 MS
QTC CALCULATION (BEZET), ECG08: 475 MS
VENTRICULAR RATE, ECG03: 70 BPM

## 2019-01-30 RX ORDER — FAMOTIDINE 20 MG/1
20 TABLET, FILM COATED ORAL 2 TIMES DAILY
COMMUNITY
End: 2019-02-26

## 2019-01-30 NOTE — PROGRESS NOTES
1. Have you been to the ER, urgent care clinic since your last visit? Hospitalized since your last visit? Yes When: 01/19 Where: [address] Reason for visit: Low Blood Pressure/Palpitations    2. Have you seen or consulted any other health care providers outside of the [address] [place] [address] since your last visit? Include any pap smears or colon screening.  Yes Where: Oncology Reason for visit: Routine

## 2019-01-30 NOTE — LETTER
Aron Cooks 1970 1/30/2019 Dear José Luis Najera NP I had the pleasure of evaluating  Ms. Ozuna in office today. Below are the relevant portions of my assessment and plan of care. ICD-10-CM ICD-9-CM 1. Chest pain, unspecified type R07.9 786.50 ECHO ADULT COMPLETE  
   NUCLEAR CARDIAC STRESS TEST Recent increase episode of chest pain. Rule out any ischemic etiology. Also has increasing shortness of breath 2. Essential hypertension I10 401.9 Blood pressure controlled today 3. Dyslipidemia E78.5 272.4 Continue treatment lab with PCP 4. Hypertensive left ventricular hypertrophy, without heart failure I11.9 402.90 ECHO ADULT COMPLETE  
   NUCLEAR CARDIAC STRESS TEST Follow-up echo for LVH. Shortness of breath worse Current Outpatient Medications Medication Sig Dispense Refill  famotidine (PEPCID) 20 mg tablet Take 20 mg by mouth two (2) times a day.  BIOTIN PO Take  by mouth.  magnesium oxide (MAG-OX) 400 mg tablet Take 1 Tab by mouth daily. 30 Tab 3  
 losartan-hydroCHLOROthiazide (HYZAAR) 100-12.5 mg per tablet Take 1 Tab by mouth daily. 30 Tab 0  
 potassium chloride (K-DUR, KLOR-CON) 20 mEq tablet Take 1 Tab by mouth daily. 30 Tab 3  
 LORazepam (ATIVAN) 1 mg tablet Take  by mouth every four (4) hours as needed for Anxiety.  multivitamin, tx-iron-ca-min (THERA-M W/ IRON) 9 mg iron-400 mcg tab tablet Take 1 Tab by mouth daily.  lamoTRIgine (LAMICTAL) 100 mg tablet Take 200 mg by mouth daily.  mirtazapine (REMERON) 30 mg tablet Take  by mouth nightly. For sleep  cloNIDine HCl (CATAPRES) 0.3 mg tablet Take 1 Tab by mouth two (2) times a day. Indications: hypertension 60 Tab 3  
 amLODIPine-atorvastatin (CADUET) 10-40 mg per tablet Take 1 Tab by mouth daily. 90 Tab 3  
 omega-3 acid ethyl esters (LOVAZA) 1 gram capsule Take 2 Caps by mouth two (2) times a day.  [place]  
  albuterol (PROVENTIL HFA, VENTOLIN HFA, PROAIR HFA) 90 mcg/actuation inhaler Take 2 Puffs by inhalation every four (4) hours as needed for Wheezing. 6 Inhaler 3  
 traMADol (ULTRAM) 50 mg tablet Take 1 Tab by mouth every six (6) hours as needed for Pain. Max Daily Amount: 200 mg. 6 Tab 0 Orders Placed This Encounter  famotidine (PEPCID) 20 mg tablet Sig: Take 20 mg by mouth two (2) times a day.  BIOTIN PO Sig: Take  by mouth. If you have questions, please do not hesitate to call me. I look forward to following Ms. Ozuna along with you. Sincerely, Christopher Gottlieb MD

## 2019-01-30 NOTE — PROGRESS NOTES
HISTORY OF PRESENT ILLNESS  Ruth Freitas is a 51 y.o. female. Recent er visit with atypical non anginal pain. Patient was in emergency room yesterday with atypical chest pain. 1/2019. ER notes and data reviewed      Hypertension   The history is provided by the patient. This is a chronic problem. The problem occurs constantly. The problem has not changed since onset. Associated symptoms include chest pain. Chest Pain (Angina)    The history is provided by the patient. This is a recurrent problem. The problem has not changed since onset. The problem occurs every several days. The pain is associated with exertion and rest. The pain is present in the substernal region. The pain is mild. The quality of the pain is described as dull. Pertinent negatives include no claudication, no cough, no dizziness, no fever, no hemoptysis, no nausea, no orthopnea, no palpitations, no PND, no sputum production, no vomiting and no weakness. Palpitations    The history is provided by the patient. This is a recurrent problem. The problem has not changed since onset. The problem occurs every several days. The problem is associated with nothing. Associated symptoms include chest pain. Pertinent negatives include no fever, no claudication, no orthopnea, no PND, no nausea, no vomiting, no dizziness, no weakness, no cough, no hemoptysis and no sputum production. Her past medical history is significant for hypertension. Review of Systems   Constitutional: Negative for chills and fever. HENT: Negative for nosebleeds. Eyes: Negative for blurred vision and double vision. Respiratory: Negative for cough, hemoptysis, sputum production and wheezing. Cardiovascular: Positive for chest pain. Negative for palpitations, orthopnea, claudication, leg swelling and PND. Gastrointestinal: Negative for heartburn, nausea and vomiting. Musculoskeletal: Negative for myalgias. Skin: Negative for rash.    Neurological: Negative for dizziness and weakness. Endo/Heme/Allergies: Does not bruise/bleed easily. Family History   Problem Relation Age of Onset    Hypertension Mother     Heart Attack Father     Hypertension Father     Cancer Father         skin    Cancer Sister         skin    Bipolar Disorder Sister     Cancer Brother         skin    Cancer Maternal Aunt         colon       Past Medical History:   Diagnosis Date    Asthma     Bronchitis     [facility]     Cocaine abuse ([facility] 3/26/2015    Dental caries     Depression     Depression 8/31/2016    Drug abuse ([facility]     Dysphasia 10/17/2018    Elevated hematocrit 8/31/2016    ETOH abuse     H/O screening mammography 12/06/2016    No evidence of malignancy     HPV (human papilloma virus) infection 11/2016    The patient referred to [facility]     Hypertension     Hypertensive left ventricular hypertrophy, without heart failure 10/15/2018    mild to moderate lvh continue bp meds    Ill-defined condition     Marijuana use 10/17/2018    Mood swings     Polycythemia 2018    Polycythemia     Psychiatric disorder     depression, Bipolar    Schatzki's ring 08/30/2018    Found on Barium Swallow    Sliding hiatal hernia 10/17/2018    Tobacco abuse     Vitamin D deficiency        Past Surgical History:   Procedure Laterality Date    HX ADENOIDECTOMY      denies    HX APPENDECTOMY      HX COLPOSCOPY  12/06/2016    Low-grade courtney/mild dysplasia RICK I    HX HERNIA REPAIR      3x       Social History     Tobacco Use    Smoking status: Current Every Day Smoker     Packs/day: 0.50     Years: 20.00     Pack years: 10.00     Types: Cigarettes    Smokeless tobacco: Never Used   Substance Use Topics    Alcohol use: No       Allergies   Allergen Reactions    Iodinated Contrast- Oral And Iv Dye Shortness of Breath       Prior to Admission medications    Medication Sig Start Date End Date Taking?  Authorizing Provider   famotidine (PEPCID) 20 mg tablet Take 20 mg by mouth two (2) times a day. Yes Provider, Historical   BIOTIN PO Take  by mouth. Yes Provider, Historical   magnesium oxide (MAG-OX) 400 mg tablet Take 1 Tab by mouth daily. 12/3/18  Yes Lion Landin NP   losartan-hydroCHLOROthiazide [facility]) 100-12.5 mg per tablet Take 1 Tab by mouth daily. 9/17/18  Yes Charlotte Downey NP   potassium chloride (K-DUR, KLOR-CON) 20 mEq tablet Take 1 Tab by mouth daily. 8/27/18  Yes Luz Elena MCDONALD NP   LORazepam (ATIVAN) 1 mg tablet Take  by mouth every four (4) hours as needed for Anxiety. Yes Provider, Historical   multivitamin, tx-iron-ca-min (THERA-M W/ IRON) 9 mg iron-400 mcg tab tablet Take 1 Tab by mouth daily. Yes Provider, Historical   lamoTRIgine (LAMICTAL) 100 mg tablet Take 200 mg by mouth daily. Yes Provider, Historical   mirtazapine (REMERON) 30 mg tablet Take  by mouth nightly. For sleep   Yes Provider, Historical   cloNIDine HCl (CATAPRES) 0.3 mg tablet Take 1 Tab by mouth two (2) times a day. Indications: hypertension 5/7/18  Yes Lori Luke NP   amLODIPine-atorvastatin (CADUET) 10-40 mg per tablet Take 1 Tab by mouth daily. 11/15/17  Yes Luz Elena MCDONALD NP   omega-3 acid ethyl esters (LOVAZA) 1 gram capsule Take 2 Caps by mouth two (2) times a day. 4/27/17  Yes Luz Elena MCDONALD NP   albuterol (PROVENTIL HFA, VENTOLIN HFA, PROAIR HFA) 90 mcg/actuation inhaler Take 2 Puffs by inhalation every four (4) hours as needed for Wheezing. 11/28/16  Yes Luz Elena MCDONALD NP   traMADol [facility] [place]) 50 mg tablet Take 1 Tab by mouth every six (6) hours as needed for Pain. Max Daily Amount: 200 mg. 1/18/19   JENNIFER Whitt         Visit Vitals  /67   Pulse 62   Ht 5' 2\" (1.575 m)   Wt 87.5 kg (193 lb)   BMI 35.30 kg/m²         Physical Exam   Constitutional: She is oriented to person, place, and time. She appears well-developed and well-nourished. HENT:   Head: Normocephalic and atraumatic.    Eyes: Conjunctivae are normal.   Neck: Neck supple. No JVD present. No tracheal deviation present. No thyromegaly present. Cardiovascular: Normal rate and regular rhythm. PMI is not displaced. Exam reveals no gallop, no S3 and no decreased pulses. No murmur heard. Pulmonary/Chest: No respiratory distress. She has no wheezes. She has no rales. She exhibits no tenderness. Abdominal: Soft. There is no tenderness. Musculoskeletal: She exhibits no edema. Neurological: She is alert and oriented to person, place, and time. Skin: Skin is warm. Psychiatric: She has a normal mood and affect. Ms. Gaby Mace has a reminder for a \"due or due soon\" health maintenance. I have asked that she contact her primary care provider for follow-up on this health maintenance. I have personally reviewed patients ekg done at other facility. SUMMARY:2017  Left ventricle: Systolic function was normal. Ejection fraction was  estimated in the range of 55 % to 60 %. No obvious wall motion  abnormalities identified in the views obtained. Wall thickness was mildly  to moderately increased. There was mild concentric hypertrophy. NUCLEAR IMAGIN2017    Findings:   1. Post-stress imaging in short axis, horizontal and vertical long axis views reveals normal isotope uptake in all areas. 2. Resting images also show normal isotope uptake in all areas. 3. Gated images show normal left ventricular size, wall motion and systolic function. Ejection fraction is 71%. Diagnosis:   1. Normal scan. 2. No evidence of significant fixed or reversible defect suggesting ischemia or myocardial infarction noted from this nuclear study. 3. Low risk scan. Assessment         ICD-10-CM ICD-9-CM    1. Chest pain, unspecified type R07.9 786.50 ECHO ADULT COMPLETE      NUCLEAR CARDIAC STRESS TEST    Recent increase episode of chest pain. Rule out any ischemic etiology. Also has increasing shortness of breath   2.  Essential hypertension I10 401.9 Blood pressure controlled today   3. Dyslipidemia E78.5 272.4     Continue treatment lab with PCP   4. Hypertensive left ventricular hypertrophy, without heart failure I11.9 402.90 ECHO ADULT COMPLETE      NUCLEAR CARDIAC STRESS TEST    Follow-up echo for LVH. Shortness of breath worse       Medications Discontinued During This Encounter   Medication Reason    promethazine (PHENERGAN) 25 mg tablet Not A Current Medication    ergocalciferol (VITAMIN D2) 50,000 unit capsule Not A Current Medication    calcium-cholecalciferol, D3, (CALTRATE 600+D) tablet Not A Current Medication       No orders of the defined types were placed in this encounter. Follow-up Disposition:  Return for F/u after tests.

## 2019-02-01 ENCOUNTER — OFFICE VISIT (OUTPATIENT)
Dept: ONCOLOGY | Age: 49
End: 2019-02-01

## 2019-02-01 VITALS
DIASTOLIC BLOOD PRESSURE: 76 MMHG | HEART RATE: 64 BPM | SYSTOLIC BLOOD PRESSURE: 127 MMHG | RESPIRATION RATE: 16 BRPM | OXYGEN SATURATION: 100 % | BODY MASS INDEX: 35.51 KG/M2 | TEMPERATURE: 98.3 F | HEIGHT: 62 IN | WEIGHT: 193 LBS

## 2019-02-01 DIAGNOSIS — D75.1 SECONDARY POLYCYTHEMIA: Primary | ICD-10-CM

## 2019-02-01 DIAGNOSIS — D75.1 ERYTHROCYTOSIS: ICD-10-CM

## 2019-02-01 LAB
ALBUMIN SERPL-MCNC: 3.6 G/DL (ref 3.4–5)
ALBUMIN/GLOB SERPL: 1.2 {RATIO} (ref 0.8–1.7)
ALP SERPL-CCNC: 102 U/L (ref 45–117)
ALT SERPL-CCNC: 21 U/L (ref 13–56)
ANION GAP SERPL CALC-SCNC: 5 MMOL/L (ref 3–18)
AST SERPL-CCNC: 10 U/L (ref 15–37)
BILIRUB SERPL-MCNC: 0.2 MG/DL (ref 0.2–1)
BUN SERPL-MCNC: 10 MG/DL (ref 7–18)
BUN/CREAT SERPL: 15 (ref 12–20)
CALCIUM SERPL-MCNC: 8.6 MG/DL (ref 8.5–10.1)
CHLORIDE SERPL-SCNC: 104 MMOL/L (ref 100–108)
CO2 SERPL-SCNC: 29 MMOL/L (ref 21–32)
CREAT SERPL-MCNC: 0.67 MG/DL (ref 0.6–1.3)
FERRITIN SERPL-MCNC: 4 NG/ML (ref 8–388)
GLOBULIN SER CALC-MCNC: 3.1 G/DL (ref 2–4)
GLUCOSE SERPL-MCNC: 132 MG/DL (ref 74–99)
IRON SATN MFR SERPL: 8 %
IRON SERPL-MCNC: 28 UG/DL (ref 50–175)
POTASSIUM SERPL-SCNC: 3.7 MMOL/L (ref 3.5–5.5)
PROT SERPL-MCNC: 6.7 G/DL (ref 6.4–8.2)
SODIUM SERPL-SCNC: 138 MMOL/L (ref 136–145)
TIBC SERPL-MCNC: 358 UG/DL (ref 250–450)
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
## 2019-02-01 NOTE — PATIENT INSTRUCTIONS
Polycythemia: Care Instructions  Your Care Instructions    Polycythemia (say \"paw-david-sy-THEE-mariama-uh) is an abnormal increase in red blood cells. It happens when the tissue inside your bones (bone marrow) makes too much blood. It also can occur if your blood does not have enough liquid, or plasma. This can make the number of red blood cells seem higher than normal. The extra red blood cells make your blood thicker than normal. This may raise your risk for blood clots that can cause heart attacks or strokes. Clots can form in the deep veins of the body, a condition called deep vein thrombosis. Or, a clot can travel through the blood to a lung (a pulmonary embolism). Your doctor may treat you by taking out some of your blood (phlebotomy). The process is like donating blood. Your doctor may even recommend that you donate blood. You may take pills to stop your body from making red blood cells. You also will get treatment for any other conditions that may cause your body to make too many red blood cells. Follow-up care is a key part of your treatment and safety. Be sure to make and go to all appointments, and call your doctor if you are having problems. It's also a good idea to know your test results and keep a list of the medicines you take. How can you care for yourself at home? · Be safe with medicines. Take your medicines exactly as prescribed. Call your doctor if you think you are having a problem with your medicine. · Drink plenty of fluids, enough so that your urine is light yellow or clear like water, before and after you have blood removed. If you have kidney, heart, or liver disease and have to limit fluids, talk with your doctor before you increase the amount of fluids you drink. · Take it easy after you have had blood removed. Do not do vigorous exercise. · If your doctor recommends aspirin, take it exactly as prescribed.  Call your doctor if you think you are having a problem with your medicine. · Do not smoke. Smoking increases the risk of blood clots and may reduce the amount of oxygen in your blood. If you need help quitting, talk to your doctor about stop-smoking programs and medicines. These can increase your chances of quitting for good. · Take an antihistamine, such as a nondrowsy one like loratadine (Claritin) or one that might make you sleepy like diphenhydramine (Benadryl), if your skin is itchy. Some people who have this condition have itching. · Wear medical alert jewelry that lists your clotting problem. You can buy this at most [employer]. When should you call for help? Call 911 anytime you think you may need emergency care. For example, call if:    · You have sudden chest pain and shortness of breath, or you cough up blood.     · You have symptoms of a stroke. These may include:  ? Sudden numbness, tingling, weakness, or loss of movement in your face, arm, or leg, especially on only one side of your body. ? Sudden vision changes. ? Sudden trouble speaking. ? Sudden confusion or trouble understanding simple statements. ? Sudden problems with walking or balance. ? A sudden, severe headache that is different from past headaches.     · You have symptoms of a heart attack. These may include:  ? Chest pain or pressure, or a strange feeling in the chest.  ? Sweating. ? Shortness of breath. ? Nausea or vomiting. ? Pain, pressure, or a strange feeling in the back, neck, jaw, or upper belly or in one or both shoulders or arms. ? Lightheadedness or sudden weakness. ? A fast or irregular heartbeat. After you call 911, the  may tell you to chew 1 adult-strength or 2 to 4 low-dose aspirin. Wait for an ambulance. Do not try to drive yourself.    Call your doctor now or seek immediate medical care if:    · You have signs of a blood clot, such as:  ? Pain in your calf, back of knee, thigh, or groin. ?  Redness and swelling in your leg or groin.    Watch closely for changes in your health, and be sure to contact your doctor if you have any problems. Where can you learn more? Go to http://los-zack.info/. Enter I799 in the search box to learn more about \"Polycythemia: Care Instructions. \"  Current as of: May 6, 2018  Content Version: 11.9  © 2151-5591 [employer]. Care instructions adapted under license by [employer] (which disclaims liability or warranty for this information). If you have questions about a medical condition or this instruction, always ask your healthcare professional. [address] any warranty or liability for your use of this information.

## 2019-02-02 ENCOUNTER — HOSPITAL ENCOUNTER (OUTPATIENT)
Dept: LAB | Age: 49
Discharge: HOME | End: 2019-02-02
Payer: COMMERCIAL

## 2019-02-02 DIAGNOSIS — E78.5 DYSLIPIDEMIA: ICD-10-CM

## 2019-02-02 LAB
CHOLEST SERPL-MCNC: 103 MG/DL
HDLC SERPL-MCNC: 41 MG/DL (ref 40–60)
HDLC SERPL: 2.5 {RATIO} (ref 0–5)
LDLC SERPL CALC-MCNC: 39.2 MG/DL (ref 0–100)
LIPID PROFILE,FLP: NORMAL
TRIGL SERPL-MCNC: 114 MG/DL (ref ?–150)
VLDLC SERPL CALC-MCNC: 22.8 MG/DL

## 2019-02-02 PROCEDURE — 80061 LIPID PANEL: CPT

## 2019-02-02 PROCEDURE — 36415 COLL VENOUS BLD VENIPUNCTURE: CPT

## 2019-02-04 ENCOUNTER — TELEPHONE (OUTPATIENT)
Dept: CARDIOLOGY CLINIC | Age: 49
End: 2019-02-04

## 2019-02-04 DIAGNOSIS — R07.9 CHEST PAIN, UNSPECIFIED TYPE: Primary | ICD-10-CM

## 2019-02-04 NOTE — TELEPHONE ENCOUNTER
Called and advised patient of insurance denial, patient will schedule Stress Echo @ [facility] and keep follow up on 2/27/19. Patient understands to go to ER if any recurrent or worsening CP and/or SOB.

## 2019-02-04 NOTE — TELEPHONE ENCOUNTER
Records faxed [facility] for pre auth wanted peer to peer. Dr Krystyna Camp called [facility].  [facility] would like Stress Echo instead of Nuclear Stress Test and Echocardiogram.

## 2019-02-05 ENCOUNTER — OFFICE VISIT (OUTPATIENT)
Dept: FAMILY MEDICINE CLINIC | Age: 49
End: 2019-02-05

## 2019-02-05 VITALS
HEART RATE: 65 BPM | HEIGHT: 62 IN | OXYGEN SATURATION: 97 % | SYSTOLIC BLOOD PRESSURE: 91 MMHG | TEMPERATURE: 98 F | BODY MASS INDEX: 34.6 KG/M2 | WEIGHT: 188 LBS | DIASTOLIC BLOOD PRESSURE: 64 MMHG | RESPIRATION RATE: 12 BRPM

## 2019-02-05 DIAGNOSIS — R00.2 PALPITATIONS: ICD-10-CM

## 2019-02-05 DIAGNOSIS — I10 ESSENTIAL HYPERTENSION: Primary | ICD-10-CM

## 2019-02-05 DIAGNOSIS — R07.9 CHEST PAIN, UNSPECIFIED TYPE: ICD-10-CM

## 2019-02-05 DIAGNOSIS — E78.5 DYSLIPIDEMIA: ICD-10-CM

## 2019-02-05 DIAGNOSIS — Z72.0 TOBACCO ABUSE DISORDER: ICD-10-CM

## 2019-02-05 DIAGNOSIS — F14.90 COCAINE USE: ICD-10-CM

## 2019-02-05 NOTE — PROGRESS NOTES
No new complaints today    Patient says she take half of the clonidine because her blood pressure was low seen in ER @ [facility] last Monday.  Patient followed up with heart doctor

## 2019-02-05 NOTE — PROGRESS NOTES
02/05/19    PCP: Robi Yanes NP    Chief Complaint   Patient presents with    Follow Up Chronic Condition        HISTORY OF PRESENT ILLNESS  Mukul Jara  is a 51 y.o. female whom presents for Follow Up Chronic Condition         Pt with h/o HTN, HLD, Tobacco abuse, and Cocaine use here for routine follow up. She has had multiple ED visits for chest pains and palpitations and is currently being followed by Cardiology Dr. Mahendra Frausto. She is pending a stress test and follow up ECHO. Her Bp today is stable but on the lower side. Her clonidine dose was recently reduced to 1/2 dose she was also asked to hydrate more. She reports she is still having issues with tobacco and substance abuse, and is welcoming to the help. She is currently on disability and is not forthcoming about her drug use to her mental health provider as she does not wish to lose her benefits. She states she is in the process of relocating to [place] to be with her family and feels the change of scenery will have a positive affect on her sobriety. Discussed other local detox/substance abuse programs and patient is open to getting more information on [facility]. Patient Active Problem List    Diagnosis Date Noted    Hypertensive left ventricular hypertrophy, without heart failure 10/15/2018    Erythrocytosis 05/22/2018    Secondary polycythemia 05/22/2018    Hypertension     Atypical squamous cells of undetermined significance on cytologic smear of cervix (ASC-US) 11/29/2016    High risk HPV infection 11/29/2016    Depression 08/31/2016    Compliance with medication regimen 08/31/2016    Elevated hematocrit 08/31/2016    Manic bipolar I disorder ([facility] [address] 04/21/2015    Dyslipidemia 03/26/2015    Cocaine abuse ([facility] [address] 03/26/2015    Vitamin D deficiency 03/26/2015     Current Outpatient Medications   Medication Sig Dispense Refill    magnesium oxide (MAG-OX) 400 mg tablet Take 1 Tab by mouth daily.  30 Tab 3    losartan-hydroCHLOROthiazide (HYZAAR) 100-12.5 mg per tablet Take 1 Tab by mouth daily. 30 Tab 0    potassium chloride (K-DUR, KLOR-CON) 20 mEq tablet Take 1 Tab by mouth daily. 30 Tab 3    LORazepam (ATIVAN) 1 mg tablet Take  by mouth every four (4) hours as needed for Anxiety.  multivitamin, tx-iron-ca-min (THERA-M W/ IRON) 9 mg iron-400 mcg tab tablet Take 1 Tab by mouth daily.  lamoTRIgine (LAMICTAL) 100 mg tablet Take 200 mg by mouth daily.  mirtazapine (REMERON) 30 mg tablet Take  by mouth nightly. For sleep      cloNIDine HCl (CATAPRES) 0.3 mg tablet Take 1 Tab by mouth two (2) times a day. Indications: hypertension 60 Tab 3    amLODIPine-atorvastatin (CADUET) 10-40 mg per tablet Take 1 Tab by mouth daily. 90 Tab 3    omega-3 acid ethyl esters (LOVAZA) 1 gram capsule Take 2 Caps by mouth two (2) times a day. 360 Cap 3    albuterol (PROVENTIL HFA, VENTOLIN HFA, PROAIR HFA) 90 mcg/actuation inhaler Take 2 Puffs by inhalation every four (4) hours as needed for Wheezing. 6 Inhaler 3    famotidine (PEPCID) 20 mg tablet Take 20 mg by mouth two (2) times a day.  BIOTIN PO Take  by mouth.  traMADol (ULTRAM) 50 mg tablet Take 1 Tab by mouth every six (6) hours as needed for Pain.  Max Daily Amount: 200 mg. 6 Tab 0     Allergies   Allergen Reactions    Iodinated Contrast- Oral And Iv Dye Shortness of Breath     Past Medical History:   Diagnosis Date    Asthma     Bronchitis     CH[facility]congest[facility] failure) [facility])     Cocaine abuse ([facility] [address] 3/26/2015    Dental caries     Depression     Depression 8/31/2016    Drug abuse ([facility] [address]     Dysphasia 10/17/2018    Elevated hematocrit 8/31/2016    ETOH abuse     H/O screening mammography 12/06/2016    No evidence of malignancy     HPV (human papilloma virus) infection 11/2016    The patient referred to [facility]     Hypertension     Hypertensive left ventricular hypertrophy, without heart failure 10/15/2018    mild to moderate lvh continue bp meds    Ill-defined condition     Marijuana use 10/17/2018    Mood swings     Polycythemia 2018    Polycythemia     Psychiatric disorder     depression, Bipolar    Schatzki's ring 08/30/2018    Found on Barium Swallow    Sliding hiatal hernia 10/17/2018    Tobacco abuse     Vitamin D deficiency      Past Surgical History:   Procedure Laterality Date    HX ADENOIDECTOMY      denies    HX APPENDECTOMY      HX COLPOSCOPY  12/06/2016    Low-grade courtney/mild dysplasia RICK I    HX HERNIA REPAIR      3x     Family History   Problem Relation Age of Onset    Hypertension Mother     Heart Attack Father     Hypertension Father     Cancer Father         skin    Cancer Sister         skin    Bipolar Disorder Sister     Cancer Brother         skin    Cancer Maternal Aunt         colon     Social History     Tobacco Use    Smoking status: Current Every Day Smoker     Packs/day: 0.50     Years: 20.00     Pack years: 10.00     Types: Cigarettes    Smokeless tobacco: Never Used   Substance Use Topics    Alcohol use: No       Review of Systems   Constitutional: Negative. HENT: Negative. Eyes: Negative. Respiratory: Positive for shortness of breath. Negative for cough, hemoptysis, sputum production and wheezing. Cardiovascular: Positive for chest pain and palpitations. Negative for orthopnea, claudication, leg swelling and PND. Gastrointestinal: Negative. Reports she will be getting gallbladder surgery    Genitourinary: Negative. Musculoskeletal: Negative. Skin: Negative. Neurological: Negative. Endo/Heme/Allergies: Negative. Psychiatric/Behavioral: Positive for depression (Followed By [facility] ) and substance abuse. Negative for hallucinations, memory loss and suicidal ideas. The patient is nervous/anxious. The patient does not have insomnia.         Visit Vitals  BP 91/64   Pulse 65   Temp 98 °F (36.7 °C)   Resp 12   Ht 5' 2\" (1.575 m)   Wt 188 lb (85.3 kg)   SpO2 97%   BMI 34.39 kg/m²       Pain Scale: 0 - No pain/10    Pain Location:      Physical Exam   Constitutional: She is oriented to person, place, and time and well-developed, well-nourished, and in no distress. HENT:   Head: Normocephalic. Eyes: Pupils are equal, round, and reactive to light. Neck: Normal range of motion. Neck supple. Cardiovascular: Normal rate and regular rhythm. Murmur heard. Pulmonary/Chest: Effort normal and breath sounds normal.   Abdominal: Soft. Bowel sounds are normal.   Neurological: She is alert and oriented to person, place, and time. Skin: Skin is warm and dry. Psychiatric: Mood and affect normal.   Vitals reviewed.       Lab Results   Component Value Date/Time    WBC 7.6 01/29/2019 01:21 PM    HGB 12.1 01/29/2019 01:21 PM    HCT 37.9 01/29/2019 01:21 PM    PLATELET 557 14/55/0881 01:21 PM    MCV 83.8 01/29/2019 01:21 PM     Lab Results   Component Value Date/Time    Hemoglobin A1c 5.2 08/06/2018 11:18 AM    Hemoglobin A1c 5.2 11/17/2014 07:16 AM    Glucose 130 (H) 01/29/2019 01:21 PM    Glucose (POC) 126 (H) 01/27/2018 09:08 PM    LDL, calculated 39.2 02/02/2019 07:19 AM    Creatinine 0.65 01/29/2019 01:21 PM      Lab Results   Component Value Date/Time    Cholesterol, total 103 02/02/2019 07:19 AM    HDL Cholesterol 41 02/02/2019 07:19 AM    LDL, calculated 39.2 02/02/2019 07:19 AM    Triglyceride 114 02/02/2019 07:19 AM    CHOL/HDL Ratio 2.5 02/02/2019 07:19 AM     Lab Results   Component Value Date/Time    TSH 0.45 06/23/2018 12:15 PM    T4, Free 0.9 06/07/2010 03:30 PM      Lab Results   Component Value Date/Time    CK 34 01/29/2019 04:00 PM    CK - MB <1.0 01/29/2019 04:00 PM    CK-MB Index  01/29/2019 04:00 PM     CALCULATION NOT PERFORMED WHEN RESULT IS BELOW LINEAR LIMIT    Troponin-I, QT <0.02 01/29/2019 04:00 PM      Lab Results   Component Value Date/Time    NT pro-BNP 63 01/27/2018 09:09 PM    NT pro- (H) 06/08/2017 10:25 AM    NT pro- (H) 02/10/2013 11:10 AM Lab Results   Component Value Date/Time    Sodium 138 01/29/2019 01:21 PM    Potassium 3.5 01/29/2019 01:21 PM    Chloride 104 01/29/2019 01:21 PM    CO2 29 01/29/2019 01:21 PM    Anion gap 5 01/29/2019 01:21 PM    Glucose 130 (H) 01/29/2019 01:21 PM    BUN 9 01/29/2019 01:21 PM    Creatinine 0.65 01/29/2019 01:21 PM    BUN/Creatinine ratio 14 01/29/2019 01:21 PM    GFR est AA >60 01/29/2019 01:21 PM    GFR est non-AA >60 01/29/2019 01:21 PM    Calcium 8.3 (L) 01/29/2019 01:21 PM    Bilirubin, total 0.1 (L) 01/29/2019 04:00 PM    ALT (SGPT) 19 01/29/2019 04:00 PM    AST (SGOT) 13 (L) 01/29/2019 04:00 PM    Alk. phosphatase 92 01/29/2019 04:00 PM    Protein, total 6.5 01/29/2019 04:00 PM    Albumin 3.3 (L) 01/29/2019 04:00 PM    Globulin 3.2 01/29/2019 04:00 PM    A-G Ratio 1.0 01/29/2019 04:00 PM      Lab Results   Component Value Date/Time    Hemoglobin A1c 5.2 08/06/2018 11:18 AM       Pertinent Radiology reports:     ASSESSMENT and PLAN    ICD-10-CM ICD-9-CM    1. Essential hypertension I10 401.9    2. Dyslipidemia E78.5 272.4    3. Palpitations R00.2 785.1    4. Chest pain, unspecified type R07.9 786.50    5. Tobacco abuse disorder Z72.0 305.1    6. Cocaine use F14.90 305.60      reviewed diet, exercise and weight control  very strongly urged to quit smoking to reduce cardiovascular risk  cardiovascular risk and specific lipid/LDL goals reviewed  reviewed medications and side effects in detail  use of aspirin to prevent MI and TIA's discussed   - Patient to proceed with stress test   - Discussed negative affects of Continued cocaine use on her health to include cardiac issues and mental health issues. She was given information for [facility] and she states she will look into it. There are no discontinued medications. Written instructions followed our verbal discussion of all information discussed above, pending tests ordered and future goals/plans.  Patient expressed understanding of current diagnosis, planned testing, follow up and if needed to contact the office for any questions or concerns prior to the next visit. Follow-up Disposition:  Return in about 4 months (around 6/5/2019), or if symptoms worsen or fail to improve, for No labs needed. Maria Guadalupe Crisostomo

## 2019-02-08 ENCOUNTER — DOCUMENTATION ONLY (OUTPATIENT)
Dept: CARDIOLOGY | Age: 49
End: 2019-02-08

## 2019-02-08 ENCOUNTER — TELEPHONE (OUTPATIENT)
Dept: CARDIOLOGY | Age: 49
End: 2019-02-08

## 2019-02-08 ENCOUNTER — HOSPITAL ENCOUNTER (OUTPATIENT)
Dept: NON INVASIVE DIAGNOSTICS | Age: 49
Discharge: HOME | End: 2019-02-08
Attending: INTERNAL MEDICINE
Payer: COMMERCIAL

## 2019-02-08 VITALS — BODY MASS INDEX: 34.23 KG/M2 | WEIGHT: 186 LBS | HEIGHT: 62 IN

## 2019-02-08 DIAGNOSIS — R07.9 CHEST PAIN, UNSPECIFIED TYPE: ICD-10-CM

## 2019-02-08 LAB
STRESS ANGINA INDEX: 0
STRESS BASELINE HR: 67 BPM
STRESS BASELINE SYS BP: NORMAL MMHG
STRESS ESTIMATED WORKLOAD: 7 METS
STRESS EXERCISE DUR MIN: NORMAL
STRESS PEAK DIAS BP: 70 MMHG
STRESS PEAK SYS BP: 108 MMHG
STRESS PERCENT HR ACHIEVED: 79 %
STRESS POST PEAK HR: 115 BPM
STRESS RATE PRESSURE PRODUCT: NORMAL BPM*MMHG
STRESS SR DUKE TREADMILL SCORE: 0
STRESS ST DEPRESSION: 0 MM
STRESS ST ELEVATION: 0 MM
STRESS TARGET HR: 145 BPM

## 2019-02-08 PROCEDURE — 93351 STRESS TTE COMPLETE: CPT

## 2019-02-08 NOTE — TELEPHONE ENCOUNTER
Came today for stress echo noted low BP 80/60. Instructed to stop clonidine and to follow in office for BP check.   medications need adjusment

## 2019-02-09 ENCOUNTER — APPOINTMENT (OUTPATIENT)
Dept: GENERAL RADIOLOGY | Age: 49
End: 2019-02-09
Attending: NURSE PRACTITIONER
Payer: MEDICAID

## 2019-02-09 ENCOUNTER — HOSPITAL ENCOUNTER (EMERGENCY)
Age: 49
Discharge: HOME | End: 2019-02-09
Attending: EMERGENCY MEDICINE
Payer: MEDICAID

## 2019-02-09 VITALS — BODY MASS INDEX: 33.84 KG/M2 | WEIGHT: 185 LBS

## 2019-02-09 DIAGNOSIS — F43.0 PANIC ATTACK AS REACTION TO STRESS: Primary | ICD-10-CM

## 2019-02-09 DIAGNOSIS — F41.0 PANIC ATTACK AS REACTION TO STRESS: Primary | ICD-10-CM

## 2019-02-09 LAB
ALBUMIN SERPL-MCNC: 4 G/DL (ref 3.4–5)
ALBUMIN/GLOB SERPL: 1 {RATIO} (ref 0.8–1.7)
ALP SERPL-CCNC: 93 U/L (ref 45–117)
ALT SERPL-CCNC: 16 U/L (ref 13–56)
ANION GAP SERPL CALC-SCNC: 6 MMOL/L (ref 3–18)
AST SERPL-CCNC: 9 U/L (ref 15–37)
BASOPHILS # BLD: 0 K/UL (ref 0–0.1)
BASOPHILS NFR BLD: 0 % (ref 0–2)
BILIRUB SERPL-MCNC: 0.2 MG/DL (ref 0.2–1)
BUN SERPL-MCNC: 12 MG/DL (ref 7–18)
BUN/CREAT SERPL: 17 (ref 12–20)
CALCIUM SERPL-MCNC: 8.8 MG/DL (ref 8.5–10.1)
CHLORIDE SERPL-SCNC: 108 MMOL/L (ref 100–108)
CK MB CFR SERPL CALC: 2.7 % (ref 0–4)
CK MB SERPL-MCNC: 1.2 NG/ML (ref 5–25)
CK SERPL-CCNC: 45 U/L (ref 26–192)
CO2 SERPL-SCNC: 25 MMOL/L (ref 21–32)
CREAT SERPL-MCNC: 0.7 MG/DL (ref 0.6–1.3)
DIFFERENTIAL METHOD BLD: ABNORMAL
EOSINOPHIL # BLD: 0.1 K/UL (ref 0–0.4)
EOSINOPHIL NFR BLD: 1 % (ref 0–5)
ERYTHROCYTE [DISTWIDTH] IN BLOOD BY AUTOMATED COUNT: 15 % (ref 11.6–14.5)
GLOBULIN SER CALC-MCNC: 3.9 G/DL (ref 2–4)
GLUCOSE SERPL-MCNC: 117 MG/DL (ref 74–99)
HCT VFR BLD AUTO: 42.5 % (ref 35–45)
HGB BLD-MCNC: 13.7 G/DL (ref 12–16)
INR PPP: 1.1 (ref 0.8–1.2)
LYMPHOCYTES # BLD: 2.6 K/UL (ref 0.9–3.6)
LYMPHOCYTES NFR BLD: 19 % (ref 21–52)
MAGNESIUM SERPL-MCNC: 2.2 MG/DL (ref 1.6–2.6)
MCH RBC QN AUTO: 26.2 PG (ref 24–34)
MCHC RBC AUTO-ENTMCNC: 32.2 G/DL (ref 31–37)
MCV RBC AUTO: 81.4 FL (ref 74–97)
MONOCYTES # BLD: 1.4 K/UL (ref 0.05–1.2)
MONOCYTES NFR BLD: 10 % (ref 3–10)
NEUTS SEG # BLD: 9.7 K/UL (ref 1.8–8)
NEUTS SEG NFR BLD: 70 % (ref 40–73)
PLATELET # BLD AUTO: 360 K/UL (ref 135–420)
PMV BLD AUTO: 9.5 FL (ref 9.2–11.8)
POTASSIUM SERPL-SCNC: 3.7 MMOL/L (ref 3.5–5.5)
PROT SERPL-MCNC: 7.9 G/DL (ref 6.4–8.2)
PROTHROMBIN TIME: 13.5 SEC (ref 11.5–15.2)
RBC # BLD AUTO: 5.22 M/UL (ref 4.2–5.3)
SODIUM SERPL-SCNC: 139 MMOL/L (ref 136–145)
TROPONIN I SERPL-MCNC: <0.02 NG/ML (ref 0–0.04)
WBC # BLD AUTO: 13.7 K/UL (ref 4.6–13.2)

## 2019-02-09 PROCEDURE — 71046 X-RAY EXAM CHEST 2 VIEWS: CPT

## 2019-02-09 PROCEDURE — 85025 COMPLETE CBC W/AUTO DIFF WBC: CPT

## 2019-02-09 PROCEDURE — 99282 EMERGENCY DEPT VISIT SF MDM: CPT

## 2019-02-09 PROCEDURE — 83735 ASSAY OF MAGNESIUM: CPT

## 2019-02-09 PROCEDURE — 82550 ASSAY OF CK (CPK): CPT

## 2019-02-09 PROCEDURE — 93005 ELECTROCARDIOGRAM TRACING: CPT

## 2019-02-09 PROCEDURE — 80053 COMPREHEN METABOLIC PANEL: CPT

## 2019-02-09 PROCEDURE — 85610 PROTHROMBIN TIME: CPT

## 2019-02-09 NOTE — ED PROVIDER NOTES
EMERGENCY DEPARTMENT HISTORY AND PHYSICAL EXAM    6:46 PM      Date: 2/9/2019  Patient Name: Alexandra Burnham    History of Presenting Illness     Chief Complaint   Patient presents with    Palpitations       History Provided By: Patient      Additional History (Context): Alexandra Burnham is a 51 y.o. female with a PMHx of HTN, depression, CHF, drug abuse, and asthma who presents with acute, moderate palpitations after an argument PTA. She states she has been under a lot of stress after losing her job and constant arguments. She also states she drank soda earlier today. Patient was here yesterday for a stress test, but did not pass it. Twice this week, her blood pressure was extremely low (below 90). No associated symptoms of chest pain or sob reported. Her cardiologist is Dr. David Johnston, and she told her to stop taking her Clonadine. Patient admits to smoking about 4 packs a week. No modifying or aggravating factors were reported. No other symptoms or concerns were expressed. PCP: Silvano Nieto NP    Chief Complaint: Palpitations  Duration:  PTA  Timing:  Acute  Location: Cardiac  Quality: N/A  Severity: Moderate  Modifying Factors: No modifying or aggravating factors were reported. Associated Symptoms: CP, SOB, bilat feet numbness    Current Outpatient Medications   Medication Sig Dispense Refill    famotidine (PEPCID) 20 mg tablet Take 20 mg by mouth two (2) times a day.  BIOTIN PO Take  by mouth.  traMADol (ULTRAM) 50 mg tablet Take 1 Tab by mouth every six (6) hours as needed for Pain. Max Daily Amount: 200 mg. 6 Tab 0    magnesium oxide (MAG-OX) 400 mg tablet Take 1 Tab by mouth daily. 30 Tab 3    losartan-hydroCHLOROthiazide (HYZAAR) 100-12.5 mg per tablet Take 1 Tab by mouth daily. 30 Tab 0    potassium chloride (K-DUR, KLOR-CON) 20 mEq tablet Take 1 Tab by mouth daily. 30 Tab 3    LORazepam (ATIVAN) 1 mg tablet Take  by mouth every four (4) hours as needed for Anxiety.       multivitamin, tx-iron-ca-min (THERA-M W/ IRON) 9 mg iron-400 mcg tab tablet Take 1 Tab by mouth daily.  lamoTRIgine (LAMICTAL) 100 mg tablet Take 200 mg by mouth daily.  mirtazapine (REMERON) 30 mg tablet Take  by mouth nightly. For sleep      cloNIDine HCl (CATAPRES) 0.3 mg tablet Take 1 Tab by mouth two (2) times a day. Indications: hypertension 60 Tab 3    amLODIPine-atorvastatin (CADUET) 10-40 mg per tablet Take 1 Tab by mouth daily. 90 Tab 3    omega-3 acid ethyl esters (LOVAZA) 1 gram capsule Take 2 Caps by mouth two (2) times a day. 360 Cap 3    albuterol (PROVENTIL HFA, VENTOLIN HFA, PROAIR HFA) 90 mcg/actuation inhaler Take 2 Puffs by inhalation every four (4) hours as needed for Wheezing.  6 Inhaler 3       Past History     Past Medical History:  Past Medical History:   Diagnosis Date    Asthma     Bronchitis     [facility])     Cocaine abuse ([facility] 3/26/2015    Dental caries     Depression     Depression 8/31/2016    Drug abuse ([facility]     Dysphasia 10/17/2018    Elevated hematocrit 8/31/2016    ETOH abuse     H/O screening mammography 12/06/2016    No evidence of malignancy     HPV (human papilloma virus) infection 11/2016    The patient referred to [facility]     Hypertension     Hypertensive left ventricular hypertrophy, without heart failure 10/15/2018    mild to moderate lvh continue bp meds    Ill-defined condition     Marijuana use 10/17/2018    Mood swings     Polycythemia 2018    Polycythemia     Psychiatric disorder     depression, Bipolar    Schatzki's ring 08/30/2018    Found on Barium Swallow    Sliding hiatal hernia 10/17/2018    Tobacco abuse     Vitamin D deficiency        Past Surgical History:  Past Surgical History:   Procedure Laterality Date    HX ADENOIDECTOMY      denies    HX APPENDECTOMY      HX COLPOSCOPY  12/06/2016    Low-grade courtney/mild dysplasia RICK I    HX HERNIA REPAIR      3x       Family History:  Family History Problem Relation Age of Onset    Hypertension Mother     Heart Attack Father     Hypertension Father     Cancer Father         skin    Cancer Sister         skin    Bipolar Disorder Sister     Cancer Brother         skin    Cancer Maternal Aunt         colon       Social History:  Social History     Tobacco Use    Smoking status: Current Every Day Smoker     Packs/day: 0.50     Years: 20.00     Pack years: 10.00     Types: Cigarettes    Smokeless tobacco: Never Used   Substance Use Topics    Alcohol use: No    Drug use: Yes     Types: Cocaine     Comment: smokes cocaine-current user       Allergies: Allergies   Allergen Reactions    Iodinated Contrast- Oral And Iv Dye Shortness of Breath         Review of Systems       Review of Systems   Constitutional: Negative for chills and fever. HENT: Negative for congestion, rhinorrhea and sore throat. Eyes: Negative for visual disturbance. Respiratory: Positive for shortness of breath. Negative for cough. Cardiovascular: Positive for chest pain and palpitations. Gastrointestinal: Negative for abdominal pain, nausea and vomiting. Musculoskeletal: Negative for back pain and myalgias. Skin: Negative. Allergic/Immunologic: Negative. Neurological: Positive for numbness (bilat feet). Negative for headaches. All other systems reviewed and are negative. Physical Exam     Visit Vitals  Wt 83.9 kg (185 lb)   BMI 33.84 kg/m²           Physical Exam   Constitutional: She is oriented to person, place, and time. She appears well-developed and well-nourished. HENT:   Head: Normocephalic and atraumatic. Eyes: Conjunctivae and EOM are normal. Pupils are equal, round, and reactive to light. Neck: Normal range of motion. Neck supple. Cardiovascular: Normal rate, regular rhythm, normal heart sounds and intact distal pulses. Exam reveals no gallop. No murmur heard.   Pulmonary/Chest: Effort normal and breath sounds normal. No respiratory distress. Abdominal: Soft. Bowel sounds are normal.   Musculoskeletal: Normal range of motion. Neurological: She is alert and oriented to person, place, and time. She has normal reflexes. Skin: Skin is warm and dry. Psychiatric: She has a normal mood and affect. Her behavior is normal. Judgment and thought content normal.   Nursing note and vitals reviewed. Diagnostic Study Results     Labs -  Recent Results (from the past 12 hour(s))   EKG, 12 LEAD, INITIAL    Collection Time: 02/09/19  6:49 PM   Result Value Ref Range    Ventricular Rate 84 BPM    Atrial Rate 84 BPM    P-R Interval 146 ms    QRS Duration 102 ms    Q-T Interval 396 ms    QTC Calculation (Bezet) 467 ms    Calculated P Axis 35 degrees    Calculated R Axis -32 degrees    Calculated T Axis 58 degrees    Diagnosis       Normal sinus rhythm  Possible Left atrial enlargement  Left axis deviation  Left ventricular hypertrophy  Cannot rule out Septal infarct (cited on or before 08-FEB-2019)  Abnormal ECG  When compared with ECG of 08-FEB-2019 10:03,  Questionable change in initial forces of Septal leads     CBC WITH AUTOMATED DIFF    Collection Time: 02/09/19  7:00 PM   Result Value Ref Range    WBC 13.7 (H) 4.6 - 13.2 K/uL    RBC 5.22 4.20 - 5.30 M/uL    HGB 13.7 12.0 - 16.0 g/dL    HCT 42.5 35.0 - 45.0 %    MCV 81.4 74.0 - 97.0 FL    MCH 26.2 24.0 - 34.0 PG    MCHC 32.2 31.0 - 37.0 g/dL    RDW 15.0 (H) 11.6 - 14.5 %    PLATELET 070 888 - 155 K/uL    MPV 9.5 9.2 - 11.8 FL    NEUTROPHILS 70 40 - 73 %    LYMPHOCYTES 19 (L) 21 - 52 %    MONOCYTES 10 3 - 10 %    EOSINOPHILS 1 0 - 5 %    BASOPHILS 0 0 - 2 %    ABS. NEUTROPHILS 9.7 (H) 1.8 - 8.0 K/UL    ABS. LYMPHOCYTES 2.6 0.9 - 3.6 K/UL    ABS. MONOCYTES 1.4 (H) 0.05 - 1.2 K/UL    ABS. EOSINOPHILS 0.1 0.0 - 0.4 K/UL    ABS.  BASOPHILS 0.0 0.0 - 0.1 K/UL    DF AUTOMATED     PROTHROMBIN TIME + INR    Collection Time: 02/09/19  7:00 PM   Result Value Ref Range    Prothrombin time 13.5 11.5 - 15.2 sec    INR 1.1 0.8 - 1.2     METABOLIC PANEL, COMPREHENSIVE    Collection Time: 02/09/19  7:00 PM   Result Value Ref Range    Sodium 139 136 - 145 mmol/L    Potassium 3.7 3.5 - 5.5 mmol/L    Chloride 108 100 - 108 mmol/L    CO2 25 21 - 32 mmol/L    Anion gap 6 3.0 - 18 mmol/L    Glucose 117 (H) 74 - 99 mg/dL    BUN 12 7.0 - 18 MG/DL    Creatinine 0.70 0.6 - 1.3 MG/DL    BUN/Creatinine ratio 17 12 - 20      GFR est AA >60 >60 ml/min/1.73m2    GFR est non-AA >60 >60 ml/min/1.73m2    Calcium 8.8 8.5 - 10.1 MG/DL    Bilirubin, total 0.2 0.2 - 1.0 MG/DL    ALT (SGPT) 16 13 - 56 U/L    AST (SGOT) 9 (L) 15 - 37 U/L    Alk. phosphatase 93 45 - 117 U/L    Protein, total 7.9 6.4 - 8.2 g/dL    Albumin 4.0 3.4 - 5.0 g/dL    Globulin 3.9 2.0 - 4.0 g/dL    A-G Ratio 1.0 0.8 - 1.7     MAGNESIUM    Collection Time: 02/09/19  7:00 PM   Result Value Ref Range    Magnesium 2.2 1.6 - 2.6 mg/dL   CARDIAC PANEL,(CK, CKMB & TROPONIN)    Collection Time: 02/09/19  7:00 PM   Result Value Ref Range    CK 45 26 - 192 U/L    CK - MB 1.2 <3.6 ng/ml    CK-MB Index 2.7 0.0 - 4.0 %    Troponin-I, QT <0.02 0.0 - 0.045 NG/ML       Radiologic Studies -   XR CHEST PA LAT   Final Result   IMPRESSION:     1. Unremarkable chest                      Medical Decision Making     It should be noted that I, Betty Nicholson MD will be the provider of record for this patient. I reviewed the vital signs, available nursing notes, past medical history, past surgical history, family history and social history. Vital Signs-Reviewed the patient's vital signs. Records Reviewed: Nursing Notes (Time of Review: 6:46 PM)     Provider Notes (Medical Decision Making):     Pt associates episode of palpitations with a stressful event. Pt denied any chest pain or sob at any time. Pt with no symptoms in ed. Pts labs, ekg and chest xray are negative. Pt has cardiology follow up. Pt wants to go home.   It was explained a second trop is indicated although pt does not want to wait. She states she will go to her cardiologist on Monday. Pt instructed that a Holter monitor may be a consideration as they may be able to catch the palpitation event in the future. Pt advised to take her ativan for anxiety and to decrease caffeine and nicotine intake to decrease stimulants. Diagnosis     Clinical Impression:   1. Panic attack as reaction to stress        Disposition:   Home      Follow-up Information     Follow up With Specialties Details Why Contact Info    Iris Bales NP Nurse Practitioner In 2 days  [address] [place] [address]  [phone]                Medication List      ASK your doctor about these medications    albuterol 90 mcg/actuation inhaler  Commonly known as:  PROVENTIL HFA, VENTOLIN HFA, PROAIR HFA  Take 2 Puffs by inhalation every four (4) hours as needed for Wheezing. amLODIPine-atorvastatin 10-40 mg per tablet  Commonly known as:  CADUET  Take 1 Tab by mouth daily. BIOTIN PO     cloNIDine HCl 0.3 mg tablet  Commonly known as:  CATAPRES  Take 1 Tab by mouth two (2) times a day. Indications: hypertension     famotidine 20 mg tablet  Commonly known as:  PEPCID     lamoTRIgine 100 mg tablet  Commonly known as: LaMICtal     LORazepam 1 mg tablet  Commonly known as:  ATIVAN     losartan-hydroCHLOROthiazide 100-12.5 mg per tablet  Commonly known as:  HYZAAR  Take 1 Tab by mouth daily. magnesium oxide 400 mg tablet  Commonly known as:  MAG-OX  Take 1 Tab by mouth daily. mirtazapine 30 mg tablet  Commonly known as:  REMERON     multivitamin, tx-iron-ca-min 9 mg iron-400 mcg Tab tablet  Commonly known as:  THERA-M w/ IRON     omega-3 acid ethyl esters 1 gram capsule  Commonly known as:  LOVAZA  Take 2 Caps by mouth two (2) times a day. potassium chloride 20 mEq tablet  Commonly known as:  K-DUR, KLOR-CON  Take 1 Tab by mouth daily.      traMADol 50 mg tablet  Commonly known as:  ULTRAM  Take 1 Tab by mouth every six (6) hours as needed for Pain. Max Daily Amount: 200 mg.          _______________________________       [address] acting as a scribe for and in the presence of Daysi Wallace NP  February 09, 2019 at 6:46 PM       Provider Attestation:      I personally performed the services described in the documentation, reviewed the documentation, as recorded by the scribe in my presence, and it accurately and completely records my words and actions. February 09, 2019 at 6:46 PM - Daysi Wallace NP  _______________________________    8:44 PM  Marj Augustemilvia's  results have been reviewed with her. She has been counseled regarding her diagnosis, treatment, and plan. She verbally conveys understanding and agreement of the signs, symptoms, diagnosis, treatment and prognosis and additionally agrees to follow up as discussed. She also agrees with the care-plan and conveys that all of her questions have been answered. I have also provided discharge instructions for her that include: educational information regarding their diagnosis and treatment, and list of reasons why they would want to return to the ED prior to their follow-up appointment, should her condition change.       Daysi HERNANDEZP-C, FNP-C

## 2019-02-10 NOTE — DISCHARGE INSTRUCTIONS
Patient Education        Panic Attacks: Care Instructions  Your Care Instructions    During a panic attack, you may have a feeling of intense fear or terror, trouble breathing, chest pain or tightness, heartbeat changes, dizziness, sweating, and shaking. A panic attack starts suddenly and usually lasts from 5 to 20 minutes but may last even longer. You have the most anxiety about 10 minutes after the attack starts. An attack can begin with a stressful event, or it can happen without a cause. Although panic attacks can cause scary symptoms, you can learn to manage them with self-care, counseling, and medicine. Follow-up care is a key part of your treatment and safety. Be sure to make and go to all appointments, and call your doctor if you are having problems. It's also a good idea to know your test results and keep a list of the medicines you take. How can you care for yourself at home? · Take your medicine exactly as directed. Call your doctor if you think you are having a problem with your medicine. · Go to your counseling sessions and follow-up appointments. · Recognize and accept your anxiety. Then, when you are in a situation that makes you anxious, say to yourself, \"This is not an emergency. I feel uncomfortable, but I am not in danger. I can keep going even if I feel anxious. \"  · Be kind to your body:  ? Relieve tension with exercise or a massage. ? Get enough rest.  ? Avoid alcohol, caffeine, nicotine, and illegal drugs. They can increase your anxiety level, cause sleep problems, or trigger a panic attack. ? Learn and do relaxation techniques. See below for more about these techniques. · Engage your mind. Get out and do something you enjoy. Go to a funny movie, or take a walk or hike. Plan your day. Having too much or too little to do can make you anxious. · Keep a record of your symptoms.  Discuss your fears with a good friend or family member, or join a support group for people with similar problems. Talking to others sometimes relieves stress. · Get involved in social groups, or volunteer to help others. Being alone sometimes makes things seem worse than they are. · Get at least 30 minutes of exercise on most days of the week to relieve stress. Walking is a good choice. You also may want to do other activities, such as running, swimming, cycling, or playing tennis or team sports. Relaxation techniques  Do relaxation exercises for 10 to 20 minutes a day. You can play soothing, relaxing music while you do them, if you wish. · Tell others in your house that you are going to do your relaxation exercises. Ask them not to disturb you. · Find a comfortable place, away from all distractions and noise. · Lie down on your back, or sit with your back straight. · Focus on your breathing. Make it slow and steady. · Breathe in through your nose. Breathe out through either your nose or mouth. · Breathe deeply, filling up the area between your navel and your rib cage. Breathe so that your belly goes up and down. · Do not hold your breath. · Breathe like this for 5 to 10 minutes. Notice the feeling of calmness throughout your whole body. As you continue to breathe slowly and deeply, relax by doing the following for another 5 to 10 minutes:  · Tighten and relax each muscle group in your body. You can begin at your toes and work your way up to your head. · Imagine your muscle groups relaxing and becoming heavy. · Empty your mind of all thoughts. · Let yourself relax more and more deeply. · Become aware of the state of calmness that surrounds you. · When your relaxation time is over, you can bring yourself back to alertness by moving your fingers and toes and then your hands and feet and then stretching and moving your entire body. Sometimes people fall asleep during relaxation, but they usually wake up shortly afterward.   · Always give yourself time to return to full alertness before you drive a car or do anything that might cause an accident if you are not fully alert. Never play a relaxation tape while driving a car. When should you call for help? Call 911 anytime you think you may need emergency care. For example, call if:    · You feel you cannot stop from hurting yourself or someone else.    Watch closely for changes in your health, and be sure to contact your doctor if:    · Your panic attacks get worse.     · You have new or different anxiety.     · You are not getting better as expected. Where can you learn more? Go to http://los-zack.info/. Enter H601 in the search box to learn more about \"Panic Attacks: Care Instructions. \"  Current as of: September 11, 2018  Content Version: 11.9  © 4769-3146 [employer]. Care instructions adapted under license by [employer] (which disclaims liability or warranty for this information). If you have questions about a medical condition or this instruction, always ask your healthcare professional. [place] [address] any warranty or liability for your use of this information.

## 2019-02-11 LAB
ATRIAL RATE: 84 BPM
CALCULATED P AXIS, ECG09: 35 DEGREES
CALCULATED R AXIS, ECG10: -32 DEGREES
CALCULATED T AXIS, ECG11: 58 DEGREES
DIAGNOSIS, 93000: NORMAL
P-R INTERVAL, ECG05: 146 MS
Q-T INTERVAL, ECG07: 396 MS
QRS DURATION, ECG06: 102 MS
QTC CALCULATION (BEZET), ECG08: 467 MS
VENTRICULAR RATE, ECG03: 84 BPM

## 2019-02-20 ENCOUNTER — OFFICE VISIT (OUTPATIENT)
Dept: SURGERY | Age: 49
End: 2019-02-20

## 2019-02-20 VITALS
OXYGEN SATURATION: 97 % | WEIGHT: 186 LBS | SYSTOLIC BLOOD PRESSURE: 138 MMHG | HEART RATE: 85 BPM | RESPIRATION RATE: 16 BRPM | TEMPERATURE: 98 F | DIASTOLIC BLOOD PRESSURE: 88 MMHG | BODY MASS INDEX: 34.23 KG/M2 | HEIGHT: 62 IN

## 2019-02-20 DIAGNOSIS — K59.01 CONSTIPATION DUE TO SLOW TRANSIT: ICD-10-CM

## 2019-02-20 DIAGNOSIS — R10.12 LEFT UPPER QUADRANT PAIN: Primary | ICD-10-CM
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
## 2019-02-21 ENCOUNTER — TELEPHONE (OUTPATIENT)
Dept: FAMILY MEDICINE CLINIC | Age: 49
End: 2019-02-21

## 2019-02-21 RX ORDER — AMLODIPINE BESYLATE 10 MG/1
10 TABLET ORAL DAILY
Qty: 30 TAB | Refills: 6 | Status: SHIPPED | OUTPATIENT
Start: 2019-02-21 | End: 2019-09-11 | Stop reason: SDUPTHER

## 2019-02-21 RX ORDER — ATORVASTATIN CALCIUM 40 MG/1
40 TABLET, FILM COATED ORAL DAILY
Qty: 30 TAB | Refills: 6 | Status: SHIPPED | OUTPATIENT
Start: 2019-02-21 | End: 2019-09-11 | Stop reason: SDUPTHER

## 2019-02-21 NOTE — TELEPHONE ENCOUNTER
Pt is out of [address], you told her that you would call in a new prescribiton for her once she was out. Pt now has Medicaid.

## 2019-02-26 ENCOUNTER — CLINICAL SUPPORT (OUTPATIENT)
Dept: ONCOLOGY | Age: 49
End: 2019-02-26

## 2019-02-26 ENCOUNTER — HOSPITAL ENCOUNTER (OUTPATIENT)
Dept: INFUSION THERAPY | Age: 49
Discharge: HOME | End: 2019-02-26
Payer: COMMERCIAL

## 2019-02-26 ENCOUNTER — HOSPITAL ENCOUNTER (OUTPATIENT)
Dept: ONCOLOGY | Age: 49
Discharge: HOME | End: 2019-02-26

## 2019-02-26 VITALS
HEART RATE: 86 BPM | SYSTOLIC BLOOD PRESSURE: 141 MMHG | DIASTOLIC BLOOD PRESSURE: 86 MMHG | RESPIRATION RATE: 16 BRPM | TEMPERATURE: 98.4 F | OXYGEN SATURATION: 96 %

## 2019-02-26 DIAGNOSIS — D75.1 POLYCYTHEMIA: ICD-10-CM

## 2019-02-26 DIAGNOSIS — D75.1 POLYCYTHEMIA: Primary | ICD-10-CM

## 2019-02-26 LAB
BASO+EOS+MONOS # BLD AUTO: 0.8 K/UL (ref 0–2.3)
BASO+EOS+MONOS NFR BLD AUTO: 5 % (ref 0.1–17)
DIFFERENTIAL METHOD BLD: ABNORMAL
ERYTHROCYTE [DISTWIDTH] IN BLOOD BY AUTOMATED COUNT: 14.9 % (ref 11.5–14.5)
HCT VFR BLD AUTO: 43.1 % (ref 36–48)
HGB BLD-MCNC: 13.4 G/DL (ref 12–16)
LYMPHOCYTES # BLD: 3.1 K/UL (ref 1.1–5.9)
LYMPHOCYTES NFR BLD: 21 % (ref 14–44)
MCH RBC QN AUTO: 26.7 PG (ref 25–35)
MCHC RBC AUTO-ENTMCNC: 31.1 G/DL (ref 31–37)
MCV RBC AUTO: 86 FL (ref 78–102)
NEUTS SEG # BLD: 10.6 K/UL (ref 1.8–9.5)
NEUTS SEG NFR BLD: 74 % (ref 40–70)
PLATELET # BLD AUTO: 343 K/UL (ref 140–440)
RBC # BLD AUTO: 5.01 M/UL (ref 4.1–5.1)
WBC # BLD AUTO: 14.5 K/UL (ref 4.5–13)

## 2019-02-26 PROCEDURE — 36415 COLL VENOUS BLD VENIPUNCTURE: CPT

## 2019-02-26 NOTE — PROGRESS NOTES
[facility] Progress Note    Date: 2019    Name: Leonela Blanchard    MRN: [id number]         : 1970      Ms. Ozuna was assessed and education was provided. Ms. Ozuna's vitals were reviewed and patient was observed for 5 minutes prior to treatment. Visit Vitals  /86 (BP 1 Location: Left arm, BP Patient Position: Sitting)   Pulse 86   Temp 98.4 °F (36.9 °C)   Resp 16   SpO2 96%   CBC drawn via venipuncture to right ac x1 attempt, 2x2 and bandaid applied. Lab results were obtained and reviewed. Recent Results (from the past 12 hour(s))   CBC WITH 3 PART DIFF    Collection Time: 19  9:19 AM   Result Value Ref Range    WBC 14.5 (H) 4.5 - 13.0 K/uL    RBC 5.01 4.10 - 5.10 M/uL    HGB 13.4 12.0 - 16.0 g/dL    HCT 43.1 36 - 48 %    MCV 86.0 78 - 102 FL    MCH 26.7 25.0 - 35.0 PG    MCHC 31.1 31 - 37 g/dL    RDW 14.9 (H) 11.5 - 14.5 %    PLATELET 077 225 - 384 K/uL    NEUTROPHILS 74 (H) 40 - 70 %    MIXED CELLS 5 0.1 - 17 %    LYMPHOCYTES 21 14 - 44 %    ABS. NEUTROPHILS 10.6 (H) 1.8 - 9.5 K/UL    ABS. MIXED CELLS 0.8 0.0 - 2.3 K/uL    ABS. LYMPHOCYTES 3.1 1.1 - 5.9 K/UL    DF AUTOMATED         Today's HCT = 43.1. Therapeutic phlebotomy not indicated. Patient armband removed and shredded. Ms. Lul Chinchilla was discharged from [place] [id number] in stable condition at [address] She is to return on 3/25/19 at 1000 for her next appointment for CBC/Phlebotomy.     Rich Vizcaino RN  2019

## 2019-02-27 ENCOUNTER — OFFICE VISIT (OUTPATIENT)
Dept: CARDIOLOGY CLINIC | Age: 49
End: 2019-02-27

## 2019-02-27 VITALS
HEIGHT: 62 IN | DIASTOLIC BLOOD PRESSURE: 66 MMHG | WEIGHT: 195.6 LBS | SYSTOLIC BLOOD PRESSURE: 140 MMHG | BODY MASS INDEX: 35.99 KG/M2 | HEART RATE: 81 BPM

## 2019-02-27 DIAGNOSIS — I51.7 LVH (LEFT VENTRICULAR HYPERTROPHY): ICD-10-CM

## 2019-02-27 DIAGNOSIS — R07.9 CHEST PAIN, UNSPECIFIED TYPE: Primary | ICD-10-CM

## 2019-02-27 DIAGNOSIS — I10 ESSENTIAL HYPERTENSION: ICD-10-CM

## 2019-02-27 DIAGNOSIS — F14.10 COCAINE ABUSE (HCC): ICD-10-CM

## 2019-02-27 DIAGNOSIS — E78.5 DYSLIPIDEMIA: ICD-10-CM

## 2019-02-27 PROBLEM — E66.01 SEVERE OBESITY (HCC): Status: ACTIVE | Noted: 2019-02-27

## 2019-02-27 RX ORDER — ASPIRIN 81 MG/1
81 TABLET ORAL DAILY
Qty: 100 TAB | Refills: 1
Start: 2019-02-27 | End: 2019-09-11

## 2019-02-27 NOTE — PROGRESS NOTES
HISTORY OF PRESENT ILLNESS  Gideon Lynn is a 51 y.o. female. Recent er visit with atypical non anginal pain. Patient was in emergency room yesterday with atypical chest pain. 1/2019. ER notes and data reviewed      Hypertension   The history is provided by the patient. This is a chronic problem. The problem occurs constantly. The problem has not changed since onset. Associated symptoms include chest pain. Chest Pain (Angina)    The history is provided by the patient. This is a recurrent problem. The problem has not changed since onset. The problem occurs every several days. The pain is associated with exertion and rest. The pain is present in the substernal region. The pain is mild. The quality of the pain is described as dull. Pertinent negatives include no claudication, no cough, no dizziness, no fever, no hemoptysis, no nausea, no orthopnea, no palpitations, no PND, no sputum production, no vomiting and no weakness. Palpitations    The history is provided by the patient. This is a recurrent problem. The problem has not changed since onset. The problem occurs every several days. The problem is associated with nothing. Associated symptoms include chest pain. Pertinent negatives include no fever, no claudication, no orthopnea, no PND, no nausea, no vomiting, no dizziness, no weakness, no cough, no hemoptysis and no sputum production. Her past medical history is significant for hypertension. Review of Systems   Constitutional: Negative for chills and fever. HENT: Negative for nosebleeds. Eyes: Negative for blurred vision and double vision. Respiratory: Negative for cough, hemoptysis, sputum production and wheezing. Cardiovascular: Positive for chest pain. Negative for palpitations, orthopnea, claudication, leg swelling and PND. Gastrointestinal: Negative for heartburn, nausea and vomiting. Musculoskeletal: Negative for myalgias. Skin: Negative for rash.    Neurological: Negative for dizziness and weakness. Endo/Heme/Allergies: Does not bruise/bleed easily. Family History   Problem Relation Age of Onset    Hypertension Mother     Heart Attack Father     Hypertension Father     Cancer Father         skin    Cancer Sister         skin    Bipolar Disorder Sister     Cancer Brother         skin    Cancer Maternal Aunt         colon       Past Medical History:   Diagnosis Date    Asthma     Bronchitis     [facility]) [facility] [facility]     Cocaine abuse ([facility] [address] 3/26/2015    Dental caries     Depression     Depression 8/31/2016    Drug abuse ([facility]     Dysphasia 10/17/2018    Elevated hematocrit 8/31/2016    ETOH abuse     H/O screening mammography 12/06/2016    No evidence of malignancy     HPV (human papilloma virus) infection 11/2016    The patient referred to [facility]     Hypertension     Hypertensive left ventricular hypertrophy, without heart failure 10/15/2018    mild to moderate lvh continue bp meds    Ill-defined condition     Marijuana use 10/17/2018    Mood swings     Polycythemia 2018    Polycythemia     Psychiatric disorder     depression, Bipolar    Schatzki's ring 08/30/2018    Found on Barium Swallow    Sliding hiatal hernia 10/17/2018    Tobacco abuse     Vitamin D deficiency        Past Surgical History:   Procedure Laterality Date    HX APPENDECTOMY      HX COLPOSCOPY  12/06/2016    Low-grade courtney/mild dysplasia RICK I    HX HERNIA REPAIR      3x       Social History     Tobacco Use    Smoking status: Current Every Day Smoker     Packs/day: 0.50     Years: 20.00     Pack years: 10.00     Types: Cigarettes    Smokeless tobacco: Never Used   Substance Use Topics    Alcohol use: No       Allergies   Allergen Reactions    Iodinated Contrast- Oral And Iv Dye Shortness of Breath       Prior to Admission medications    Medication Sig Start Date End Date Taking? Authorizing Provider   aspirin delayed-release 81 mg tablet Take 1 Tab by mouth daily. 2/27/19  Yes Tree Kenny MD   amLODIPine (NORVASC) 10 mg tablet Take 1 Tab by mouth daily. 2/21/19  Yes Curt MCDONALD NP   atorvastatin (LIPITOR) 40 mg tablet Take 1 Tab by mouth daily. 2/21/19  Yes Curt MCDONALD NP   magnesium oxide (MAG-OX) 400 mg tablet Take 1 Tab by mouth daily. 12/3/18  Yes Paxton Butler NP   losartan-hydroCHLOROthiazide [facility]) 100-12.5 mg per tablet Take 1 Tab by mouth daily. 9/17/18  Yes Sapna Downey NP   potassium chloride (K-DUR, KLOR-CON) 20 mEq tablet Take 1 Tab by mouth daily. 8/27/18  Yes Curt MCDONALD NP   LORazepam (ATIVAN) 1 mg tablet Take  by mouth every four (4) hours as needed for Anxiety. Yes Provider, Historical   multivitamin, tx-iron-ca-min (THERA-M W/ IRON) 9 mg iron-400 mcg tab tablet Take 1 Tab by mouth daily. Yes Provider, Historical   lamoTRIgine (LAMICTAL) 100 mg tablet Take 200 mg by mouth daily. Yes Provider, Historical   mirtazapine (REMERON) 30 mg tablet Take  by mouth nightly. For sleep   Yes Provider, Historical   omega-3 acid ethyl esters (LOVAZA) 1 gram capsule Take 2 Caps by mouth two (2) times a day. 4/27/17  Yes Curt MCDONALD NP   albuterol (PROVENTIL HFA, VENTOLIN HFA, PROAIR HFA) 90 mcg/actuation inhaler Take 2 Puffs by inhalation every four (4) hours as needed for Wheezing. 11/28/16  Yes Paxton Butler NP         Visit Vitals  /66   Pulse 81   Ht 5' 2\" (1.575 m)   Wt 88.7 kg (195 lb 9.6 oz)   BMI 35.78 kg/m²         Physical Exam   Constitutional: She is oriented to person, place, and time. She appears well-developed and well-nourished. HENT:   Head: Normocephalic and atraumatic. Eyes: Conjunctivae are normal.   Neck: Neck supple. No JVD present. No tracheal deviation present. No thyromegaly present. Cardiovascular: Normal rate and regular rhythm. PMI is not displaced. Exam reveals no gallop, no S3 and no decreased pulses. Murmur heard.    Early systolic murmur is present at the upper right sternal border and lower left sternal border. Pulmonary/Chest: No respiratory distress. She has no wheezes. She has no rales. She exhibits no tenderness. Abdominal: Soft. There is no tenderness. Musculoskeletal: She exhibits no edema. Neurological: She is alert and oriented to person, place, and time. Skin: Skin is warm. Psychiatric: She has a normal mood and affect. Ms. Lisa Michel has a reminder for a \"due or due soon\" health maintenance. I have asked that she contact her primary care provider for follow-up on this health maintenance. I have personally reviewed patients ekg done at other facility. SUMMARY:2017  Left ventricle: Systolic function was normal. Ejection fraction was  estimated in the range of 55 % to 60 %. No obvious wall motion  abnormalities identified in the views obtained. Wall thickness was mildly  to moderately increased. There was mild concentric hypertrophy. NUCLEAR [id number]    Findings:   1. Post-stress imaging in short axis, horizontal and vertical long axis views reveals normal isotope uptake in all areas. 2. Resting images also show normal isotope uptake in all areas. 3. Gated images show normal left ventricular size, wall motion and systolic function. Ejection fraction is 71%. Diagnosis:   1. Normal scan. 2. No evidence of significant fixed or reversible defect suggesting ischemia or myocardial infarction noted from this nuclear study. 3. Low risk scan. Interpretation Summary        · Baseline ECG: Normal sinus rhythm, Minimal voltage criteria for LVH , maybe normal variant Septal infarct, age undetermined. · Inconclusive stress electrocardiogram.  · Low risk study. Submaximal stress test.  If clinically indicated, recommend nuclear stress test.      Stress Findings     ECG Baseline ECG: Normal sinus rhythm, Minimal voltage criteria for LVH , maybe normal variant  Septal infarct, age undetermined.   Arrhythmias during stress: rare PACs.   There was no ST segment deviation noted during stress. Arrhythmias during recovery: rare PACs. Arrhythmias were not significant. Stress Findings A stress test was performed following a modified Sathish protocol. The test was stopped because the patient complained of shortness of breath. The patient reported no angina during stress. Leg pain   The patient's response to exercise was inadequate for diagnosis. Blood pressure demonstrated a normal response to exercise. Overall, the patient's exercise capacity was normal.   Angina Index is 0. Inconclusive stress electrocardiogram for inducible myocardial ischemia. Patient unable to reach target heart rate   Stress Measurements     Baseline Vitals   Baseline HR 67 BPM      Baseline BP 80/57 mmHg       Peak Stress Vitals   Post peak  BPM      Stress Base Systolic  mmHg      Stress Base Diastolic BP 70 mmHg      Estimated workload 7 METS      Stress Rate Pressure Product 12,420 BPM*mmHg       Exercise Data   Target  bpm      Percent HR 79 %      Exercise duration time               Assessment         ICD-10-CM ICD-9-CM    1. Chest pain, unspecified type R07.9 786.50     still with significant chest pains. Unclear if it is angina. Inconclusive EKG part of the stress test with submaximal exercise. 2. Essential hypertension I10 401.9     Stable   3. LVH (left ventricular hypertrophy) I51.7 429.3     stable. Insurance did not allow 2D echo to measure for LVH   4. Dyslipidemia E78.5 272.4    5. Cocaine abuse (HCC) F14.10 305.60     Discussed cessation of drug use as it could even increase the risk of cardiac events   2/2019  Continue medical management insurance had refused nuclear stress test and echocardiogram exam stress echo EKG part inconclusive exercise to 79% of maximum predicted heart rate at this level of stress no EKG changes and no echo abnormality.   Continue to monitor clinically and continue with medication including amlodipine aspirin and statin. Discussed cessation of cocaine use    There are no discontinued medications. Orders Placed This Encounter    aspirin delayed-release 81 mg tablet     Sig: Take 1 Tab by mouth daily. Dispense:  100 Tab     Refill:  1       Follow-up Disposition:  Return in about 3 months (around 5/27/2019).

## 2019-02-27 NOTE — PROGRESS NOTES
Patient didn't bring medications, verbally reviewed    1. Have you been to the ER, urgent care clinic since your last visit? Hospitalized since your last visit? Yes When: 02/19 Where: [facility] Reason for visit: Palpitations    2. Have you seen or consulted any other health care providers outside of the [address] [place] [address] since your last visit? Include any pap smears or colon screening.  Yes Where: PCP Reason for visit: Routine

## 2019-02-27 NOTE — LETTER
Sharon Tijerinagard 1970 2/27/2019 Dear Anila Mckenzie NP I had the pleasure of evaluating  Ms. Ozuna in office today. Below are the relevant portions of my assessment and plan of care. ICD-10-CM ICD-9-CM 1. Chest pain, unspecified type R07.9 786.50   
 still with significant chest pains. Unclear if it is angina. Inconclusive EKG part of the stress test with submaximal exercise. 2. Essential hypertension I10 401.9 Stable 3. LVH (left ventricular hypertrophy) I51.7 429.3   
 stable. Insurance did not allow 2D echo to measure for LVH 4. Dyslipidemia E78.5 272.4 5. Cocaine abuse ([facility] F14.10 305.60 Discussed cessation of drug use as it could even increase the risk of cardiac events Current Outpatient Medications Medication Sig Dispense Refill  aspirin delayed-release 81 mg tablet Take 1 Tab by mouth daily. 100 Tab 1  
 amLODIPine (NORVASC) 10 mg tablet Take 1 Tab by mouth daily. 30 Tab 6  
 atorvastatin (LIPITOR) 40 mg tablet Take 1 Tab by mouth daily. 30 Tab 6  
 magnesium oxide (MAG-OX) 400 mg tablet Take 1 Tab by mouth daily. 30 Tab 3  
 losartan-hydroCHLOROthiazide (HYZAAR) 100-12.5 mg per tablet Take 1 Tab by mouth daily. 30 Tab 0  
 potassium chloride (K-DUR, KLOR-CON) 20 mEq tablet Take 1 Tab by mouth daily. 30 Tab 3  
 LORazepam (ATIVAN) 1 mg tablet Take  by mouth every four (4) hours as needed for Anxiety.  multivitamin, tx-iron-ca-min (THERA-M W/ IRON) 9 mg iron-400 mcg tab tablet Take 1 Tab by mouth daily.  lamoTRIgine (LAMICTAL) 100 mg tablet Take 200 mg by mouth daily.  mirtazapine (REMERON) 30 mg tablet Take  by mouth nightly. For sleep  omega-3 acid ethyl esters (LOVAZA) 1 gram capsule Take 2 Caps by mouth two (2) times a day. 360 Cap 3  
 albuterol (PROVENTIL HFA, VENTOLIN HFA, PROAIR HFA) 90 mcg/actuation inhaler Take 2 Puffs by inhalation every four (4) hours as needed for Wheezing.  6 Inhaler 3  
 
 
 Orders Placed This Encounter  aspirin delayed-release 81 mg tablet Sig: Take 1 Tab by mouth daily. Dispense:  100 Tab Refill:  1 If you have questions, please do not hesitate to call me. I look forward to following Ms. Ozuna along with you. Sincerely, Nicki Mtz MD

## 2019-03-05 DIAGNOSIS — R10.84 GENERALIZED ABDOMINAL PAIN: Primary | ICD-10-CM

## 2019-03-08 ENCOUNTER — HOSPITAL ENCOUNTER (OUTPATIENT)
Dept: NUCLEAR MEDICINE | Age: 49
Discharge: HOME | End: 2019-03-08
Payer: MEDICAID

## 2019-03-08 ENCOUNTER — HOSPITAL ENCOUNTER (OUTPATIENT)
Dept: ULTRASOUND IMAGING | Age: 49
Discharge: HOME | End: 2019-03-08
Payer: MEDICAID

## 2019-03-08 VITALS — WEIGHT: 192 LBS | BODY MASS INDEX: 35.12 KG/M2

## 2019-03-08 DIAGNOSIS — R10.12 LEFT UPPER QUADRANT PAIN: ICD-10-CM

## 2019-03-08 DIAGNOSIS — R10.84 GENERALIZED ABDOMINAL PAIN: ICD-10-CM

## 2019-03-08 PROCEDURE — 74011250636 HC RX REV CODE- 250/636

## 2019-03-08 PROCEDURE — 76705 ECHO EXAM OF ABDOMEN: CPT

## 2019-03-08 PROCEDURE — 78227 HEPATOBIL SYST IMAGE W/DRUG: CPT

## 2019-03-08 RX ADMIN — SINCALIDE 1.74 MCG: 5 INJECTION, POWDER, LYOPHILIZED, FOR SOLUTION INTRAVENOUS at 14:45

## 2019-03-21 ENCOUNTER — TELEPHONE (OUTPATIENT)
Dept: FAMILY MEDICINE CLINIC | Age: 49
End: 2019-03-21

## 2019-03-21 DIAGNOSIS — E78.1 HYPERTRIGLYCERIDEMIA: ICD-10-CM

## 2019-03-21 DIAGNOSIS — L71.9 ROSACEA: Primary | ICD-10-CM

## 2019-03-21 RX ORDER — OMEGA-3-ACID ETHYL ESTERS 1 G/1
2 CAPSULE, LIQUID FILLED ORAL 2 TIMES DAILY
Qty: 360 CAP | Refills: 3 | Status: SHIPPED | OUTPATIENT
Start: 2019-03-21 | End: 2020-04-28 | Stop reason: SDUPTHER

## 2019-03-21 NOTE — TELEPHONE ENCOUNTER
Patient want refill of the cream you prescibed for her for  Rosacea. Please call it into [facility] on [address].

## 2019-03-26 ENCOUNTER — HOSPITAL ENCOUNTER (OUTPATIENT)
Dept: ONCOLOGY | Age: 49
Discharge: HOME | End: 2019-03-26

## 2019-03-26 DIAGNOSIS — D75.1 POLYCYTHEMIA: ICD-10-CM

## 2019-03-26 NOTE — PROGRESS NOTES
[facility] Progress Note    Date: 2019    Name: Mandy Krueger    MRN: [id number]         : 1970      Ms. Ozuna did NOT show up today, Tuesday, 3-26-19, for her [facility] appointment for CBC/Phlebotomy. I attempted to contact her via phone on both of her listed phone numbers, but no answer received at either number, and no ability to leave messages on either. Therefore for now, her next tentative [facility] appointment for CBC/Phlebotomy is in 1 month, on 19 at 1300.       Jim Dorsey RN  2019  10:30 AM

## 2019-03-27 ENCOUNTER — OFFICE VISIT (OUTPATIENT)
Dept: CARDIOLOGY CLINIC | Age: 49
End: 2019-03-27

## 2019-03-27 VITALS
WEIGHT: 198 LBS | DIASTOLIC BLOOD PRESSURE: 56 MMHG | HEIGHT: 62 IN | HEART RATE: 94 BPM | BODY MASS INDEX: 36.44 KG/M2 | SYSTOLIC BLOOD PRESSURE: 98 MMHG

## 2019-03-27 DIAGNOSIS — Z72.0 TOBACCO ABUSE: ICD-10-CM

## 2019-03-27 DIAGNOSIS — I24.9 ACS (ACUTE CORONARY SYNDROME) (HCC): ICD-10-CM

## 2019-03-27 DIAGNOSIS — E78.5 DYSLIPIDEMIA: ICD-10-CM

## 2019-03-27 DIAGNOSIS — R07.9 CHEST PAIN, UNSPECIFIED TYPE: ICD-10-CM

## 2019-03-27 DIAGNOSIS — F14.10 COCAINE ABUSE (HCC): ICD-10-CM

## 2019-03-27 DIAGNOSIS — I10 ESSENTIAL HYPERTENSION: Primary | ICD-10-CM

## 2019-03-27 DIAGNOSIS — I51.7 LVH (LEFT VENTRICULAR HYPERTROPHY): ICD-10-CM

## 2019-03-27 RX ORDER — PREDNISONE 50 MG/1
50 TABLET ORAL 2 TIMES DAILY
Qty: 4 TAB | Refills: 0 | Status: SHIPPED | OUTPATIENT
Start: 2019-03-27 | End: 2019-03-29

## 2019-03-27 RX ORDER — DIPHENHYDRAMINE HCL 25 MG
50 TABLET ORAL 2 TIMES DAILY
Qty: 8 TAB | Refills: 0 | Status: SHIPPED | OUTPATIENT
Start: 2019-03-27 | End: 2019-03-29

## 2019-03-27 NOTE — PROGRESS NOTES
1. Have you been to the ER, urgent care clinic since your last visit? Hospitalized since your last visit? No    2. Have you seen or consulted any other health care providers outside of the [address] [place] [address] since your last visit? Include any pap smears or colon screening.  No

## 2019-03-27 NOTE — PATIENT INSTRUCTIONS
Start prednisone and benadryl 2 days prior to cardiac cath - take twice daily    Have blood work and chest xray 1 week before cath    Follow up post procedure as directed         Cocaine Misuse: Care Instructions  Your Care Instructions    Using cocaine can cause physical and mental harm. It can increase your heart rate and blood pressure, which can lead to a heart attack and even death. It can raise your body temperature. You may have nausea, vomiting, and chills. If you smoke cocaine, the fumes can cause breathing problems. If you snort cocaine, it can damage your nasal passages. If you inject cocaine, it can cause an abscess at the injection site or an infection throughout your body. You may become shaky and restless. You also may see or hear things that are not there (hallucinations), or believe things that are not true. When the doctor treated you, he or she may have:  · Watched your symptoms or done tests to find out how much cocaine was in your body. · Treated you to control your heart rate, temperature, and blood pressure. · Given you oxygen to help you breathe. · Given you medicine to settle your thoughts and help keep you calm. The doctor has checked you carefully, but problems can develop later. If you notice any problems or new symptoms, get medical treatment right away. How can you care for yourself at home? · When you use cocaine regularly, your body and brain get used to it. This is called dependency. If you are dependent on this drug, you may have withdrawal symptoms when you stop using it. You may feel drowsy, have vivid dreams, or feel hungry, tired, or depressed. You may also feel confused and have trouble thinking clearly. To help get past these symptoms:  ? Get plenty of rest.  ? Drink lots of fluids. ? Stay active, but don't tire yourself. ? Eat a healthy diet. · Talk to your doctor about drug counseling programs that can help you stop using cocaine.   · When you stop using cocaine, you may develop abstinence syndrome, which can last for months. Symptoms of this may include feeling depressed, being tired, having trouble concentrating, and craving cocaine. When should you call for help? Call 911 anytime you think you may need emergency care. For example, call if:    · You have symptoms of a heart attack. These may include:  ? Chest pain or pressure, or a strange feeling in the chest.  ? Sweating. ? Shortness of breath. ? Nausea or vomiting. ? Pain, pressure, or a strange feeling in the back, neck, jaw, or upper belly or in one or both shoulders or arms. ? A fast or irregular heartbeat. After you call 911, the  may tell you to chew 1 adult-strength or 2 to 4 low-dose aspirin. Wait for an ambulance. Do not try to drive yourself.     · You feel you cannot stop from hurting yourself or someone else.   [facility] your doctor now or seek immediate medical care if:    · You have severe side effects from using cocaine. These may include problems with thinking, such as seeing things that aren't there or thinking that someone is trying to harm you (paranoia).     · You have new or worse withdrawal symptoms.    Watch closely for changes in your health, and be sure to contact your doctor if:    · You need more help or support to stop. Where can you learn more? Go to http://los-zack.info/. Enter K849 in the search box to learn more about \"Cocaine Misuse: Care Instructions. \"  Current as of: May 7, 2018  Content Version: 11.9  © 4583-5236 [employer]. Care instructions adapted under license by [employer] (which disclaims liability or warranty for this information). If you have questions about a medical condition or this instruction, always ask your healthcare professional. [address] any warranty or liability for your use of this information.

## 2019-03-27 NOTE — PROGRESS NOTES
HISTORY OF PRESENT ILLNESS  Zandra Lainez is a 51 y.o. female. Recent er visit with atypical non anginal pain. Patient was in emergency room yesterday with atypical chest pain. 1/2019. ER notes and data reviewed    Hypertension   The history is provided by the patient. This is a chronic problem. The problem occurs constantly. The problem has not changed since onset. Associated symptoms include chest pain and shortness of breath. Chest Pain (Angina)    The history is provided by the patient. This is a recurrent problem. The problem has not changed since onset. The problem occurs every several days. The pain is associated with exertion and rest. The pain is present in the substernal region. The pain is mild. The quality of the pain is described as dull. Associated symptoms include shortness of breath. Pertinent negatives include no claudication, no cough, no dizziness, no fever, no hemoptysis, no nausea, no orthopnea, no palpitations, no PND, no sputum production, no vomiting and no weakness. Palpitations    The history is provided by the patient. This is a recurrent problem. The problem has not changed since onset. The problem occurs every several days. The problem is associated with nothing. Associated symptoms include chest pain and shortness of breath. Pertinent negatives include no fever, no claudication, no orthopnea, no PND, no nausea, no vomiting, no dizziness, no weakness, no cough, no hemoptysis and no sputum production. Her past medical history is significant for hypertension. Review of Systems   Constitutional: Negative for chills and fever. HENT: Negative for nosebleeds. Eyes: Negative for blurred vision and double vision. Respiratory: Positive for shortness of breath. Negative for cough, hemoptysis, sputum production and wheezing. Cardiovascular: Positive for chest pain. Negative for palpitations, orthopnea, claudication, leg swelling and PND.    Gastrointestinal: Negative for heartburn, nausea and vomiting. Musculoskeletal: Negative for myalgias. Skin: Negative for rash. Neurological: Negative for dizziness and weakness. Endo/Heme/Allergies: Does not bruise/bleed easily.      Family History   Problem Relation Age of Onset    Hypertension Mother     Heart Attack Father     Hypertension Father     Cancer Father         skin    Cancer Sister         skin    Bipolar Disorder Sister     Cancer Brother         skin    Cancer Maternal Aunt         colon       Past Medical History:   Diagnosis Date    Asthma     Bronchitis     [facility] [address] [address]     Cocaine abuse [facility] [address] 3/26/2015    Dental caries     Depression     Depression 8/31/2016    Drug abuse [facility] [address]     Dysphasia 10/17/2018    Elevated hematocrit 8/31/2016    ETOH abuse     H/O screening mammography 12/06/2016    No evidence of malignancy     HPV (human papilloma virus) infection 11/2016    The patient referred to [facility]     Hypertension     Hypertensive left ventricular hypertrophy, without heart failure 10/15/2018    mild to moderate lvh continue bp meds    Ill-defined condition     Marijuana use 10/17/2018    Mood swings     Polycythemia 2018    Polycythemia     Psychiatric disorder     depression, Bipolar    Schatzki's ring 08/30/2018    Found on Barium Swallow    Sliding hiatal hernia 10/17/2018    Tobacco abuse     Vitamin D deficiency        Past Surgical History:   Procedure Laterality Date    HX APPENDECTOMY      HX COLPOSCOPY  12/06/2016    Low-grade courtney/mild dysplasia RICK I    HX HERNIA REPAIR      3x       Social History     Tobacco Use    Smoking status: Current Every Day Smoker     Packs/day: 0.50     Years: 20.00     Pack years: 10.00     Types: Cigarettes    Smokeless tobacco: Never Used   Substance Use Topics    Alcohol use: No       Allergies   Allergen Reactions    Iodinated Contrast- Oral And Iv Dye Shortness of Breath       Prior to Admission medications Medication Sig Start Date End Date Taking? Authorizing Provider   predniSONE (DELTASONE) 50 mg tablet Take 1 Tab by mouth two (2) times a day for 2 days. 3/27/19 3/29/19 Yes Koby Diamond NP   diphenhydrAMINE (BENADRYL ALLERGY) 25 mg tablet Take 2 Tabs by mouth two (2) times a day for 2 days. 3/27/19 3/29/19 Yes Koby Diamond NP   omega-3 acid ethyl esters (LOVAZA) 1 gram capsule Take 2 Caps by mouth two (2) times a day. 3/21/19  Yes Mouna MCDONALD NP   aspirin delayed-release 81 mg tablet Take 1 Tab by mouth daily. 2/27/19  Yes Nino Pierce MD   amLODIPine (NORVASC) 10 mg tablet Take 1 Tab by mouth daily. 2/21/19  Yes Mouna MCDONALD NP   atorvastatin (LIPITOR) 40 mg tablet Take 1 Tab by mouth daily. 2/21/19  Yes Mouna MCDONALD NP   magnesium oxide (MAG-OX) 400 mg tablet Take 1 Tab by mouth daily. 12/3/18  Yes Nayeli Holt NP   losartan-hydroCHLOROthiazide [facility]) 100-12.5 mg per tablet Take 1 Tab by mouth daily. 9/17/18  Yes Adeola Downey NP   potassium chloride (K-DUR, KLOR-CON) 20 mEq tablet Take 1 Tab by mouth daily. 8/27/18  Yes Mouna MCDONALD NP   LORazepam (ATIVAN) 1 mg tablet Take  by mouth every four (4) hours as needed for Anxiety. Yes Provider, Historical   multivitamin, tx-iron-ca-min (THERA-M W/ IRON) 9 mg iron-400 mcg tab tablet Take 1 Tab by mouth daily. Yes Provider, Historical   lamoTRIgine (LAMICTAL) 100 mg tablet Take 200 mg by mouth daily. Yes Provider, Historical   mirtazapine (REMERON) 30 mg tablet Take  by mouth nightly. For sleep   Yes Provider, Historical   albuterol (PROVENTIL HFA, VENTOLIN HFA, PROAIR HFA) 90 mcg/actuation inhaler Take 2 Puffs by inhalation every four (4) hours as needed for Wheezing. 11/28/16  Yes Tyler Yanet, NP         Visit Vitals  BP 98/56   Pulse 94   Ht 5' 2\" (1.575 m)   Wt 89.8 kg (198 lb)   BMI 36.21 kg/m²         Physical Exam   Constitutional: She is oriented to person, place, and time.  She appears well-developed and well-nourished. HENT:   Head: Normocephalic and atraumatic. Eyes: Conjunctivae are normal.   Neck: Neck supple. No JVD present. No tracheal deviation present. No thyromegaly present. Cardiovascular: Normal rate and regular rhythm. PMI is not displaced. Exam reveals no gallop, no S3 and no decreased pulses. Murmur heard. Early systolic murmur is present at the upper right sternal border and lower left sternal border. Pulmonary/Chest: No respiratory distress. She has no wheezes. She has no rales. She exhibits no tenderness. Abdominal: Soft. There is no tenderness. Musculoskeletal: She exhibits no edema. Neurological: She is alert and oriented to person, place, and time. Skin: Skin is warm. Psychiatric: She has a normal mood and affect. Ms. Letha Lyons has a reminder for a \"due or due soon\" health maintenance. I have asked that she contact her primary care provider for follow-up on this health maintenance. I have personally reviewed patients ekg done at other facility. SUMMARY:2017  Left ventricle: Systolic function was normal. Ejection fraction was  estimated in the range of 55 % to 60 %. No obvious wall motion  abnormalities identified in the views obtained. Wall thickness was mildly  to moderately increased. There was mild concentric hypertrophy. NUCLEAR [id number]    Findings:   1. Post-stress imaging in short axis, horizontal and vertical long axis views reveals normal isotope uptake in all areas. 2. Resting images also show normal isotope uptake in all areas. 3. Gated images show normal left ventricular size, wall motion and systolic function. Ejection fraction is 71%. Diagnosis:   1. Normal scan. 2. No evidence of significant fixed or reversible defect suggesting ischemia or myocardial infarction noted from this nuclear study. 3. Low risk scan.     Interpretation Summary        · Baseline ECG: Normal sinus rhythm, Minimal voltage criteria for LVH , maybe normal variant Septal infarct, age undetermined. · Inconclusive stress electrocardiogram.  · Low risk study. Submaximal stress test.  If clinically indicated, recommend nuclear stress test.      Stress Findings     ECG Baseline ECG: Normal sinus rhythm, Minimal voltage criteria for LVH , maybe normal variant  Septal infarct, age undetermined. Arrhythmias during stress: rare PACs. There was no ST segment deviation noted during stress. Arrhythmias during recovery: rare PACs. Arrhythmias were not significant. Stress Findings A stress test was performed following a modified Sathish protocol. The test was stopped because the patient complained of shortness of breath. The patient reported no angina during stress. Leg pain   The patient's response to exercise was inadequate for diagnosis. Blood pressure demonstrated a normal response to exercise. Overall, the patient's exercise capacity was normal.   Angina Index is 0. Inconclusive stress electrocardiogram for inducible myocardial ischemia. Patient unable to reach target heart rate   Stress Measurements     Baseline Vitals   Baseline HR 67 BPM      Baseline BP 80/57 mmHg       Peak Stress Vitals   Post peak  BPM      Stress Base Systolic  mmHg      Stress Base Diastolic BP 70 mmHg      Estimated workload 7 METS      Stress Rate Pressure Product 12,420 BPM*mmHg       Exercise Data   Target  bpm      Percent HR 79 %      Exercise duration time               Assessment         ICD-10-CM ICD-9-CM    1. Essential hypertension I10 401.9 AMB POC EKG ROUTINE W/ 12 LEADS, INTER & REP    B/P stable   2. Chest pain, unspecified type Q92.1 864.76 METABOLIC PANEL, BASIC      CBC W/O DIFF      XR CHEST PA LAT      PROTHROMBIN TIME + INR      predniSONE (DELTASONE) 50 mg tablet      diphenhydrAMINE (BENADRYL ALLERGY) 25 mg tablet    Angina / ACS - will plan for cardiac cath   3. Dyslipidemia E78.5 272.4     continue statin   4.  LVH (left ventricular hypertrophy) I51.7 429.3      stable. Insurance did not allow 2D echo to measure for LVH         5. Cocaine abuse ([facility]) F14.10 305.60     Discussed cessation of drug use as it could even increase the risk of cardiac events   6. ACS (acute coronary syndrome) ([facility]) I24.9 411.1     Continue aspirin, norvasc and statin  Will proceed with cardiac catheterization. 7. Tobacco abuse Z72.0 305.1    2/2019  Continue medical management insurance had refused nuclear stress test and echocardiogram exam stress echo EKG part inconclusive exercise to 79% of maximum predicted heart rate at this level of stress no EKG changes and no echo abnormality. Continue to monitor clinically and continue with medication including amlodipine aspirin and statin. Discussed cessation of cocaine use    3/2019  ACS with continued substernal chest pain with radiation to left arm and shoulder with SOB. She was unable to have NST and stress echo due to insurance denial.  Continue aspirin, statin and norvasc. Not on BB due to ongoing cocaine use (last used 2 weeks ago). Will proceed with cardiac cath due to ongoing symptoms and risk factors. Pre-medicate with prednisone and benadryl to begin 2 days prior to cardiac cath. Pre-procedure labs and chest xray ordered. Discussed cessation of tobacco and cocaine use    There are no discontinued medications. Orders Placed This Encounter    XR CHEST PA LAT     Standing Status:   Future     Standing Expiration Date:   4/27/2020     Order Specific Question:   Reason for Exam     Answer:   pre cath     Order Specific Question:   Is Patient Pregnant?      Answer:   Unknown    METABOLIC PANEL, BASIC     Standing Status:   Future     Standing Expiration Date:   3/27/2020    CBC W/O DIFF     Standing Status:   Future     Standing Expiration Date:   3/27/2020    PROTHROMBIN TIME + INR     Standing Status:   Future     Standing Expiration Date:   3/27/2020    AMB POC EKG ROUTINE W/ 12 LEADS, INTER & REP     Order Specific Question:   Reason for Exam:     Answer:   HTN    predniSONE (DELTASONE) 50 mg tablet     Sig: Take 1 Tab by mouth two (2) times a day for 2 days. Dispense:  4 Tab     Refill:  0    diphenhydrAMINE (BENADRYL ALLERGY) 25 mg tablet     Sig: Take 2 Tabs by mouth two (2) times a day for 2 days. Dispense:  8 Tab     Refill:  0       Follow-up and Dispositions    · Return in about 1 month (around 4/27/2019).

## 2019-03-28 RX ORDER — CLINDAMYCIN PHOSPHATE 10 UG/ML
LOTION TOPICAL 2 TIMES DAILY
Qty: 1 BOTTLE | Refills: 0 | Status: SHIPPED | OUTPATIENT
Start: 2019-03-28 | End: 2019-06-06

## 2019-04-12 ENCOUNTER — HOSPITAL ENCOUNTER (EMERGENCY)
Age: 49
Discharge: HOME | End: 2019-04-12
Attending: EMERGENCY MEDICINE
Payer: MEDICAID

## 2019-04-12 ENCOUNTER — HOSPITAL ENCOUNTER (OUTPATIENT)
Dept: GENERAL RADIOLOGY | Age: 49
Discharge: HOME | End: 2019-04-12
Payer: MEDICAID

## 2019-04-12 ENCOUNTER — HOSPITAL ENCOUNTER (OUTPATIENT)
Dept: PREADMISSION TESTING | Age: 49
Discharge: HOME | End: 2019-04-12
Payer: MEDICAID

## 2019-04-12 VITALS
HEART RATE: 65 BPM | OXYGEN SATURATION: 97 % | TEMPERATURE: 97.4 F | SYSTOLIC BLOOD PRESSURE: 126 MMHG | RESPIRATION RATE: 13 BRPM | DIASTOLIC BLOOD PRESSURE: 76 MMHG

## 2019-04-12 DIAGNOSIS — R07.9 CHEST PAIN, UNSPECIFIED TYPE: Primary | ICD-10-CM

## 2019-04-12 DIAGNOSIS — R07.9 CHEST PAIN, UNSPECIFIED TYPE: ICD-10-CM

## 2019-04-12 LAB
ALBUMIN SERPL-MCNC: 3.6 G/DL (ref 3.4–5)
ALBUMIN/GLOB SERPL: 1.1 {RATIO} (ref 0.8–1.7)
ALP SERPL-CCNC: 90 U/L (ref 45–117)
ALT SERPL-CCNC: 24 U/L (ref 13–56)
ANION GAP SERPL CALC-SCNC: 4 MMOL/L (ref 3–18)
ANION GAP SERPL CALC-SCNC: 6 MMOL/L (ref 3–18)
AST SERPL-CCNC: 15 U/L (ref 15–37)
ATRIAL RATE: 72 BPM
BASOPHILS # BLD: 0 K/UL (ref 0–0.1)
BASOPHILS NFR BLD: 0 % (ref 0–2)
BILIRUB SERPL-MCNC: 0.3 MG/DL (ref 0.2–1)
BUN SERPL-MCNC: 14 MG/DL (ref 7–18)
BUN SERPL-MCNC: 15 MG/DL (ref 7–18)
BUN/CREAT SERPL: 23 (ref 12–20)
BUN/CREAT SERPL: 24 (ref 12–20)
CALCIUM SERPL-MCNC: 8.4 MG/DL (ref 8.5–10.1)
CALCIUM SERPL-MCNC: 9 MG/DL (ref 8.5–10.1)
CALCULATED P AXIS, ECG09: 56 DEGREES
CALCULATED R AXIS, ECG10: -22 DEGREES
CALCULATED T AXIS, ECG11: 59 DEGREES
CHLORIDE SERPL-SCNC: 106 MMOL/L (ref 100–108)
CHLORIDE SERPL-SCNC: 109 MMOL/L (ref 100–108)
CK MB CFR SERPL CALC: 2.2 % (ref 0–4)
CK MB SERPL-MCNC: 2.5 NG/ML (ref 5–25)
CK SERPL-CCNC: 115 U/L (ref 26–192)
CO2 SERPL-SCNC: 28 MMOL/L (ref 21–32)
CO2 SERPL-SCNC: 30 MMOL/L (ref 21–32)
CREAT SERPL-MCNC: 0.61 MG/DL (ref 0.6–1.3)
CREAT SERPL-MCNC: 0.63 MG/DL (ref 0.6–1.3)
DIAGNOSIS, 93000: NORMAL
DIFFERENTIAL METHOD BLD: ABNORMAL
EOSINOPHIL # BLD: 0.1 K/UL (ref 0–0.4)
EOSINOPHIL NFR BLD: 1 % (ref 0–5)
ERYTHROCYTE [DISTWIDTH] IN BLOOD BY AUTOMATED COUNT: 16.7 % (ref 11.6–14.5)
ERYTHROCYTE [DISTWIDTH] IN BLOOD BY AUTOMATED COUNT: 16.8 % (ref 11.6–14.5)
GLOBULIN SER CALC-MCNC: 3.3 G/DL (ref 2–4)
GLUCOSE SERPL-MCNC: 109 MG/DL (ref 74–99)
GLUCOSE SERPL-MCNC: 118 MG/DL (ref 74–99)
HCT VFR BLD AUTO: 41.1 % (ref 35–45)
HCT VFR BLD AUTO: 41.4 % (ref 35–45)
HGB BLD-MCNC: 13.1 G/DL (ref 12–16)
HGB BLD-MCNC: 13.3 G/DL (ref 12–16)
INR PPP: 1 (ref 0.8–1.2)
LYMPHOCYTES # BLD: 3.4 K/UL (ref 0.9–3.6)
LYMPHOCYTES NFR BLD: 29 % (ref 21–52)
MCH RBC QN AUTO: 26.6 PG (ref 24–34)
MCH RBC QN AUTO: 26.8 PG (ref 24–34)
MCHC RBC AUTO-ENTMCNC: 31.9 G/DL (ref 31–37)
MCHC RBC AUTO-ENTMCNC: 32.1 G/DL (ref 31–37)
MCV RBC AUTO: 83.4 FL (ref 74–97)
MCV RBC AUTO: 83.5 FL (ref 74–97)
MONOCYTES # BLD: 1.1 K/UL (ref 0.05–1.2)
MONOCYTES NFR BLD: 9 % (ref 3–10)
NEUTS SEG # BLD: 7.3 K/UL (ref 1.8–8)
NEUTS SEG NFR BLD: 61 % (ref 40–73)
P-R INTERVAL, ECG05: 182 MS
PLATELET # BLD AUTO: 286 K/UL (ref 135–420)
PLATELET # BLD AUTO: 294 K/UL (ref 135–420)
PMV BLD AUTO: 9.1 FL (ref 9.2–11.8)
PMV BLD AUTO: 9.3 FL (ref 9.2–11.8)
POTASSIUM SERPL-SCNC: 3.3 MMOL/L (ref 3.5–5.5)
POTASSIUM SERPL-SCNC: 3.9 MMOL/L (ref 3.5–5.5)
PROT SERPL-MCNC: 6.9 G/DL (ref 6.4–8.2)
PROTHROMBIN TIME: 12.6 SEC (ref 11.5–15.2)
Q-T INTERVAL, ECG07: 434 MS
QRS DURATION, ECG06: 118 MS
QTC CALCULATION (BEZET), ECG08: 475 MS
RBC # BLD AUTO: 4.93 M/UL (ref 4.2–5.3)
RBC # BLD AUTO: 4.96 M/UL (ref 4.2–5.3)
SODIUM SERPL-SCNC: 141 MMOL/L (ref 136–145)
SODIUM SERPL-SCNC: 142 MMOL/L (ref 136–145)
TROPONIN I SERPL-MCNC: <0.02 NG/ML (ref 0–0.04)
VENTRICULAR RATE, ECG03: 72 BPM
WBC # BLD AUTO: 10.1 K/UL (ref 4.6–13.2)
WBC # BLD AUTO: 11.9 K/UL (ref 4.6–13.2)

## 2019-04-12 PROCEDURE — 93005 ELECTROCARDIOGRAM TRACING: CPT

## 2019-04-12 PROCEDURE — 71046 X-RAY EXAM CHEST 2 VIEWS: CPT

## 2019-04-12 PROCEDURE — 85027 COMPLETE CBC AUTOMATED: CPT

## 2019-04-12 PROCEDURE — 74011250636 HC RX REV CODE- 250/636: Performed by: EMERGENCY MEDICINE

## 2019-04-12 PROCEDURE — 82550 ASSAY OF CK (CPK): CPT

## 2019-04-12 PROCEDURE — 74011250637 HC RX REV CODE- 250/637: Performed by: EMERGENCY MEDICINE

## 2019-04-12 PROCEDURE — 74011000250 HC RX REV CODE- 250: Performed by: EMERGENCY MEDICINE

## 2019-04-12 PROCEDURE — 85025 COMPLETE CBC W/AUTO DIFF WBC: CPT

## 2019-04-12 PROCEDURE — 36415 COLL VENOUS BLD VENIPUNCTURE: CPT

## 2019-04-12 PROCEDURE — 80053 COMPREHEN METABOLIC PANEL: CPT

## 2019-04-12 PROCEDURE — 85610 PROTHROMBIN TIME: CPT

## 2019-04-12 PROCEDURE — 96374 THER/PROPH/DIAG INJ IV PUSH: CPT

## 2019-04-12 PROCEDURE — 99285 EMERGENCY DEPT VISIT HI MDM: CPT

## 2019-04-12 RX ORDER — FAMOTIDINE 20 MG/1
20 TABLET, FILM COATED ORAL 2 TIMES DAILY
Qty: 20 TAB | Refills: 0 | Status: SHIPPED | OUTPATIENT
Start: 2019-04-12 | End: 2019-04-22

## 2019-04-12 RX ORDER — ACETAMINOPHEN 500 MG
1000 TABLET ORAL ONCE
Status: COMPLETED | OUTPATIENT
Start: 2019-04-12 | End: 2019-04-12

## 2019-04-12 RX ORDER — FAMOTIDINE 10 MG/ML
20 INJECTION INTRAVENOUS
Status: COMPLETED | OUTPATIENT
Start: 2019-04-12 | End: 2019-04-12

## 2019-04-12 RX ORDER — LIDOCAINE 4 G/100G
1 PATCH TOPICAL
Status: DISCONTINUED | OUTPATIENT
Start: 2019-04-12 | End: 2019-04-12 | Stop reason: HOSPADM

## 2019-04-12 RX ADMIN — ACETAMINOPHEN 1000 MG: 500 TABLET ORAL at 05:48

## 2019-04-12 RX ADMIN — ALUMINUM HYDROXIDE AND MAGNESIUM HYDROXIDE 15 ML: 200; 200 SUSPENSION ORAL at 05:48

## 2019-04-12 RX ADMIN — FAMOTIDINE 20 MG: 10 INJECTION INTRAVENOUS at 05:48

## 2019-04-12 NOTE — ED PROVIDER NOTES
EMERGENCY DEPARTMENT HISTORY AND PHYSICAL EXAM    5:27 AM  Date: 4/12/2019  Patient Name: Sofia Maldonado    History of Presenting Illness     Chief Complaint   Patient presents with    Back Pain    Chest Pain      History Provided By: Patient    HPI: Sofia Maldonado is a 51 y.o. female with history of CHF, cocaine abuse, alcohol abuse, depression, hypertension, here for chest pain. Patient states that she has had the pain on and off the, but it has gone worse over the past 3 days. It is mid chest and does not radiate, and it worsens whenever she is stressed out by her mother who is declining in health. There is no associated fever, shortness of breath, abdominal pain, leg pain or swelling. She has a cardiac cath planned on Monday with her cardiologist, and an endoscopy planned later on next month as well.     Location: Chest  Severity: Mild  Timing: Subacute  Duration: Days to weeks       PCP: Saleem Blandon NP    Past History     Past Medical History:  Past Medical History:   Diagnosis Date    Asthma     Bronchitis     [facility] [place] [address]     Cocaine abuse ([facility] [place] [address] 3/26/2015    Dental caries     Depression     Depression 8/31/2016    Drug abuse ([facility] [place] [address]     Dysphasia 10/17/2018    Elevated hematocrit 8/31/2016    ETOH abuse     H/O screening mammography 12/06/2016    No evidence of malignancy     HPV (human papilloma virus) infection 11/2016    The patient [facility]     Hypertension     Hypertensive left ventricular hypertrophy, without heart failure 10/15/2018    mild to moderate lvh continue bp meds    Ill-defined condition     Marijuana use 10/17/2018    Mood swings     Polycythemia 2018    Polycythemia     Psychiatric disorder     depression, Bipolar    Schatzki's ring 08/30/2018    Found on Barium Swallow    Sliding hiatal hernia 10/17/2018    Tobacco abuse     Vitamin D deficiency        Past Surgical History:  Past Surgical History:   Procedure Laterality Date    HX APPENDECTOMY      HX COLPOSCOPY  12/06/2016    Low-grade courtney/mild dysplasia RICK I    HX HERNIA REPAIR      3x       Family History:  Family History   Problem Relation Age of Onset    Hypertension Mother     Heart Attack Father     Hypertension Father     Cancer Father         skin    Cancer Sister         skin    Bipolar Disorder Sister     Cancer Brother         skin    Cancer Maternal Aunt         colon       Social History:  Social History     Tobacco Use    Smoking status: Current Every Day Smoker     Packs/day: 0.50     Years: 20.00     Pack years: 10.00     Types: Cigarettes    Smokeless tobacco: Never Used   Substance Use Topics    Alcohol use: No    Drug use: Yes     Types: Cocaine     Comment: smokes cocaine-current user       Allergies: Allergies   Allergen Reactions    Iodinated Contrast- Oral And Iv Dye Shortness of Breath       Review of Systems     Constitutional: No fevers. Eyes: No visual symptoms. ENT: No sore throat, runny nose, or ear pain. Respiratory: No cough, dyspnea, or wheezing. Cardiovascular: No palpitations or heaviness. +CP  Gastrointestinal: No nausea, vomiting, diarrhea. Genitourinary: No dysuria, frequency, or urgency. Musculoskeletal: No joint pain or swelling. Integumentary: No rashes. Neurological: No headaches, sensory or motor symptoms. All other systems negative. Physical Exam     Patient Vitals for the past 12 hrs:   Temp Pulse Resp BP SpO2   04/12/19 0545 -- 69 13 130/62 95 %   04/12/19 0530 -- 72 18 123/72 97 %   04/12/19 0515 -- 68 23 111/73 93 %   04/12/19 0500 -- 69 12 117/73 94 %   04/12/19 0445 -- 68 14 112/62 95 %   04/12/19 0430 -- 79 23 127/67 97 %   04/12/19 0415 -- 73 14 130/69 96 %   04/12/19 0400 97.4 °F (36.3 °C) 78 15 114/86 95 %   04/12/19 0347 -- 76 15 -- 98 %   04/12/19 0346 -- -- -- 136/76 --       Physical Exam   Constitutional: Appears well-developed. Is not diaphoretic.    Eyes: Conjunctiva clear, EOMI  Head: Normocephalic and atraumatic. Neck: Normal range of motion. No stridor or tracheal deviation. Cardiovascular: Intact distal pulses. Reproducible chest pain with palpation anteriorly. Pulmonary/Chest: Effort normal and no respiratory distress. Abdominal: Non distended. Musculoskeletal: Normal range of motion. Exhibits no tenderness or swelling  Neurological: Conversant, alert. Skin: Skin is warm and dry. Psychiatric: Has a normal mood and affect. Behavior is normal.   Nursing note and vitals reviewed. Diagnostic Study Results     Labs -  Recent Results (from the past 12 hour(s))   EKG, 12 LEAD, INITIAL    Collection Time: 04/12/19  3:42 AM   Result Value Ref Range    Ventricular Rate 72 BPM    Atrial Rate 72 BPM    P-R Interval 182 ms    QRS Duration 118 ms    Q-T Interval 434 ms    QTC Calculation (Bezet) 475 ms    Calculated P Axis 56 degrees    Calculated R Axis -22 degrees    Calculated T Axis 59 degrees    Diagnosis       Normal sinus rhythm  Left ventricular hypertrophy with QRS widening  Abnormal ECG  When compared with ECG of 09-FEB-2019 18:49,  Minimal criteria for Septal infarct are no longer present     CBC WITH AUTOMATED DIFF    Collection Time: 04/12/19  3:56 AM   Result Value Ref Range    WBC 11.9 4.6 - 13.2 K/uL    RBC 4.93 4.20 - 5.30 M/uL    HGB 13.1 12.0 - 16.0 g/dL    HCT 41.1 35.0 - 45.0 %    MCV 83.4 74.0 - 97.0 FL    MCH 26.6 24.0 - 34.0 PG    MCHC 31.9 31.0 - 37.0 g/dL    RDW 16.7 (H) 11.6 - 14.5 %    PLATELET 083 573 - 455 K/uL    MPV 9.1 (L) 9.2 - 11.8 FL    NEUTROPHILS 61 40 - 73 %    LYMPHOCYTES 29 21 - 52 %    MONOCYTES 9 3 - 10 %    EOSINOPHILS 1 0 - 5 %    BASOPHILS 0 0 - 2 %    ABS. NEUTROPHILS 7.3 1.8 - 8.0 K/UL    ABS. LYMPHOCYTES 3.4 0.9 - 3.6 K/UL    ABS. MONOCYTES 1.1 0.05 - 1.2 K/UL    ABS. EOSINOPHILS 0.1 0.0 - 0.4 K/UL    ABS.  BASOPHILS 0.0 0.0 - 0.1 K/UL    DF AUTOMATED     METABOLIC PANEL, COMPREHENSIVE    Collection Time: 04/12/19  3:56 AM Result Value Ref Range    Sodium 141 136 - 145 mmol/L    Potassium 3.3 (L) 3.5 - 5.5 mmol/L    Chloride 109 (H) 100 - 108 mmol/L    CO2 28 21 - 32 mmol/L    Anion gap 4 3.0 - 18 mmol/L    Glucose 118 (H) 74 - 99 mg/dL    BUN 14 7.0 - 18 MG/DL    Creatinine 0.61 0.6 - 1.3 MG/DL    BUN/Creatinine ratio 23 (H) 12 - 20      GFR est AA >60 >60 ml/min/1.73m2    GFR est non-AA >60 >60 ml/min/1.73m2    Calcium 8.4 (L) 8.5 - 10.1 MG/DL    Bilirubin, total 0.3 0.2 - 1.0 MG/DL    ALT (SGPT) 24 13 - 56 U/L    AST (SGOT) 15 15 - 37 U/L    Alk. phosphatase 90 45 - 117 U/L    Protein, total 6.9 6.4 - 8.2 g/dL    Albumin 3.6 3.4 - 5.0 g/dL    Globulin 3.3 2.0 - 4.0 g/dL    A-G Ratio 1.1 0.8 - 1.7     CARDIAC PANEL,(CK, CKMB & TROPONIN)    Collection Time: 04/12/19  3:56 AM   Result Value Ref Range     26 - 192 U/L    CK - MB 2.5 <3.6 ng/ml    CK-MB Index 2.2 0.0 - 4.0 %    Troponin-I, QT <0.02 0.0 - 0.045 NG/ML       Radiologic Studies -   CT Results  (Last 48 hours)    None        CXR Results  (Last 48 hours)    None          Medical Decision Making     ED Course: Progress Notes, Reevaluation, and Consults:  6:30 AM- spoke with Brando covering for Dr. Molina Ramos agrees with the reassuring EKG, troponin, other labs here. Agreeable to plan to continue to do cardiac cath on Monday, and she can contact Dr. Alejandra to reach out later. Went back to patient with this plan and she is agreeable to plan as well. She will actually go now for some preop labs in rapid reassessment with her PMD as well. She understands that she will have a very very low threshold for returning for reevaluation in case her symptoms return, change, do not improve. Provider Notes (Medical Decision Making): Based on my history, physical exam, and diagnostic evaluation, the patient appears to have symptoms consistent with chest pain. The physical exam was unremarkable.  I do not believe that the patient's symptoms are currently related to myocardial ischemia. Ekg does not demonstrate acute ischemic changes and the pt did not have a high HEART score. I do not believe this is a vascular catastrophe such as a dissection or an acute rupture of an AAA. I also do not believe the pt is having acute thrombo-embolic phenomena. Pt will be discharged to follow-up with their primary care physician and/or cardiologist in the next 24-48 hours. Patient was advised of our evaluation and understood the plan is to return to the emergency department if symptoms change, worsen, do not improve, or new symptoms develop. After a shared decision making discussion where we discussed the above, they are agreeable with a trial of outpatient management and had all questions answered. Vital Signs-Reviewed the patient's vital signs. Reviewed pt's pulse ox reading. EKG: Interpreted by the EP. Time Interpreted: 342   Rate: 72   Rhythm: NSR   Interpretation: LVH, nonspecific STs,       Records Reviewed: Nursing Notes and Old Medical Records (Time of Review: 5:27 AM)  -I am the first provider for this patient.  -I reviewed the vital signs, available nursing notes, past medical history, past surgical history, family history and social history. Current Facility-Administered Medications   Medication Dose Route Frequency Provider Last Rate Last Dose    lidocaine 4 % patch 1 Patch  1 Patch TransDERmal NOW Dominga Grady MD   1 Patch at 04/12/19 2787     Current Outpatient Medications   Medication Sig Dispense Refill    famotidine (PEPCID) 20 mg tablet Take 1 Tab by mouth two (2) times a day for 10 days. 20 Tab 0    aluminum-magnesium hydroxide (MAALOX) 200-200 mg/5 mL suspension Take 15 mL by mouth every six (6) hours as needed for Indigestion. 300 mL 0    lidocaine HCl-me-salicyl-menth 9-59-27 % ktcg 1 Patch by Apply Externally route daily as needed for Pain. 1 Box 0    clindamycin (CLEOCIN T) 1 % lotion Apply  to affected area two (2) times a day.  use thin film on affected area for Rosacea 1 Bottle 0    omega-3 acid ethyl esters (LOVAZA) 1 gram capsule Take 2 Caps by mouth two (2) times a day. 360 Cap 3    aspirin delayed-release 81 mg tablet Take 1 Tab by mouth daily. 100 Tab 1    amLODIPine (NORVASC) 10 mg tablet Take 1 Tab by mouth daily. 30 Tab 6    atorvastatin (LIPITOR) 40 mg tablet Take 1 Tab by mouth daily. 30 Tab 6    magnesium oxide (MAG-OX) 400 mg tablet Take 1 Tab by mouth daily. 30 Tab 3    losartan-hydroCHLOROthiazide (HYZAAR) 100-12.5 mg per tablet Take 1 Tab by mouth daily. 30 Tab 0    potassium chloride (K-DUR, KLOR-CON) 20 mEq tablet Take 1 Tab by mouth daily. 30 Tab 3    LORazepam (ATIVAN) 1 mg tablet Take  by mouth every four (4) hours as needed for Anxiety.  multivitamin, tx-iron-ca-min (THERA-M W/ IRON) 9 mg iron-400 mcg tab tablet Take 1 Tab by mouth daily.  lamoTRIgine (LAMICTAL) 100 mg tablet Take 200 mg by mouth daily.  mirtazapine (REMERON) 30 mg tablet Take  by mouth nightly. For sleep      albuterol (PROVENTIL HFA, VENTOLIN HFA, PROAIR HFA) 90 mcg/actuation inhaler Take 2 Puffs by inhalation every four (4) hours as needed for Wheezing. 6 Inhaler 3        Diagnosis     Clinical Impression:   1.  Chest pain, unspecified type        Disposition: GA home

## 2019-04-12 NOTE — DISCHARGE INSTRUCTIONS
Your evaluation today showed chest pain. Please follow up with a doctor as discussed. The evaluation and treatment done today requires that you follow up with a physician for re-evaluation. Not following up could result in chronic pain/disability/injury. Medical problems can change over time and symptoms can get worse or new symptoms can develop over time, therefore, it is important that you follow up as we discussed or return immedediately to the ER. Diseases don't read textbooks and there are limitations to our evaluation and testing, so please immediately return to the ER if you have any concerns. Call the ER if you have any questions about what we discussed. Please visit CME for discounts on any prescriptions you may have. At the [facility] we are genuinely concerned about your health and comfort. You may be selected to participate in a patient satisfaction survey mailed to your home. We are excited about the opportunity to learn from your experience so we may continue to improve. In striving for the very best we believe good is not good enough, but if you rate us as EXCELLENT in all boxes, we have succeeded. We strive to provide EXCELLENT care to you and your family. We appreciate the opportunity to take care of you, and hope you do well. Chest Pain: Care Instructions  Your Care Instructions    There are many things that can cause chest pain. Some are not serious and will get better on their own in a few days. But some kinds of chest pain need more testing and treatment. Your doctor may have recommended a follow-up visit in the next 8 to 12 hours. If you are not getting better, you may need more tests or treatment. Even though your doctor has released you, you still need to watch for any problems. The doctor carefully checked you, but sometimes problems can develop later.  If you have new symptoms or if your symptoms do not get better, get medical care right away.  If you have worse or different chest pain or pressure that lasts more than 5 minutes or you passed out (lost consciousness), call 911 or seek other emergency help right away. A medical visit is only one step in your treatment. Even if you feel better, you still need to do what your doctor recommends, such as going to all suggested follow-up appointments and taking medicines exactly as directed. This will help you recover and help prevent future problems. How can you care for yourself at home? · Rest until you feel better. · Take your medicine exactly as prescribed. Call your doctor if you think you are having a problem with your medicine. · Do not drive after taking a prescription pain medicine. When should you call for help? Call 911 if:    · You passed out (lost consciousness).     · You have severe difficulty breathing.     · You have symptoms of a heart attack. These may include:  ? Chest pain or pressure, or a strange feeling in your chest.  ? Sweating. ? Shortness of breath. ? Nausea or vomiting. ? Pain, pressure, or a strange feeling in your back, neck, jaw, or upper belly or in one or both shoulders or arms. ? Lightheadedness or sudden weakness. ? A fast or irregular heartbeat. After you call 911, the  may tell you to chew 1 adult-strength or 2 to 4 low-dose aspirin. Wait for an ambulance. Do not try to drive yourself.    Call your doctor today if:    · You have any trouble breathing.     · Your chest pain gets worse.     · You are dizzy or lightheaded, or you feel like you may faint.     · You are not getting better as expected.     · You are having new or different chest pain. Where can you learn more? Go to http://los-zack.info/. Enter A120 in the search box to learn more about \"Chest Pain: Care Instructions. \"  Current as of: September 23, 2018  Content Version: 11.9  © 5632-5168 [employer].  Care instructions adapted under license by [employer] (which disclaims liability or warranty for this information). If you have questions about a medical condition or this instruction, always ask your healthcare professional. [address] any warranty or liability for your use of this information.

## 2019-04-15 ENCOUNTER — HOSPITAL ENCOUNTER (OUTPATIENT)
Age: 49
Setting detail: OUTPATIENT SURGERY
Discharge: HOME | End: 2019-04-15
Attending: INTERNAL MEDICINE | Admitting: INTERNAL MEDICINE
Payer: MEDICAID

## 2019-04-15 VITALS — HEIGHT: 62 IN | WEIGHT: 200 LBS | BODY MASS INDEX: 36.8 KG/M2

## 2019-04-15 DIAGNOSIS — I24.9 ACUTE CORONARY SYNDROME (HCC): ICD-10-CM

## 2019-04-15 PROCEDURE — 80307 DRUG TEST PRSMV CHEM ANLYZR: CPT

## 2019-04-15 PROCEDURE — 74011250637 HC RX REV CODE- 250/637: Performed by: INTERNAL MEDICINE

## 2019-04-15 PROCEDURE — 74011250636 HC RX REV CODE- 250/636: Performed by: INTERNAL MEDICINE

## 2019-04-15 PROCEDURE — 82542 COL CHROMOTOGRAPHY QUAL/QUAN: CPT

## 2019-04-15 PROCEDURE — 80353 DRUG SCREENING COCAINE: CPT

## 2019-04-15 RX ORDER — GUAIFENESIN 100 MG/5ML
81 LIQUID (ML) ORAL ONCE
Status: COMPLETED | OUTPATIENT
Start: 2019-04-15 | End: 2019-04-15

## 2019-04-15 RX ORDER — DIPHENHYDRAMINE HCL 25 MG
25 CAPSULE ORAL
Status: COMPLETED | OUTPATIENT
Start: 2019-04-15 | End: 2019-04-15

## 2019-04-15 RX ADMIN — ASPIRIN 81 MG 81 MG: 81 TABLET ORAL at 08:13

## 2019-04-15 RX ADMIN — METHYLPREDNISOLONE SODIUM SUCCINATE 125 MG: 125 INJECTION, POWDER, FOR SOLUTION INTRAMUSCULAR; INTRAVENOUS at 08:13

## 2019-04-15 RX ADMIN — DIPHENHYDRAMINE HYDROCHLORIDE 25 MG: 25 CAPSULE ORAL at 08:13

## 2019-04-15 NOTE — PROGRESS NOTES
Case cancelled due to positive UDS. Pt informed of need to reschedule and education provided regarding need to abstain from drug usage prior to procedure.  Pt verbalized understanding

## 2019-04-16 RX ORDER — DIPHENHYDRAMINE HCL 25 MG
CAPSULE ORAL
Qty: 8 CAP | Refills: 0 | Status: SHIPPED | OUTPATIENT
Start: 2019-04-16 | End: 2019-09-11

## 2019-04-16 RX ORDER — PREDNISONE 50 MG/1
TABLET ORAL
Qty: 4 TAB | Refills: 0 | Status: SHIPPED | OUTPATIENT
Start: 2019-04-16 | End: 2019-06-06 | Stop reason: ALTCHOICE

## 2019-04-16 NOTE — TELEPHONE ENCOUNTER
Patient is scheduled for [facility] 4/29/19 and is allergic to iodine. Can you please put in a order for Prednisone and Benadryl to take prior.

## 2019-04-18 DIAGNOSIS — R06.02 SHORTNESS OF BREATH: ICD-10-CM

## 2019-04-18 DIAGNOSIS — Z72.0 TOBACCO ABUSE: ICD-10-CM

## 2019-04-19 LAB
AMPHET UR QL SCN: NEGATIVE
BARBITURATES UR QL SCN: NEGATIVE
BENZODIAZ UR QL: NEGATIVE
BZE UR CFM-MCNC: ABNORMAL NG/ML
BZE UR QL: POSITIVE
CANNABINOIDS UR QL CFM: POSITIVE
CANNABINOIDS UR QL SCN: POSITIVE
COCAINE UR QL SCN: POSITIVE
HDSCOM,HDSCOM: ABNORMAL
METHADONE UR QL: NEGATIVE
OPIATES UR QL: NEGATIVE
PCP UR QL: NEGATIVE
THC UR CFM-MCNC: 24 NG/ML

## 2019-04-20 RX ORDER — ALBUTEROL SULFATE 90 UG/1
1-2 AEROSOL, METERED RESPIRATORY (INHALATION)
Qty: 1 INHALER | Refills: 6 | Status: SHIPPED | OUTPATIENT
Start: 2019-04-20 | End: 2019-10-21 | Stop reason: SDUPTHER

## 2019-04-22 NOTE — TELEPHONE ENCOUNTER
Called patient to let her know that her refill request was sent to the pharmacy. Unable to contact patient and mailbox not set up at this time.

## 2019-04-30 ENCOUNTER — HOSPITAL ENCOUNTER (OUTPATIENT)
Dept: INFUSION THERAPY | Age: 49
Discharge: HOME | End: 2019-04-30
Payer: MEDICAID

## 2019-04-30 ENCOUNTER — CLINICAL SUPPORT (OUTPATIENT)
Dept: ONCOLOGY | Age: 49
End: 2019-04-30

## 2019-04-30 ENCOUNTER — HOSPITAL ENCOUNTER (OUTPATIENT)
Dept: ONCOLOGY | Age: 49
Discharge: HOME | End: 2019-04-30

## 2019-04-30 VITALS
RESPIRATION RATE: 20 BRPM | DIASTOLIC BLOOD PRESSURE: 72 MMHG | SYSTOLIC BLOOD PRESSURE: 114 MMHG | HEART RATE: 73 BPM | OXYGEN SATURATION: 95 % | TEMPERATURE: 99.4 F

## 2019-04-30 DIAGNOSIS — D75.1 POLYCYTHEMIA: Primary | ICD-10-CM

## 2019-04-30 DIAGNOSIS — D75.1 POLYCYTHEMIA: ICD-10-CM

## 2019-04-30 LAB
BASO+EOS+MONOS # BLD AUTO: 0.9 K/UL (ref 0–2.3)
BASO+EOS+MONOS NFR BLD AUTO: 10 % (ref 0.1–17)
DIFFERENTIAL METHOD BLD: ABNORMAL
ERYTHROCYTE [DISTWIDTH] IN BLOOD BY AUTOMATED COUNT: 16.5 % (ref 11.5–14.5)
HCT VFR BLD AUTO: 45.1 % (ref 36–48)
HGB BLD-MCNC: 14.2 G/DL (ref 12–16)
LYMPHOCYTES # BLD: 3.4 K/UL (ref 1.1–5.9)
LYMPHOCYTES NFR BLD: 38 % (ref 14–44)
MCH RBC QN AUTO: 27.5 PG (ref 25–35)
MCHC RBC AUTO-ENTMCNC: 31.5 G/DL (ref 31–37)
MCV RBC AUTO: 87.4 FL (ref 78–102)
NEUTS SEG # BLD: 4.8 K/UL (ref 1.8–9.5)
NEUTS SEG NFR BLD: 52 % (ref 40–70)
PLATELET # BLD AUTO: 300 K/UL (ref 140–440)
RBC # BLD AUTO: 5.16 M/UL (ref 4.1–5.1)
WBC # BLD AUTO: 9.1 K/UL (ref 4.5–13)

## 2019-04-30 PROCEDURE — 36415 COLL VENOUS BLD VENIPUNCTURE: CPT

## 2019-04-30 PROCEDURE — 74011250636 HC RX REV CODE- 250/636: Performed by: INTERNAL MEDICINE

## 2019-04-30 PROCEDURE — 99195 PHLEBOTOMY: CPT

## 2019-04-30 RX ORDER — SODIUM CHLORIDE 9 MG/ML
500 INJECTION, SOLUTION INTRAVENOUS ONCE
Status: COMPLETED | OUTPATIENT
Start: 2019-04-30 | End: 2019-04-30

## 2019-04-30 RX ADMIN — SODIUM CHLORIDE 500 ML: 9 INJECTION, SOLUTION INTRAVENOUS at 14:00

## 2019-04-30 NOTE — PROGRESS NOTES
[facility] OPIC Progress Note    Date: 2019    Name: Char Milton    MRN: [id number]         : 1970      Ms. Ozuna arrived in the [facility] today at 1315, in stable condition, here for CBC/Phlebotomy. She was assessed and education was provided. Ms. Ozuna's vitals were reviewed. Visit Vitals  /73 (BP 1 Location: Left arm, BP Patient Position: Sitting)   Pulse 74   Temp 97.8 °F (36.6 °C)   Resp 20   SpO2 95%   Breastfeeding? No           Blood for a CBC was drawn from her left AC at 1324, without incident. Lab results were obtained and reviewed. Recent Results (from the past 12 hour(s))   CBC WITH 3 PART DIFF    Collection Time: 19  1:24 PM   Result Value Ref Range    WBC 9.1 4.5 - 13.0 K/uL    RBC 5.16 (H) 4.10 - 5.10 M/uL    HGB 14.2 12.0 - 16.0 g/dL    HCT 45.1 36 - 48 %    MCV 87.4 78 - 102 FL    MCH 27.5 25.0 - 35.0 PG    MCHC 31.5 31 - 37 g/dL    RDW 16.5 (H) 11.5 - 14.5 %    PLATELET 461 222 - 549 K/uL    NEUTROPHILS 52 40 - 70 %    MIXED CELLS 10 0.1 - 17 %    LYMPHOCYTES 38 14 - 44 %    ABS. NEUTROPHILS 4.8 1.8 - 9.5 K/UL    ABS. MIXED CELLS 0.9 0.0 - 2.3 K/uL    ABS. LYMPHOCYTES 3.4 1.1 - 5.9 K/UL    DF AUTOMATED             Phlebotomy was indicated today per order, for HCT > 45.0. PIV (# 20 G) was established in her left [facility] without incident at [phone], and phlebotomy was started without incident. Snack was offered and provided. Phlebotomy was completed without incident at 1400, after having obtained 500 ml Blood, per order.  ml IV Bolus, was administered post phlebotomy, per order, and without incident. After completion of the NS Bolus, the PIV was removed and gauze/bandaid/coban was applied. Ms. Ozuna tolerated well, and had no complaints. Ms. René Mijares was discharged from [place] [address] in stable condition at 1440. Elmer Phan  She is to return on next Tuesday, 19, at 1100, for her next appointment, for CBC/Phlebotomy (Weekly Status).     Megan Adams RN  April 30, 2019  1:31 PM

## 2019-05-06 NOTE — PROGRESS NOTES
[facility] Progress Note    Date: May 6, 2019    Name: Ct Golden    MRN: [id number]         : 1970      Ms. Kerri House currently has an OPIC appointment scheduled in the [facility] on tomorrow, Tuesday, 19 at 1100, for CBC/Phlebotomy (Weekly Status). However, due to a last minute staffing emergency, the [facility] will be closed tomorrow. But, after multiple attempts to contact Ms. Ozuna regarding moving the appointment to the [facility] OPIC, I was unable to reach her, and no voicemail box was available. So for now, she has a tentative OPIC appointment in 2 weeks in the [facility], on 19 at 1100.         Vicky Fair RN  May 6, 2019  4:10 PM

## 2019-05-14 DIAGNOSIS — I10 ESSENTIAL HYPERTENSION: ICD-10-CM

## 2019-05-15 ENCOUNTER — OFFICE VISIT (OUTPATIENT)
Dept: FAMILY MEDICINE CLINIC | Age: 49
End: 2019-05-15

## 2019-05-15 VITALS
HEART RATE: 68 BPM | RESPIRATION RATE: 18 BRPM | SYSTOLIC BLOOD PRESSURE: 124 MMHG | DIASTOLIC BLOOD PRESSURE: 60 MMHG | TEMPERATURE: 98 F

## 2019-05-15 DIAGNOSIS — I10 ESSENTIAL HYPERTENSION: ICD-10-CM

## 2019-05-15 DIAGNOSIS — H10.32 ACUTE BACTERIAL CONJUNCTIVITIS OF LEFT EYE: Primary | ICD-10-CM

## 2019-05-15 RX ORDER — ERYTHROMYCIN 5 MG/G
1 OINTMENT OPHTHALMIC 2 TIMES DAILY
Qty: 1 TUBE | Refills: 0 | OUTPATIENT
Start: 2019-05-15 | End: 2019-05-25

## 2019-05-15 RX ORDER — LOSARTAN POTASSIUM AND HYDROCHLOROTHIAZIDE 12.5; 1 MG/1; MG/1
1 TABLET ORAL DAILY
Qty: 30 TAB | Refills: 0 | Status: SHIPPED | OUTPATIENT
Start: 2019-05-15 | End: 2019-06-24 | Stop reason: SDUPTHER

## 2019-05-15 NOTE — PROGRESS NOTES
Eye Complaints:  Pt c/o eye problem without pain to the left eye(s) for 3 days. Started with drainage, redness and crusting Visual acuity normal photosensitivity negative  Contact lens:negative  Eye glasses: negative  Discharge: purulent and crusting in am.  Symptoms are show no change . History of glaucoma: no  Rx for E-mycin ointment obtained from Ms. Stafford Files with verbal read back called into [facility] on [address]. Pt given instructions on pink eye and how to apply ointment.

## 2019-05-16 RX ORDER — LOSARTAN POTASSIUM AND HYDROCHLOROTHIAZIDE 12.5; 1 MG/1; MG/1
TABLET ORAL
Qty: 30 TAB | Refills: 2 | OUTPATIENT
Start: 2019-05-16

## 2019-05-16 NOTE — TELEPHONE ENCOUNTER
RX refused dut to [facility] sent in a refill on 5/15/19.     Requested Prescriptions     Refused Prescriptions Disp Refills    losartan-hydroCHLOROthiazide (HYZAAR) 100-12.5 mg per tablet [Pharmacy Med Name: Semaj Mcdonald 100-12.5 MG TAB] 30 Tab 2     Sig: TAKE ONE TABLET BY MOUTH DAILY     Refused By: Karla Alva     Reason for Refusal: Request already responded to by other means (e.g. phone or fax)

## 2019-05-21 ENCOUNTER — HOSPITAL ENCOUNTER (OUTPATIENT)
Dept: ONCOLOGY | Age: 49
Discharge: HOME | End: 2019-05-21

## 2019-05-21 DIAGNOSIS — D75.1 POLYCYTHEMIA: ICD-10-CM

## 2019-05-27 ENCOUNTER — APPOINTMENT (OUTPATIENT)
Dept: GENERAL RADIOLOGY | Age: 49
End: 2019-05-27
Attending: EMERGENCY MEDICINE
Payer: MEDICAID

## 2019-05-27 ENCOUNTER — HOSPITAL ENCOUNTER (EMERGENCY)
Age: 49
Discharge: HOME | End: 2019-05-27
Attending: EMERGENCY MEDICINE
Payer: MEDICAID

## 2019-05-27 VITALS
SYSTOLIC BLOOD PRESSURE: 142 MMHG | OXYGEN SATURATION: 99 % | HEART RATE: 74 BPM | RESPIRATION RATE: 20 BRPM | DIASTOLIC BLOOD PRESSURE: 79 MMHG | TEMPERATURE: 98.1 F

## 2019-05-27 DIAGNOSIS — R07.9 CHEST PAIN, UNSPECIFIED TYPE: Primary | ICD-10-CM

## 2019-05-27 LAB
ALBUMIN SERPL-MCNC: 3.5 G/DL (ref 3.4–5)
ALBUMIN/GLOB SERPL: 1.2 {RATIO} (ref 0.8–1.7)
ALP SERPL-CCNC: 87 U/L (ref 45–117)
ALT SERPL-CCNC: 21 U/L (ref 13–56)
ANION GAP SERPL CALC-SCNC: 5 MMOL/L (ref 3–18)
AST SERPL-CCNC: 14 U/L (ref 15–37)
BASOPHILS # BLD: 0 K/UL (ref 0–0.1)
BASOPHILS NFR BLD: 0 % (ref 0–2)
BILIRUB SERPL-MCNC: 0.2 MG/DL (ref 0.2–1)
BUN SERPL-MCNC: 8 MG/DL (ref 7–18)
BUN/CREAT SERPL: 15 (ref 12–20)
CALCIUM SERPL-MCNC: 9.1 MG/DL (ref 8.5–10.1)
CHLORIDE SERPL-SCNC: 108 MMOL/L (ref 100–108)
CK MB CFR SERPL CALC: 2.1 % (ref 0–4)
CK MB SERPL-MCNC: 1.2 NG/ML (ref 5–25)
CK SERPL-CCNC: 57 U/L (ref 26–192)
CO2 SERPL-SCNC: 29 MMOL/L (ref 21–32)
CREAT SERPL-MCNC: 0.55 MG/DL (ref 0.6–1.3)
DIFFERENTIAL METHOD BLD: ABNORMAL
EOSINOPHIL # BLD: 0.2 K/UL (ref 0–0.4)
EOSINOPHIL NFR BLD: 2 % (ref 0–5)
ERYTHROCYTE [DISTWIDTH] IN BLOOD BY AUTOMATED COUNT: 15.8 % (ref 11.6–14.5)
GLOBULIN SER CALC-MCNC: 3 G/DL (ref 2–4)
GLUCOSE SERPL-MCNC: 110 MG/DL (ref 74–99)
HCT VFR BLD AUTO: 41 % (ref 35–45)
HGB BLD-MCNC: 13.2 G/DL (ref 12–16)
LYMPHOCYTES # BLD: 3.4 K/UL (ref 0.9–3.6)
LYMPHOCYTES NFR BLD: 33 % (ref 21–52)
MCH RBC QN AUTO: 26.8 PG (ref 24–34)
MCHC RBC AUTO-ENTMCNC: 32.2 G/DL (ref 31–37)
MCV RBC AUTO: 83.2 FL (ref 74–97)
MONOCYTES # BLD: 0.8 K/UL (ref 0.05–1.2)
MONOCYTES NFR BLD: 8 % (ref 3–10)
NEUTS SEG # BLD: 5.9 K/UL (ref 1.8–8)
NEUTS SEG NFR BLD: 57 % (ref 40–73)
PLATELET # BLD AUTO: 304 K/UL (ref 135–420)
PMV BLD AUTO: 8.9 FL (ref 9.2–11.8)
POTASSIUM SERPL-SCNC: 3.6 MMOL/L (ref 3.5–5.5)
PROT SERPL-MCNC: 6.5 G/DL (ref 6.4–8.2)
RBC # BLD AUTO: 4.93 M/UL (ref 4.2–5.3)
SODIUM SERPL-SCNC: 142 MMOL/L (ref 136–145)
TROPONIN I SERPL-MCNC: <0.02 NG/ML (ref 0–0.04)
WBC # BLD AUTO: 10.3 K/UL (ref 4.6–13.2)

## 2019-05-27 PROCEDURE — 93005 ELECTROCARDIOGRAM TRACING: CPT

## 2019-05-27 PROCEDURE — 82553 CREATINE MB FRACTION: CPT

## 2019-05-27 PROCEDURE — 85025 COMPLETE CBC W/AUTO DIFF WBC: CPT

## 2019-05-27 PROCEDURE — 71045 X-RAY EXAM CHEST 1 VIEW: CPT

## 2019-05-27 PROCEDURE — 80053 COMPREHEN METABOLIC PANEL: CPT

## 2019-05-27 PROCEDURE — 99285 EMERGENCY DEPT VISIT HI MDM: CPT

## 2019-05-28 ENCOUNTER — HOSPITAL ENCOUNTER (EMERGENCY)
Age: 49
Discharge: HOME | End: 2019-05-28
Attending: EMERGENCY MEDICINE | Admitting: EMERGENCY MEDICINE
Payer: MEDICAID

## 2019-05-28 VITALS
SYSTOLIC BLOOD PRESSURE: 161 MMHG | DIASTOLIC BLOOD PRESSURE: 92 MMHG | TEMPERATURE: 97.5 F | OXYGEN SATURATION: 96 % | RESPIRATION RATE: 18 BRPM | HEART RATE: 67 BPM

## 2019-05-28 DIAGNOSIS — R53.81 MALAISE AND FATIGUE: Primary | ICD-10-CM

## 2019-05-28 DIAGNOSIS — N30.00 ACUTE CYSTITIS WITHOUT HEMATURIA: ICD-10-CM

## 2019-05-28 DIAGNOSIS — R53.83 MALAISE AND FATIGUE: Primary | ICD-10-CM

## 2019-05-28 DIAGNOSIS — A59.03 TRICHOMONIASIS OF BLADDER: ICD-10-CM

## 2019-05-28 LAB
ALBUMIN SERPL-MCNC: 3.9 G/DL (ref 3.4–5)
ALBUMIN/GLOB SERPL: 1.1 {RATIO} (ref 0.8–1.7)
ALP SERPL-CCNC: 92 U/L (ref 45–117)
ALT SERPL-CCNC: 22 U/L (ref 13–56)
ANION GAP SERPL CALC-SCNC: 6 MMOL/L (ref 3–18)
APPEARANCE UR: ABNORMAL
AST SERPL-CCNC: 15 U/L (ref 15–37)
ATRIAL RATE: 71 BPM
BACTERIA URNS QL MICRO: ABNORMAL /HPF
BASOPHILS # BLD: 0 K/UL (ref 0–0.1)
BASOPHILS NFR BLD: 0 % (ref 0–2)
BILIRUB SERPL-MCNC: 0.3 MG/DL (ref 0.2–1)
BILIRUB UR QL: NEGATIVE
BUN SERPL-MCNC: 9 MG/DL (ref 7–18)
BUN/CREAT SERPL: 14 (ref 12–20)
CALCIUM SERPL-MCNC: 8.9 MG/DL (ref 8.5–10.1)
CALCULATED P AXIS, ECG09: 42 DEGREES
CALCULATED R AXIS, ECG10: -20 DEGREES
CALCULATED T AXIS, ECG11: 64 DEGREES
CHLORIDE SERPL-SCNC: 105 MMOL/L (ref 100–108)
CO2 SERPL-SCNC: 29 MMOL/L (ref 21–32)
COLOR UR: YELLOW
CREAT SERPL-MCNC: 0.64 MG/DL (ref 0.6–1.3)
DIAGNOSIS, 93000: NORMAL
DIFFERENTIAL METHOD BLD: ABNORMAL
EOSINOPHIL # BLD: 0.1 K/UL (ref 0–0.4)
EOSINOPHIL NFR BLD: 1 % (ref 0–5)
EPITH CASTS URNS QL MICRO: ABNORMAL /LPF (ref 0–5)
ERYTHROCYTE [DISTWIDTH] IN BLOOD BY AUTOMATED COUNT: 15.8 % (ref 11.6–14.5)
GLOBULIN SER CALC-MCNC: 3.5 G/DL (ref 2–4)
GLUCOSE SERPL-MCNC: 98 MG/DL (ref 74–99)
GLUCOSE UR STRIP.AUTO-MCNC: NEGATIVE MG/DL
HCG UR QL: NEGATIVE
HCT VFR BLD AUTO: 43 % (ref 35–45)
HGB BLD-MCNC: 14.1 G/DL (ref 12–16)
HGB UR QL STRIP: NEGATIVE
KETONES UR QL STRIP.AUTO: NEGATIVE MG/DL
LEUKOCYTE ESTERASE UR QL STRIP.AUTO: ABNORMAL
LYMPHOCYTES # BLD: 2.5 K/UL (ref 0.9–3.6)
LYMPHOCYTES NFR BLD: 21 % (ref 21–52)
MCH RBC QN AUTO: 27 PG (ref 24–34)
MCHC RBC AUTO-ENTMCNC: 32.8 G/DL (ref 31–37)
MCV RBC AUTO: 82.2 FL (ref 74–97)
MONOCYTES # BLD: 0.7 K/UL (ref 0.05–1.2)
MONOCYTES NFR BLD: 6 % (ref 3–10)
NEUTS SEG # BLD: 8.6 K/UL (ref 1.8–8)
NEUTS SEG NFR BLD: 72 % (ref 40–73)
NITRITE UR QL STRIP.AUTO: NEGATIVE
P-R INTERVAL, ECG05: 174 MS
PH UR STRIP: 7.5 [PH] (ref 5–8)
PLATELET # BLD AUTO: 314 K/UL (ref 135–420)
PMV BLD AUTO: 9.2 FL (ref 9.2–11.8)
POTASSIUM SERPL-SCNC: 3.4 MMOL/L (ref 3.5–5.5)
PROT SERPL-MCNC: 7.4 G/DL (ref 6.4–8.2)
PROT UR STRIP-MCNC: NEGATIVE MG/DL
Q-T INTERVAL, ECG07: 424 MS
QRS DURATION, ECG06: 114 MS
QTC CALCULATION (BEZET), ECG08: 460 MS
RBC # BLD AUTO: 5.23 M/UL (ref 4.2–5.3)
RBC #/AREA URNS HPF: NEGATIVE /HPF (ref 0–5)
SODIUM SERPL-SCNC: 140 MMOL/L (ref 136–145)
SP GR UR REFRACTOMETRY: 1.01 (ref 1–1.03)
TRICHOMONAS UR QL MICRO: ABNORMAL
TROPONIN I SERPL-MCNC: <0.02 NG/ML (ref 0–0.04)
UROBILINOGEN UR QL STRIP.AUTO: 1 EU/DL (ref 0.2–1)
VENTRICULAR RATE, ECG03: 71 BPM
WBC # BLD AUTO: 11.8 K/UL (ref 4.6–13.2)
WBC URNS QL MICRO: ABNORMAL /HPF (ref 0–4)

## 2019-05-28 PROCEDURE — 96374 THER/PROPH/DIAG INJ IV PUSH: CPT

## 2019-05-28 PROCEDURE — 84484 ASSAY OF TROPONIN QUANT: CPT

## 2019-05-28 PROCEDURE — 85025 COMPLETE CBC W/AUTO DIFF WBC: CPT

## 2019-05-28 PROCEDURE — 74011250637 HC RX REV CODE- 250/637: Performed by: EMERGENCY MEDICINE

## 2019-05-28 PROCEDURE — 81025 URINE PREGNANCY TEST: CPT

## 2019-05-28 PROCEDURE — 74011250636 HC RX REV CODE- 250/636: Performed by: EMERGENCY MEDICINE

## 2019-05-28 PROCEDURE — 74011000250 HC RX REV CODE- 250: Performed by: EMERGENCY MEDICINE

## 2019-05-28 PROCEDURE — 81001 URINALYSIS AUTO W/SCOPE: CPT

## 2019-05-28 PROCEDURE — 80053 COMPREHEN METABOLIC PANEL: CPT

## 2019-05-28 PROCEDURE — 99283 EMERGENCY DEPT VISIT LOW MDM: CPT

## 2019-05-28 RX ORDER — METRONIDAZOLE 500 MG/1
2000 TABLET ORAL
Status: COMPLETED | OUTPATIENT
Start: 2019-05-28 | End: 2019-05-28

## 2019-05-28 RX ORDER — DOXYCYCLINE 100 MG/1
100 CAPSULE ORAL
Status: COMPLETED | OUTPATIENT
Start: 2019-05-28 | End: 2019-05-28

## 2019-05-28 RX ORDER — DOXYCYCLINE 100 MG/1
100 CAPSULE ORAL 2 TIMES DAILY
Qty: 14 CAP | Refills: 0 | Status: SHIPPED | OUTPATIENT
Start: 2019-05-28 | End: 2019-06-04

## 2019-05-28 RX ORDER — CEFTRIAXONE 250 MG/8ML
1000 INJECTION, POWDER, FOR SOLUTION INTRAMUSCULAR; INTRAVENOUS ONCE
Status: DISCONTINUED | OUTPATIENT
Start: 2019-05-28 | End: 2019-05-28 | Stop reason: SDUPTHER

## 2019-05-28 RX ORDER — METRONIDAZOLE 500 MG/100ML
2000 INJECTION, SOLUTION INTRAVENOUS
Status: DISCONTINUED | OUTPATIENT
Start: 2019-05-28 | End: 2019-05-28 | Stop reason: SDUPTHER

## 2019-05-28 RX ADMIN — METRONIDAZOLE 2000 MG: 500 TABLET, FILM COATED ORAL at 19:39

## 2019-05-28 RX ADMIN — CEFTRIAXONE SODIUM 1 G: 1 INJECTION, POWDER, FOR SOLUTION INTRAMUSCULAR; INTRAVENOUS at 19:40

## 2019-05-28 RX ADMIN — DOXYCYCLINE HYCLATE 100 MG: 100 CAPSULE ORAL at 19:40

## 2019-05-28 NOTE — ED PROVIDER NOTES
EMERGENCY DEPARTMENT HISTORY AND PHYSICAL EXAM    9:26 PM      Date: 5/27/2019  Patient Name: Tara Gagnon    History of Presenting Illness     Chief Complaint   Patient presents with    Chest Pain         History Provided By: Patient    Additional History (Context): Tara Gagnon is a 51 y.o. female with history of CHF, cocaine abuse who presents with chest pain that started just prior to arrival.  She states that she started having an aching, pressure in her chest while she was laying to go to sleep. She was given aspirin and nitro by EMS, now feels improved. She notes associated nausea and diaphoresis though that has resolved now. She is now pain-free. She notes that she was having intermittent chest pain, was scheduled to have a diagnostic left heart cath with Dr. Nga Peng, but then her mother passed, she missed the appointment, and has not rescheduled. PCP: Magui MCDONALD NP    Current Outpatient Medications   Medication Sig Dispense Refill    losartan-hydroCHLOROthiazide (HYZAAR) 100-12.5 mg per tablet Take 1 Tab by mouth daily. 30 Tab 0    albuterol (PROVENTIL HFA, VENTOLIN HFA, PROAIR HFA) 90 mcg/actuation inhaler Take 1-2 Puffs by inhalation every four (4) hours as needed for Wheezing. 1 Inhaler 6    diphenhydrAMINE (BENADRYL) 25 mg capsule Take 2 tabs by mouth two (2) times a day for 2 days 8 Cap 0    predniSONE (DELTASONE) 50 mg tablet Take 1 Tab by mouth two (2) times a day for 2 days. 4 Tab 0    aluminum-magnesium hydroxide (MAALOX) 200-200 mg/5 mL suspension Take 15 mL by mouth every six (6) hours as needed for Indigestion. 300 mL 0    lidocaine HCl-me-salicyl-menth 4-25-47 % ktcg 1 Patch by Apply Externally route daily as needed for Pain. 1 Box 0    clindamycin (CLEOCIN T) 1 % lotion Apply  to affected area two (2) times a day. use thin film on affected area for Rosacea 1 Bottle 0    omega-3 acid ethyl esters (LOVAZA) 1 gram capsule Take 2 Caps by mouth two (2) times a day. 360 Cap 3    aspirin delayed-release 81 mg tablet Take 1 Tab by mouth daily. 100 Tab 1    amLODIPine (NORVASC) 10 mg tablet Take 1 Tab by mouth daily. 30 Tab 6    atorvastatin (LIPITOR) 40 mg tablet Take 1 Tab by mouth daily. 30 Tab 6    magnesium oxide (MAG-OX) 400 mg tablet Take 1 Tab by mouth daily. 30 Tab 3    potassium chloride (K-DUR, KLOR-CON) 20 mEq tablet Take 1 Tab by mouth daily. 30 Tab 3    LORazepam (ATIVAN) 1 mg tablet Take  by mouth every four (4) hours as needed for Anxiety.  multivitamin, tx-iron-ca-min (THERA-M W/ IRON) 9 mg iron-400 mcg tab tablet Take 1 Tab by mouth daily.  lamoTRIgine (LAMICTAL) 100 mg tablet Take 200 mg by mouth daily.  mirtazapine (REMERON) 30 mg tablet Take  by mouth nightly.  For sleep         Past History     Past Medical History:  Past Medical History:   Diagnosis Date    Asthma     Bronchitis     [facility]     Cocaine abuse ([facility] 3/26/2015    Dental caries     Depression     Depression 8/31/2016    Drug abuse ([facility]     Dysphasia 10/17/2018    Elevated hematocrit 8/31/2016    ETOH abuse     H/O screening mammography 12/06/2016    No evidence of malignancy     HPV (human papilloma virus) infection 11/2016    The patient [facility]     Hypertension     Hypertensive left ventricular hypertrophy, without heart failure 10/15/2018    mild to moderate lvh continue bp meds    Ill-defined condition     Marijuana use 10/17/2018    Mood swings     Polycythemia 2018    Polycythemia     Psychiatric disorder     depression, Bipolar    Schatzki's ring 08/30/2018    Found on Barium Swallow    Sliding hiatal hernia 10/17/2018    Tobacco abuse     Vitamin D deficiency        Past Surgical History:  Past Surgical History:   Procedure Laterality Date    HX APPENDECTOMY      HX COLPOSCOPY  12/06/2016    Low-grade courtney/mild dysplasia RICK I    HX HERNIA REPAIR      3x       Family History:  Family History   Problem Relation Age of Onset    Hypertension Mother     Heart Attack Father     Hypertension Father     Cancer Father         skin    Cancer Sister         skin    Bipolar Disorder Sister     Cancer Brother         skin    Cancer Maternal Aunt         colon       Social History:  Social History     Tobacco Use    Smoking status: Current Every Day Smoker     Packs/day: 0.50     Years: 20.00     Pack years: 10.00     Types: Cigarettes    Smokeless tobacco: Never Used   Substance Use Topics    Alcohol use: No    Drug use: Yes     Types: Cocaine     Comment: smokes cocaine-current user       Allergies: Allergies   Allergen Reactions    Iodinated Contrast- Oral And Iv Dye Shortness of Breath         Review of Systems       Review of Systems   Constitutional: Positive for diaphoresis. Negative for activity change and appetite change. HENT: Negative for congestion. Eyes: Negative for visual disturbance. Respiratory: Negative for cough and shortness of breath. Cardiovascular: Positive for chest pain. Gastrointestinal: Positive for nausea. Negative for abdominal pain, diarrhea and vomiting. Genitourinary: Negative for dysuria. Musculoskeletal: Negative for arthralgias and myalgias. Skin: Negative for rash. Neurological: Negative for weakness and numbness. Physical Exam     Visit Vitals  /65   Pulse 66   Temp 98.1 °F (36.7 °C)   Resp 13   SpO2 95%         Physical Exam   Constitutional: She is oriented to person, place, and time. She appears well-developed and well-nourished. HENT:   Head: Normocephalic and atraumatic. Mouth/Throat: Oropharynx is clear and moist.   Eyes: Conjunctivae are normal.   Neck: Normal range of motion. Neck supple. No JVD present. Cardiovascular: Normal rate, regular rhythm, normal heart sounds and intact distal pulses. No murmur heard. Pulmonary/Chest: Effort normal and breath sounds normal.   Abdominal: Soft.  Bowel sounds are normal. She exhibits no distension. There is no tenderness. Musculoskeletal: Normal range of motion. She exhibits no deformity. Lymphadenopathy:     She has no cervical adenopathy. Neurological: She is alert and oriented to person, place, and time. Coordination normal.   Skin: Skin is warm and dry. No rash noted. Psychiatric: She has a normal mood and affect. Nursing note and vitals reviewed. Diagnostic Study Results     Labs -  Recent Results (from the past 12 hour(s))   EKG, 12 LEAD, INITIAL    Collection Time: 05/27/19  7:52 PM   Result Value Ref Range    Ventricular Rate 71 BPM    Atrial Rate 71 BPM    P-R Interval 174 ms    QRS Duration 114 ms    Q-T Interval 424 ms    QTC Calculation (Bezet) 460 ms    Calculated P Axis 42 degrees    Calculated R Axis -20 degrees    Calculated T Axis 64 degrees    Diagnosis       Normal sinus rhythm  Voltage criteria for left ventricular hypertrophy  Abnormal ECG  When compared with ECG of 12-APR-2019 03:42,  No significant change was found     CBC WITH AUTOMATED DIFF    Collection Time: 05/27/19  8:00 PM   Result Value Ref Range    WBC 10.3 4.6 - 13.2 K/uL    RBC 4.93 4.20 - 5.30 M/uL    HGB 13.2 12.0 - 16.0 g/dL    HCT 41.0 35.0 - 45.0 %    MCV 83.2 74.0 - 97.0 FL    MCH 26.8 24.0 - 34.0 PG    MCHC 32.2 31.0 - 37.0 g/dL    RDW 15.8 (H) 11.6 - 14.5 %    PLATELET 742 116 - 219 K/uL    MPV 8.9 (L) 9.2 - 11.8 FL    NEUTROPHILS 57 40 - 73 %    LYMPHOCYTES 33 21 - 52 %    MONOCYTES 8 3 - 10 %    EOSINOPHILS 2 0 - 5 %    BASOPHILS 0 0 - 2 %    ABS. NEUTROPHILS 5.9 1.8 - 8.0 K/UL    ABS. LYMPHOCYTES 3.4 0.9 - 3.6 K/UL    ABS. MONOCYTES 0.8 0.05 - 1.2 K/UL    ABS. EOSINOPHILS 0.2 0.0 - 0.4 K/UL    ABS.  BASOPHILS 0.0 0.0 - 0.1 K/UL    DF AUTOMATED     METABOLIC PANEL, COMPREHENSIVE    Collection Time: 05/27/19  8:00 PM   Result Value Ref Range    Sodium 142 136 - 145 mmol/L    Potassium 3.6 3.5 - 5.5 mmol/L    Chloride 108 100 - 108 mmol/L    CO2 29 21 - 32 mmol/L    Anion gap 5 3.0 - 18 mmol/L    Glucose 110 (H) 74 - 99 mg/dL    BUN 8 7.0 - 18 MG/DL    Creatinine 0.55 (L) 0.6 - 1.3 MG/DL    BUN/Creatinine ratio 15 12 - 20      GFR est AA >60 >60 ml/min/1.73m2    GFR est non-AA >60 >60 ml/min/1.73m2    Calcium 9.1 8.5 - 10.1 MG/DL    Bilirubin, total 0.2 0.2 - 1.0 MG/DL    ALT (SGPT) 21 13 - 56 U/L    AST (SGOT) 14 (L) 15 - 37 U/L    Alk. phosphatase 87 45 - 117 U/L    Protein, total 6.5 6.4 - 8.2 g/dL    Albumin 3.5 3.4 - 5.0 g/dL    Globulin 3.0 2.0 - 4.0 g/dL    A-G Ratio 1.2 0.8 - 1.7     CARDIAC PANEL,(CK, CKMB & TROPONIN)    Collection Time: 05/27/19  8:00 PM   Result Value Ref Range    CK 57 26 - 192 U/L    CK - MB 1.2 <3.6 ng/ml    CK-MB Index 2.1 0.0 - 4.0 %    Troponin-I, QT <0.02 0.0 - 0.045 NG/ML       Radiologic Studies -   XR CHEST PORT    (Results Pending)         Medical Decision Making   I am the first provider for this patient. I reviewed the vital signs, available nursing notes, past medical history, past surgical history, family history and social history. Vital Signs-Reviewed the patient's vital signs. EKG:  Normal sinus rhythm, rate 71, , QTc 460. No acute ST or T wave changes, no STEMI. Records Reviewed: Nursing Notes and Old Medical Records (Time of Review: 9:26 PM)      Provider Notes (Medical Decision Making):   Nakul Harrell is a 51 y.o. female with history of CHF, cocaine abuse who presents with chest pain that started just prior to arrival.  She states that she started having an aching, pressure in her chest while she was laying to go to sleep. She was given aspirin and nitro by EMS, now feels improved. She notes associated nausea and diaphoresis though that has resolved now. She is now pain-free. She notes that she was having intermittent chest pain, was scheduled to have a diagnostic left heart cath with Dr. Sonja Banks, but then her mother passed, she missed the appointment, and has not rescheduled.     Differential Diagnosis: Will consider ACS, GERD, Pericarditis/myocarditis, pleuritis, bronchitis or other respiratory infection, pneumothorax, pneumonia, MSK pain. Given the patient's history and exam, I do not suspect PE, aortic dissection, pancreatitis, esophageal rupture, pericardial tamponate, mediastenitis. Testing: CXR, ECG, CBC, CMP, Troponin. Treatments: Pending evaluation, now asymptomatic    Re-evaluations:  Outpatient studies are unremarkable, she is is asymptomatic at this time. Given her recent heart history, missed cath appointment, and her symptoms tonight, I believe her heart score is a 4.  I recommended that she be admitted for further evaluation and consultation by cardiology in the morning. At this point, the patient refuses admission. I recommended at least obtaining a second troponin, but she refused this as well.    9:59 PM  I informed the pt that she needed admission and further workup for adequate evaluation for her chest pain  and that by refusing the above, she is at risk for myocardial infarction, arrythmia, sudden death and further deterioration  She is awake, alert, and she understands her condition and the risks involved in leaving. She is clinically aware of her surroundings and able to ask appropriate questions. She verbalized knowing the risks and understood it was recommended that she stay and could also return at any time. They were both provided with warnings regarding worsening of her condition and were provided instructions to follow up with Sushil Tony NP tomorrow or return to the Emergency Room as soon as possible. This discussion was witnessed by nurse Payam Vail. Diagnosis     Clinical Impression:   1.  Chest pain, unspecified type        Disposition: Discharge    Follow-up Information     Follow up With Specialties Details Why Contact Info    Sushil Tony NP Nurse Practitioner Schedule an appointment as soon as possible for a visit  [address] [place] [address] [address]      [facility] EMERGENCY DEPT Emergency Medicine  If symptoms worsen [address] [place] [address] [address] [place] [address]    Andreea Maldonado MD Cardiology, Internal Medicine Schedule an appointment as soon as possible for a visit  [address] [facility]  [address]  [facility]  [address] [facility] [address]  [phone]             Patient's Medications   Start Taking    No medications on file   Continue Taking    ALBUTEROL (PROVENTIL HFA, VENTOLIN HFA, PROAIR HFA) 90 MCG/ACTUATION INHALER    Take 1-2 Puffs by inhalation every four (4) hours as needed for Wheezing. ALUMINUM-MAGNESIUM HYDROXIDE (MAALOX) 200-200 MG/5 ML SUSPENSION    Take 15 mL by mouth every six (6) hours as needed for Indigestion. AMLODIPINE (NORVASC) 10 MG TABLET    Take 1 Tab by mouth daily. ASPIRIN DELAYED-RELEASE 81 MG TABLET    Take 1 Tab by mouth daily. ATORVASTATIN (LIPITOR) 40 MG TABLET    Take 1 Tab by mouth daily. CLINDAMYCIN (CLEOCIN T) 1 % LOTION    Apply  to affected area two (2) times a day. use thin film on affected area for Rosacea    DIPHENHYDRAMINE (BENADRYL) 25 MG CAPSULE    Take 2 tabs by mouth two (2) times a day for 2 days    LAMOTRIGINE (LAMICTAL) 100 MG TABLET    Take 200 mg by mouth daily. LIDOCAINE HCL-ME-SALICYL-MENTH 8-08-03 % KTCG    1 Patch by Apply Externally route daily as needed for Pain. LORAZEPAM (ATIVAN) 1 MG TABLET    Take  by mouth every four (4) hours as needed for Anxiety. LOSARTAN-HYDROCHLOROTHIAZIDE (HYZAAR) 100-12.5 MG PER TABLET    Take 1 Tab by mouth daily. MAGNESIUM OXIDE (MAG-OX) 400 MG TABLET    Take 1 Tab by mouth daily. MIRTAZAPINE (REMERON) 30 MG TABLET    Take  by mouth nightly. For sleep    MULTIVITAMIN, TX-IRON-CA-MIN (THERA-M W/ IRON) 9 MG IRON-400 MCG TAB TABLET    Take 1 Tab by mouth daily. OMEGA-3 ACID ETHYL ESTERS (LOVAZA) 1 GRAM CAPSULE    Take 2 Caps by mouth two (2) times a day.     POTASSIUM CHLORIDE (K-DUR, KLOR-CON) 20 MEQ TABLET    Take 1 Tab by mouth daily. PREDNISONE (DELTASONE) 50 MG TABLET    Take 1 Tab by mouth two (2) times a day for 2 days. These Medications have changed    No medications on file   Stop Taking    No medications on file     _______________________________    Attestations:  Ryan Contreras MD acting as a scribe for and in the presence of Severa Burden, MD      May 27, 2019 at 9:59 PM       Provider Attestation:      I personally performed the services described in the documentation, reviewed the documentation, as recorded by the scribe in my presence, and it accurately and completely records my words and actions.  May 27, 2019 at 9:59 PM - Severa Burden, MD    _______________________________

## 2019-05-28 NOTE — ED PROVIDER NOTES
EMERGENCY DEPARTMENT HISTORY AND PHYSICAL EXAM    6:07 PM      Date: 5/28/2019  Patient Name: Sandor Dudley    History of Presenting Illness     Chief Complaint   Patient presents with    Generalized Body Aches         History Provided By: Patient  Location/Duration/Severity/Modifying factors   Patient is a 50-year-old female with a history of congestive heart failure, polycythemia, cocaine abuse, asthma, anxiety returns the emergency department with complaint of fatigue and decreased appetite. Patient was seen yesterday with some chest pain and was discharged from the hospital.  Patient today has been feeling fatigued without shortness of breath but not really want to eat or drink anything. Patient admits to not using cocaine for the last 48 hours has been having increased stress in her life given the recent loss of her mother. Patient denies any chest pain or shortness of breath at this time and follows closely with [facility] [place] as well as Dr. Melvi Ward her cardiologist.  Patient meant to call Dr. Melvi Ward today but did not end of coming to the emergency department for evaluation. Patient denies any diarrhea, fever, chills, cough, or urinary symptoms. Patient does smoke marijuana and tobacco but denies any alcohol. Patient denies any other aggravating alleviating factors. Patient notes that she is out of her Ativan that may be why she is more anxious than usual.          PCP: Naoma Brittle, NP    Current Outpatient Medications   Medication Sig Dispense Refill    losartan-hydroCHLOROthiazide (HYZAAR) 100-12.5 mg per tablet Take 1 Tab by mouth daily. 30 Tab 0    albuterol (PROVENTIL HFA, VENTOLIN HFA, PROAIR HFA) 90 mcg/actuation inhaler Take 1-2 Puffs by inhalation every four (4) hours as needed for Wheezing.  1 Inhaler 6    diphenhydrAMINE (BENADRYL) 25 mg capsule Take 2 tabs by mouth two (2) times a day for 2 days 8 Cap 0    predniSONE (DELTASONE) 50 mg tablet Take 1 Tab by mouth two (2) times a day for 2 days. 4 Tab 0    aluminum-magnesium hydroxide (MAALOX) 200-200 mg/5 mL suspension Take 15 mL by mouth every six (6) hours as needed for Indigestion. 300 mL 0    lidocaine HCl-me-salicyl-menth 8-48-02 % ktcg 1 Patch by Apply Externally route daily as needed for Pain. 1 Box 0    clindamycin (CLEOCIN T) 1 % lotion Apply  to affected area two (2) times a day. use thin film on affected area for Rosacea 1 Bottle 0    omega-3 acid ethyl esters (LOVAZA) 1 gram capsule Take 2 Caps by mouth two (2) times a day. 360 Cap 3    aspirin delayed-release 81 mg tablet Take 1 Tab by mouth daily. 100 Tab 1    amLODIPine (NORVASC) 10 mg tablet Take 1 Tab by mouth daily. 30 Tab 6    atorvastatin (LIPITOR) 40 mg tablet Take 1 Tab by mouth daily. 30 Tab 6    magnesium oxide (MAG-OX) 400 mg tablet Take 1 Tab by mouth daily. 30 Tab 3    potassium chloride (K-DUR, KLOR-CON) 20 mEq tablet Take 1 Tab by mouth daily. 30 Tab 3    LORazepam (ATIVAN) 1 mg tablet Take  by mouth every four (4) hours as needed for Anxiety.  multivitamin, tx-iron-ca-min (THERA-M W/ IRON) 9 mg iron-400 mcg tab tablet Take 1 Tab by mouth daily.  lamoTRIgine (LAMICTAL) 100 mg tablet Take 200 mg by mouth daily.  mirtazapine (REMERON) 30 mg tablet Take  by mouth nightly.  For sleep         Past History     Past Medical History:  Past Medical History:   Diagnosis Date    Asthma     Bronchitis     [facility] [facility]     Cocaine abuse ([facility] [address] 3/26/2015    Dental caries     Depression     Depression 8/31/2016    Drug abuse ([facility] [facility]     Dysphasia 10/17/2018    Elevated hematocrit 8/31/2016    ETOH abuse     H/O screening mammography 12/06/2016    No evidence of malignancy     HPV (human papilloma virus) infection 11/2016    The patient [facility]     Hypertension     Hypertensive left ventricular hypertrophy, without heart failure 10/15/2018    mild to moderate lvh continue bp meds    Ill-defined condition     Marijuana use 10/17/2018    Mood swings     Polycythemia 2018    Polycythemia     Psychiatric disorder     depression, Bipolar    Schatzki's ring 08/30/2018    Found on Barium Swallow    Sliding hiatal hernia 10/17/2018    Tobacco abuse     Vitamin D deficiency        Past Surgical History:  Past Surgical History:   Procedure Laterality Date    HX APPENDECTOMY      HX COLPOSCOPY  12/06/2016    Low-grade courtney/mild dysplasia RICK I    HX HERNIA REPAIR      3x       Family History:  Family History   Problem Relation Age of Onset    Hypertension Mother     Heart Attack Father     Hypertension Father     Cancer Father         skin    Cancer Sister         skin    Bipolar Disorder Sister     Cancer Brother         skin    Cancer Maternal Aunt         colon       Social History:  Social History     Tobacco Use    Smoking status: Current Every Day Smoker     Packs/day: 0.50     Years: 20.00     Pack years: 10.00     Types: Cigarettes    Smokeless tobacco: Never Used   Substance Use Topics    Alcohol use: No    Drug use: Yes     Types: Cocaine     Comment: smokes cocaine-current user       Allergies: Allergies   Allergen Reactions    Iodinated Contrast- Oral And Iv Dye Shortness of Breath         Review of Systems     Review of Systems   Constitutional: Positive for fatigue. Negative for activity change and fever. HENT: Negative for congestion and rhinorrhea. Eyes: Negative for visual disturbance. Respiratory: Negative for shortness of breath. Cardiovascular: Negative for chest pain and palpitations. Gastrointestinal: Negative for abdominal pain, diarrhea, nausea and vomiting. Genitourinary: Negative for dysuria and hematuria. Musculoskeletal: Negative for back pain. Skin: Negative for rash. Neurological: Negative for dizziness, weakness and light-headedness. Psychiatric/Behavioral: The patient is nervous/anxious.     All other systems reviewed and are negative. Physical Exam     Visit Vitals  BP (!) 162/93 (BP 1 Location: Right arm, BP Patient Position: At rest)   Pulse 80   Temp 97.5 °F (36.4 °C)   Resp 18   SpO2 100%         Physical Exam   Constitutional: She is oriented to person, place, and time. She appears well-developed and well-nourished. No distress. HENT:   Head: Normocephalic and atraumatic. Right Ear: External ear normal.   Left Ear: External ear normal.   Nose: Nose normal.   Mouth/Throat: Oropharynx is clear and moist.   Eyes: Pupils are equal, round, and reactive to light. Conjunctivae and EOM are normal. No scleral icterus. Neck: Normal range of motion. Neck supple. No JVD present. No tracheal deviation present. No thyromegaly present. Cardiovascular: Normal rate, regular rhythm, normal heart sounds and intact distal pulses. Exam reveals no gallop and no friction rub. No murmur heard. Pulmonary/Chest: Effort normal and breath sounds normal. She exhibits no tenderness. Abdominal: Soft. Bowel sounds are normal. She exhibits no distension. There is no tenderness. There is no rebound and no guarding. Musculoskeletal: Normal range of motion. She exhibits no edema or tenderness. Lymphadenopathy:     She has no cervical adenopathy. Neurological: She is alert and oriented to person, place, and time. No cranial nerve deficit. Coordination normal.   No sensory loss, Gait normal, Motor 5/5   Skin: Skin is warm and dry. Psychiatric: She has a normal mood and affect. Her behavior is normal. Judgment and thought content normal.   Nursing note and vitals reviewed.         Diagnostic Study Results     Labs -  Recent Results (from the past 12 hour(s))   CBC WITH AUTOMATED DIFF    Collection Time: 05/28/19  5:20 PM   Result Value Ref Range    WBC 11.8 4.6 - 13.2 K/uL    RBC 5.23 4.20 - 5.30 M/uL    HGB 14.1 12.0 - 16.0 g/dL    HCT 43.0 35.0 - 45.0 %    MCV 82.2 74.0 - 97.0 FL    MCH 27.0 24.0 - 34.0 PG    MCHC 32.8 31.0 - 37.0 g/dL RDW 15.8 (H) 11.6 - 14.5 %    PLATELET 638 266 - 118 K/uL    MPV 9.2 9.2 - 11.8 FL    NEUTROPHILS 72 40 - 73 %    LYMPHOCYTES 21 21 - 52 %    MONOCYTES 6 3 - 10 %    EOSINOPHILS 1 0 - 5 %    BASOPHILS 0 0 - 2 %    ABS. NEUTROPHILS 8.6 (H) 1.8 - 8.0 K/UL    ABS. LYMPHOCYTES 2.5 0.9 - 3.6 K/UL    ABS. MONOCYTES 0.7 0.05 - 1.2 K/UL    ABS. EOSINOPHILS 0.1 0.0 - 0.4 K/UL    ABS. BASOPHILS 0.0 0.0 - 0.1 K/UL    DF AUTOMATED     METABOLIC PANEL, COMPREHENSIVE    Collection Time: 05/28/19  5:20 PM   Result Value Ref Range    Sodium 140 136 - 145 mmol/L    Potassium 3.4 (L) 3.5 - 5.5 mmol/L    Chloride 105 100 - 108 mmol/L    CO2 29 21 - 32 mmol/L    Anion gap 6 3.0 - 18 mmol/L    Glucose 98 74 - 99 mg/dL    BUN 9 7.0 - 18 MG/DL    Creatinine 0.64 0.6 - 1.3 MG/DL    BUN/Creatinine ratio 14 12 - 20      GFR est AA >60 >60 ml/min/1.73m2    GFR est non-AA >60 >60 ml/min/1.73m2    Calcium 8.9 8.5 - 10.1 MG/DL    Bilirubin, total 0.3 0.2 - 1.0 MG/DL    ALT (SGPT) 22 13 - 56 U/L    AST (SGOT) 15 15 - 37 U/L    Alk.  phosphatase 92 45 - 117 U/L    Protein, total 7.4 6.4 - 8.2 g/dL    Albumin 3.9 3.4 - 5.0 g/dL    Globulin 3.5 2.0 - 4.0 g/dL    A-G Ratio 1.1 0.8 - 1.7     URINALYSIS W/ RFLX MICROSCOPIC    Collection Time: 05/28/19  5:30 PM   Result Value Ref Range    Color YELLOW      Appearance CLOUDY      Specific gravity 1.015 1.005 - 1.030      pH (UA) 7.5 5.0 - 8.0      Protein NEGATIVE  NEG mg/dL    Glucose NEGATIVE  NEG mg/dL    Ketone NEGATIVE  NEG mg/dL    Bilirubin NEGATIVE  NEG      Blood NEGATIVE  NEG      Urobilinogen 1.0 0.2 - 1.0 EU/dL    Nitrites NEGATIVE  NEG      Leukocyte Esterase LARGE (A) NEG     HCG URINE, QL    Collection Time: 05/28/19  5:30 PM   Result Value Ref Range    HCG urine, QL NEGATIVE  NEG     URINE MICROSCOPIC ONLY    Collection Time: 05/28/19  5:30 PM   Result Value Ref Range    WBC 21 to 35 0 - 4 /hpf    RBC NEGATIVE  0 - 5 /hpf    Epithelial cells 4+ 0 - 5 /lpf    Bacteria 1+ (A) NEG /hpf Trichomonas 2+ (A) NEG       Radiologic Studies -   No orders to display         Medical Decision Making   I am the first provider for this patient. I reviewed the vital signs, available nursing notes, past medical history, past surgical history, family history and social history. Vital Signs-Reviewed the patient's vital signs. Records Reviewed: Nursing Notes and Old Medical Records (Time of Review: 6:07 PM)    ED Course: Progress Notes, Reevaluation, and Consults: Workup and recommendations were reviewed with the patient and all questions were answered. The patient understands the plan and will proceed with close outpatient care. I have encouraged the patient to return if at all worsened or concerned. Frantz Reddy,  9:42 AM      Provider Notes (Medical Decision Making):   MDM  Number of Diagnoses or Management Options  Diagnosis management comments: Patient is a 67-year-old female with a history of polycythemia, hypertension, polysubstance abuse, asthma, and smoking, presents emergency department complaint of feeling fatigued today with decreased appetite. Patient appears well on exam and will follow another set of cardiac markers given that she does have a plan to get a cardiac catheterization although she has not been able to due to having cocaine in her drug screen by looking at her records. Suspect this may be related to her not using cocaine and appears also have a urinary tract infection with trichomonal UTI. Patient denies any vaginal discharge we will treat her as GC and if her cardiac markers are reassuring we will plan for close outpatient care. Frantz Reddy DO 6:12 PM        Procedures        Diagnosis     Clinical Impression:   1. Malaise and fatigue    2. Trichomoniasis of bladder    3.  Acute cystitis without hematuria        Disposition: DC    Follow-up Information    None          Patient's Medications   Start Taking    No medications on file   Continue Taking ALBUTEROL (PROVENTIL HFA, VENTOLIN HFA, PROAIR HFA) 90 MCG/ACTUATION INHALER    Take 1-2 Puffs by inhalation every four (4) hours as needed for Wheezing. ALUMINUM-MAGNESIUM HYDROXIDE (MAALOX) 200-200 MG/5 ML SUSPENSION    Take 15 mL by mouth every six (6) hours as needed for Indigestion. AMLODIPINE (NORVASC) 10 MG TABLET    Take 1 Tab by mouth daily. ASPIRIN DELAYED-RELEASE 81 MG TABLET    Take 1 Tab by mouth daily. ATORVASTATIN (LIPITOR) 40 MG TABLET    Take 1 Tab by mouth daily. CLINDAMYCIN (CLEOCIN T) 1 % LOTION    Apply  to affected area two (2) times a day. use thin film on affected area for Rosacea    DIPHENHYDRAMINE (BENADRYL) 25 MG CAPSULE    Take 2 tabs by mouth two (2) times a day for 2 days    LAMOTRIGINE (LAMICTAL) 100 MG TABLET    Take 200 mg by mouth daily. LIDOCAINE HCL-ME-SALICYL-MENTH 4-79-69 % KTCG    1 Patch by Apply Externally route daily as needed for Pain. LORAZEPAM (ATIVAN) 1 MG TABLET    Take  by mouth every four (4) hours as needed for Anxiety. LOSARTAN-HYDROCHLOROTHIAZIDE (HYZAAR) 100-12.5 MG PER TABLET    Take 1 Tab by mouth daily. MAGNESIUM OXIDE (MAG-OX) 400 MG TABLET    Take 1 Tab by mouth daily. MIRTAZAPINE (REMERON) 30 MG TABLET    Take  by mouth nightly. For sleep    MULTIVITAMIN, TX-IRON-CA-MIN (THERA-M W/ IRON) 9 MG IRON-400 MCG TAB TABLET    Take 1 Tab by mouth daily. OMEGA-3 ACID ETHYL ESTERS (LOVAZA) 1 GRAM CAPSULE    Take 2 Caps by mouth two (2) times a day. POTASSIUM CHLORIDE (K-DUR, KLOR-CON) 20 MEQ TABLET    Take 1 Tab by mouth daily. PREDNISONE (DELTASONE) 50 MG TABLET    Take 1 Tab by mouth two (2) times a day for 2 days. These Medications have changed    No medications on file   Stop Taking    No medications on file     Disclaimer: Sections of this note are dictated using utilizing voice recognition software. Minor typographical errors may be present. If questions arise, please do not hesitate to contact me or call our department.

## 2019-05-28 NOTE — ED TRIAGE NOTES
Left sided chest pain that goes into her left arm.  Patient was suppose to have a schedule cath but mother  recently

## 2019-05-28 NOTE — ED NOTES
Patient discharged and given discharge instructions by [facility] . Patient had an opportunity to ask questions. Patient verbalized understanding of discharge instructions. Patient d/c from ED ambulatory, discharge instructions and prescriptions in hand. Patient accompanied by self. Removed and shredded pt's armband.

## 2019-05-28 NOTE — ED TRIAGE NOTES
Pt arrived through triage with c/o generalized body aches and nausea. States she was seen yesterday and \"has not felt the same since. \"

## 2019-05-28 NOTE — ED NOTES
I performed a brief evaluation, including history and physical, of the patient here in triage and I have determined that pt will need further treatment and evaluation from the main side ER physician. I have placed initial orders to help in expediting patients care.      May 28, 2019 at 4:49 PM - [place]        Visit Vitals  BP (!) 162/93 (BP 1 Location: Right arm, BP Patient Position: At rest)   Pulse 80   Temp 97.5 °F (36.4 °C)   Resp 18   SpO2 100%

## 2019-05-28 NOTE — DISCHARGE INSTRUCTIONS

## 2019-05-28 NOTE — DISCHARGE INSTRUCTIONS

## 2019-06-03 ENCOUNTER — TELEPHONE (OUTPATIENT)
Dept: FAMILY MEDICINE CLINIC | Age: 49
End: 2019-06-03

## 2019-06-03 NOTE — TELEPHONE ENCOUNTER
Called patient to remind her of her appointment tomorrow morning. Patient voicemail not set up at this time.

## 2019-06-04 ENCOUNTER — NURSE NAVIGATOR (OUTPATIENT)
Dept: FAMILY MEDICINE CLINIC | Age: 49
End: 2019-06-04

## 2019-06-06 ENCOUNTER — HOSPITAL ENCOUNTER (OUTPATIENT)
Dept: LAB | Age: 49
Discharge: HOME | End: 2019-06-06
Payer: COMMERCIAL

## 2019-06-06 ENCOUNTER — OFFICE VISIT (OUTPATIENT)
Dept: FAMILY MEDICINE CLINIC | Age: 49
End: 2019-06-06

## 2019-06-06 VITALS
HEART RATE: 64 BPM | DIASTOLIC BLOOD PRESSURE: 74 MMHG | SYSTOLIC BLOOD PRESSURE: 142 MMHG | HEIGHT: 62 IN | OXYGEN SATURATION: 97 % | TEMPERATURE: 98 F | BODY MASS INDEX: 37.91 KG/M2 | WEIGHT: 206 LBS | RESPIRATION RATE: 20 BRPM

## 2019-06-06 DIAGNOSIS — A59.03 TRICHOMONIASIS OF BLADDER: ICD-10-CM

## 2019-06-06 DIAGNOSIS — Z72.0 TOBACCO ABUSE DISORDER: ICD-10-CM

## 2019-06-06 DIAGNOSIS — I10 ESSENTIAL HYPERTENSION: Primary | ICD-10-CM

## 2019-06-06 DIAGNOSIS — F32.A ANXIETY AND DEPRESSION: ICD-10-CM

## 2019-06-06 DIAGNOSIS — E78.3 MIXED HYPERGLYCERIDEMIA: ICD-10-CM

## 2019-06-06 DIAGNOSIS — F19.10 DRUG ABUSE (HCC): ICD-10-CM

## 2019-06-06 DIAGNOSIS — F41.9 ANXIETY AND DEPRESSION: ICD-10-CM

## 2019-06-06 LAB
APPEARANCE UR: CLEAR
BILIRUB UR QL: NEGATIVE
COLOR UR: YELLOW
GLUCOSE UR STRIP.AUTO-MCNC: NEGATIVE MG/DL
HGB UR QL STRIP: NEGATIVE
KETONES UR QL STRIP.AUTO: NEGATIVE MG/DL
LEUKOCYTE ESTERASE UR QL STRIP.AUTO: NEGATIVE
NITRITE UR QL STRIP.AUTO: NEGATIVE
PH UR STRIP: 5 [PH] (ref 5–8)
PROT UR STRIP-MCNC: NEGATIVE MG/DL
SP GR UR REFRACTOMETRY: 1.01 (ref 1–1.03)
UROBILINOGEN UR QL STRIP.AUTO: 0.2 EU/DL (ref 0.2–1)

## 2019-06-06 PROCEDURE — 81003 URINALYSIS AUTO W/O SCOPE: CPT

## 2019-06-06 PROCEDURE — 87491 CHLMYD TRACH DNA AMP PROBE: CPT

## 2019-06-06 PROCEDURE — 87661 TRICHOMONAS VAGINALIS AMPLIF: CPT

## 2019-06-06 NOTE — PATIENT INSTRUCTIONS

## 2019-06-06 NOTE — PROGRESS NOTES
Please notify of negative Urinalysis.  Final trich results should be back next week, but preliminary results are negative

## 2019-06-06 NOTE — PROGRESS NOTES
06/06/19    PCP: Luz Marina Arevalo NP    Chief Complaint   Patient presents with    Follow Up Chronic Condition    Hypertension        HISTORY OF PRESENT ILLNESS  Leslie Xavier  is a 51 y.o. female whom presents for Follow Up Chronic Condition and Hypertension         HPI  Cardiovascular Review:  The patient has hypertension, hyperlipidemia and obesity. Diet and Lifestyle: not attempting to follow a low fat, low cholesterol diet, smoker and continues to use cocaine  Home BP Monitoring: is not measured at home. Pertinent ROS: taking medications as instructed, no medication side effects noted, no TIA's, noting some chest pains currently being followed by cardiology, notes stable dyspnea on exertion, no change, no swelling of ankles. Patient Active Problem List    Diagnosis Date Noted    Severe obesity ([facility] 02/27/2019    Hypertensive left ventricular hypertrophy, without heart failure 10/15/2018    Erythrocytosis 05/22/2018    Secondary polycythemia 05/22/2018    Hypertension     Atypical squamous cells of undetermined significance on cytologic smear of cervix (ASC-US) 11/29/2016    High risk HPV infection 11/29/2016    Depression 08/31/2016    Compliance with medication regimen 08/31/2016    Elevated hematocrit 08/31/2016    Manic bipolar I disorder ([facility] 04/21/2015    Dyslipidemia 03/26/2015    Cocaine abuse ([facility] 03/26/2015    Vitamin D deficiency 03/26/2015     Current Outpatient Medications   Medication Sig Dispense Refill    losartan-hydroCHLOROthiazide (HYZAAR) 100-12.5 mg per tablet Take 1 Tab by mouth daily. 30 Tab 0    albuterol (PROVENTIL HFA, VENTOLIN HFA, PROAIR HFA) 90 mcg/actuation inhaler Take 1-2 Puffs by inhalation every four (4) hours as needed for Wheezing.  1 Inhaler 6    diphenhydrAMINE (BENADRYL) 25 mg capsule Take 2 tabs by mouth two (2) times a day for 2 days 8 Cap 0    aluminum-magnesium hydroxide (MAALOX) 200-200 mg/5 mL suspension Take 15 mL by mouth every six (6) hours as needed for Indigestion. 300 mL 0    omega-3 acid ethyl esters (LOVAZA) 1 gram capsule Take 2 Caps by mouth two (2) times a day. 360 Cap 3    aspirin delayed-release 81 mg tablet Take 1 Tab by mouth daily. 100 Tab 1    amLODIPine (NORVASC) 10 mg tablet Take 1 Tab by mouth daily. 30 Tab 6    atorvastatin (LIPITOR) 40 mg tablet Take 1 Tab by mouth daily. 30 Tab 6    potassium chloride (K-DUR, KLOR-CON) 20 mEq tablet Take 1 Tab by mouth daily. 30 Tab 3    LORazepam (ATIVAN) 1 mg tablet Take  by mouth every four (4) hours as needed for Anxiety.  multivitamin, tx-iron-ca-min (THERA-M W/ IRON) 9 mg iron-400 mcg tab tablet Take 1 Tab by mouth daily.  lamoTRIgine (LAMICTAL) 100 mg tablet Take 200 mg by mouth daily.  mirtazapine (REMERON) 30 mg tablet Take  by mouth nightly.  For sleep       Allergies   Allergen Reactions    Iodinated Contrast- Oral And Iv Dye Shortness of Breath     Past Medical History:   Diagnosis Date    Asthma     Bronchitis     CHF (congestive heart failure) (HCC)     Cocaine abuse ([facility] 3/26/2015    Dental caries     Depression     Depression 8/31/2016    Drug abuse ([facility]     Dysphasia 10/17/2018    Elevated hematocrit 8/31/2016    ETOH abuse     H/O screening mammography 12/06/2016    No evidence of malignancy     HPV (human papilloma virus) infection 11/2016    The patient referred to [facility]     Hypertension     Hypertensive left ventricular hypertrophy, without heart failure 10/15/2018    mild to moderate lvh continue bp meds    Ill-defined condition     Marijuana use 10/17/2018    Mood swings     Polycythemia 2018    Polycythemia     Psychiatric disorder     depression, Bipolar    Schatzki's ring 08/30/2018    Found on Barium Swallow    Sliding hiatal hernia 10/17/2018    Tobacco abuse     Vitamin D deficiency      Past Surgical History:   Procedure Laterality Date    HX APPENDECTOMY      HX COLPOSCOPY  12/06/2016    Low-grade courtney/mild dysplasia RICK I    HX HERNIA REPAIR      3x     Family History   Problem Relation Age of Onset    Hypertension Mother     Heart Attack Father     Hypertension Father     Cancer Father         skin    Cancer Sister         skin    Bipolar Disorder Sister     Cancer Brother         skin    Cancer Maternal Aunt         colon     Social History     Tobacco Use    Smoking status: Current Every Day Smoker     Packs/day: 0.50     Years: 20.00     Pack years: 10.00     Types: Cigarettes    Smokeless tobacco: Never Used   Substance Use Topics    Alcohol use: No       Review of Systems   Constitutional: Negative. HENT: Negative. Respiratory: Negative. Cardiovascular: Negative. Gastrointestinal: Negative. Genitourinary: Negative for dysuria, flank pain, frequency, hematuria and urgency. Recently treated for Trichomonias UTI and would like to get retested. States she received it from sons father as he was her only sexual partner. Psychiatric/Behavioral: Positive for depression, substance abuse and suicidal ideas (Patient denies a plan). Negative for hallucinations and memory loss. The patient is nervous/anxious. The patient does not have insomnia. Reports her 80year old mother passed away from stomach cancer recently and she is still grieving. Her mother provided a lot of financial support and since her death, she is unsure of her financial security. States she cannot tolerate working because of her mood swings and does not wish to jeopardize her disability. She states she has \"cut off\" and has withdrawn from her family. She is having difficulty getting into previous Mental health providers due to outstanding balances and previous discharges from practices.         Visit Vitals  /74 (BP 1 Location: Left arm, BP Patient Position: Sitting)   Pulse 64   Temp 98 °F (36.7 °C) (Oral)   Resp 20   Ht 5' 2\" (1.575 m)   Wt 206 lb (93.4 kg)   SpO2 97%   BMI 37.68 kg/m²       Pain Scale: 0 - No pain/10    Pain Location:      Physical Exam   Constitutional: She is oriented to person, place, and time and well-developed, well-nourished, and in no distress. HENT:   Head: Normocephalic. Eyes: Pupils are equal, round, and reactive to light. Neck: Normal range of motion. Neck supple. Cardiovascular: Normal rate, regular rhythm and normal heart sounds. Pulmonary/Chest: Effort normal. She has decreased breath sounds. Abdominal: Soft. Bowel sounds are normal.   Neurological: She is alert and oriented to person, place, and time. Skin: Skin is warm and dry. Psychiatric: Affect and judgment normal. She exhibits a depressed mood. She expresses no suicidal plans and no homicidal plans. Vitals reviewed.       Lab Results   Component Value Date/Time    WBC 11.8 05/28/2019 05:20 PM    HGB 14.1 05/28/2019 05:20 PM    HCT 43.0 05/28/2019 05:20 PM    PLATELET 436 35/68/6074 05:20 PM    MCV 82.2 05/28/2019 05:20 PM     Lab Results   Component Value Date/Time    Hemoglobin A1c 5.2 08/06/2018 11:18 AM    Hemoglobin A1c 5.2 11/17/2014 07:16 AM    Glucose 98 05/28/2019 05:20 PM    Glucose (POC) 126 (H) 01/27/2018 09:08 PM    LDL, calculated 39.2 02/02/2019 07:19 AM    Creatinine 0.64 05/28/2019 05:20 PM      Lab Results   Component Value Date/Time    Cholesterol, total 103 02/02/2019 07:19 AM    HDL Cholesterol 41 02/02/2019 07:19 AM    LDL, calculated 39.2 02/02/2019 07:19 AM    Triglyceride 114 02/02/2019 07:19 AM    CHOL/HDL Ratio 2.5 02/02/2019 07:19 AM     Lab Results   Component Value Date/Time    Sodium 140 05/28/2019 05:20 PM    Potassium 3.4 (L) 05/28/2019 05:20 PM    Chloride 105 05/28/2019 05:20 PM    CO2 29 05/28/2019 05:20 PM    Anion gap 6 05/28/2019 05:20 PM    Glucose 98 05/28/2019 05:20 PM    BUN 9 05/28/2019 05:20 PM    Creatinine 0.64 05/28/2019 05:20 PM    BUN/Creatinine ratio 14 05/28/2019 05:20 PM    GFR est AA >60 05/28/2019 05:20 PM    GFR est non-AA >60 05/28/2019 05:20 PM Calcium 8.9 05/28/2019 05:20 PM       Pertinent Radiology reports:     ASSESSMENT and PLAN    ICD-10-CM ICD-9-CM    1. Essential hypertension I10 401.9    2. Drug abuse ([facility] F19.10 305.90 REFERRAL TO PSYCHIATRY   3. Trichomoniasis of bladder A59.03 131.09 URINALYSIS W/ RFLX MICROSCOPIC      TRICHOMONAS AMPLIFICATION   4. Anxiety and depression F41.9 300.00 REFERRAL TO PSYCHIATRY    F32.9 311    5. Mixed hyperglyceridemia E78.3 272.3    6. Tobacco abuse disorder Z72.0 305.1      Diagnoses and all orders for this visit:    1. Essential hypertension    2. Drug abuse ([facility]  -     REFERRAL TO PSYCHIATRY    3. Trichomoniasis of bladder  -     URINALYSIS W/ RFLX MICROSCOPIC; Future  -     TRICHOMONAS AMPLIFICATION; Future    4. Anxiety and depression  -     REFERRAL TO PSYCHIATRY    5. Mixed hyperglyceridemia    6. Tobacco abuse disorder       - Given Suicide hotline information. Also, referred to nearest ED if she has intent and plan. Will refer to psych for medication management at another practice. - Discussed life goals, self -activation, and patients need to make more efforts into things to sustain life. Discussed possibly finding part-time work as I feel she is negatively focusing and putting too much emphasis on her bipolar and is not attempting to make any efforts to progress and better herself. Also discussed that her cocaine use is negatively affecting her mental health and increasing her cardiac risk factors. - Advised to call office at anytime, we are here to support her.         Medications Discontinued During This Encounter   Medication Reason    clindamycin (CLEOCIN T) 1 % lotion Not A Current Medication    predniSONE (DELTASONE) 50 mg tablet Therapy Completed    magnesium oxide (MAG-OX) 400 mg tablet Non-Compliance    lidocaine HCl-me-salicyl-menth 8-14-29 % ktcg Not A Current Medication       Written instructions followed our verbal discussion of all information discussed above, pending tests ordered and future goals/plans. Patient expressed understanding of current diagnosis, planned testing, follow up and if needed to contact the office for any questions or concerns prior to the next visit.

## 2019-06-06 NOTE — PROGRESS NOTES
Chief Complaint   Patient presents with    Follow Up Chronic Condition    Hypertension     Patient is here for follow-up with c/o recent trichomonas and cystitis treated at [facility] ER 5/28/19. She requests a recheck urinalysis. Patient states that she is pending a cardiac catherization and \"they are not going to touch me until my system is cleaned out\". 1. Have you been to the ER, urgent care clinic since your last visit? Hospitalized since your last visit? Yes When: 5/28/19 at [facility]    2. Have you seen or consulted any other health care providers outside of the [address] [place] [address] since your last visit? Was seen at hospital in [facility] March or April 2019 for pain. Her mother passed away at the time with stomach cancer and she is states is still grieving. 3.   When was your last Pap smear? 11/21/2016  When was your last Mammogram? 12/16/2016 at [facility]  When was your last Colon screening? Never    PMH/FH/Social Hx reviewed and updated as needed      Applicable screenings reviewed and updated as needed  Medication reconciliation performed. Patient does not know need medication refills. Health Maintenance reviewed.

## 2019-06-07 ENCOUNTER — APPOINTMENT (OUTPATIENT)
Dept: INFUSION THERAPY | Age: 49
End: 2019-06-07

## 2019-06-11 LAB
C TRACH RRNA SPEC QL NAA+PROBE: NEGATIVE
N GONORRHOEA RRNA SPEC QL NAA+PROBE: NEGATIVE
SPECIMEN SOURCE: NORMAL
T VAGINALIS RRNA VAG QL NAA+PROBE: NEGATIVE

## 2019-06-11 NOTE — PROGRESS NOTES
Please notify patient that her urine came back normal and negative for any Sexually transmitted diseases.

## 2019-06-11 NOTE — PROGRESS NOTES
Called patient to give negative STD results. Unable to contact patient. Voice mail not set up. Letter sent to patient.

## 2019-06-14 ENCOUNTER — OFFICE VISIT (OUTPATIENT)
Dept: ONCOLOGY | Age: 49
End: 2019-06-14

## 2019-06-14 ENCOUNTER — HOSPITAL ENCOUNTER (OUTPATIENT)
Dept: ONCOLOGY | Age: 49
Discharge: HOME | End: 2019-06-14

## 2019-06-14 DIAGNOSIS — D75.1 SECONDARY POLYCYTHEMIA: Primary | ICD-10-CM

## 2019-06-14 DIAGNOSIS — D75.1 SECONDARY POLYCYTHEMIA: ICD-10-CM

## 2019-06-20 ENCOUNTER — HOSPITAL ENCOUNTER (OUTPATIENT)
Dept: ONCOLOGY | Age: 49
Discharge: HOME | End: 2019-06-20

## 2019-06-20 ENCOUNTER — OFFICE VISIT (OUTPATIENT)
Dept: FAMILY MEDICINE CLINIC | Age: 49
End: 2019-06-20

## 2019-06-20 VITALS
SYSTOLIC BLOOD PRESSURE: 136 MMHG | OXYGEN SATURATION: 96 % | DIASTOLIC BLOOD PRESSURE: 84 MMHG | HEART RATE: 78 BPM | HEIGHT: 62 IN | TEMPERATURE: 98.6 F | WEIGHT: 205.2 LBS | RESPIRATION RATE: 22 BRPM | BODY MASS INDEX: 37.76 KG/M2

## 2019-06-20 DIAGNOSIS — D75.1 SECONDARY POLYCYTHEMIA: ICD-10-CM

## 2019-06-20 DIAGNOSIS — F32.A DEPRESSION, UNSPECIFIED DEPRESSION TYPE: ICD-10-CM

## 2019-06-20 DIAGNOSIS — H10.33 ACUTE BACTERIAL CONJUNCTIVITIS OF BOTH EYES: Primary | ICD-10-CM

## 2019-06-20 RX ORDER — ERYTHROMYCIN 5 MG/G
OINTMENT OPHTHALMIC
Qty: 1 TUBE | Refills: 0 | Status: SHIPPED | OUTPATIENT
Start: 2019-06-20 | End: 2019-09-11

## 2019-06-20 NOTE — PATIENT INSTRUCTIONS
Pinkeye: Care Instructions  Your Care Instructions    Pinkeye is redness and swelling of the eye surface and the conjunctiva (the lining of the eyelid and the covering of the white part of the eye). Pinkeye is also called conjunctivitis. Pinkeye is often caused by infection with bacteria or a virus. Dry air, allergies, smoke, and chemicals are other common causes. Pinkeye often clears on its own in 7 to 10 days. Antibiotics only help if the pinkeye is caused by bacteria. Pinkeye caused by infection spreads easily. If an allergy or chemical is causing pinkeye, it will not go away unless you can avoid whatever is causing it. Follow-up care is a key part of your treatment and safety. Be sure to make and go to all appointments, and call your doctor if you are having problems. It's also a good idea to know your test results and keep a list of the medicines you take. How can you care for yourself at home? · Wash your hands often. Always wash them before and after you treat pinkeye or touch your eyes or face. · Use moist cotton or a clean, wet cloth to remove crust. Wipe from the inside corner of the eye to the outside. Use a clean part of the cloth for each wipe. · Put cold or warm wet cloths on your eye a few times a day if the eye hurts. · Do not wear contact lenses or eye makeup until the pinkeye is gone. Throw away any eye makeup you were using when you got pinkeye. Clean your contacts and storage case. If you wear disposable contacts, use a new pair when your eye has cleared and it is safe to wear contacts again. · If the doctor gave you antibiotic ointment or eyedrops, use them as directed. Use the medicine for as long as instructed, even if your eye starts looking better soon. Keep the bottle tip clean, and do not let it touch the eye area. · To put in eyedrops or ointment:  ? Tilt your head back, and pull your lower eyelid down with one finger. ?  Drop or squirt the medicine inside the lower lid.  ? Close your eye for 30 to 60 seconds to let the drops or ointment move around. ? Do not touch the ointment or dropper tip to your eyelashes or any other surface. · Do not share towels, pillows, or washcloths while you have pinkeye. When should you call for help? Call your doctor now or seek immediate medical care if:    · You have pain in your eye, not just irritation on the surface.     · You have a change in vision or loss of vision.     · You have an increase in discharge from the eye.     · Your eye has not started to improve or begins to get worse within 48 hours after you start using antibiotics.     · Pinkeye lasts longer than 7 days.    Watch closely for changes in your health, and be sure to contact your doctor if you have any problems. Where can you learn more? Go to http://los-zack.info/. Enter Y392 in the search box to learn more about \"Pinkeye: Care Instructions. \"  Current as of: September 23, 2018  Content Version: 11.9  © 2368-9093 [employer]. Care instructions adapted under license by [employer] (which disclaims liability or warranty for this information). If you have questions about a medical condition or this instruction, always ask your healthcare professional. [address] any warranty or liability for your use of this information.

## 2019-06-20 NOTE — PROGRESS NOTES
Patient is here for sick visit with left eye drainage and pain level 8 of 10 x 2 days. Visual Screen without correction /Snellen:  OD:20/25  OS: 20/40  OU: 20/20    Patient was also scheduled or follow-up depression. Written information given to patient regarding referral to Psych and addiction services at [facility]. Referral has been faxed to [facility]  [address] [facility], [address] [place] [address]  Phone: Garrett Koenig  Fax: [phone]  A copy of letter sent was given to patient. Strongly encouraged patient to follow through with referral and she agrees at this time.

## 2019-06-20 NOTE — PROGRESS NOTES
06/20/19    PCP: Ann Cardona NP    Chief Complaint   Patient presents with    Depression    Eye Drainage     and pain left eye        HISTORY OF PRESENT ILLNESS  Guevara Sosa  is a 51 y.o. female whom presents for Depression and Eye Drainage (and pain left eye)         Eye Drainage    The history is provided by the patient. This is a new problem. The current episode started more than 2 days ago. The problem occurs daily. The problem has been gradually worsening. Both (Left worse than Right) eyes are affected. The injury mechanism was none. The pain is at a severity of 8/10. The pain is moderate. There is no history of trauma to the eye. There is known exposure (Patient reports she had Pink eye weeks ago but did not complete treatment) to pink eye. She does not wear contacts. Associated symptoms include decreased vision, discharge, eye redness, negative and pain. Pertinent negatives include no foreign body sensation, no nausea, no vomiting, no tingling, no weakness, no itching, no fever, no blindness, no head injury and no dizziness. She has tried nothing for the symptoms. Patient Active Problem List    Diagnosis Date Noted    Severe obesity ([facility] 02/27/2019    Hypertensive left ventricular hypertrophy, without heart failure 10/15/2018    Erythrocytosis 05/22/2018    Secondary polycythemia 05/22/2018    Hypertension     Atypical squamous cells of undetermined significance on cytologic smear of cervix (ASC-US) 11/29/2016    High risk HPV infection 11/29/2016    Depression 08/31/2016    Compliance with medication regimen 08/31/2016    Elevated hematocrit 08/31/2016    Manic bipolar I disorder ([facility] 04/21/2015    Dyslipidemia 03/26/2015    [facility] 03/26/2015    Vitamin D deficiency 03/26/2015     Current Outpatient Medications   Medication Sig Dispense Refill    erythromycin (ILOTYCIN) ophthalmic ointment Apply 1 cm ribbon to both eyes every 6 hours daily for 10 days.  1 Tube 0    losartan-hydroCHLOROthiazide (HYZAAR) 100-12.5 mg per tablet Take 1 Tab by mouth daily. 30 Tab 0    albuterol (PROVENTIL HFA, VENTOLIN HFA, PROAIR HFA) 90 mcg/actuation inhaler Take 1-2 Puffs by inhalation every four (4) hours as needed for Wheezing. 1 Inhaler 6    diphenhydrAMINE (BENADRYL) 25 mg capsule Take 2 tabs by mouth two (2) times a day for 2 days 8 Cap 0    aluminum-magnesium hydroxide (MAALOX) 200-200 mg/5 mL suspension Take 15 mL by mouth every six (6) hours as needed for Indigestion. 300 mL 0    omega-3 acid ethyl esters (LOVAZA) 1 gram capsule Take 2 Caps by mouth two (2) times a day. 360 Cap 3    aspirin delayed-release 81 mg tablet Take 1 Tab by mouth daily. 100 Tab 1    amLODIPine (NORVASC) 10 mg tablet Take 1 Tab by mouth daily. 30 Tab 6    atorvastatin (LIPITOR) 40 mg tablet Take 1 Tab by mouth daily. 30 Tab 6    potassium chloride (K-DUR, KLOR-CON) 20 mEq tablet Take 1 Tab by mouth daily. 30 Tab 3    LORazepam (ATIVAN) 1 mg tablet Take  by mouth every four (4) hours as needed for Anxiety.  lamoTRIgine (LAMICTAL) 100 mg tablet Take 200 mg by mouth daily.  mirtazapine (REMERON) 30 mg tablet Take  by mouth nightly. For sleep      multivitamin, tx-iron-ca-min (THERA-M W/ IRON) 9 mg iron-400 mcg tab tablet Take 1 Tab by mouth daily.        Allergies   Allergen Reactions    Iodinated Contrast- Oral And Iv Dye Shortness of Breath     Past Medical History:   Diagnosis Date    Asthma     Bronchitis     [facility]     Cocaine abuse ([address] 3/26/2015    Dental caries     Depression     Depression 8/31/2016    Drug abuse ([facility]     Dysphasia 10/17/2018    Elevated hematocrit 8/31/2016    ETOH abuse     H/O screening mammography 12/06/2016    No evidence of malignancy     HPV (human papilloma virus) infection 11/2016    [facility]     Hypertension     Hypertensive left ventricular hypertrophy, without heart failure 10/15/2018    mild to moderate lvh continue bp meds    Ill-defined condition     Marijuana use 10/17/2018    Mood swings     Polycythemia 2018    Polycythemia     Psychiatric disorder     depression, Bipolar    Schatzki's ring 08/30/2018    Found on Barium Swallow    Sliding hiatal hernia 10/17/2018    Tobacco abuse     Vitamin D deficiency      Past Surgical History:   Procedure Laterality Date    HX APPENDECTOMY      HX COLPOSCOPY  12/06/2016    Low-grade courtney/mild dysplasia RICK I    HX HERNIA REPAIR      3x     Family History   Problem Relation Age of Onset    Hypertension Mother     Heart Attack Father     Hypertension Father     Cancer Father         skin    Cancer Sister         skin    Bipolar Disorder Sister     Cancer Brother         skin    Cancer Maternal Aunt         colon     Social History     Tobacco Use    Smoking status: Current Every Day Smoker     Packs/day: 0.50     Years: 20.00     Pack years: 10.00     Types: Cigarettes    Smokeless tobacco: Never Used   Substance Use Topics    Alcohol use: No       Review of Systems   Constitutional: Negative for fever. HENT: Negative. Eyes: Positive for pain, discharge and redness. Negative for blindness. Respiratory: Negative. Cardiovascular: Negative. Gastrointestinal: Negative for nausea and vomiting. Genitourinary: Negative. Musculoskeletal: Negative. Skin: Negative for itching. Neurological: Negative. Negative for dizziness, tingling and weakness. Psychiatric/Behavioral: Positive for depression and substance abuse. Negative for hallucinations, memory loss and suicidal ideas. The patient is not nervous/anxious and does not have insomnia. Patient reports she is open to going to [facility] for treatment.         Visit Vitals  /84 (BP 1 Location: Left arm, BP Patient Position: Sitting)   Pulse 78   Temp 98.6 °F (37 °C) (Oral)   Resp 22   Ht 5' 2\" (1.575 m)   Wt 205 lb 3.2 oz (93.1 kg) SpO2 96%   BMI 37.53 kg/m²       Pain Scale: 8/10    Pain Location: Eye (left eye)     Physical Exam   Eyes: Pupils are equal, round, and reactive to light. EOM are normal. Left eye exhibits exudate. Left eye exhibits no hordeolum. No foreign body present in the left eye. Right conjunctiva is injected. Left conjunctiva is injected (Slight left eye swelling). Psychiatric: Memory and affect normal. She exhibits a depressed mood. She expresses no homicidal and no suicidal ideation. She expresses no suicidal plans and no homicidal plans. Vitals reviewed. ASSESSMENT and PLAN    ICD-10-CM ICD-9-CM    1. Acute bacterial conjunctivitis of both eyes H10.33 372.03 erythromycin (ILOTYCIN) ophthalmic ointment   2. Depression, unspecified depression type F32.9 311     Patient to f/u with [facility]. Diagnoses and all orders for this visit:    1. Acute bacterial conjunctivitis of both eyes  -     erythromycin (ILOTYCIN) ophthalmic ointment; Apply 1 cm ribbon to both eyes every 6 hours daily for 10 days. 2. Depression, unspecified depression type  Comments:  Patient to f/u with [facility]. There are no discontinued medications. Written instructions followed our verbal discussion of all information discussed above, pending tests ordered and future goals/plans. Patient expressed understanding of current diagnosis, planned testing, follow up and if needed to contact the office for any questions or concerns prior to the next visit. Follow-up and Dispositions    · Return in about 2 weeks (around 7/4/2019), or if symptoms worsen or fail to improve.

## 2019-06-24 DIAGNOSIS — I10 ESSENTIAL HYPERTENSION: ICD-10-CM

## 2019-06-24 RX ORDER — LOSARTAN POTASSIUM AND HYDROCHLOROTHIAZIDE 12.5; 1 MG/1; MG/1
1 TABLET ORAL DAILY
Qty: 30 TAB | Refills: 6 | Status: SHIPPED | OUTPATIENT
Start: 2019-06-24 | End: 2020-01-17 | Stop reason: SDUPTHER

## 2019-06-27 ENCOUNTER — HOSPITAL ENCOUNTER (EMERGENCY)
Age: 49
Discharge: HOME | End: 2019-06-27
Attending: EMERGENCY MEDICINE
Payer: COMMERCIAL

## 2019-06-27 ENCOUNTER — APPOINTMENT (OUTPATIENT)
Dept: CT IMAGING | Age: 49
End: 2019-06-27
Attending: EMERGENCY MEDICINE
Payer: COMMERCIAL

## 2019-06-27 ENCOUNTER — HOSPITAL ENCOUNTER (EMERGENCY)
Age: 49
Discharge: LEFT BEFORE DISCHARGE | End: 2019-06-27
Attending: EMERGENCY MEDICINE
Payer: COMMERCIAL

## 2019-06-27 VITALS
HEIGHT: 62 IN | OXYGEN SATURATION: 98 % | BODY MASS INDEX: 36.8 KG/M2 | SYSTOLIC BLOOD PRESSURE: 159 MMHG | RESPIRATION RATE: 12 BRPM | HEART RATE: 80 BPM | WEIGHT: 200 LBS | TEMPERATURE: 98.1 F | DIASTOLIC BLOOD PRESSURE: 94 MMHG

## 2019-06-27 VITALS
DIASTOLIC BLOOD PRESSURE: 70 MMHG | HEIGHT: 62 IN | SYSTOLIC BLOOD PRESSURE: 160 MMHG | RESPIRATION RATE: 20 BRPM | OXYGEN SATURATION: 100 % | HEART RATE: 89 BPM | BODY MASS INDEX: 37.76 KG/M2 | WEIGHT: 205.2 LBS

## 2019-06-27 DIAGNOSIS — R20.2 FACIAL TINGLING: Primary | ICD-10-CM

## 2019-06-27 LAB
ANION GAP SERPL CALC-SCNC: 8 MMOL/L (ref 3–18)
BASOPHILS # BLD: 0 K/UL (ref 0–0.1)
BASOPHILS NFR BLD: 0 % (ref 0–2)
BUN SERPL-MCNC: 9 MG/DL (ref 7–18)
BUN/CREAT SERPL: 12 (ref 12–20)
CALCIUM SERPL-MCNC: 8.6 MG/DL (ref 8.5–10.1)
CHLORIDE SERPL-SCNC: 106 MMOL/L (ref 100–108)
CK MB CFR SERPL CALC: 4 % (ref 0–4)
CK MB SERPL-MCNC: 1.4 NG/ML (ref 5–25)
CK SERPL-CCNC: 35 U/L (ref 26–192)
CO2 SERPL-SCNC: 27 MMOL/L (ref 21–32)
CREAT SERPL-MCNC: 0.73 MG/DL (ref 0.6–1.3)
DIFFERENTIAL METHOD BLD: ABNORMAL
EOSINOPHIL # BLD: 0.2 K/UL (ref 0–0.4)
EOSINOPHIL NFR BLD: 2 % (ref 0–5)
ERYTHROCYTE [DISTWIDTH] IN BLOOD BY AUTOMATED COUNT: 15.2 % (ref 11.6–14.5)
GLUCOSE SERPL-MCNC: 107 MG/DL (ref 74–99)
HCT VFR BLD AUTO: 42.8 % (ref 35–45)
HGB BLD-MCNC: 14 G/DL (ref 12–16)
LYMPHOCYTES # BLD: 1.6 K/UL (ref 0.9–3.6)
LYMPHOCYTES NFR BLD: 16 % (ref 21–52)
MCH RBC QN AUTO: 27.2 PG (ref 24–34)
MCHC RBC AUTO-ENTMCNC: 32.7 G/DL (ref 31–37)
MCV RBC AUTO: 83.1 FL (ref 74–97)
MONOCYTES # BLD: 0.9 K/UL (ref 0.05–1.2)
MONOCYTES NFR BLD: 9 % (ref 3–10)
NEUTS SEG # BLD: 7.4 K/UL (ref 1.8–8)
NEUTS SEG NFR BLD: 73 % (ref 40–73)
PLATELET # BLD AUTO: 241 K/UL (ref 135–420)
PMV BLD AUTO: 9 FL (ref 9.2–11.8)
POTASSIUM SERPL-SCNC: 3.7 MMOL/L (ref 3.5–5.5)
RBC # BLD AUTO: 5.15 M/UL (ref 4.2–5.3)
SODIUM SERPL-SCNC: 141 MMOL/L (ref 136–145)
TROPONIN I SERPL-MCNC: <0.02 NG/ML (ref 0–0.04)
WBC # BLD AUTO: 10 K/UL (ref 4.6–13.2)

## 2019-06-27 PROCEDURE — 75810000275 HC EMERGENCY DEPT VISIT NO LEVEL OF CARE

## 2019-06-27 PROCEDURE — 85025 COMPLETE CBC W/AUTO DIFF WBC: CPT

## 2019-06-27 PROCEDURE — 70450 CT HEAD/BRAIN W/O DYE: CPT

## 2019-06-27 PROCEDURE — 99285 EMERGENCY DEPT VISIT HI MDM: CPT

## 2019-06-27 PROCEDURE — 80048 BASIC METABOLIC PNL TOTAL CA: CPT

## 2019-06-27 PROCEDURE — 82550 ASSAY OF CK (CPK): CPT

## 2019-06-27 NOTE — ED TRIAGE NOTES
\"I was here earlier. They let me go home. I still feel bad. I didn't have pain to my side earlier, but I do now. \"  She is complaining of LUQ abdominal pain.

## 2019-06-27 NOTE — ED TRIAGE NOTES
Patient in ICU visiting family patient states that her right side of face feels numb.  Left arm has pain8/10

## 2019-06-27 NOTE — DISCHARGE INSTRUCTIONS
Patient Education        Numbness and Tingling: Care Instructions  Your Care Instructions    Many things can cause numbness or tingling. Swelling may put pressure on a nerve. This could cause you to lose feeling or have a pins-and-needles sensation on part of your body. Nerves may be damaged from trauma, toxins, or diseases, such as diabetes or multiple sclerosis (MS). Sometimes, though, the cause is not clear. If there is no clear reason for your symptoms, and you are not having any other symptoms, your doctor may suggest watching and waiting for a while to see if the numbness or tingling goes away on its own. Your doctor may want you to have blood or nerve tests to find the cause of your symptoms. Follow-up care is a key part of your treatment and safety. Be sure to make and go to all appointments, and call your doctor if you are having problems. It's also a good idea to know your test results and keep a list of the medicines you take. How can you care for yourself at home? · If your doctor prescribes medicine, take it exactly as directed. Call your doctor if you think you are having a problem with your medicine. · If you have any swelling, put ice or a cold pack on the area for 10 to 20 minutes at a time. Put a thin cloth between the ice and your skin. When should you call for help? Call 911 anytime you think you may need emergency care. For example, call if:    · You have weakness, numbness, or tingling in both legs.     · You lose bowel or bladder control.     · You have symptoms of a stroke. These may include:  ? Sudden numbness, tingling, weakness, or loss of movement in your face, arm, or leg, especially on only one side of your body. ? Sudden vision changes. ? Sudden trouble speaking. ? Sudden confusion or trouble understanding simple statements. ? Sudden problems with walking or balance.   ? A sudden, severe headache that is different from past headaches.    Watch closely for changes in your health, and be sure to contact your doctor if you have any problems, or if:    · You do not get better as expected. Where can you learn more? Go to http://los-zack.info/. Enter S108 in the search box to learn more about \"Numbness and Tingling: Care Instructions. \"  Current as of: Rosalie 3, 2018  Content Version: 11.9  © 4305-9569 [employer]. Care instructions adapted under license by [employer] (which disclaims liability or warranty for this information). If you have questions about a medical condition or this instruction, always ask your healthcare professional. [address] any warranty or liability for your use of this information.

## 2019-07-02 ENCOUNTER — HOSPITAL ENCOUNTER (OUTPATIENT)
Dept: ONCOLOGY | Age: 49
Discharge: HOME | End: 2019-07-02

## 2019-07-02 DIAGNOSIS — D75.1 POLYCYTHEMIA: ICD-10-CM

## 2019-07-18 ENCOUNTER — HOSPITAL ENCOUNTER (EMERGENCY)
Age: 49
Discharge: LEFT BEFORE DISCHARGE | End: 2019-07-19
Attending: EMERGENCY MEDICINE | Admitting: EMERGENCY MEDICINE
Payer: MEDICAID

## 2019-07-18 VITALS
DIASTOLIC BLOOD PRESSURE: 90 MMHG | SYSTOLIC BLOOD PRESSURE: 156 MMHG | RESPIRATION RATE: 16 BRPM | WEIGHT: 200 LBS | BODY MASS INDEX: 36.58 KG/M2 | HEART RATE: 80 BPM | OXYGEN SATURATION: 96 % | TEMPERATURE: 98 F

## 2019-07-18 PROCEDURE — 75810000275 HC EMERGENCY DEPT VISIT NO LEVEL OF CARE

## 2019-07-18 PROCEDURE — 93005 ELECTROCARDIOGRAM TRACING: CPT

## 2019-07-18 NOTE — ED TRIAGE NOTES
\"I've had a burning sensation in my chest off and on for 3 days and it's very heavy on the left side. \"

## 2019-07-20 LAB
ATRIAL RATE: 75 BPM
CALCULATED P AXIS, ECG09: 52 DEGREES
CALCULATED R AXIS, ECG10: -32 DEGREES
CALCULATED T AXIS, ECG11: 36 DEGREES
DIAGNOSIS, 93000: NORMAL
P-R INTERVAL, ECG05: 174 MS
Q-T INTERVAL, ECG07: 420 MS
QRS DURATION, ECG06: 116 MS
QTC CALCULATION (BEZET), ECG08: 469 MS
VENTRICULAR RATE, ECG03: 75 BPM

## 2019-08-13 ENCOUNTER — APPOINTMENT (OUTPATIENT)
Dept: INFUSION THERAPY | Age: 49
End: 2019-08-13
Payer: MEDICAID

## 2019-08-27 ENCOUNTER — CLINICAL SUPPORT (OUTPATIENT)
Dept: ONCOLOGY | Age: 49
End: 2019-08-27

## 2019-08-27 ENCOUNTER — HOSPITAL ENCOUNTER (OUTPATIENT)
Dept: INFUSION THERAPY | Age: 49
Discharge: HOME | End: 2019-08-27
Payer: MEDICAID

## 2019-08-27 ENCOUNTER — HOSPITAL ENCOUNTER (OUTPATIENT)
Dept: ONCOLOGY | Age: 49
Discharge: HOME | End: 2019-08-27

## 2019-08-27 VITALS
RESPIRATION RATE: 16 BRPM | DIASTOLIC BLOOD PRESSURE: 84 MMHG | TEMPERATURE: 97.3 F | HEART RATE: 71 BPM | SYSTOLIC BLOOD PRESSURE: 144 MMHG

## 2019-08-27 DIAGNOSIS — D75.1 SECONDARY POLYCYTHEMIA: Primary | ICD-10-CM

## 2019-08-27 DIAGNOSIS — D75.1 SECONDARY POLYCYTHEMIA: ICD-10-CM

## 2019-08-27 LAB
BASO+EOS+MONOS # BLD AUTO: 0.6 K/UL (ref 0–2.3)
BASO+EOS+MONOS NFR BLD AUTO: 7 % (ref 0.1–17)
DIFFERENTIAL METHOD BLD: ABNORMAL
ERYTHROCYTE [DISTWIDTH] IN BLOOD BY AUTOMATED COUNT: 16.1 % (ref 11.5–14.5)
HCT VFR BLD AUTO: 46.9 % (ref 36–48)
HGB BLD-MCNC: 14.9 G/DL (ref 12–16)
LYMPHOCYTES # BLD: 3.2 K/UL (ref 1.1–5.9)
LYMPHOCYTES NFR BLD: 39 % (ref 14–44)
MCH RBC QN AUTO: 28.1 PG (ref 25–35)
MCHC RBC AUTO-ENTMCNC: 31.8 G/DL (ref 31–37)
MCV RBC AUTO: 88.5 FL (ref 78–102)
NEUTS SEG # BLD: 4.5 K/UL (ref 1.8–9.5)
NEUTS SEG NFR BLD: 54 % (ref 40–70)
PLATELET # BLD AUTO: 277 K/UL (ref 140–440)
RBC # BLD AUTO: 5.3 M/UL (ref 4.1–5.1)
WBC # BLD AUTO: 8.3 K/UL (ref 4.5–13)

## 2019-08-27 PROCEDURE — 99195 PHLEBOTOMY: CPT

## 2019-08-27 PROCEDURE — 74011250636 HC RX REV CODE- 250/636: Performed by: INTERNAL MEDICINE

## 2019-08-27 PROCEDURE — 36415 COLL VENOUS BLD VENIPUNCTURE: CPT

## 2019-08-27 RX ORDER — SODIUM CHLORIDE 9 MG/ML
500 INJECTION, SOLUTION INTRAVENOUS AS NEEDED
Status: DISCONTINUED | OUTPATIENT
Start: 2019-08-27 | End: 2019-08-28 | Stop reason: HOSPADM

## 2019-08-27 RX ADMIN — SODIUM CHLORIDE 500 ML: 9 INJECTION, SOLUTION INTRAVENOUS at 10:21

## 2019-08-27 NOTE — PROGRESS NOTES
[facility] Progress Note    Date: 2019    Name: Rolan Aden    MRN: [id number]         : 1970      Ms. Ozuna was assessed and education was provided. Ms. Ozuna's vitals were reviewed and patient was observed for 5 minutes prior to treatment. Visit Vitals  /88 (BP 1 Location: Left arm, BP Patient Position: Sitting)   Pulse 75   Temp 98 °F (36.7 °C)   Resp 16   Breastfeeding? No       Lab results were obtained and reviewed. Recent Results (from the past 12 hour(s))   CBC WITH 3 PART DIFF    Collection Time: 19  9:38 AM   Result Value Ref Range    WBC 8.3 4.5 - 13.0 K/uL    RBC 5.30 (H) 4.10 - 5.10 M/uL    HGB 14.9 12.0 - 16.0 g/dL    HCT 46.9 36 - 48 %    MCV 88.5 78 - 102 FL    MCH 28.1 25.0 - 35.0 PG    MCHC 31.8 31 - 37 g/dL    RDW 16.1 (H) 11.5 - 14.5 %    PLATELET 717 853 - 376 K/uL    NEUTROPHILS 54 40 - 70 %    MIXED CELLS 7 0.1 - 17 %    LYMPHOCYTES 39 14 - 44 %    ABS. NEUTROPHILS 4.5 1.8 - 9.5 K/UL    ABS. MIXED CELLS 0.6 0.0 - 2.3 K/uL    ABS. LYMPHOCYTES 3.2 1.1 - 5.9 K/UL    DF AUTOMATED         Therapeutic phlebotomy started at 0947 and stopped at 1020. Today's HCT = 46.9. Approx 500 ml blood removed from patient. 500 ml NS IV given to patient. Ms. Micheal Chapa tolerated the procedure, and had no complaints. Patient armband removed and shredded. Ms. Micheal Chapa was discharged from [place] [address] in stable condition at 1110. She is to return on 19 at 1000 for her next appointment for CBC/Phlebotomy.     Lulu Main RN  2019

## 2019-09-11 ENCOUNTER — OFFICE VISIT (OUTPATIENT)
Dept: FAMILY MEDICINE CLINIC | Age: 49
End: 2019-09-11

## 2019-09-11 ENCOUNTER — OFFICE VISIT (OUTPATIENT)
Dept: CARDIOLOGY CLINIC | Age: 49
End: 2019-09-11

## 2019-09-11 VITALS
RESPIRATION RATE: 20 BRPM | SYSTOLIC BLOOD PRESSURE: 118 MMHG | BODY MASS INDEX: 39.34 KG/M2 | DIASTOLIC BLOOD PRESSURE: 81 MMHG | HEIGHT: 62 IN | WEIGHT: 213.8 LBS | OXYGEN SATURATION: 95 % | TEMPERATURE: 98.3 F | HEART RATE: 91 BPM

## 2019-09-11 VITALS
SYSTOLIC BLOOD PRESSURE: 136 MMHG | WEIGHT: 213.6 LBS | DIASTOLIC BLOOD PRESSURE: 72 MMHG | HEIGHT: 62 IN | BODY MASS INDEX: 39.31 KG/M2 | HEART RATE: 80 BPM

## 2019-09-11 DIAGNOSIS — K21.9 GASTROESOPHAGEAL REFLUX DISEASE, ESOPHAGITIS PRESENCE NOT SPECIFIED: ICD-10-CM

## 2019-09-11 DIAGNOSIS — R07.9 CHEST PAIN, UNSPECIFIED TYPE: Primary | ICD-10-CM

## 2019-09-11 DIAGNOSIS — R07.9 CHEST PAIN, UNSPECIFIED TYPE: ICD-10-CM

## 2019-09-11 DIAGNOSIS — Z72.0 TOBACCO ABUSE DISORDER: ICD-10-CM

## 2019-09-11 DIAGNOSIS — F19.10 DRUG ABUSE (HCC): ICD-10-CM

## 2019-09-11 DIAGNOSIS — E78.5 DYSLIPIDEMIA: ICD-10-CM

## 2019-09-11 DIAGNOSIS — I10 ESSENTIAL HYPERTENSION: ICD-10-CM

## 2019-09-11 DIAGNOSIS — F14.10 COCAINE ABUSE (HCC): ICD-10-CM

## 2019-09-11 DIAGNOSIS — F41.9 ANXIETY AND DEPRESSION: ICD-10-CM

## 2019-09-11 DIAGNOSIS — I10 ESSENTIAL HYPERTENSION: Primary | ICD-10-CM

## 2019-09-11 DIAGNOSIS — F32.A ANXIETY AND DEPRESSION: ICD-10-CM

## 2019-09-11 DIAGNOSIS — I51.7 LVH (LEFT VENTRICULAR HYPERTROPHY): ICD-10-CM

## 2019-09-11 RX ORDER — FLUTICASONE PROPIONATE AND SALMETEROL 100; 50 UG/1; UG/1
1 POWDER RESPIRATORY (INHALATION) EVERY 12 HOURS
Qty: 1 INHALER | Refills: 3 | Status: SHIPPED | OUTPATIENT
Start: 2019-09-11 | End: 2019-10-30 | Stop reason: SDUPTHER

## 2019-09-11 RX ORDER — AMLODIPINE BESYLATE 10 MG/1
10 TABLET ORAL DAILY
Qty: 30 TAB | Refills: 6 | Status: SHIPPED | OUTPATIENT
Start: 2019-09-11 | End: 2020-01-30 | Stop reason: SDUPTHER

## 2019-09-11 RX ORDER — ASPIRIN 81 MG/1
81 TABLET ORAL DAILY
Qty: 100 TAB | Refills: 3 | Status: SHIPPED | OUTPATIENT
Start: 2019-09-11 | End: 2020-05-06 | Stop reason: SDUPTHER

## 2019-09-11 RX ORDER — ATORVASTATIN CALCIUM 40 MG/1
40 TABLET, FILM COATED ORAL DAILY
Qty: 30 TAB | Refills: 6 | Status: SHIPPED | OUTPATIENT
Start: 2019-09-11 | End: 2020-04-14 | Stop reason: SDUPTHER

## 2019-09-11 RX ORDER — OMEPRAZOLE 20 MG/1
20 CAPSULE, DELAYED RELEASE ORAL DAILY
Qty: 30 CAP | Refills: 3 | Status: SHIPPED | OUTPATIENT
Start: 2019-09-11 | End: 2019-12-30 | Stop reason: SDUPTHER
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
## 2019-09-11 NOTE — PROGRESS NOTES
1. Have you been to the ER, urgent care clinic since your last visit? Hospitalized since your last visit? Yes When: 07/2019 Where: [facility] Reason for visit: Chest Pain    2. Have you seen or consulted any other health care providers outside of the [address] [place] [address] since your last visit? Include any pap smears or colon screening.  Yes Where: Psychiatry Reason for visit: Routine Visit

## 2019-09-11 NOTE — PROGRESS NOTES
HISTORY OF PRESENT ILLNESS  Cecilia Tomlinson is a 47 y.o. female. Recent er visit with atypical non anginal pain. Patient was in emergency room yesterday with atypical chest pain. 1/2019. ER notes and data reviewed    Chest Pain (Angina)    The history is provided by the patient. This is a recurrent problem. The problem has not changed since onset. The problem occurs every several days. The pain is associated with exertion and rest. The pain is present in the substernal region. The pain is mild. The quality of the pain is described as dull. Associated symptoms include shortness of breath. Pertinent negatives include no claudication, no cough, no dizziness, no fever, no hemoptysis, no nausea, no orthopnea, no palpitations, no PND, no sputum production, no vomiting and no weakness. Hypertension   The history is provided by the patient. This is a chronic problem. The problem occurs constantly. The problem has not changed since onset. Associated symptoms include chest pain and shortness of breath. Palpitations    The history is provided by the patient. This is a recurrent problem. The problem has not changed since onset. The problem occurs every several days. The problem is associated with nothing. Associated symptoms include chest pain and shortness of breath. Pertinent negatives include no fever, no claudication, no orthopnea, no PND, no nausea, no vomiting, no dizziness, no weakness, no cough, no hemoptysis and no sputum production. Her past medical history is significant for hypertension. Review of Systems   Constitutional: Negative for chills and fever. HENT: Negative for nosebleeds. Eyes: Negative for blurred vision and double vision. Respiratory: Positive for shortness of breath. Negative for cough, hemoptysis, sputum production and wheezing. Cardiovascular: Positive for chest pain. Negative for palpitations, orthopnea, claudication, leg swelling and PND.    Gastrointestinal: Negative for heartburn, nausea and vomiting. Musculoskeletal: Negative for myalgias. Skin: Negative for rash. Neurological: Negative for dizziness and weakness. Endo/Heme/Allergies: Does not bruise/bleed easily.      Family History   Problem Relation Age of Onset    Hypertension Mother     Heart Attack Father     Hypertension Father     Cancer Father         skin    Cancer Sister         skin    Bipolar Disorder Sister     Cancer Brother         skin    Cancer Maternal Aunt         colon       Past Medical History:   Diagnosis Date    Asthma     Bronchitis     [facility] [address]     Cocaine abuse ([facility] [address] 3/26/2015    Dental caries     Depression     Depression 8/31/2016    Drug abuse ([facility] [address]     Dysphasia 10/17/2018    Elevated hematocrit 8/31/2016    ETOH abuse     H/O screening mammography 12/06/2016    No evidence of malignancy     HPV (human papilloma virus) infection 11/2016    The patient referred to [facility]     Hypertension     Hypertensive left ventricular hypertrophy, without heart failure 10/15/2018    mild to moderate lvh continue bp meds    Ill-defined condition     Marijuana use 10/17/2018    Mood swings     Polycythemia 2018    Polycythemia     Psychiatric disorder     depression, Bipolar    Schatzki's ring 08/30/2018    Found on Barium Swallow    Sliding hiatal hernia 10/17/2018    Tobacco abuse     Vitamin D deficiency        Past Surgical History:   Procedure Laterality Date    HX APPENDECTOMY      HX COLPOSCOPY  12/06/2016    Low-grade courtney/mild dysplasia RICK I    HX HERNIA REPAIR      3x       Social History     Tobacco Use    Smoking status: Current Every Day Smoker     Packs/day: 0.50     Years: 20.00     Pack years: 10.00     Types: Cigarettes    Smokeless tobacco: Never Used   Substance Use Topics    Alcohol use: No       Allergies   Allergen Reactions    Iodinated Contrast Media Shortness of Breath       Prior to Admission medications    Medication Sig Start Date End Date Taking? Authorizing Provider   trazodone HCl (TRAZODONE PO) Take  by mouth as needed. Yes Provider, Historical   aspirin delayed-release 81 mg tablet Take 1 Tab by mouth daily. 9/11/19  Yes Torri Salas MD   losartan-hydroCHLOROthiazide [facility]) 100-12.5 mg per tablet Take 1 Tab by mouth daily. 6/24/19  Yes Supriya Aranda NP   albuterol (PROVENTIL HFA, VENTOLIN HFA, PROAIR HFA) 90 mcg/actuation inhaler Take 1-2 Puffs by inhalation every four (4) hours as needed for Wheezing. 4/20/19  Yes Luis Alberto MCDONALD NP   aluminum-magnesium hydroxide (MAALOX) 200-200 mg/5 mL suspension Take 15 mL by mouth every six (6) hours as needed for Indigestion. 4/12/19  Yes Lendon Osler, MD   omega-3 acid ethyl esters (LOVAZA) 1 gram capsule Take 2 Caps by mouth two (2) times a day. 3/21/19  Yes Luis Alberto MCDONALD NP   amLODIPine (NORVASC) 10 mg tablet Take 1 Tab by mouth daily. 2/21/19  Yes Luis Alberto MCDONALD NP   atorvastatin (LIPITOR) 40 mg tablet Take 1 Tab by mouth daily. 2/21/19  Yes Luis Alberto MCDONALD NP   LORazepam (ATIVAN) 1 mg tablet Take  by mouth every four (4) hours as needed for Anxiety. Yes Provider, Historical   lamoTRIgine (LAMICTAL) 100 mg tablet Take 200 mg by mouth daily. Yes Provider, Historical   mirtazapine (REMERON) 30 mg tablet Take  by mouth nightly. For sleep , 2 tabs at night   Yes Provider, Historical         Visit Vitals  /72   Pulse 80   Ht 5' 2\" (1.575 m)   Wt 96.9 kg (213 lb 9.6 oz)   BMI 39.07 kg/m²         Physical Exam   Constitutional: She is oriented to person, place, and time. She appears well-developed and well-nourished. HENT:   Head: Normocephalic and atraumatic. Eyes: Conjunctivae are normal.   Neck: Neck supple. No JVD present. No tracheal deviation present. No thyromegaly present. Cardiovascular: Normal rate and regular rhythm. PMI is not displaced. Exam reveals no gallop, no S3 and no decreased pulses. Murmur heard.    Early systolic murmur is present at the upper right sternal border and lower left sternal border. Pulmonary/Chest: No respiratory distress. She has no wheezes. She has no rales. She exhibits no tenderness. Abdominal: Soft. There is no tenderness. Musculoskeletal: She exhibits no edema. Neurological: She is alert and oriented to person, place, and time. Skin: Skin is warm. Psychiatric: She has a normal mood and affect. Ms. Mandeep Rose has a reminder for a \"due or due soon\" health maintenance. I have asked that she contact her primary care provider for follow-up on this health maintenance. I have personally reviewed patients ekg done at other facility. SUMMARY:2017  Left ventricle: Systolic function was normal. Ejection fraction was  estimated in the range of 55 % to 60 %. No obvious wall motion  abnormalities identified in the views obtained. Wall thickness was mildly  to moderately increased. There was mild concentric hypertrophy. NUCLEAR [id number]    Findings:   1. Post-stress imaging in short axis, horizontal and vertical long axis views reveals normal isotope uptake in all areas. 2. Resting images also show normal isotope uptake in all areas. 3. Gated images show normal left ventricular size, wall motion and systolic function. Ejection fraction is 71%. Diagnosis:   1. Normal scan. 2. No evidence of significant fixed or reversible defect suggesting ischemia or myocardial infarction noted from this nuclear study. 3. Low risk scan. Interpretation Summary        · Baseline ECG: Normal sinus rhythm, Minimal voltage criteria for LVH , maybe normal variant Septal infarct, age undetermined. · Inconclusive stress electrocardiogram.  · Low risk study.      Submaximal stress test.  If clinically indicated, recommend nuclear stress test.      Stress Findings     ECG Baseline ECG: Normal sinus rhythm, Minimal voltage criteria for LVH , maybe normal variant  Septal infarct, age undetermined. Arrhythmias during stress: rare PACs. There was no ST segment deviation noted during stress. Arrhythmias during recovery: rare PACs. Arrhythmias were not significant. Stress Findings A stress test was performed following a modified Sathish protocol. The test was stopped because the patient complained of shortness of breath. The patient reported no angina during stress. Leg pain   The patient's response to exercise was inadequate for diagnosis. Blood pressure demonstrated a normal response to exercise. Overall, the patient's exercise capacity was normal.   Angina Index is 0. Inconclusive stress electrocardiogram for inducible myocardial ischemia. Patient unable to reach target heart rate   Stress Measurements     Baseline Vitals   Baseline HR 67 BPM      Baseline BP 80/57 mmHg       Peak Stress Vitals   Post peak  BPM      Stress Base Systolic  mmHg      Stress Base Diastolic BP 70 mmHg      Estimated workload 7 METS      Stress Rate Pressure Product 12,420 BPM*mmHg       Exercise Data   Target  bpm      Percent HR 79 %      Exercise duration time               Assessment         ICD-10-CM ICD-9-CM    1. Chest pain, unspecified type R07.9 786.50     Chest pain on and off. Continue medical management. Continue amlodipine. Advised to continue aspirin. Discussed cocaine cessation   2. Dyslipidemia E78.5 272.4     Continue statin follow-up lab with PCP   3. LVH (left ventricular hypertrophy) I51.7 429.3     Stable   4. Cocaine abuse ([facility] [address] F14.10 305.60     Discussed discontinuation of that. Follow-up with PCP for further help   5. Essential hypertension I10 401.9     Stable continue   2/2019  Continue medical management insurance had refused nuclear stress test and echocardiogram exam stress echo EKG part inconclusive exercise to 79% of maximum predicted heart rate at this level of stress no EKG changes and no echo abnormality.   Continue to monitor clinically and continue with medication including amlodipine aspirin and statin. Discussed cessation of cocaine use    3/2019  ACS with continued substernal chest pain with radiation to left arm and shoulder with SOB. She was unable to have NST and stress echo due to insurance denial.  Continue aspirin, statin and norvasc. Not on BB due to ongoing cocaine use (last used 2 weeks ago). Will proceed with cardiac cath due to ongoing symptoms and risk factors. Pre-medicate with prednisone and benadryl to begin 2 days prior to cardiac cath. Pre-procedure labs and chest xray ordered. Discussed cessation of tobacco and cocaine use  9/2019  Noncompliant with follow-up. Continues to use cocaine. Did not keep her cath appointments. Will try and continue medical management. Advised back on aspirin. Discussed smoking cessation and cocaine cessation  Medications Discontinued During This Encounter   Medication Reason    erythromycin (ILOTYCIN) ophthalmic ointment Not A Current Medication    diphenhydrAMINE (BENADRYL) 25 mg capsule Not A Current Medication    aspirin delayed-release 81 mg tablet Not A Current Medication    potassium chloride (K-DUR, KLOR-CON) 20 mEq tablet Not A Current Medication    multivitamin, tx-iron-ca-min (THERA-M W/ IRON) 9 mg iron-400 mcg tab tablet Not A Current Medication       Orders Placed This Encounter    aspirin delayed-release 81 mg tablet     Sig: Take 1 Tab by mouth daily. Dispense:  100 Tab     Refill:  3       Follow-up and Dispositions    · Return in about 6 months (around 3/11/2020).

## 2019-09-12 ENCOUNTER — HOSPITAL ENCOUNTER (OUTPATIENT)
Dept: INFUSION THERAPY | Age: 49
Discharge: HOME | End: 2019-09-12
Payer: MEDICAID

## 2019-09-12 ENCOUNTER — TELEPHONE (OUTPATIENT)
Dept: FAMILY MEDICINE CLINIC | Age: 49
End: 2019-09-12

## 2019-09-12 ENCOUNTER — HOSPITAL ENCOUNTER (OUTPATIENT)
Dept: ONCOLOGY | Age: 49
Discharge: HOME | End: 2019-09-12

## 2019-09-12 ENCOUNTER — CLINICAL SUPPORT (OUTPATIENT)
Dept: ONCOLOGY | Age: 49
End: 2019-09-12

## 2019-09-12 VITALS
HEART RATE: 79 BPM | RESPIRATION RATE: 18 BRPM | OXYGEN SATURATION: 95 % | DIASTOLIC BLOOD PRESSURE: 87 MMHG | SYSTOLIC BLOOD PRESSURE: 131 MMHG | TEMPERATURE: 97.4 F

## 2019-09-12 DIAGNOSIS — D75.1 POLYCYTHEMIA: ICD-10-CM

## 2019-09-12 DIAGNOSIS — D75.1 POLYCYTHEMIA: Primary | ICD-10-CM

## 2019-09-12 LAB
BASO+EOS+MONOS # BLD AUTO: 0.9 K/UL (ref 0–2.3)
BASO+EOS+MONOS NFR BLD AUTO: 11 % (ref 0.1–17)
DIFFERENTIAL METHOD BLD: ABNORMAL
ERYTHROCYTE [DISTWIDTH] IN BLOOD BY AUTOMATED COUNT: 15.7 % (ref 11.5–14.5)
HCT VFR BLD AUTO: 44.2 % (ref 36–48)
HGB BLD-MCNC: 14.2 G/DL (ref 12–16)
LYMPHOCYTES # BLD: 2.9 K/UL (ref 1.1–5.9)
LYMPHOCYTES NFR BLD: 35 % (ref 14–44)
MCH RBC QN AUTO: 28.3 PG (ref 25–35)
MCHC RBC AUTO-ENTMCNC: 32.1 G/DL (ref 31–37)
MCV RBC AUTO: 88 FL (ref 78–102)
NEUTS SEG # BLD: 4.6 K/UL (ref 1.8–9.5)
NEUTS SEG NFR BLD: 54 % (ref 40–70)
PLATELET # BLD AUTO: 293 K/UL (ref 140–440)
RBC # BLD AUTO: 5.02 M/UL (ref 4.1–5.1)
WBC # BLD AUTO: 8.4 K/UL (ref 4.5–13)

## 2019-09-12 PROCEDURE — 99195 PHLEBOTOMY: CPT

## 2019-09-12 PROCEDURE — 36415 COLL VENOUS BLD VENIPUNCTURE: CPT

## 2019-09-12 PROCEDURE — 74011250636 HC RX REV CODE- 250/636: Performed by: INTERNAL MEDICINE

## 2019-09-12 RX ORDER — SODIUM CHLORIDE 9 MG/ML
500 INJECTION, SOLUTION INTRAVENOUS ONCE
Status: COMPLETED | OUTPATIENT
Start: 2019-09-12 | End: 2019-09-12

## 2019-09-12 RX ADMIN — SODIUM CHLORIDE 500 ML/HR: 9 INJECTION, SOLUTION INTRAVENOUS at 11:15

## 2019-09-12 NOTE — PROGRESS NOTES
[facility] Progress Note    Date: 2019    Name: Kunal Cazares    MRN: [id number]         : 1970      Ms. Ozuna was assessed and education was provided. Ms. Ozuna's vitals were reviewed and patient was observed for 5 minutes prior to treatment. Visit Vitals  /87 (BP 1 Location: Right arm, BP Patient Position: Sitting)   Pulse 79   Temp 97.4 °F (36.3 °C)   Resp 18   SpO2 95%   Breastfeeding? No     20 gauge PIV placed to RAC x 1 attempt. CBC obtained. Lab results were obtained and reviewed. Recent Results (from the past 12 hour(s))   CBC WITH 3 PART DIFF    Collection Time: 19 10:30 AM   Result Value Ref Range    WBC 8.4 4.5 - 13.0 K/uL    RBC 5.02 4.10 - 5.10 M/uL    HGB 14.2 12.0 - 16.0 g/dL    HCT 44.2 36 - 48 %    MCV 88.0 78 - 102 FL    MCH 28.3 25.0 - 35.0 PG    MCHC 32.1 31 - 37 g/dL    RDW 15.7 (H) 11.5 - 14.5 %    PLATELET 240 462 - 954 K/uL    NEUTROPHILS 54 40 - 70 %    MIXED CELLS 11 0.1 - 17 %    LYMPHOCYTES 35 14 - 44 %    ABS. NEUTROPHILS 4.6 1.8 - 9.5 K/UL    ABS. MIXED CELLS 0.9 0.0 - 2.3 K/uL    ABS. LYMPHOCYTES 2.9 1.1 - 5.9 K/UL    DF AUTOMATED         Therapeutic phlebotomy initiated for HCT = 44.2. Approx 500 ml blood removed from patient. 500 ml NS IV given to patient. Snacks offered during this visit. Ms. Tatiana Neff tolerated the procedure, and had no complaints. Patient armband removed and shredded. Ms. Tatiana Neff was discharged from [place] [id number] in stable condition at 1200. She is to return on 19 at 0920 for her next appointment for CBC/Phlebotomy.     Christian Frisa  2019

## 2019-09-20 ENCOUNTER — HOSPITAL ENCOUNTER (OUTPATIENT)
Dept: ONCOLOGY | Age: 49
Discharge: HOME | End: 2019-09-20

## 2019-09-20 ENCOUNTER — HOSPITAL ENCOUNTER (OUTPATIENT)
Dept: INFUSION THERAPY | Age: 49
Discharge: HOME | End: 2019-09-20
Payer: MEDICAID

## 2019-09-20 ENCOUNTER — CLINICAL SUPPORT (OUTPATIENT)
Dept: ONCOLOGY | Age: 49
End: 2019-09-20

## 2019-09-20 VITALS
HEART RATE: 58 BPM | DIASTOLIC BLOOD PRESSURE: 89 MMHG | SYSTOLIC BLOOD PRESSURE: 139 MMHG | RESPIRATION RATE: 16 BRPM | TEMPERATURE: 98.3 F

## 2019-09-20 DIAGNOSIS — D75.1 POLYCYTHEMIA: ICD-10-CM

## 2019-09-20 DIAGNOSIS — D75.1 POLYCYTHEMIA: Primary | ICD-10-CM

## 2019-09-20 LAB
BASO+EOS+MONOS # BLD AUTO: 0.6 K/UL (ref 0–2.3)
BASO+EOS+MONOS NFR BLD AUTO: 8 % (ref 0.1–17)
DIFFERENTIAL METHOD BLD: ABNORMAL
ERYTHROCYTE [DISTWIDTH] IN BLOOD BY AUTOMATED COUNT: 15.1 % (ref 11.5–14.5)
HCT VFR BLD AUTO: 44.3 % (ref 36–48)
HGB BLD-MCNC: 14.2 G/DL (ref 12–16)
LYMPHOCYTES # BLD: 2.9 K/UL (ref 1.1–5.9)
LYMPHOCYTES NFR BLD: 37 % (ref 14–44)
MCH RBC QN AUTO: 28.5 PG (ref 25–35)
MCHC RBC AUTO-ENTMCNC: 32.1 G/DL (ref 31–37)
MCV RBC AUTO: 89 FL (ref 78–102)
NEUTS SEG # BLD: 4.3 K/UL (ref 1.8–9.5)
NEUTS SEG NFR BLD: 55 % (ref 40–70)
PLATELET # BLD AUTO: 323 K/UL (ref 140–440)
RBC # BLD AUTO: 4.98 M/UL (ref 4.1–5.1)
WBC # BLD AUTO: 7.8 K/UL (ref 4.5–13)

## 2019-09-20 PROCEDURE — 74011250636 HC RX REV CODE- 250/636: Performed by: INTERNAL MEDICINE

## 2019-09-20 PROCEDURE — 99195 PHLEBOTOMY: CPT

## 2019-09-20 PROCEDURE — 36415 COLL VENOUS BLD VENIPUNCTURE: CPT

## 2019-09-20 RX ORDER — SODIUM CHLORIDE 0.9 % (FLUSH) 0.9 %
10-40 SYRINGE (ML) INJECTION AS NEEDED
Status: DISCONTINUED | OUTPATIENT
Start: 2019-09-20 | End: 2019-09-24 | Stop reason: HOSPADM

## 2019-09-20 RX ORDER — SODIUM CHLORIDE 9 MG/ML
500 INJECTION, SOLUTION INTRAVENOUS CONTINUOUS
Status: DISCONTINUED | OUTPATIENT
Start: 2019-09-20 | End: 2019-09-21 | Stop reason: HOSPADM

## 2019-09-20 RX ADMIN — SODIUM CHLORIDE 500 ML: 9 INJECTION, SOLUTION INTRAVENOUS at 10:40

## 2019-09-20 RX ADMIN — Medication 10 ML: at 09:57

## 2019-10-03 ENCOUNTER — HOSPITAL ENCOUNTER (EMERGENCY)
Age: 49
Discharge: HOME | End: 2019-10-03
Attending: EMERGENCY MEDICINE
Payer: MEDICAID

## 2019-10-03 ENCOUNTER — APPOINTMENT (OUTPATIENT)
Dept: GENERAL RADIOLOGY | Age: 49
End: 2019-10-03
Attending: EMERGENCY MEDICINE
Payer: MEDICAID

## 2019-10-03 ENCOUNTER — APPOINTMENT (OUTPATIENT)
Dept: INFUSION THERAPY | Age: 49
End: 2019-10-03

## 2019-10-03 VITALS
SYSTOLIC BLOOD PRESSURE: 131 MMHG | HEART RATE: 75 BPM | OXYGEN SATURATION: 98 % | RESPIRATION RATE: 23 BRPM | DIASTOLIC BLOOD PRESSURE: 79 MMHG | WEIGHT: 200 LBS | BODY MASS INDEX: 36.58 KG/M2

## 2019-10-03 DIAGNOSIS — R07.9 CHEST PAIN, UNSPECIFIED TYPE: Primary | ICD-10-CM

## 2019-10-03 LAB
ANION GAP SERPL CALC-SCNC: 6 MMOL/L (ref 3–18)
BASOPHILS # BLD: 0 K/UL (ref 0–0.1)
BASOPHILS NFR BLD: 0 % (ref 0–2)
BUN SERPL-MCNC: 15 MG/DL (ref 7–18)
BUN/CREAT SERPL: 23 (ref 12–20)
CALCIUM SERPL-MCNC: 8.7 MG/DL (ref 8.5–10.1)
CHLORIDE SERPL-SCNC: 109 MMOL/L (ref 100–111)
CK MB CFR SERPL CALC: 2.8 % (ref 0–4)
CK MB SERPL-MCNC: 1.7 NG/ML (ref 5–25)
CK SERPL-CCNC: 60 U/L (ref 26–192)
CO2 SERPL-SCNC: 28 MMOL/L (ref 21–32)
CREAT SERPL-MCNC: 0.65 MG/DL (ref 0.6–1.3)
DIFFERENTIAL METHOD BLD: NORMAL
EOSINOPHIL # BLD: 0.2 K/UL (ref 0–0.4)
EOSINOPHIL NFR BLD: 2 % (ref 0–5)
ERYTHROCYTE [DISTWIDTH] IN BLOOD BY AUTOMATED COUNT: 14.3 % (ref 11.6–14.5)
GLUCOSE SERPL-MCNC: 105 MG/DL (ref 74–99)
HCT VFR BLD AUTO: 40.4 % (ref 35–45)
HGB BLD-MCNC: 13.4 G/DL (ref 12–16)
LYMPHOCYTES # BLD: 2.7 K/UL (ref 0.9–3.6)
LYMPHOCYTES NFR BLD: 31 % (ref 21–52)
MCH RBC QN AUTO: 28.2 PG (ref 24–34)
MCHC RBC AUTO-ENTMCNC: 33.2 G/DL (ref 31–37)
MCV RBC AUTO: 84.9 FL (ref 74–97)
MONOCYTES # BLD: 0.7 K/UL (ref 0.05–1.2)
MONOCYTES NFR BLD: 8 % (ref 3–10)
NEUTS SEG # BLD: 5 K/UL (ref 1.8–8)
NEUTS SEG NFR BLD: 59 % (ref 40–73)
PLATELET # BLD AUTO: 316 K/UL (ref 135–420)
PMV BLD AUTO: 9.4 FL (ref 9.2–11.8)
POTASSIUM SERPL-SCNC: 3.8 MMOL/L (ref 3.5–5.5)
RBC # BLD AUTO: 4.76 M/UL (ref 4.2–5.3)
SODIUM SERPL-SCNC: 143 MMOL/L (ref 136–145)
TROPONIN I SERPL-MCNC: <0.02 NG/ML (ref 0–0.04)
WBC # BLD AUTO: 8.6 K/UL (ref 4.6–13.2)

## 2019-10-03 PROCEDURE — 99285 EMERGENCY DEPT VISIT HI MDM: CPT

## 2019-10-03 PROCEDURE — 93005 ELECTROCARDIOGRAM TRACING: CPT

## 2019-10-03 PROCEDURE — 96374 THER/PROPH/DIAG INJ IV PUSH: CPT

## 2019-10-03 PROCEDURE — 71046 X-RAY EXAM CHEST 2 VIEWS: CPT

## 2019-10-03 PROCEDURE — 85025 COMPLETE CBC W/AUTO DIFF WBC: CPT

## 2019-10-03 PROCEDURE — 74011250636 HC RX REV CODE- 250/636: Performed by: EMERGENCY MEDICINE

## 2019-10-03 PROCEDURE — 74011250637 HC RX REV CODE- 250/637: Performed by: EMERGENCY MEDICINE

## 2019-10-03 PROCEDURE — 82550 ASSAY OF CK (CPK): CPT

## 2019-10-03 PROCEDURE — 80048 BASIC METABOLIC PNL TOTAL CA: CPT

## 2019-10-03 RX ORDER — LORAZEPAM 2 MG/ML
1 INJECTION INTRAMUSCULAR
Status: COMPLETED | OUTPATIENT
Start: 2019-10-03 | End: 2019-10-03

## 2019-10-03 RX ADMIN — LORAZEPAM 1 MG: 2 INJECTION INTRAMUSCULAR; INTRAVENOUS at 09:04

## 2019-10-03 RX ADMIN — ALUMINUM HYDROXIDE AND MAGNESIUM HYDROXIDE 30 ML: 200; 200 SUSPENSION ORAL at 09:04

## 2019-10-03 NOTE — DISCHARGE INSTRUCTIONS

## 2019-10-03 NOTE — ED TRIAGE NOTES
Patient states she was laying in bed and when she sat up the c/p started immediately. Patient suffers from anxiety and has not had her medication x2 days.

## 2019-10-03 NOTE — ED PROVIDER NOTES
EMERGENCY DEPARTMENT HISTORY AND PHYSICAL EXAM    8:33 AM  Date: 10/3/2019  Patient Name: Marleny Colmenares    History of Presenting Illness     Chief Complaint   Patient presents with    Chest Pain        History Provided By: Patient    HPI: Marleny Colmenares is a 47 y.o. female with audible medical problems as below. Patient is presenting with substernal chest pain that started suddenly 2 hours prior to arrival.  Patient was already awake and laying in bed and had a sudden onset sharp chest pain that is still persistent. She reports that the pain is getting better and not as intense. No history of shortness of breath, fever or cough. No prior similar episodes. Patient had cocaine use last night. No history of recent travel or prior PEs    Location:  Severity:  Timing/course:    Onset/Duration:     PCP: Kamille Cortes NP    Past History     Past Medical History:  Past Medical History:   Diagnosis Date    Asthma     Bronchitis     [facility] [address]     Cocaine abuse ([facility] [address] 3/26/2015    Dental caries     Depression     Depression 8/31/2016    Drug abuse ([facility] [address]     Dysphasia 10/17/2018    Elevated hematocrit 8/31/2016    ETOH abuse     H/O screening mammography 12/06/2016    No evidence of malignancy     HPV (human papilloma virus) infection 11/2016    The patient referred to [facility]     Hypertension     Hypertensive left ventricular hypertrophy, without heart failure 10/15/2018    mild to moderate lvh continue bp meds    Ill-defined condition     Marijuana use 10/17/2018    Mood swings     Polycythemia 2018    Polycythemia     Psychiatric disorder     depression, Bipolar    Schatzki's ring 08/30/2018    Found on Barium Swallow    Sliding hiatal hernia 10/17/2018    Tobacco abuse     Vitamin D deficiency        Past Surgical History:  Past Surgical History:   Procedure Laterality Date    HX APPENDECTOMY      HX COLPOSCOPY  12/06/2016    Low-grade courtney/mild dysplasia RICK I    HX HERNIA REPAIR      3x       Family History:  Family History   Problem Relation Age of Onset    Hypertension Mother     Heart Attack Father     Hypertension Father     Cancer Father         skin    Cancer Sister         skin    Bipolar Disorder Sister     Cancer Brother         skin    Cancer Maternal Aunt         colon       Social History:  Social History     Tobacco Use    Smoking status: Current Every Day Smoker     Packs/day: 0.50     Years: 20.00     Pack years: 10.00     Types: Cigarettes    Smokeless tobacco: Never Used   Substance Use Topics    Alcohol use: No    Drug use: Yes     Types: Cocaine     Comment: smokes cocaine-current user       Allergies: Allergies   Allergen Reactions    Iodinated Contrast Media Shortness of Breath       Review of Systems   Review of Systems   Cardiovascular: Positive for chest pain. All other systems reviewed and are negative. Physical Exam     Patient Vitals for the past 12 hrs:   Pulse Resp BP SpO2   10/03/19 0816 -- -- 118/86 --   10/03/19 0731 75 18 144/89 97 %       Physical Exam   Constitutional: She is oriented to person, place, and time. She appears well-developed and well-nourished. HENT:   Head: Normocephalic and atraumatic. Eyes: Conjunctivae and EOM are normal.   Neck: Normal range of motion. Neck supple. Cardiovascular: Normal rate and normal heart sounds. Pulmonary/Chest: Effort normal and breath sounds normal. No respiratory distress. Musculoskeletal: Normal range of motion. She exhibits no edema or deformity. Neurological: She is alert and oriented to person, place, and time. Skin: Skin is warm and dry. Psychiatric: She has a normal mood and affect.  Her behavior is normal.       Diagnostic Study Results     Labs -  Recent Results (from the past 12 hour(s))   EKG, 12 LEAD, INITIAL    Collection Time: 10/03/19  8:09 AM   Result Value Ref Range    Ventricular Rate 66 BPM    Atrial Rate 66 BPM    P-R Interval 160 ms QRS Duration 116 ms    Q-T Interval 454 ms    QTC Calculation (Bezet) 475 ms    Calculated P Axis 52 degrees    Calculated R Axis -34 degrees    Calculated T Axis 64 degrees    Diagnosis       Normal sinus rhythm with sinus arrhythmia  Left axis deviation  Left ventricular hypertrophy with QRS widening  Abnormal ECG  When compared with ECG of 18-JUL-2019 18:37,  No significant change was found         Radiologic Studies -   No results found. Medical Decision Making     ED Course: Progress Notes, Reevaluation, and Consults:    8:33 AM Initial assessment performed. The patients presenting problems have been discussed, and they/their family are in agreement with the care plan formulated and outlined with them. I have encouraged them to ask questions as they arise throughout their visit. 11:32 AM  Patient reports that she is feeling better and refusing repeat troponin. She has an appointment with her primary care doctor now that she does not want to miss. I discussed the benefits versus risk with the patient and she verbalized understanding and will return to the ER if her symptoms got worse. Provider Notes (Medical Decision Making): 58-year-old female presenting with atypical chest pain. Has multiple risk factors and also cocaine use last night. I will obtain cardiac work-up and at least 2 sets of cardiac enzymes given the short onset. Patient is also reporting a lot of anxiety with similar chest pain in the past so this could possibly be related to that. Procedures:     Critical Care Time:     Vital Signs-Reviewed the patient's vital signs. Reviewed pt's pulse ox reading. EKG: Interpreted by the EP.    Time Interpreted:    Rate:    Rhythm: Normal Sinus Rhythm 66   Interpretation: Axis deviation, LVH   Comparison: No significant interval changes    Records Reviewed: Nursing Notes, Old Medical Records and Previous electrocardiograms (Time of Review: 8:33 AM)  -I am the first provider for this patient.  -I reviewed the vital signs, available nursing notes, past medical history, past surgical history, family history and social history. Current Outpatient Medications   Medication Sig Dispense Refill    trazodone HCl (TRAZODONE PO) Take  by mouth as needed.  aspirin delayed-release 81 mg tablet Take 1 Tab by mouth daily. 100 Tab 3    fluticasone propion-salmeterol (ADVAIR DISKUS) 100-50 mcg/dose diskus inhaler Take 1 Puff by inhalation every twelve (12) hours. Indications: Bronchospasm Prevention with COPD 1 Inhaler 3    atorvastatin (LIPITOR) 40 mg tablet Take 1 Tab by mouth daily. 30 Tab 6    amLODIPine (NORVASC) 10 mg tablet Take 1 Tab by mouth daily. 30 Tab 6    omeprazole (PRILOSEC) 20 mg capsule Take 1 Cap by mouth daily. 30 Cap 3    losartan-hydroCHLOROthiazide (HYZAAR) 100-12.5 mg per tablet Take 1 Tab by mouth daily. 30 Tab 6    albuterol (PROVENTIL HFA, VENTOLIN HFA, PROAIR HFA) 90 mcg/actuation inhaler Take 1-2 Puffs by inhalation every four (4) hours as needed for Wheezing. 1 Inhaler 6    aluminum-magnesium hydroxide (MAALOX) 200-200 mg/5 mL suspension Take 15 mL by mouth every six (6) hours as needed for Indigestion. 300 mL 0    omega-3 acid ethyl esters (LOVAZA) 1 gram capsule Take 2 Caps by mouth two (2) times a day. 360 Cap 3    LORazepam (ATIVAN) 1 mg tablet Take  by mouth every four (4) hours as needed for Anxiety.  lamoTRIgine (LAMICTAL) 100 mg tablet Take 200 mg by mouth daily.  mirtazapine (REMERON) 30 mg tablet Take  by mouth nightly. For sleep , 2 tabs at night          Clinical Impression     Clinical Impression: No diagnosis found. Disposition: DC      DISCHARGE NOTE:     Pt has been reexamined. Patient has no new complaints, changes, or physical findings. Care plan outlined and precautions discussed. Results of labs and imaging were reviewed with the patient. All medications were reviewed with the patient; will d/c home with return precautions. All of pt's questions and concerns were addressed. Patient was instructed and agrees to follow up with PCP, as well as to return to the ED upon further deterioration. Patient is ready to go home. This note was dictated utilizing voice recognition software which may lead to typographical errors. I apologize in advance if the situation occurs. If questions arise please do not hesitate to contact me or call our department.     Virginie Mercer MD  8:33 AM

## 2019-10-04 ENCOUNTER — HOSPITAL ENCOUNTER (OUTPATIENT)
Dept: ONCOLOGY | Age: 49
Discharge: HOME | End: 2019-10-04

## 2019-10-04 DIAGNOSIS — D75.1 POLYCYTHEMIA: ICD-10-CM

## 2019-10-04 LAB
ATRIAL RATE: 66 BPM
CALCULATED P AXIS, ECG09: 52 DEGREES
CALCULATED R AXIS, ECG10: -34 DEGREES
CALCULATED T AXIS, ECG11: 64 DEGREES
DIAGNOSIS, 93000: NORMAL
P-R INTERVAL, ECG05: 160 MS
Q-T INTERVAL, ECG07: 454 MS
QRS DURATION, ECG06: 116 MS
QTC CALCULATION (BEZET), ECG08: 475 MS
VENTRICULAR RATE, ECG03: 66 BPM

## 2019-10-21 DIAGNOSIS — R06.02 SHORTNESS OF BREATH: ICD-10-CM

## 2019-10-21 DIAGNOSIS — Z72.0 TOBACCO ABUSE: ICD-10-CM

## 2019-10-21 RX ORDER — ALBUTEROL SULFATE 90 UG/1
1-2 AEROSOL, METERED RESPIRATORY (INHALATION)
Qty: 1 INHALER | Refills: 0 | Status: SHIPPED | OUTPATIENT
Start: 2019-10-21 | End: 2019-10-30 | Stop reason: SDUPTHER

## 2019-10-30 ENCOUNTER — OFFICE VISIT (OUTPATIENT)
Dept: FAMILY MEDICINE CLINIC | Facility: CLINIC | Age: 49
End: 2019-10-30

## 2019-10-30 VITALS
HEART RATE: 77 BPM | SYSTOLIC BLOOD PRESSURE: 109 MMHG | HEIGHT: 62 IN | OXYGEN SATURATION: 96 % | WEIGHT: 210 LBS | RESPIRATION RATE: 12 BRPM | DIASTOLIC BLOOD PRESSURE: 60 MMHG | BODY MASS INDEX: 38.64 KG/M2 | TEMPERATURE: 98 F

## 2019-10-30 DIAGNOSIS — R09.89 BILATERAL CAROTID BRUITS: ICD-10-CM

## 2019-10-30 DIAGNOSIS — Z72.0 TOBACCO ABUSE: ICD-10-CM

## 2019-10-30 DIAGNOSIS — Z12.39 BREAST CANCER SCREENING: ICD-10-CM

## 2019-10-30 DIAGNOSIS — Z72.0 TOBACCO ABUSE DISORDER: ICD-10-CM

## 2019-10-30 DIAGNOSIS — M72.2 PLANTAR FASCIITIS: ICD-10-CM

## 2019-10-30 DIAGNOSIS — Z23 ENCOUNTER FOR IMMUNIZATION: ICD-10-CM

## 2019-10-30 DIAGNOSIS — R06.02 SHORTNESS OF BREATH: ICD-10-CM

## 2019-10-30 DIAGNOSIS — Z12.11 SCREEN FOR COLON CANCER: ICD-10-CM

## 2019-10-30 DIAGNOSIS — M25.551 RIGHT HIP PAIN: Primary | ICD-10-CM

## 2019-10-30 RX ORDER — ALBUTEROL SULFATE 90 UG/1
1-2 AEROSOL, METERED RESPIRATORY (INHALATION)
Qty: 1 INHALER | Refills: 0 | Status: SHIPPED | OUTPATIENT
Start: 2019-10-30 | End: 2019-11-18 | Stop reason: SDUPTHER

## 2019-10-30 RX ORDER — FLUTICASONE PROPIONATE AND SALMETEROL 100; 50 UG/1; UG/1
1 POWDER RESPIRATORY (INHALATION) EVERY 12 HOURS
Qty: 1 INHALER | Refills: 3 | Status: SHIPPED | OUTPATIENT
Start: 2019-10-30 | End: 2019-11-14 | Stop reason: SDUPTHER

## 2019-10-30 NOTE — PROGRESS NOTES
Butch Arevalo presents today for   Chief Complaint   Patient presents with    New Patient     establish care     Visit Vitals  /60   Pulse 77   Temp 98 °F (36.7 °C) (Oral)   Resp 12   Ht 5' 2\" (1.575 m)   Wt 210 lb (95.3 kg)   SpO2 96%   BMI 38.41 kg/m²       Is someone accompanying this pt? no    Is the patient using any DME equipment during OV? no    Depression Screening:  3 most recent PHQ Screens 8/16/2017   Little interest or pleasure in doing things Nearly every day   Feeling down, depressed, irritable, or hopeless Nearly every day   Total Score PHQ 2 6   Trouble falling or staying asleep, or sleeping too much Nearly every day   Feeling tired or having little energy Nearly every day   Poor appetite, weight loss, or overeating Several days   Feeling bad about yourself - or that you are a failure or have let yourself or your family down Nearly every day   Trouble concentrating on things such as school, work, reading, or watching TV Nearly every day   Moving or speaking so slowly that other people could have noticed; or the opposite being so fidgety that others notice Not at all   Thoughts of being better off dead, or hurting yourself in some way Not at all   PHQ 9 Score 19       Learning Assessment:  Learning Assessment 10/30/2019   PRIMARY LEARNER Patient   HIGHEST LEVEL OF EDUCATION - PRIMARY LEARNER  SOME COLLEGE   BARRIERS PRIMARY LEARNER NONE   CO-LEARNER CAREGIVER No   PRIMARY LANGUAGE ENGLISH   LEARNER PREFERENCE PRIMARY READING   ANSWERED BY patient   RELATIONSHIP SELF       Abuse Screening:  Abuse Screening Questionnaire 10/30/2019   Do you ever feel afraid of your partner? N   Are you in a relationship with someone who physically or mentally threatens you? N   Is it safe for you to go home? Y       Fall Risk  No flowsheet data found. Health Maintenance reviewed and discussed and ordered per Provider.     Health Maintenance Due   Topic Date Due    Pneumococcal 0-64 years (1 of 1 - PPSV23) 06/22/1975    DTaP/Tdap/Td series (1 - Tdap) 06/22/1990    Shingrix Vaccine Age 50> (1 of 2) 06/22/2019    BREAST CANCER SCRN MAMMOGRAM  06/22/2019    FOBT Q 1 YEAR AGE 50-75  06/22/2019    Influenza Age 9 to Adult  08/01/2019    PAP AKA CERVICAL CYTOLOGY  11/21/2019           Coordination of Care:  1. Have you been to the ER, urgent care clinic since your last visit? Hospitalized since your last visit? yes    2. Have you seen or consulted any other health care providers outside of the [address] [place] [address] since your last visit? Include any pap smears or colon screening.  no
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
## 2019-11-01 ENCOUNTER — HOSPITAL ENCOUNTER (OUTPATIENT)
Dept: ONCOLOGY | Age: 49
Discharge: HOME | End: 2019-11-01

## 2019-11-01 DIAGNOSIS — D75.1 POLYCYTHEMIA: ICD-10-CM

## 2019-11-08 ENCOUNTER — HOSPITAL ENCOUNTER (OUTPATIENT)
Dept: ONCOLOGY | Age: 49
Discharge: HOME | End: 2019-11-08

## 2019-11-08 ENCOUNTER — HOSPITAL ENCOUNTER (OUTPATIENT)
Dept: INFUSION THERAPY | Age: 49
Discharge: HOME | End: 2019-11-08
Payer: COMMERCIAL

## 2019-11-08 ENCOUNTER — CLINICAL SUPPORT (OUTPATIENT)
Dept: ONCOLOGY | Age: 49
End: 2019-11-08

## 2019-11-08 VITALS
SYSTOLIC BLOOD PRESSURE: 121 MMHG | RESPIRATION RATE: 16 BRPM | HEART RATE: 68 BPM | TEMPERATURE: 97.5 F | DIASTOLIC BLOOD PRESSURE: 79 MMHG

## 2019-11-08 DIAGNOSIS — D75.1 POLYCYTHEMIA: ICD-10-CM

## 2019-11-08 DIAGNOSIS — D75.1 POLYCYTHEMIA: Primary | ICD-10-CM

## 2019-11-08 LAB
BASO+EOS+MONOS # BLD AUTO: 0.8 K/UL (ref 0–2.3)
BASO+EOS+MONOS NFR BLD AUTO: 10 % (ref 0.1–17)
DIFFERENTIAL METHOD BLD: ABNORMAL
ERYTHROCYTE [DISTWIDTH] IN BLOOD BY AUTOMATED COUNT: 13.3 % (ref 11.5–14.5)
HCT VFR BLD AUTO: 41.1 % (ref 36–48)
HGB BLD-MCNC: 12.5 G/DL (ref 12–16)
LYMPHOCYTES # BLD: 2.9 K/UL (ref 1.1–5.9)
LYMPHOCYTES NFR BLD: 38 % (ref 14–44)
MCH RBC QN AUTO: 26 PG (ref 25–35)
MCHC RBC AUTO-ENTMCNC: 30.4 G/DL (ref 31–37)
MCV RBC AUTO: 85.6 FL (ref 78–102)
NEUTS SEG # BLD: 3.9 K/UL (ref 1.8–9.5)
NEUTS SEG NFR BLD: 52 % (ref 40–70)
PLATELET # BLD AUTO: 339 K/UL (ref 140–440)
RBC # BLD AUTO: 4.8 M/UL (ref 4.1–5.1)
WBC # BLD AUTO: 7.6 K/UL (ref 4.5–13)

## 2019-11-08 PROCEDURE — 36415 COLL VENOUS BLD VENIPUNCTURE: CPT

## 2019-11-08 NOTE — PROGRESS NOTES
[facility] Progress Note    Date: 2019    Name: Melody Rosas    MRN: [id number]         : 1970      Ms. Ozuna was assessed and education was provided. Ms. Ozuna's vitals were reviewed and patient was observed for 5 minutes prior to treatment. Visit Vitals  /79 (BP 1 Location: Left arm, BP Patient Position: Sitting)   Pulse 68   Temp 97.5 °F (36.4 °C)   Resp 16   Breastfeeding? No       Lab results were obtained and reviewed. Recent Results (from the past 12 hour(s))   CBC WITH 3 PART DIFF    Collection Time: 19  9:22 AM   Result Value Ref Range    WBC 7.6 4.5 - 13.0 K/uL    RBC 4.80 4. 10 - 5.10 M/uL    HGB 12.5 12.0 - 16.0 g/dL    HCT 41.1 36 - 48 %    MCV 85.6 78 - 102 FL    MCH 26.0 25.0 - 35.0 PG    MCHC 30.4 (L) 31 - 37 g/dL    RDW 13.3 11.5 - 14.5 %    PLATELET 346 793 - 813 K/uL    NEUTROPHILS 52 40 - 70 %    MIXED CELLS 10 0.1 - 17 %    LYMPHOCYTES 38 14 - 44 %    ABS. NEUTROPHILS 3.9 1.8 - 9.5 K/UL    ABS. MIXED CELLS 0.8 0.0 - 2.3 K/uL    ABS. LYMPHOCYTES 2.9 1.1 - 5.9 K/UL    DF AUTOMATED       Therapeutic phlebotomy not done for HCT 41.1. Patient armband removed and shredded. Ms. Janay Barron was discharged from [place] [address] in stable condition at 36.  She is to return on 19 at 1300 for her next appointment for CBC/Phlebotomy    Sal Rodriguez RN  2019  9:50 AM

## 2019-11-14 DIAGNOSIS — Z72.0 TOBACCO ABUSE DISORDER: ICD-10-CM

## 2019-11-14 RX ORDER — FLUTICASONE PROPIONATE AND SALMETEROL 100; 50 UG/1; UG/1
1 POWDER RESPIRATORY (INHALATION) EVERY 12 HOURS
Qty: 1 INHALER | Refills: 3 | Status: SHIPPED | OUTPATIENT
Start: 2019-11-14 | End: 2019-11-18 | Stop reason: SDUPTHER

## 2019-11-14 NOTE — TELEPHONE ENCOUNTER
Requested Prescriptions     Pending Prescriptions Disp Refills    fluticasone propion-salmeterol (ADVAIR DISKUS) 100-50 mcg/dose diskus inhaler 1 Inhaler 3     Sig: Take 1 Puff by inhalation every twelve (12) hours.  Indications: Bronchospasm Prevention with COPD

## 2019-11-15 ENCOUNTER — HOSPITAL ENCOUNTER (OUTPATIENT)
Dept: VASCULAR SURGERY | Age: 49
Discharge: HOME | End: 2019-11-15
Attending: NURSE PRACTITIONER
Payer: MEDICAID

## 2019-11-15 DIAGNOSIS — R09.89 BILATERAL CAROTID BRUITS: ICD-10-CM

## 2019-11-15 LAB
LEFT CCA DIST DIAS: 20.8 CM/S
LEFT CCA DIST SYS: 87.1 CM/S
LEFT CCA MID DIAS: 36.98 CM/S
LEFT CCA MID SYS: 114.49 CM/S
LEFT CCA PROX DIAS: 30.5 CM/S
LEFT CCA PROX SYS: 106.4 CM/S
LEFT ECA DIAS: 14.37 CM/S
LEFT ECA SYS: 98.4 CM/S
LEFT ICA DIST DIAS: 37 CM/S
LEFT ICA DIST SYS: 103.2 CM/S
LEFT ICA MID DIAS: 20.1 CM/S
LEFT ICA MID SYS: 56.4 CM/S
LEFT ICA PROX DIAS: 25.7 CM/S
LEFT ICA PROX SYS: 72.5 CM/S
LEFT ICA/CCA SYS: 1.19
LEFT SUBCLAVIAN SYS: 104.8 CM/S
LEFT VERTEBRAL DIAS: 21.04 CM/S
LEFT VERTEBRAL SYS: 58.3 CM/S
RIGHT CCA DIST DIAS: 20.7 CM/S
RIGHT CCA DIST SYS: 69 CM/S
RIGHT CCA MID DIAS: 12.91 CM/S
RIGHT CCA MID SYS: 70.31 CM/S
RIGHT CCA PROX DIAS: 19.4 CM/S
RIGHT CCA PROX SYS: 96.4 CM/S
RIGHT ECA DIAS: 30.36 CM/S
RIGHT ECA SYS: 147.4 CM/S
RIGHT ICA DIST DIAS: 17.7 CM/S
RIGHT ICA DIST SYS: 66.2 CM/S
RIGHT ICA MID DIAS: 16.4 CM/S
RIGHT ICA MID SYS: 59.2 CM/S
RIGHT ICA PROX DIAS: 13.4 CM/S
RIGHT ICA PROX SYS: 51.7 CM/S
RIGHT ICA/CCA SYS: 1
RIGHT SUBCLAVIAN SYS: 101.6 CM/S
RIGHT VERTEBRAL DIAS: 13.08 CM/S
RIGHT VERTEBRAL SYS: 57 CM/S

## 2019-11-15 PROCEDURE — 93880 EXTRACRANIAL BILAT STUDY: CPT

## 2019-11-17 DIAGNOSIS — R06.02 SHORTNESS OF BREATH: ICD-10-CM

## 2019-11-17 DIAGNOSIS — Z72.0 TOBACCO ABUSE: ICD-10-CM

## 2019-11-18 DIAGNOSIS — R06.02 SHORTNESS OF BREATH: ICD-10-CM

## 2019-11-18 DIAGNOSIS — Z72.0 TOBACCO ABUSE: ICD-10-CM

## 2019-11-18 DIAGNOSIS — Z72.0 TOBACCO ABUSE DISORDER: ICD-10-CM

## 2019-11-18 RX ORDER — FLUTICASONE PROPIONATE AND SALMETEROL 100; 50 UG/1; UG/1
1 POWDER RESPIRATORY (INHALATION) EVERY 12 HOURS
Qty: 1 INHALER | Refills: 3 | Status: SHIPPED | OUTPATIENT
Start: 2019-11-18 | End: 2020-01-17 | Stop reason: SDUPTHER

## 2019-11-18 RX ORDER — ALBUTEROL SULFATE 90 UG/1
AEROSOL, METERED RESPIRATORY (INHALATION)
Qty: 1 INHALER | Refills: 3 | Status: SHIPPED | OUTPATIENT
Start: 2019-11-18 | End: 2020-01-30 | Stop reason: SDUPTHER

## 2019-11-18 RX ORDER — ALBUTEROL SULFATE 90 UG/1
1-2 AEROSOL, METERED RESPIRATORY (INHALATION)
Qty: 1 INHALER | Refills: 3 | Status: SHIPPED | OUTPATIENT
Start: 2019-11-18 | End: 2020-01-17 | Stop reason: SDUPTHER

## 2019-11-18 NOTE — TELEPHONE ENCOUNTER
Requested Prescriptions     Pending Prescriptions Disp Refills    PROAIR HFA 90 mcg/actuation inhaler [Pharmacy Med Name: PROAIR HFA 90 MCG INHALER]  0     Sig: INHALE ONE TO TWO PUFFS BY MOUTH EVERY 4 HOURS AS NEEDED FOR WHEEZING   +

## 2019-12-01 ENCOUNTER — HOSPITAL ENCOUNTER (EMERGENCY)
Age: 49
Discharge: HOME | End: 2019-12-01
Attending: EMERGENCY MEDICINE
Payer: COMMERCIAL

## 2019-12-01 ENCOUNTER — APPOINTMENT (OUTPATIENT)
Dept: GENERAL RADIOLOGY | Age: 49
End: 2019-12-01
Attending: EMERGENCY MEDICINE
Payer: COMMERCIAL

## 2019-12-01 VITALS
HEART RATE: 73 BPM | DIASTOLIC BLOOD PRESSURE: 53 MMHG | TEMPERATURE: 98 F | RESPIRATION RATE: 13 BRPM | OXYGEN SATURATION: 96 % | SYSTOLIC BLOOD PRESSURE: 106 MMHG | BODY MASS INDEX: 36.8 KG/M2 | HEIGHT: 62 IN | WEIGHT: 200 LBS

## 2019-12-01 DIAGNOSIS — R07.9 CHEST PAIN, UNSPECIFIED TYPE: Primary | ICD-10-CM

## 2019-12-01 LAB
ALBUMIN SERPL-MCNC: 3.7 G/DL (ref 3.4–5)
ALBUMIN/GLOB SERPL: 0.9 {RATIO} (ref 0.8–1.7)
ALP SERPL-CCNC: 100 U/L (ref 45–117)
ALT SERPL-CCNC: 19 U/L (ref 13–56)
ANION GAP SERPL CALC-SCNC: 6 MMOL/L (ref 3–18)
AST SERPL-CCNC: 15 U/L (ref 10–38)
BASOPHILS # BLD: 0 K/UL (ref 0–0.1)
BASOPHILS NFR BLD: 0 % (ref 0–2)
BILIRUB SERPL-MCNC: 0.2 MG/DL (ref 0.2–1)
BUN SERPL-MCNC: 14 MG/DL (ref 7–18)
BUN/CREAT SERPL: 18 (ref 12–20)
CALCIUM SERPL-MCNC: 8.4 MG/DL (ref 8.5–10.1)
CHLORIDE SERPL-SCNC: 106 MMOL/L (ref 100–111)
CK MB CFR SERPL CALC: NORMAL % (ref 0–4)
CK MB SERPL-MCNC: <1 NG/ML (ref 5–25)
CK SERPL-CCNC: 50 U/L (ref 26–192)
CO2 SERPL-SCNC: 28 MMOL/L (ref 21–32)
CREAT SERPL-MCNC: 0.78 MG/DL (ref 0.6–1.3)
DIFFERENTIAL METHOD BLD: NORMAL
EOSINOPHIL # BLD: 0.2 K/UL (ref 0–0.4)
EOSINOPHIL NFR BLD: 2 % (ref 0–5)
ERYTHROCYTE [DISTWIDTH] IN BLOOD BY AUTOMATED COUNT: 14.4 % (ref 11.6–14.5)
GLOBULIN SER CALC-MCNC: 3.9 G/DL (ref 2–4)
GLUCOSE SERPL-MCNC: 126 MG/DL (ref 74–99)
HCT VFR BLD AUTO: 41.3 % (ref 35–45)
HGB BLD-MCNC: 13.2 G/DL (ref 12–16)
LYMPHOCYTES # BLD: 3.3 K/UL (ref 0.9–3.6)
LYMPHOCYTES NFR BLD: 34 % (ref 21–52)
MCH RBC QN AUTO: 25.7 PG (ref 24–34)
MCHC RBC AUTO-ENTMCNC: 32 G/DL (ref 31–37)
MCV RBC AUTO: 80.5 FL (ref 74–97)
MONOCYTES # BLD: 0.7 K/UL (ref 0.05–1.2)
MONOCYTES NFR BLD: 7 % (ref 3–10)
NEUTS SEG # BLD: 5.5 K/UL (ref 1.8–8)
NEUTS SEG NFR BLD: 57 % (ref 40–73)
PLATELET # BLD AUTO: 271 K/UL (ref 135–420)
PMV BLD AUTO: 9.6 FL (ref 9.2–11.8)
POTASSIUM SERPL-SCNC: 3.6 MMOL/L (ref 3.5–5.5)
PROT SERPL-MCNC: 7.6 G/DL (ref 6.4–8.2)
RBC # BLD AUTO: 5.13 M/UL (ref 4.2–5.3)
SODIUM SERPL-SCNC: 140 MMOL/L (ref 136–145)
TROPONIN I SERPL-MCNC: <0.02 NG/ML (ref 0–0.04)
WBC # BLD AUTO: 9.6 K/UL (ref 4.6–13.2)

## 2019-12-01 PROCEDURE — 71045 X-RAY EXAM CHEST 1 VIEW: CPT

## 2019-12-01 PROCEDURE — 93005 ELECTROCARDIOGRAM TRACING: CPT

## 2019-12-01 PROCEDURE — 85025 COMPLETE CBC W/AUTO DIFF WBC: CPT

## 2019-12-01 PROCEDURE — 82550 ASSAY OF CK (CPK): CPT

## 2019-12-01 PROCEDURE — 80053 COMPREHEN METABOLIC PANEL: CPT

## 2019-12-01 PROCEDURE — 99283 EMERGENCY DEPT VISIT LOW MDM: CPT

## 2019-12-02 LAB
ATRIAL RATE: 71 BPM
CALCULATED P AXIS, ECG09: 51 DEGREES
CALCULATED R AXIS, ECG10: -33 DEGREES
CALCULATED T AXIS, ECG11: 39 DEGREES
DIAGNOSIS, 93000: NORMAL
P-R INTERVAL, ECG05: 168 MS
Q-T INTERVAL, ECG07: 448 MS
QRS DURATION, ECG06: 104 MS
QTC CALCULATION (BEZET), ECG08: 486 MS
VENTRICULAR RATE, ECG03: 71 BPM

## 2019-12-02 NOTE — ED PROVIDER NOTES
EMERGENCY DEPARTMENT HISTORY AND PHYSICAL EXAM    10:10 PM      Date: 12/1/2019  Patient Name: Bird Gusman    History of Presenting Illness     Chief Complaint   Patient presents with    Chest Pain         History Provided By: Patient    Additional History (Context): Bird Gusman is a 47 y.o. female with hypertension and CHF who presents with chest pain about 2 hours ago. Patient states that she was driving suddenly developed a headache towards her right eye and sharp pain. Pain was 6 out of 10, radiating towards the left arm, intermittent, nothing made better or worse. Patient has a history of chest pain and flutters in the past.  She has been going through some constipation. She denies nausea, vomiting, cough or fever. Patient does smoke half a pack per day. Denies any alcohol use. She does admit to marijuana and cocaine use yesterday. PCP: Jenni Poole NP      Current Outpatient Medications   Medication Sig Dispense Refill    quetiapine fumarate (SEROQUEL PO) Take  by mouth.  atorvastatin (LIPITOR) 40 mg tablet Take 1 Tab by mouth daily. 30 Tab 6    amLODIPine (NORVASC) 10 mg tablet Take 1 Tab by mouth daily. 30 Tab 6    losartan-hydroCHLOROthiazide (HYZAAR) 100-12.5 mg per tablet Take 1 Tab by mouth daily. 30 Tab 6    mirtazapine (REMERON) 30 mg tablet Take  by mouth nightly. For sleep , 2 tabs at night      PROAIR HFA 90 mcg/actuation inhaler INHALE ONE TO TWO PUFFS BY MOUTH EVERY 4 HOURS AS NEEDED FOR WHEEZING 1 Inhaler 3    fluticasone propion-salmeterol (ADVAIR DISKUS) 100-50 mcg/dose diskus inhaler Take 1 Puff by inhalation every twelve (12) hours. Indications: Bronchospasm Prevention with COPD 1 Inhaler 3    albuterol (PROVENTIL HFA, VENTOLIN HFA, PROAIR HFA) 90 mcg/actuation inhaler Take 1-2 Puffs by inhalation every four (4) hours as needed for Wheezing. 1 Inhaler 3    aspirin delayed-release 81 mg tablet Take 1 Tab by mouth daily.  100 Tab 3    omeprazole (PRILOSEC) 20 mg capsule Take 1 Cap by mouth daily. 30 Cap 3    omega-3 acid ethyl esters (LOVAZA) 1 gram capsule Take 2 Caps by mouth two (2) times a day. 360 Cap 3    LORazepam (ATIVAN) 1 mg tablet Take  by mouth every four (4) hours as needed for Anxiety.  lamoTRIgine (LAMICTAL) 100 mg tablet Take 200 mg by mouth daily.          Past History     Past Medical History:  Past Medical History:   Diagnosis Date    Asthma     Bronchitis     [facility]     Cocaine abuse ([facility] 3/26/2015    Dental caries     Depression     Depression 8/31/2016    [facility]     Dysphasia 10/17/2018    Elevated hematocrit 8/31/2016    ETOH abuse     H/O screening mammography 12/06/2016    No evidence of malignancy     HPV (human papilloma virus) infection 11/2016    The patient [facility] to [facility]     Hypertension     Hypertensive left ventricular hypertrophy, without heart failure 10/15/2018    mild to moderate lvh continue bp meds    Ill-defined condition     Marijuana use 10/17/2018    Mood swings     Polycythemia 2018    Polycythemia     Psychiatric disorder     depression, Bipolar    Schatzki's ring 08/30/2018    Found on Barium Swallow    Sliding hiatal hernia 10/17/2018    Tobacco abuse     Vitamin D deficiency        Past Surgical History:  Past Surgical History:   Procedure Laterality Date    HX APPENDECTOMY      HX COLPOSCOPY  12/06/2016    Low-grade courtney/mild dysplasia RICK I    HX HERNIA REPAIR      3x       Family History:  Family History   Problem Relation Age of Onset    Hypertension Mother     Heart Attack Father     Hypertension Father     Cancer Father         skin    Cancer Sister         skin    Bipolar Disorder Sister     Cancer Brother         skin    Cancer Maternal Aunt         colon       Social History:  Social History     Tobacco Use    Smoking status: Current Every Day Smoker     Packs/day: 0.50     Years: 20.00     Pack years: 10.00     Types: Cigarettes    Smokeless tobacco: Never Used   Substance Use Topics    Alcohol use: Yes     Comment: socially    Drug use: Yes     Types: Cocaine, Marijuana     Comment: smokes cocaine-current user       Allergies: Allergies   Allergen Reactions    Iodinated Contrast Media Shortness of Breath         Review of Systems       Review of Systems   Constitutional: Negative. Negative for chills, diaphoresis and fever. HENT: Negative. Negative for congestion, rhinorrhea and sore throat. Eyes: Negative. Negative for pain, discharge and redness. Respiratory: Negative. Negative for cough, chest tightness, shortness of breath and wheezing. Cardiovascular: Positive for chest pain. Gastrointestinal: Negative. Negative for abdominal pain, constipation, diarrhea, nausea and vomiting. Genitourinary: Negative. Negative for dysuria, flank pain, frequency, hematuria and urgency. Musculoskeletal: Negative. Negative for back pain and neck pain. Skin: Negative. Negative for rash. Neurological: Positive for headaches. Negative for syncope, weakness and numbness. Psychiatric/Behavioral: Negative. All other systems reviewed and are negative. Physical Exam     Visit Vitals  /53 (BP 1 Location: Left arm)   Pulse 73   Temp 98 °F (36.7 °C)   Resp 13   Ht 5' 2\" (1.575 m)   Wt 90.7 kg (200 lb)   SpO2 96%   BMI 36.58 kg/m²         Physical Exam  Vitals signs and nursing note reviewed. Exam conducted with a chaperone present. Constitutional:       General: She is not in acute distress. Appearance: Normal appearance. She is well-developed. She is not ill-appearing, toxic-appearing or diaphoretic. HENT:      Head: Normocephalic and atraumatic. Mouth/Throat:      Pharynx: No oropharyngeal exudate. Eyes:      General: No scleral icterus. Conjunctiva/sclera: Conjunctivae normal.      Pupils: Pupils are equal, round, and reactive to light.    Neck:      Musculoskeletal: Normal range of motion and neck supple. Thyroid: No thyromegaly. Vascular: No hepatojugular reflux or JVD. Trachea: No tracheal deviation. Cardiovascular:      Rate and Rhythm: Normal rate and regular rhythm. Pulses: Normal pulses. Radial pulses are 2+ on the right side and 2+ on the left side. Dorsalis pedis pulses are 2+ on the right side and 2+ on the left side. Heart sounds: Normal heart sounds, S1 normal and S2 normal. No murmur. No gallop. No S3 or S4 sounds. Pulmonary:      Effort: Pulmonary effort is normal. No respiratory distress. Breath sounds: Normal breath sounds. No decreased breath sounds, wheezing, rhonchi or rales. Abdominal:      General: Bowel sounds are normal. There is no distension. Palpations: Abdomen is soft. Abdomen is not rigid. There is no mass. Tenderness: There is no tenderness. There is no guarding or rebound. Negative signs include Dominguez's sign and McBurney's sign. Musculoskeletal: Normal range of motion. Comments: Strength 5 out of 5 throughout. Lymphadenopathy:      Head:      Right side of head: No submental, submandibular, preauricular or occipital adenopathy. Left side of head: No submental, submandibular, preauricular or occipital adenopathy. Cervical: No cervical adenopathy. Upper Body:      Right upper body: No supraclavicular adenopathy. Left upper body: No supraclavicular adenopathy. Skin:     General: Skin is warm and dry. Findings: No rash. Neurological:      Mental Status: She is alert. She is not disoriented. GCS: GCS eye subscore is 4. GCS verbal subscore is 5. GCS motor subscore is 6. Cranial Nerves: No cranial nerve deficit. Sensory: No sensory deficit. Coordination: Coordination normal.      Gait: Gait normal.      Deep Tendon Reflexes: Reflexes are normal and symmetric.    Psychiatric:         Speech: Speech normal.         Behavior: Behavior normal. Thought Content: Thought content normal.         Judgment: Judgment normal.           Diagnostic Study Results     Labs -  Recent Results (from the past 12 hour(s))   METABOLIC PANEL, COMPREHENSIVE    Collection Time: 12/01/19 10:20 PM   Result Value Ref Range    Sodium 140 136 - 145 mmol/L    Potassium 3.6 3.5 - 5.5 mmol/L    Chloride 106 100 - 111 mmol/L    CO2 28 21 - 32 mmol/L    Anion gap 6 3.0 - 18 mmol/L    Glucose 126 (H) 74 - 99 mg/dL    BUN 14 7.0 - 18 MG/DL    Creatinine 0.78 0.6 - 1.3 MG/DL    BUN/Creatinine ratio 18 12 - 20      GFR est AA >60 >60 ml/min/1.73m2    GFR est non-AA >60 >60 ml/min/1.73m2    Calcium 8.4 (L) 8.5 - 10.1 MG/DL    Bilirubin, total 0.2 0.2 - 1.0 MG/DL    ALT (SGPT) 19 13 - 56 U/L    AST (SGOT) 15 10 - 38 U/L    Alk. phosphatase 100 45 - 117 U/L    Protein, total 7.6 6.4 - 8.2 g/dL    Albumin 3.7 3.4 - 5.0 g/dL    Globulin 3.9 2.0 - 4.0 g/dL    A-G Ratio 0.9 0.8 - 1.7     CARDIAC PANEL,(CK, CKMB & TROPONIN)    Collection Time: 12/01/19 10:20 PM   Result Value Ref Range    CK 50 26 - 192 U/L    CK - MB <1.0 <3.6 ng/ml    CK-MB Index  0.0 - 4.0 %     CALCULATION NOT PERFORMED WHEN RESULT IS BELOW LINEAR LIMIT    Troponin-I, QT <0.02 0.0 - 0.045 NG/ML   CBC WITH AUTOMATED DIFF    Collection Time: 12/01/19 10:20 PM   Result Value Ref Range    WBC 9.6 4.6 - 13.2 K/uL    RBC 5.13 4.20 - 5.30 M/uL    HGB 13.2 12.0 - 16.0 g/dL    HCT 41.3 35.0 - 45.0 %    MCV 80.5 74.0 - 97.0 FL    MCH 25.7 24.0 - 34.0 PG    MCHC 32.0 31.0 - 37.0 g/dL    RDW 14.4 11.6 - 14.5 %    PLATELET 071 129 - 768 K/uL    MPV 9.6 9.2 - 11.8 FL    NEUTROPHILS 57 40 - 73 %    LYMPHOCYTES 34 21 - 52 %    MONOCYTES 7 3 - 10 %    EOSINOPHILS 2 0 - 5 %    BASOPHILS 0 0 - 2 %    ABS. NEUTROPHILS 5.5 1.8 - 8.0 K/UL    ABS. LYMPHOCYTES 3.3 0.9 - 3.6 K/UL    ABS. MONOCYTES 0.7 0.05 - 1.2 K/UL    ABS. EOSINOPHILS 0.2 0.0 - 0.4 K/UL    ABS.  BASOPHILS 0.0 0.0 - 0.1 K/UL    DF AUTOMATED         Radiologic Studies -   XR CHEST PORT (Results Pending)         Medical Decision Making   Provider Notes (Medical Decision Making):  MDM  Number of Diagnoses or Management Options  Chest pain, unspecified type:   Diagnosis management comments: DIFFERENTIAL DIAGNOSES/ MEDICAL DECISION MAKING:  Chest pain etiologies include acute cardiac events to include possible acute myocardial infarction, acute coronary syndrome, pneumonia, chest wall pain (myofascial/ musculoskeletal etiology), chronic obstructive pulmonary disease (copd), acute asthma exacerbation, congestive heart failure, acute bronchitis, pulmonary embolism, upper respiratory infection, referred abdominal pain, other etiologies, versus combination of the above. I am the first provider for this patient. I reviewed the vital signs, available nursing notes, past medical history, past surgical history, family history and social history. Vital Signs-Reviewed the patient's vital signs. Records Reviewed: Nursing Notes (Time of Review: 10:10 PM)    ED Course: Progress Notes, Reevaluation, and Consults:    Labs essentially normal.  Chest X-Ray showed No acute process. EKG showed NSR with rate of 71 bpm. With no ST elevations or depression and non specific T wave changes. 10:50 PM 12/1/2019    Patient. Patient refused normal saline. Patient wants to go home. We did a combined decision making and at this time I feel she is stable to go home she is going to follow-up with her PCP tomorrow in the morning. Other risks of discharge were discussed with patient. Diagnosis       I have reassessed the patient. Patient is feeling better. Patient was discharged in stable condition. Patient is to return to emergency department if any new or worsening condition. Clinical Impression:   1.  Chest pain, unspecified type        Disposition: Discharged home     Follow-up Information     Follow up With Specialties Details Why Contact Info    Giovanni Rust NP Nurse Practitioner In 2 days  [address] [place] [address]  [phone]  [address] [facility]        Provider Attestation:     I personally performed the services described in the documentation, reviewed the documentation and it accurately and completely records my words and actions utilizing the [address] December 01, 2019 at 10:58 PM - Jerel Bosch DO    Disclaimer. It is dictated using utilizing voice recognition software. Unfortunately this leads to occasional typographical errors. I apologize in advance if the situation occurs. If questions arise please do not hesitate to contact me or call our department.

## 2019-12-02 NOTE — ED NOTES
Patient concerned about blood pressure. Reports it is usually , \"much higher than this\". Checked BP manually, there was not a significant deficit between the automatic BP and manual BP. Provider made aware and is at bedside. Provided PO fluids.

## 2019-12-02 NOTE — ED TRIAGE NOTES
Chest pain. Patient reports,\"fluttering. And a headache. Rates headache as a 6 out of 10, a migraine, constant\". Feels anxious, denies SOB.

## 2019-12-02 NOTE — DISCHARGE INSTRUCTIONS

## 2019-12-13 ENCOUNTER — HOSPITAL ENCOUNTER (OUTPATIENT)
Dept: ONCOLOGY | Age: 49
Discharge: HOME | End: 2019-12-13

## 2019-12-13 ENCOUNTER — HOSPITAL ENCOUNTER (OUTPATIENT)
Dept: INFUSION THERAPY | Age: 49
Discharge: HOME | End: 2019-12-13
Payer: COMMERCIAL

## 2019-12-13 ENCOUNTER — CLINICAL SUPPORT (OUTPATIENT)
Dept: ONCOLOGY | Age: 49
End: 2019-12-13

## 2019-12-13 VITALS
SYSTOLIC BLOOD PRESSURE: 126 MMHG | DIASTOLIC BLOOD PRESSURE: 84 MMHG | TEMPERATURE: 98.7 F | HEART RATE: 85 BPM | OXYGEN SATURATION: 96 % | RESPIRATION RATE: 20 BRPM

## 2019-12-13 DIAGNOSIS — D75.1 POLYCYTHEMIA: Primary | ICD-10-CM

## 2019-12-13 DIAGNOSIS — D75.1 POLYCYTHEMIA: ICD-10-CM

## 2019-12-13 LAB
BASO+EOS+MONOS # BLD AUTO: 0.8 K/UL (ref 0–2.3)
BASO+EOS+MONOS NFR BLD AUTO: 9 % (ref 0.1–17)
DIFFERENTIAL METHOD BLD: ABNORMAL
ERYTHROCYTE [DISTWIDTH] IN BLOOD BY AUTOMATED COUNT: 14.1 % (ref 11.5–14.5)
HCT VFR BLD AUTO: 41.3 % (ref 36–48)
HGB BLD-MCNC: 12.5 G/DL (ref 12–16)
LYMPHOCYTES # BLD: 3.2 K/UL (ref 1.1–5.9)
LYMPHOCYTES NFR BLD: 37 % (ref 14–44)
MCH RBC QN AUTO: 25.5 PG (ref 25–35)
MCHC RBC AUTO-ENTMCNC: 30.3 G/DL (ref 31–37)
MCV RBC AUTO: 84.1 FL (ref 78–102)
NEUTS SEG # BLD: 4.8 K/UL (ref 1.8–9.5)
NEUTS SEG NFR BLD: 54 % (ref 40–70)
PLATELET # BLD AUTO: 318 K/UL (ref 140–440)
RBC # BLD AUTO: 4.91 M/UL (ref 4.1–5.1)
WBC # BLD AUTO: 8.8 K/UL (ref 4.5–13)

## 2019-12-13 PROCEDURE — 36415 COLL VENOUS BLD VENIPUNCTURE: CPT

## 2019-12-13 NOTE — PROGRESS NOTES
[facility] Progress Note    Date: 2019    Name: Dahlia Kearney    MRN: [id number]         : 1970      Ms. Ozuna arrived in the [facility] today at 1315, in stable condition, here for CBC/Phlebotomy (Monthly Status). She was assessed and education was provided. Ms. Ozuna's vitals were reviewed. Visit Vitals  /84 (BP 1 Location: Left arm, BP Patient Position: Sitting)   Pulse 85   Temp 98.7 °F (37.1 °C)   Resp 20   SpO2 96%   Breastfeeding No           Blood for a CBC was drawn from her left AC at 1322, without incident. Lab results were obtained and reviewed. Recent Results (from the past 12 hour(s))   CBC WITH 3 PART DIFF    Collection Time: 19  1:22 PM   Result Value Ref Range    WBC 8.8 4.5 - 13.0 K/uL    RBC 4.91 4.10 - 5.10 M/uL    HGB 12.5 12.0 - 16.0 g/dL    HCT 41.3 36 - 48 %    MCV 84.1 78 - 102 FL    MCH 25.5 25.0 - 35.0 PG    MCHC 30.3 (L) 31 - 37 g/dL    RDW 14.1 11.5 - 14.5 %    PLATELET 428 465 - 697 K/uL    NEUTROPHILS 54 40 - 70 %    MIXED CELLS 9 0.1 - 17 %    LYMPHOCYTES 37 14 - 44 %    ABS. NEUTROPHILS 4.8 1.8 - 9.5 K/UL    ABS. MIXED CELLS 0.8 0.0 - 2.3 K/uL    ABS. LYMPHOCYTES 3.2 1.1 - 5.9 K/UL    DF AUTOMATED           Phlebotomy was HELD today per order, for HCT < 45.0. Ms. Josue Bunn tolerated well, and had no complaints. Ms. Josue Bunn was discharged from [place] [address] in stable condition at .1335. She is to return in 1 month, on Friday, 20, at 1430, for her next appointment, for CBC/Phlebotomy.        Helena Wells RN  2019  1:31 PM

## 2019-12-27 DIAGNOSIS — K21.9 GASTROESOPHAGEAL REFLUX DISEASE, ESOPHAGITIS PRESENCE NOT SPECIFIED: ICD-10-CM

## 2019-12-29 RX ORDER — OMEPRAZOLE 20 MG/1
CAPSULE, DELAYED RELEASE ORAL
Qty: 90 CAP | Refills: 2 | OUTPATIENT
Start: 2019-12-29

## 2019-12-30 DIAGNOSIS — K21.9 GASTROESOPHAGEAL REFLUX DISEASE, ESOPHAGITIS PRESENCE NOT SPECIFIED: ICD-10-CM

## 2019-12-31 RX ORDER — OMEPRAZOLE 20 MG/1
20 CAPSULE, DELAYED RELEASE ORAL DAILY
Qty: 30 CAP | Refills: 3 | Status: SHIPPED | OUTPATIENT
Start: 2019-12-31 | End: 2020-04-10 | Stop reason: SDUPTHER

## 2020-01-17 ENCOUNTER — HOSPITAL ENCOUNTER (OUTPATIENT)
Dept: ONCOLOGY | Age: 50
Discharge: HOME | End: 2020-01-17

## 2020-01-17 DIAGNOSIS — Z72.0 TOBACCO ABUSE DISORDER: ICD-10-CM

## 2020-01-17 DIAGNOSIS — R06.02 SHORTNESS OF BREATH: ICD-10-CM

## 2020-01-17 DIAGNOSIS — I10 ESSENTIAL HYPERTENSION: ICD-10-CM

## 2020-01-17 DIAGNOSIS — Z72.0 TOBACCO ABUSE: ICD-10-CM

## 2020-01-17 DIAGNOSIS — D75.1 POLYCYTHEMIA: ICD-10-CM

## 2020-01-17 RX ORDER — LOSARTAN POTASSIUM AND HYDROCHLOROTHIAZIDE 12.5; 1 MG/1; MG/1
1 TABLET ORAL DAILY
Qty: 30 TAB | Refills: 6 | Status: SHIPPED | OUTPATIENT
Start: 2020-01-17 | End: 2020-07-26

## 2020-01-17 RX ORDER — FLUTICASONE PROPIONATE AND SALMETEROL 100; 50 UG/1; UG/1
1 POWDER RESPIRATORY (INHALATION) EVERY 12 HOURS
Qty: 1 INHALER | Refills: 3 | Status: SHIPPED | OUTPATIENT
Start: 2020-01-17 | End: 2020-01-30 | Stop reason: SDUPTHER

## 2020-01-17 RX ORDER — LOSARTAN POTASSIUM AND HYDROCHLOROTHIAZIDE 12.5; 1 MG/1; MG/1
TABLET ORAL
Qty: 30 TAB | Refills: 5 | OUTPATIENT
Start: 2020-01-17

## 2020-01-17 RX ORDER — ALBUTEROL SULFATE 90 UG/1
1-2 AEROSOL, METERED RESPIRATORY (INHALATION)
Qty: 1 INHALER | Refills: 3 | Status: SHIPPED | OUTPATIENT
Start: 2020-01-17 | End: 2020-03-18 | Stop reason: SDUPTHER

## 2020-01-17 NOTE — TELEPHONE ENCOUNTER
Requested Prescriptions     Pending Prescriptions Disp Refills    fluticasone propion-salmeteroL (ADVAIR DISKUS) 100-50 mcg/dose diskus inhaler 1 Inhaler 3     Sig: Take 1 Puff by inhalation every twelve (12) hours. Indications: bronchospasm prevention with COPD    albuterol (PROVENTIL HFA, VENTOLIN HFA, PROAIR HFA) 90 mcg/actuation inhaler 1 Inhaler 3     Sig: Take 1-2 Puffs by inhalation every four (4) hours as needed for Wheezing.  losartan-hydroCHLOROthiazide (HYZAAR) 100-12.5 mg per tablet 30 Tab 6     Sig: Take 1 Tab by mouth daily.      [address] [place] [address]

## 2020-01-30 ENCOUNTER — OFFICE VISIT (OUTPATIENT)
Dept: FAMILY MEDICINE CLINIC | Facility: CLINIC | Age: 50
End: 2020-01-30

## 2020-01-30 VITALS
HEIGHT: 62 IN | DIASTOLIC BLOOD PRESSURE: 61 MMHG | RESPIRATION RATE: 14 BRPM | HEART RATE: 77 BPM | BODY MASS INDEX: 37.91 KG/M2 | SYSTOLIC BLOOD PRESSURE: 125 MMHG | OXYGEN SATURATION: 98 % | WEIGHT: 206 LBS | TEMPERATURE: 97.4 F

## 2020-01-30 DIAGNOSIS — E78.5 DYSLIPIDEMIA: ICD-10-CM

## 2020-01-30 DIAGNOSIS — R22.32 AXILLARY MASS, LEFT: ICD-10-CM

## 2020-01-30 DIAGNOSIS — F32.A DEPRESSION, UNSPECIFIED DEPRESSION TYPE: ICD-10-CM

## 2020-01-30 DIAGNOSIS — R06.02 SHORTNESS OF BREATH: ICD-10-CM

## 2020-01-30 DIAGNOSIS — I10 ESSENTIAL HYPERTENSION: Primary | ICD-10-CM

## 2020-01-30 DIAGNOSIS — Z72.0 TOBACCO ABUSE DISORDER: ICD-10-CM

## 2020-01-30 DIAGNOSIS — Z13.29 SCREENING FOR THYROID DISORDER: ICD-10-CM

## 2020-01-30 DIAGNOSIS — Z72.0 TOBACCO ABUSE: ICD-10-CM

## 2020-01-30 DIAGNOSIS — L30.4 INTERTRIGO: ICD-10-CM

## 2020-01-30 DIAGNOSIS — E55.9 VITAMIN D DEFICIENCY: ICD-10-CM

## 2020-01-30 RX ORDER — ALBUTEROL SULFATE 90 UG/1
AEROSOL, METERED RESPIRATORY (INHALATION)
Qty: 3 INHALER | Refills: 3 | Status: SHIPPED | OUTPATIENT
Start: 2020-01-30 | End: 2020-06-05

## 2020-01-30 RX ORDER — FLUTICASONE PROPIONATE AND SALMETEROL 100; 50 UG/1; UG/1
1 POWDER RESPIRATORY (INHALATION) EVERY 12 HOURS
Qty: 3 INHALER | Refills: 3 | Status: SHIPPED | OUTPATIENT
Start: 2020-01-30 | End: 2020-07-31 | Stop reason: SDUPTHER

## 2020-01-30 RX ORDER — NYSTATIN 100000 U/G
CREAM TOPICAL 2 TIMES DAILY
Qty: 15 G | Refills: 0 | Status: SHIPPED | OUTPATIENT
Start: 2020-01-30 | End: 2021-01-13

## 2020-01-30 RX ORDER — AMLODIPINE BESYLATE 10 MG/1
10 TABLET ORAL DAILY
Qty: 90 TAB | Refills: 1 | Status: SHIPPED | OUTPATIENT
Start: 2020-01-30 | End: 2020-07-31 | Stop reason: SDUPTHER
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
## 2020-01-31 ENCOUNTER — HOSPITAL ENCOUNTER (OUTPATIENT)
Dept: ONCOLOGY | Age: 50
Discharge: HOME | End: 2020-01-31

## 2020-01-31 DIAGNOSIS — D75.1 POLYCYTHEMIA: ICD-10-CM

## 2020-02-03 ENCOUNTER — PATIENT OUTREACH (OUTPATIENT)
Dept: FAMILY MEDICINE CLINIC | Facility: CLINIC | Age: 50
End: 2020-02-03

## 2020-02-03 DIAGNOSIS — Z12.11 COLON CANCER SCREENING: Primary | ICD-10-CM

## 2020-02-03 NOTE — PROGRESS NOTES
NN health screening:    Discussed the pap smear screening, its importance and the fact she had positive HPV in 2016 this is highly recommended she repeat the pap smear to f/u. Pt stated \"I'll have to think about that but if I decide to do it I will call the office to get scheduled. \" Was very appreciative of gas cards.

## 2020-02-06 ENCOUNTER — HOSPITAL ENCOUNTER (OUTPATIENT)
Dept: ULTRASOUND IMAGING | Age: 50
Discharge: HOME | End: 2020-02-06
Attending: NURSE PRACTITIONER
Payer: MEDICAID

## 2020-02-06 ENCOUNTER — HOSPITAL ENCOUNTER (OUTPATIENT)
Dept: MAMMOGRAPHY | Age: 50
Discharge: HOME | End: 2020-02-06
Attending: NURSE PRACTITIONER
Payer: MEDICAID

## 2020-02-06 DIAGNOSIS — R22.30 AXILLARY MASS: ICD-10-CM

## 2020-02-06 DIAGNOSIS — N63.0 BREAST MASS: ICD-10-CM

## 2020-02-06 PROCEDURE — 77062 BREAST TOMOSYNTHESIS BI: CPT

## 2020-02-06 PROCEDURE — 76642 ULTRASOUND BREAST LIMITED: CPT

## 2020-02-21 ENCOUNTER — CLINICAL SUPPORT (OUTPATIENT)
Dept: ONCOLOGY | Age: 50
End: 2020-02-21

## 2020-02-21 ENCOUNTER — HOSPITAL ENCOUNTER (OUTPATIENT)
Dept: ONCOLOGY | Age: 50
Discharge: HOME | End: 2020-02-21

## 2020-02-21 ENCOUNTER — HOSPITAL ENCOUNTER (OUTPATIENT)
Dept: INFUSION THERAPY | Age: 50
Discharge: HOME | End: 2020-02-21
Payer: MEDICAID

## 2020-02-21 VITALS
TEMPERATURE: 98.1 F | DIASTOLIC BLOOD PRESSURE: 87 MMHG | SYSTOLIC BLOOD PRESSURE: 141 MMHG | OXYGEN SATURATION: 98 % | RESPIRATION RATE: 18 BRPM | HEART RATE: 64 BPM

## 2020-02-21 DIAGNOSIS — D75.1 POLYCYTHEMIA: ICD-10-CM

## 2020-02-21 DIAGNOSIS — D75.1 POLYCYTHEMIA: Primary | ICD-10-CM

## 2020-02-21 LAB
BASO+EOS+MONOS # BLD AUTO: 0.9 K/UL (ref 0–2.3)
BASO+EOS+MONOS NFR BLD AUTO: 11 % (ref 0.1–17)
DIFFERENTIAL METHOD BLD: ABNORMAL
ERYTHROCYTE [DISTWIDTH] IN BLOOD BY AUTOMATED COUNT: 17.7 % (ref 11.5–14.5)
HCT VFR BLD AUTO: 44.2 % (ref 36–48)
HGB BLD-MCNC: 14.3 G/DL (ref 12–16)
LYMPHOCYTES # BLD: 2.6 K/UL (ref 1.1–5.9)
LYMPHOCYTES NFR BLD: 31 % (ref 14–44)
MCH RBC QN AUTO: 27 PG (ref 25–35)
MCHC RBC AUTO-ENTMCNC: 32.4 G/DL (ref 31–37)
MCV RBC AUTO: 83.4 FL (ref 78–102)
NEUTS SEG # BLD: 5 K/UL (ref 1.8–9.5)
NEUTS SEG NFR BLD: 58 % (ref 40–70)
PLATELET # BLD AUTO: 272 K/UL (ref 140–440)
RBC # BLD AUTO: 5.3 M/UL (ref 4.1–5.1)
WBC # BLD AUTO: 8.5 K/UL (ref 4.5–13)

## 2020-02-21 PROCEDURE — 36415 COLL VENOUS BLD VENIPUNCTURE: CPT

## 2020-02-21 NOTE — PROGRESS NOTES
[facility] Progress Note    Date: 2020    Name: Ct Golden    MRN: [id number]         : 1970      Ms. Ozuna arrived to [facility] at [phone]. Pt was assessed and education provided. Ms. Ozuna's vitals were reviewed and patient was observed for 5 minutes prior to treatment. Visit Vitals  /87 (BP 1 Location: Right arm, BP Patient Position: Sitting)   Pulse 64   Temp 98.1 °F (36.7 °C)   Resp 18   SpO2 98%   Breastfeeding No       Lab results were obtained and reviewed. Recent Results (from the past 12 hour(s))   CBC WITH 3 PART DIFF    Collection Time: 20  9:22 AM   Result Value Ref Range    WBC 8.5 4.5 - 13.0 K/uL    RBC 5.30 (H) 4.10 - 5.10 M/uL    HGB 14.3 12.0 - 16.0 g/dL    HCT 44.2 36 - 48 %    MCV 83.4 78 - 102 FL    MCH 27.0 25.0 - 35.0 PG    MCHC 32.4 31 - 37 g/dL    RDW 17.7 (H) 11.5 - 14.5 %    PLATELET 245 840 - 495 K/uL    NEUTROPHILS 58 40 - 70 %    MIXED CELLS 11 0.1 - 17 %    LYMPHOCYTES 31 14 - 44 %    ABS. NEUTROPHILS 5.0 1.8 - 9.5 K/UL    ABS. MIXED CELLS 0.9 0.0 - 2.3 K/uL    ABS. LYMPHOCYTES 2.6 1.1 - 5.9 K/UL    DF AUTOMATED         Therapeutic phlebotomy held at this time per order for HCT 44.2%. Patient armband removed and shredded. Ms. Kerri House was discharged from [place] [address] in stable condition at [address] [place] [address]. She is to return on 3/20/20 at 1000 for her next appointment.     Yoana Higuera RN  2020  9:50 AM

## 2020-02-26 ENCOUNTER — PATIENT OUTREACH (OUTPATIENT)
Dept: FAMILY MEDICINE CLINIC | Facility: CLINIC | Age: 50
End: 2020-02-26

## 2020-02-26 NOTE — PROGRESS NOTES
NN health screening:    Mammogram negative after routine screening with f/u US of breast. Report is updated in HM. Awaiting completion of fit kit.

## 2020-03-18 ENCOUNTER — OFFICE VISIT (OUTPATIENT)
Dept: CARDIOLOGY CLINIC | Age: 50
End: 2020-03-18

## 2020-03-18 VITALS
HEIGHT: 62 IN | SYSTOLIC BLOOD PRESSURE: 142 MMHG | OXYGEN SATURATION: 96 % | WEIGHT: 210 LBS | DIASTOLIC BLOOD PRESSURE: 82 MMHG | BODY MASS INDEX: 38.64 KG/M2 | HEART RATE: 84 BPM | TEMPERATURE: 98.1 F

## 2020-03-18 DIAGNOSIS — F14.10 COCAINE ABUSE (HCC): ICD-10-CM

## 2020-03-18 DIAGNOSIS — I51.7 LVH (LEFT VENTRICULAR HYPERTROPHY): ICD-10-CM

## 2020-03-18 DIAGNOSIS — I10 ESSENTIAL HYPERTENSION: ICD-10-CM

## 2020-03-18 DIAGNOSIS — E78.5 DYSLIPIDEMIA: ICD-10-CM

## 2020-03-18 DIAGNOSIS — R07.9 CHEST PAIN, UNSPECIFIED TYPE: Primary | ICD-10-CM

## 2020-03-18 NOTE — PROGRESS NOTES
1. Have you been to the ER, urgent care clinic since your last visit? Hospitalized since your last visit? Yes When: 12/1/19 Where: [facility] Reason for visit: Chest Pain    2. Have you seen or consulted any other health care providers outside of the [address] [place] [address] since your last visit? Include any pap smears or colon screening.  No

## 2020-03-18 NOTE — PROGRESS NOTES
HISTORY OF PRESENT ILLNESS  Jorge Luis Arroyo is a 47 y.o. female. Recent er visit with atypical non anginal pain. Patient was in emergency room yesterday with atypical chest pain. 1/2019. ER notes and data reviewed  3/2020  Patient is seen in office for follow-up. She was in ER last 12/2019. Records reviewed    Chest Pain (Angina)    The history is provided by the patient. This is a recurrent problem. The problem has not changed since onset. The problem occurs every several days. The pain is associated with exertion and rest. The pain is present in the substernal region. The pain is mild. The quality of the pain is described as dull. Associated symptoms include shortness of breath. Pertinent negatives include no claudication, no cough, no dizziness, no fever, no hemoptysis, no nausea, no orthopnea, no palpitations, no PND, no sputum production, no vomiting and no weakness. Hypertension   The history is provided by the patient. This is a chronic problem. The problem occurs constantly. The problem has not changed since onset. Associated symptoms include chest pain and shortness of breath. Palpitations    The history is provided by the patient. This is a recurrent problem. The problem has not changed since onset. The problem occurs every several days. The problem is associated with nothing. Associated symptoms include chest pain and shortness of breath. Pertinent negatives include no fever, no claudication, no orthopnea, no PND, no nausea, no vomiting, no dizziness, no weakness, no cough, no hemoptysis and no sputum production. Her past medical history is significant for hypertension. Review of Systems   Constitutional: Negative for chills and fever. HENT: Negative for nosebleeds. Eyes: Negative for blurred vision and double vision. Respiratory: Positive for shortness of breath. Negative for cough, hemoptysis, sputum production and wheezing. Cardiovascular: Positive for chest pain.  Negative for palpitations, orthopnea, claudication, leg swelling and PND. Gastrointestinal: Negative for heartburn, nausea and vomiting. Musculoskeletal: Negative for myalgias. Skin: Negative for rash. Neurological: Negative for dizziness and weakness. Endo/Heme/Allergies: Does not bruise/bleed easily.      Family History   Problem Relation Age of Onset    Hypertension Mother     Heart Attack Father     Hypertension Father     Cancer Father         skin    Cancer Sister         skin    Bipolar Disorder Sister     Cancer Brother         skin    Cancer Maternal Aunt         colon       Past Medical History:   Diagnosis Date    Asthma     Bronchitis     [facility] [address]     Cocaine abuse ([facility] [address] 3/26/2015    Dental caries     Depression     Depression 8/31/2016    Drug abuse ([facility] [address]     Dysphasia 10/17/2018    Elevated hematocrit 8/31/2016    ETOH abuse     H/O screening mammography 12/06/2016    No evidence of malignancy     HPV (human papilloma virus) infection 11/2016    The patient referred to [facility]     Hypertension     Hypertensive left ventricular hypertrophy, without heart failure 10/15/2018    mild to moderate lvh continue bp meds    Ill-defined condition     Marijuana use 10/17/2018    Mood swings     Polycythemia 2018    Polycythemia     Psychiatric disorder     depression, Bipolar    Schatzki's ring 08/30/2018    Found on Barium Swallow    Sliding hiatal hernia 10/17/2018    Tobacco abuse     Vitamin D deficiency        Past Surgical History:   Procedure Laterality Date    HX APPENDECTOMY      HX COLPOSCOPY  12/06/2016    Low-grade courtney/mild dysplasia RICK I    HX HERNIA REPAIR      3x       Social History     Tobacco Use    Smoking status: Current Every Day Smoker     Packs/day: 0.50     Years: 20.00     Pack years: 10.00     Types: Cigarettes    Smokeless tobacco: Never Used   Substance Use Topics    Alcohol use: Yes     Comment: socially       Allergies Allergen Reactions    Iodinated Contrast Media Shortness of Breath       Prior to Admission medications    Medication Sig Start Date End Date Taking? Authorizing Provider   fluticasone propion-salmeteroL (ADVAIR DISKUS) 100-50 mcg/dose diskus inhaler Take 1 Puff by inhalation every twelve (12) hours. Indications: bronchospasm prevention with COPD 1/30/20  Yes David Aly NP   albuterol (PROAIR HFA) 90 mcg/actuation inhaler INHALE ONE TO TWO PUFFS BY MOUTH EVERY 4 HOURS AS NEEDED FOR WHEEZING 1/30/20  Yes David Aly NP   amLODIPine (NORVASC) 10 mg tablet Take 1 Tab by mouth daily. 1/30/20  Yes David Aly NP   nystatin (MYCOSTATIN) topical cream Apply  to affected area two (2) times a day. 1/30/20  Yes David Aly NP   losartan-hydroCHLOROthiazide (HYZAAR) 100-12.5 mg per tablet Take 1 Tab by mouth daily. 1/17/20  Yes David Aly NP   omeprazole (PRILOSEC) 20 mg capsule Take 1 Cap by mouth daily. 12/31/19  Yes David Aly NP   quetiapine fumarate (SEROQUEL PO) Take 300 Tabs by mouth nightly. 2 tablets   Yes Provider, Historical   aspirin delayed-release 81 mg tablet Take 1 Tab by mouth daily. 9/11/19  Yes Henrry Alford MD   atorvastatin (LIPITOR) 40 mg tablet Take 1 Tab by mouth daily. 9/11/19  Yes Ingrid MCDONALD NP   omega-3 acid ethyl esters (LOVAZA) 1 gram capsule Take 2 Caps by mouth two (2) times a day. 3/21/19  Yes Ingrid MCDONALD NP   LORazepam (ATIVAN) 1 mg tablet Take  by mouth every four (4) hours as needed for Anxiety. Yes Provider, Historical   lamoTRIgine (LAMICTAL) 100 mg tablet Take 200 mg by mouth daily. Yes Provider, Historical         Visit Vitals  /82 (BP 1 Location: Left arm, BP Patient Position: Sitting)   Pulse 84   Temp 98.1 °F (36.7 °C) (Oral)   Ht 5' 2\" (1.575 m)   Wt 95.3 kg (210 lb)   SpO2 96%   BMI 38.41 kg/m²         Physical Exam   Constitutional: She is oriented to person, place, and time. She appears well-developed and well-nourished. HENT:   Head: Normocephalic and atraumatic. Eyes: Conjunctivae are normal.   Neck: Neck supple. No JVD present. No tracheal deviation present. No thyromegaly present. Cardiovascular: Normal rate and regular rhythm. PMI is not displaced. Exam reveals no gallop, no S3 and no decreased pulses. Murmur heard. Early systolic murmur is present at the upper right sternal border and lower left sternal border. Pulmonary/Chest: No respiratory distress. She has no wheezes. She has no rales. She exhibits no tenderness. Abdominal: Soft. There is no abdominal tenderness. Musculoskeletal:         General: No edema. Neurological: She is alert and oriented to person, place, and time. Skin: Skin is warm. Psychiatric: She has a normal mood and affect. Ms. Marbin Granados has a reminder for a \"due or due soon\" health maintenance. I have asked that she contact her primary care provider for follow-up on this health maintenance. I have personally reviewed patients ekg done at other facility. SUMMARY:2017  Left ventricle: Systolic function was normal. Ejection fraction was  estimated in the range of 55 % to 60 %. No obvious wall motion  abnormalities identified in the views obtained. Wall thickness was mildly  to moderately increased. There was mild concentric hypertrophy. NUCLEAR IMAGIN2017    Findings:   1. Post-stress imaging in short axis, horizontal and vertical long axis views reveals normal isotope uptake in all areas. 2. Resting images also show normal isotope uptake in all areas. 3. Gated images show normal left ventricular size, wall motion and systolic function. Ejection fraction is 71%. Diagnosis:   1. Normal scan. 2. No evidence of significant fixed or reversible defect suggesting ischemia or myocardial infarction noted from this nuclear study. 3. Low risk scan.     Interpretation Summary        · Baseline ECG: Normal sinus rhythm, Minimal voltage criteria for LVH , maybe normal variant Septal infarct, age undetermined. · Inconclusive stress electrocardiogram.  · Low risk study. Submaximal stress test.  If clinically indicated, recommend nuclear stress test.      Stress Findings     ECG Baseline ECG: Normal sinus rhythm, Minimal voltage criteria for LVH , maybe normal variant  Septal infarct, age undetermined. Arrhythmias during stress: rare PACs. There was no ST segment deviation noted during stress. Arrhythmias during recovery: rare PACs. Arrhythmias were not significant. Stress Findings A stress test was performed following a modified Sathish protocol. The test was stopped because the patient complained of shortness of breath. The patient reported no angina during stress. Leg pain   The patient's response to exercise was inadequate for diagnosis. Blood pressure demonstrated a normal response to exercise. Overall, the patient's exercise capacity was normal.   Angina Index is 0. Inconclusive stress electrocardiogram for inducible myocardial ischemia. Patient unable to reach target heart rate   Stress Measurements     Baseline Vitals   Baseline HR 67 BPM      Baseline BP 80/57 mmHg       Peak Stress Vitals   Post peak  BPM      Stress Base Systolic  mmHg      Stress Base Diastolic BP 70 mmHg      Estimated workload 7 METS      Stress Rate Pressure Product 12,420 BPM*mmHg       Exercise Data   Target  bpm      Percent HR 79 %      Exercise duration time               Assessment         ICD-10-CM ICD-9-CM    1. Chest pain, unspecified type R07.9 786.50 HEPATIC FUNCTION PANEL      LIPID PANEL    Recurrent chest pain. Continue current medical management history of cocaine abuse. Discussed cessation   2. LVH (left ventricular hypertrophy) I51.7 429.3     Stable monitor continue treatment   3. Dyslipidemia E78.5 272.4 HEPATIC FUNCTION PANEL      LIPID PANEL    Continue treatment. Follow-up lab with PCP   4.  Essential hypertension I10 401.9     Stable on treatment   5. Cocaine abuse ([facility] [address] F14.10 305.60     Continue working hard at cessation   2/2019  Continue medical management insurance had refused nuclear stress test and echocardiogram exam stress echo EKG part inconclusive exercise to 79% of maximum predicted heart rate at this level of stress no EKG changes and no echo abnormality. Continue to monitor clinically and continue with medication including amlodipine aspirin and statin. Discussed cessation of cocaine use    3/2019  ACS with continued substernal chest pain with radiation to left arm and shoulder with SOB. She was unable to have NST and stress echo due to insurance denial.  Continue aspirin, statin and norvasc. Not on BB due to ongoing cocaine use (last used 2 weeks ago). Will proceed with cardiac cath due to ongoing symptoms and risk factors. Pre-medicate with prednisone and benadryl to begin 2 days prior to cardiac cath. Pre-procedure labs and chest xray ordered. Discussed cessation of tobacco and cocaine use  9/2019  Noncompliant with follow-up. Continues to use cocaine. Did not keep her cath appointments. Will try and continue medical management. Advised back on aspirin.   Discussed smoking cessation and cocaine cessation  Medications Discontinued During This Encounter   Medication Reason    albuterol (PROVENTIL HFA, VENTOLIN HFA, PROAIR HFA) 90 mcg/actuation inhaler Duplicate Order       Orders Placed This Encounter    HEPATIC FUNCTION PANEL     Standing Status:   Future     Standing Expiration Date:   9/16/2020    LIPID PANEL     Standing Status:   Future     Standing Expiration Date:   9/16/2020

## 2020-03-20 ENCOUNTER — HOSPITAL ENCOUNTER (OUTPATIENT)
Dept: INFUSION THERAPY | Age: 50
Discharge: HOME | End: 2020-03-20
Payer: MEDICAID

## 2020-03-20 ENCOUNTER — CLINICAL SUPPORT (OUTPATIENT)
Dept: ONCOLOGY | Age: 50
End: 2020-03-20

## 2020-03-20 ENCOUNTER — HOSPITAL ENCOUNTER (OUTPATIENT)
Dept: ONCOLOGY | Age: 50
Discharge: HOME | End: 2020-03-20

## 2020-03-20 VITALS
RESPIRATION RATE: 16 BRPM | SYSTOLIC BLOOD PRESSURE: 124 MMHG | TEMPERATURE: 98.5 F | DIASTOLIC BLOOD PRESSURE: 88 MMHG | HEART RATE: 76 BPM

## 2020-03-20 DIAGNOSIS — D75.1 POLYCYTHEMIA: ICD-10-CM

## 2020-03-20 DIAGNOSIS — D75.1 POLYCYTHEMIA: Primary | ICD-10-CM

## 2020-03-20 LAB
BASO+EOS+MONOS # BLD AUTO: 0.6 K/UL (ref 0–2.3)
BASO+EOS+MONOS NFR BLD AUTO: 8 % (ref 0.1–17)
DIFFERENTIAL METHOD BLD: ABNORMAL
ERYTHROCYTE [DISTWIDTH] IN BLOOD BY AUTOMATED COUNT: 17.2 % (ref 11.5–14.5)
HCT VFR BLD AUTO: 47 % (ref 36–48)
HGB BLD-MCNC: 15.1 G/DL (ref 12–16)
LYMPHOCYTES # BLD: 2.5 K/UL (ref 1.1–5.9)
LYMPHOCYTES NFR BLD: 31 % (ref 14–44)
MCH RBC QN AUTO: 27.5 PG (ref 25–35)
MCHC RBC AUTO-ENTMCNC: 32.1 G/DL (ref 31–37)
MCV RBC AUTO: 85.6 FL (ref 78–102)
NEUTS SEG # BLD: 5 K/UL (ref 1.8–9.5)
NEUTS SEG NFR BLD: 61 % (ref 40–70)
PLATELET # BLD AUTO: 284 K/UL (ref 140–440)
RBC # BLD AUTO: 5.49 M/UL (ref 4.1–5.1)
WBC # BLD AUTO: 8.1 K/UL (ref 4.5–13)

## 2020-03-20 PROCEDURE — 36415 COLL VENOUS BLD VENIPUNCTURE: CPT

## 2020-03-20 PROCEDURE — 74011250636 HC RX REV CODE- 250/636: Performed by: INTERNAL MEDICINE

## 2020-03-20 PROCEDURE — 99195 PHLEBOTOMY: CPT

## 2020-03-20 RX ORDER — SODIUM CHLORIDE 9 MG/ML
500 INJECTION, SOLUTION INTRAVENOUS CONTINUOUS
Status: DISCONTINUED | OUTPATIENT
Start: 2020-03-20 | End: 2020-03-21 | Stop reason: HOSPADM

## 2020-03-20 RX ADMIN — SODIUM CHLORIDE 500 ML: 9 INJECTION, SOLUTION INTRAVENOUS at 10:52

## 2020-03-20 NOTE — PROGRESS NOTES
[facility] Progress Note    Date: 2020    Name: Cami Barroso    MRN: [id number]         : 1970      Ms. Ozuna was assessed and education was provided. Ms. Ozuna's vitals were reviewed and patient was observed for 5 minutes prior to treatment. Visit Vitals  BP (!) 155/98 (BP 1 Location: Left arm, BP Patient Position: Sitting)   Pulse 76   Temp 98 °F (36.7 °C)   Resp 16   Breastfeeding No       Lab results were obtained and reviewed. Recent Results (from the past 12 hour(s))   CBC WITH 3 PART DIFF    Collection Time: 20 10:34 AM   Result Value Ref Range    WBC 8.1 4.5 - 13.0 K/uL    RBC 5.49 (H) 4.10 - 5.10 M/uL    HGB 15.1 12.0 - 16.0 g/dL    HCT 47.0 36 - 48 %    MCV 85.6 78 - 102 FL    MCH 27.5 25.0 - 35.0 PG    MCHC 32.1 31 - 37 g/dL    RDW 17.2 (H) 11.5 - 14.5 %    PLATELET 883 427 - 397 K/uL    NEUTROPHILS 61 40 - 70 %    MIXED CELLS 8 0.1 - 17 %    LYMPHOCYTES 31 14 - 44 %    ABS. NEUTROPHILS 5.0 1.8 - 9.5 K/UL    ABS. MIXED CELLS 0.6 0.0 - 2.3 K/uL    ABS. LYMPHOCYTES 2.5 1.1 - 5.9 K/UL    DF AUTOMATED         Therapeutic phlebotomy started at 1034 and stopped at 1052. Approx 500 ml blood removed from patient. 500 ml NS IV given to patient via peripheral line. Ms. Salinas Fonseca tolerated the procedure, and had no complaints. Patient armband removed and shredded. Ms. Salinas Fonseca was discharged from [place] [id number] in stable condition at 1140. She is to return on 3/27/2020 at [facility] for her next appointment for weekly status CBC/Phlebotomy.     Lorrain Dakin, RN  2020

## 2020-03-27 ENCOUNTER — HOSPITAL ENCOUNTER (OUTPATIENT)
Dept: ONCOLOGY | Age: 50
Discharge: HOME | End: 2020-03-27

## 2020-03-27 DIAGNOSIS — D75.1 POLYCYTHEMIA: ICD-10-CM

## 2020-04-03 ENCOUNTER — APPOINTMENT (OUTPATIENT)
Dept: GENERAL RADIOLOGY | Age: 50
End: 2020-04-03
Attending: EMERGENCY MEDICINE
Payer: MEDICAID

## 2020-04-03 ENCOUNTER — HOSPITAL ENCOUNTER (OUTPATIENT)
Dept: ONCOLOGY | Age: 50
Discharge: HOME | End: 2020-04-03

## 2020-04-03 ENCOUNTER — HOSPITAL ENCOUNTER (OUTPATIENT)
Dept: INFUSION THERAPY | Age: 50
Discharge: HOME | End: 2020-04-03
Payer: MEDICAID

## 2020-04-03 ENCOUNTER — HOSPITAL ENCOUNTER (EMERGENCY)
Age: 50
Discharge: HOME | End: 2020-04-03
Attending: EMERGENCY MEDICINE
Payer: MEDICAID

## 2020-04-03 ENCOUNTER — CLINICAL SUPPORT (OUTPATIENT)
Dept: ONCOLOGY | Age: 50
End: 2020-04-03

## 2020-04-03 ENCOUNTER — TELEPHONE (OUTPATIENT)
Dept: FAMILY MEDICINE CLINIC | Facility: CLINIC | Age: 50
End: 2020-04-03

## 2020-04-03 VITALS
HEART RATE: 84 BPM | SYSTOLIC BLOOD PRESSURE: 153 MMHG | BODY MASS INDEX: 38.64 KG/M2 | HEIGHT: 62 IN | OXYGEN SATURATION: 98 % | RESPIRATION RATE: 14 BRPM | DIASTOLIC BLOOD PRESSURE: 94 MMHG | TEMPERATURE: 98.4 F | WEIGHT: 210 LBS

## 2020-04-03 VITALS
SYSTOLIC BLOOD PRESSURE: 141 MMHG | TEMPERATURE: 98.7 F | RESPIRATION RATE: 16 BRPM | DIASTOLIC BLOOD PRESSURE: 89 MMHG | HEART RATE: 77 BPM

## 2020-04-03 DIAGNOSIS — M54.6 ACUTE LEFT-SIDED THORACIC BACK PAIN: Primary | ICD-10-CM

## 2020-04-03 DIAGNOSIS — D75.1 POLYCYTHEMIA: Primary | ICD-10-CM

## 2020-04-03 DIAGNOSIS — D75.1 POLYCYTHEMIA: ICD-10-CM

## 2020-04-03 LAB
BASO+EOS+MONOS # BLD AUTO: 0.6 K/UL (ref 0–2.3)
BASO+EOS+MONOS NFR BLD AUTO: 7 % (ref 0.1–17)
DIFFERENTIAL METHOD BLD: ABNORMAL
ERYTHROCYTE [DISTWIDTH] IN BLOOD BY AUTOMATED COUNT: 16.7 % (ref 11.5–14.5)
HCT VFR BLD AUTO: 41 % (ref 36–48)
HGB BLD-MCNC: 12.8 G/DL (ref 12–16)
LYMPHOCYTES # BLD: 2.1 K/UL (ref 1.1–5.9)
LYMPHOCYTES NFR BLD: 22 % (ref 14–44)
MCH RBC QN AUTO: 27 PG (ref 25–35)
MCHC RBC AUTO-ENTMCNC: 31.2 G/DL (ref 31–37)
MCV RBC AUTO: 86.5 FL (ref 78–102)
NEUTS SEG # BLD: 6.5 K/UL (ref 1.8–9.5)
NEUTS SEG NFR BLD: 71 % (ref 40–70)
PLATELET # BLD AUTO: 333 K/UL (ref 140–440)
RBC # BLD AUTO: 4.74 M/UL (ref 4.1–5.1)
WBC # BLD AUTO: 9.2 K/UL (ref 4.5–13)

## 2020-04-03 PROCEDURE — 99282 EMERGENCY DEPT VISIT SF MDM: CPT

## 2020-04-03 PROCEDURE — 71046 X-RAY EXAM CHEST 2 VIEWS: CPT

## 2020-04-03 PROCEDURE — 36415 COLL VENOUS BLD VENIPUNCTURE: CPT

## 2020-04-03 RX ORDER — OXYCODONE AND ACETAMINOPHEN 5; 325 MG/1; MG/1
1 TABLET ORAL
Qty: 6 TAB | Refills: 0 | Status: SHIPPED | OUTPATIENT
Start: 2020-04-03 | End: 2020-04-05

## 2020-04-03 RX ORDER — METAXALONE 800 MG/1
800 TABLET ORAL 4 TIMES DAILY
Qty: 12 TAB | Refills: 0 | Status: SHIPPED | OUTPATIENT
Start: 2020-04-03 | End: 2020-04-06

## 2020-04-03 NOTE — TELEPHONE ENCOUNTER
Patient called to inform Lillie Ashley that she went to the ER and is still having abdominal pain and has not had a bowel movement in 3  Plus days she would like some one to call her back please and thanks . [phone]

## 2020-04-03 NOTE — ED PROVIDER NOTES
EMERGENCY DEPARTMENT HISTORY AND PHYSICAL EXAM    Date: 4/3/2020  Patient Name: Wang Flood    History of Presenting Illness     Chief Complaint   Patient presents with    Back Pain         History Provided By: Patient  Additional History (Context): Wang Flood is a 47 y.o. female with hypertension, obesity, COPD and Substance abuse and daily including marijuana cocaine and alcohol who presents with left thoracic back pain radiating to her left front x3 days. Worse with coughing worse with bending and movement. Denies injury rash fever. Her cough is slightly productive with darkish sputum. She is wheezing which is her baseline since she smokes half a pack a day. PCP: Montana Sue NP    Current Outpatient Medications   Medication Sig Dispense Refill    metaxalone (Skelaxin) 800 mg tablet Take 1 Tab by mouth four (4) times daily for 3 days. 12 Tab 0    oxyCODONE-acetaminophen (Percocet) 5-325 mg per tablet Take 1 Tab by mouth every six (6) hours as needed for Pain for up to 2 days. Max Daily Amount: 4 Tabs. Take 1 tablet every 6 hours as needed for pain control. 6 Tab 0    fluticasone propion-salmeteroL (ADVAIR DISKUS) 100-50 mcg/dose diskus inhaler Take 1 Puff by inhalation every twelve (12) hours. Indications: bronchospasm prevention with COPD 3 Inhaler 3    albuterol (PROAIR HFA) 90 mcg/actuation inhaler INHALE ONE TO TWO PUFFS BY MOUTH EVERY 4 HOURS AS NEEDED FOR WHEEZING 3 Inhaler 3    amLODIPine (NORVASC) 10 mg tablet Take 1 Tab by mouth daily. 90 Tab 1    nystatin (MYCOSTATIN) topical cream Apply  to affected area two (2) times a day. 15 g 0    losartan-hydroCHLOROthiazide (HYZAAR) 100-12.5 mg per tablet Take 1 Tab by mouth daily. 30 Tab 6    omeprazole (PRILOSEC) 20 mg capsule Take 1 Cap by mouth daily. 30 Cap 3    quetiapine fumarate (SEROQUEL PO) Take 300 Tabs by mouth nightly. 2 tablets      aspirin delayed-release 81 mg tablet Take 1 Tab by mouth daily.  100 Tab 3    atorvastatin (LIPITOR) 40 mg tablet Take 1 Tab by mouth daily. 30 Tab 6    omega-3 acid ethyl esters (LOVAZA) 1 gram capsule Take 2 Caps by mouth two (2) times a day. 360 Cap 3    LORazepam (ATIVAN) 1 mg tablet Take  by mouth every four (4) hours as needed for Anxiety.  lamoTRIgine (LAMICTAL) 100 mg tablet Take 200 mg by mouth daily.          Past History     Past Medical History:  Past Medical History:   Diagnosis Date    Asthma     Bronchitis     [facility]est[facility]) [facility] [address]     Cocaine abuse ([facility] [address] 3/26/2015    Dental caries     Depression     Depression 8/31/2016    Drug abuse ([facility] [address]     Dysphasia 10/17/2018    Elevated hematocrit 8/31/2016    ETOH abuse     H/O screening mammography 12/06/2016    No evidence of malignancy     HPV (human papilloma virus) infection 11/2016    The patient referred to [facility]     Hypertension     Hypertensive left ventricular hypertrophy, without heart failure 10/15/2018    mild to moderate lvh continue bp meds    Ill-defined condition     Marijuana use 10/17/2018    Mood swings     Polycythemia 2018    Polycythemia     Psychiatric disorder     depression, Bipolar    Schatzki's ring 08/30/2018    Found on Barium Swallow    Sliding hiatal hernia 10/17/2018    Tobacco abuse     Vitamin D deficiency        Past Surgical History:  Past Surgical History:   Procedure Laterality Date    HX APPENDECTOMY      HX COLPOSCOPY  12/06/2016    Low-grade courtney/mild dysplasia RICK I    HX HERNIA REPAIR      3x       Family History:  Family History   Problem Relation Age of Onset    Hypertension Mother     Heart Attack Father     Hypertension Father     Cancer Father         skin    Cancer Sister         skin    Bipolar Disorder Sister     Cancer Brother         skin    Cancer Maternal Aunt         colon       Social History:  Social History     Tobacco Use    Smoking status: Current Every Day Smoker     Packs/day: 0.50     Years: 20.00     Pack years: 10.00 Types: Cigarettes    Smokeless tobacco: Never Used   Substance Use Topics    Alcohol use: Yes     Comment: socially    Drug use: Yes     Types: Cocaine, Marijuana     Comment: smokes cocaine-current user       Allergies: Allergies   Allergen Reactions    Iodinated Contrast Media Shortness of Breath         Review of Systems   Review of Systems   Constitutional: Negative for fever. Respiratory: Positive for cough and wheezing. Musculoskeletal: Positive for back pain. All Other Systems Negative  Physical Exam     Vitals:    04/03/20 0732   BP: (!) 153/94   Pulse: 84   Resp: 14   Temp: 98.4 °F (36.9 °C)   SpO2: 98%   Weight: 95.3 kg (210 lb)   Height: 5' 2\" (1.575 m)     Physical Exam  Vitals signs and nursing note reviewed. Constitutional:       Appearance: She is well-developed. She is obese. She is not ill-appearing. HENT:      Head: Normocephalic and atraumatic. Eyes:      Pupils: Pupils are equal, round, and reactive to light. Neck:      Thyroid: No thyromegaly. Vascular: No JVD. Trachea: No tracheal deviation. Cardiovascular:      Rate and Rhythm: Normal rate and regular rhythm. Heart sounds: Normal heart sounds. No murmur. No friction rub. No gallop. Pulmonary:      Effort: Pulmonary effort is normal. No respiratory distress. Breath sounds: No stridor. Wheezing present. No rales. Chest:      Chest wall: Tenderness present. Abdominal:      General: There is no distension. Palpations: Abdomen is soft. There is no mass. Tenderness: There is no abdominal tenderness. There is no guarding or rebound. Musculoskeletal:         General: No tenderness. Lymphadenopathy:      Cervical: No cervical adenopathy. Skin:     General: Skin is warm and dry. Coloration: Skin is not pale. Findings: No erythema or rash. Neurological:      Mental Status: She is alert and oriented to person, place, and time.    Psychiatric:         Behavior: Behavior normal.         Thought Content: Thought content normal.            Diagnostic Study Results     Labs -   No results found for this or any previous visit (from the past 12 hour(s)). Radiologic Studies -   XR CHEST PA LAT    (Results Pending)     CT Results  (Last 48 hours)    None        CXR Results  (Last 48 hours)    None            Medical Decision Making   I am the first provider for this patient. I reviewed the vital signs, available nursing notes, past medical history, past surgical history, family history and social history. Vital Signs-Reviewed the patient's vital signs. Records Reviewed: Nursing Notes and Old Medical Records    Procedures:  Procedures    Provider Notes (Medical Decision Making): Get chest x-ray. No definite consolidation or infiltrate on her chest x-ray. Has butyryl inhalers on her refills available to her. Discharge home with medications for pain relief. Believe this is musculoskeletal.    MED RECONCILIATION:  No current facility-administered medications for this encounter. Current Outpatient Medications   Medication Sig    metaxalone (Skelaxin) 800 mg tablet Take 1 Tab by mouth four (4) times daily for 3 days.  oxyCODONE-acetaminophen (Percocet) 5-325 mg per tablet Take 1 Tab by mouth every six (6) hours as needed for Pain for up to 2 days. Max Daily Amount: 4 Tabs. Take 1 tablet every 6 hours as needed for pain control.  fluticasone propion-salmeteroL (ADVAIR DISKUS) 100-50 mcg/dose diskus inhaler Take 1 Puff by inhalation every twelve (12) hours. Indications: bronchospasm prevention with COPD    albuterol (PROAIR HFA) 90 mcg/actuation inhaler INHALE ONE TO TWO PUFFS BY MOUTH EVERY 4 HOURS AS NEEDED FOR WHEEZING    amLODIPine (NORVASC) 10 mg tablet Take 1 Tab by mouth daily.  nystatin (MYCOSTATIN) topical cream Apply  to affected area two (2) times a day.  losartan-hydroCHLOROthiazide (HYZAAR) 100-12.5 mg per tablet Take 1 Tab by mouth daily.     omeprazole (PRILOSEC) 20 mg capsule Take 1 Cap by mouth daily.  quetiapine fumarate (SEROQUEL PO) Take 300 Tabs by mouth nightly. 2 tablets    aspirin delayed-release 81 mg tablet Take 1 Tab by mouth daily.  atorvastatin (LIPITOR) 40 mg tablet Take 1 Tab by mouth daily.  omega-3 acid ethyl esters (LOVAZA) 1 gram capsule Take 2 Caps by mouth two (2) times a day.  LORazepam (ATIVAN) 1 mg tablet Take  by mouth every four (4) hours as needed for Anxiety.  lamoTRIgine (LAMICTAL) 100 mg tablet Take 200 mg by mouth daily. Disposition:  home    DISCHARGE NOTE:   8:13 AM    Pt has been reexamined. Patient has no new complaints, changes, or physical findings. Care plan outlined and precautions discussed. Results of CXR were reviewed with the patient. All medications were reviewed with the patient; will d/c home with percocet, skelaxin. All of pt's questions and concerns were addressed. Patient was instructed and agrees to follow up with PCP, as well as to return to the ED upon further deterioration. Patient is ready to go home. Follow-up Information     Follow up With Specialties Details Why Contact Info    Nina Fernandez NP Nurse Practitioner Schedule an appointment as soon as possible for a visit in 2 days  [address] [place] [phone]  [phone]      [facility] EMERGENCY DEPT Emergency Medicine  If symptoms worsen return immediately [address]  [phone]          Current Discharge Medication List      START taking these medications    Details   metaxalone (Skelaxin) 800 mg tablet Take 1 Tab by mouth four (4) times daily for 3 days. Qty: 12 Tab, Refills: 0      oxyCODONE-acetaminophen (Percocet) 5-325 mg per tablet Take 1 Tab by mouth every six (6) hours as needed for Pain for up to 2 days. Max Daily Amount: 4 Tabs. Take 1 tablet every 6 hours as needed for pain control.   Qty: 6 Tab, Refills: 0    Associated Diagnoses: Acute left-sided thoracic back pain                 Diagnosis     Clinical Impression:   1.  Acute left-sided thoracic back pain

## 2020-04-03 NOTE — DISCHARGE INSTRUCTIONS
Patient Education        Learning About Relief for Back Pain  What is back tension and strain? Back strain happens when you overstretch, or pull, a muscle in your back. You may hurt your back in an accident or when you exercise or lift something. Most back pain will get better with rest and time. You can take care of yourself at home to help your back heal.  What can you do first to relieve back pain? When you first feel back pain, try these steps:  · Walk. Take a short walk (10 to 20 minutes) on a level surface (no slopes, hills, or stairs) every 2 to 3 hours. Walk only distances you can manage without pain, especially leg pain. · Relax. Find a comfortable position for rest. Some people are comfortable on the floor or a medium-firm bed with a small pillow under their head and another under their knees. Some people prefer to lie on their side with a pillow between their knees. Don't stay in one position for too long. · Try heat or ice. Try using a heating pad on a low or medium setting, or take a warm shower, for 15 to 20 minutes every 2 to 3 hours. Or you can buy single-use heat wraps that last up to 8 hours. You can also try an ice pack for 10 to 15 minutes every 2 to 3 hours. You can use an ice pack or a bag of frozen vegetables wrapped in a thin towel. There is not strong evidence that either heat or ice will help, but you can try them to see if they help. You may also want to try switching between heat and cold. · Take pain medicine exactly as directed. ¨ If the doctor gave you a prescription medicine for pain, take it as prescribed. ¨ If you are not taking a prescription pain medicine, ask your doctor if you can take an over-the-counter medicine. What else can you do? · Stretch and exercise. Exercises that increase flexibility may relieve your pain and make it easier for your muscles to keep your spine in a good, neutral position. And don't forget to keep walking. · Do self-massage.  You can use self-massage to unwind after work or school or to energize yourself in the morning. You can easily massage your feet, hands, or neck. Self-massage works best if you are in comfortable clothes and are sitting or lying in a comfortable position. Use oil or lotion to massage bare skin. · Reduce stress. Back pain can lead to a vicious [place]: Distress about the pain tenses the muscles in your back, which in turn causes more pain. Learn how to relax your mind and your muscles to lower your stress. Where can you learn more? Go to http://los-zack.info/. Enter M749 in the search box to learn more about \"Learning About Relief for Back Pain. \"  Current as of: March 21, 2017  Content Version: 11.5  © 8192-8485 [employer]. Care instructions adapted under license by [employer] (which disclaims liability or warranty for this information). If you have questions about a medical condition or this instruction, always ask your healthcare professional. [address] any warranty or liability for your use of this information.

## 2020-04-03 NOTE — TELEPHONE ENCOUNTER
Pt need to be set up for a VV 39.0 wk, Prolonged rupture of 19.5 hours, maternal temp/rule out sepsis, s/p TTN, now with desats requiring stimulation.    DOL 6  Weight (g): 3065 + 20 g  Ins ml/kg/d: 90 ml/kg/day 30-55 cc per feed+ BF x 2  Urine: X 7  Stool: x 4    FEN: Feed EHM/SA PO ad nasima q3 hours. Enable breastfeeding.  Respiratory:  having desaturations requiring stimulation. Will obtain head ultrasound today to evaluate for central cause of apnea.   CV: No current issues. Continue cardiorespiratory monitoring.  Heme: Bilirubins trending upwards but below phototherapy threshhold. Will continue to trend.  ID: Presumed sepsis - ruled out.  Neuro: Normal exam for GA. HC 33  Social: no issues    Labs/Imaging/Studies: 11/1 - bili  Plan; Monitor respiratory status - D/C home when desaturation events resolved. 39.0 wk, Prolonged rupture of 19.5 hours, maternal temp/rule out sepsis, s/p TTN, now with desats requiring stimulation. No events for 24 hours.     DOL 6  Weight (g): 3021 -44 g (-3%)  Ins ml/kg/d: 86ml/kg/day (40 to 60 cc per feed) + BF x 2  Urine: X 8  Stool: x 46    FEN: Feed EHM/SA PO ad nasima q3 hours. Enable breastfeeding.  Respiratory:  having desaturations requiring stimulation. Obtained MRI on 10/31 which showed normal brain but bilateral dacrocystoceles L > R. On left there is protrusion into nasal cavity seen on exam by ENT. Awaiting exam by ophthalmology attending.   CV: No current issues. Continue cardiorespiratory monitoring.  Heme: Bilirubins trending upwards but below phototherapy threshhold. Will continue to trend.  ID: Presumed sepsis - ruled out.  Neuro: Normal exam for GA. HC 33  Social: no issues    Labs/Imaging/Studies: 11/1 - bili  Plan: Will discuss dacrocystoceles  with ophthalmology - either inpatient drainage or outpatient follow up. If no desaturations consider earliest discharge 11/3. 39.0 wk, Prolonged rupture of 19.5 hours, maternal temp/rule out sepsis, s/p TTN, now with desats requiring stimulation. No events for 24 hours.     DOL 6  Weight (g): 3021 -44 g (-3%)  Ins ml/kg/d: 86ml/kg/day (40 to 60 cc per feed) + BF x 2  Urine: X 8  Stool: x 46    FEN: Feed EHM/SA PO ad nasima q3 hours. Enable breastfeeding.  Respiratory:  having desaturations requiring stimulation. Obtained MRI on 10/31 which showed normal brain but bilateral dacrocystoceles L > R. On left there is protrusion into nasal cavity seen on exam by ENT. Awaiting exam by ophthalmology attending.   CV: No current issues. Continue cardiorespiratory monitoring.  Heme: Bilirubins trending upwards but below phototherapy threshhold. Will continue to trend.  ID: Presumed sepsis - ruled out.  Neuro: Normal exam for GA. HC 33  Social: no issues    Labs/Imaging/Studies: 11/1 - bili  Plan: Will discuss dacrocystoceles  with ophthalmology - either inpatient drainage or outpatient follow up. If no further desaturations consider earliest discharge 11/3. 39.0 wk, Prolonged rupture of 19.5 hours, maternal temp/rule out sepsis, s/p TTN, now with desats requiring stimulation. No events for 24 hours.     DOL 6  Weight (g): 3021 -44 g (-3%)  Ins ml/kg/d: 86ml/kg/day (40 to 60 cc per feed) + BF x 2  Urine: X 8  Stool: x 4    FEN: Feed EHM/SA PO ad nasima q3 hours. Enable breastfeeding.  Respiratory:  having desaturations requiring stimulation. Obtained MRI on 10/31 which showed normal brain but bilateral dacrocystoceles L > R. On left there is protrusion into nasal cavity seen on exam by ENT. Awaiting exam by ophthalmology attending.   CV: No current issues. Continue cardiorespiratory monitoring.  Heme: Bilirubins trending upwards but below phototherapy threshhold. Will continue to trend.  ID: Presumed sepsis - ruled out.  Neuro: Normal exam for GA. HC 33  Social: no issues    Labs/Imaging/Studies: 11/1 - bili  Plan: Will discuss dacrocystoceles  with ophthalmology - either inpatient drainage or outpatient follow up. If no further desaturations consider earliest discharge 11/3.

## 2020-04-03 NOTE — ED TRIAGE NOTES
The patient presents for evaluation of left thoracic back pain x three days. States, \"when I cough, it hurts a lot. \"

## 2020-04-03 NOTE — PROGRESS NOTES
[facility] Progress Note    Date: April 3, 2020    Name: Hosea aMrinelli    MRN: [id number]         : 1970      Ms. Ozuna was assessed and education was provided. Ms. Ozuna's vitals were reviewed and patient was observed for 5 minutes prior to treatment. Visit Vitals  /89 (BP 1 Location: Left arm, BP Patient Position: Sitting)   Pulse 77   Temp 98.7 °F (37.1 °C)   Resp 16   Breastfeeding No       Lab results were obtained and reviewed. Recent Results (from the past 12 hour(s))   CBC WITH 3 PART DIFF    Collection Time: 20  1:11 PM   Result Value Ref Range    WBC 9.2 4.5 - 13.0 K/uL    RBC 4.74 4.10 - 5.10 M/uL    HGB 12.8 12.0 - 16.0 g/dL    HCT 41.0 36 - 48 %    MCV 86.5 78 - 102 FL    MCH 27.0 25.0 - 35.0 PG    MCHC 31.2 31 - 37 g/dL    RDW 16.7 (H) 11.5 - 14.5 %    PLATELET 727 260 - 725 K/uL    NEUTROPHILS 71 (H) 40 - 70 %    MIXED CELLS 7 0.1 - 17 %    LYMPHOCYTES 22 14 - 44 %    ABS. NEUTROPHILS 6.5 1.8 - 9.5 K/UL    ABS. MIXED CELLS 0.6 0.0 - 2.3 K/uL    ABS. LYMPHOCYTES 2.1 1.1 - 5.9 K/UL    DF AUTOMATED       Therapeutic phlebotomy not done for HCT 41.0. Patient armband removed and shredded. Ms. Janet Adames was discharged from [place] [address] in stable condition at 1325.  She is to return on  at 1400 for her next appointment for CBC/Phlebotomy    Yasmani Anthony RN  April 3, 2020

## 2020-04-04 ENCOUNTER — APPOINTMENT (OUTPATIENT)
Dept: GENERAL RADIOLOGY | Age: 50
End: 2020-04-04
Attending: EMERGENCY MEDICINE
Payer: MEDICAID

## 2020-04-04 ENCOUNTER — HOSPITAL ENCOUNTER (EMERGENCY)
Age: 50
Discharge: HOME | End: 2020-04-04
Attending: EMERGENCY MEDICINE
Payer: MEDICAID

## 2020-04-04 ENCOUNTER — HOSPITAL ENCOUNTER (EMERGENCY)
Age: 50
Discharge: STILL ADMITTED OR ADMINISTRATIVE | End: 2020-04-04

## 2020-04-04 VITALS
RESPIRATION RATE: 16 BRPM | HEART RATE: 86 BPM | BODY MASS INDEX: 36.8 KG/M2 | HEIGHT: 62 IN | DIASTOLIC BLOOD PRESSURE: 79 MMHG | SYSTOLIC BLOOD PRESSURE: 144 MMHG | WEIGHT: 200 LBS | OXYGEN SATURATION: 94 % | TEMPERATURE: 98 F

## 2020-04-04 DIAGNOSIS — R10.84 ABDOMINAL PAIN, GENERALIZED: ICD-10-CM

## 2020-04-04 DIAGNOSIS — K59.00 CONSTIPATION, UNSPECIFIED CONSTIPATION TYPE: Primary | ICD-10-CM

## 2020-04-04 LAB
ALBUMIN SERPL-MCNC: 3.8 G/DL (ref 3.4–5)
ALBUMIN/GLOB SERPL: 1 {RATIO} (ref 0.8–1.7)
ALP SERPL-CCNC: 114 U/L (ref 45–117)
ALT SERPL-CCNC: 23 U/L (ref 13–56)
ANION GAP SERPL CALC-SCNC: 4 MMOL/L (ref 3–18)
APPEARANCE UR: ABNORMAL
AST SERPL-CCNC: 15 U/L (ref 10–38)
BACTERIA URNS QL MICRO: ABNORMAL /HPF
BASOPHILS # BLD: 0 K/UL (ref 0–0.1)
BASOPHILS NFR BLD: 0 % (ref 0–2)
BILIRUB SERPL-MCNC: 0.4 MG/DL (ref 0.2–1)
BILIRUB UR QL: NEGATIVE
BUN SERPL-MCNC: 12 MG/DL (ref 7–18)
BUN/CREAT SERPL: 17 (ref 12–20)
CALCIUM SERPL-MCNC: 8.4 MG/DL (ref 8.5–10.1)
CHLORIDE SERPL-SCNC: 104 MMOL/L (ref 100–111)
CO2 SERPL-SCNC: 29 MMOL/L (ref 21–32)
COLOR UR: YELLOW
CREAT SERPL-MCNC: 0.69 MG/DL (ref 0.6–1.3)
DIFFERENTIAL METHOD BLD: ABNORMAL
EOSINOPHIL # BLD: 0.3 K/UL (ref 0–0.4)
EOSINOPHIL NFR BLD: 3 % (ref 0–5)
EPITH CASTS URNS QL MICRO: ABNORMAL /LPF (ref 0–5)
ERYTHROCYTE [DISTWIDTH] IN BLOOD BY AUTOMATED COUNT: 17.2 % (ref 11.6–14.5)
GLOBULIN SER CALC-MCNC: 4 G/DL (ref 2–4)
GLUCOSE SERPL-MCNC: 117 MG/DL (ref 74–99)
GLUCOSE UR STRIP.AUTO-MCNC: NEGATIVE MG/DL
HCT VFR BLD AUTO: 42.8 % (ref 35–45)
HGB BLD-MCNC: 13.6 G/DL (ref 12–16)
HGB UR QL STRIP: NEGATIVE
KETONES UR QL STRIP.AUTO: NEGATIVE MG/DL
LEUKOCYTE ESTERASE UR QL STRIP.AUTO: ABNORMAL
LIPASE SERPL-CCNC: 79 U/L (ref 73–393)
LYMPHOCYTES # BLD: 2.2 K/UL (ref 0.9–3.6)
LYMPHOCYTES NFR BLD: 22 % (ref 21–52)
MCH RBC QN AUTO: 27.3 PG (ref 24–34)
MCHC RBC AUTO-ENTMCNC: 31.8 G/DL (ref 31–37)
MCV RBC AUTO: 85.8 FL (ref 74–97)
MONOCYTES # BLD: 1.2 K/UL (ref 0.05–1.2)
MONOCYTES NFR BLD: 12 % (ref 3–10)
MUCOUS THREADS URNS QL MICRO: ABNORMAL /LPF
NEUTS SEG # BLD: 6.2 K/UL (ref 1.8–8)
NEUTS SEG NFR BLD: 63 % (ref 40–73)
NITRITE UR QL STRIP.AUTO: NEGATIVE
PH UR STRIP: 5.5 [PH] (ref 5–8)
PLATELET # BLD AUTO: 324 K/UL (ref 135–420)
PMV BLD AUTO: 9.4 FL (ref 9.2–11.8)
POTASSIUM SERPL-SCNC: 3.6 MMOL/L (ref 3.5–5.5)
PROT SERPL-MCNC: 7.8 G/DL (ref 6.4–8.2)
PROT UR STRIP-MCNC: ABNORMAL MG/DL
RBC # BLD AUTO: 4.99 M/UL (ref 4.2–5.3)
RBC #/AREA URNS HPF: ABNORMAL /HPF (ref 0–5)
SODIUM SERPL-SCNC: 137 MMOL/L (ref 136–145)
SP GR UR REFRACTOMETRY: 1.03 (ref 1–1.03)
UROBILINOGEN UR QL STRIP.AUTO: 1 EU/DL (ref 0.2–1)
WBC # BLD AUTO: 9.8 K/UL (ref 4.6–13.2)
WBC URNS QL MICRO: ABNORMAL /HPF (ref 0–4)

## 2020-04-04 PROCEDURE — 74011250637 HC RX REV CODE- 250/637: Performed by: EMERGENCY MEDICINE

## 2020-04-04 PROCEDURE — 99283 EMERGENCY DEPT VISIT LOW MDM: CPT

## 2020-04-04 PROCEDURE — 85025 COMPLETE CBC W/AUTO DIFF WBC: CPT

## 2020-04-04 PROCEDURE — 74022 RADEX COMPL AQT ABD SERIES: CPT

## 2020-04-04 PROCEDURE — 96361 HYDRATE IV INFUSION ADD-ON: CPT

## 2020-04-04 PROCEDURE — 81001 URINALYSIS AUTO W/SCOPE: CPT

## 2020-04-04 PROCEDURE — 96360 HYDRATION IV INFUSION INIT: CPT

## 2020-04-04 PROCEDURE — 80053 COMPREHEN METABOLIC PANEL: CPT

## 2020-04-04 PROCEDURE — 83690 ASSAY OF LIPASE: CPT

## 2020-04-04 PROCEDURE — 74011250636 HC RX REV CODE- 250/636: Performed by: EMERGENCY MEDICINE

## 2020-04-04 RX ORDER — GLYCERIN ADULT
1 SUPPOSITORY, RECTAL RECTAL
Qty: 10 SUPPOSITORY | Refills: 0 | Status: SHIPPED | OUTPATIENT
Start: 2020-04-04 | End: 2020-04-04

## 2020-04-04 RX ORDER — SODIUM CHLORIDE 9 MG/ML
100 INJECTION, SOLUTION INTRAVENOUS CONTINUOUS
Status: DISCONTINUED | OUTPATIENT
Start: 2020-04-04 | End: 2020-04-04 | Stop reason: HOSPADM

## 2020-04-04 RX ORDER — GLYCERIN ADULT
1 SUPPOSITORY, RECTAL RECTAL
Status: COMPLETED | OUTPATIENT
Start: 2020-04-04 | End: 2020-04-04

## 2020-04-04 RX ORDER — MAGNESIUM CITRATE
296 SOLUTION, ORAL ORAL
Status: COMPLETED | OUTPATIENT
Start: 2020-04-04 | End: 2020-04-04

## 2020-04-04 RX ADMIN — SODIUM CHLORIDE 100 ML/HR: 900 INJECTION, SOLUTION INTRAVENOUS at 07:54

## 2020-04-04 RX ADMIN — MAGNESIUM CITRATE 296 ML: 1.75 LIQUID ORAL at 09:23

## 2020-04-04 NOTE — ED PROVIDER NOTES
EMERGENCY DEPARTMENT HISTORY AND PHYSICAL EXAM    Date: 4/4/2020  Patient Name: Sathish Manuel    History of Presenting Illness     No chief complaint on file. History Provided By: Patient    Additional History (Context): Sathish Manuel is a 47 y.o. female with obesity, COPD, asthma and depression, bipolar, alcohol, cocaine abuse, CHF, polycythemia who presents with persistent pain in her LUQ of her abd x 2d. Thought it was her chest when seen in ED earlier this week but believes it's settled more into her abd. No bowel movement x 4d. On Miralax and Ex-lax w/o relief. PCP: Citlaly Kwan NP    Current Facility-Administered Medications   Medication Dose Route Frequency Provider Last Rate Last Dose    0.9% sodium chloride infusion  100 mL/hr IntraVENous CONTINUOUS Jessica Castro  mL/hr at 04/04/20 0754 100 mL/hr at 04/04/20 0754    glycerin (adult) suppository 1 Suppository  1 Suppository Rectal NOW Dong JENNIFER Clayton         Current Outpatient Medications   Medication Sig Dispense Refill    glycerin, adult, suppository Insert 1 Suppository into rectum now for 1 dose. 10 Suppository 0    sodium phosphates (FLEET PHOSPHO-SODA) oral solution Take 15 mL by mouth now for 1 dose. 15 mL 1    metaxalone (Skelaxin) 800 mg tablet Take 1 Tab by mouth four (4) times daily for 3 days. 12 Tab 0    oxyCODONE-acetaminophen (Percocet) 5-325 mg per tablet Take 1 Tab by mouth every six (6) hours as needed for Pain for up to 2 days. Max Daily Amount: 4 Tabs. Take 1 tablet every 6 hours as needed for pain control. 6 Tab 0    fluticasone propion-salmeteroL (ADVAIR DISKUS) 100-50 mcg/dose diskus inhaler Take 1 Puff by inhalation every twelve (12) hours.  Indications: bronchospasm prevention with COPD 3 Inhaler 3    albuterol (PROAIR HFA) 90 mcg/actuation inhaler INHALE ONE TO TWO PUFFS BY MOUTH EVERY 4 HOURS AS NEEDED FOR WHEEZING 3 Inhaler 3    amLODIPine (NORVASC) 10 mg tablet Take 1 Tab by mouth daily. 90 Tab 1    nystatin (MYCOSTATIN) topical cream Apply  to affected area two (2) times a day. 15 g 0    losartan-hydroCHLOROthiazide (HYZAAR) 100-12.5 mg per tablet Take 1 Tab by mouth daily. 30 Tab 6    omeprazole (PRILOSEC) 20 mg capsule Take 1 Cap by mouth daily. 30 Cap 3    quetiapine fumarate (SEROQUEL PO) Take 300 Tabs by mouth nightly. 2 tablets      aspirin delayed-release 81 mg tablet Take 1 Tab by mouth daily. 100 Tab 3    atorvastatin (LIPITOR) 40 mg tablet Take 1 Tab by mouth daily. 30 Tab 6    omega-3 acid ethyl esters (LOVAZA) 1 gram capsule Take 2 Caps by mouth two (2) times a day. 360 Cap 3    LORazepam (ATIVAN) 1 mg tablet Take  by mouth every four (4) hours as needed for Anxiety.  lamoTRIgine (LAMICTAL) 100 mg tablet Take 200 mg by mouth daily.          Past History     Past Medical History:  Past Medical History:   Diagnosis Date    Asthma     Bronchitis     CHF (congestive heart failure) ([address]     Cocaine abuse ([address] 3/26/2015    Dental caries     Depression     Depression 8/31/2016    Drug abuse ([address]     Dysphasia 10/17/2018    Elevated hematocrit 8/31/2016    ETOH abuse     H/O screening mammography 12/06/2016    No evidence of malignancy     HPV (human papilloma virus) infection 11/2016    The patient referred to [facility]     Hypertension     Hypertensive left ventricular hypertrophy, without heart failure 10/15/2018    mild to moderate lvh continue bp meds    Ill-defined condition     Marijuana use 10/17/2018    Mood swings     Polycythemia 2018    Polycythemia     Psychiatric disorder     depression, Bipolar    Schatzki's ring 08/30/2018    Found on Barium Swallow    Sliding hiatal hernia 10/17/2018    Tobacco abuse     Vitamin D deficiency        Past Surgical History:  Past Surgical History:   Procedure Laterality Date    HX APPENDECTOMY      HX COLPOSCOPY  12/06/2016    Low-grade courtney/mild dysplasia RICK I    HX HERNIA REPAIR      3x       Family History:  Family History   Problem Relation Age of Onset    Hypertension Mother     Heart Attack Father     Hypertension Father     Cancer Father         skin    Cancer Sister         skin    Bipolar Disorder Sister     Cancer Brother         skin    Cancer Maternal Aunt         colon       Social History:  Social History     Tobacco Use    Smoking status: Current Every Day Smoker     Packs/day: 0.50     Years: 20.00     Pack years: 10.00     Types: Cigarettes    Smokeless tobacco: Never Used   Substance Use Topics    Alcohol use: Yes     Comment: socially    Drug use: Yes     Types: Cocaine, Marijuana     Comment: smokes cocaine-current user       Allergies: Allergies   Allergen Reactions    Iodinated Contrast Media Shortness of Breath         Review of Systems   Review of Systems   Constitutional: Negative for fever. Respiratory: Negative for cough, shortness of breath and wheezing. Cardiovascular: Negative for chest pain. Gastrointestinal: Positive for abdominal pain and constipation. Negative for blood in stool, diarrhea, nausea and vomiting. All Other Systems Negative  Physical Exam     Vitals:    04/04/20 0737 04/04/20 0747   BP: (!) 175/102 144/79   Pulse: 86    Resp: 16    Temp: 98 °F (36.7 °C)    SpO2: 96% 94%   Weight: 90.7 kg (200 lb)    Height: 5' 2\" (1.575 m)      Physical Exam  Vitals signs and nursing note reviewed. Constitutional:       Appearance: She is well-developed. HENT:      Head: Normocephalic and atraumatic. Eyes:      Pupils: Pupils are equal, round, and reactive to light. Neck:      Thyroid: No thyromegaly. Vascular: No JVD. Trachea: No tracheal deviation. Cardiovascular:      Rate and Rhythm: Normal rate and regular rhythm. Heart sounds: Normal heart sounds. No murmur. No friction rub. No gallop. Pulmonary:      Effort: Pulmonary effort is normal. No respiratory distress. Breath sounds: Normal breath sounds. No stridor.  No wheezing or rales. Chest:      Chest wall: No tenderness. Abdominal:      General: There is no distension. Palpations: Abdomen is soft. There is no mass. Tenderness: There is abdominal tenderness. There is no guarding or rebound. Comments: +LUQ TTP, milder LLQ. Musculoskeletal:         General: No tenderness. Lymphadenopathy:      Cervical: No cervical adenopathy. Skin:     General: Skin is warm and dry. Coloration: Skin is not pale. Findings: No erythema or rash. Neurological:      Mental Status: She is alert and oriented to person, place, and time. Psychiatric:         Behavior: Behavior normal.         Thought Content: Thought content normal.            Diagnostic Study Results     Labs -     Recent Results (from the past 12 hour(s))   METABOLIC PANEL, COMPREHENSIVE    Collection Time: 04/04/20  7:48 AM   Result Value Ref Range    Sodium 137 136 - 145 mmol/L    Potassium 3.6 3.5 - 5.5 mmol/L    Chloride 104 100 - 111 mmol/L    CO2 29 21 - 32 mmol/L    Anion gap 4 3.0 - 18 mmol/L    Glucose 117 (H) 74 - 99 mg/dL    BUN 12 7.0 - 18 MG/DL    Creatinine 0.69 0.6 - 1.3 MG/DL    BUN/Creatinine ratio 17 12 - 20      GFR est AA >60 >60 ml/min/1.73m2    GFR est non-AA >60 >60 ml/min/1.73m2    Calcium 8.4 (L) 8.5 - 10.1 MG/DL    Bilirubin, total 0.4 0.2 - 1.0 MG/DL    ALT (SGPT) 23 13 - 56 U/L    AST (SGOT) 15 10 - 38 U/L    Alk.  phosphatase 114 45 - 117 U/L    Protein, total 7.8 6.4 - 8.2 g/dL    Albumin 3.8 3.4 - 5.0 g/dL    Globulin 4.0 2.0 - 4.0 g/dL    A-G Ratio 1.0 0.8 - 1.7     LIPASE    Collection Time: 04/04/20  7:48 AM   Result Value Ref Range    Lipase 79 73 - 393 U/L   CBC WITH AUTOMATED DIFF    Collection Time: 04/04/20  7:48 AM   Result Value Ref Range    WBC 9.8 4.6 - 13.2 K/uL    RBC 4.99 4.20 - 5.30 M/uL    HGB 13.6 12.0 - 16.0 g/dL    HCT 42.8 35.0 - 45.0 %    MCV 85.8 74.0 - 97.0 FL    MCH 27.3 24.0 - 34.0 PG    MCHC 31.8 31.0 - 37.0 g/dL    RDW 17.2 (H) 11.6 - 14.5 % PLATELET 179 403 - 424 K/uL    MPV 9.4 9.2 - 11.8 FL    NEUTROPHILS 63 40 - 73 %    LYMPHOCYTES 22 21 - 52 %    MONOCYTES 12 (H) 3 - 10 %    EOSINOPHILS 3 0 - 5 %    BASOPHILS 0 0 - 2 %    ABS. NEUTROPHILS 6.2 1.8 - 8.0 K/UL    ABS. LYMPHOCYTES 2.2 0.9 - 3.6 K/UL    ABS. MONOCYTES 1.2 0.05 - 1.2 K/UL    ABS. EOSINOPHILS 0.3 0.0 - 0.4 K/UL    ABS. BASOPHILS 0.0 0.0 - 0.1 K/UL    DF AUTOMATED     URINALYSIS W/ RFLX MICROSCOPIC    Collection Time: 04/04/20  8:26 AM   Result Value Ref Range    Color YELLOW      Appearance CLOUDY      Specific gravity 1.027 1.005 - 1.030      pH (UA) 5.5 5.0 - 8.0      Protein TRACE (A) NEG mg/dL    Glucose NEGATIVE  NEG mg/dL    Ketone NEGATIVE  NEG mg/dL    Bilirubin NEGATIVE  NEG      Blood NEGATIVE  NEG      Urobilinogen 1.0 0.2 - 1.0 EU/dL    Nitrites NEGATIVE  NEG      Leukocyte Esterase TRACE (A) NEG     URINE MICROSCOPIC ONLY    Collection Time: 04/04/20  8:26 AM   Result Value Ref Range    WBC 4 to 6 0 - 4 /hpf    RBC NONE 0 - 5 /hpf    Epithelial cells 2+ 0 - 5 /lpf    Bacteria 1+ (A) NEG /hpf    Mucus FEW (A) NEG /lpf       Radiologic Studies -   XR ABD ACUTE W 1 V CHEST    (Results Pending)     CT Results  (Last 48 hours)    None        CXR Results  (Last 48 hours)               04/03/20 0757  XR CHEST PA LAT Final result    Impression:  Impression:       Linear scar or subsegmental atelectasis anterior right midlung. Otherwise no   evidence of acute cardiopulmonary abnormality       Narrative:  EXAM:  XR CHEST PA LAT       INDICATION:   Shortness of breath       COMPARISON: 12/1/2019. FINDINGS:   The cardiac and mediastinal silhouette are within normal limits. Pulmonary   vasculature is within normal limits. Subsegmental atelectasis or scar in the   anterior right midlung. No pneumothorax or pleural effusions. No air space   opacity. No acute osseous abnormality. Mild thoracic spondylosis.                    Medical Decision Making   I am the first provider for this patient. I reviewed the vital signs, available nursing notes, past medical history, past surgical history, family history and social history. Vital Signs-Reviewed the patient's vital signs. Records Reviewed: Nursing Notes, Old Medical Records and Previous Radiology Studies    Procedures:  Procedures    Provider Notes (Medical Decision Making): moderate stool burden; no change in CXR appearance. Labs, VS stable. Treat her constipation with mag citrate and glycerin; home w/Rx for Fleets and glycerin. MED RECONCILIATION:  Current Facility-Administered Medications   Medication Dose Route Frequency    0.9% sodium chloride infusion  100 mL/hr IntraVENous CONTINUOUS    glycerin (adult) suppository 1 Suppository  1 Suppository Rectal NOW     Current Outpatient Medications   Medication Sig    glycerin, adult, suppository Insert 1 Suppository into rectum now for 1 dose.  sodium phosphates (FLEET PHOSPHO-SODA) oral solution Take 15 mL by mouth now for 1 dose.  metaxalone (Skelaxin) 800 mg tablet Take 1 Tab by mouth four (4) times daily for 3 days.  oxyCODONE-acetaminophen (Percocet) 5-325 mg per tablet Take 1 Tab by mouth every six (6) hours as needed for Pain for up to 2 days. Max Daily Amount: 4 Tabs. Take 1 tablet every 6 hours as needed for pain control.  fluticasone propion-salmeteroL (ADVAIR DISKUS) 100-50 mcg/dose diskus inhaler Take 1 Puff by inhalation every twelve (12) hours. Indications: bronchospasm prevention with COPD    albuterol (PROAIR HFA) 90 mcg/actuation inhaler INHALE ONE TO TWO PUFFS BY MOUTH EVERY 4 HOURS AS NEEDED FOR WHEEZING    amLODIPine (NORVASC) 10 mg tablet Take 1 Tab by mouth daily.  nystatin (MYCOSTATIN) topical cream Apply  to affected area two (2) times a day.  losartan-hydroCHLOROthiazide (HYZAAR) 100-12.5 mg per tablet Take 1 Tab by mouth daily.  omeprazole (PRILOSEC) 20 mg capsule Take 1 Cap by mouth daily.     quetiapine fumarate (SEROQUEL PO) Take 300 Tabs by mouth nightly. 2 tablets    aspirin delayed-release 81 mg tablet Take 1 Tab by mouth daily.  atorvastatin (LIPITOR) 40 mg tablet Take 1 Tab by mouth daily.  omega-3 acid ethyl esters (LOVAZA) 1 gram capsule Take 2 Caps by mouth two (2) times a day.  LORazepam (ATIVAN) 1 mg tablet Take  by mouth every four (4) hours as needed for Anxiety.  lamoTRIgine (LAMICTAL) 100 mg tablet Take 200 mg by mouth daily. Disposition:  home    DISCHARGE NOTE:   9:27 AM    Pt has been reexamined. Patient has no new complaints, changes, or physical findings. Care plan outlined and precautions discussed. Results of labs, CXR were reviewed with the patient. All medications were reviewed with the patient; will d/c home with ramez palmerin. All of pt's questions and concerns were addressed. Patient was instructed and agrees to follow up with PCP, as well as to return to the ED upon further deterioration. Patient is ready to go home. Follow-up Information     Follow up With Specialties Details Why Contact Meg Adams NP Nurse Practitioner Schedule an appointment as soon as possible for a visit in 2 days  [address] [place], [place] [address]st[facility] [address]  [phone]      [facility] EMERGENCY DEPT Emergency Medicine  If symptoms worsen return immediately [address] [place] [address] [address] [address]  [phone]          Current Discharge Medication List      START taking these medications    Details   glycerin, adult, suppository Insert 1 Suppository into rectum now for 1 dose. Qty: 10 Suppository, Refills: 0      sodium phosphates (FLEET PHOSPHO-SODA) oral solution Take 15 mL by mouth now for 1 dose. Qty: 15 mL, Refills: 1             Diagnosis     Clinical Impression:   1. Constipation, unspecified constipation type    2.  Abdominal pain, generalized

## 2020-04-04 NOTE — DISCHARGE INSTRUCTIONS
Patient Education        Constipation: Care Instructions  Your Care Instructions    Constipation means that you have a hard time passing stools (bowel movements). People pass stools from 3 times a day to once every 3 days. What is normal for you may be different. Constipation may occur with pain in the rectum and cramping. The pain may get worse when you try to pass stools. Sometimes there are small amounts of bright red blood on toilet paper or the surface of stools. This is because of enlarged veins near the rectum (hemorrhoids). A few changes in your diet and lifestyle may help you avoid ongoing constipation. Your doctor may also prescribe medicine to help loosen your stool. Some medicines can cause constipation. These include pain medicines and antidepressants. Tell your doctor about all the medicines you take. Your doctor may want to make a medicine change to ease your symptoms. Follow-up care is a key part of your treatment and safety. Be sure to make and go to all appointments, and call your doctor if you are having problems. It's also a good idea to know your test results and keep a list of the medicines you take. How can you care for yourself at home? · Drink plenty of fluids, enough so that your urine is light yellow or clear like water. If you have kidney, heart, or liver disease and have to limit fluids, talk with your doctor before you increase the amount of fluids you drink. · Include high-fiber foods in your diet each day. These include fruits, vegetables, beans, and whole grains. · Get at least 30 minutes of exercise on most days of the week. Walking is a good choice. You also may want to do other activities, such as running, swimming, cycling, or playing tennis or team sports. · Take a fiber supplement, such as Citrucel or Metamucil, every day. Read and follow all instructions on the label. · Schedule time each day for a bowel movement. A daily routine may help.  Take your time having your bowel movement. · Support your feet with a small step stool when you sit on the toilet. This helps flex your hips and places your pelvis in a squatting position. · Your doctor may recommend an over-the-counter laxative to relieve your constipation. Examples are Milk of Magnesia and MiraLax. Read and follow all instructions on the label. Do not use laxatives on a long-term basis. When should you call for help? Call your doctor now or seek immediate medical care if:    · You have new or worse belly pain.     · You have new or worse nausea or vomiting.     · You have blood in your stools.    Watch closely for changes in your health, and be sure to contact your doctor if:    · Your constipation is getting worse.     · You do not get better as expected. Where can you learn more? Go to http://los-zack.info/  Enter P343 in the search box to learn more about \"Constipation: Care Instructions. \"  Current as of: June 26, 2019Content Version: 12.4  © 5580-4973 [employer]. Care instructions adapted under license by [employer] (which disclaims liability or warranty for this information). If you have questions about a medical condition or this instruction, always ask your healthcare professional. [address] any warranty or liability for your use of this information.

## 2020-04-08 ENCOUNTER — VIRTUAL VISIT (OUTPATIENT)
Dept: FAMILY MEDICINE CLINIC | Facility: CLINIC | Age: 50
End: 2020-04-08

## 2020-04-10 ENCOUNTER — VIRTUAL VISIT (OUTPATIENT)
Dept: FAMILY MEDICINE CLINIC | Facility: CLINIC | Age: 50
End: 2020-04-10

## 2020-04-10 DIAGNOSIS — Z12.11 COLON CANCER SCREENING: ICD-10-CM

## 2020-04-10 DIAGNOSIS — K21.9 GASTROESOPHAGEAL REFLUX DISEASE, ESOPHAGITIS PRESENCE NOT SPECIFIED: ICD-10-CM

## 2020-04-10 DIAGNOSIS — R19.4 CHANGE IN STOOL HABITS: Primary | ICD-10-CM

## 2020-04-10 RX ORDER — OMEPRAZOLE 20 MG/1
20 CAPSULE, DELAYED RELEASE ORAL DAILY
Qty: 30 CAP | Refills: 3 | Status: SHIPPED | OUTPATIENT
Start: 2020-04-10 | End: 2020-07-31 | Stop reason: SDUPTHER

## 2020-04-10 NOTE — PROGRESS NOTES
Consent: Wang Flood, who was seen by synchronous (real-time) audio-video technology, and/or her healthcare decision maker, is aware that this patient-initiated, Telehealth encounter on 4/10/2020 is a billable service, with coverage as determined by her insurance carrier. She is aware that she may receive a bill and has provided verbal consent to proceed: Yes. Assessment & Plan:   Diagnoses and all orders for this visit:    1. Change in stool habits  -     REFERRAL TO GASTROENTEROLOGY  -     linaCLOtide (Linzess) 145 mcg cap capsule; Take 1 Cap by mouth Daily (before breakfast). 2. Colon cancer screening  -     REFERRAL TO GASTROENTEROLOGY  -     linaCLOtide (Linzess) 145 mcg cap capsule; Take 1 Cap by mouth Daily (before breakfast). 3. Gastroesophageal reflux disease, esophagitis presence not specified  -     omeprazole (PRILOSEC) 20 mg capsule; Take 1 Cap by mouth daily. Referral to gastroenterology for colonoscopy  Prescription for Linzess given  Omeprazole prescription given    Follow-up and Dispositions    · Return in about 4 weeks (around 5/8/2020), or if symptoms worsen or fail to improve. I spent at least 15 minutes with this established patient, and >50% of the time was spent counseling and/or coordinating care regarding medical evaluation, diagnosis, treatment plan and follow-up  712  Subjective:   Wang Flood is a 47 y.o. female who was seen for Abdominal Pain    The patient presents for an Audio-visual teleconference appointment for emergency room follow-up. She was diagnosed with constipation. She has was glycerin suppositories and fleets enema. She had images done and x-ray of the abdomen showed no evidence of bowel obstruction. She continues to complain of left upper quadrant pain. The pain does keep her up at night. She has not been able to have a bowel movement without a laxative.   Note she has had a small bowel movement but not for filling and she continues to feel constipated. GERD-she is asked in 4 refill of her omeprazole    Prior to Admission medications    Medication Sig Start Date End Date Taking? Authorizing Provider   linaCLOtide (Linzess) 145 mcg cap capsule Take 1 Cap by mouth Daily (before breakfast). 4/10/20  Yes Magui Gonzales NP   omeprazole (PRILOSEC) 20 mg capsule Take 1 Cap by mouth daily. 4/10/20  Yes Magui Gonzales NP   fluticasone propion-salmeteroL (ADVAIR DISKUS) 100-50 mcg/dose diskus inhaler Take 1 Puff by inhalation every twelve (12) hours. Indications: bronchospasm prevention with COPD 1/30/20  Yes Magui Gonzales NP   albuterol (PROAIR HFA) 90 mcg/actuation inhaler INHALE ONE TO TWO PUFFS BY MOUTH EVERY 4 HOURS AS NEEDED FOR WHEEZING 1/30/20  Yes Magui Gonzales NP   amLODIPine (NORVASC) 10 mg tablet Take 1 Tab by mouth daily. 1/30/20  Yes Magui Gonzales NP   nystatin (MYCOSTATIN) topical cream Apply  to affected area two (2) times a day. 1/30/20  Yes Magui Gonzales NP   losartan-hydroCHLOROthiazide (HYZAAR) 100-12.5 mg per tablet Take 1 Tab by mouth daily. 1/17/20  Yes Magui Gonzales NP   quetiapine fumarate (SEROQUEL PO) Take 300 Tabs by mouth nightly. 2 tablets   Yes Provider, Historical   aspirin delayed-release 81 mg tablet Take 1 Tab by mouth daily. 9/11/19  Yes Caryle Lint, MD   atorvastatin (LIPITOR) 40 mg tablet Take 1 Tab by mouth daily. 9/11/19  Yes Rosey Oppenheim D, NP   omega-3 acid ethyl esters (LOVAZA) 1 gram capsule Take 2 Caps by mouth two (2) times a day. 3/21/19  Yes Rosey Oppenheim D, NP   LORazepam (ATIVAN) 1 mg tablet Take  by mouth every four (4) hours as needed for Anxiety. Yes Provider, Historical   lamoTRIgine (LAMICTAL) 100 mg tablet Take 200 mg by mouth daily. Yes Provider, Historical   omeprazole (PRILOSEC) 20 mg capsule Take 1 Cap by mouth daily.  12/31/19 4/10/20  Magui Gonzales NP     Allergies   Allergen Reactions    Iodinated Contrast Media Shortness of Breath       Patient Active Problem List   Diagnosis Code    Dyslipidemia E78.5    Cocaine abuse ([facility] F14.10    Vitamin D deficiency E55.9    Manic bipolar I disorder ([facility] F31.10    Depression F32.9    Compliance with medication regimen Z91.89    Elevated hematocrit R71.8    Atypical squamous cells of undetermined significance on cytologic smear of cervix (ASC-US) R87.610    High risk HPV infection B97.7    Hypertension I10    Erythrocytosis D75.1    Secondary polycythemia D75.1    Hypertensive left ventricular hypertrophy, without heart failure I11.9    Severe obesity (HCC) E66.01     Patient Active Problem List    Diagnosis Date Noted    Severe obesity ([facility] 02/27/2019    Hypertensive left ventricular hypertrophy, without heart failure 10/15/2018    Erythrocytosis 05/22/2018    Secondary polycythemia 05/22/2018    Hypertension     Atypical squamous cells of undetermined significance on cytologic smear of cervix (ASC-US) 11/29/2016    High risk HPV infection 11/29/2016    Depression 08/31/2016    Compliance with medication regimen 08/31/2016    Elevated hematocrit 08/31/2016    Manic bipolar I disorder ([facility] 04/21/2015    Dyslipidemia 03/26/2015    Cocaine abuse ([facility] 03/26/2015    Vitamin D deficiency 03/26/2015     Current Outpatient Medications   Medication Sig Dispense Refill    linaCLOtide (Linzess) 145 mcg cap capsule Take 1 Cap by mouth Daily (before breakfast). 30 Cap 0    omeprazole (PRILOSEC) 20 mg capsule Take 1 Cap by mouth daily. 30 Cap 3    fluticasone propion-salmeteroL (ADVAIR DISKUS) 100-50 mcg/dose diskus inhaler Take 1 Puff by inhalation every twelve (12) hours. Indications: bronchospasm prevention with COPD 3 Inhaler 3    albuterol (PROAIR HFA) 90 mcg/actuation inhaler INHALE ONE TO TWO PUFFS BY MOUTH EVERY 4 HOURS AS NEEDED FOR WHEEZING 3 Inhaler 3    amLODIPine (NORVASC) 10 mg tablet Take 1 Tab by mouth daily.  90 Tab 1    nystatin (MYCOSTATIN) topical cream Apply  to affected area two (2) times a day. 15 g 0    losartan-hydroCHLOROthiazide (HYZAAR) 100-12.5 mg per tablet Take 1 Tab by mouth daily. 30 Tab 6    quetiapine fumarate (SEROQUEL PO) Take 300 Tabs by mouth nightly. 2 tablets      aspirin delayed-release 81 mg tablet Take 1 Tab by mouth daily. 100 Tab 3    atorvastatin (LIPITOR) 40 mg tablet Take 1 Tab by mouth daily. 30 Tab 6    omega-3 acid ethyl esters (LOVAZA) 1 gram capsule Take 2 Caps by mouth two (2) times a day. 360 Cap 3    LORazepam (ATIVAN) 1 mg tablet Take  by mouth every four (4) hours as needed for Anxiety.  lamoTRIgine (LAMICTAL) 100 mg tablet Take 200 mg by mouth daily.        Allergies   Allergen Reactions    Iodinated Contrast Media Shortness of Breath     Past Medical History:   Diagnosis Date    Asthma     Bronchitis     [facility]     [facility] 3/26/2015    Dental caries     Depression     Depression 8/31/2016    Drug abuse ([facility]     Dysphasia 10/17/2018    Elevated hematocrit 8/31/2016    ETOH abuse     H/O screening mammography 12/06/2016    No evidence of malignancy     HPV (human papilloma virus) infection 11/2016    The patient referred to [facility]     Hypertension     Hypertensive left ventricular hypertrophy, without heart failure 10/15/2018    mild to moderate lvh continue bp meds    Ill-defined condition     Marijuana use 10/17/2018    Mood swings     Polycythemia 2018    Polycythemia     Psychiatric disorder     depression, Bipolar    Schatzki's ring 08/30/2018    Found on Barium Swallow    Sliding hiatal hernia 10/17/2018    Tobacco abuse     Vitamin D deficiency      Past Surgical History:   Procedure Laterality Date    HX APPENDECTOMY      HX COLPOSCOPY  12/06/2016    Low-grade courtney/mild dysplasia RICK I    HX HERNIA REPAIR      3x       ROS    Constitutional: No apparent distress noted  General- negative for fever, chills or fatigue  Eyes- negative visual changes  CV- denies chest pain, palpitation  Pul: negative cough or SOB  GI: + LUQ pain and constipation  Urinary:- No dysuria or polyuria  MS- negative myalgia, negative joint pain  Neuro- negative headache, dizziness or weakness  Skin- negative for rashes or lesions. Objective:   Vital Signs: (As obtained by patient/caregiver at home)  There were no vitals taken for this visit. [INSTRUCTIONS:  \"[x]\" Indicates a positive item  \"[]\" Indicates a negative item  -- DELETE ALL ITEMS NOT EXAMINED]    Constitutional: [x] Appears well-developed and well-nourished [x] No apparent distress      [] Abnormal -     Mental status: [x] Alert and awake  [x] Oriented to person/place/time [x] Able to follow commands    [] Abnormal -     Eyes:   EOM    [x]  Normal    [] Abnormal -   Sclera  [x]  Normal    [] Abnormal -          Discharge [x]  None visible   [] Abnormal -     HENT: [x] Normocephalic, atraumatic  [] Abnormal -   [x] Mouth/Throat: Mucous membranes are moist    External Ears [x] Normal  [] Abnormal -    Neck: [x] No visualized mass [] Abnormal -     Pulmonary/Chest: [x] Respiratory effort normal   [x] No visualized signs of difficulty breathing or respiratory distress        [] Abnormal -      Musculoskeletal:   [x] Normal gait with no signs of ataxia         [x] Normal range of motion of neck        [] Abnormal -     Neurological:        [x] No Facial Asymmetry (Cranial nerve 7 motor function) (limited exam due to video visit)          [x] No gaze palsy        [] Abnormal -          Skin:        [x] No significant exanthematous lesions or discoloration noted on facial skin         [] Abnormal -            Psychiatric:       [x] Normal Affect [] Abnormal -        [x] No Hallucinations    Other pertinent observable physical exam findings:-        We discussed the expected course, resolution and complications of the diagnosis(es) in detail.   Medication risks, benefits, costs, interactions, and alternatives were discussed as indicated. I advised her to contact the office if her condition worsens, changes or fails to improve as anticipated. She expressed understanding with the diagnosis(es) and plan. Gabi Delong is a 47 y.o. female being evaluated by a video visit encounter for concerns as above. A caregiver was present when appropriate. Due to this being a TeleHealth encounter (During [facility] public health emergency), evaluation of the following organ systems was limited: Vitals/Constitutional/EENT/Resp/CV/GI//MS/Neuro/Skin/Heme-Lymph-Imm. Pursuant to the emergency declaration under the [facility] waiver authority and the [employer] Act, this Virtual  Visit was conducted, with patient's (and/or legal guardian's) consent, to reduce the patient's risk of exposure to COVID-19 and provide necessary medical care. Services were provided through a video synchronous discussion virtually to substitute for in-person clinic visit. Patient and provider were located at their individual homes.         Prema Romero NP

## 2020-04-13 ENCOUNTER — TELEPHONE (OUTPATIENT)
Dept: FAMILY MEDICINE CLINIC | Facility: CLINIC | Age: 50
End: 2020-04-13

## 2020-04-13 DIAGNOSIS — I10 ESSENTIAL HYPERTENSION: ICD-10-CM

## 2020-04-13 DIAGNOSIS — E78.5 DYSLIPIDEMIA: ICD-10-CM

## 2020-04-13 NOTE — TELEPHONE ENCOUNTER
Patient states the prescription given for constipation is not covered by her insurance. Please advise.

## 2020-04-14 DIAGNOSIS — I10 ESSENTIAL HYPERTENSION: ICD-10-CM

## 2020-04-14 DIAGNOSIS — E78.5 DYSLIPIDEMIA: ICD-10-CM

## 2020-04-14 RX ORDER — ATORVASTATIN CALCIUM 40 MG/1
40 TABLET, FILM COATED ORAL DAILY
Qty: 30 TAB | Refills: 2 | Status: SHIPPED | OUTPATIENT
Start: 2020-04-14 | End: 2020-05-14 | Stop reason: SDUPTHER

## 2020-04-14 RX ORDER — ATORVASTATIN CALCIUM 40 MG/1
40 TABLET, FILM COATED ORAL DAILY
Qty: 30 TAB | Refills: 6 | Status: CANCELLED | OUTPATIENT
Start: 2020-04-14
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
## 2020-04-17 ENCOUNTER — TELEPHONE (OUTPATIENT)
Dept: FAMILY MEDICINE CLINIC | Facility: CLINIC | Age: 50
End: 2020-04-17

## 2020-04-17 NOTE — TELEPHONE ENCOUNTER
Prior auth submitted for the patients Linzess and was approved and patient is aware and will notify the pharmacy to have them resubmit. No further action needed.

## 2020-04-22 ENCOUNTER — TELEPHONE (OUTPATIENT)
Dept: FAMILY MEDICINE CLINIC | Facility: CLINIC | Age: 50
End: 2020-04-22

## 2020-04-24 NOTE — TELEPHONE ENCOUNTER
TC was made to pt I left pt a VM to let her know that per NP Peg Ruiz a referral has been done for GI and a CT scan was not ordered. If pt has any questions she can give our office a call back.

## 2020-04-28 DIAGNOSIS — E78.1 HYPERTRIGLYCERIDEMIA: ICD-10-CM

## 2020-04-28 NOTE — TELEPHONE ENCOUNTER
Requested Prescriptions     Pending Prescriptions Disp Refills    omega-3 acid ethyl esters (Lovaza) 1 gram capsule 360 Cap 3     Sig: Take 2 Caps by mouth two (2) times a day.

## 2020-04-29 RX ORDER — OMEGA-3-ACID ETHYL ESTERS 1 G/1
2 CAPSULE, LIQUID FILLED ORAL 2 TIMES DAILY
Qty: 360 CAP | Refills: 3 | Status: SHIPPED | OUTPATIENT
Start: 2020-04-29 | End: 2021-07-13

## 2020-05-06 DIAGNOSIS — I24.9 ACS (ACUTE CORONARY SYNDROME) (HCC): Primary | ICD-10-CM

## 2020-05-06 RX ORDER — ASPIRIN 81 MG/1
81 TABLET ORAL DAILY
Qty: 100 TAB | Refills: 3 | Status: SHIPPED | OUTPATIENT
Start: 2020-05-06 | End: 2020-05-24 | Stop reason: SDUPTHER

## 2020-05-06 NOTE — TELEPHONE ENCOUNTER
Requested Prescriptions     Pending Prescriptions Disp Refills    aspirin delayed-release 81 mg tablet 100 Tab 3     Sig: Take 1 Tab by mouth daily.

## 2020-05-07 ENCOUNTER — PATIENT OUTREACH (OUTPATIENT)
Dept: FAMILY MEDICINE CLINIC | Facility: CLINIC | Age: 50
End: 2020-05-07

## 2020-05-07 NOTE — PROGRESS NOTES
NN health screening:    Noted new referral for CRC screening has been generated to [address]. Will continue to follow.

## 2020-05-14 DIAGNOSIS — E78.5 DYSLIPIDEMIA: ICD-10-CM

## 2020-05-14 DIAGNOSIS — I10 ESSENTIAL HYPERTENSION: ICD-10-CM

## 2020-05-14 RX ORDER — ATORVASTATIN CALCIUM 40 MG/1
40 TABLET, FILM COATED ORAL DAILY
Qty: 30 TAB | Refills: 2 | Status: SHIPPED | OUTPATIENT
Start: 2020-05-14 | End: 2020-09-23

## 2020-05-14 NOTE — TELEPHONE ENCOUNTER
Requested Prescriptions     Pending Prescriptions Disp Refills    atorvastatin (LIPITOR) 40 mg tablet 30 Tab 2     Sig: Take 1 Tab by mouth daily.

## 2020-05-24 DIAGNOSIS — I24.9 ACS (ACUTE CORONARY SYNDROME) (HCC): ICD-10-CM

## 2020-05-26 RX ORDER — ASPIRIN 81 MG/1
81 TABLET ORAL DAILY
Qty: 100 TAB | Refills: 3 | Status: SHIPPED | OUTPATIENT
Start: 2020-05-26 | End: 2022-09-15

## 2020-06-01 ENCOUNTER — HOSPITAL ENCOUNTER (OUTPATIENT)
Dept: INFUSION THERAPY | Age: 50
Discharge: HOME | End: 2020-06-01
Payer: MEDICAID

## 2020-06-01 VITALS
RESPIRATION RATE: 18 BRPM | HEART RATE: 85 BPM | OXYGEN SATURATION: 97 % | TEMPERATURE: 98.5 F | DIASTOLIC BLOOD PRESSURE: 74 MMHG | SYSTOLIC BLOOD PRESSURE: 116 MMHG

## 2020-06-01 LAB
BASO+EOS+MONOS # BLD AUTO: 1 K/UL (ref 0–2.3)
BASO+EOS+MONOS NFR BLD AUTO: 13 % (ref 0.1–17)
DIFFERENTIAL METHOD BLD: ABNORMAL
ERYTHROCYTE [DISTWIDTH] IN BLOOD BY AUTOMATED COUNT: 15.5 % (ref 11.5–14.5)
HCT VFR BLD AUTO: 39.1 % (ref 36–48)
HGB BLD-MCNC: 12.4 G/DL (ref 12–16)
LYMPHOCYTES # BLD: 2.9 K/UL (ref 1.1–5.9)
LYMPHOCYTES NFR BLD: 35 % (ref 14–44)
MCH RBC QN AUTO: 27.1 PG (ref 25–35)
MCHC RBC AUTO-ENTMCNC: 31.7 G/DL (ref 31–37)
MCV RBC AUTO: 85.4 FL (ref 78–102)
NEUTS SEG # BLD: 4.4 K/UL (ref 1.8–9.5)
NEUTS SEG NFR BLD: 52 % (ref 40–70)
PLATELET # BLD AUTO: 271 K/UL (ref 140–440)
RBC # BLD AUTO: 4.58 M/UL (ref 4.1–5.1)
WBC # BLD AUTO: 8.3 K/UL (ref 4.5–13)

## 2020-06-01 PROCEDURE — 36415 COLL VENOUS BLD VENIPUNCTURE: CPT

## 2020-06-01 PROCEDURE — 85025 COMPLETE CBC W/AUTO DIFF WBC: CPT

## 2020-06-01 NOTE — PROGRESS NOTES
[address] Lab Visit:    Cortney Larson  1970  [id number]    1910:  Pt arrived ambulatory. Labs drawn peripherally in right ac and sent for processing. Pt did not meet parameters for Therapeutic Phlebotomy. Departed [facility] ambulatory and in no distress. Visit Vitals  /74 (BP 1 Location: Left arm, BP Patient Position: Sitting)   Pulse 85   Temp 98.5 °F (36.9 °C)   Resp 18   SpO2 97%     Recent Results (from the past 12 hour(s))   CBC WITH 3 PART DIFF    Collection Time: 06/01/20  1:20 PM   Result Value Ref Range    WBC 8.3 4.5 - 13.0 K/uL    RBC 4.58 4.10 - 5.10 M/uL    HGB 12.4 12.0 - 16 g/dL    HCT 39.1 36 - 48 %    MCV 85.4 78 - 102 FL    MCH 27.1 25.0 - 35.0 PG    MCHC 31.7 31 - 37 g/dL    RDW 15.5 (H) 11.5 - 14.5 %    PLATELET 919 261 - 466 K/uL    NEUTROPHILS 52 40 - 70 %    MIXED CELLS 13 0.1 - 17 %    LYMPHOCYTES 35 14 - 44 %    ABS. NEUTROPHILS 4.4 1.8 - 9.5 K/UL    ABS. MIXED CELLS 1.0 0.0 - 2.3 K/uL    ABS.  LYMPHOCYTES 2.9 1.1 - 5.9 K/UL    DF AUTOMATED

## 2020-06-04 DIAGNOSIS — R06.02 SHORTNESS OF BREATH: ICD-10-CM

## 2020-06-04 DIAGNOSIS — Z72.0 TOBACCO ABUSE: ICD-10-CM

## 2020-06-05 RX ORDER — ALBUTEROL SULFATE 90 UG/1
AEROSOL, METERED RESPIRATORY (INHALATION)
Qty: 18 G | Refills: 2 | Status: SHIPPED | OUTPATIENT
Start: 2020-06-05 | End: 2020-07-31 | Stop reason: SDUPTHER

## 2020-06-23 NOTE — PROGRESS NOTES
Visit Vitals  /61   Pulse 77   Temp 97.4 °F (36.3 °C) (Oral)   Resp 14   Ht 5' 2\" (1.575 m)   Wt 206 lb (93.4 kg)   SpO2 98%   BMI 37.68 kg/m²     Karren Buerger presents today for   Chief Complaint   Patient presents with    Rash     follow-up under breast       Is someone accompanying this pt? no    Is the patient using any DME equipment during OV? no    Depression Screening:  3 most recent PHQ Screens 8/16/2017   Little interest or pleasure in doing things Nearly every day   Feeling down, depressed, irritable, or hopeless Nearly every day   Total Score PHQ 2 6   Trouble falling or staying asleep, or sleeping too much Nearly every day   Feeling tired or having little energy Nearly every day   Poor appetite, weight loss, or overeating Several days   Feeling bad about yourself - or that you are a failure or have let yourself or your family down Nearly every day   Trouble concentrating on things such as school, work, reading, or watching TV Nearly every day   Moving or speaking so slowly that other people could have noticed; or the opposite being so fidgety that others notice Not at all   Thoughts of being better off dead, or hurting yourself in some way Not at all   PHQ 9 Score 19       Learning Assessment:  Learning Assessment 10/30/2019   PRIMARY LEARNER Patient   HIGHEST LEVEL OF EDUCATION - PRIMARY LEARNER  SOME COLLEGE   BARRIERS PRIMARY LEARNER [address] CAREGIVER No   PRIMARY LANGUAGE ENGLISH   LEARNER PREFERENCE PRIMARY READING   ANSWERED BY patient   RELATIONSHIP SELF       Abuse Screening:  Abuse Screening Questionnaire 10/30/2019   Do you ever feel afraid of your partner? N   Are you in a relationship with someone who physically or mentally threatens you? N   Is it safe for you to go home? Y       Fall Risk  No flowsheet data found. Health Maintenance reviewed and discussed and ordered per Provider.     Health Maintenance Due   Topic Date Due    Pneumococcal 0-64 years (1 of 1 - PPSV23) 06/22/1975    DTaP/Tdap/Td series (1 - Tdap) 06/22/1980    Shingrix Vaccine Age 50> (1 of 2) 06/22/2019    BREAST CANCER SCRN MAMMOGRAM  06/22/2019    FOBT Q 1 YEAR AGE 50-75  06/22/2019    PAP AKA CERVICAL CYTOLOGY  11/21/2019           Coordination of Care:  1. Have you been to the ER, urgent care clinic since your last visit? Hospitalized since your last visit? no    2. Have you seen or consulted any other health care providers outside of the [address] [place] [address] since your last visit? Include any pap smears or colon screening.  no Discharged

## 2020-06-25 ENCOUNTER — PATIENT OUTREACH (OUTPATIENT)
Dept: FAMILY MEDICINE CLINIC | Facility: CLINIC | Age: 50
End: 2020-06-25

## 2020-06-26 DIAGNOSIS — R19.4 CHANGE IN STOOL HABITS: ICD-10-CM

## 2020-06-26 DIAGNOSIS — Z12.11 COLON CANCER SCREENING: ICD-10-CM

## 2020-06-29 RX ORDER — LINACLOTIDE 145 UG/1
CAPSULE, GELATIN COATED ORAL
Qty: 30 CAP | Refills: 0 | Status: SHIPPED | OUTPATIENT
Start: 2020-06-29 | End: 2020-07-31 | Stop reason: SDUPTHER

## 2020-07-31 ENCOUNTER — HOSPITAL ENCOUNTER (OUTPATIENT)
Dept: LAB | Age: 50
Discharge: HOME | End: 2020-07-31
Payer: MEDICAID

## 2020-07-31 ENCOUNTER — VIRTUAL VISIT (OUTPATIENT)
Dept: FAMILY MEDICINE CLINIC | Facility: CLINIC | Age: 50
End: 2020-07-31

## 2020-07-31 ENCOUNTER — HOSPITAL ENCOUNTER (OUTPATIENT)
Dept: INFUSION THERAPY | Age: 50
Discharge: HOME | End: 2020-07-31
Payer: MEDICAID

## 2020-07-31 ENCOUNTER — CLINICAL SUPPORT (OUTPATIENT)
Dept: FAMILY MEDICINE CLINIC | Facility: CLINIC | Age: 50
End: 2020-07-31

## 2020-07-31 VITALS
OXYGEN SATURATION: 95 % | DIASTOLIC BLOOD PRESSURE: 76 MMHG | RESPIRATION RATE: 18 BRPM | SYSTOLIC BLOOD PRESSURE: 104 MMHG | HEART RATE: 82 BPM | TEMPERATURE: 98 F

## 2020-07-31 DIAGNOSIS — Z20.822 SUSPECTED COVID-19 VIRUS INFECTION: Primary | ICD-10-CM

## 2020-07-31 DIAGNOSIS — I10 ESSENTIAL HYPERTENSION: ICD-10-CM

## 2020-07-31 DIAGNOSIS — K59.00 CONSTIPATION, UNSPECIFIED CONSTIPATION TYPE: ICD-10-CM

## 2020-07-31 DIAGNOSIS — F14.10 COCAINE ABUSE (HCC): ICD-10-CM

## 2020-07-31 DIAGNOSIS — Z20.822 SUSPECTED COVID-19 VIRUS INFECTION: ICD-10-CM

## 2020-07-31 DIAGNOSIS — J02.9 SORE THROAT: ICD-10-CM

## 2020-07-31 DIAGNOSIS — R06.02 SHORTNESS OF BREATH: ICD-10-CM

## 2020-07-31 DIAGNOSIS — R51.9 HEADACHE DISORDER: ICD-10-CM

## 2020-07-31 DIAGNOSIS — K21.9 GASTROESOPHAGEAL REFLUX DISEASE, ESOPHAGITIS PRESENCE NOT SPECIFIED: ICD-10-CM

## 2020-07-31 LAB
BASO+EOS+MONOS # BLD AUTO: 0.9 K/UL (ref 0–2.3)
BASO+EOS+MONOS NFR BLD AUTO: 10 % (ref 0.1–17)
DIFFERENTIAL METHOD BLD: ABNORMAL
ERYTHROCYTE [DISTWIDTH] IN BLOOD BY AUTOMATED COUNT: 15.8 % (ref 11.5–14.5)
HCT VFR BLD AUTO: 42.9 % (ref 36–48)
HGB BLD-MCNC: 13.7 G/DL (ref 12–16)
LYMPHOCYTES # BLD: 3 K/UL (ref 1.1–5.9)
LYMPHOCYTES NFR BLD: 31 % (ref 14–44)
MCH RBC QN AUTO: 27 PG (ref 25–35)
MCHC RBC AUTO-ENTMCNC: 31.9 G/DL (ref 31–37)
MCV RBC AUTO: 84.6 FL (ref 78–102)
NEUTS SEG # BLD: 5.9 K/UL (ref 1.8–9.5)
NEUTS SEG NFR BLD: 60 % (ref 40–70)
PLATELET # BLD AUTO: 308 K/UL (ref 140–440)
RBC # BLD AUTO: 5.07 M/UL (ref 4.1–5.1)
WBC # BLD AUTO: 9.8 K/UL (ref 4.5–13)

## 2020-07-31 PROCEDURE — 36415 COLL VENOUS BLD VENIPUNCTURE: CPT

## 2020-07-31 PROCEDURE — 87635 SARS-COV-2 COVID-19 AMP PRB: CPT

## 2020-07-31 PROCEDURE — 85025 COMPLETE CBC W/AUTO DIFF WBC: CPT

## 2020-07-31 RX ORDER — AMLODIPINE BESYLATE 10 MG/1
10 TABLET ORAL DAILY
Qty: 90 TAB | Refills: 1 | Status: SHIPPED | OUTPATIENT
Start: 2020-07-31 | End: 2020-12-22

## 2020-07-31 RX ORDER — ALBUTEROL SULFATE 90 UG/1
AEROSOL, METERED RESPIRATORY (INHALATION)
Qty: 18 G | Refills: 2 | Status: SHIPPED | OUTPATIENT
Start: 2020-07-31 | End: 2020-09-07

## 2020-07-31 RX ORDER — OMEPRAZOLE 20 MG/1
20 CAPSULE, DELAYED RELEASE ORAL DAILY
Qty: 30 CAP | Refills: 3 | Status: SHIPPED | OUTPATIENT
Start: 2020-07-31 | End: 2020-11-23

## 2020-07-31 RX ORDER — FLUTICASONE PROPIONATE AND SALMETEROL 100; 50 UG/1; UG/1
1 POWDER RESPIRATORY (INHALATION) EVERY 12 HOURS
Qty: 3 INHALER | Refills: 3 | Status: SHIPPED | OUTPATIENT
Start: 2020-07-31 | End: 2021-01-20 | Stop reason: SDUPTHER

## 2020-07-31 NOTE — PROGRESS NOTES
Gosia Shipman is a 45 y.o. female who was seen by synchronous (real-time) audio-video technology on 7/31/2020 for No chief complaint on file. Assessment & Plan:   Diagnoses and all orders for this visit:    1. Suspected COVID-19 virus infection  -     NOVEL CORONAVIRUS (COVID-19); Future    2. Shortness of breath  -     albuterol (PROVENTIL HFA, VENTOLIN HFA, PROAIR HFA) 90 mcg/actuation inhaler; INHALE ONE TO TWO PUFFS BY MOUTH EVERY 4 HOURS AS NEEDED FOR WHEEZING  -     fluticasone propion-salmeteroL (Advair Diskus) 100-50 mcg/dose diskus inhaler; Take 1 Puff by inhalation every twelve (12) hours. Indications: bronchospasm prevention with COPD    3. Essential hypertension  -     amLODIPine (NORVASC) 10 mg tablet; Take 1 Tab by mouth daily. 4. Gastroesophageal reflux disease, esophagitis presence not specified  -     omeprazole (PRILOSEC) 20 mg capsule; Take 1 Cap by mouth daily. 5. Constipation, unspecified constipation type  -     linaCLOtide (Linzess) 145 mcg cap capsule; TAKE ONE CAPSULE BY MOUTH DAILY BEFORE BREAKFAST    6. Sore throat    7. Headache disorder    8. Cocaine abuse ([facility]          Novel coronavirus chest ordered  Medication refills given  Patient given telephone number for GI  Labs needed. Previous labs ordered patient noted she will go get her labs done next week  Subjective: The patient presents for an Audio-visual teleconference appointment for routine follow-up care. Patient has a history of hypertension, GERD, asthma and constipation. She presents today complaining of sore throat, mild cough and headache x 2 days. She is negative for dyspnea or fever. HTN- previous BP controlled. Denies any CP or palpitation. She is compliant with taking her medication daily. Patient continues to abuse cocaine and notes her last drug use was about 2 weeks    Constipation-patient had a virtual visit in April, 2020 she was complaining of constipation and stool habit changes.   Patient was given a referral to gastroenterology and a prescription for Linzess. Per the patient and her constipation has improved with the medication. Patient was also given the telephone number to the gastroenterologist to schedule her appointment. Patient requesting medication refills today. Prior to Admission medications    Medication Sig Start Date End Date Taking? Authorizing Provider   linaCLOtide (Linzess) 145 mcg cap capsule TAKE ONE CAPSULE BY MOUTH DAILY BEFORE BREAKFAST 7/31/20  Yes Theodor Rough, NP   albuterol (PROVENTIL HFA, VENTOLIN HFA, PROAIR HFA) 90 mcg/actuation inhaler INHALE ONE TO TWO PUFFS BY MOUTH EVERY 4 HOURS AS NEEDED FOR WHEEZING 7/31/20  Yes Theodor Rough, NP   fluticasone propion-salmeteroL (Advair Diskus) 100-50 mcg/dose diskus inhaler Take 1 Puff by inhalation every twelve (12) hours. Indications: bronchospasm prevention with COPD 7/31/20  Yes Theodor Rough, NP   amLODIPine (NORVASC) 10 mg tablet Take 1 Tab by mouth daily. 7/31/20  Yes Theodor Rough, NP   omeprazole (PRILOSEC) 20 mg capsule Take 1 Cap by mouth daily. 7/31/20  Yes Theodor Rough, NP   losartan-hydroCHLOROthiazide (HYZAAR) 100-12.5 mg per tablet TAKE ONE TABLET BY MOUTH DAILY 7/26/20   Theodor Rough, NP   Linzess 145 mcg cap capsule TAKE ONE CAPSULE BY MOUTH DAILY BEFORE BREAKFAST 6/29/20 7/31/20  Theodor Rough, NP   albuterol (PROVENTIL HFA, VENTOLIN HFA, PROAIR HFA) 90 mcg/actuation inhaler INHALE ONE TO TWO PUFFS BY MOUTH EVERY 4 HOURS AS NEEDED FOR WHEEZING 6/5/20 7/31/20  Theodor Rough, NP   aspirin delayed-release 81 mg tablet Take 1 Tab by mouth daily. 5/26/20   Koby Diamond NP   atorvastatin (LIPITOR) 40 mg tablet Take 1 Tab by mouth daily. 5/14/20   Theodor Rough, NP   omega-3 acid ethyl esters (Lovaza) 1 gram capsule Take 2 Caps by mouth two (2) times a day.  4/29/20   Theodor Rough, NP   omeprazole (PRILOSEC) 20 mg capsule Take 1 Cap by mouth daily. 4/10/20 7/31/20  Billy Lopez NP   nystatin (MYCOSTATIN) topical cream Apply  to affected area two (2) times a day. 1/30/20   Billy Lopez NP   fluticasone propion-salmeteroL (ADVAIR DISKUS) 100-50 mcg/dose diskus inhaler Take 1 Puff by inhalation every twelve (12) hours. Indications: bronchospasm prevention with COPD 1/30/20 7/31/20  Billy Lopez NP   amLODIPine (NORVASC) 10 mg tablet Take 1 Tab by mouth daily. 1/30/20 7/31/20  Billy Lopez NP   quetiapine fumarate (SEROQUEL PO) Take 300 Tabs by mouth nightly. 2 tablets    Provider, Historical   LORazepam (ATIVAN) 1 mg tablet Take  by mouth every four (4) hours as needed for Anxiety. Provider, Historical   lamoTRIgine (LAMICTAL) 100 mg tablet Take 200 mg by mouth daily.     Provider, Historical     Patient Active Problem List   Diagnosis Code    Dyslipidemia E78.5    [facility] ([facility] F14.10    Vitamin D deficiency E55.9    Manic bipolar I disorder ([facility] F31.10    Depression F32.9    Compliance with medication regimen Z91.89    Elevated hematocrit R71.8    Atypical squamous cells of undetermined significance on cytologic smear of cervix (ASC-US) R87.610    High risk HPV infection B97.7    Hypertension I10    Erythrocytosis D75.1    Secondary polycythemia D75.1    Hypertensive left ventricular hypertrophy, without heart failure I11.9    Severe obesity ([facility] E66.01     Patient Active Problem List    Diagnosis Date Noted    Severe obesity ([facility] 02/27/2019    Hypertensive left ventricular hypertrophy, without heart failure 10/15/2018    Erythrocytosis 05/22/2018    Secondary polycythemia 05/22/2018    Hypertension     Atypical squamous cells of undetermined significance on cytologic smear of cervix (ASC-US) 11/29/2016    High risk HPV infection 11/29/2016    Depression 08/31/2016    Compliance with medication regimen 08/31/2016    Elevated hematocrit 08/31/2016    Manic bipolar I disorder ([facility] 04/21/2015    Dyslipidemia 03/26/2015    Cocaine abuse ([facility] 03/26/2015    Vitamin D deficiency 03/26/2015     Current Outpatient Medications   Medication Sig Dispense Refill    linaCLOtide (Linzess) 145 mcg cap capsule TAKE ONE CAPSULE BY MOUTH DAILY BEFORE BREAKFAST 30 Cap 0    albuterol (PROVENTIL HFA, VENTOLIN HFA, PROAIR HFA) 90 mcg/actuation inhaler INHALE ONE TO TWO PUFFS BY MOUTH EVERY 4 HOURS AS NEEDED FOR WHEEZING 18 g 2    fluticasone propion-salmeteroL (Advair Diskus) 100-50 mcg/dose diskus inhaler Take 1 Puff by inhalation every twelve (12) hours. Indications: bronchospasm prevention with COPD 3 Inhaler 3    amLODIPine (NORVASC) 10 mg tablet Take 1 Tab by mouth daily. 90 Tab 1    omeprazole (PRILOSEC) 20 mg capsule Take 1 Cap by mouth daily. 30 Cap 3    losartan-hydroCHLOROthiazide (HYZAAR) 100-12.5 mg per tablet TAKE ONE TABLET BY MOUTH DAILY 30 Tab 2    aspirin delayed-release 81 mg tablet Take 1 Tab by mouth daily. 100 Tab 3    atorvastatin (LIPITOR) 40 mg tablet Take 1 Tab by mouth daily. 30 Tab 2    omega-3 acid ethyl esters (Lovaza) 1 gram capsule Take 2 Caps by mouth two (2) times a day. 360 Cap 3    nystatin (MYCOSTATIN) topical cream Apply  to affected area two (2) times a day. 15 g 0    quetiapine fumarate (SEROQUEL PO) Take 300 Tabs by mouth nightly. 2 tablets      LORazepam (ATIVAN) 1 mg tablet Take  by mouth every four (4) hours as needed for Anxiety.  lamoTRIgine (LAMICTAL) 100 mg tablet Take 200 mg by mouth daily.        Allergies   Allergen Reactions    Iodinated Contrast Media Shortness of Breath     Past Medical History:   Diagnosis Date    Asthma     Bronchitis     [facility]est[facility]     Cocaine abuse ([facility] 3/26/2015    Dental caries     Depression     Depression 8/31/2016    Drug abuse ([facility]     Dysphasia 10/17/2018    Elevated hematocrit 8/31/2016    ETOH abuse     H/O screening mammography 12/06/2016    No evidence of malignancy     HPV (human papilloma virus) infection 11/2016    The patient referred to [facility]     Hypertension     Hypertensive left ventricular hypertrophy, without heart failure 10/15/2018    mild to moderate lvh continue bp meds    Ill-defined condition     Marijuana use 10/17/2018    Mood swings     Polycythemia 2018    Polycythemia     Psychiatric disorder     depression, Bipolar    Schatzki's ring 08/30/2018    Found on Barium Swallow    Sliding hiatal hernia 10/17/2018    Tobacco abuse     Vitamin D deficiency        ROS    Constitutional: No apparent distress noted  General- negative for fever, chills or fatigue  Eyes- negative visual changes  Throat- sore throat  CV- denies chest pain, palpitation  Pul: negative cough or SOB  GI: negative nausea, flank pain, diarrhea, constipation  Urinary:- No dysuria or polyuria  MS- negative myalgia, negative joint pain  Neuro-mild headache  Skin- negative for rashes or lesions. Psych- denies any anxiety or depression    Objective:   No flowsheet data found.      [INSTRUCTIONS:  \"[x]\" Indicates a positive item  \"[]\" Indicates a negative item  -- DELETE ALL ITEMS NOT EXAMINED]    Constitutional: [x] Appears well-developed and well-nourished [x] No apparent distress      [] Abnormal -     Mental status: [x] Alert and awake  [x] Oriented to person/place/time [x] Able to follow commands    [] Abnormal -     Eyes:   EOM    [x]  Normal    [] Abnormal -   Sclera  [x]  Normal    [] Abnormal -          Discharge [x]  None visible   [] Abnormal -     HENT: [x] Normocephalic, atraumatic  [] Abnormal -   [x] Mouth/Throat: Mucous membranes are moist    External Ears [x] Normal  [] Abnormal -    Neck: [x] No visualized mass [] Abnormal -     Pulmonary/Chest: [x] Respiratory effort normal   [x] No visualized signs of difficulty breathing or respiratory distress        [] Abnormal -      Musculoskeletal:   [x] Normal gait with no signs of ataxia         [x] Normal range of motion of neck        [] Abnormal -     Neurological:        [x] No Facial Asymmetry (Cranial nerve 7 motor function) (limited exam due to video visit)          [x] No gaze palsy        [] Abnormal -          Skin:        [x] No significant exanthematous lesions or discoloration noted on facial skin         [] Abnormal -            Psychiatric:       [x] Normal Affect [] Abnormal -        [x] No Hallucinations    Other pertinent observable physical exam findings:-        We discussed the expected course, resolution and complications of the diagnosis(es) in detail. Medication risks, benefits, costs, interactions, and alternatives were discussed as indicated. I advised her to contact the office if her condition worsens, changes or fails to improve as anticipated. She expressed understanding with the diagnosis(es) and plan. Nate Tai, who was evaluated through a patient-initiated, synchronous (real-time) audio-video encounter, and/or her healthcare decision maker, is aware that it is a billable service, with coverage as determined by her insurance carrier. She provided verbal consent to proceed: Yes, and patient identification was verified. It was conducted pursuant to the emergency declaration under the [facility], [address] [place] [address] authority and the [employer] Act. A caregiver was present when appropriate. Ability to conduct physical exam was limited. I was at home. The patient was at home.       Christiane Ames NP

## 2020-07-31 NOTE — PROGRESS NOTES
[address] Lab Visit:    Je Lainez  1970  [id number]    9119:  Pt arrived ambulatory. Labs drawn peripherally in left ac and sent for processing. Pt did not meet parameters for a Therapeutic Phlebotomy today. Pt is scheduled to return in one month. Departed [facility] ambulatory and in no distress. Visit Vitals  /76 (BP 1 Location: Right arm, BP Patient Position: Sitting)   Pulse 82   Temp 98 °F (36.7 °C)   Resp 18   SpO2 95%     Recent Results (from the past 24 hour(s))   CBC WITH 3 PART DIFF    Collection Time: 07/31/20 11:20 AM   Result Value Ref Range    WBC 9.8 4.5 - 13.0 K/uL    RBC 5.07 4. 10 - 5.10 M/uL    HGB 13.7 12.0 - 16 g/dL    HCT 42.9 36 - 48 %    MCV 84.6 78 - 102 FL    MCH 27.0 25.0 - 35.0 PG    MCHC 31.9 31 - 37 g/dL    RDW 15.8 (H) 11.5 - 14.5 %    PLATELET 590 315 - 695 K/uL    NEUTROPHILS 60 40 - 70 %    MIXED CELLS 10 0.1 - 17 %    LYMPHOCYTES 31 14 - 44 %    ABS. NEUTROPHILS 5.9 1.8 - 9.5 K/UL    ABS. MIXED CELLS 0.9 0.0 - 2.3 K/uL    ABS.  LYMPHOCYTES 3.0 1.1 - 5.9 K/UL    DF AUTOMATED

## 2020-08-02 LAB — SARS-COV-2, COV2NT: NOT DETECTED

## 2020-08-18 ENCOUNTER — TELEPHONE (OUTPATIENT)
Dept: CARDIOLOGY CLINIC | Age: 50
End: 2020-08-18

## 2020-08-18 NOTE — TELEPHONE ENCOUNTER
Called and spoke with patient. Let her know per Dr. Leticia Gilmore based on her last evaluation on 3-2020 she is cleared for dental extraction with low cardiac risk. Patient verbalized understanding and stated dentist will call us if needed.

## 2020-08-18 NOTE — TELEPHONE ENCOUNTER
Patient called and states that she is scheduled to have dental extractions, can we clear from last visit?  Please advise

## 2020-08-18 NOTE — TELEPHONE ENCOUNTER
Based on last evaluation in 3/2020 cardiac status was stable and she is cleared for dental extraction.   Low cardiac risk

## 2020-09-04 DIAGNOSIS — R06.02 SHORTNESS OF BREATH: ICD-10-CM

## 2020-09-07 RX ORDER — ALBUTEROL SULFATE 90 UG/1
AEROSOL, METERED RESPIRATORY (INHALATION)
Qty: 18 G | Refills: 1 | Status: SHIPPED | OUTPATIENT
Start: 2020-09-07 | End: 2020-12-16

## 2020-09-14 DIAGNOSIS — K59.00 CONSTIPATION, UNSPECIFIED CONSTIPATION TYPE: ICD-10-CM

## 2020-09-15 ENCOUNTER — HOSPITAL ENCOUNTER (OUTPATIENT)
Dept: LAB | Age: 50
Discharge: HOME | End: 2020-09-15
Payer: COMMERCIAL

## 2020-09-15 DIAGNOSIS — E55.9 VITAMIN D DEFICIENCY: ICD-10-CM

## 2020-09-15 DIAGNOSIS — Z13.29 SCREENING FOR THYROID DISORDER: ICD-10-CM

## 2020-09-15 DIAGNOSIS — I10 ESSENTIAL HYPERTENSION: ICD-10-CM

## 2020-09-15 DIAGNOSIS — E78.5 DYSLIPIDEMIA: ICD-10-CM

## 2020-09-15 LAB
25(OH)D3 SERPL-MCNC: 14.4 NG/ML (ref 30–100)
ALBUMIN SERPL-MCNC: 3.4 G/DL (ref 3.4–5)
ALBUMIN/GLOB SERPL: 1.2 {RATIO} (ref 0.8–1.7)
ALP SERPL-CCNC: 83 U/L (ref 45–117)
ALT SERPL-CCNC: 24 U/L (ref 13–56)
ANION GAP SERPL CALC-SCNC: 5 MMOL/L (ref 3–18)
AST SERPL-CCNC: 18 U/L (ref 10–38)
BASOPHILS # BLD: 0 K/UL (ref 0–0.1)
BASOPHILS NFR BLD: 0 % (ref 0–2)
BILIRUB SERPL-MCNC: 0.2 MG/DL (ref 0.2–1)
BUN SERPL-MCNC: 16 MG/DL (ref 7–18)
BUN/CREAT SERPL: 29 (ref 12–20)
CALCIUM SERPL-MCNC: 8.4 MG/DL (ref 8.5–10.1)
CHLORIDE SERPL-SCNC: 108 MMOL/L (ref 100–111)
CHOLEST SERPL-MCNC: 148 MG/DL
CO2 SERPL-SCNC: 29 MMOL/L (ref 21–32)
CREAT SERPL-MCNC: 0.56 MG/DL (ref 0.6–1.3)
DIFFERENTIAL METHOD BLD: ABNORMAL
EOSINOPHIL # BLD: 0.2 K/UL (ref 0–0.4)
EOSINOPHIL NFR BLD: 3 % (ref 0–5)
ERYTHROCYTE [DISTWIDTH] IN BLOOD BY AUTOMATED COUNT: 16 % (ref 11.6–14.5)
GLOBULIN SER CALC-MCNC: 2.9 G/DL (ref 2–4)
GLUCOSE SERPL-MCNC: 108 MG/DL (ref 74–99)
HCT VFR BLD AUTO: 43.1 % (ref 35–45)
HDLC SERPL-MCNC: 39 MG/DL (ref 40–60)
HDLC SERPL: 3.8 {RATIO} (ref 0–5)
HGB BLD-MCNC: 13.8 G/DL (ref 12–16)
LDLC SERPL CALC-MCNC: 61.8 MG/DL (ref 0–100)
LIPID PROFILE,FLP: ABNORMAL
LYMPHOCYTES # BLD: 2.1 K/UL (ref 0.9–3.6)
LYMPHOCYTES NFR BLD: 28 % (ref 21–52)
MCH RBC QN AUTO: 27.4 PG (ref 24–34)
MCHC RBC AUTO-ENTMCNC: 32 G/DL (ref 31–37)
MCV RBC AUTO: 85.5 FL (ref 74–97)
MONOCYTES # BLD: 0.7 K/UL (ref 0.05–1.2)
MONOCYTES NFR BLD: 8 % (ref 3–10)
NEUTS SEG # BLD: 4.7 K/UL (ref 1.8–8)
NEUTS SEG NFR BLD: 61 % (ref 40–73)
PLATELET # BLD AUTO: 262 K/UL (ref 135–420)
PMV BLD AUTO: 9.6 FL (ref 9.2–11.8)
POTASSIUM SERPL-SCNC: 4.1 MMOL/L (ref 3.5–5.5)
PROT SERPL-MCNC: 6.3 G/DL (ref 6.4–8.2)
RBC # BLD AUTO: 5.04 M/UL (ref 4.2–5.3)
SODIUM SERPL-SCNC: 142 MMOL/L (ref 136–145)
TRIGL SERPL-MCNC: 236 MG/DL (ref ?–150)
TSH SERPL DL<=0.05 MIU/L-ACNC: 0.49 UIU/ML (ref 0.36–3.74)
VLDLC SERPL CALC-MCNC: 47.2 MG/DL
WBC # BLD AUTO: 7.7 K/UL (ref 4.6–13.2)

## 2020-09-15 PROCEDURE — 80053 COMPREHEN METABOLIC PANEL: CPT

## 2020-09-15 PROCEDURE — 84443 ASSAY THYROID STIM HORMONE: CPT

## 2020-09-15 PROCEDURE — 80061 LIPID PANEL: CPT

## 2020-09-15 PROCEDURE — 36415 COLL VENOUS BLD VENIPUNCTURE: CPT

## 2020-09-15 PROCEDURE — 82306 VITAMIN D 25 HYDROXY: CPT

## 2020-09-15 PROCEDURE — 85025 COMPLETE CBC W/AUTO DIFF WBC: CPT

## 2020-09-16 ENCOUNTER — OFFICE VISIT (OUTPATIENT)
Dept: CARDIOLOGY CLINIC | Age: 50
End: 2020-09-16

## 2020-09-16 VITALS
SYSTOLIC BLOOD PRESSURE: 124 MMHG | TEMPERATURE: 97.6 F | WEIGHT: 216.8 LBS | BODY MASS INDEX: 39.9 KG/M2 | HEIGHT: 62 IN | HEART RATE: 79 BPM | OXYGEN SATURATION: 96 % | DIASTOLIC BLOOD PRESSURE: 60 MMHG

## 2020-09-16 DIAGNOSIS — F14.10 COCAINE ABUSE (HCC): ICD-10-CM

## 2020-09-16 DIAGNOSIS — E78.5 DYSLIPIDEMIA: ICD-10-CM

## 2020-09-16 DIAGNOSIS — R60.0 LEG EDEMA, RIGHT: ICD-10-CM

## 2020-09-16 DIAGNOSIS — R07.9 CHEST PAIN, UNSPECIFIED TYPE: Primary | ICD-10-CM

## 2020-09-16 DIAGNOSIS — I51.7 LVH (LEFT VENTRICULAR HYPERTROPHY): ICD-10-CM

## 2020-09-16 DIAGNOSIS — I11.9 HYPERTENSIVE LEFT VENTRICULAR HYPERTROPHY, WITHOUT HEART FAILURE: ICD-10-CM

## 2020-09-16 NOTE — PROGRESS NOTES
HISTORY OF PRESENT ILLNESS  Myra Gar is a 45 y.o. female. Recent er visit with atypical non anginal pain. Patient was in emergency room yesterday with atypical chest pain. 1/2019. ER notes and data reviewed  3/2020  Patient is seen in office for follow-up. She was in ER last 12/2019. Records reviewed    Hypertension   The history is provided by the patient. This is a chronic problem. The problem occurs constantly. The problem has not changed since onset. Associated symptoms include chest pain and shortness of breath. Chest Pain (Angina)    The history is provided by the patient. This is a recurrent problem. The problem has not changed since onset. The problem occurs every several days. The pain is associated with exertion and rest. The pain is present in the substernal region. The pain is mild. The quality of the pain is described as dull. Associated symptoms include shortness of breath. Pertinent negatives include no claudication, no cough, no dizziness, no fever, no hemoptysis, no nausea, no orthopnea, no palpitations, no PND, no sputum production, no vomiting and no weakness. Palpitations    The history is provided by the patient. This is a recurrent problem. The problem has not changed since onset. The problem occurs every several days. The problem is associated with nothing. Associated symptoms include chest pain and shortness of breath. Pertinent negatives include no fever, no claudication, no orthopnea, no PND, no nausea, no vomiting, no dizziness, no weakness, no cough, no hemoptysis and no sputum production. Her past medical history is significant for hypertension. Review of Systems   Constitutional: Negative for chills and fever. HENT: Negative for nosebleeds. Eyes: Negative for blurred vision and double vision. Respiratory: Positive for shortness of breath. Negative for cough, hemoptysis, sputum production and wheezing. Cardiovascular: Positive for chest pain.  Negative for palpitations, orthopnea, claudication, leg swelling and PND. Gastrointestinal: Negative for heartburn, nausea and vomiting. Musculoskeletal: Negative for myalgias. Skin: Negative for rash. Neurological: Negative for dizziness and weakness. Endo/Heme/Allergies: Does not bruise/bleed easily.      Family History   Problem Relation Age of Onset    Hypertension Mother     Heart Attack Father     Hypertension Father     Cancer Father         skin    Cancer Sister         skin    Bipolar Disorder Sister     Cancer Brother         skin    Cancer Maternal Aunt         colon       Past Medical History:   Diagnosis Date    Asthma     Bronchitis     [facility] [address] [facility]     Cocaine abuse ([facility] [address] 3/26/2015    Dental caries     Depression     Depression 8/31/2016    Drug abuse ([facility] [address] [facility]     Dysphasia 10/17/2018    Elevated hematocrit 8/31/2016    ETOH abuse     H/O screening mammography 12/06/2016    No evidence of malignancy     HPV (human papilloma virus) infection 11/2016    The patient referred to [facility]     Hypertension     Hypertensive left ventricular hypertrophy, without heart failure 10/15/2018    mild to moderate lvh continue bp meds    Ill-defined condition     Marijuana use 10/17/2018    Mood swings     Polycythemia 2018    Polycythemia     Psychiatric disorder     depression, Bipolar    Schatzki's ring 08/30/2018    Found on Barium Swallow    Sliding hiatal hernia 10/17/2018    Tobacco abuse     Vitamin D deficiency        Past Surgical History:   Procedure Laterality Date    HX APPENDECTOMY      HX COLPOSCOPY  12/06/2016    Low-grade courtney/mild dysplasia RICK I    HX HERNIA REPAIR      3x       Social History     Tobacco Use    Smoking status: Current Every Day Smoker     Packs/day: 0.50     Years: 20.00     Pack years: 10.00     Types: Cigarettes    Smokeless tobacco: Never Used   Substance Use Topics    Alcohol use: Yes     Comment: socially       Allergies Allergen Reactions    Iodinated Contrast Media Shortness of Breath       Prior to Admission medications    Medication Sig Start Date End Date Taking? Authorizing Provider   albuterol (PROVENTIL HFA, VENTOLIN HFA, PROAIR HFA) 90 mcg/actuation inhaler INHALE ONE TO TWO PUFFS BY MOUTH EVERY 4 HOURS AS NEEDED FOR WHEEZING 9/7/20  Yes Cas Hawthorne NP   linaCLOtide (Linzess) 145 mcg cap capsule TAKE ONE CAPSULE BY MOUTH DAILY BEFORE BREAKFAST 7/31/20  Yes Cas Hawthorne NP   fluticasone propion-salmeteroL (Advair Diskus) 100-50 mcg/dose diskus inhaler Take 1 Puff by inhalation every twelve (12) hours. Indications: bronchospasm prevention with COPD 7/31/20  Yes Cas Hawthorne NP   amLODIPine (NORVASC) 10 mg tablet Take 1 Tab by mouth daily. 7/31/20  Yes Cas Hawthorne NP   omeprazole (PRILOSEC) 20 mg capsule Take 1 Cap by mouth daily. 7/31/20  Yes Cas Hawthorne NP   losartan-hydroCHLOROthiazide (HYZAAR) 100-12.5 mg per tablet TAKE ONE TABLET BY MOUTH DAILY 7/26/20  Yes Cas Hawthorne NP   aspirin delayed-release 81 mg tablet Take 1 Tab by mouth daily. 5/26/20  Yes Koby Diamond NP   atorvastatin (LIPITOR) 40 mg tablet Take 1 Tab by mouth daily. 5/14/20  Yes Cas Hawthorne NP   omega-3 acid ethyl esters (Lovaza) 1 gram capsule Take 2 Caps by mouth two (2) times a day. 4/29/20  Yes Cas Hawthorne NP   nystatin (MYCOSTATIN) topical cream Apply  to affected area two (2) times a day. 1/30/20  Yes Cas Hawthorne NP   quetiapine fumarate (SEROQUEL PO) Take 300 Tabs by mouth nightly. 2 tablets   Yes Provider, Historical   LORazepam (ATIVAN) 1 mg tablet Take  by mouth every four (4) hours as needed for Anxiety. Yes Provider, Historical   lamoTRIgine (LAMICTAL) 100 mg tablet Take 200 mg by mouth daily.    Yes Provider, Historical         Visit Vitals  /60 (BP 1 Location: Left arm, BP Patient Position: Sitting)   Pulse 79   Temp 97.6 °F (36.4 °C) (Temporal)   Ht 5' 2\" (1.575 m)   Wt 98.3 kg (216 lb 12.8 oz)   SpO2 96%   BMI 39.65 kg/m²         Physical Exam   Constitutional: She is oriented to person, place, and time. She appears well-developed and well-nourished. HENT:   Head: Normocephalic and atraumatic. Eyes: Conjunctivae are normal.   Neck: Neck supple. No JVD present. No tracheal deviation present. No thyromegaly present. Cardiovascular: Normal rate and regular rhythm. PMI is not displaced. Exam reveals no gallop, no S3 and no decreased pulses. Murmur heard. Early systolic murmur is present at the upper right sternal border and lower left sternal border. Pulmonary/Chest: No respiratory distress. She has no wheezes. She has no rales. She exhibits no tenderness. Abdominal: Soft. There is no abdominal tenderness. Musculoskeletal:         General: No edema. Neurological: She is alert and oriented to person, place, and time. Skin: Skin is warm. Psychiatric: She has a normal mood and affect. Ms. Jono Richardson has a reminder for a \"due or due soon\" health maintenance. I have asked that she contact her primary care provider for follow-up on this health maintenance. I have personally reviewed patients ekg done at other facility. SUMMARY:2017  Left ventricle: Systolic function was normal. Ejection fraction was  estimated in the range of 55 % to 60 %. No obvious wall motion  abnormalities identified in the views obtained. Wall thickness was mildly  to moderately increased. There was mild concentric hypertrophy. NUCLEAR [id number]    Findings:   1. Post-stress imaging in short axis, horizontal and vertical long axis views reveals normal isotope uptake in all areas. 2. Resting images also show normal isotope uptake in all areas. 3. Gated images show normal left ventricular size, wall motion and systolic function. Ejection fraction is 71%. Diagnosis:   1. Normal scan.    2. No evidence of significant fixed or reversible defect suggesting ischemia or myocardial infarction noted from this nuclear study. 3. Low risk scan. Interpretation Summary        · Baseline ECG: Normal sinus rhythm, Minimal voltage criteria for LVH , maybe normal variant Septal infarct, age undetermined. · Inconclusive stress electrocardiogram.  · Low risk study. Submaximal stress test.  If clinically indicated, recommend nuclear stress test.      Stress Findings     ECG Baseline ECG: Normal sinus rhythm, Minimal voltage criteria for LVH , maybe normal variant  Septal infarct, age undetermined. Arrhythmias during stress: rare PACs. There was no ST segment deviation noted during stress. Arrhythmias during recovery: rare PACs. Arrhythmias were not significant. Stress Findings A stress test was performed following a modified Sathish protocol. The test was stopped because the patient complained of shortness of breath. The patient reported no angina during stress. Leg pain   The patient's response to exercise was inadequate for diagnosis. Blood pressure demonstrated a normal response to exercise. Overall, the patient's exercise capacity was normal.   Angina Index is 0. Inconclusive stress electrocardiogram for inducible myocardial ischemia. Patient unable to reach target heart rate   Stress Measurements     Baseline Vitals   Baseline HR 67 BPM      Baseline BP 80/57 mmHg       Peak Stress Vitals   Post peak  BPM      Stress Base Systolic  mmHg      Stress Base Diastolic BP 70 mmHg      Estimated workload 7 METS      Stress Rate Pressure Product 12,420 BPM*mmHg       Exercise Data   Target  bpm      Percent HR 79 %      Exercise duration time               Assessment         ICD-10-CM ICD-9-CM    1. Chest pain, unspecified type  R07.9 786.50     Stable continue current medical management   2. Hypertensive left ventricular hypertrophy, without heart failure  I11.9 402.90     Will continue medication   3.  LVH (left ventricular hypertrophy)  I51.7 429.3     Stable monitor   4. Dyslipidemia  E78.5 272.4     Continue treatment LDL controlled high triglyceride low-carb diet and weight loss   5. Cocaine abuse ([facility] [address]  F14.10 305.60     Working with sedation   6. Leg edema, right  R60.0 782.3 DUPLEX LOWER EXT VENOUS BILAT    Leg edema and pain. Rule out DVT   2/2019  Continue medical management insurance had refused nuclear stress test and echocardiogram exam stress echo EKG part inconclusive exercise to 79% of maximum predicted heart rate at this level of stress no EKG changes and no echo abnormality. Continue to monitor clinically and continue with medication including amlodipine aspirin and statin. Discussed cessation of cocaine use    3/2019  ACS with continued substernal chest pain with radiation to left arm and shoulder with SOB. She was unable to have NST and stress echo due to insurance denial.  Continue aspirin, statin and norvasc. Not on BB due to ongoing cocaine use (last used 2 weeks ago). Will proceed with cardiac cath due to ongoing symptoms and risk factors. Pre-medicate with prednisone and benadryl to begin 2 days prior to cardiac cath. Pre-procedure labs and chest xray ordered. Discussed cessation of tobacco and cocaine use  9/2019  Noncompliant with follow-up. Continues to use cocaine. Did not keep her cath appointments. Will try and continue medical management. Advised back on aspirin. Discussed smoking cessation and cocaine cessation  9/2020  Cardiac status stable continue to work with cocaine cessation. As leg swelling and pain will check venous Doppler. There are no discontinued medications. No orders of the defined types were placed in this encounter. Follow-up and Dispositions    · Return for F/u after tests.

## 2020-09-16 NOTE — PROGRESS NOTES
1. Have you been to the ER, urgent care clinic since your last visit? Hospitalized since your last visit? Yes When: 04/04/20 Where: [facility] Reason for visit: Abd Pain    2. Have you seen or consulted any other health care providers outside of the [address] [place] [address] since your last visit? Include any pap smears or colon screening.  No

## 2020-09-18 ENCOUNTER — HOSPITAL ENCOUNTER (OUTPATIENT)
Dept: VASCULAR SURGERY | Age: 50
Discharge: HOME | End: 2020-09-18
Attending: INTERNAL MEDICINE
Payer: COMMERCIAL

## 2020-09-18 ENCOUNTER — APPOINTMENT (OUTPATIENT)
Dept: INFUSION THERAPY | Age: 50
End: 2020-09-18

## 2020-09-18 ENCOUNTER — TELEPHONE (OUTPATIENT)
Dept: CARDIOLOGY CLINIC | Age: 50
End: 2020-09-18

## 2020-09-18 DIAGNOSIS — R60.0 LEG EDEMA, RIGHT: ICD-10-CM

## 2020-09-18 DIAGNOSIS — E55.9 VITAMIN D DEFICIENCY: Primary | ICD-10-CM

## 2020-09-18 PROCEDURE — 93970 EXTREMITY STUDY: CPT

## 2020-09-18 RX ORDER — ERGOCALCIFEROL 1.25 MG/1
50000 CAPSULE ORAL
Qty: 12 CAP | Refills: 1 | Status: SHIPPED | OUTPATIENT
Start: 2020-09-18 | End: 2021-01-31

## 2020-09-18 NOTE — PROGRESS NOTES
Please notify patient that lab results were reviewed her vitamin D level is low. Vitamin D prescription was called to her pharmacy. Please verify the patient is taking her cholesterol medicine. Her triglycerides are elevated at 236 and she has low good cholesterol levels.

## 2020-09-18 NOTE — TELEPHONE ENCOUNTER
----- Message from Leopold Bile, MD sent at 9/18/2020  9:55 AM EDT -----  Lab/test  reviewed  No significant abnormality

## 2020-09-18 NOTE — TELEPHONE ENCOUNTER
Called ans spoke to patient regarding Duplex results per Dr. Luisa Leon, lab reviewed no significant abnormality.  She voices understanding and acceptance of this advice and will call back if any further questions or concerns.'

## 2020-09-21 ENCOUNTER — TELEPHONE (OUTPATIENT)
Dept: CARDIOLOGY CLINIC | Age: 50
End: 2020-09-21

## 2020-09-21 ENCOUNTER — TELEPHONE (OUTPATIENT)
Dept: FAMILY MEDICINE CLINIC | Age: 50
End: 2020-09-21

## 2020-09-21 NOTE — TELEPHONE ENCOUNTER
Her LDL is controlled. High triglyceride and low HDL are related to her dietary modification needs. She is already on omega-3 so does not require any additional treatment.   There was no need for cardiac panel

## 2020-09-21 NOTE — TELEPHONE ENCOUNTER
Patient called regarding labs, she states he PCP called her and said her HDL was low and triglycerides was elevated and she saw you and you told her it was fine and her legs still hurt and she wants to know why you didn't order and cardiac panel. Patient states she very upset and states she was just overlooked.  Please advise

## 2020-09-21 NOTE — TELEPHONE ENCOUNTER
Patient called stating her left leg is still hurting from the knee down. She states she is retaing water.   Please call her back at [phone]

## 2020-09-21 NOTE — TELEPHONE ENCOUNTER
Spoke with patient regarding Dr. Amanda Shah message, Her LDL is controlled. Mick Triglycerides and slow HDL are related to her dietary modification needs. She is already on Omega-3 so does not require any additional treatment. There is no need for cardiac panel. She voices understanding and acceptance of this advice and will call back if any further questions or concerns.

## 2020-09-21 NOTE — TELEPHONE ENCOUNTER
Called to speak to patient regarding message per Dr. Ana Luisa Saunders. Patient wasn't in will try later.

## 2020-09-22 NOTE — TELEPHONE ENCOUNTER
TC to patient informed patient that she needs appointment. Patient stated she would like a face to face visit. Patient was transferred to [facility] to schedule pt appointment.

## 2020-09-24 DIAGNOSIS — I10 ESSENTIAL HYPERTENSION: ICD-10-CM

## 2020-09-24 DIAGNOSIS — E78.5 DYSLIPIDEMIA: ICD-10-CM

## 2020-09-24 RX ORDER — ATORVASTATIN CALCIUM 40 MG/1
TABLET, FILM COATED ORAL
Qty: 90 TAB | Refills: 1 | Status: SHIPPED | OUTPATIENT
Start: 2020-09-24 | End: 2020-12-20

## 2020-10-16 ENCOUNTER — APPOINTMENT (OUTPATIENT)
Dept: INFUSION THERAPY | Age: 50
End: 2020-10-16

## 2020-10-16 ENCOUNTER — HOSPITAL ENCOUNTER (EMERGENCY)
Age: 50
Discharge: HOME | End: 2020-10-16
Attending: EMERGENCY MEDICINE
Payer: COMMERCIAL

## 2020-10-16 VITALS
RESPIRATION RATE: 18 BRPM | OXYGEN SATURATION: 97 % | HEIGHT: 62 IN | BODY MASS INDEX: 38.28 KG/M2 | HEART RATE: 97 BPM | DIASTOLIC BLOOD PRESSURE: 83 MMHG | SYSTOLIC BLOOD PRESSURE: 128 MMHG | WEIGHT: 208 LBS | TEMPERATURE: 98.2 F

## 2020-10-16 DIAGNOSIS — R82.71 BACTERIA IN URINE: Primary | ICD-10-CM

## 2020-10-16 LAB
APPEARANCE UR: ABNORMAL
BACTERIA URNS QL MICRO: ABNORMAL /HPF
BILIRUB UR QL: NEGATIVE
COLOR UR: YELLOW
EPITH CASTS URNS QL MICRO: ABNORMAL /LPF (ref 0–5)
GLUCOSE UR STRIP.AUTO-MCNC: NEGATIVE MG/DL
HGB UR QL STRIP: NEGATIVE
KETONES UR QL STRIP.AUTO: NEGATIVE MG/DL
LEUKOCYTE ESTERASE UR QL STRIP.AUTO: ABNORMAL
NITRITE UR QL STRIP.AUTO: NEGATIVE
PH UR STRIP: 8 [PH] (ref 5–8)
PROT UR STRIP-MCNC: NEGATIVE MG/DL
RBC #/AREA URNS HPF: NEGATIVE /HPF (ref 0–5)
SP GR UR REFRACTOMETRY: 1.02 (ref 1–1.03)
UROBILINOGEN UR QL STRIP.AUTO: 1 EU/DL (ref 0.2–1)
WBC URNS QL MICRO: ABNORMAL /HPF (ref 0–4)

## 2020-10-16 PROCEDURE — 99282 EMERGENCY DEPT VISIT SF MDM: CPT

## 2020-10-16 PROCEDURE — 81001 URINALYSIS AUTO W/SCOPE: CPT

## 2020-10-16 PROCEDURE — 87086 URINE CULTURE/COLONY COUNT: CPT

## 2020-10-16 NOTE — ED TRIAGE NOTES
Pt states she was called because she had blood in her urine and WBC in urine here to get checked for UTI

## 2020-10-17 LAB
BACTERIA SPEC CULT: NORMAL
SERVICE CMNT-IMP: NORMAL

## 2020-10-22 DIAGNOSIS — K59.00 CONSTIPATION, UNSPECIFIED CONSTIPATION TYPE: ICD-10-CM

## 2020-11-12 ENCOUNTER — VIRTUAL VISIT (OUTPATIENT)
Dept: FAMILY MEDICINE CLINIC | Age: 50
End: 2020-11-12
Payer: COMMERCIAL

## 2020-11-12 DIAGNOSIS — M25.552 LEFT HIP PAIN: ICD-10-CM

## 2020-11-12 DIAGNOSIS — F14.10 COCAINE ABUSE (HCC): ICD-10-CM

## 2020-11-12 DIAGNOSIS — Z12.11 COLON CANCER SCREENING: ICD-10-CM

## 2020-11-12 DIAGNOSIS — I10 ESSENTIAL HYPERTENSION: Primary | ICD-10-CM

## 2020-11-12 DIAGNOSIS — E78.5 DYSLIPIDEMIA: ICD-10-CM

## 2020-11-12 PROCEDURE — 99213 OFFICE O/P EST LOW 20 MIN: CPT | Performed by: NURSE PRACTITIONER

## 2020-11-12 RX ORDER — ACETAMINOPHEN 500 MG
TABLET ORAL
Qty: 1 KIT | Refills: 0 | Status: SHIPPED | OUTPATIENT
Start: 2020-11-12 | End: 2021-01-20 | Stop reason: SDUPTHER

## 2020-11-12 NOTE — PROGRESS NOTES
Amanda Huizar is a 45 y.o. female who was seen by synchronous (real-time) audio-video technology on 11/12/2020 for Hypertension and Cholesterol Problem        Assessment & Plan:   Diagnoses and all orders for this visit:    1. Essential hypertension  -     LIPID PANEL; Future  -     CBC WITH AUTOMATED DIFF; Future  -     METABOLIC PANEL, COMPREHENSIVE; Future  -     Blood Pressure Test Kit-Large kit; Please provide BP cuff for hypertension diagnosis    2. Cocaine abuse ([address]    3. Colon cancer screening    4. Left hip pain  -     XR HIP LT W OR WO PELV 2-3 VWS; Future    5. Dyslipidemia      Xray left hip  Fasting  Labs ordered  Patient advised to call GI for appointment for iBS       Subjective: The patient presents for an Audio-visual teleconference appointment for hypertension and dyslipidemia follow-up care. She feels well today and denies any CP or palpitation. She has a history of cocaine abuse and her last drug use was 2 weeks ago. HTN- recent BP reading in the EMR stable. She is complaint with taking her medications daily. HLD- last lipid panel in 1/2020. Fasting lipid panel ordered for today. She denies any myalgia. Prior to Admission medications    Medication Sig Start Date End Date Taking? Authorizing Provider   Blood Pressure Test Kit-Large kit Please provide BP cuff for hypertension diagnosis 11/12/20  Yes Sanaz Haney NP   losartan-hydroCHLOROthiazide (HYZAAR) 100-12.5 mg per tablet TAKE ONE TABLET BY MOUTH DAILY 10/27/20   Sanaz Haney NP   linaCLOtide (Linzess) 145 mcg cap capsule TAKE ONE CAPSULE BY MOUTH DAILY BEFORE BREAKFAST 10/25/20   Sanaz Haney NP   atorvastatin (LIPITOR) 40 mg tablet TAKE ONE TABLET BY MOUTH DAILY 9/24/20   Sanaz Haney NP   ergocalciferol (ERGOCALCIFEROL) 1,250 mcg (50,000 unit) capsule Take 1 Cap by mouth every seven (7) days.  9/18/20   Sanaz Haney NP   albuterol (PROVENTIL HFA, VENTOLIN HFA, PROAIR HFA) 90 mcg/actuation inhaler INHALE ONE TO TWO PUFFS BY MOUTH EVERY 4 HOURS AS NEEDED FOR WHEEZING 9/7/20   Viri Figueredo NP   fluticasone propion-salmeteroL (Advair Diskus) 100-50 mcg/dose diskus inhaler Take 1 Puff by inhalation every twelve (12) hours. Indications: bronchospasm prevention with COPD 7/31/20   Viri Figueredo NP   amLODIPine (NORVASC) 10 mg tablet Take 1 Tab by mouth daily. 7/31/20   Viri Figueredo NP   omeprazole (PRILOSEC) 20 mg capsule Take 1 Cap by mouth daily. 7/31/20   Viri Figueredo NP   aspirin delayed-release 81 mg tablet Take 1 Tab by mouth daily. 5/26/20   Koby Diamond NP   omega-3 acid ethyl esters (Lovaza) 1 gram capsule Take 2 Caps by mouth two (2) times a day. 4/29/20   Viri Figueredo NP   nystatin (MYCOSTATIN) topical cream Apply  to affected area two (2) times a day. 1/30/20   Viri Figueredo NP   quetiapine fumarate (SEROQUEL PO) Take 300 Tabs by mouth nightly. 2 tablets    Provider, Historical   LORazepam (ATIVAN) 1 mg tablet Take  by mouth every four (4) hours as needed for Anxiety. Provider, Historical   lamoTRIgine (LAMICTAL) 100 mg tablet Take 200 mg by mouth daily.     Provider, Historical     Patient Active Problem List   Diagnosis Code    Dyslipidemia E78.5    Cocaine abuse ([facility] F14.10    Vitamin D deficiency E55.9    Manic bipolar I disorder ([facility] F31.10    Depression F32.9    Compliance with medication regimen Z91.89    Elevated hematocrit R71.8    Atypical squamous cells of undetermined significance on cytologic smear of cervix (ASC-US) R87.610    High risk HPV infection B97.7    Hypertension I10    Erythrocytosis D75.1    Secondary polycythemia D75.1    Hypertensive left ventricular hypertrophy, without heart failure I11.9    Severe obesity ([facility] E66.01     Patient Active Problem List    Diagnosis Date Noted    Severe obesity ([facility] 02/27/2019    Hypertensive left ventricular hypertrophy, without heart failure 10/15/2018    Erythrocytosis 05/22/2018    Secondary polycythemia 05/22/2018    Hypertension     Atypical squamous cells of undetermined significance on cytologic smear of cervix (ASC-US) 11/29/2016    High risk HPV infection 11/29/2016    Depression 08/31/2016    Compliance with medication regimen 08/31/2016    Elevated hematocrit 08/31/2016    Manic bipolar I disorder ([facility] 04/21/2015    Dyslipidemia 03/26/2015    Cocaine abuse ([facility] 03/26/2015    Vitamin D deficiency 03/26/2015     Current Outpatient Medications   Medication Sig Dispense Refill    Blood Pressure Test Kit-Large kit Please provide BP cuff for hypertension diagnosis 1 Kit 0    losartan-hydroCHLOROthiazide (HYZAAR) 100-12.5 mg per tablet TAKE ONE TABLET BY MOUTH DAILY 30 Tab 1    linaCLOtide (Linzess) 145 mcg cap capsule TAKE ONE CAPSULE BY MOUTH DAILY BEFORE BREAKFAST 30 Cap 0    atorvastatin (LIPITOR) 40 mg tablet TAKE ONE TABLET BY MOUTH DAILY 90 Tab 1    ergocalciferol (ERGOCALCIFEROL) 1,250 mcg (50,000 unit) capsule Take 1 Cap by mouth every seven (7) days. 12 Cap 1    albuterol (PROVENTIL HFA, VENTOLIN HFA, PROAIR HFA) 90 mcg/actuation inhaler INHALE ONE TO TWO PUFFS BY MOUTH EVERY 4 HOURS AS NEEDED FOR WHEEZING 18 g 1    fluticasone propion-salmeteroL (Advair Diskus) 100-50 mcg/dose diskus inhaler Take 1 Puff by inhalation every twelve (12) hours. Indications: bronchospasm prevention with COPD 3 Inhaler 3    amLODIPine (NORVASC) 10 mg tablet Take 1 Tab by mouth daily. 90 Tab 1    omeprazole (PRILOSEC) 20 mg capsule Take 1 Cap by mouth daily. 30 Cap 3    aspirin delayed-release 81 mg tablet Take 1 Tab by mouth daily. 100 Tab 3    omega-3 acid ethyl esters (Lovaza) 1 gram capsule Take 2 Caps by mouth two (2) times a day. 360 Cap 3    nystatin (MYCOSTATIN) topical cream Apply  to affected area two (2) times a day. 15 g 0    quetiapine fumarate (SEROQUEL PO) Take 300 Tabs by mouth nightly.  2 tablets      LORazepam (ATIVAN) 1 mg tablet Take  by mouth every four (4) hours as needed for Anxiety.  lamoTRIgine (LAMICTAL) 100 mg tablet Take 200 mg by mouth daily. Allergies   Allergen Reactions    Iodinated Contrast Media Shortness of Breath     Past Medical History:   Diagnosis Date    Asthma     Bronchitis     CHF (congestive heart failure) (HCC)     Cocaine abuse ([facility] 3/26/2015    Dental caries     Depression     Depression 8/31/2016    Drug abuse ([facility]     Dysphasia 10/17/2018    Elevated hematocrit 8/31/2016    ETOH abuse     H/O screening mammography 12/06/2016    No evidence of malignancy     HPV (human papilloma virus) infection 11/2016    The patient referred to [facility]     Hypertension     Hypertensive left ventricular hypertrophy, without heart failure 10/15/2018    mild to moderate lvh continue bp meds    Ill-defined condition     Marijuana use 10/17/2018    Mood swings     Polycythemia 2018    Polycythemia     Psychiatric disorder     depression, Bipolar    Schatzki's ring 08/30/2018    Found on Barium Swallow    Sliding hiatal hernia 10/17/2018    Tobacco abuse     Vitamin D deficiency        ROS    Constitutional: No apparent distress noted  General- negative for fever, chills or fatigue  Eyes- negative visual changes  CV- denies chest pain, palpitation  Pul: negative cough or SOB  GI: negative nausea, flank pain, diarrhea, constipation  Urinary:- No dysuria or polyuria  MS- negative myalgia, negative joint pain  Neuro- negative headache, dizziness or weakness  Skin- negative for rashes or lesions. Psych- denies any anxiety or depression    Objective:   No flowsheet data found.      [INSTRUCTIONS:  \"[x]\" Indicates a positive item  \"[]\" Indicates a negative item  -- DELETE ALL ITEMS NOT EXAMINED]    Constitutional: [x] Appears well-developed and well-nourished [x] No apparent distress      [] Abnormal -     Mental status: [x] Alert and awake  [x] Oriented to person/place/time [x] Able to follow commands    [] Abnormal -     Eyes:   EOM    [x]  Normal    [] Abnormal -   Sclera  [x]  Normal    [] Abnormal -          Discharge [x]  None visible   [] Abnormal -     HENT: [x] Normocephalic, atraumatic  [] Abnormal -   [x] Mouth/Throat: Mucous membranes are moist    External Ears [x] Normal  [] Abnormal -    Neck: [x] No visualized mass [] Abnormal -     Pulmonary/Chest: [x] Respiratory effort normal   [x] No visualized signs of difficulty breathing or respiratory distress        [] Abnormal -      Musculoskeletal:   [x] Normal gait with no signs of ataxia         [x] Normal range of motion of neck        [] Abnormal -     Neurological:        [x] No Facial Asymmetry (Cranial nerve 7 motor function) (limited exam due to video visit)          [x] No gaze palsy        [] Abnormal -          Skin:        [x] No significant exanthematous lesions or discoloration noted on facial skin         [] Abnormal -            Psychiatric:       [x] Normal Affect [] Abnormal -        [x] No Hallucinations    Other pertinent observable physical exam findings:-        We discussed the expected course, resolution and complications of the diagnosis(es) in detail. Medication risks, benefits, costs, interactions, and alternatives were discussed as indicated. I advised her to contact the office if her condition worsens, changes or fails to improve as anticipated. She expressed understanding with the diagnosis(es) and plan. Jimbo Rios, who was evaluated through a patient-initiated, synchronous (real-time) audio-video encounter, and/or her healthcare decision maker, is aware that it is a billable service, with coverage as determined by her insurance carrier. She provided verbal consent to proceed: Yes, and patient identification was verified.  It was conducted pursuant to the emergency declaration under the [address] [place], [address] and Response Supplemental Appropriations Act. A caregiver was present when appropriate. Ability to conduct physical exam was limited. I was at home. The patient was at home.       Larissa Fox NP

## 2020-11-13 ENCOUNTER — HOSPITAL ENCOUNTER (OUTPATIENT)
Dept: LAB | Age: 50
Discharge: HOME | End: 2020-11-13
Payer: COMMERCIAL

## 2020-11-13 DIAGNOSIS — I10 ESSENTIAL HYPERTENSION: ICD-10-CM

## 2020-11-13 LAB
ALBUMIN SERPL-MCNC: 3.2 G/DL (ref 3.4–5)
ALBUMIN/GLOB SERPL: 1.1 {RATIO} (ref 0.8–1.7)
ALP SERPL-CCNC: 96 U/L (ref 45–117)
ALT SERPL-CCNC: 23 U/L (ref 13–56)
ANION GAP SERPL CALC-SCNC: 5 MMOL/L (ref 3–18)
AST SERPL-CCNC: 10 U/L (ref 10–38)
BASOPHILS # BLD: 0 K/UL (ref 0–0.1)
BASOPHILS NFR BLD: 0 % (ref 0–2)
BILIRUB SERPL-MCNC: 0.2 MG/DL (ref 0.2–1)
BUN SERPL-MCNC: 15 MG/DL (ref 7–18)
BUN/CREAT SERPL: 27 (ref 12–20)
CALCIUM SERPL-MCNC: 8.9 MG/DL (ref 8.5–10.1)
CHLORIDE SERPL-SCNC: 107 MMOL/L (ref 100–111)
CHOLEST SERPL-MCNC: 114 MG/DL
CO2 SERPL-SCNC: 29 MMOL/L (ref 21–32)
CREAT SERPL-MCNC: 0.55 MG/DL (ref 0.6–1.3)
DIFFERENTIAL METHOD BLD: ABNORMAL
EOSINOPHIL # BLD: 0.3 K/UL (ref 0–0.4)
EOSINOPHIL NFR BLD: 3 % (ref 0–5)
ERYTHROCYTE [DISTWIDTH] IN BLOOD BY AUTOMATED COUNT: 16 % (ref 11.6–14.5)
GLOBULIN SER CALC-MCNC: 3 G/DL (ref 2–4)
GLUCOSE SERPL-MCNC: 122 MG/DL (ref 74–99)
HCT VFR BLD AUTO: 42.6 % (ref 35–45)
HDLC SERPL-MCNC: 32 MG/DL (ref 40–60)
HDLC SERPL: 3.6 {RATIO} (ref 0–5)
HGB BLD-MCNC: 13.9 G/DL (ref 12–16)
LDLC SERPL CALC-MCNC: 26.8 MG/DL (ref 0–100)
LIPID PROFILE,FLP: ABNORMAL
LYMPHOCYTES # BLD: 2.8 K/UL (ref 0.9–3.6)
LYMPHOCYTES NFR BLD: 32 % (ref 21–52)
MCH RBC QN AUTO: 28.4 PG (ref 24–34)
MCHC RBC AUTO-ENTMCNC: 32.6 G/DL (ref 31–37)
MCV RBC AUTO: 86.9 FL (ref 74–97)
MONOCYTES # BLD: 0.8 K/UL (ref 0.05–1.2)
MONOCYTES NFR BLD: 9 % (ref 3–10)
NEUTS SEG # BLD: 5 K/UL (ref 1.8–8)
NEUTS SEG NFR BLD: 56 % (ref 40–73)
PLATELET # BLD AUTO: 266 K/UL (ref 135–420)
PMV BLD AUTO: 9.8 FL (ref 9.2–11.8)
POTASSIUM SERPL-SCNC: 4.3 MMOL/L (ref 3.5–5.5)
PROT SERPL-MCNC: 6.2 G/DL (ref 6.4–8.2)
RBC # BLD AUTO: 4.9 M/UL (ref 4.2–5.3)
SODIUM SERPL-SCNC: 141 MMOL/L (ref 136–145)
TRIGL SERPL-MCNC: 276 MG/DL (ref ?–150)
VLDLC SERPL CALC-MCNC: 55.2 MG/DL
WBC # BLD AUTO: 8.8 K/UL (ref 4.6–13.2)

## 2020-11-13 PROCEDURE — 80061 LIPID PANEL: CPT

## 2020-11-13 PROCEDURE — 85025 COMPLETE CBC W/AUTO DIFF WBC: CPT

## 2020-11-13 PROCEDURE — 80053 COMPREHEN METABOLIC PANEL: CPT

## 2020-11-13 PROCEDURE — 36415 COLL VENOUS BLD VENIPUNCTURE: CPT

## 2020-11-15 NOTE — PROGRESS NOTES
Please notify patient that lab results were reviewed and her triglyceride are elevated. Please limit fried food and and do some physical activity. In addition her glucose level was mildly elevated, please have patient work on [address].

## 2020-11-16 ENCOUNTER — APPOINTMENT (OUTPATIENT)
Dept: INFUSION THERAPY | Age: 50
End: 2020-11-16

## 2020-11-18 ENCOUNTER — OFFICE VISIT (OUTPATIENT)
Dept: CARDIOLOGY CLINIC | Age: 50
End: 2020-11-18
Payer: COMMERCIAL

## 2020-11-18 VITALS
HEIGHT: 62 IN | SYSTOLIC BLOOD PRESSURE: 132 MMHG | WEIGHT: 219.6 LBS | HEART RATE: 79 BPM | BODY MASS INDEX: 40.41 KG/M2 | DIASTOLIC BLOOD PRESSURE: 79 MMHG | TEMPERATURE: 98 F | OXYGEN SATURATION: 97 %

## 2020-11-18 DIAGNOSIS — E78.5 DYSLIPIDEMIA: ICD-10-CM

## 2020-11-18 DIAGNOSIS — R60.0 LEG EDEMA, RIGHT: ICD-10-CM

## 2020-11-18 DIAGNOSIS — I11.9 HYPERTENSIVE LEFT VENTRICULAR HYPERTROPHY, WITHOUT HEART FAILURE: Primary | ICD-10-CM

## 2020-11-18 PROCEDURE — 99213 OFFICE O/P EST LOW 20 MIN: CPT | Performed by: INTERNAL MEDICINE

## 2020-11-18 NOTE — PROGRESS NOTES
1. Have you been to the ER, urgent care clinic since your last visit? Hospitalized since your last visit? Yes When: 10/16/20 Where: [facility] Reason for visit: Hematuria    2. Have you seen or consulted any other health care providers outside of the [address] [place] [address] since your last visit? Include any pap smears or colon screening.  No

## 2020-11-23 ENCOUNTER — APPOINTMENT (OUTPATIENT)
Dept: INFUSION THERAPY | Age: 50
End: 2020-11-23

## 2020-12-16 DIAGNOSIS — R06.02 SHORTNESS OF BREATH: ICD-10-CM

## 2020-12-16 RX ORDER — ALBUTEROL SULFATE 90 UG/1
AEROSOL, METERED RESPIRATORY (INHALATION)
Qty: 18 G | Refills: 1 | Status: SHIPPED | OUTPATIENT
Start: 2020-12-16 | End: 2021-01-14

## 2021-01-06 ENCOUNTER — TELEPHONE (OUTPATIENT)
Dept: FAMILY MEDICINE CLINIC | Age: 51
End: 2021-01-06

## 2021-01-11 DIAGNOSIS — L30.4 INTERTRIGO: ICD-10-CM

## 2021-01-13 RX ORDER — NYSTATIN 100000 U/G
CREAM TOPICAL
Qty: 15 G | Refills: 0 | Status: SHIPPED | OUTPATIENT
Start: 2021-01-13 | End: 2021-03-31 | Stop reason: SDUPTHER

## 2021-01-14 ENCOUNTER — TRANSCRIBE ORDER (OUTPATIENT)
Dept: REGISTRATION | Age: 51
End: 2021-01-14

## 2021-01-14 ENCOUNTER — HOSPITAL ENCOUNTER (OUTPATIENT)
Dept: LAB | Age: 51
Discharge: HOME | End: 2021-01-14
Payer: COMMERCIAL

## 2021-01-14 ENCOUNTER — TRANSCRIBE ORDER (OUTPATIENT)
Dept: SCHEDULING | Age: 51
End: 2021-01-14

## 2021-01-14 DIAGNOSIS — Z79.899 ENCOUNTER FOR LONG-TERM (CURRENT) USE OF OTHER MEDICATIONS: Primary | ICD-10-CM

## 2021-01-14 DIAGNOSIS — Z79.899 ENCOUNTER FOR LONG-TERM (CURRENT) USE OF OTHER MEDICATIONS: ICD-10-CM

## 2021-01-14 DIAGNOSIS — Z12.31 VISIT FOR SCREENING MAMMOGRAM: Primary | ICD-10-CM

## 2021-01-14 LAB
ATRIAL RATE: 64 BPM
CALCULATED P AXIS, ECG09: 43 DEGREES
CALCULATED R AXIS, ECG10: -29 DEGREES
CALCULATED T AXIS, ECG11: 37 DEGREES
DIAGNOSIS, 93000: NORMAL
P-R INTERVAL, ECG05: 154 MS
Q-T INTERVAL, ECG07: 426 MS
QRS DURATION, ECG06: 100 MS
QTC CALCULATION (BEZET), ECG08: 439 MS
VENTRICULAR RATE, ECG03: 64 BPM

## 2021-01-14 PROCEDURE — 93005 ELECTROCARDIOGRAM TRACING: CPT

## 2021-01-18 ENCOUNTER — HOSPITAL ENCOUNTER (OUTPATIENT)
Dept: INFUSION THERAPY | Age: 51
End: 2021-01-18

## 2021-01-18 ENCOUNTER — TELEPHONE (OUTPATIENT)
Dept: FAMILY MEDICINE CLINIC | Age: 51
End: 2021-01-18

## 2021-01-18 NOTE — TELEPHONE ENCOUNTER
Patient called stating her albuterol inhaler needs prior authorization ( she states she needs 2 inhalers a month)

## 2021-01-20 DIAGNOSIS — I10 ESSENTIAL HYPERTENSION: ICD-10-CM

## 2021-01-20 DIAGNOSIS — R06.02 SHORTNESS OF BREATH: ICD-10-CM

## 2021-01-21 ENCOUNTER — HOSPITAL ENCOUNTER (OUTPATIENT)
Dept: INFUSION THERAPY | Age: 51
Discharge: HOME | End: 2021-01-21
Payer: COMMERCIAL

## 2021-01-21 VITALS
DIASTOLIC BLOOD PRESSURE: 79 MMHG | OXYGEN SATURATION: 93 % | TEMPERATURE: 98.6 F | HEART RATE: 75 BPM | SYSTOLIC BLOOD PRESSURE: 111 MMHG

## 2021-01-21 LAB
BASO+EOS+MONOS # BLD AUTO: 0.9 K/UL (ref 0–2.3)
BASO+EOS+MONOS NFR BLD AUTO: 12 % (ref 0.1–17)
DIFFERENTIAL METHOD BLD: ABNORMAL
ERYTHROCYTE [DISTWIDTH] IN BLOOD BY AUTOMATED COUNT: 13.6 % (ref 11.5–14.5)
HCT VFR BLD AUTO: 44.8 % (ref 36–48)
HGB BLD-MCNC: 15.1 G/DL (ref 12–16)
LYMPHOCYTES # BLD: 3.2 K/UL (ref 1.1–5.9)
LYMPHOCYTES NFR BLD: 42 % (ref 14–44)
MCH RBC QN AUTO: 29 PG (ref 25–35)
MCHC RBC AUTO-ENTMCNC: 33.7 G/DL (ref 31–37)
MCV RBC AUTO: 86.2 FL (ref 78–102)
NEUTS SEG # BLD: 3.5 K/UL (ref 1.8–9.5)
NEUTS SEG NFR BLD: 47 % (ref 40–70)
PLATELET # BLD AUTO: 246 K/UL (ref 140–440)
RBC # BLD AUTO: 5.2 M/UL (ref 4.1–5.1)
WBC # BLD AUTO: 7.6 K/UL (ref 4.5–13)

## 2021-01-21 PROCEDURE — 85025 COMPLETE CBC W/AUTO DIFF WBC: CPT

## 2021-01-21 PROCEDURE — 36415 COLL VENOUS BLD VENIPUNCTURE: CPT

## 2021-01-21 NOTE — PROGRESS NOTES
[facility] Progress Note    Date: 2021    Name: Ana Rosario    MRN: [id number]         : 1970    Peripheral Lab Draw      Ms. Ozuna to [facility], ambulatory at 1300 accompanied by self. Pt was assessed and education was provided. Ms. Ozuna's vitals were reviewed and patient was observed for 5 minutes prior to treatment. Visit Vitals  /79 (BP 1 Location: Right arm, BP Patient Position: Sitting)   Pulse 75   Temp 98.6 °F (37 °C)   SpO2 93%     Recent Results (from the past 12 hour(s))   CBC WITH 3 PART DIFF    Collection Time: 21  1:10 PM   Result Value Ref Range    WBC 7.6 4.5 - 13.0 K/uL    RBC 5.20 (H) 4.10 - 5.10 M/uL    HGB 15.1 12.0 - 16 g/dL    HCT 44.8 36 - 48 %    MCV 86.2 78 - 102 FL    MCH 29.0 25.0 - 35.0 PG    MCHC 33.7 31 - 37 g/dL    RDW 13.6 11.5 - 14.5 %    PLATELET 863 883 - 999 K/uL    NEUTROPHILS 47 40 - 70 %    MIXED CELLS 12 0.1 - 17 %    LYMPHOCYTES 42 14 - 44 %    ABS. NEUTROPHILS 3.5 1.8 - 9.5 K/UL    ABS. MIXED CELLS 0.9 0.0 - 2.3 K/uL    ABS. LYMPHOCYTES 3.2 1.1 - 5.9 K/UL    DF AUTOMATED           Blood obtained peripherally from [facility] first attempt with butterfly needle and sent to lab for CBC w/ Diff per written orders. No bleeding or hematoma noted at site. Gauze and coban applied. Ms. Octavio Salomon tolerated the venipuncture, and had no complaints. CBC results were shared with TRINY Hewitt and patient did not meet requirements for treatment. Patient armband removed and shredded.     Ms. Octavio Salomon was discharged from [place] [address] in stable condition at 1315.   ]  Alirio Pitt Phlebotomist PCT  2021  1:55 PM

## 2021-01-22 RX ORDER — ACETAMINOPHEN 500 MG
TABLET ORAL
Qty: 1 KIT | Refills: 0 | Status: SHIPPED | OUTPATIENT
Start: 2021-01-22 | End: 2022-02-04 | Stop reason: SDUPTHER

## 2021-01-24 RX ORDER — FLUTICASONE PROPIONATE AND SALMETEROL 100; 50 UG/1; UG/1
1 POWDER RESPIRATORY (INHALATION) EVERY 12 HOURS
Qty: 3 INHALER | Refills: 3 | Status: SHIPPED | OUTPATIENT
Start: 2021-01-24 | End: 2021-12-23 | Stop reason: SDUPTHER

## 2021-01-31 DIAGNOSIS — E55.9 VITAMIN D DEFICIENCY: ICD-10-CM

## 2021-01-31 RX ORDER — ERGOCALCIFEROL 1.25 MG/1
CAPSULE ORAL
Qty: 12 CAP | Refills: 0 | Status: SHIPPED | OUTPATIENT
Start: 2021-01-31 | End: 2021-06-14

## 2021-02-12 ENCOUNTER — TELEPHONE (OUTPATIENT)
Dept: FAMILY MEDICINE CLINIC | Age: 51
End: 2021-02-12

## 2021-02-12 DIAGNOSIS — R73.09 ELEVATED RANDOM BLOOD GLUCOSE LEVEL: ICD-10-CM

## 2021-02-12 DIAGNOSIS — E78.5 DYSLIPIDEMIA: Primary | ICD-10-CM

## 2021-02-12 NOTE — TELEPHONE ENCOUNTER

## 2021-02-16 NOTE — TELEPHONE ENCOUNTER
TC to patient informed patient labs were labs were placed and to get drawn two to three days prior to her appointment.

## 2021-02-23 ENCOUNTER — HOSPITAL ENCOUNTER (OUTPATIENT)
Dept: LAB | Age: 51
Discharge: HOME | End: 2021-02-23
Payer: COMMERCIAL

## 2021-02-23 DIAGNOSIS — E78.5 DYSLIPIDEMIA: ICD-10-CM

## 2021-02-23 DIAGNOSIS — R73.09 ELEVATED RANDOM BLOOD GLUCOSE LEVEL: ICD-10-CM

## 2021-02-23 LAB
CHOLEST SERPL-MCNC: 128 MG/DL
EST. AVERAGE GLUCOSE BLD GHB EST-MCNC: 123 MG/DL
HBA1C MFR BLD: 5.9 % (ref 4.2–5.6)
HDLC SERPL-MCNC: 35 MG/DL (ref 40–60)
HDLC SERPL: 3.7 {RATIO} (ref 0–5)
LDLC SERPL CALC-MCNC: 40 MG/DL (ref 0–100)
LIPID PROFILE,FLP: ABNORMAL
TRIGL SERPL-MCNC: 265 MG/DL (ref ?–150)
VLDLC SERPL CALC-MCNC: 53 MG/DL

## 2021-02-23 PROCEDURE — 36415 COLL VENOUS BLD VENIPUNCTURE: CPT

## 2021-02-23 PROCEDURE — 80061 LIPID PANEL: CPT

## 2021-02-23 PROCEDURE — 83036 HEMOGLOBIN GLYCOSYLATED A1C: CPT

## 2021-02-26 ENCOUNTER — VIRTUAL VISIT (OUTPATIENT)
Dept: FAMILY MEDICINE CLINIC | Age: 51
End: 2021-02-26
Payer: COMMERCIAL

## 2021-02-26 DIAGNOSIS — I10 ESSENTIAL HYPERTENSION: ICD-10-CM

## 2021-02-26 DIAGNOSIS — E78.1 HYPERTRIGLYCERIDEMIA: ICD-10-CM

## 2021-02-26 DIAGNOSIS — R73.03 PREDIABETES: Primary | ICD-10-CM

## 2021-02-26 DIAGNOSIS — K21.9 GASTROESOPHAGEAL REFLUX DISEASE: ICD-10-CM

## 2021-02-26 PROCEDURE — 99214 OFFICE O/P EST MOD 30 MIN: CPT | Performed by: NURSE PRACTITIONER

## 2021-02-26 RX ORDER — AMLODIPINE BESYLATE 10 MG/1
TABLET ORAL
Qty: 90 TAB | Refills: 0 | Status: SHIPPED | OUTPATIENT
Start: 2021-02-26 | End: 2021-04-05

## 2021-02-26 RX ORDER — FENOFIBRATE 145 MG/1
145 TABLET, COATED ORAL DAILY
Qty: 30 TAB | Refills: 3 | Status: SHIPPED | OUTPATIENT
Start: 2021-02-26 | End: 2021-06-03

## 2021-02-26 RX ORDER — OMEPRAZOLE 20 MG/1
CAPSULE, DELAYED RELEASE ORAL
Qty: 30 CAP | Refills: 2 | Status: SHIPPED | OUTPATIENT
Start: 2021-02-26 | End: 2021-05-05

## 2021-02-26 RX ORDER — LURASIDONE HYDROCHLORIDE 20 MG/1
60 TABLET, FILM COATED ORAL DAILY
COMMUNITY
Start: 2021-01-09 | End: 2021-03-16

## 2021-02-26 RX ORDER — TRAZODONE HYDROCHLORIDE 100 MG/1
100 TABLET ORAL
COMMUNITY
Start: 2021-01-15

## 2021-02-26 NOTE — PROGRESS NOTES
Corinna Queen presents today for   Chief Complaint   Patient presents with    Labs    Hypertension     follow-up    Depression       Virtual/telephone visit    Depression Screening:  3 most recent PHQ Screens 2/26/2021   Little interest or pleasure in doing things Several days   Feeling down, depressed, irritable, or hopeless Several days   Total Score PHQ 2 2   Trouble falling or staying asleep, or sleeping too much Several days   Feeling tired or having little energy Several days   Poor appetite, weight loss, or overeating Several days   Feeling bad about yourself - or that you are a failure or have let yourself or your family down Several days   Trouble concentrating on things such as school, work, reading, or watching TV More than half the days   Moving or speaking so slowly that other people could have noticed; or the opposite being so fidgety that others notice More than half the days   Thoughts of being better off dead, or hurting yourself in some way More than half the days   PHQ 9 Score 12       Learning Assessment:  Learning Assessment 10/30/2019   PRIMARY LEARNER Patient   HIGHEST LEVEL OF EDUCATION - PRIMARY LEARNER  SOME COLLEGE   BARRIERS PRIMARY LEARNER NONE   CO-LEARNER CAREGIVER No   PRIMARY LANGUAGE ENGLISH   LEARNER PREFERENCE PRIMARY READING   ANSWERED BY patient   RELATIONSHIP SELF       Health Maintenance reviewed and discussed and ordered per Provider. Health Maintenance Due   Topic Date Due    Pneumococcal 0-64 years (1 of 1 - PPSV23) 06/22/1975    COVID-19 Vaccine (1 of 2) 06/22/1985    DTaP/Tdap/Td series (1 - Tdap) 06/22/1990    Shingrix Vaccine Age 50> (1 of 2) 06/22/2019    Colorectal Cancer Screening Combo  06/22/2019    PAP AKA CERVICAL CYTOLOGY  11/21/2019    Flu Vaccine (1) 09/01/2020   . Coordination of Care:  1. Have you been to the ER, urgent care clinic since your last visit? Hospitalized since your last visit? no    2.  Have you seen or consulted any other health care providers outside of the [address] [place] [address] since your last visit? Include any pap smears or colon screening.  no

## 2021-02-26 NOTE — PROGRESS NOTES
Nate Krueger is a 45 y.o. female who was seen by synchronous (real-time) audio-video technology on 2/26/2021 for Labs, Hypertension (follow-up), and Depression        Assessment & Plan:   Diagnoses and all orders for this visit:    1. Prediabetes    2. Gastroesophageal reflux disease  -     omeprazole (PRILOSEC) 20 mg capsule; TAKE ONE CAPSULE BY MOUTH DAILY  Indications: stomach ulcer from aspirin/ibuprofen-like drugs prevention    3. Essential hypertension  -     amLODIPine (NORVASC) 10 mg tablet; TAKE ONE TABLET BY MOUTH DAILY    4. Hypertriglyceridemia  -     fenofibrate nanocrystallized (TRICOR) 145 mg tablet; Take 1 Tab by mouth daily. Hypertension-medication refill today. No change to current treatment plan. We will follow-up in 3 months  Hypertriglyceridemia-remains mildly elevated-continue current treatment. TriCor 145 mg tablet daily  Prediabetes-hemoglobin A1c remains 5.9. Continue to work on diet and exercise          Subjective: The patient presents for an Audio-visual teleconference appointment for hypertension, hypertriglyceridemia, GERD and prediabetes follow-up care. Patient denies any cough, fever, loss of smell or taste, headache or GI upset. Hypertension-patient is prescribed amlodipine and losartan-HCTZ daily. She denies any lower extremity edema and is compliant with the medication treatment plan. She is up-to-date with her lab work and her last blood pressure readings in the medical record have been controlled. Dyslipidemia-patient prescribed fenofibrate and atorvastatin. Lipid panel on February 23, 2021 her cholesterol was 128, triglycerides 265, HDL is 35 and LDL 40. Prediabetes-patient hemoglobin A1c on February 23, 2021 was 5.9. Depression- patient does not think about harming. She does have a psychiatrist and will schedule a visit. She is any homicidal or suicidal ideations. Prior to Admission medications    Medication Sig Start Date End Date Taking?  Authorizing Provider   Latuda 20 mg tab tablet Take 60 mg by mouth daily. 1/9/21  Yes Provider, Historical   traZODone (DESYREL) 50 mg tablet Take 100 mg by mouth nightly. 1/15/21  Yes Provider, Historical   omeprazole (PRILOSEC) 20 mg capsule TAKE ONE CAPSULE BY MOUTH DAILY  Indications: stomach ulcer from aspirin/ibuprofen-like drugs prevention 2/26/21  Yes Brit Holliday NP   amLODIPine (NORVASC) 10 mg tablet TAKE ONE TABLET BY MOUTH DAILY 2/26/21  Yes Brit Holliday NP   fenofibrate nanocrystallized (TRICOR) 145 mg tablet Take 1 Tab by mouth daily. 2/26/21  Yes Brit Holliday NP   ergocalciferol (ERGOCALCIFEROL) 1,250 mcg (50,000 unit) capsule TAKE ONE CAPSULE BY MOUTH ONCE WEEKLY 1/31/21  Yes Brit Holliday NP   fluticasone propion-salmeteroL (Advair Diskus) 100-50 mcg/dose diskus inhaler Take 1 Puff by inhalation every twelve (12) hours. Indications: bronchospasm prevention with COPD 1/24/21  Yes Brit Holliday NP   Blood Pressure Test Kit-Large kit Please provide BP cuff for hypertension diagnosis 1/22/21  Yes Brit Holliday NP   albuterol (PROVENTIL HFA, VENTOLIN HFA, PROAIR HFA) 90 mcg/actuation inhaler INHALE ONE TO TWO PUFFS BY MOUTH EVERY 4 HOURS AS NEEDED FOR WHEEZING 1/14/21  Yes Brit Holliday NP   nystatin (MYCOSTATIN) topical cream APPLY TO AFFECTED AREA(S) TWO TIMES A DAY 1/13/21  Yes Brit Holliday NP   losartan-hydroCHLOROthiazide (HYZAAR) 100-12.5 mg per tablet TAKE ONE TABLET BY MOUTH DAILY 12/20/20  Yes Brit Holliday NP   atorvastatin (LIPITOR) 40 mg tablet TAKE ONE TABLET BY MOUTH DAILY 12/20/20  Yes Brit Holliday NP   linaCLOtide (Linzess) 145 mcg cap capsule TAKE ONE CAPSULE BY MOUTH DAILY BEFORE BREAKFAST 10/25/20  Yes Brit Holliday NP   aspirin delayed-release 81 mg tablet Take 1 Tab by mouth daily.  5/26/20  Yes Koby Diamond NP   omega-3 acid ethyl esters (Lovaza) 1 gram capsule Take 2 Caps by mouth two (2) times a day. 4/29/20  Yes Tigist Decker NP   LORazepam (ATIVAN) 1 mg tablet Take  by mouth every four (4) hours as needed for Anxiety. Yes Provider, Historical   lamoTRIgine (LAMICTAL) 100 mg tablet Take 200 mg by mouth daily. Yes Provider, Historical   amLODIPine (NORVASC) 10 mg tablet TAKE ONE TABLET BY MOUTH DAILY 12/22/20 2/26/21  Tigist Decker NP   omeprazole (PRILOSEC) 20 mg capsule TAKE ONE CAPSULE BY MOUTH DAILY 11/23/20 2/26/21  Tigist Decker NP   quetiapine fumarate (SEROQUEL PO) Take 300 Tabs by mouth nightly.  2 tablets    Provider, Historical     Patient Active Problem List   Diagnosis Code    Dyslipidemia E78.5    Cocaine abuse ([facility] F14.10    Vitamin D deficiency E55.9    Manic bipolar I disorder ([facility] F31.10    Depression F32.9    Compliance with medication regimen Z91.89    Elevated hematocrit R71.8    Atypical squamous cells of undetermined significance on cytologic smear of cervix (ASC-US) R87.610    High risk HPV infection B97.7    Hypertension I10    Erythrocytosis D75.1    Secondary polycythemia D75.1    Hypertensive left ventricular hypertrophy, without heart failure I11.9    Severe obesity ([facility] E66.01     Patient Active Problem List    Diagnosis Date Noted    Severe obesity ([facility] 02/27/2019    Hypertensive left ventricular hypertrophy, without heart failure 10/15/2018    Erythrocytosis 05/22/2018    Secondary polycythemia 05/22/2018    Hypertension     Atypical squamous cells of undetermined significance on cytologic smear of cervix (ASC-US) 11/29/2016    High risk HPV infection 11/29/2016    Depression 08/31/2016    Compliance with medication regimen 08/31/2016    Elevated hematocrit 08/31/2016    Manic bipolar I disorder ([facility] 04/21/2015    Dyslipidemia 03/26/2015    Cocaine abuse ([facility] 03/26/2015    Vitamin D deficiency 03/26/2015     Current Outpatient Medications   Medication Sig Dispense Refill    Latuda 20 mg tab tablet Take 60 mg by mouth daily.  traZODone (DESYREL) 50 mg tablet Take 100 mg by mouth nightly.  omeprazole (PRILOSEC) 20 mg capsule TAKE ONE CAPSULE BY MOUTH DAILY  Indications: stomach ulcer from aspirin/ibuprofen-like drugs prevention 30 Cap 2    amLODIPine (NORVASC) 10 mg tablet TAKE ONE TABLET BY MOUTH DAILY 90 Tab 0    fenofibrate nanocrystallized (TRICOR) 145 mg tablet Take 1 Tab by mouth daily. 30 Tab 3    ergocalciferol (ERGOCALCIFEROL) 1,250 mcg (50,000 unit) capsule TAKE ONE CAPSULE BY MOUTH ONCE WEEKLY 12 Cap 0    fluticasone propion-salmeteroL (Advair Diskus) 100-50 mcg/dose diskus inhaler Take 1 Puff by inhalation every twelve (12) hours. Indications: bronchospasm prevention with COPD 3 Inhaler 3    Blood Pressure Test Kit-Large kit Please provide BP cuff for hypertension diagnosis 1 Kit 0    albuterol (PROVENTIL HFA, VENTOLIN HFA, PROAIR HFA) 90 mcg/actuation inhaler INHALE ONE TO TWO PUFFS BY MOUTH EVERY 4 HOURS AS NEEDED FOR WHEEZING 18 g 2    nystatin (MYCOSTATIN) topical cream APPLY TO AFFECTED AREA(S) TWO TIMES A DAY 15 g 0    losartan-hydroCHLOROthiazide (HYZAAR) 100-12.5 mg per tablet TAKE ONE TABLET BY MOUTH DAILY 90 Tab 1    atorvastatin (LIPITOR) 40 mg tablet TAKE ONE TABLET BY MOUTH DAILY 90 Tab 0    linaCLOtide (Linzess) 145 mcg cap capsule TAKE ONE CAPSULE BY MOUTH DAILY BEFORE BREAKFAST 30 Cap 0    aspirin delayed-release 81 mg tablet Take 1 Tab by mouth daily. 100 Tab 3    omega-3 acid ethyl esters (Lovaza) 1 gram capsule Take 2 Caps by mouth two (2) times a day. 360 Cap 3    LORazepam (ATIVAN) 1 mg tablet Take  by mouth every four (4) hours as needed for Anxiety.  lamoTRIgine (LAMICTAL) 100 mg tablet Take 200 mg by mouth daily.  quetiapine fumarate (SEROQUEL PO) Take 300 Tabs by mouth nightly.  2 tablets       Allergies   Allergen Reactions    Iodinated Contrast Media Shortness of Breath     Past Medical History:   Diagnosis Date    Asthma     Bronchitis     CHF (congestive [facility] failure) ([facility]     Cocaine abuse ([facility] 3/26/2015    Dental caries     Depression     Depression 8/31/2016    Drug abuse ([facility]     Dysphasia 10/17/2018    Elevated hematocrit 8/31/2016    ETOH abuse     H/O screening mammography 12/06/2016    No evidence of malignancy     HPV (human papilloma virus) infection 11/2016    The patient referred to [facility]     Hypertension     Hypertensive left ventricular hypertrophy, without heart failure 10/15/2018    mild to moderate lvh continue bp meds    Ill-defined condition     Marijuana use 10/17/2018    Mood swings     Polycythemia 2018    Polycythemia     Psychiatric disorder     depression, Bipolar    Schatzki's ring 08/30/2018    Found on Barium Swallow    Sliding hiatal hernia 10/17/2018    Tobacco abuse     Vitamin D deficiency        ROS  ROS:  History obtained from the patient intake forms which are reviewed with the patient  · General: negative for - chills, fever, weight changes or malaise  · HEENT: no sore throat, nasal congestion, vision problems or ear problems  · Respiratory: no cough, shortness of breath, or wheezing  · Cardiovascular: no chest pain, palpitations, or dyspnea on exertion  · Gastrointestinal: no abdominal pain, N/V, change in bowel habits, or black or bloody stools  · Musculoskeletal: no back pain, joint pain, joint stiffness, muscle pain or muscle weakness  · Neurological: no numbness, tingling, headache or dizziness  · Endo:  No polyuria or polydipsia. · : no hematuria, dysuria, frequency, hesitancy, or nocturia. · Psychological: negative for - anxiety, depression, sleep disturbances, suicidal or homicidal ideations    Objective:   No flowsheet data found.      [INSTRUCTIONS:  \"[x]\" Indicates a positive item  \"[]\" Indicates a negative item  -- DELETE ALL ITEMS NOT EXAMINED]    Constitutional: [x] Appears well-developed and well-nourished [x] No apparent distress      [] Abnormal -     Mental status: [x] Alert and awake  [x] Oriented to person/place/time [x] Able to follow commands    [] Abnormal -     Eyes:   EOM    [x]  Normal    [] Abnormal -   Sclera  [x]  Normal    [] Abnormal -          Discharge [x]  None visible   [] Abnormal -     HENT: [x] Normocephalic, atraumatic  [] Abnormal -   [x] Mouth/Throat: Mucous membranes are moist    External Ears [x] Normal  [] Abnormal -    Neck: [x] No visualized mass [] Abnormal -     Pulmonary/Chest: [x] Respiratory effort normal   [x] No visualized signs of difficulty breathing or respiratory distress        [] Abnormal -      Musculoskeletal:   [x] Normal gait with no signs of ataxia         [x] Normal range of motion of neck        [] Abnormal -     Neurological:        [x] No Facial Asymmetry (Cranial nerve 7 motor function) (limited exam due to video visit)          [x] No gaze palsy        [] Abnormal -          Skin:        [x] No significant exanthematous lesions or discoloration noted on facial skin         [] Abnormal -            Psychiatric:       [x] Normal Affect [] Abnormal -        [x] No Hallucinations    Other pertinent observable physical exam findings:-        We discussed the expected course, resolution and complications of the diagnosis(es) in detail. Medication risks, benefits, costs, interactions, and alternatives were discussed as indicated. I advised her to contact the office if her condition worsens, changes or fails to improve as anticipated. She expressed understanding with the diagnosis(es) and plan. Mari Peres, who was evaluated through a patient-initiated, synchronous (real-time) audio-video encounter, and/or her healthcare decision maker, is aware that it is a billable service, with coverage as determined by her insurance carrier. She provided verbal consent to proceed: Yes, and patient identification was verified.  It was conducted pursuant to the emergency declaration under the [facility] Act, [facility] waiver authority and the [place] and [employer] General Act. A caregiver was present when appropriate. Ability to conduct physical exam was limited. I was at home. The patient was at home.       Geoffrey Murray NP

## 2021-03-16 ENCOUNTER — HOSPITAL ENCOUNTER (OUTPATIENT)
Dept: INFUSION THERAPY | Age: 51
Discharge: HOME | End: 2021-03-16
Payer: COMMERCIAL

## 2021-03-16 VITALS
RESPIRATION RATE: 18 BRPM | TEMPERATURE: 98.6 F | HEART RATE: 64 BPM | DIASTOLIC BLOOD PRESSURE: 86 MMHG | OXYGEN SATURATION: 95 % | SYSTOLIC BLOOD PRESSURE: 150 MMHG

## 2021-03-16 LAB
BASO+EOS+MONOS # BLD AUTO: 0.8 K/UL (ref 0–2.3)
BASO+EOS+MONOS NFR BLD AUTO: 11 % (ref 0.1–17)
DIFFERENTIAL METHOD BLD: ABNORMAL
ERYTHROCYTE [DISTWIDTH] IN BLOOD BY AUTOMATED COUNT: 14.1 % (ref 11.5–14.5)
HCT VFR BLD AUTO: 49.9 % (ref 36–48)
HGB BLD-MCNC: 16.6 G/DL (ref 12–16)
LYMPHOCYTES # BLD: 2.2 K/UL (ref 1.1–5.9)
LYMPHOCYTES NFR BLD: 30 % (ref 14–44)
MCH RBC QN AUTO: 29.4 PG (ref 25–35)
MCHC RBC AUTO-ENTMCNC: 33.3 G/DL (ref 31–37)
MCV RBC AUTO: 88.5 FL (ref 78–102)
NEUTS SEG # BLD: 4.5 K/UL (ref 1.8–9.5)
NEUTS SEG NFR BLD: 59 % (ref 40–70)
PLATELET # BLD AUTO: 238 K/UL (ref 140–440)
RBC # BLD AUTO: 5.64 M/UL (ref 4.1–5.1)
WBC # BLD AUTO: 7.5 K/UL (ref 4.5–13)

## 2021-03-16 PROCEDURE — 36415 COLL VENOUS BLD VENIPUNCTURE: CPT

## 2021-03-16 PROCEDURE — 74011250636 HC RX REV CODE- 250/636: Performed by: NURSE PRACTITIONER

## 2021-03-16 PROCEDURE — 99195 PHLEBOTOMY: CPT

## 2021-03-16 PROCEDURE — 85025 COMPLETE CBC W/AUTO DIFF WBC: CPT

## 2021-03-16 RX ORDER — SODIUM CHLORIDE 0.9 % (FLUSH) 0.9 %
10-40 SYRINGE (ML) INJECTION AS NEEDED
Status: DISCONTINUED | OUTPATIENT
Start: 2021-03-16 | End: 2021-03-16 | Stop reason: CLARIF

## 2021-03-16 RX ORDER — SODIUM CHLORIDE 9 MG/ML
500 INJECTION, SOLUTION INTRAVENOUS ONCE
Status: COMPLETED | OUTPATIENT
Start: 2021-03-16 | End: 2021-03-16

## 2021-03-16 RX ADMIN — SODIUM CHLORIDE 500 ML: 9 INJECTION, SOLUTION INTRAVENOUS at 09:52

## 2021-03-16 NOTE — PROGRESS NOTES
[facility] Progress Note    Date: 2021    Name: Ana Rosario    MRN: [id number]         : 1970      Ms. Ozuna arrived to [address] [place] [address] at 0910. Pt was assessed and education provided. Ms. Ozuna's vitals were reviewed. Visit Vitals  /79 (BP 1 Location: Right arm, BP Patient Position: Sitting)   Pulse 69   Temp 98.6 °F (37 °C)   Resp 18   SpO2 96%   Breastfeeding No       Blood drawn per order by [address] [place] [address] phlebotomist.    Lab results were obtained and reviewed. Recent Results (from the past 12 hour(s))   CBC WITH 3 PART DIFF    Collection Time: 21  9:15 AM   Result Value Ref Range    WBC 7.5 4.5 - 13.0 K/uL    RBC 5.64 (H) 4.10 - 5.10 M/uL    HGB 16.6 (HH) 12.0 - 16 g/dL    HCT 49.9 (H) 36 - 48 %    MCV 88.5 78 - 102 FL    MCH 29.4 25.0 - 35.0 PG    MCHC 33.3 31 - 37 g/dL    RDW 14.1 11.5 - 14.5 %    PLATELET 931 885 - 739 K/uL    NEUTROPHILS 59 40 - 70 %    MIXED CELLS 11 0.1 - 17 %    LYMPHOCYTES 30 14 - 44 %    ABS. NEUTROPHILS 4.5 1.8 - 9.5 K/UL    ABS. MIXED CELLS 0.8 0.0 - 2.3 K/uL    ABS. LYMPHOCYTES 2.2 1.1 - 5.9 K/UL    DF AUTOMATED         Therapeutic phlebotomy started at 0939 via right AC 18g IV inserted x1 attempt. Completed at 2320 w/ approximately 500ml of blood obtained per order, followed by 500ml NS bolus per order. Pt tolerated well without complaints & consumed chips & water throughout her visit. IV removed/ intact. Gauze/ coban to site. Discharge/ follow-up instructions discussed w/ pt. Pt verbalized understanding. Patient armband removed and shredded. Ms. Octavio Salomon was discharged from [place] [id number] in stable condition at 1010. She is to return on 3/26/21 at 0900 for her next appointment.     Erick Oneal RN  2021

## 2021-03-30 DIAGNOSIS — R06.02 SHORTNESS OF BREATH: ICD-10-CM

## 2021-03-31 DIAGNOSIS — R06.02 SHORTNESS OF BREATH: ICD-10-CM

## 2021-03-31 DIAGNOSIS — L30.4 INTERTRIGO: ICD-10-CM

## 2021-04-01 RX ORDER — ALBUTEROL SULFATE 90 UG/1
AEROSOL, METERED RESPIRATORY (INHALATION)
Qty: 18 G | Refills: 1 | Status: SHIPPED | OUTPATIENT
Start: 2021-04-01 | End: 2021-08-09

## 2021-04-05 RX ORDER — ALBUTEROL SULFATE 90 UG/1
2 AEROSOL, METERED RESPIRATORY (INHALATION)
Qty: 18 G | Refills: 2 | Status: SHIPPED
Start: 2021-04-05 | End: 2021-07-14 | Stop reason: SDUPTHER

## 2021-04-05 RX ORDER — NYSTATIN 100000 U/G
CREAM TOPICAL
Qty: 15 G | Refills: 0 | Status: SHIPPED | OUTPATIENT
Start: 2021-04-05 | End: 2021-06-03

## 2021-04-17 NOTE — PROGRESS NOTES
HISTORY OF PRESENT ILLNESS  Allison Sy is a 45 y.o. female. Recent er visit with atypical non anginal pain. Patient was in emergency room yesterday with atypical chest pain. 1/2019. ER notes and data reviewed  3/2020  Patient is seen in office for follow-up. She was in ER last 12/2019. Records reviewed    Hypertension   The history is provided by the patient. This is a chronic problem. The problem occurs constantly. The problem has not changed since onset. Associated symptoms include chest pain and shortness of breath. Chest Pain (Angina)    The history is provided by the patient. This is a recurrent problem. The problem has not changed since onset. The problem occurs every several days. The pain is associated with exertion and rest. The pain is present in the substernal region. The pain is mild. The quality of the pain is described as dull. Associated symptoms include shortness of breath. Pertinent negatives include no claudication, no cough, no dizziness, no fever, no hemoptysis, no nausea, no orthopnea, no palpitations, no PND, no sputum production, no vomiting and no weakness. Palpitations    The history is provided by the patient. This is a recurrent problem. The problem has not changed since onset. The problem occurs every several days. The problem is associated with nothing. Associated symptoms include chest pain and shortness of breath. Pertinent negatives include no fever, no claudication, no orthopnea, no PND, no nausea, no vomiting, no dizziness, no weakness, no cough, no hemoptysis and no sputum production. Her past medical history is significant for hypertension. Review of Systems   Constitutional: Negative for chills and fever. HENT: Negative for nosebleeds. Eyes: Negative for blurred vision and double vision. Respiratory: Positive for shortness of breath. Negative for cough, hemoptysis, sputum production and wheezing. Cardiovascular: Positive for chest pain.  Negative for Patient seen and examined today at bedside:   - Still reports pain  - Admits that this is the first episode of pancreatitis   - Last Drink was yesterday ( Friday 4/17), I do not expect to see any withdrawal before the next 24-48 hours ( if he starts symptoms of withdrawal)  - Physical exam:   Lungs: GBAE, no added sounds  Heart: RRR, normal s1s2, no audible murmurs   Abdomen: +BS, soft, mild tenderness in the left lower quadrants, no periumbilical or flank ecchymotic lesions noted  - Spoke with Dr. Hameed:   a. Will switch IV to LR instead of NSS and decrease to 150mL/hour  b. Will start clear liquids, advance if tolerates ( patient agreeable to start) palpitations, orthopnea, claudication, leg swelling and PND. Gastrointestinal: Negative for heartburn, nausea and vomiting. Musculoskeletal: Negative for myalgias. Skin: Negative for rash. Neurological: Negative for dizziness and weakness. Endo/Heme/Allergies: Does not bruise/bleed easily.      Family History   Problem Relation Age of Onset    Hypertension Mother     Heart Attack Father     Hypertension Father     Cancer Father         skin    Cancer Sister         skin    Bipolar Disorder Sister     Cancer Brother         skin    Cancer Maternal Aunt         colon       Past Medical History:   Diagnosis Date    Asthma     Bronchitis     [facility]est[facility] failure[facility] [address]     Cocaine abuse ([facility] [address] 3/26/2015    Dental caries     Depression     Depression 8/31/2016    Drug abuse ([facility] [address]     Dysphasia 10/17/2018    Elevated hematocrit 8/31/2016    ETOH abuse     H/O screening mammography 12/06/2016    No evidence of malignancy     HPV (human papilloma virus) infection 11/2016    The patient referred to [facility]     Hypertension     Hypertensive left ventricular hypertrophy, without heart failure 10/15/2018    mild to moderate lvh continue bp meds    Ill-defined condition     Marijuana use 10/17/2018    Mood swings     Polycythemia 2018    Polycythemia     Psychiatric disorder     depression, Bipolar    Schatzki's ring 08/30/2018    Found on Barium Swallow    Sliding hiatal hernia 10/17/2018    Tobacco abuse     Vitamin D deficiency        Past Surgical History:   Procedure Laterality Date    HX APPENDECTOMY      HX COLPOSCOPY  12/06/2016    Low-grade courtney/mild dysplasia RICK I    HX HERNIA REPAIR      3x       Social History     Tobacco Use    Smoking status: Current Every Day Smoker     Packs/day: 0.50     Years: 20.00     Pack years: 10.00     Types: Cigarettes    Smokeless tobacco: Never Used   Substance Use Topics    Alcohol use: Yes     Frequency: 2-4 times a month     Drinks #Acute Pancreatitis:  - Likely secondary to alcohol  Patient seen and examined today at bedside:   - Still reports pain  - Admits that this is the first episode of pancreatitis   - Last Drink was yesterday ( Friday 4/17), I do not expect to see any withdrawal before the next 24-48 hours ( if he starts symptoms of withdrawal)  - Physical exam:   Lungs: GBAE, no added sounds  Heart: RRR, normal s1s2, no audible murmurs   Abdomen: +BS, soft, mild tenderness in the left lower quadrants, no periumbilical or flank ecchymotic lesions noted  - Spoke with Dr. Hameed:   a. Will switch IV to LR instead of NSS and decrease to 150mL/hour  b. Will start clear liquids, advance if tolerates ( patient agreeable to start)  - Sonogram of the RUQ: Hepatic Steatosis, pancrease obscured. No mentioning of stones.   - LFTs slight disturbed, ALk Phhosp: 189  - follow up a.m. Triglyceride and LFTs, Ca++ and Hb/Ht per session: 1 or 2     Binge frequency: Never     Comment: socially       Allergies   Allergen Reactions    Iodinated Contrast Media Shortness of Breath       Prior to Admission medications    Medication Sig Start Date End Date Taking? Authorizing Provider   Blood Pressure Test Kit-Large kit Please provide BP cuff for hypertension diagnosis 11/12/20  Yes Sanaz Haney NP   losartan-hydroCHLOROthiazide (HYZAAR) 100-12.5 mg per tablet TAKE ONE TABLET BY MOUTH DAILY 10/27/20  Yes Sanaz Haney NP   linaCLOtide (Linzess) 145 mcg cap capsule TAKE ONE CAPSULE BY MOUTH DAILY BEFORE BREAKFAST 10/25/20  Yes Sanaz Haney NP   atorvastatin (LIPITOR) 40 mg tablet TAKE ONE TABLET BY MOUTH DAILY 9/24/20  Yes Sanaz Haney NP   ergocalciferol (ERGOCALCIFEROL) 1,250 mcg (50,000 unit) capsule Take 1 Cap by mouth every seven (7) days. 9/18/20  Yes Sanaz Haney NP   albuterol (PROVENTIL HFA, VENTOLIN HFA, PROAIR HFA) 90 mcg/actuation inhaler INHALE ONE TO TWO PUFFS BY MOUTH EVERY 4 HOURS AS NEEDED FOR WHEEZING 9/7/20  Yes Sanaz Haney NP   fluticasone propion-salmeteroL (Advair Diskus) 100-50 mcg/dose diskus inhaler Take 1 Puff by inhalation every twelve (12) hours. Indications: bronchospasm prevention with COPD 7/31/20  Yes Sanaz Haney NP   amLODIPine (NORVASC) 10 mg tablet Take 1 Tab by mouth daily. 7/31/20  Yes Sanaz Haney NP   omeprazole (PRILOSEC) 20 mg capsule Take 1 Cap by mouth daily. 7/31/20  Yes Sanaz Haney NP   aspirin delayed-release 81 mg tablet Take 1 Tab by mouth daily. 5/26/20  Yes Koby Diamond NP   omega-3 acid ethyl esters (Lovaza) 1 gram capsule Take 2 Caps by mouth two (2) times a day. 4/29/20  Yes Sanaz Haney NP   nystatin (MYCOSTATIN) topical cream Apply  to affected area two (2) times a day. 1/30/20  Yes Sanaz Haney NP   quetiapine fumarate (SEROQUEL PO) Take 300 Tabs by mouth nightly.  2 tablets Yes Provider, Historical   LORazepam (ATIVAN) 1 mg tablet Take  by mouth every four (4) hours as needed for Anxiety. Yes Provider, Historical   lamoTRIgine (LAMICTAL) 100 mg tablet Take 200 mg by mouth daily. Yes Provider, Historical         Visit Vitals  /79 (BP 1 Location: Left arm, BP Patient Position: Sitting)   Pulse 79   Temp 98 °F (36.7 °C) (Temporal)   Ht 5' 2\" (1.575 m)   Wt 99.6 kg (219 lb 9.6 oz)   SpO2 97%   BMI 40.17 kg/m²         Physical Exam   Constitutional: She is oriented to person, place, and time. She appears well-developed and well-nourished. HENT:   Head: Normocephalic and atraumatic. Eyes: Conjunctivae are normal.   Neck: Neck supple. No JVD present. No tracheal deviation present. No thyromegaly present. Cardiovascular: Normal rate and regular rhythm. PMI is not displaced. Exam reveals no gallop, no S3 and no decreased pulses. Murmur heard. Early systolic murmur is present at the upper right sternal border and lower left sternal border. Pulmonary/Chest: No respiratory distress. She has no wheezes. She has no rales. She exhibits no tenderness. Abdominal: Soft. There is no abdominal tenderness. Musculoskeletal:         General: No edema. Neurological: She is alert and oriented to person, place, and time. Skin: Skin is warm. Psychiatric: She has a normal mood and affect. Ms. Demar Moreland has a reminder for a \"due or due soon\" health maintenance. I have asked that she contact her primary care provider for follow-up on this health maintenance. I have personally reviewed patients ekg done at other facility. SUMMARY:2017  Left ventricle: Systolic function was normal. Ejection fraction was  estimated in the range of 55 % to 60 %. No obvious wall motion  abnormalities identified in the views obtained. Wall thickness was mildly  to moderately increased. There was mild concentric hypertrophy. NUCLEAR [id number]    Findings:   1.  Post-stress imaging in short axis, horizontal and vertical long axis views reveals normal isotope uptake in all areas. 2. Resting images also show normal isotope uptake in all areas. 3. Gated images show normal left ventricular size, wall motion and systolic function. Ejection fraction is 71%. Diagnosis:   1. Normal scan. 2. No evidence of significant fixed or reversible defect suggesting ischemia or myocardial infarction noted from this nuclear study. 3. Low risk scan. Interpretation Summary        · Baseline ECG: Normal sinus rhythm, Minimal voltage criteria for LVH , maybe normal variant Septal infarct, age undetermined. · Inconclusive stress electrocardiogram.  · Low risk study. Submaximal stress test.  If clinically indicated, recommend nuclear stress test.      Stress Findings     ECG Baseline ECG: Normal sinus rhythm, Minimal voltage criteria for LVH , maybe normal variant  Septal infarct, age undetermined. Arrhythmias during stress: rare PACs. There was no ST segment deviation noted during stress. Arrhythmias during recovery: rare PACs. Arrhythmias were not significant. Stress Findings A stress test was performed following a modified Sathish protocol. The test was stopped because the patient complained of shortness of breath. The patient reported no angina during stress. Leg pain   The patient's response to exercise was inadequate for diagnosis. Blood pressure demonstrated a normal response to exercise. Overall, the patient's exercise capacity was normal.   Angina Index is 0. Inconclusive stress electrocardiogram for inducible myocardial ischemia.  Patient unable to reach target heart rate   Stress Measurements     Baseline Vitals   Baseline HR 67 BPM      Baseline BP 80/57 mmHg       Peak Stress Vitals   Post peak  BPM      Stress Base Systolic  mmHg      Stress Base Diastolic BP 70 mmHg      Estimated workload 7 METS      Stress Rate Pressure Product 12,420 BPM*mmHg       Exercise Data   Target  bpm      Percent HR 79 %      Exercise duration time           Interpretation Summary 9/2020       · No evidence of acute deep vein thrombosis in the right common femoral, superficial femoral, popliteal, posterior tibial, and peroneal veins. The veins were imaged in the transverse and longitudinal planes. The vessels showed normal color filling and compressibility. Doppler interrogation showed phasic and spontaneous flow. · No evidence of acute deep vein thrombosis in the left common femoral, superficial femoral, popliteal, posterior tibial, and peroneal veins. The veins were imaged in the transverse and longitudinal planes. The vessels showed normal color filling and compressibility. Doppler interrogation showed phasic and spontaneous flow. No DVT demonstrated bilateral lower extremity. Bilateral posterior tibial arteries are triphasic. Assessment         ICD-10-CM ICD-9-CM    1. Hypertensive left ventricular hypertrophy, without heart failure  I11.9 402.90     Stable continue treatment   2. Dyslipidemia  E78.5 272.4     High triglyceride and low HDL. Requires strict low carbohydrate diet exercise and weight loss   3. Leg edema, right  R60.0 782.3     Recent DVT study negative monitor follow-up with PCP   2/2019  Continue medical management insurance had refused nuclear stress test and echocardiogram exam stress echo EKG part inconclusive exercise to 79% of maximum predicted heart rate at this level of stress no EKG changes and no echo abnormality. Continue to monitor clinically and continue with medication including amlodipine aspirin and statin. Discussed cessation of cocaine use    3/2019  ACS with continued substernal chest pain with radiation to left arm and shoulder with SOB. She was unable to have NST and stress echo due to insurance denial.  Continue aspirin, statin and norvasc. Not on BB due to ongoing cocaine use (last used 2 weeks ago).  Will proceed with cardiac cath due to ongoing symptoms and risk factors. Pre-medicate with prednisone and benadryl to begin 2 days prior to cardiac cath. Pre-procedure labs and chest xray ordered. Discussed cessation of tobacco and cocaine use  9/2019  Noncompliant with follow-up. Continues to use cocaine. Did not keep her cath appointments. Will try and continue medical management. Advised back on aspirin. Discussed smoking cessation and cocaine cessation  9/2020  Cardiac status stable continue to work with cocaine cessation. As leg swelling and pain will check venous Doppler. 11/2020  Cardiac status stable. Dyslipidemia requires treatment with low carbohydrate diet exercise weight loss. Continue lifestyle modification. Venous Doppler negative for DVT,    There are no discontinued medications. No orders of the defined types were placed in this encounter. Follow-up and Dispositions    · Return in about 6 months (around 5/18/2021).

## 2021-04-18 ENCOUNTER — HOSPITAL ENCOUNTER (EMERGENCY)
Age: 51
Discharge: HOME | End: 2021-04-18
Attending: EMERGENCY MEDICINE
Payer: COMMERCIAL

## 2021-04-18 VITALS
BODY MASS INDEX: 36.44 KG/M2 | HEIGHT: 62 IN | RESPIRATION RATE: 18 BRPM | TEMPERATURE: 97.2 F | HEART RATE: 66 BPM | SYSTOLIC BLOOD PRESSURE: 123 MMHG | DIASTOLIC BLOOD PRESSURE: 79 MMHG | WEIGHT: 198 LBS | OXYGEN SATURATION: 96 %

## 2021-04-18 DIAGNOSIS — N30.00 ACUTE CYSTITIS WITHOUT HEMATURIA: Primary | ICD-10-CM

## 2021-04-18 DIAGNOSIS — M79.18 MUSCLE PAIN, LUMBAR: ICD-10-CM

## 2021-04-18 LAB
APPEARANCE UR: ABNORMAL
BACTERIA URNS QL MICRO: ABNORMAL /HPF
BILIRUB UR QL: NEGATIVE
COLOR UR: ABNORMAL
EPITH CASTS URNS QL MICRO: ABNORMAL /LPF (ref 0–5)
GLUCOSE UR STRIP.AUTO-MCNC: NEGATIVE MG/DL
HGB UR QL STRIP: NEGATIVE
HYALINE CASTS URNS QL MICRO: ABNORMAL /LPF (ref 0–2)
KETONES UR QL STRIP.AUTO: ABNORMAL MG/DL
LEUKOCYTE ESTERASE UR QL STRIP.AUTO: ABNORMAL
MUCOUS THREADS URNS QL MICRO: ABNORMAL /LPF
NITRITE UR QL STRIP.AUTO: NEGATIVE
PH UR STRIP: 6 [PH] (ref 5–8)
PROT UR STRIP-MCNC: 30 MG/DL
RBC #/AREA URNS HPF: ABNORMAL /HPF (ref 0–5)
SP GR UR REFRACTOMETRY: 1.02 (ref 1–1.03)
UROBILINOGEN UR QL STRIP.AUTO: 1 EU/DL (ref 0.2–1)
WBC URNS QL MICRO: ABNORMAL /HPF (ref 0–4)

## 2021-04-18 PROCEDURE — 99283 EMERGENCY DEPT VISIT LOW MDM: CPT

## 2021-04-18 PROCEDURE — 81001 URINALYSIS AUTO W/SCOPE: CPT

## 2021-04-18 RX ORDER — CEPHALEXIN 250 MG/1
250 CAPSULE ORAL 3 TIMES DAILY
Qty: 15 CAP | Refills: 0 | Status: SHIPPED | OUTPATIENT
Start: 2021-04-18 | End: 2021-04-23

## 2021-04-18 RX ORDER — METHOCARBAMOL 500 MG/1
500 TABLET, FILM COATED ORAL
Qty: 10 TAB | Refills: 0 | Status: SHIPPED | OUTPATIENT
Start: 2021-04-18 | End: 2021-04-23

## 2021-04-18 RX ORDER — DESVENLAFAXINE SUCCINATE 50 MG/1
50 TABLET, EXTENDED RELEASE ORAL DAILY
COMMUNITY

## 2021-04-18 NOTE — ED PROVIDER NOTES
EMERGENCY DEPARTMENT HISTORY AND PHYSICAL EXAM    9:28 AM      Date: 4/18/2021  Patient Name: Jannie Mohan    History of Presenting Illness     Chief Complaint   Patient presents with    Back Pain         History Provided By: Patient    Additional History (Context): Jannie Mohan is a 45 y.o. female with Past medical history of depression, cocaine abuse, hypertension, dental caries who presents with chief complaint of low back pain that started this morning. Denies any injury. She was going to trial Motrin for the pain with she did not have any more. Does state that she has some urinary hesitancy. Movement and bending forward does exacerbate the pain. Denies fever, dysuria, hematuria, and no chest pain, dizziness, numbness, and no bowel or bladder dysfunction, no other complaints.     PCP: Deniz Mustafa NP        Past History     Past Medical History:  Past Medical History:   Diagnosis Date    Asthma     Bronchitis     [facility]     Cocaine abuse ([facility] 3/26/2015    Dental caries     Depression     Depression 8/31/2016    Drug abuse ([facility]     Dysphasia 10/17/2018    Elevated hematocrit 8/31/2016    ETOH abuse     H/O screening mammography 12/06/2016    No evidence of malignancy     HPV (human papilloma virus) infection 11/2016    The patient referred to [facility]     Hypertension     Hypertensive left ventricular hypertrophy, without heart failure 10/15/2018    mild to moderate lvh continue bp meds    Ill-defined condition     Marijuana use 10/17/2018    Mood swings     Polycythemia 2018    Polycythemia     Psychiatric disorder     depression, Bipolar    Schatzki's ring 08/30/2018    Found on Barium Swallow    Sliding hiatal hernia 10/17/2018    Tobacco abuse     Vitamin D deficiency        Past Surgical History:  Past Surgical History:   Procedure Laterality Date    HX APPENDECTOMY      HX COLPOSCOPY  12/06/2016    Low-grade courtney/mild dysplasia RICK I  HX HERNIA REPAIR      3x       Family History:  Family History   Problem Relation Age of Onset    Hypertension Mother     Heart Attack Father     Hypertension Father     Cancer Father         skin    Cancer Sister         skin    Bipolar Disorder Sister     Cancer Brother         skin    Cancer Maternal Aunt         colon       Social History:  Social History     Tobacco Use    Smoking status: Current Every Day Smoker     Packs/day: 0.50     Years: 20.00     Pack years: 10.00     Types: Cigarettes    Smokeless tobacco: Never Used   Substance Use Topics    Alcohol use: Yes     Frequency: 2-4 times a month     Drinks per session: 1 or 2     Binge frequency: Never     Comment: socially    Drug use: Yes     Types: Cocaine, Marijuana     Comment: smokes cocaine-current user       Allergies: Allergies   Allergen Reactions    Iodinated Contrast Media Shortness of Breath         Review of Systems       Review of Systems   Constitutional: Negative for chills and fever. HENT: Negative for congestion and trouble swallowing. Respiratory: Negative for cough and wheezing. Cardiovascular: Negative for chest pain. Gastrointestinal: Negative for abdominal pain, nausea and vomiting. Endocrine: Negative for polyuria. Genitourinary: Negative for dysuria, flank pain, frequency and pelvic pain. Urinary hesitancy   Musculoskeletal: Positive for back pain. Negative for arthralgias, gait problem, neck pain and neck stiffness. Skin: Negative for pallor and rash. Neurological: Negative for dizziness, weakness, numbness and headaches. Hematological: Does not bruise/bleed easily. Psychiatric/Behavioral: Negative for confusion and dysphoric mood. All other systems reviewed and are negative.         Physical Exam     Visit Vitals  /79 (BP 1 Location: Right upper arm, BP Patient Position: Sitting)   Pulse 66   Temp 97.2 °F (36.2 °C)   Resp 18   Ht 5' 2\" (1.575 m)   Wt 89.8 kg (198 lb)   SpO2 96%   BMI 36.21 kg/m²         Physical Exam  Vitals signs and nursing note reviewed. Constitutional:       General: She is not in acute distress. Appearance: She is well-developed. She is not diaphoretic. HENT:      Head: Normocephalic and atraumatic. Eyes:      General: No scleral icterus. Conjunctiva/sclera: Conjunctivae normal.      Pupils: Pupils are equal, round, and reactive to light. Neck:      Musculoskeletal: Normal range of motion and neck supple. No muscular tenderness. Cardiovascular:      Rate and Rhythm: Normal rate. Pulses: Normal pulses. Comments: Capillary refill < 3 seconds  Pulmonary:      Effort: Pulmonary effort is normal. No respiratory distress. Breath sounds: Normal breath sounds. No stridor. No wheezing or rhonchi. Musculoskeletal: Normal range of motion. General: Tenderness present. No signs of injury. Comments: Tenderness of the paraspinal lumbar muscles bilaterally. No midline spinal tenderness    Has full range of motion bilateral upper and lower extremities    Strength 5 out of 5 bilateral upper and lower extremities with normal strength and symmetric throughout    Sensation intact    Normal distal pulses with normal capillary refill    Straight leg test is negative    Is ambulating in the ED   Skin:     General: Skin is warm and dry. Coloration: Skin is not pale. Neurological:      General: No focal deficit present. Mental Status: She is alert and oriented to person, place, and time. Cranial Nerves: No cranial nerve deficit. Sensory: No sensory deficit. Motor: No weakness. Coordination: Coordination normal.   Psychiatric:         Thought Content: Thought content normal.           Diagnostic Study Results     Labs -  No results found for this or any previous visit (from the past 12 hour(s)).     Radiologic Studies -   No orders to display         Medical Decision Making   I am the first provider for this patient. I reviewed the vital signs, available nursing notes, past medical history, past surgical history, family history and social history. Vital Signs-Reviewed the patient's vital signs. Records Reviewed: Nursing Notes and Old Medical Records (Time of Review: 9:42 AM)    Provider Notes (Medical Decision Making): DDx: Strain, spasm, UTI    No red flags for cord compression    We will check a urine    MDM    Medications - No data to display        ED Course: Progress Notes, Reevaluation, and Consults:  I have reassessed the patient. I have discussed the workup, results and plan with the patient and patient is in agreement. .  Patient will be prescribed Keflex, Robaxin. Patient was discharge in stable condition. Patient was given outpatient follow up. Patient is to return to emergency department if any new or worsening condition. Diagnosis     Clinical Impression:   1. Acute cystitis without hematuria    2. Muscle pain, lumbar        Disposition: Discharged    Follow-up Information     Follow up With Specialties Details Why Contact Info    Bryan Jeffrey NP Nurse Practitioner Schedule an appointment as soon as possible for a visit in 3 days  [address] [place] [phone]  [phone]      [facility] EMERGENCY DEPT Emergency Medicine  As needed, If symptoms worsen [address] [facility] [address]  [phone]           Discharge Medication List as of 4/18/2021 10:43 AM      START taking these medications    Details   cephALEXin (Keflex) 250 mg capsule Take 1 Cap by mouth three (3) times daily for 5 days. Indications: bacterial urinary tract infection, Normal, Disp-15 Cap, R-0      methocarbamoL (Robaxin) 500 mg tablet Take 1 Tab by mouth two (2) times daily as needed (pain or muscle spasms) for up to 5 days. , Normal, Disp-10 Tab, R-0         CONTINUE these medications which have NOT CHANGED    Details   desvenlafaxine succinate (Pristiq) 50 mg ER tablet Take 50 mg by mouth daily. , Historical Med      nystatin (MYCOSTATIN) topical cream Apply  to affected area three (3) times daily as needed for Skin Irritation. , Normal, Disp-15 g, R-0      !! albuterol (PROVENTIL HFA, VENTOLIN HFA, PROAIR HFA) 90 mcg/actuation inhaler Take 2 Puffs by inhalation every four (4) hours as needed for Wheezing., Normal, Disp-18 g, R-2      amLODIPine (NORVASC) 10 mg tablet TAKE ONE TABLET BY MOUTH DAILY, Normal, Disp-60 Tab, R-0      !! albuterol (PROVENTIL HFA, VENTOLIN HFA, PROAIR HFA) 90 mcg/actuation inhaler INHALE ONE TO TWO PUFFS BY MOUTH EVERY 4 HOURS AS NEEDED FOR WHEEZING, Normal, Disp-18 g, R-1      traZODone (DESYREL) 50 mg tablet Take 150 mg by mouth nightly., Historical Med      omeprazole (PRILOSEC) 20 mg capsule TAKE ONE CAPSULE BY MOUTH DAILY  Indications: stomach ulcer from aspirin/ibuprofen-like drugs prevention, Normal, Disp-30 Cap, R-2      fenofibrate nanocrystallized (TRICOR) 145 mg tablet Take 1 Tab by mouth daily. , Normal, Disp-30 Tab, R-3      ergocalciferol (ERGOCALCIFEROL) 1,250 mcg (50,000 unit) capsule TAKE ONE CAPSULE BY MOUTH ONCE WEEKLY, Normal, Disp-12 Cap, R-0      fluticasone propion-salmeteroL (Advair Diskus) 100-50 mcg/dose diskus inhaler Take 1 Puff by inhalation every twelve (12) hours. Indications: bronchospasm prevention with COPD, Normal, Disp-3 Inhaler, R-3      Blood Pressure Test Kit-Large kit Please provide BP cuff for hypertension diagnosis, Normal, Disp-1 Kit, R-0      losartan-hydroCHLOROthiazide (HYZAAR) 100-12.5 mg per tablet TAKE ONE TABLET BY MOUTH DAILY, Normal, Disp-90 Tab, R-1      atorvastatin (LIPITOR) 40 mg tablet TAKE ONE TABLET BY MOUTH DAILY, Normal, Disp-90 Tab, R-0      linaCLOtide (Linzess) 145 mcg cap capsule TAKE ONE CAPSULE BY MOUTH DAILY BEFORE BREAKFAST, Normal, Disp-30 Cap,R-0      aspirin delayed-release 81 mg tablet Take 1 Tab by mouth daily. , Normal, Disp-100 Tab, R-3      omega-3 acid ethyl esters (Lovaza) 1 gram capsule Take 2 Caps by mouth two (2) times a day., Normal, Disp-360 Cap, R-3      quetiapine fumarate (SEROQUEL PO) Take 300 Tabs by mouth nightly. 2 tablets, Historical Med      LORazepam (ATIVAN) 1 mg tablet Take  by mouth every four (4) hours as needed for Anxiety. , Historical Med      lamoTRIgine (LAMICTAL) 100 mg tablet Take 200 mg by mouth daily. , Historical Med       !! - Potential duplicate medications found. Please discuss with provider. DO Chuck Zhaoon medical dictation software was used for portions of this report. Unintended transcription errors may occur. My signature above authenticates this document and my orders, the final    diagnosis (es), discharge prescription (s), and instructions in the Epic    record.

## 2021-05-02 DIAGNOSIS — K21.9 GASTROESOPHAGEAL REFLUX DISEASE: ICD-10-CM

## 2021-05-05 RX ORDER — OMEPRAZOLE 20 MG/1
CAPSULE, DELAYED RELEASE ORAL
Qty: 29 CAP | Refills: 2 | Status: SHIPPED | OUTPATIENT
Start: 2021-05-05 | End: 2021-06-14

## 2021-06-07 ENCOUNTER — OFFICE VISIT (OUTPATIENT)
Dept: FAMILY MEDICINE CLINIC | Age: 51
End: 2021-06-07
Payer: COMMERCIAL

## 2021-06-07 VITALS
SYSTOLIC BLOOD PRESSURE: 112 MMHG | HEART RATE: 81 BPM | RESPIRATION RATE: 14 BRPM | OXYGEN SATURATION: 96 % | TEMPERATURE: 99.1 F | DIASTOLIC BLOOD PRESSURE: 73 MMHG

## 2021-06-07 DIAGNOSIS — J02.0 STREP SORE THROAT: Primary | ICD-10-CM

## 2021-06-07 DIAGNOSIS — R50.9 FEVER, UNSPECIFIED FEVER CAUSE: ICD-10-CM

## 2021-06-07 DIAGNOSIS — J02.0 STREP PHARYNGITIS: ICD-10-CM

## 2021-06-07 LAB
FLUAV+FLUBV AG NOSE QL IA.RAPID: NEGATIVE
FLUAV+FLUBV AG NOSE QL IA.RAPID: NEGATIVE
S PYO AG THROAT QL: POSITIVE
VALID INTERNAL CONTROL?: YES
VALID INTERNAL CONTROL?: YES

## 2021-06-07 PROCEDURE — 99213 OFFICE O/P EST LOW 20 MIN: CPT | Performed by: NURSE PRACTITIONER

## 2021-06-07 PROCEDURE — 87880 STREP A ASSAY W/OPTIC: CPT | Performed by: NURSE PRACTITIONER

## 2021-06-07 PROCEDURE — 87804 INFLUENZA ASSAY W/OPTIC: CPT | Performed by: NURSE PRACTITIONER

## 2021-06-07 RX ORDER — AZITHROMYCIN 250 MG/1
TABLET, FILM COATED ORAL
Qty: 6 TABLET | Refills: 0 | Status: SHIPPED | OUTPATIENT
Start: 2021-06-07 | End: 2021-06-12

## 2021-06-07 NOTE — PROGRESS NOTES
Abran Lim presents today for   Chief Complaint   Patient presents with    Sore Throat     started on 6/4/21    Sweats    Dysphagia       Is someone accompanying this pt? no    Is the patient using any DME equipment during OV? no    Travel and Exposure Screening was performed during check in or rooming process Yes    Depression Screening:  3 most recent PHQ Screens 2/26/2021   Little interest or pleasure in doing things Several days   Feeling down, depressed, irritable, or hopeless Several days   Total Score PHQ 2 2   Trouble falling or staying asleep, or sleeping too much Several days   Feeling tired or having little energy Several days   Poor appetite, weight loss, or overeating Several days   Feeling bad about yourself - or that you are a failure or have let yourself or your family down Several days   Trouble concentrating on things such as school, work, reading, or watching TV More than half the days   Moving or speaking so slowly that other people could have noticed; or the opposite being so fidgety that others notice More than half the days   Thoughts of being better off dead, or hurting yourself in some way More than half the days   PHQ 9 Score 12       Fall Risk  No flowsheet data found.     This Visit Test  Results for orders placed or performed during the hospital encounter of 04/18/21   URINALYSIS W/ RFLX MICROSCOPIC   Result Value Ref Range    Color DARK YELLOW      Appearance TURBID      Specific gravity 1.022 1.005 - 1.030      pH (UA) 6.0 5.0 - 8.0      Protein 30 (A) NEG mg/dL    Glucose Negative NEG mg/dL    Ketone TRACE (A) NEG mg/dL    Bilirubin Negative NEG      Blood Negative NEG      Urobilinogen 1.0 0.2 - 1.0 EU/dL    Nitrites Negative NEG      Leukocyte Esterase MODERATE (A) NEG     URINE MICROSCOPIC ONLY   Result Value Ref Range    WBC 8 to 12 0 - 4 /hpf    RBC NONE 0 - 5 /hpf    Epithelial cells 2+ 0 - 5 /lpf    Bacteria 2+ (A) NEG /hpf    Mucus 2+ (A) NEG /lpf    Hyaline cast 4 to 5 0 - 2 /lpf

## 2021-06-07 NOTE — PROGRESS NOTES
SUBJECTIVE:  Chief Complaint   Patient presents with    Sore Throat     started on 6/4/21    Sweats    Dysphagia     Patient denies recent travel. Pt exposed to sick contacts under investigations for possible Covid NO. Patient reports in addition to her sore throat, sweating she has had a bit of a cough with some yellow sputum. Patient says she is a every day smoker. Advised patient if she is having an episode of bronchitis that the azithromycin would also cover it. Patient declined being tested for Covid. Patient reports she had her second Covid vaccine June 1, 2021. Pt is a current smoker. OBJECTIVE    Visit Vitals  /73 (BP 1 Location: Right upper arm, BP Patient Position: Sitting)   Pulse 81   Temp 99.1 °F (37.3 °C) (Oral)   Resp 14   SpO2 96%      General:  healthy, alert, well developed, well nourished, cooperative, pleasant and in no apparent distress. Sick but not toxic appearing. Eyes:   The lids are without swelling, lesions, or drainage. The conjunctiva is clear and noninjected. ENT:  Slight pharynx erythematous, no neck nodes or sinus tenderness. Neck: normal, supple and no adenopathy. Lungs/CV: clear to auscultation, no wheezes or rales and unlabored breathing. Heart: regular rhythm normal rate, no murmurs, rubs or gallop. Skin:  No rashes, no jaundice. ASSESSMENT / PLAN     ICD-10-CM ICD-9-CM    1. Strep sore throat  J02.0 034.0 AMB POC RAPID INFLUENZA TEST      AMB POC RAPID STREP A      azithromycin (ZITHROMAX) 250 mg tablet   2. Fever, unspecified fever cause  R50.9 780.60 AMB POC RAPID INFLUENZA TEST      AMB POC RAPID STREP A   3.  Strep pharyngitis  J02.0 034.0      Results for orders placed or performed in visit on 06/07/21   AMB POC RAPID INFLUENZA TEST   Result Value Ref Range    VALID INTERNAL CONTROL POC Yes     Influenza A Ag POC Negative Negative    Influenza B Ag POC Negative Negative   AMB POC RAPID STREP A   Result Value Ref Range    VALID INTERNAL CONTROL POC Yes     Group A Strep Ag Positive Negative     Positive for strep. Negative for flu. Based on CDC recommendations and limited testing supplies, only those patients who meet criteria will be tested for Covid. High priority groups for testing   Symptomatic and/or Exposure /Test for Covid  Immunocompromised host (on prednisone, biological therapy, blood cancer, metastatic cancer or active chemotherapy)   [facility] care worker in the home    Other high-risk group: o age >47   o Uncontrolled DM   o Uncontrolled HTN   o BMI >40, CKD/ESRD    Dialysis patients (patients going to HD units, not asymptomatic home HD/PD)    Anyone living in a congregate setting     Non High-risk patient category           Test for COVID-19   Asymptomatic, no known exposure  No    Asymptomatic, possible exposure  No    Asymptomatic, definite exposure  Provider discretion    Symptomatic, no known exposure  Yes    Symptomatic, + exposure  Yes      Patient does  meet criteria for Covid testing. COVID-19 testing was not completed. Work note was given until [employer] are available. If Covid testing was completed and is negative, patient may return back to work despite quarantine originally set in place if applicable. Instructed pt on the importance of rest, fluid intake (with avoidance of red fluids), zinc and vit C supplements. Instructed pt to check temp if possible and to take acetaminophen or NSAIDs if fevers are noted. Instructed patient to remember to wash hands, disinfect surroundings, and avoid touching face. Instructed pt to remain home and and self quarantine until Covid results are negative and all symptoms have improved or subsided. If they must leave home, wear a mask. Patient verbalized understanding. We have provided the patient with a detailed after visit summary which was reviewed, and red flag symptoms that would warrant an ER visit were emphasized.     CC'd chart to PCP: Yes: Comment: GERARDO Ramirez, YEE    This visit was provided as a focused evaluation during the COVID -19 pandemic/national emergency. A comprehensive review of all previous patient history and testing was not conducted. Pertinent findings were elicited during the visit.

## 2021-06-13 DIAGNOSIS — E55.9 VITAMIN D DEFICIENCY: ICD-10-CM

## 2021-06-14 RX ORDER — ERGOCALCIFEROL 1.25 MG/1
CAPSULE ORAL
Qty: 12 CAPSULE | Refills: 0 | Status: SHIPPED | OUTPATIENT
Start: 2021-06-14 | End: 2021-09-10

## 2021-07-14 ENCOUNTER — OFFICE VISIT (OUTPATIENT)
Dept: CARDIOLOGY CLINIC | Age: 51
End: 2021-07-14
Payer: COMMERCIAL

## 2021-07-14 VITALS
HEART RATE: 82 BPM | BODY MASS INDEX: 36.22 KG/M2 | DIASTOLIC BLOOD PRESSURE: 68 MMHG | OXYGEN SATURATION: 97 % | WEIGHT: 196.8 LBS | SYSTOLIC BLOOD PRESSURE: 143 MMHG | HEIGHT: 62 IN

## 2021-07-14 DIAGNOSIS — I10 ESSENTIAL HYPERTENSION: ICD-10-CM

## 2021-07-14 DIAGNOSIS — R07.9 CHEST PAIN, UNSPECIFIED TYPE: ICD-10-CM

## 2021-07-14 DIAGNOSIS — R06.02 SHORTNESS OF BREATH: ICD-10-CM

## 2021-07-14 DIAGNOSIS — E78.5 DYSLIPIDEMIA: ICD-10-CM

## 2021-07-14 DIAGNOSIS — E78.1 HYPERTRIGLYCERIDEMIA: ICD-10-CM

## 2021-07-14 DIAGNOSIS — I11.9 HYPERTENSIVE LEFT VENTRICULAR HYPERTROPHY, WITHOUT HEART FAILURE: Primary | ICD-10-CM

## 2021-07-14 PROCEDURE — 93000 ELECTROCARDIOGRAM COMPLETE: CPT | Performed by: INTERNAL MEDICINE

## 2021-07-14 PROCEDURE — 99214 OFFICE O/P EST MOD 30 MIN: CPT | Performed by: INTERNAL MEDICINE

## 2021-07-14 RX ORDER — OMEGA-3-ACID ETHYL ESTERS 1 G/1
CAPSULE, LIQUID FILLED ORAL
Qty: 360 CAPSULE | Refills: 3 | Status: SHIPPED | OUTPATIENT
Start: 2021-07-14 | End: 2022-06-02 | Stop reason: SDUPTHER

## 2021-07-14 NOTE — PROGRESS NOTES
HISTORY OF PRESENT ILLNESS  Luis M Gomez is a 45 y.o. female. Recent er visit with atypical non anginal pain. Patient was in emergency room yesterday with atypical chest pain. 1/2019. ER notes and data reviewed  3/2020  Patient is seen in office for follow-up. She was in ER last 12/2019. Records reviewed  7/2020  Patient is seen today for follow-up. She has been having episode of new onset chest pain described as substernal pain uneasy feeling that occasionally in the right side of the neck. Happens at rest.  At times last for half an hour. Patient has had an episode of palpitation and she is getting short of breath on activity exertion    Hypertension  The history is provided by the patient. This is a chronic problem. The problem occurs constantly. The problem has not changed since onset. Associated symptoms include chest pain and shortness of breath. Chest Pain (Angina)   The history is provided by the patient. This is a recurrent problem. The problem has not changed since onset. The problem occurs every several days. The pain is associated with exertion and rest. The pain is present in the substernal region. The pain is mild. The quality of the pain is described as dull. Associated symptoms include shortness of breath. Pertinent negatives include no claudication, no cough, no dizziness, no fever, no hemoptysis, no nausea, no orthopnea, no palpitations, no PND, no sputum production, no vomiting and no weakness. Palpitations   The history is provided by the patient. This is a recurrent problem. The problem has not changed since onset. The problem occurs every several days. The problem is associated with nothing. Associated symptoms include chest pain and shortness of breath. Pertinent negatives include no fever, no claudication, no orthopnea, no PND, no nausea, no vomiting, no dizziness, no weakness, no cough, no hemoptysis and no sputum production.  Her past medical history is significant for hypertension. Follow-up  Associated symptoms include chest pain and shortness of breath. Review of Systems   Constitutional: Negative for chills and fever. HENT: Negative for nosebleeds. Eyes: Negative for blurred vision and double vision. Respiratory: Positive for shortness of breath. Negative for cough, hemoptysis, sputum production and wheezing. Cardiovascular: Positive for chest pain. Negative for palpitations, orthopnea, claudication, leg swelling and PND. Gastrointestinal: Negative for heartburn, nausea and vomiting. Musculoskeletal: Negative for myalgias. Skin: Negative for rash. Neurological: Negative for dizziness and weakness. Endo/Heme/Allergies: Does not bruise/bleed easily.      Family History   Problem Relation Age of Onset    Hypertension Mother     Heart Attack Father     Hypertension Father     Cancer Father         skin    Cancer Sister         skin    Bipolar Disorder Sister     Cancer Brother         skin    Cancer Maternal Aunt         colon       Past Medical History:   Diagnosis Date    Asthma     Bronchitis     CHF (congestive heart failure) ([address]     Cocaine abuse ([facility] 3/26/2015    Dental caries     Depression     Depression 8/31/2016    Drug abuse ([facility]     Dysphasia 10/17/2018    Elevated hematocrit 8/31/2016    ETOH abuse     H/O screening mammography 12/06/2016    No evidence of malignancy     HPV (human papilloma virus) infection 11/2016    The patient referred to [facility]     Hypertension     Hypertensive left ventricular hypertrophy, without heart failure 10/15/2018    mild to moderate lvh continue bp meds    Ill-defined condition     Marijuana use 10/17/2018    Mood swings     Polycythemia 2018    Polycythemia     Psychiatric disorder     depression, Bipolar    Schatzki's ring 08/30/2018    Found on Barium Swallow    Sliding hiatal hernia 10/17/2018    Tobacco abuse     Vitamin D deficiency        Past Surgical History: Procedure Laterality Date    HX APPENDECTOMY      HX COLPOSCOPY  12/06/2016    Low-grade courtney/mild dysplasia RICK I    HX HERNIA REPAIR      3x       Social History     Tobacco Use    Smoking status: Current Every Day Smoker     Packs/day: 0.50     Years: 20.00     Pack years: 10.00     Types: Cigarettes    Smokeless tobacco: Never Used   Substance Use Topics    Alcohol use: Yes     Comment: socially       Allergies   Allergen Reactions    Iodinated Contrast Media Shortness of Breath       Prior to Admission medications    Medication Sig Start Date End Date Taking? Authorizing Provider   omega-3 acid ethyl esters (LOVAZA) 1 gram capsule TAKE TWO CAPSULES BY MOUTH TWICE A DAY 7/14/21  Yes Latrice Varela MD   amLODIPine (NORVASC) 10 mg tablet TAKE ONE TABLET BY MOUTH DAILY 6/27/21  Yes Mirza Becerra NP   omeprazole (PRILOSEC) 20 mg capsule TAKE ONE CAPSULE BY MOUTH DAILY 6/14/21  Yes Mirza Becerra NP   ergocalciferol (ERGOCALCIFEROL) 1,250 mcg (50,000 unit) capsule TAKE ONE CAPSULE BY MOUTH ONCE WEEKLY 6/14/21  Yes Mirza Becerra NP   fenofibrate nanocrystallized (TRICOR) 145 mg tablet TAKE ONE TABLET BY MOUTH DAILY 6/3/21  Yes Mirza Becerra NP   nystatin (MYCOSTATIN) topical cream APPLY TO AFFECTED AREA(S) THREE TIMES A DAY AS NEEDED 6/3/21  Yes Mirza Becerra NP   desvenlafaxine succinate (Pristiq) 50 mg ER tablet Take 50 mg by mouth daily. Yes Kash, MD Montrell   albuterol (PROVENTIL HFA, VENTOLIN HFA, PROAIR HFA) 90 mcg/actuation inhaler INHALE ONE TO TWO PUFFS BY MOUTH EVERY 4 HOURS AS NEEDED FOR WHEEZING 4/1/21  Yes Mirza Becerra NP   traZODone (DESYREL) 100 mg tablet Take 100 mg by mouth nightly. 1/15/21  Yes Slick Boo   fluticasone propion-salmeteroL (Advair Diskus) 100-50 mcg/dose diskus inhaler Take 1 Puff by inhalation every twelve (12) hours.  Indications: bronchospasm prevention with COPD 1/24/21  Yes Mirza Becerra NP   Blood Pressure Test Kit-Large kit Please provide BP cuff for hypertension diagnosis 1/22/21  Yes Leonidas Vaughn NP   losartan-hydroCHLOROthiazide (HYZAAR) 100-12.5 mg per tablet TAKE ONE TABLET BY MOUTH DAILY 12/20/20  Yes Leonidas Vaughn NP   atorvastatin (LIPITOR) 40 mg tablet TAKE ONE TABLET BY MOUTH DAILY 12/20/20  Yes Leonidas Vaughn NP   linaCLOtide (Linzess) 145 mcg cap capsule TAKE ONE CAPSULE BY MOUTH DAILY BEFORE BREAKFAST 10/25/20  Yes Leonidas Vaughn NP   aspirin delayed-release 81 mg tablet Take 1 Tab by mouth daily. 5/26/20  Yes Koby Diamond NP   quetiapine fumarate (SEROQUEL PO) Take 300 Tabs by mouth nightly. 2 tablets   Yes Provider, Historical   LORazepam (ATIVAN) 1 mg tablet Take  by mouth every four (4) hours as needed for Anxiety. Yes Provider, Historical   lamoTRIgine (LAMICTAL) 100 mg tablet Take 200 mg by mouth daily. Yes Provider, Historical         Visit Vitals  BP (!) 143/68 (BP 1 Location: Left upper arm, BP Patient Position: Sitting, BP Cuff Size: Large adult)   Pulse 82   Ht 5' 2\" (1.575 m)   Wt 89.3 kg (196 lb 12.8 oz)   SpO2 97%   BMI 36.00 kg/m²         Physical Exam  Constitutional:       Appearance: She is well-developed. HENT:      Head: Normocephalic and atraumatic. Eyes:      Conjunctiva/sclera: Conjunctivae normal.   Neck:      Thyroid: No thyromegaly. Vascular: No JVD. Trachea: No tracheal deviation. Cardiovascular:      Rate and Rhythm: Normal rate and regular rhythm. Chest Wall: PMI is not displaced. Pulses: No decreased pulses. Heart sounds: Murmur heard. Early systolic murmur is present at the upper right sternal border and lower left sternal border. No gallop. No S3 sounds. Pulmonary:      Effort: No respiratory distress. Breath sounds: No wheezing or rales. Chest:      Chest wall: No tenderness. Abdominal:      Palpations: Abdomen is soft. Tenderness: There is no abdominal tenderness. Musculoskeletal:      Cervical back: Neck supple. Skin:     General: Skin is warm. Neurological:      Mental Status: She is alert and oriented to person, place, and time. Ms. Ck Thompson has a reminder for a \"due or due soon\" health maintenance. I have asked that she contact her primary care provider for follow-up on this health maintenance. I have personally reviewed patients ekg done at other facility. SUMMARY:2017  Left ventricle: Systolic function was normal. Ejection fraction was  estimated in the range of 55 % to 60 %. No obvious wall motion  abnormalities identified in the views obtained. Wall thickness was mildly  to moderately increased. There was mild concentric hypertrophy. NUCLEAR [id number]    Findings:   1. Post-stress imaging in short axis, horizontal and vertical long axis views reveals normal isotope uptake in all areas. 2. Resting images also show normal isotope uptake in all areas. 3. Gated images show normal left ventricular size, wall motion and systolic function. Ejection fraction is 71%. Diagnosis:   1. Normal scan. 2. No evidence of significant fixed or reversible defect suggesting ischemia or myocardial infarction noted from this nuclear study. 3. Low risk scan. Interpretation Summary        · Baseline ECG: Normal sinus rhythm, Minimal voltage criteria for LVH , maybe normal variant Septal infarct, age undetermined. · Inconclusive stress electrocardiogram.  · Low risk study. Submaximal stress test.  If clinically indicated, recommend nuclear stress test.      Stress Findings     ECG Baseline ECG: Normal sinus rhythm, Minimal voltage criteria for LVH , maybe normal variant  Septal infarct, age undetermined. Arrhythmias during stress: rare PACs. There was no ST segment deviation noted during stress. Arrhythmias during recovery: rare PACs. Arrhythmias were not significant.    Stress Findings A stress test was performed following a modified Sathish protocol. The test was stopped because the patient complained of shortness of breath. The patient reported no angina during stress. Leg pain   The patient's response to exercise was inadequate for diagnosis. Blood pressure demonstrated a normal response to exercise. Overall, the patient's exercise capacity was normal.   Angina Index is 0. Inconclusive stress electrocardiogram for inducible myocardial ischemia. Patient unable to reach target heart rate   Stress Measurements     Baseline Vitals   Baseline HR 67 BPM      Baseline BP 80/57 mmHg       Peak Stress Vitals   Post peak  BPM      Stress Base Systolic  mmHg      Stress Base Diastolic BP 70 mmHg      Estimated workload 7 METS      Stress Rate Pressure Product 12,420 BPM*mmHg       Exercise Data   Target  bpm      Percent HR 79 %      Exercise duration time           Interpretation Summary 9/2020       · No evidence of acute deep vein thrombosis in the right common femoral, superficial femoral, popliteal, posterior tibial, and peroneal veins. The veins were imaged in the transverse and longitudinal planes. The vessels showed normal color filling and compressibility. Doppler interrogation showed phasic and spontaneous flow. · No evidence of acute deep vein thrombosis in the left common femoral, superficial femoral, popliteal, posterior tibial, and peroneal veins. The veins were imaged in the transverse and longitudinal planes. The vessels showed normal color filling and compressibility. Doppler interrogation showed phasic and spontaneous flow. No DVT demonstrated bilateral lower extremity. Bilateral posterior tibial arteries are triphasic. Results for Ofelia Fournier (MRN [id number]) as of 7/14/2021 10:49   Ref.  Range 2/23/2021 13:29   Triglyceride Latest Ref Range: <150 MG/ (H)   Cholesterol, total Latest Ref Range: <200 MG/   HDL Cholesterol Latest Ref Range: 40 - 60 MG/DL 35 (L)   CHOL/HDL Ratio Latest Ref Range: 0 - 5.0   3.7   VLDL, calculated Latest Units: MG/DL 53   LDL, calculated Latest Ref Range: 0 - 100 MG/DL 40     Assessment         ICD-10-CM ICD-9-CM    1. Hypertensive left ventricular hypertrophy, without heart failure  I11.9 402.90 ECHO ADULT COMPLETE      NUCLEAR CARDIAC STRESS TEST    Stable continue current medical management monitor   2. Essential hypertension  I10 401.9     Stable monitor   3. Chest pain, unspecified type  R07.9 786.50 AMB POC EKG ROUTINE W/ 12 LEADS, INTER & REP      ECHO ADULT COMPLETE      NUCLEAR CARDIAC STRESS TEST    Recurrent. Possible angina, mild GI issue, chest wall. Patient is unable to exercise on treadmill we will use Lexiscan   4. Dyslipidemia  E78.5 272.4 HEPATIC FUNCTION PANEL      LIPID PANEL    Continue dietary modification. Lovaza is being done. Follow-up lab with PCP   5. Shortness of breath  R06.02 786.05 ECHO ADULT COMPLETE      NUCLEAR CARDIAC STRESS TEST    Rule out LV dysfunction. Cardiomyopathy. 6. Hypertriglyceridemia  E78.1 272.1 omega-3 acid ethyl esters (LOVAZA) 1 gram capsule   2/2019  Continue medical management insurance had refused nuclear stress test and echocardiogram exam stress echo EKG part inconclusive exercise to 79% of maximum predicted heart rate at this level of stress no EKG changes and no echo abnormality. Continue to monitor clinically and continue with medication including amlodipine aspirin and statin. Discussed cessation of cocaine use    3/2019  ACS with continued substernal chest pain with radiation to left arm and shoulder with SOB. She was unable to have NST and stress echo due to insurance denial.  Continue aspirin, statin and norvasc. Not on BB due to ongoing cocaine use (last used 2 weeks ago). Will proceed with cardiac cath due to ongoing symptoms and risk factors. Pre-medicate with prednisone and benadryl to begin 2 days prior to cardiac cath. Pre-procedure labs and chest xray ordered.   Discussed cessation of tobacco and cocaine use  9/2019  Noncompliant with follow-up. Continues to use cocaine. Did not keep her cath appointments. Will try and continue medical management. Advised back on aspirin. Discussed smoking cessation and cocaine cessation  9/2020  Cardiac status stable continue to work with cocaine cessation. As leg swelling and pain will check venous Doppler. 11/2020  Cardiac status stable. Dyslipidemia requires treatment with low carbohydrate diet exercise weight loss. Continue lifestyle modification. Venous Doppler negative for DVT,  7/2021  New onset of chest pain or shortness of breath. Rule out ischemia or LV dysfunction. Set up for stress test and echo unable to exercise we will set up for [facility]. Continue lifestyle modification. High triglyceride. Refill sent for Lovaza. Discussed compliance  Medications Discontinued During This Encounter   Medication Reason    albuterol (PROVENTIL HFA, VENTOLIN HFA, PROAIR HFA) 90 mcg/actuation inhaler DUPLICATE ORDER    omega-3 acid ethyl esters (LOVAZA) 1 gram capsule REORDER       Orders Placed This Encounter    HEPATIC FUNCTION PANEL     Standing Status:   Future     Standing Expiration Date:   1/12/2022    LIPID PANEL     Standing Status:   Future     Standing Expiration Date:   1/12/2022    AMB POC EKG ROUTINE W/ 12 LEADS, INTER & REP     Order Specific Question:   Reason for Exam:     Answer:   CHest pain    ECHO ADULT COMPLETE     Standing Status:   Future     Standing Expiration Date:   7/14/2022     Order Specific Question:   Contrast Enhancement (Bubble Study, Definity, Optison) may be used if criteria listed in established evidence-based protocol has been identified. Answer: Yes    omega-3 acid ethyl esters (LOVAZA) 1 gram capsule     Sig: TAKE TWO CAPSULES BY MOUTH TWICE A DAY     Dispense:  360 Capsule     Refill:  3       Follow-up and Dispositions    · Return for F/u after tests, Follow-up with Koby.

## 2021-07-14 NOTE — PROGRESS NOTES
1. Have you been to the ER, urgent care clinic since your last visit? Hospitalized since your last visit? Yes     When: 4- Where: [facility] Reason for visit: back pain     2. Have you seen or consulted any other health care providers outside of the [address] [place] [address] since your last visit? Include any pap smears or colon screening. No        3. Do you need any refills today?    No

## 2021-07-29 ENCOUNTER — TELEPHONE (OUTPATIENT)
Dept: CARDIOLOGY CLINIC | Age: 51
End: 2021-07-29

## 2021-07-29 NOTE — TELEPHONE ENCOUNTER
Called patient regarding nuc stress test and echo per Dr. Gabrielle Serrano. Patient phone just rang couldn't leave message will try later.

## 2021-07-29 NOTE — TELEPHONE ENCOUNTER
Insurance wants a peer to peer for Nuclear Stress test and echocardiogram scheduled, please call [phone] tracking # [id number]

## 2021-08-03 ENCOUNTER — TELEPHONE (OUTPATIENT)
Dept: CARDIOLOGY CLINIC | Age: 51
End: 2021-08-03

## 2021-08-03 ENCOUNTER — TELEPHONE (OUTPATIENT)
Dept: ONCOLOGY | Age: 51
End: 2021-08-03

## 2021-08-03 NOTE — TELEPHONE ENCOUNTER
Per Emily Cali tried contacting patient to schedule appt since hasn't been seen by Dr. Jeffery Harding 2019. Phone rings @5 times and disconnects.

## 2021-08-03 NOTE — TELEPHONE ENCOUNTER
Patient called regarding Nuclear Stress and Echocardiogram being denied by insurance. Advise that records were sent to the insurance company for approval and was denied. Advised patient that insurance wants a regular treadmill test, patient refuses and states that Angelica Amado will fall and magdi us if we walk her in the treadmill. \" advised if symptoms were worsening then she needed to go to ER for treatment. Patient states that she wants a call back.  Please call her at [phone]

## 2021-08-04 NOTE — TELEPHONE ENCOUNTER
[id number]    Nuclear stress test is approved. Echo is not approved.   Above is a nuclear stress test approval

## 2021-08-05 ENCOUNTER — APPOINTMENT (OUTPATIENT)
Dept: INFUSION THERAPY | Age: 51
End: 2021-08-05

## 2021-08-08 DIAGNOSIS — R06.02 SHORTNESS OF BREATH: ICD-10-CM

## 2021-08-09 RX ORDER — ALBUTEROL SULFATE 90 UG/1
AEROSOL, METERED RESPIRATORY (INHALATION)
Qty: 18 G | Refills: 0 | Status: SHIPPED | OUTPATIENT
Start: 2021-08-09 | End: 2021-09-10

## 2021-08-10 DIAGNOSIS — E78.5 DYSLIPIDEMIA: ICD-10-CM

## 2021-08-10 DIAGNOSIS — I10 ESSENTIAL HYPERTENSION: ICD-10-CM

## 2021-08-14 RX ORDER — LOSARTAN POTASSIUM AND HYDROCHLOROTHIAZIDE 12.5; 1 MG/1; MG/1
TABLET ORAL
Qty: 28 TABLET | Refills: 0 | Status: SHIPPED | OUTPATIENT
Start: 2021-08-14 | End: 2021-12-06

## 2021-08-14 RX ORDER — ATORVASTATIN CALCIUM 40 MG/1
TABLET, FILM COATED ORAL
Qty: 90 TABLET | Refills: 0 | Status: SHIPPED | OUTPATIENT
Start: 2021-08-14 | End: 2021-11-11

## 2021-09-10 DIAGNOSIS — R06.02 SHORTNESS OF BREATH: ICD-10-CM

## 2021-09-10 DIAGNOSIS — E55.9 VITAMIN D DEFICIENCY: ICD-10-CM

## 2021-09-10 RX ORDER — ALBUTEROL SULFATE 90 UG/1
AEROSOL, METERED RESPIRATORY (INHALATION)
Qty: 18 G | Refills: 0 | Status: SHIPPED | OUTPATIENT
Start: 2021-09-10 | End: 2021-10-06

## 2021-09-10 RX ORDER — ERGOCALCIFEROL 1.25 MG/1
CAPSULE ORAL
Qty: 12 CAPSULE | Refills: 0 | Status: SHIPPED | OUTPATIENT
Start: 2021-09-10 | End: 2021-12-10

## 2021-10-04 DIAGNOSIS — R06.02 SHORTNESS OF BREATH: ICD-10-CM

## 2021-10-06 RX ORDER — ALBUTEROL SULFATE 90 UG/1
AEROSOL, METERED RESPIRATORY (INHALATION)
Qty: 18 G | Refills: 0 | Status: SHIPPED | OUTPATIENT
Start: 2021-10-06 | End: 2021-11-08

## 2021-10-14 ENCOUNTER — TELEPHONE (OUTPATIENT)
Dept: FAMILY MEDICINE CLINIC | Age: 51
End: 2021-10-14

## 2021-11-03 DIAGNOSIS — I10 ESSENTIAL HYPERTENSION: ICD-10-CM

## 2021-11-04 DIAGNOSIS — E55.9 VITAMIN D DEFICIENCY: ICD-10-CM

## 2021-11-04 DIAGNOSIS — E78.5 DYSLIPIDEMIA: ICD-10-CM

## 2021-11-04 DIAGNOSIS — I10 HYPERTENSION, UNSPECIFIED TYPE: Primary | ICD-10-CM

## 2021-11-04 DIAGNOSIS — R73.03 PREDIABETES: ICD-10-CM

## 2021-11-04 RX ORDER — AMLODIPINE BESYLATE 10 MG/1
TABLET ORAL
Qty: 30 TABLET | Refills: 0 | Status: SHIPPED | OUTPATIENT
Start: 2021-11-04 | End: 2021-12-06

## 2021-11-08 DIAGNOSIS — R06.02 SHORTNESS OF BREATH: ICD-10-CM

## 2021-11-08 RX ORDER — ALBUTEROL SULFATE 90 UG/1
AEROSOL, METERED RESPIRATORY (INHALATION)
Qty: 18 G | Refills: 0 | Status: SHIPPED | OUTPATIENT
Start: 2021-11-08 | End: 2021-12-10

## 2021-11-11 DIAGNOSIS — I10 ESSENTIAL HYPERTENSION: ICD-10-CM

## 2021-11-11 DIAGNOSIS — E78.5 DYSLIPIDEMIA: ICD-10-CM

## 2021-11-11 RX ORDER — ATORVASTATIN CALCIUM 40 MG/1
TABLET, FILM COATED ORAL
Qty: 90 TABLET | Refills: 0 | Status: SHIPPED | OUTPATIENT
Start: 2021-11-11 | End: 2022-02-04 | Stop reason: SDUPTHER

## 2021-11-18 DIAGNOSIS — E78.1 HYPERTRIGLYCERIDEMIA: ICD-10-CM

## 2021-11-22 RX ORDER — FENOFIBRATE 145 MG/1
TABLET, COATED ORAL
Qty: 90 TABLET | Refills: 0 | Status: SHIPPED | OUTPATIENT
Start: 2021-11-22 | End: 2022-06-02 | Stop reason: SDUPTHER

## 2021-12-01 ENCOUNTER — OFFICE VISIT (OUTPATIENT)
Dept: FAMILY MEDICINE CLINIC | Age: 51
End: 2021-12-01
Payer: COMMERCIAL

## 2021-12-01 VITALS
TEMPERATURE: 98 F | HEIGHT: 62 IN | SYSTOLIC BLOOD PRESSURE: 110 MMHG | BODY MASS INDEX: 35.51 KG/M2 | RESPIRATION RATE: 16 BRPM | OXYGEN SATURATION: 96 % | WEIGHT: 193 LBS | DIASTOLIC BLOOD PRESSURE: 72 MMHG | HEART RATE: 71 BPM

## 2021-12-01 DIAGNOSIS — E66.01 SEVERE OBESITY (HCC): ICD-10-CM

## 2021-12-01 DIAGNOSIS — R05.9 COUGH: ICD-10-CM

## 2021-12-01 DIAGNOSIS — I10 ESSENTIAL HYPERTENSION: Primary | ICD-10-CM

## 2021-12-01 DIAGNOSIS — Z23 NEEDS FLU SHOT: ICD-10-CM

## 2021-12-01 DIAGNOSIS — R73.03 PREDIABETES: ICD-10-CM

## 2021-12-01 DIAGNOSIS — E78.1 HYPERTRIGLYCERIDEMIA: ICD-10-CM

## 2021-12-01 DIAGNOSIS — R49.0 RASPY VOICE: ICD-10-CM

## 2021-12-01 LAB — HBA1C MFR BLD HPLC: 5.5 %

## 2021-12-01 PROCEDURE — 83036 HEMOGLOBIN GLYCOSYLATED A1C: CPT | Performed by: NURSE PRACTITIONER

## 2021-12-01 PROCEDURE — 90686 IIV4 VACC NO PRSV 0.5 ML IM: CPT | Performed by: NURSE PRACTITIONER

## 2021-12-01 PROCEDURE — 99214 OFFICE O/P EST MOD 30 MIN: CPT | Performed by: NURSE PRACTITIONER

## 2021-12-01 RX ORDER — CLONAZEPAM 1 MG/1
1 TABLET ORAL DAILY
COMMUNITY
Start: 2021-10-20
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
## 2021-12-01 NOTE — PROGRESS NOTES
Irish White presents today for   Chief Complaint   Patient presents with    Hypertension     follow-up       Is someone accompanying this pt? no    Is the patient using any DME equipment during OV? no    Depression Screening:  3 most recent PHQ Screens 12/1/2021   Little interest or pleasure in doing things -   Feeling down, depressed, irritable, or hopeless Several days   Total Score PHQ 2 -   Trouble falling or staying asleep, or sleeping too much -   Feeling tired or having little energy -   Poor appetite, weight loss, or overeating -   Feeling bad about yourself - or that you are a failure or have let yourself or your family down -   Trouble concentrating on things such as school, work, reading, or watching TV -   Moving or speaking so slowly that other people could have noticed; or the opposite being so fidgety that others notice -   Thoughts of being better off dead, or hurting yourself in some way -   PHQ 9 Score -       Learning Assessment:  Learning Assessment 10/30/2019   PRIMARY LEARNER Patient   HIGHEST LEVEL OF EDUCATION - PRIMARY LEARNER  SOME COLLEGE   BARRIERS PRIMARY LEARNER NONE   CO-LEARNER CAREGIVER No   PRIMARY LANGUAGE ENGLISH   LEARNER PREFERENCE PRIMARY READING   ANSWERED BY patient   RELATIONSHIP SELF       Health Maintenance reviewed and discussed and ordered per Provider. Health Maintenance Due   Topic Date Due    COVID-19 Vaccine (1) Never done    Pneumococcal 0-64 years (1 of 2 - PPSV23) Never done    DTaP/Tdap/Td series (1 - Tdap) Never done    Colorectal Cancer Screening Combo  Never done    Shingrix Vaccine Age 50> (1 of 2) Never done    Flu Vaccine (1) 09/01/2021    Cervical cancer screen  11/21/2021   . Coordination of Care:  1. Have you been to the ER, urgent care clinic since your last visit? Hospitalized since your last visit? no    2. Have you seen or consulted any other health care providers outside of the [address] [place] [address] since your last visit? Include any pap smears or colon screening.  no

## 2021-12-06 ENCOUNTER — TELEPHONE (OUTPATIENT)
Dept: FAMILY MEDICINE CLINIC | Age: 51
End: 2021-12-06

## 2021-12-06 ENCOUNTER — HOSPITAL ENCOUNTER (OUTPATIENT)
Dept: GENERAL RADIOLOGY | Age: 51
Discharge: HOME | End: 2021-12-06
Payer: COMMERCIAL

## 2021-12-06 ENCOUNTER — HOSPITAL ENCOUNTER (OUTPATIENT)
Dept: LAB | Age: 51
Discharge: HOME | End: 2021-12-06
Payer: COMMERCIAL

## 2021-12-06 DIAGNOSIS — R73.03 PREDIABETES: ICD-10-CM

## 2021-12-06 DIAGNOSIS — I10 HYPERTENSION, UNSPECIFIED TYPE: ICD-10-CM

## 2021-12-06 DIAGNOSIS — R05.9 COUGH: ICD-10-CM

## 2021-12-06 DIAGNOSIS — E55.9 VITAMIN D DEFICIENCY: ICD-10-CM

## 2021-12-06 DIAGNOSIS — E78.5 DYSLIPIDEMIA: ICD-10-CM

## 2021-12-06 LAB
25(OH)D3 SERPL-MCNC: 26.1 NG/ML (ref 30–100)
ALBUMIN SERPL-MCNC: 3.2 G/DL (ref 3.4–5)
ALBUMIN/GLOB SERPL: 1.1 {RATIO} (ref 0.8–1.7)
ALP SERPL-CCNC: 71 U/L (ref 45–117)
ALT SERPL-CCNC: 21 U/L (ref 13–56)
ANION GAP SERPL CALC-SCNC: 5 MMOL/L (ref 3–18)
AST SERPL-CCNC: 17 U/L (ref 10–38)
BASOPHILS # BLD: 0.1 K/UL (ref 0–0.1)
BASOPHILS NFR BLD: 1 % (ref 0–2)
BILIRUB SERPL-MCNC: 0.2 MG/DL (ref 0.2–1)
BUN SERPL-MCNC: 27 MG/DL (ref 7–18)
BUN/CREAT SERPL: 47 (ref 12–20)
CALCIUM SERPL-MCNC: 8.2 MG/DL (ref 8.5–10.1)
CHLORIDE SERPL-SCNC: 108 MMOL/L (ref 100–111)
CHOLEST SERPL-MCNC: 131 MG/DL
CO2 SERPL-SCNC: 27 MMOL/L (ref 21–32)
CREAT SERPL-MCNC: 0.58 MG/DL (ref 0.6–1.3)
DIFFERENTIAL METHOD BLD: ABNORMAL
EOSINOPHIL # BLD: 0.2 K/UL (ref 0–0.4)
EOSINOPHIL NFR BLD: 2 % (ref 0–5)
ERYTHROCYTE [DISTWIDTH] IN BLOOD BY AUTOMATED COUNT: 14.1 % (ref 11.6–14.5)
EST. AVERAGE GLUCOSE BLD GHB EST-MCNC: 105 MG/DL
GLOBULIN SER CALC-MCNC: 2.8 G/DL (ref 2–4)
GLUCOSE SERPL-MCNC: 115 MG/DL (ref 74–99)
HBA1C MFR BLD: 5.3 % (ref 4.2–5.6)
HCT VFR BLD AUTO: 57.8 % (ref 35–45)
HDLC SERPL-MCNC: 36 MG/DL (ref 40–60)
HDLC SERPL: 3.6 {RATIO} (ref 0–5)
HGB BLD-MCNC: 18.8 G/DL (ref 12–16)
IMM GRANULOCYTES # BLD AUTO: 0 K/UL (ref 0–0.04)
IMM GRANULOCYTES NFR BLD AUTO: 0 % (ref 0–0.5)
LDLC SERPL CALC-MCNC: 58.8 MG/DL (ref 0–100)
LIPID PROFILE,FLP: ABNORMAL
LYMPHOCYTES # BLD: 3.1 K/UL (ref 0.9–3.6)
LYMPHOCYTES NFR BLD: 30 % (ref 21–52)
MCH RBC QN AUTO: 30.2 PG (ref 24–34)
MCHC RBC AUTO-ENTMCNC: 32.5 G/DL (ref 31–37)
MCV RBC AUTO: 92.8 FL (ref 78–100)
MONOCYTES # BLD: 0.8 K/UL (ref 0.05–1.2)
MONOCYTES NFR BLD: 8 % (ref 3–10)
NEUTS SEG # BLD: 6.1 K/UL (ref 1.8–8)
NEUTS SEG NFR BLD: 59 % (ref 40–73)
NRBC # BLD: 0 K/UL (ref 0–0.01)
NRBC BLD-RTO: 0 PER 100 WBC
PLATELET # BLD AUTO: 293 K/UL (ref 135–420)
PMV BLD AUTO: 9.8 FL (ref 9.2–11.8)
POTASSIUM SERPL-SCNC: 4.1 MMOL/L (ref 3.5–5.5)
PROT SERPL-MCNC: 6 G/DL (ref 6.4–8.2)
RBC # BLD AUTO: 6.23 M/UL (ref 4.2–5.3)
SODIUM SERPL-SCNC: 140 MMOL/L (ref 136–145)
TRIGL SERPL-MCNC: 181 MG/DL (ref ?–150)
VLDLC SERPL CALC-MCNC: 36.2 MG/DL
WBC # BLD AUTO: 10.3 K/UL (ref 4.6–13.2)

## 2021-12-06 PROCEDURE — 80061 LIPID PANEL: CPT

## 2021-12-06 PROCEDURE — 71046 X-RAY EXAM CHEST 2 VIEWS: CPT

## 2021-12-06 PROCEDURE — 85025 COMPLETE CBC W/AUTO DIFF WBC: CPT

## 2021-12-06 PROCEDURE — 36415 COLL VENOUS BLD VENIPUNCTURE: CPT

## 2021-12-06 PROCEDURE — 83036 HEMOGLOBIN GLYCOSYLATED A1C: CPT

## 2021-12-06 PROCEDURE — 82306 VITAMIN D 25 HYDROXY: CPT

## 2021-12-06 PROCEDURE — 80053 COMPREHEN METABOLIC PANEL: CPT

## 2021-12-06 NOTE — TELEPHONE ENCOUNTER
I received a call from [place] from [facility] lab with a critical lab for pt.  Hemoglobin 18.7 and Hematocrit is 58

## 2021-12-07 ENCOUNTER — TELEPHONE (OUTPATIENT)
Dept: ONCOLOGY | Age: 51
End: 2021-12-07

## 2021-12-07 ENCOUNTER — HOSPITAL ENCOUNTER (EMERGENCY)
Age: 51
Discharge: LEFT BEFORE DISCHARGE | End: 2021-12-07
Attending: EMERGENCY MEDICINE
Payer: COMMERCIAL

## 2021-12-07 VITALS
RESPIRATION RATE: 18 BRPM | DIASTOLIC BLOOD PRESSURE: 87 MMHG | OXYGEN SATURATION: 95 % | TEMPERATURE: 98.6 F | HEART RATE: 61 BPM | WEIGHT: 189 LBS | BODY MASS INDEX: 37.11 KG/M2 | HEIGHT: 60 IN | SYSTOLIC BLOOD PRESSURE: 156 MMHG

## 2021-12-07 DIAGNOSIS — R71.8 ELEVATED HEMATOCRIT: Primary | ICD-10-CM

## 2021-12-07 DIAGNOSIS — Z86.2 HISTORY OF POLYCYTHEMIA: ICD-10-CM

## 2021-12-07 LAB
ALBUMIN SERPL-MCNC: 3.3 G/DL (ref 3.4–5)
ALBUMIN/GLOB SERPL: 1.1 {RATIO} (ref 0.8–1.7)
ALP SERPL-CCNC: 66 U/L (ref 45–117)
ALT SERPL-CCNC: 20 U/L (ref 13–56)
ANION GAP SERPL CALC-SCNC: 6 MMOL/L (ref 3–18)
AST SERPL-CCNC: 11 U/L (ref 10–38)
BASOPHILS # BLD: 0.1 K/UL (ref 0–0.1)
BASOPHILS NFR BLD: 1 % (ref 0–2)
BILIRUB SERPL-MCNC: 0.3 MG/DL (ref 0.2–1)
BUN SERPL-MCNC: 17 MG/DL (ref 7–18)
BUN/CREAT SERPL: 31 (ref 12–20)
CALCIUM SERPL-MCNC: 8.5 MG/DL (ref 8.5–10.1)
CHLORIDE SERPL-SCNC: 110 MMOL/L (ref 100–111)
CO2 SERPL-SCNC: 25 MMOL/L (ref 21–32)
CREAT SERPL-MCNC: 0.54 MG/DL (ref 0.6–1.3)
DIFFERENTIAL METHOD BLD: ABNORMAL
EOSINOPHIL # BLD: 0.1 K/UL (ref 0–0.4)
EOSINOPHIL NFR BLD: 1 % (ref 0–5)
ERYTHROCYTE [DISTWIDTH] IN BLOOD BY AUTOMATED COUNT: 14 % (ref 11.6–14.5)
GLOBULIN SER CALC-MCNC: 3 G/DL (ref 2–4)
GLUCOSE SERPL-MCNC: 113 MG/DL (ref 74–99)
HCG SERPL QL: NEGATIVE
HCT VFR BLD AUTO: 54.3 % (ref 35–45)
HGB BLD-MCNC: 17.9 G/DL (ref 12–16)
IMM GRANULOCYTES # BLD AUTO: 0 K/UL (ref 0–0.04)
IMM GRANULOCYTES NFR BLD AUTO: 0 % (ref 0–0.5)
LYMPHOCYTES # BLD: 3.2 K/UL (ref 0.9–3.6)
LYMPHOCYTES NFR BLD: 34 % (ref 21–52)
MCH RBC QN AUTO: 30.2 PG (ref 24–34)
MCHC RBC AUTO-ENTMCNC: 33 G/DL (ref 31–37)
MCV RBC AUTO: 91.6 FL (ref 78–100)
MONOCYTES # BLD: 1 K/UL (ref 0.05–1.2)
MONOCYTES NFR BLD: 11 % (ref 3–10)
NEUTS SEG # BLD: 5 K/UL (ref 1.8–8)
NEUTS SEG NFR BLD: 54 % (ref 40–73)
NRBC # BLD: 0 K/UL (ref 0–0.01)
NRBC BLD-RTO: 0 PER 100 WBC
PLATELET # BLD AUTO: 260 K/UL (ref 135–420)
PMV BLD AUTO: 9.2 FL (ref 9.2–11.8)
POTASSIUM SERPL-SCNC: 3.7 MMOL/L (ref 3.5–5.5)
PROT SERPL-MCNC: 6.3 G/DL (ref 6.4–8.2)
RBC # BLD AUTO: 5.93 M/UL (ref 4.2–5.3)
SODIUM SERPL-SCNC: 141 MMOL/L (ref 136–145)
WBC # BLD AUTO: 9.4 K/UL (ref 4.6–13.2)

## 2021-12-07 PROCEDURE — 85025 COMPLETE CBC W/AUTO DIFF WBC: CPT

## 2021-12-07 PROCEDURE — 96360 HYDRATION IV INFUSION INIT: CPT

## 2021-12-07 PROCEDURE — 99282 EMERGENCY DEPT VISIT SF MDM: CPT

## 2021-12-07 PROCEDURE — 84703 CHORIONIC GONADOTROPIN ASSAY: CPT

## 2021-12-07 PROCEDURE — 80053 COMPREHEN METABOLIC PANEL: CPT

## 2021-12-07 PROCEDURE — 74011250637 HC RX REV CODE- 250/637: Performed by: NURSE PRACTITIONER

## 2021-12-07 PROCEDURE — 74011250636 HC RX REV CODE- 250/636: Performed by: NURSE PRACTITIONER

## 2021-12-07 PROCEDURE — 99283 EMERGENCY DEPT VISIT LOW MDM: CPT

## 2021-12-07 RX ORDER — ACETAMINOPHEN 500 MG
1000 TABLET ORAL
Status: COMPLETED | OUTPATIENT
Start: 2021-12-07 | End: 2021-12-07

## 2021-12-07 RX ADMIN — SODIUM CHLORIDE 1000 ML: 900 INJECTION, SOLUTION INTRAVENOUS at 10:42

## 2021-12-07 RX ADMIN — ACETAMINOPHEN 1000 MG: 500 TABLET ORAL at 11:24

## 2021-12-07 NOTE — ED TRIAGE NOTES
Pt reports abnormal labs per dr Verónica Mckeon on [facility] CHART.   High hematocrit

## 2021-12-07 NOTE — TELEPHONE ENCOUNTER
Patient contacted office really upset and worried about her labs done with her primary care doctor. She is asking if we can move her appt any sooner. I told her I would ask the  and they would contact her back since she is going to be a new patient to our office since Dr. Michael Bhatia has retired she has not seen the new doctor. Patient was still very upset about those blod results and reports she is going to [facility] to see if they can give her a phlebotomy. Patient will contact office after hospital visit to get appt information.

## 2021-12-07 NOTE — ED PROVIDER NOTES
EMERGENCY DEPARTMENT HISTORY AND PHYSICAL EXAM    9:58 AM      Date: 12/7/2021  Patient Name: Lang Blount    History of Presenting Illness     Chief Complaint   Patient presents with    Abnormal Lab Results         History Provided By: Patient    Additional History (Context): Lang Blount is a 45 y.o. female with PMHx pf Polycythemia, HTN, tobacco use,  depression, bipolar disorder, presenting today for evaluation of abnormal labs. Pt reports she had routine labs drawn yesterday 12/06/21 found to have an elevated HCT, she spoke with hematology/oncology they are unable to see her in the office until December 22, she presented here for evaluation. She is taking 81mg ASA daily but reports out of concern for a clot, she took 6 81 mg ASA yesterday. She is most concerned she has a blood clot. No history of PE or DVT, no recent trauma, fall or surgery. She has no complaints in the ER, no SOB, no CP, no dyspnea, no fever, no chills, no n/v/d. She is UTD on her covid vaccinations and is not concerned for infection. She also reports she is not taking all of her medicines as prescribed- Lamictal. She also admits to weekly cocaine and marijuana use. Denies IVDA. Of note she does report she punched a wall x1 week ago, has R 5th digit pain, she is L hand dominant, politely refuses imaging. PCP: Willie Fofana NP    Current Outpatient Medications   Medication Sig Dispense Refill    amLODIPine (NORVASC) 10 mg tablet TAKE ONE TABLET BY MOUTH DAILY 90 Tablet 0    losartan-hydroCHLOROthiazide (HYZAAR) 100-12.5 mg per tablet TAKE ONE TABLET BY MOUTH DAILY 90 Tablet 0    clonazePAM (KlonoPIN) 1 mg tablet Take 1 mg by mouth daily.       fenofibrate nanocrystallized (TRICOR) 145 mg tablet TAKE ONE TABLET BY MOUTH DAILY 90 Tablet 0    atorvastatin (LIPITOR) 40 mg tablet TAKE ONE TABLET BY MOUTH DAILY 90 Tablet 0    albuterol (PROVENTIL HFA, VENTOLIN HFA, PROAIR HFA) 90 mcg/actuation inhaler INHALE ONE TO TWO PUFFS BY MOUTH EVERY 4 HOURS AS NEEDED 18 g 0    ergocalciferol (ERGOCALCIFEROL) 1,250 mcg (50,000 unit) capsule TAKE ONE CAPSULE BY MOUTH ONCE WEEKLY 12 Capsule 0    omega-3 acid ethyl esters (LOVAZA) 1 gram capsule TAKE TWO CAPSULES BY MOUTH TWICE A  Capsule 3    omeprazole (PRILOSEC) 20 mg capsule TAKE ONE CAPSULE BY MOUTH DAILY 30 Capsule 2    nystatin (MYCOSTATIN) topical cream APPLY TO AFFECTED AREA(S) THREE TIMES A DAY AS NEEDED 15 g 0    desvenlafaxine succinate (Pristiq) 50 mg ER tablet Take 50 mg by mouth daily.  traZODone (DESYREL) 100 mg tablet Take 100 mg by mouth nightly.  fluticasone propion-salmeteroL (Advair Diskus) 100-50 mcg/dose diskus inhaler Take 1 Puff by inhalation every twelve (12) hours. Indications: bronchospasm prevention with COPD 3 Inhaler 3    Blood Pressure Test Kit-Large kit Please provide BP cuff for hypertension diagnosis 1 Kit 0    linaCLOtide (Linzess) 145 mcg cap capsule TAKE ONE CAPSULE BY MOUTH DAILY BEFORE BREAKFAST 30 Cap 0    aspirin delayed-release 81 mg tablet Take 1 Tab by mouth daily. 100 Tab 3    LORazepam (ATIVAN) 1 mg tablet Take  by mouth every four (4) hours as needed for Anxiety.  lamoTRIgine (LAMICTAL) 100 mg tablet Take 200 mg by mouth daily.          Past History     Past Medical History:  Past Medical History:   Diagnosis Date    Asthma     Bronchitis     [facility]     Cocaine abuse ([facility] 3/26/2015    Dental caries     Depression     Depression 8/31/2016    Drug abuse ([facility]     Dysphasia 10/17/2018    Elevated hematocrit 8/31/2016    ETOH abuse     H/O screening mammography 12/06/2016    No evidence of malignancy     HPV (human papilloma virus) infection 11/2016    The patient referred to [facility]     Hypertension     Hypertensive left ventricular hypertrophy, without heart failure 10/15/2018    mild to moderate lvh continue bp meds    Ill-defined condition     Marijuana use 10/17/2018    Mood swings     Polycythemia 2018    Polycythemia     Psychiatric disorder     depression, Bipolar    Schatzki's ring 08/30/2018    Found on Barium Swallow    Sliding hiatal hernia 10/17/2018    Tobacco abuse     Vitamin D deficiency        Past Surgical History:  Past Surgical History:   Procedure Laterality Date    HX APPENDECTOMY      HX COLPOSCOPY  12/06/2016    Low-grade courtney/mild dysplasia RICK I    HX HERNIA REPAIR      3x       Family History:  Family History   Problem Relation Age of Onset    Hypertension Mother     Heart Attack Father     Hypertension Father     Cancer Father         skin    Cancer Sister         skin    Bipolar Disorder Sister     Cancer Brother         skin    Cancer Maternal Aunt         colon       Social History:  Social History     Tobacco Use    Smoking status: Current Every Day Smoker     Packs/day: 0.50     Years: 20.00     Pack years: 10.00     Types: Cigarettes    Smokeless tobacco: Never Used   Vaping Use    Vaping Use: Never used   Substance Use Topics    Alcohol use: Yes     Comment: socially    Drug use: Yes     Types: Cocaine, Marijuana     Comment: smokes cocaine-current user       Allergies: Allergies   Allergen Reactions    Iodinated Contrast Media Shortness of Breath         Review of Systems       Review of Systems   Constitutional: Negative for chills and fever. Positive for abnormal labs   HENT: Negative for ear pain and sore throat. Eyes: Negative for pain. Respiratory: Negative for cough, shortness of breath and stridor. Cardiovascular: Negative for chest pain. Gastrointestinal: Negative for abdominal pain, diarrhea, nausea and vomiting. Genitourinary: Negative for dysuria and hematuria. Musculoskeletal: Positive for arthralgias (Rate 15 finger positive for right fifth finger pain). Negative for back pain and myalgias. Skin: Negative for pallor, rash and wound.    Neurological: Negative for syncope, numbness and headaches. Psychiatric/Behavioral: Negative for confusion and suicidal ideas. Physical Exam     Visit Vitals  BP (!) 156/87 (BP 1 Location: Left upper arm, BP Patient Position: Sitting)   Pulse 61   Temp 98.6 °F (37 °C)   Resp 18   Ht 5' (1.524 m)   Wt 85.7 kg (189 lb)   SpO2 95%   BMI 36.91 kg/m²         Physical Exam  Vitals and nursing note reviewed. HENT:      Head: Normocephalic. Right Ear: External ear normal.      Left Ear: External ear normal.      Nose: Nose normal.      Mouth/Throat:      Mouth: Mucous membranes are moist.      Pharynx: Oropharynx is clear. Eyes:      Extraocular Movements: Extraocular movements intact. Pupils: Pupils are equal, round, and reactive to light. Cardiovascular:      Rate and Rhythm: Normal rate and regular rhythm. Pulses: Normal pulses. Heart sounds: Normal heart sounds. No murmur heard. No friction rub. No gallop. Pulmonary:      Effort: Pulmonary effort is normal.      Breath sounds: No stridor. No wheezing, rhonchi or rales. Abdominal:      General: Abdomen is flat. Musculoskeletal:      Cervical back: Normal range of motion and neck supple. Comments: Swelling to R 5th digit DIP, mallet finger deformity , NVID, no overlying warmth, rash or lesion. Skin:     General: Skin is warm and dry. Neurological:      General: No focal deficit present. Mental Status: She is alert and oriented to person, place, and time. Sensory: No sensory deficit. Motor: No weakness.    Psychiatric:      Comments: Appears anxious on exam            Diagnostic Study Results     Labs -  Recent Results (from the past 12 hour(s))   CBC WITH AUTOMATED DIFF    Collection Time: 12/07/21 10:05 AM   Result Value Ref Range    WBC 9.4 4.6 - 13.2 K/uL    RBC 5.93 (H) 4.20 - 5.30 M/uL    HGB 17.9 (H) 12.0 - 16.0 g/dL    HCT 54.3 (H) 35.0 - 45.0 %    MCV 91.6 78.0 - 100.0 FL    MCH 30.2 24.0 - 34.0 PG    MCHC 33.0 31.0 - 37.0 g/dL    RDW 14.0 11.6 - 14.5 %    PLATELET 440 821 - 318 K/uL    MPV 9.2 9.2 - 11.8 FL    NRBC 0.0 0  WBC    ABSOLUTE NRBC 0.00 0.00 - 0.01 K/uL    NEUTROPHILS 54 40 - 73 %    LYMPHOCYTES 34 21 - 52 %    MONOCYTES 11 (H) 3 - 10 %    EOSINOPHILS 1 0 - 5 %    BASOPHILS 1 0 - 2 %    IMMATURE GRANULOCYTES 0 0.0 - 0.5 %    ABS. NEUTROPHILS 5.0 1.8 - 8.0 K/UL    ABS. LYMPHOCYTES 3.2 0.9 - 3.6 K/UL    ABS. MONOCYTES 1.0 0.05 - 1.2 K/UL    ABS. EOSINOPHILS 0.1 0.0 - 0.4 K/UL    ABS. BASOPHILS 0.1 0.0 - 0.1 K/UL    ABS. IMM. GRANS. 0.0 0.00 - 0.04 K/UL    DF AUTOMATED     METABOLIC PANEL, COMPREHENSIVE    Collection Time: 12/07/21 10:05 AM   Result Value Ref Range    Sodium 141 136 - 145 mmol/L    Potassium 3.7 3.5 - 5.5 mmol/L    Chloride 110 100 - 111 mmol/L    CO2 25 21 - 32 mmol/L    Anion gap 6 3.0 - 18 mmol/L    Glucose 113 (H) 74 - 99 mg/dL    BUN 17 7.0 - 18 MG/DL    Creatinine 0.54 (L) 0.6 - 1.3 MG/DL    BUN/Creatinine ratio 31 (H) 12 - 20      GFR est AA >60 >60 ml/min/1.73m2    GFR est non-AA >60 >60 ml/min/1.73m2    Calcium 8.5 8.5 - 10.1 MG/DL    Bilirubin, total 0.3 0.2 - 1.0 MG/DL    ALT (SGPT) 20 13 - 56 U/L    AST (SGOT) 11 10 - 38 U/L    Alk.  phosphatase 66 45 - 117 U/L    Protein, total 6.3 (L) 6.4 - 8.2 g/dL    Albumin 3.3 (L) 3.4 - 5.0 g/dL    Globulin 3.0 2.0 - 4.0 g/dL    A-G Ratio 1.1 0.8 - 1.7     HCG QL SERUM    Collection Time: 12/07/21 10:05 AM   Result Value Ref Range    HCG, Ql. Negative NEG         Radiologic Studies -   No orders to display     Splint, Finger    Date/Time: 12/7/2021 11:16 AM  Performed by: Dolores Thomas  Authorized by: Iván Mabry MD     Consent:     Consent obtained:  Verbal    Consent given by:  Patient    Procedural risks discussed: deformity     Alternatives discussed:  No treatment  Pre-procedure details:     Sensation:  Normal  Procedure details:     Laterality:  Right    Location:  Finger    Finger:  R small finger    Cast type:  Finger    Supplies:  Aluminum splint (coban placed )  Comments:      Splinted in hyperextension, remaining splinting materials provided for future use         Medical Decision Making   I am the first provider for this patient. I reviewed available nursing notes, past medical history, past surgical history, family history and social history. Vital Signs-Reviewed the patient's vital signs. Records Reviewed: Nursing Notes and Old Medical Records (Time of Review: 9:58 AM)    Pulse Oximetry Analysis - 95% on RA- normal     ED Course: Progress Notes, Reevaluation, and Consults:  9:58 AM  Initial assessment performed. The patients presenting problems have been discussed, and they/their family are in agreement with the care plan formulated and outlined with them. I have encouraged them to ask questions as they arise throughout their visit. 10:45 AM CBC shows elevated RBC at 5.93, hemoglobin of 17.9, and hematocrit of 54.3 which is slightly improved from labs done yesterday with hemoglobin of 18.8 and hematocrit of 57.8. Platelets are normal and CMP is unremarkable. hCG negative for pregnancy. 11:56 AM patient states she is no longer able to wait for hematology consultation. She did agree to receive IV fluids. At discussed continuing aspirin and close follow-up with her hematologist as scheduled. She also refused phlebotomy here in the ER. I informed the patient that she needed further work-up for adequate evaluation for elevated hematocrit and history of polycythemia and that by refusing admission she is at risk for deterioration and death. Pt is awake, alert, and understands her condition and the risks involved in leaving. She verbalized understanding the risks and that it was recommended she stay. Discussed that she is able to return at any time. Leopoldo Stein RN was present for the discussion and also verbalized understanding. Pt was provided with warnings regarding worsening of her condition and was provided instructions to follow-up with PMD tomorrow.  This discussion was witnessed by nurse Bryant Rios. Provider Notes (Medical Decision Making):     Patient is a 62-year-old female with history of polycythemia who reports to the ER for abnormal labs that she had done yesterday. She received a call for the lab for critical hematocrit of 57.8. She does take daily aspirin and took extra yesterday due to concern for blood clots. She has never had a blood clot and has no symptoms at this time concerning for PE or DVT. Her vitals are stable. We will repeat her labs at this time and contact hematology as she does not have follow-up for 2 weeks. She does have history of phlebotomy. Diagnosis     Clinical Impression:   1. Elevated hematocrit    2. History of polycythemia        Disposition: AMA     Follow-up Information     Follow up With Specialties Details Why Contact Info    Do, Edna Clark MD Hematology, Hematology and Oncology, Oncology Schedule an appointment as soon as possible for a visit  Follow-up from the Emergency Department [address] [facility]  [address] [place] [address]      Schuyler Cushing, NP Nurse Practitioner Schedule an appointment as soon as possible for a visit  Follow-up from the Emergency Department [address] [place] [address]  [address] [id number]  [phone]      [facility] EMERGENCY DEPT Emergency Medicine  As needed, If symptoms worsen [address] [place] [id number]  [phone]           Current Discharge Medication List            Dictation disclaimer:  Please note that this dictation was completed with [employer], the computer voice recognition software. Quite often unanticipated grammatical, syntax, homophones, and other interpretive errors are inadvertently transcribed by the computer software. Please disregard these errors. Please excuse any errors that have escaped final proofreading.

## 2021-12-08 DIAGNOSIS — D75.1 POLYCYTHEMIA: Primary | ICD-10-CM

## 2021-12-08 DIAGNOSIS — E55.9 VITAMIN D DEFICIENCY: ICD-10-CM

## 2021-12-08 DIAGNOSIS — R06.02 SHORTNESS OF BREATH: ICD-10-CM

## 2021-12-09 DIAGNOSIS — K21.9 GASTROESOPHAGEAL REFLUX DISEASE: ICD-10-CM

## 2021-12-09 NOTE — TELEPHONE ENCOUNTER
TC was made to pt and pt verified name and d. o.b pt was made aware of lab results pt also was made aware that per MP Thora Branch pt need to make sure she follow up with Hematology Dr. Lion Nazario on 12/21/2021.

## 2021-12-09 NOTE — TELEPHONE ENCOUNTER
Requested Prescriptions     Pending Prescriptions Disp Refills    albuterol (PROVENTIL HFA, VENTOLIN HFA, PROAIR HFA) 90 mcg/actuation inhaler [Pharmacy Med Name: ALBUTEROL HFA 90 MCG INHALER] 18 g 0     Sig: INHALE ONE TO TWO PUFFS BY MOUTH EVERY 4 HOURS AS NEEDED    ergocalciferol (ERGOCALCIFEROL) 1,250 mcg (50,000 unit) capsule [Pharmacy Med Name: VIT D2 (ERGOCAL) 1.25MG(50,000U) CP] 12 Capsule 0     Sig: TAKE ONE CAPSULE BY MOUTH ONCE WEEKLY

## 2021-12-09 NOTE — TELEPHONE ENCOUNTER
Patient called asking about her labs please advise. she seen her labs in my chart but didn't understand them

## 2021-12-10 RX ORDER — ALBUTEROL SULFATE 90 UG/1
AEROSOL, METERED RESPIRATORY (INHALATION)
Qty: 18 G | Refills: 0 | Status: SHIPPED | OUTPATIENT
Start: 2021-12-10 | End: 2022-06-07 | Stop reason: SDUPTHER

## 2021-12-10 RX ORDER — ERGOCALCIFEROL 1.25 MG/1
CAPSULE ORAL
Qty: 12 CAPSULE | Refills: 0 | Status: SHIPPED | OUTPATIENT
Start: 2021-12-10 | End: 2021-12-23 | Stop reason: SDUPTHER

## 2021-12-17 RX ORDER — OMEPRAZOLE 20 MG/1
20 CAPSULE, DELAYED RELEASE ORAL DAILY
Qty: 30 CAPSULE | Refills: 2 | Status: SHIPPED | OUTPATIENT
Start: 2021-12-17

## 2021-12-20 ENCOUNTER — TELEPHONE (OUTPATIENT)
Dept: FAMILY MEDICINE CLINIC | Age: 51
End: 2021-12-20

## 2021-12-21 ENCOUNTER — OFFICE VISIT (OUTPATIENT)
Dept: ONCOLOGY | Age: 51
End: 2021-12-21
Payer: COMMERCIAL

## 2021-12-21 VITALS
WEIGHT: 191 LBS | SYSTOLIC BLOOD PRESSURE: 142 MMHG | OXYGEN SATURATION: 95 % | DIASTOLIC BLOOD PRESSURE: 85 MMHG | HEART RATE: 78 BPM | BODY MASS INDEX: 37.5 KG/M2 | RESPIRATION RATE: 18 BRPM | TEMPERATURE: 97.9 F | HEIGHT: 60 IN

## 2021-12-21 DIAGNOSIS — D75.1 POLYCYTHEMIA: Primary | ICD-10-CM

## 2021-12-21 DIAGNOSIS — F17.200 SMOKING: ICD-10-CM

## 2021-12-21 PROCEDURE — 99214 OFFICE O/P EST MOD 30 MIN: CPT | Performed by: INTERNAL MEDICINE
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
## 2021-12-23 NOTE — TELEPHONE ENCOUNTER
Please let her know her triglyceride level decreased approximately 80 points. Please continue cure treatment plan.

## 2022-01-13 DIAGNOSIS — L30.4 INTERTRIGO: ICD-10-CM

## 2022-01-14 RX ORDER — NYSTATIN 100000 U/G
CREAM TOPICAL 2 TIMES DAILY
Qty: 15 G | Refills: 1 | Status: SHIPPED | OUTPATIENT
Start: 2022-01-14 | End: 2022-02-04 | Stop reason: SDUPTHER

## 2022-01-18 ENCOUNTER — PATIENT MESSAGE (OUTPATIENT)
Dept: FAMILY MEDICINE CLINIC | Age: 52
End: 2022-01-18

## 2022-01-19 DIAGNOSIS — K59.00 CONSTIPATION, UNSPECIFIED CONSTIPATION TYPE: ICD-10-CM

## 2022-01-21 ENCOUNTER — APPOINTMENT (OUTPATIENT)
Dept: ONCOLOGY | Age: 52
End: 2022-01-21

## 2022-01-21 ENCOUNTER — HOSPITAL ENCOUNTER (OUTPATIENT)
Dept: INFUSION THERAPY | Age: 52
Discharge: HOME | End: 2022-01-21
Payer: COMMERCIAL

## 2022-01-21 VITALS
DIASTOLIC BLOOD PRESSURE: 72 MMHG | SYSTOLIC BLOOD PRESSURE: 110 MMHG | HEART RATE: 79 BPM | TEMPERATURE: 98.2 F | RESPIRATION RATE: 16 BRPM | OXYGEN SATURATION: 96 %

## 2022-01-21 LAB
BASO+EOS+MONOS # BLD AUTO: 0.8 K/UL (ref 0–2.3)
BASO+EOS+MONOS NFR BLD AUTO: 10 % (ref 0.1–17)
DIFFERENTIAL METHOD BLD: ABNORMAL
ERYTHROCYTE [DISTWIDTH] IN BLOOD BY AUTOMATED COUNT: 13.3 % (ref 11.5–14.5)
HCT VFR BLD AUTO: 52.7 % (ref 36–48)
HGB BLD-MCNC: 17 G/DL (ref 12–16)
LYMPHOCYTES # BLD: 2.6 K/UL (ref 1.1–5.9)
LYMPHOCYTES NFR BLD: 34 % (ref 14–44)
MCH RBC QN AUTO: 30.9 PG (ref 25–35)
MCHC RBC AUTO-ENTMCNC: 32.3 G/DL (ref 31–37)
MCV RBC AUTO: 95.8 FL (ref 78–102)
NEUTS SEG # BLD: 4.3 K/UL (ref 1.8–9.5)
NEUTS SEG NFR BLD: 56 % (ref 40–70)
PLATELET # BLD AUTO: 227 K/UL (ref 140–440)
RBC # BLD AUTO: 5.5 M/UL (ref 4.1–5.1)
WBC # BLD AUTO: 7.7 K/UL (ref 4.5–13)

## 2022-01-21 PROCEDURE — 36415 COLL VENOUS BLD VENIPUNCTURE: CPT

## 2022-01-21 PROCEDURE — 85025 COMPLETE CBC W/AUTO DIFF WBC: CPT

## 2022-01-21 PROCEDURE — 99195 PHLEBOTOMY: CPT

## 2022-01-21 PROCEDURE — 74011250636 HC RX REV CODE- 250/636: Performed by: INTERNAL MEDICINE

## 2022-01-21 RX ORDER — SODIUM CHLORIDE 9 MG/ML
500 INJECTION, SOLUTION INTRAVENOUS CONTINUOUS
Status: DISCONTINUED | OUTPATIENT
Start: 2022-01-21 | End: 2022-01-22 | Stop reason: HOSPADM

## 2022-01-21 RX ADMIN — SODIUM CHLORIDE 500 ML: 9 INJECTION, SOLUTION INTRAVENOUS at 11:15

## 2022-01-21 NOTE — PROGRESS NOTES
[facility] Progress Note    Date: 2022    Name: Xiomy Cruz    MRN: [id number]         : 1970      Ms. Ozuna was assessed and education, including discharge instructions, was provided. Ms. Ozuna's vitals were reviewed and patient was observed for 5 minutes prior to treatment. Visit Vitals  /72 (BP 1 Location: Left upper arm, BP Patient Position: Sitting)   Pulse 79   Temp 98.2 °F (36.8 °C)   Resp 16   SpO2 96%   Breastfeeding No       Lab results were obtained and reviewed. Recent Results (from the past 12 hour(s))   CBC WITH 3 PART DIFF    Collection Time: 22 10:42 AM   Result Value Ref Range    WBC 7.7 4.5 - 13.0 K/uL    RBC 5.50 (H) 4.10 - 5.10 M/uL    HGB 17.0 (HH) 12.0 - 16 g/dL    HCT 52.7 (HH) 36 - 48 %    MCV 95.8 78 - 102 FL    MCH 30.9 25.0 - 35.0 PG    MCHC 32.3 31 - 37 g/dL    RDW 13.3 11.5 - 14.5 %    PLATELET 103 734 - 984 K/uL    NEUTROPHILS 56 40 - 70 %    MIXED CELLS 10 0.1 - 17 %    LYMPHOCYTES 34 14 - 44 %    ABS. NEUTROPHILS 4.3 1.8 - 9.5 K/UL    ABS. MIXED CELLS 0.8 0.0 - 2.3 K/uL    ABS. LYMPHOCYTES 2.6 1.1 - 5.9 K/UL    DF AUTOMATED       Critical H&H 17.0/52.7 reported to Dr Gonzalo Hassan. No new orders given. See Critical Results Flowsheet. Therapeutic phlebotomy started at 1020 and stopped at 1215 for critical HCT 52.7. Approx 500 ml blood removed from patient. 500 ml NS IV given. Ms. Carine Lobo tolerated the treatment and had no complaints. Patient armband removed and shredded. Ms. Carine Lobo was discharged from [place] [address] in stable condition at 1215. She is to return on 22 at 0900 for her next appointment.     Mahendra Julien RN  2022

## 2022-02-01 ENCOUNTER — TELEPHONE (OUTPATIENT)
Dept: FAMILY MEDICINE CLINIC | Age: 52
End: 2022-02-01

## 2022-02-01 NOTE — TELEPHONE ENCOUNTER
TC to patient, left VM for her to call the office pertaining to her long term disability forms from [id number].

## 2022-02-02 ENCOUNTER — TELEPHONE (OUTPATIENT)
Dept: FAMILY MEDICINE CLINIC | Age: 52
End: 2022-02-02

## 2022-02-02 NOTE — TELEPHONE ENCOUNTER
TC to patient, I could not understand anything the patient was saying. Her speech was slurred. Patient stated to disregard disability paper work from [facility].

## 2022-02-03 LAB
ALBUMIN SERPL-MCNC: 3.9 G/DL (ref 3.8–4.9)
ALBUMIN/GLOB SERPL: 2.1 {RATIO} (ref 1.2–2.2)
ALP SERPL-CCNC: 59 IU/L (ref 44–121)
ALT SERPL-CCNC: 23 IU/L (ref 0–32)
AST SERPL-CCNC: 19 IU/L (ref 0–40)
BACKGROUND, 081113: NORMAL
BACKGROUND: 480503: NORMAL
BACKGROUND: 480503: NORMAL
BASOPHILS # BLD AUTO: 0 X10E3/UL (ref 0–0.2)
BASOPHILS NFR BLD AUTO: 0 %
BILIRUB SERPL-MCNC: <0.2 MG/DL (ref 0–1.2)
BUN SERPL-MCNC: 10 MG/DL (ref 6–24)
BUN/CREAT SERPL: 18 (ref 9–23)
CALCIUM SERPL-MCNC: 8.6 MG/DL (ref 8.7–10.2)
CALR MUTATION DETECTION RESULT, 489451: NORMAL
CHLORIDE SERPL-SCNC: 108 MMOL/L (ref 96–106)
CO2 SERPL-SCNC: 25 MMOL/L (ref 20–29)
COHGB MFR BLD: 7.9 % (ref 0–3.6)
CREAT SERPL-MCNC: 0.56 MG/DL (ref 0.57–1)
EOSINOPHIL # BLD AUTO: 0.1 X10E3/UL (ref 0–0.4)
EOSINOPHIL NFR BLD AUTO: 1 %
EPO SERPL-ACNC: 10.3 MIU/ML (ref 2.6–18.5)
ERYTHROCYTE [DISTWIDTH] IN BLOOD BY AUTOMATED COUNT: 12.9 % (ref 11.7–15.4)
GLOBULIN SER CALC-MCNC: 1.9 G/DL (ref 1.5–4.5)
GLUCOSE SERPL-MCNC: 93 MG/DL (ref 65–99)
HCT VFR BLD AUTO: 50.4 % (ref 34–46.6)
HGB BLD-MCNC: 17.1 G/DL (ref 11.1–15.9)
IMM GRANULOCYTES # BLD AUTO: 0 X10E3/UL (ref 0–0.1)
IMM GRANULOCYTES NFR BLD AUTO: 1 %
INTERPRETATION: 480488: NEGATIVE
JAK2 P.V617F BLD/T QL: NORMAL
LAB DIRECTOR NAME PROVIDER: NORMAL
LYMPHOCYTES # BLD AUTO: 2.8 X10E3/UL (ref 0.7–3.1)
LYMPHOCYTES NFR BLD AUTO: 35 %
MCH RBC QN AUTO: 30.5 PG (ref 26.6–33)
MCHC RBC AUTO-ENTMCNC: 33.9 G/DL (ref 31.5–35.7)
MCV RBC AUTO: 90 FL (ref 79–97)
MONOCYTES # BLD AUTO: 0.8 X10E3/UL (ref 0.1–0.9)
MONOCYTES NFR BLD AUTO: 10 %
MPL GENE MUT TESTED BLD/T: NORMAL
MPL P.W515L+W515K+S505N BLD/T QL: NORMAL
NEUTROPHILS # BLD AUTO: 4.3 X10E3/UL (ref 1.4–7)
NEUTROPHILS NFR BLD AUTO: 53 %
PLATELET # BLD AUTO: 258 X10E3/UL (ref 150–450)
POTASSIUM SERPL-SCNC: 4.2 MMOL/L (ref 3.5–5.2)
PROT SERPL-MCNC: 5.8 G/DL (ref 6–8.5)
RBC # BLD AUTO: 5.6 X10E6/UL (ref 3.77–5.28)
REF LAB TEST METHOD: NORMAL
REF LAB TEST METHOD: NORMAL
REFERENCES, 150293: NORMAL
REFERENCES, 150293: NORMAL
SERVICE CMNT-IMP: NORMAL
SODIUM SERPL-SCNC: 142 MMOL/L (ref 134–144)
T(ABL1,BCR)B2A2/CONTROL BLD/T: NORMAL %
T(ABL1,BCR)B3A2/CONTROL BLD/T: NORMAL %
T(ABL1,BCR)E1A2/CONTROL BLD/T: NORMAL %
WBC # BLD AUTO: 8 X10E3/UL (ref 3.4–10.8)

## 2022-02-03 NOTE — TELEPHONE ENCOUNTER
TC to patient, she stated to disregard the disability paperwork from [facility].

## 2022-02-03 NOTE — TELEPHONE ENCOUNTER
----- Message from Natividad Chawla sent at 2/2/2022  2:23 PM EST -----  Subject: Message to Provider    QUESTIONS  Information for Provider? PATIENT RETURNING PHONE CALL SHE RECEIVED FROM   OFFICE, PATIENT STATED SHE WAS ON THE PHONE WITH SOMEONE FROM OFFICE SHE   STATED NO ONE COULD HEAR VOICE CALL WAS DISCONNECTED  ---------------------------------------------------------------------------  --------------  CALL BACK INFO  What is the best way for the office to contact you? OK to leave message on   voicemail, OK to respond with electronic message via [employer] portal (only   for patients who have registered [employer] account)  Preferred Call Back Phone Number?  [phone]  ---------------------------------------------------------------------------  --------------  SCRIPT ANSWERS  undefined

## 2022-02-04 ENCOUNTER — VIRTUAL VISIT (OUTPATIENT)
Dept: ONCOLOGY | Age: 52
End: 2022-02-04
Payer: COMMERCIAL

## 2022-02-04 DIAGNOSIS — D75.1 POLYCYTHEMIA: ICD-10-CM

## 2022-02-04 DIAGNOSIS — D75.1 SECONDARY POLYCYTHEMIA: Primary | ICD-10-CM

## 2022-02-04 DIAGNOSIS — F17.200 SMOKING: ICD-10-CM

## 2022-02-04 PROCEDURE — 99443 PR PHYS/QHP TELEPHONE EVALUATION 21-30 MIN: CPT | Performed by: INTERNAL MEDICINE

## 2022-02-04 RX ORDER — DESVENLAFAXINE 100 MG/1
TABLET, EXTENDED RELEASE ORAL
COMMUNITY
Start: 2022-01-25 | End: 2022-05-26

## 2022-02-04 RX ORDER — LAMOTRIGINE 150 MG/1
TABLET ORAL
COMMUNITY
Start: 2021-12-03 | End: 2022-05-16 | Stop reason: ALTCHOICE

## 2022-02-04 NOTE — PROGRESS NOTES
Yemi Mcrae is a 45 y.o. female, evaluated via audio-only technology on 2/4/2022 for Follow-up  . Assessment & Plan:   Diagnoses and all orders for this visit:    1. Secondary polycythemia    2. Polycythemia    3. Smoking      The complexity of medical decision making for this visit is moderate. Polycythemia  -- Patient was being followed by Dr. Blu Ontiveros who retired, last seen on 2/1/2019. She has hx significant of polycythemia, HTN, tobacco use,  depression, bipolar disorder. She is taking 81mg ASA daily. -- She has had phlebotomy for secondary polycythemia 2/2 smoking, last one was in March 2021. She did not keep her phlebotomy since then. She is trying to cut back on her tobacco use but continues to smoke at least a pack of cigarettes per day. -- She denied any history of PE or DVT, no recent trauma, fall, severe headache, acute vision change, or symptomatic concerns. -- 12/6/2021 CXR reported No acute findings. -- 12/7/2021 CBC reported hemoglobin 17.9, hematocrit 54.3%, WBC 9.4, platelet 952,968.  -- Today I have reviewed with the patient about recent lab reports. 1/21/2022 Hb/Hct 17.1/50.4. Epo 10.3 WNL. Negative JAK2/MPL/CALR/ BCR/ABL1.  -- Her polycythemia was likely secondary, 2/2 smoking. She has been on therapeutic phlebotomy. Plan:  -- She will continue therapeutic phlebotomy given significant elevated Hb/Hct.   -- She was encouraged to continue working at smoking cessation. -- The patient thinks she probably has BRENTON. The patient may need a referral to Sleep specialist.  I would defer this to primary care physician. -- We will see the patient back in about 3 months. Always sooner if required. 12  Subjective:   Mrs. Linda To is a 58-year-old woman who uses tobacco on a regular basis. Patient was being followed by Dr. Blu Ontiveros who retired, last seen on 2/1/2019. 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 She has had phlebotomy, last one was in March 2021.  She did not keep her phlebotomy since then. She is trying to cut back on her tobacco use but continues to smoke at least a pack of cigarettes per day. The patient denied any new complaints since last visit. Denied fever, chills, night sweat, unintentional weight loss, skin lumps or bumps, acute bleeding or bruising issues. Denied headache, acute vision change, dizziness, chest pain, worsen shortness of breath, palpitation, productive cough, nausea, vomiting, abdominal pain, altered bowel habits, dysuria, new bone pain or back pain, focal numbness or weakness. Prior to Admission medications    Medication Sig Start Date End Date Taking? Authorizing Provider   linaCLOtide (Linzess) 145 mcg cap capsule TAKE ONE CAPSULE BY MOUTH DAILY BEFORE BREAKFAST 1/23/22   Antony Tony NP   nystatin (MYCOSTATIN) topical cream Apply  to affected area two (2) times a day. 1/14/22   Antony Tony NP   fluticasone propion-salmeteroL (Advair Diskus) 100-50 mcg/dose diskus inhaler Take 1 Puff by inhalation every twelve (12) hours. Indications: bronchospasm prevention with COPD 12/28/21   Antony Tony NP   ergocalciferol (ERGOCALCIFEROL) 1,250 mcg (50,000 unit) capsule Take 1 Capsule by mouth every seven (7) days. 12/28/21   Antony Tony NP   albuterol (PROVENTIL HFA, VENTOLIN HFA, PROAIR HFA) 90 mcg/actuation inhaler INHALE ONE TO TWO PUFFS BY MOUTH EVERY 4 HOURS AS NEEDED 12/28/21   Antony Tony NP   omeprazole (PRILOSEC) 20 mg capsule Take 1 Capsule by mouth daily.  12/17/21   Antony Tony NP   albuterol (PROVENTIL HFA, VENTOLIN HFA, PROAIR HFA) 90 mcg/actuation inhaler INHALE ONE TO TWO PUFFS BY MOUTH EVERY 4 HOURS AS NEEDED 12/10/21   Antony Tony NP   amLODIPine (NORVASC) 10 mg tablet TAKE ONE TABLET BY MOUTH DAILY 12/6/21   Antony Tony NP   losartan-hydroCHLOROthiazide (HYZAAR) 100-12.5 mg per tablet TAKE ONE TABLET BY MOUTH DAILY 12/6/21 Shan Daley NP   clonazePAM (KlonoPIN) 1 mg tablet Take 1 mg by mouth daily. 10/20/21   Provider, Historical   fenofibrate nanocrystallized (TRICOR) 145 mg tablet TAKE ONE TABLET BY MOUTH DAILY 11/22/21   Shan Daley NP   atorvastatin (LIPITOR) 40 mg tablet TAKE ONE TABLET BY MOUTH DAILY 11/11/21   Shan Daley NP   omega-3 acid ethyl esters (LOVAZA) 1 gram capsule TAKE TWO CAPSULES BY MOUTH TWICE A DAY 7/14/21   Alyssa Givens MD   desvenlafaxine succinate (Pristiq) 50 mg ER tablet Take 50 mg by mouth daily. Montrell Hewitt MD   traZODone (DESYREL) 100 mg tablet Take 100 mg by mouth nightly. 1/15/21   Provider, Historical   Blood Pressure Test Kit-Large kit Please provide BP cuff for hypertension diagnosis 1/22/21   Shan Daley NP   aspirin delayed-release 81 mg tablet Take 1 Tab by mouth daily. 5/26/20   Koby Diamond NP   LORazepam (ATIVAN) 1 mg tablet Take  by mouth every four (4) hours as needed for Anxiety. Provider, Historical   lamoTRIgine (LAMICTAL) 100 mg tablet Take 200 mg by mouth daily. Provider, Historical     Patient Active Problem List   Diagnosis Code    Dyslipidemia E78.5    Cocaine abuse ([facility] F14.10    Vitamin D deficiency E55.9    Manic bipolar I disorder ([facility] F31.10    Depression F32. A    Compliance with medication regimen Z91.89    Elevated hematocrit R71.8    Atypical squamous cells of undetermined significance on cytologic smear of cervix (ASC-US) R87.610    High risk HPV infection B97.7    Hypertension I10    Erythrocytosis D75.1    Secondary polycythemia D75.1    Hypertensive left ventricular hypertrophy, without heart failure I11.9    Severe obesity (HCC) E66.01       Review of Systems   Constitutional: Negative for chills, diaphoresis, fever, malaise/fatigue and weight loss. Respiratory: Negative for cough, hemoptysis, shortness of breath and wheezing.     Cardiovascular: Negative for chest pain, palpitations and leg swelling. Gastrointestinal: Negative for abdominal pain, diarrhea, heartburn, nausea and vomiting. Genitourinary: Negative for dysuria, frequency, hematuria and urgency. Musculoskeletal: Negative for joint pain and myalgias. Skin: Negative for itching and rash. Neurological: Negative for dizziness, seizures, weakness and headaches. Psychiatric/Behavioral: Negative for depression. The patient does not have insomnia. No flowsheet data found. The patient was advised that our priority at this time is to keep the patients safe, and therefore we are reaching out to patients virtually to prevent them coming into the office unless necessarily. Patient verbalized understanding and was agreeable for virtual visit. Tara Grady, who was evaluated through a patient-initiated, synchronous (real-time) audio only encounter, and/or her healthcare decision maker, is aware that it is a billable service, with coverage as determined by her insurance carrier. She provided verbal consent to proceed: Yes. She has not had a related appointment within my department in the past 7 days or scheduled within the next 24 hours. On this date 02/04/2022 I have spent 25 minutes reviewing previous notes, test results and face to face (virtual) with the patient discussing the diagnosis and importance of compliance with the treatment plan as well as documenting on the day of the visit.     Britta Amador MD

## 2022-02-08 ENCOUNTER — HOSPITAL ENCOUNTER (OUTPATIENT)
Dept: INFUSION THERAPY | Age: 52
Discharge: HOME | End: 2022-02-08
Payer: COMMERCIAL

## 2022-02-08 VITALS
TEMPERATURE: 98.2 F | DIASTOLIC BLOOD PRESSURE: 84 MMHG | OXYGEN SATURATION: 98 % | HEART RATE: 76 BPM | SYSTOLIC BLOOD PRESSURE: 126 MMHG | RESPIRATION RATE: 16 BRPM

## 2022-02-08 LAB
BASO+EOS+MONOS # BLD AUTO: 0.7 K/UL (ref 0–2.3)
BASO+EOS+MONOS NFR BLD AUTO: 10 % (ref 0.1–17)
DIFFERENTIAL METHOD BLD: NORMAL
ERYTHROCYTE [DISTWIDTH] IN BLOOD BY AUTOMATED COUNT: 12.7 % (ref 11.5–14.5)
HCT VFR BLD AUTO: 46.8 % (ref 36–48)
HGB BLD-MCNC: 15.1 G/DL (ref 12–16)
LYMPHOCYTES # BLD: 2 K/UL (ref 1.1–5.9)
LYMPHOCYTES NFR BLD: 30 % (ref 14–44)
MCH RBC QN AUTO: 30.9 PG (ref 25–35)
MCHC RBC AUTO-ENTMCNC: 32.3 G/DL (ref 31–37)
MCV RBC AUTO: 95.7 FL (ref 78–102)
NEUTS SEG # BLD: 3.9 K/UL (ref 1.8–9.5)
NEUTS SEG NFR BLD: 60 % (ref 40–70)
PLATELET # BLD AUTO: 255 K/UL (ref 140–440)
RBC # BLD AUTO: 4.89 M/UL (ref 4.1–5.1)
WBC # BLD AUTO: 6.6 K/UL (ref 4.5–13)

## 2022-02-08 PROCEDURE — 36415 COLL VENOUS BLD VENIPUNCTURE: CPT

## 2022-02-08 PROCEDURE — 99195 PHLEBOTOMY: CPT

## 2022-02-08 PROCEDURE — 85025 COMPLETE CBC W/AUTO DIFF WBC: CPT

## 2022-02-08 NOTE — PROGRESS NOTES
[facility] Progress Note    Date: 2022    Name: Gricelda Leavitt    MRN: [id number]         : 1970      Ms. Ozuna arrived at [facility] at 10:55. She was assessed and education, including discharge instructions, was provided. Ms. Ozuna's vitals were reviewed and patient was observed for 5 minutes prior to treatment. Visit Vitals  /85 (BP 1 Location: Left upper arm, BP Patient Position: Sitting)   Temp 98.2 °F (36.8 °C)   Resp 16   SpO2 96%   Breastfeeding No       Labs were drawn via #20 G in left AC and results were obtained and reviewed. H&H 15.1/46.8    PIV #20 started left wrist without difficulty. Brisk blood return received. Therapeutic phlebotomy started at 11:20 and stopped at 1155. Approx 500 ml blood removed from patient. 500 ml NS IV given. Ms. Shivani Lemon tolerated the treatment and had no complaints. Patient armband removed and shredded. Ms. Shivani Lemon was discharged from [place] [address] in stable condition at 1215. She is to return on 2/15/22 at 1000 for her next appointment.     Juana Lucas RN  2022

## 2022-02-15 ENCOUNTER — APPOINTMENT (OUTPATIENT)
Dept: INFUSION THERAPY | Age: 52
End: 2022-02-15

## 2022-02-16 DIAGNOSIS — K59.00 CONSTIPATION, UNSPECIFIED CONSTIPATION TYPE: ICD-10-CM

## 2022-02-16 NOTE — TELEPHONE ENCOUNTER
Requested Prescriptions     Pending Prescriptions Disp Refills    linaCLOtide (Linzess) 145 mcg cap capsule 90 Capsule 0     Sig: TAKE ONE CAPSULE BY MOUTH DAILY BEFORE BREAKFAST

## 2022-02-17 ENCOUNTER — HOSPITAL ENCOUNTER (OUTPATIENT)
Dept: INFUSION THERAPY | Age: 52
Discharge: HOME | End: 2022-02-17
Payer: COMMERCIAL

## 2022-02-17 VITALS
SYSTOLIC BLOOD PRESSURE: 128 MMHG | TEMPERATURE: 98.2 F | HEART RATE: 66 BPM | DIASTOLIC BLOOD PRESSURE: 80 MMHG | OXYGEN SATURATION: 100 % | RESPIRATION RATE: 16 BRPM

## 2022-02-17 LAB
BASO+EOS+MONOS # BLD AUTO: 0.6 K/UL (ref 0–2.3)
BASO+EOS+MONOS NFR BLD AUTO: 9 % (ref 0.1–17)
DIFFERENTIAL METHOD BLD: NORMAL
ERYTHROCYTE [DISTWIDTH] IN BLOOD BY AUTOMATED COUNT: 12.5 % (ref 11.5–14.5)
HCT VFR BLD AUTO: 44.5 % (ref 36–48)
HGB BLD-MCNC: 14.5 G/DL (ref 12–16)
LYMPHOCYTES # BLD: 2.4 K/UL (ref 1.1–5.9)
LYMPHOCYTES NFR BLD: 38 % (ref 14–44)
MCH RBC QN AUTO: 31 PG (ref 25–35)
MCHC RBC AUTO-ENTMCNC: 32.6 G/DL (ref 31–37)
MCV RBC AUTO: 95.3 FL (ref 78–102)
NEUTS SEG # BLD: 3.5 K/UL (ref 1.8–9.5)
NEUTS SEG NFR BLD: 53 % (ref 40–70)
PLATELET # BLD AUTO: 272 K/UL (ref 140–440)
RBC # BLD AUTO: 4.67 M/UL (ref 4.1–5.1)
WBC # BLD AUTO: 6.5 K/UL (ref 4.5–13)

## 2022-02-17 PROCEDURE — 36415 COLL VENOUS BLD VENIPUNCTURE: CPT

## 2022-02-17 PROCEDURE — 99195 PHLEBOTOMY: CPT

## 2022-02-17 PROCEDURE — 85025 COMPLETE CBC W/AUTO DIFF WBC: CPT

## 2022-02-17 RX ORDER — SODIUM CHLORIDE 9 MG/ML
150 INJECTION, SOLUTION INTRAVENOUS CONTINUOUS
Status: DISCONTINUED | OUTPATIENT
Start: 2022-02-17 | End: 2022-02-18 | Stop reason: HOSPADM

## 2022-02-17 NOTE — PROGRESS NOTES
[facility] Progress Note    Date: 2022    Name: Kvng Martinez    MRN: [id number]         : 1970      Ms. Ozuna arrived at [facility] at 10:15. She was assessed and education, including discharge instructions, was provided. Ms. Ozuna's vitals were reviewed and patient was observed for 5 minutes prior to treatment. Visit Vitals  /81 (BP 1 Location: Right upper arm, BP Patient Position: Sitting)   Pulse 63   Temp 97.3 °F (36.3 °C)   Resp 16   SpO2 100%   Breastfeeding No       Labs were drawn via #20 G in left AC and results were obtained and reviewed. H&H 14.5/44.5    PIV #20 started left wrist without difficulty. Brisk blood return received. Therapeutic phlebotomy started at 10:40 and stopped at 11:05  Approx 500 ml blood removed from patient. 500 ml NS IV given. Ms. Lise Segura tolerated the treatment and had no complaints. Patient armband removed and shredded. Ms. Lise Segura was discharged from [place] [address] in stable condition at 1140. She is to return on 22 at 1000 for her next appointment.     Krunal Barr RN  2022

## 2022-02-24 ENCOUNTER — APPOINTMENT (OUTPATIENT)
Dept: INFUSION THERAPY | Age: 52
End: 2022-02-24

## 2022-03-01 ENCOUNTER — APPOINTMENT (OUTPATIENT)
Dept: INFUSION THERAPY | Age: 52
End: 2022-03-01

## 2022-03-02 DIAGNOSIS — I10 ESSENTIAL HYPERTENSION: ICD-10-CM

## 2022-03-07 RX ORDER — AMLODIPINE BESYLATE 10 MG/1
TABLET ORAL
Qty: 90 TABLET | Refills: 0 | Status: SHIPPED | OUTPATIENT
Start: 2022-03-07 | End: 2022-06-02 | Stop reason: SDUPTHER

## 2022-03-07 RX ORDER — LOSARTAN POTASSIUM AND HYDROCHLOROTHIAZIDE 12.5; 1 MG/1; MG/1
TABLET ORAL
Qty: 90 TABLET | Refills: 0 | Status: SHIPPED | OUTPATIENT
Start: 2022-03-07 | End: 2022-06-20 | Stop reason: SDUPTHER

## 2022-03-18 PROBLEM — I11.9 HYPERTENSIVE LEFT VENTRICULAR HYPERTROPHY, WITHOUT HEART FAILURE: Status: ACTIVE | Noted: 2018-10-15

## 2022-03-19 PROBLEM — E66.01 SEVERE OBESITY (HCC): Status: ACTIVE | Noted: 2019-02-27

## 2022-03-19 PROBLEM — D75.1 ERYTHROCYTOSIS: Status: ACTIVE | Noted: 2018-05-22

## 2022-03-20 PROBLEM — D75.1 SECONDARY POLYCYTHEMIA: Status: ACTIVE | Noted: 2018-05-22

## 2022-04-07 ENCOUNTER — TELEPHONE (OUTPATIENT)
Dept: FAMILY MEDICINE CLINIC | Age: 52
End: 2022-04-07

## 2022-04-07 ENCOUNTER — HOSPITAL ENCOUNTER (OUTPATIENT)
Dept: INFUSION THERAPY | Age: 52
Discharge: HOME | End: 2022-04-07
Payer: COMMERCIAL

## 2022-04-07 VITALS
TEMPERATURE: 98 F | HEART RATE: 61 BPM | HEIGHT: 60 IN | BODY MASS INDEX: 35.61 KG/M2 | OXYGEN SATURATION: 98 % | DIASTOLIC BLOOD PRESSURE: 83 MMHG | RESPIRATION RATE: 16 BRPM | WEIGHT: 181.4 LBS | SYSTOLIC BLOOD PRESSURE: 132 MMHG

## 2022-04-07 LAB
BASO+EOS+MONOS # BLD AUTO: 0.8 K/UL (ref 0–2.3)
BASO+EOS+MONOS NFR BLD AUTO: 13 % (ref 0.1–17)
DIFFERENTIAL METHOD BLD: ABNORMAL
ERYTHROCYTE [DISTWIDTH] IN BLOOD BY AUTOMATED COUNT: 13.6 % (ref 11.5–14.5)
HCT VFR BLD AUTO: 41.9 % (ref 36–48)
HGB BLD-MCNC: 12.7 G/DL (ref 12–16)
LYMPHOCYTES # BLD: 2.5 K/UL (ref 1.1–5.9)
LYMPHOCYTES NFR BLD: 38 % (ref 14–44)
MCH RBC QN AUTO: 27.1 PG (ref 25–35)
MCHC RBC AUTO-ENTMCNC: 30.3 G/DL (ref 31–37)
MCV RBC AUTO: 89.5 FL (ref 78–102)
NEUTS SEG # BLD: 3.3 K/UL (ref 1.8–9.5)
NEUTS SEG NFR BLD: 50 % (ref 40–70)
PLATELET # BLD AUTO: 236 K/UL (ref 140–440)
RBC # BLD AUTO: 4.68 M/UL (ref 4.1–5.1)
WBC # BLD AUTO: 6.6 K/UL (ref 4.5–13)

## 2022-04-07 PROCEDURE — 36415 COLL VENOUS BLD VENIPUNCTURE: CPT

## 2022-04-07 PROCEDURE — 85025 COMPLETE CBC W/AUTO DIFF WBC: CPT

## 2022-04-07 NOTE — PROGRESS NOTES
[facility] Lab Visit:    Ever Howard  1970  [id number]    [id number] Pt arrived ambulatory w/o assist. Labs drawn per order via Left AC venipuncture x1 attempt. Pt tolerated well without complaints. Gauze/ coban to site. Pt departed [facility] ambulatory and in no distress at 1355.      Visit Vitals  /83 (BP 1 Location: Right upper arm, BP Patient Position: Sitting)   Pulse 61   Temp 98 °F (36.7 °C)   Resp 16   Ht 5' (1.524 m)   Wt 82.3 kg (181 lb 6.4 oz)   SpO2 98%   BMI 35.43 kg/m²

## 2022-04-07 NOTE — TELEPHONE ENCOUNTER
Patient called stating she has been taking stool softeners for the last several days but has not had a bowel movement in 3 days. Patient states she is throwing up and because of the smell believes she is throwing up poop.  Please advise

## 2022-04-08 DIAGNOSIS — R06.02 SHORTNESS OF BREATH: ICD-10-CM

## 2022-04-08 NOTE — TELEPHONE ENCOUNTER
Requested Prescriptions     Pending Prescriptions Disp Refills    albuterol (PROVENTIL HFA, VENTOLIN HFA, PROAIR HFA) 90 mcg/actuation inhaler 18 g 1     Sig: INHALE ONE TO TWO PUFFS BY MOUTH EVERY 4 HOURS AS NEEDED

## 2022-04-13 RX ORDER — ALBUTEROL SULFATE 90 UG/1
AEROSOL, METERED RESPIRATORY (INHALATION)
Qty: 18 G | Refills: 1 | Status: SHIPPED | OUTPATIENT
Start: 2022-04-13 | End: 2022-06-02 | Stop reason: SDUPTHER

## 2022-04-13 NOTE — TELEPHONE ENCOUNTER
TC to patient on 04/07/2022. She was informed to go to the ER or urgent care to be evaluated. Patient verbalized her understanding.

## 2022-04-28 ENCOUNTER — TELEPHONE (OUTPATIENT)
Dept: FAMILY MEDICINE CLINIC | Age: 52
End: 2022-04-28

## 2022-04-28 ENCOUNTER — OFFICE VISIT (OUTPATIENT)
Dept: FAMILY MEDICINE CLINIC | Age: 52
End: 2022-04-28
Payer: COMMERCIAL

## 2022-04-28 VITALS
SYSTOLIC BLOOD PRESSURE: 136 MMHG | DIASTOLIC BLOOD PRESSURE: 72 MMHG | WEIGHT: 184 LBS | BODY MASS INDEX: 36.12 KG/M2 | HEART RATE: 66 BPM | RESPIRATION RATE: 16 BRPM | OXYGEN SATURATION: 96 % | HEIGHT: 60 IN | TEMPERATURE: 96 F

## 2022-04-28 DIAGNOSIS — Z12.11 COLON CANCER SCREENING: ICD-10-CM

## 2022-04-28 DIAGNOSIS — I10 HYPERTENSION, UNSPECIFIED TYPE: ICD-10-CM

## 2022-04-28 DIAGNOSIS — M25.551 HIP PAIN, CHRONIC, RIGHT: ICD-10-CM

## 2022-04-28 DIAGNOSIS — K13.79 NODULE OF CHEEK: Primary | ICD-10-CM

## 2022-04-28 DIAGNOSIS — G89.29 HIP PAIN, CHRONIC, RIGHT: ICD-10-CM

## 2022-04-28 PROCEDURE — 99213 OFFICE O/P EST LOW 20 MIN: CPT | Performed by: NURSE PRACTITIONER
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
## 2022-04-28 NOTE — PROGRESS NOTES
Elin Michel presents today for   Chief Complaint   Patient presents with    Cyst     left side face    Hypertension     follow-up       Is someone accompanying this pt? no    Is the patient using any DME equipment during OV? no    Depression Screening:  3 most recent PHQ Screens 4/28/2022   Little interest or pleasure in doing things Several days   Feeling down, depressed, irritable, or hopeless Several days   Total Score PHQ 2 2   Trouble falling or staying asleep, or sleeping too much -   Feeling tired or having little energy -   Poor appetite, weight loss, or overeating -   Feeling bad about yourself - or that you are a failure or have let yourself or your family down -   Trouble concentrating on things such as school, work, reading, or watching TV -   Moving or speaking so slowly that other people could have noticed; or the opposite being so fidgety that others notice -   Thoughts of being better off dead, or hurting yourself in some way -   PHQ 9 Score -       Learning Assessment:  Learning Assessment 10/30/2019   PRIMARY LEARNER Patient   HIGHEST LEVEL OF EDUCATION - PRIMARY LEARNER  SOME COLLEGE   BARRIERS PRIMARY LEARNER NONE   CO-LEARNER CAREGIVER No   PRIMARY LANGUAGE ENGLISH   LEARNER PREFERENCE PRIMARY READING   ANSWERED BY patient   RELATIONSHIP SELF       Health Maintenance reviewed and discussed and ordered per Provider. Health Maintenance Due   Topic Date Due    COVID-19 Vaccine (1) Never done    Pneumococcal 0-64 years (1 - PCV) Never done    DTaP/Tdap/Td series (1 - Tdap) Never done    Colorectal Cancer Screening Combo  Never done    Shingrix Vaccine Age 50> (1 of 2) Never done    Cervical cancer screen  11/21/2021    Breast Cancer Screen Mammogram  02/06/2022    Depression Monitoring  02/26/2022   . Coordination of Care:  1. Have you been to the ER, urgent care clinic since your last visit? Hospitalized since your last visit? no    2.  Have you seen or consulted any other health care providers outside of the [address] [place] [address] since your last visit? Include any pap smears or colon screening.  no

## 2022-04-28 NOTE — TELEPHONE ENCOUNTER
Patient states she forgot to ask for antibiotic for tooth pain. She has appointment with dentist on June 8. Please. advise.

## 2022-04-29 DIAGNOSIS — K08.89 TOOTH PAIN: Primary | ICD-10-CM

## 2022-04-29 RX ORDER — AMOXICILLIN 500 MG/1
500 CAPSULE ORAL 3 TIMES DAILY
Qty: 21 CAPSULE | Refills: 0 | Status: SHIPPED | OUTPATIENT
Start: 2022-04-29 | End: 2022-05-06

## 2022-05-05 ENCOUNTER — APPOINTMENT (OUTPATIENT)
Dept: INFUSION THERAPY | Age: 52
End: 2022-05-05

## 2022-05-06 ENCOUNTER — HOSPITAL ENCOUNTER (OUTPATIENT)
Dept: INFUSION THERAPY | Age: 52
Discharge: HOME | End: 2022-05-06
Payer: COMMERCIAL

## 2022-05-06 VITALS
HEIGHT: 60 IN | RESPIRATION RATE: 16 BRPM | OXYGEN SATURATION: 98 % | SYSTOLIC BLOOD PRESSURE: 134 MMHG | HEART RATE: 56 BPM | TEMPERATURE: 97.4 F | BODY MASS INDEX: 35.94 KG/M2 | DIASTOLIC BLOOD PRESSURE: 72 MMHG

## 2022-05-06 LAB
BASO+EOS+MONOS # BLD AUTO: 0.7 K/UL (ref 0–2.3)
BASO+EOS+MONOS NFR BLD AUTO: 10 % (ref 0.1–17)
DIFFERENTIAL METHOD BLD: ABNORMAL
ERYTHROCYTE [DISTWIDTH] IN BLOOD BY AUTOMATED COUNT: 15.5 % (ref 11.5–14.5)
HCT VFR BLD AUTO: 43.8 % (ref 36–48)
HGB BLD-MCNC: 12.8 G/DL (ref 12–16)
LYMPHOCYTES # BLD: 2.3 K/UL (ref 1.1–5.9)
LYMPHOCYTES NFR BLD: 33 % (ref 14–44)
MCH RBC QN AUTO: 25.6 PG (ref 25–35)
MCHC RBC AUTO-ENTMCNC: 29.2 G/DL (ref 31–37)
MCV RBC AUTO: 87.6 FL (ref 78–102)
NEUTS SEG # BLD: 3.9 K/UL (ref 1.8–9.5)
NEUTS SEG NFR BLD: 57 % (ref 40–70)
PLATELET # BLD AUTO: 264 K/UL (ref 140–440)
RBC # BLD AUTO: 5 M/UL (ref 4.1–5.1)
WBC # BLD AUTO: 6.9 K/UL (ref 4.5–13)

## 2022-05-06 PROCEDURE — 85025 COMPLETE CBC W/AUTO DIFF WBC: CPT

## 2022-05-06 PROCEDURE — 36415 COLL VENOUS BLD VENIPUNCTURE: CPT

## 2022-05-06 NOTE — PROGRESS NOTES
[facility] Lab Visit:    Bethann Goltz  1970  [id number]    0855 Pt arrived ambulatory w/o assist. Labs drawn per order via Left AC venipuncture x1 attempt. Pt tolerated well without complaints. Gauze/ coban to site. Pt departed [facility] ambulatory and in no distress at 0905.      Visit Vitals  /72 (BP 1 Location: Right upper arm, BP Patient Position: Sitting)   Pulse (!) 56   Temp 97.4 °F (36.3 °C)   Resp 16   Ht 5' (1.524 m)   SpO2 98%   BMI 35.94 kg/m²

## 2022-05-11 ENCOUNTER — TELEPHONE (OUTPATIENT)
Dept: ONCOLOGY | Age: 52
End: 2022-05-11

## 2022-05-11 ENCOUNTER — HOSPITAL ENCOUNTER (OUTPATIENT)
Dept: GENERAL RADIOLOGY | Age: 52
Discharge: HOME | End: 2022-05-11
Payer: COMMERCIAL

## 2022-05-11 DIAGNOSIS — M25.551 HIP PAIN, CHRONIC, RIGHT: ICD-10-CM

## 2022-05-11 DIAGNOSIS — G89.29 HIP PAIN, CHRONIC, RIGHT: ICD-10-CM

## 2022-05-11 DIAGNOSIS — K13.79 NODULE OF CHEEK: ICD-10-CM

## 2022-05-11 PROCEDURE — 73502 X-RAY EXAM HIP UNI 2-3 VIEWS: CPT

## 2022-05-11 PROCEDURE — 70140 X-RAY EXAM OF FACIAL BONES: CPT

## 2022-05-11 PROCEDURE — 70150 X-RAY EXAM OF FACIAL BONES: CPT

## 2022-05-11 NOTE — TELEPHONE ENCOUNTER
Patient contacted office and left a voicemail to report she went to [facility] to get labs drawn but no orders are in. Patient sounded very upset and reported this was unprofessional.    I do see labs were ordered back in  to be done but they may have  but janice did not contact our office. I am unsure who told patient to go to [facility] for labs, she had a CBC done  and that is all we need from our office so she did not even need labs.

## 2022-05-12 ENCOUNTER — TELEPHONE (OUTPATIENT)
Dept: FAMILY MEDICINE CLINIC | Age: 52
End: 2022-05-12

## 2022-05-25 ENCOUNTER — TELEPHONE (OUTPATIENT)
Dept: FAMILY MEDICINE CLINIC | Age: 52
End: 2022-05-25

## 2022-05-25 NOTE — TELEPHONE ENCOUNTER
Patient had x-rays of her hip and was given the results. She is concerned about the Atherosclerosis that was noted. She wants to know if there is anything that needs to be done to treat this. She has appointment with Dr. Bradley Mckenzie to establish care on August 30. She is anxious to hear back about treatment if needed. Please advise.

## 2022-05-26 ENCOUNTER — OFFICE VISIT (OUTPATIENT)
Dept: SURGERY | Age: 52
End: 2022-05-26
Payer: COMMERCIAL

## 2022-05-26 VITALS
TEMPERATURE: 97 F | HEIGHT: 60 IN | BODY MASS INDEX: 36.32 KG/M2 | SYSTOLIC BLOOD PRESSURE: 128 MMHG | WEIGHT: 185 LBS | OXYGEN SATURATION: 97 % | DIASTOLIC BLOOD PRESSURE: 80 MMHG | HEART RATE: 54 BPM | RESPIRATION RATE: 16 BRPM

## 2022-05-26 DIAGNOSIS — K59.00 CONSTIPATION, UNSPECIFIED CONSTIPATION TYPE: Primary | ICD-10-CM

## 2022-05-26 DIAGNOSIS — Z12.11 SPECIAL SCREENING FOR MALIGNANT NEOPLASMS, COLON: ICD-10-CM

## 2022-05-26 PROCEDURE — 99203 OFFICE O/P NEW LOW 30 MIN: CPT | Performed by: COLON & RECTAL SURGERY

## 2022-05-26 RX ORDER — METHYLCELLULOSE (WITH SUGAR)
1 POWDER IN PACKET (EA) ORAL DAILY
Qty: 255 G | Refills: 3 | Status: SHIPPED | OUTPATIENT
Start: 2022-05-26 | End: 2022-06-01 | Stop reason: SDUPTHER

## 2022-05-26 NOTE — PROGRESS NOTES
Kevyn Joshuamilvia is a 45 y.o. female  Chief Complaint   Patient presents with    New Patient     Constipation
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
## 2022-05-26 NOTE — PATIENT INSTRUCTIONS
Increase fiber in diet  Start fiber powder supplement such as citrucel one adult dose daily  Schedule colonoscopy

## 2022-06-01 RX ORDER — METHYLCELLULOSE (WITH SUGAR)
1 POWDER IN PACKET (EA) ORAL DAILY
Qty: 255 G | Refills: 3 | Status: SHIPPED | OUTPATIENT
Start: 2022-06-01

## 2022-06-02 DIAGNOSIS — I10 ESSENTIAL HYPERTENSION: ICD-10-CM

## 2022-06-02 DIAGNOSIS — E78.1 HYPERTRIGLYCERIDEMIA: ICD-10-CM

## 2022-06-02 DIAGNOSIS — R06.02 SHORTNESS OF BREATH: ICD-10-CM

## 2022-06-02 NOTE — TELEPHONE ENCOUNTER
Requested Prescriptions     Pending Prescriptions Disp Refills    fenofibrate nanocrystallized (TRICOR) 145 mg tablet 90 Tablet 0     Sig: Take 1 Tablet by mouth daily.  amLODIPine (NORVASC) 10 mg tablet 90 Tablet 0     Sig: Take 1 Tablet by mouth daily.  albuterol (PROVENTIL HFA, VENTOLIN HFA, PROAIR HFA) 90 mcg/actuation inhaler 18 g 1     Sig: INHALE ONE TO TWO PUFFS BY MOUTH EVERY 4 HOURS AS NEEDED    omega-3 acid ethyl esters (LOVAZA) 1 gram capsule 360 Capsule 3     Sig: TAKE TWO CAPSULES BY MOUTH TWICE A DAY    fluticasone propion-salmeteroL (Advair Diskus) 100-50 mcg/dose diskus inhaler 1 Each 2     Sig: Take 1 Puff by inhalation every twelve (12) hours.  Indications: bronchospasm prevention with COPD

## 2022-06-03 ENCOUNTER — HOSPITAL ENCOUNTER (OUTPATIENT)
Dept: INFUSION THERAPY | Age: 52
Discharge: HOME | End: 2022-06-03
Payer: COMMERCIAL

## 2022-06-03 ENCOUNTER — HOSPITAL ENCOUNTER (OUTPATIENT)
Dept: LAB | Age: 52
Discharge: HOME | End: 2022-06-03
Payer: COMMERCIAL

## 2022-06-03 VITALS
DIASTOLIC BLOOD PRESSURE: 75 MMHG | RESPIRATION RATE: 16 BRPM | HEART RATE: 64 BPM | TEMPERATURE: 97.6 F | OXYGEN SATURATION: 98 % | SYSTOLIC BLOOD PRESSURE: 113 MMHG

## 2022-06-03 DIAGNOSIS — Z12.11 SPECIAL SCREENING FOR MALIGNANT NEOPLASMS, COLON: ICD-10-CM

## 2022-06-03 DIAGNOSIS — I10 HYPERTENSION, UNSPECIFIED TYPE: ICD-10-CM

## 2022-06-03 DIAGNOSIS — K59.00 CONSTIPATION, UNSPECIFIED CONSTIPATION TYPE: ICD-10-CM

## 2022-06-03 LAB
ALBUMIN SERPL-MCNC: 3.3 G/DL (ref 3.4–5)
ALBUMIN/GLOB SERPL: 1.2 {RATIO} (ref 0.8–1.7)
ALP SERPL-CCNC: 60 U/L (ref 45–117)
ALT SERPL-CCNC: 22 U/L (ref 13–56)
ANION GAP SERPL CALC-SCNC: 3 MMOL/L (ref 3–18)
AST SERPL-CCNC: 18 U/L (ref 10–38)
ATRIAL RATE: 64 BPM
BASO+EOS+MONOS # BLD AUTO: 0.7 K/UL (ref 0–2.3)
BASO+EOS+MONOS NFR BLD AUTO: 9 % (ref 0.1–17)
BASOPHILS # BLD: 0.1 K/UL (ref 0–0.1)
BASOPHILS NFR BLD: 1 % (ref 0–2)
BILIRUB SERPL-MCNC: 0.3 MG/DL (ref 0.2–1)
BUN SERPL-MCNC: 19 MG/DL (ref 7–18)
BUN/CREAT SERPL: 39 (ref 12–20)
CALCIUM SERPL-MCNC: 9 MG/DL (ref 8.5–10.1)
CALCULATED P AXIS, ECG09: 47 DEGREES
CALCULATED R AXIS, ECG10: -31 DEGREES
CALCULATED T AXIS, ECG11: 26 DEGREES
CHLORIDE SERPL-SCNC: 111 MMOL/L (ref 100–111)
CHOLEST SERPL-MCNC: 110 MG/DL
CO2 SERPL-SCNC: 29 MMOL/L (ref 21–32)
CREAT SERPL-MCNC: 0.49 MG/DL (ref 0.6–1.3)
DIAGNOSIS, 93000: NORMAL
DIFFERENTIAL METHOD BLD: ABNORMAL
DIFFERENTIAL METHOD BLD: ABNORMAL
EOSINOPHIL # BLD: 0.1 K/UL (ref 0–0.4)
EOSINOPHIL NFR BLD: 2 % (ref 0–5)
ERYTHROCYTE [DISTWIDTH] IN BLOOD BY AUTOMATED COUNT: 17 % (ref 11.5–14.5)
ERYTHROCYTE [DISTWIDTH] IN BLOOD BY AUTOMATED COUNT: 17.9 % (ref 11.6–14.5)
GLOBULIN SER CALC-MCNC: 2.7 G/DL (ref 2–4)
GLUCOSE SERPL-MCNC: 109 MG/DL (ref 74–99)
HCT VFR BLD AUTO: 44.2 % (ref 35–45)
HCT VFR BLD AUTO: 45.5 % (ref 36–48)
HDLC SERPL-MCNC: 55 MG/DL (ref 40–60)
HDLC SERPL: 2 {RATIO} (ref 0–5)
HGB BLD-MCNC: 13.5 G/DL (ref 12–16)
HGB BLD-MCNC: 13.7 G/DL (ref 12–16)
IMM GRANULOCYTES # BLD AUTO: 0 K/UL (ref 0–0.04)
IMM GRANULOCYTES NFR BLD AUTO: 0 % (ref 0–0.5)
LDLC SERPL CALC-MCNC: 34.8 MG/DL (ref 0–100)
LIPID PROFILE,FLP: NORMAL
LYMPHOCYTES # BLD: 1.9 K/UL (ref 0.9–3.6)
LYMPHOCYTES # BLD: 2.1 K/UL (ref 1.1–5.9)
LYMPHOCYTES NFR BLD: 24 % (ref 21–52)
LYMPHOCYTES NFR BLD: 27 % (ref 14–44)
MCH RBC QN AUTO: 25.6 PG (ref 24–34)
MCH RBC QN AUTO: 26 PG (ref 25–35)
MCHC RBC AUTO-ENTMCNC: 30.1 G/DL (ref 31–37)
MCHC RBC AUTO-ENTMCNC: 30.5 G/DL (ref 31–37)
MCV RBC AUTO: 83.9 FL (ref 78–100)
MCV RBC AUTO: 86.3 FL (ref 78–102)
MONOCYTES # BLD: 0.8 K/UL (ref 0.05–1.2)
MONOCYTES NFR BLD: 10 % (ref 3–10)
NEUTS SEG # BLD: 4.8 K/UL (ref 1.8–8)
NEUTS SEG # BLD: 4.8 K/UL (ref 1.8–9.5)
NEUTS SEG NFR BLD: 63 % (ref 40–70)
NEUTS SEG NFR BLD: 63 % (ref 40–73)
NRBC # BLD: 0 K/UL (ref 0–0.01)
NRBC BLD-RTO: 0 PER 100 WBC
P-R INTERVAL, ECG05: 158 MS
PLATELET # BLD AUTO: 223 K/UL (ref 140–440)
PLATELET # BLD AUTO: 241 K/UL (ref 135–420)
PMV BLD AUTO: 9.8 FL (ref 9.2–11.8)
POTASSIUM SERPL-SCNC: 4.1 MMOL/L (ref 3.5–5.5)
PROT SERPL-MCNC: 6 G/DL (ref 6.4–8.2)
Q-T INTERVAL, ECG07: 436 MS
QRS DURATION, ECG06: 110 MS
QTC CALCULATION (BEZET), ECG08: 449 MS
RBC # BLD AUTO: 5.27 M/UL (ref 4.1–5.1)
RBC # BLD AUTO: 5.27 M/UL (ref 4.2–5.3)
SODIUM SERPL-SCNC: 143 MMOL/L (ref 136–145)
TRIGL SERPL-MCNC: 101 MG/DL (ref ?–150)
VENTRICULAR RATE, ECG03: 64 BPM
VLDLC SERPL CALC-MCNC: 20.2 MG/DL
WBC # BLD AUTO: 7.6 K/UL (ref 4.5–13)
WBC # BLD AUTO: 7.6 K/UL (ref 4.6–13.2)

## 2022-06-03 PROCEDURE — 36415 COLL VENOUS BLD VENIPUNCTURE: CPT

## 2022-06-03 PROCEDURE — 93005 ELECTROCARDIOGRAM TRACING: CPT

## 2022-06-03 PROCEDURE — 85025 COMPLETE CBC W/AUTO DIFF WBC: CPT

## 2022-06-03 PROCEDURE — 80061 LIPID PANEL: CPT

## 2022-06-03 PROCEDURE — 99195 PHLEBOTOMY: CPT

## 2022-06-03 PROCEDURE — 80053 COMPREHEN METABOLIC PANEL: CPT

## 2022-06-03 RX ORDER — ALBUTEROL SULFATE 90 UG/1
AEROSOL, METERED RESPIRATORY (INHALATION)
Qty: 18 G | Refills: 1 | Status: SHIPPED | OUTPATIENT
Start: 2022-06-03 | End: 2022-06-20 | Stop reason: SDUPTHER

## 2022-06-03 RX ORDER — AMLODIPINE BESYLATE 10 MG/1
10 TABLET ORAL DAILY
Qty: 90 TABLET | Refills: 0 | Status: SHIPPED | OUTPATIENT
Start: 2022-06-03 | End: 2022-08-30

## 2022-06-03 RX ORDER — FENOFIBRATE 145 MG/1
145 TABLET, COATED ORAL DAILY
Qty: 90 TABLET | Refills: 0 | Status: SHIPPED | OUTPATIENT
Start: 2022-06-03 | End: 2022-10-26

## 2022-06-03 RX ORDER — FLUTICASONE PROPIONATE AND SALMETEROL 100; 50 UG/1; UG/1
1 POWDER RESPIRATORY (INHALATION) EVERY 12 HOURS
Qty: 1 EACH | Refills: 2 | Status: SHIPPED | OUTPATIENT
Start: 2022-06-03 | End: 2022-09-06 | Stop reason: SDUPTHER

## 2022-06-03 RX ORDER — OMEGA-3-ACID ETHYL ESTERS 1 G/1
CAPSULE, LIQUID FILLED ORAL
Qty: 360 CAPSULE | Refills: 3 | Status: SHIPPED | OUTPATIENT
Start: 2022-06-03

## 2022-06-03 NOTE — PROGRESS NOTES
[address] [facility] Progress Note    Date: Rosalie 3, 2022    Name: Karen Chapman    MRN: [id number]         : 1970    Therapeutic Phlebotomy (Monthly Status)    Ms. Prisca Ramirez arrived at [address] [place] [address] at 0900. She was assessed and education was provided. Ms. Ozuna's vitals were reviewed and patient was observed for 5 minutes prior to treatment. Visit Vitals  /75 (BP 1 Location: Right upper arm, BP Patient Position: Sitting)   Pulse 64   Temp 97.6 °F (36.4 °C)   Resp 16   SpO2 98%   Breastfeeding No       Labs were drawn via left AC by [facility] Phlebotomist.  HGB 45.5 today    PIV #20 started LAC x1 attempt without difficulty. Brisk blood return received. Therapeutic phlebotomy started at 0931 and stopped at 0955  Approx 500 ml blood removed from patient. 500 ml NS IV given. Ms. Prisca Ramirez tolerated the treatment and had no complaints. Patient armband removed and shredded. Ms. Prisca Ramirez was discharged from [place] [address] in stable condition at 1030. She is to return on 6/10/22 at 1000 for hernext Therapeutic Phlebotomy Weekly appointment.         Emily Larson  Rosalie 3, 2022

## 2022-06-07 ENCOUNTER — OFFICE VISIT (OUTPATIENT)
Dept: FAMILY MEDICINE CLINIC | Age: 52
End: 2022-06-07
Payer: COMMERCIAL

## 2022-06-07 VITALS
RESPIRATION RATE: 14 BRPM | TEMPERATURE: 98.4 F | SYSTOLIC BLOOD PRESSURE: 117 MMHG | DIASTOLIC BLOOD PRESSURE: 72 MMHG | OXYGEN SATURATION: 95 % | BODY MASS INDEX: 34.93 KG/M2 | WEIGHT: 185 LBS | HEIGHT: 61 IN | HEART RATE: 78 BPM

## 2022-06-07 DIAGNOSIS — R73.03 PREDIABETES: ICD-10-CM

## 2022-06-07 DIAGNOSIS — F32.A DEPRESSION, UNSPECIFIED DEPRESSION TYPE: ICD-10-CM

## 2022-06-07 DIAGNOSIS — D75.1 SECONDARY POLYCYTHEMIA: ICD-10-CM

## 2022-06-07 DIAGNOSIS — R06.02 SHORTNESS OF BREATH: ICD-10-CM

## 2022-06-07 DIAGNOSIS — Z12.31 BREAST CANCER SCREENING BY MAMMOGRAM: ICD-10-CM

## 2022-06-07 DIAGNOSIS — I10 ESSENTIAL HYPERTENSION: Primary | ICD-10-CM

## 2022-06-07 DIAGNOSIS — E78.5 DYSLIPIDEMIA: ICD-10-CM

## 2022-06-07 DIAGNOSIS — R07.9 CHEST PAIN, UNSPECIFIED TYPE: ICD-10-CM

## 2022-06-07 DIAGNOSIS — F31.10 MANIC BIPOLAR I DISORDER (HCC): ICD-10-CM

## 2022-06-07 DIAGNOSIS — Z23 ENCOUNTER FOR IMMUNIZATION: ICD-10-CM

## 2022-06-07 LAB — HBA1C MFR BLD HPLC: 5.3 %

## 2022-06-07 PROCEDURE — 83036 HEMOGLOBIN GLYCOSYLATED A1C: CPT | Performed by: STUDENT IN AN ORGANIZED HEALTH CARE EDUCATION/TRAINING PROGRAM

## 2022-06-07 PROCEDURE — 99214 OFFICE O/P EST MOD 30 MIN: CPT | Performed by: STUDENT IN AN ORGANIZED HEALTH CARE EDUCATION/TRAINING PROGRAM

## 2022-06-07 RX ORDER — ALBUTEROL SULFATE 90 UG/1
2 AEROSOL, METERED RESPIRATORY (INHALATION)
Qty: 18 G | Refills: 2 | Status: SHIPPED | OUTPATIENT
Start: 2022-06-07 | End: 2022-09-06 | Stop reason: SDUPTHER

## 2022-06-07 RX ORDER — ZOSTER VACCINE RECOMBINANT, ADJUVANTED 50 MCG/0.5
0.5 KIT INTRAMUSCULAR ONCE
Qty: 0.5 ML | Refills: 1 | Status: SHIPPED | OUTPATIENT
Start: 2022-06-07 | End: 2022-06-07
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
## 2022-06-13 ENCOUNTER — TELEPHONE (OUTPATIENT)
Dept: CARDIOLOGY CLINIC | Age: 52
End: 2022-06-13

## 2022-06-13 DIAGNOSIS — R06.02 SHORTNESS OF BREATH: ICD-10-CM

## 2022-06-13 DIAGNOSIS — I11.9 HYPERTENSIVE LEFT VENTRICULAR HYPERTROPHY, WITHOUT HEART FAILURE: Primary | ICD-10-CM

## 2022-06-13 DIAGNOSIS — R07.9 CHEST PAIN, UNSPECIFIED TYPE: ICD-10-CM

## 2022-06-20 DIAGNOSIS — I10 ESSENTIAL HYPERTENSION: ICD-10-CM

## 2022-06-20 DIAGNOSIS — R06.02 SHORTNESS OF BREATH: ICD-10-CM

## 2022-06-20 NOTE — TELEPHONE ENCOUNTER
Patient requesting 90 day supply    Requested Prescriptions     Pending Prescriptions Disp Refills    albuterol (PROVENTIL HFA, VENTOLIN HFA, PROAIR HFA) 90 mcg/actuation inhaler 18 g 1     Sig: INHALE ONE TO TWO PUFFS BY MOUTH EVERY 4 HOURS AS NEEDED    losartan-hydroCHLOROthiazide (HYZAAR) 100-12.5 mg per tablet 90 Tablet 0     Sig: Take 1 Tablet by mouth daily.

## 2022-06-21 DIAGNOSIS — I10 ESSENTIAL HYPERTENSION: ICD-10-CM

## 2022-06-21 RX ORDER — ALBUTEROL SULFATE 90 UG/1
AEROSOL, METERED RESPIRATORY (INHALATION)
Qty: 18 G | Refills: 2 | Status: SHIPPED | OUTPATIENT
Start: 2022-06-21

## 2022-06-21 RX ORDER — LOSARTAN POTASSIUM AND HYDROCHLOROTHIAZIDE 12.5; 1 MG/1; MG/1
1 TABLET ORAL DAILY
Qty: 90 TABLET | Refills: 0 | OUTPATIENT
Start: 2022-06-21

## 2022-06-21 RX ORDER — LOSARTAN POTASSIUM AND HYDROCHLOROTHIAZIDE 12.5; 1 MG/1; MG/1
1 TABLET ORAL DAILY
Qty: 90 TABLET | Refills: 0 | Status: SHIPPED | OUTPATIENT
Start: 2022-06-21 | End: 2022-08-03

## 2022-06-21 NOTE — TELEPHONE ENCOUNTER
Requested Prescriptions     Pending Prescriptions Disp Refills    losartan-hydroCHLOROthiazide (HYZAAR) 100-12.5 mg per tablet 90 Tablet 0     Sig: Take 1 Tablet by mouth daily.

## 2022-06-29 ENCOUNTER — OFFICE VISIT (OUTPATIENT)
Dept: ONCOLOGY | Age: 52
End: 2022-06-29
Payer: COMMERCIAL

## 2022-06-29 ENCOUNTER — HOSPITAL ENCOUNTER (OUTPATIENT)
Dept: INFUSION THERAPY | Age: 52
Discharge: HOME | End: 2022-06-29
Payer: COMMERCIAL

## 2022-06-29 VITALS
RESPIRATION RATE: 16 BRPM | SYSTOLIC BLOOD PRESSURE: 118 MMHG | OXYGEN SATURATION: 97 % | DIASTOLIC BLOOD PRESSURE: 78 MMHG | TEMPERATURE: 97.3 F | HEART RATE: 64 BPM

## 2022-06-29 VITALS
TEMPERATURE: 97.3 F | HEART RATE: 64 BPM | DIASTOLIC BLOOD PRESSURE: 78 MMHG | RESPIRATION RATE: 16 BRPM | SYSTOLIC BLOOD PRESSURE: 118 MMHG | HEIGHT: 61 IN | BODY MASS INDEX: 34.96 KG/M2 | OXYGEN SATURATION: 97 %

## 2022-06-29 DIAGNOSIS — D75.1 POLYCYTHEMIA SECONDARY TO SMOKING: Primary | ICD-10-CM

## 2022-06-29 LAB
BASO+EOS+MONOS # BLD AUTO: 0.5 K/UL (ref 0–2.3)
BASO+EOS+MONOS NFR BLD AUTO: 10 % (ref 0.1–17)
DIFFERENTIAL METHOD BLD: ABNORMAL
ERYTHROCYTE [DISTWIDTH] IN BLOOD BY AUTOMATED COUNT: 16.8 % (ref 11.5–14.5)
HCT VFR BLD AUTO: 40.1 % (ref 36–48)
HGB BLD-MCNC: 12.2 G/DL (ref 12–16)
LYMPHOCYTES # BLD: 1.9 K/UL (ref 1.1–5.9)
LYMPHOCYTES NFR BLD: 36 % (ref 14–44)
MCH RBC QN AUTO: 26.2 PG (ref 25–35)
MCHC RBC AUTO-ENTMCNC: 30.4 G/DL (ref 31–37)
MCV RBC AUTO: 86.2 FL (ref 78–102)
NEUTS SEG # BLD: 2.8 K/UL (ref 1.8–9.5)
NEUTS SEG NFR BLD: 54 % (ref 40–70)
PLATELET # BLD AUTO: 255 K/UL (ref 140–440)
RBC # BLD AUTO: 4.65 M/UL (ref 4.1–5.1)
WBC # BLD AUTO: 5.2 K/UL (ref 4.5–13)

## 2022-06-29 PROCEDURE — 36415 COLL VENOUS BLD VENIPUNCTURE: CPT

## 2022-06-29 PROCEDURE — 99213 OFFICE O/P EST LOW 20 MIN: CPT | Performed by: INTERNAL MEDICINE

## 2022-06-29 PROCEDURE — 85025 COMPLETE CBC W/AUTO DIFF WBC: CPT
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
## 2022-06-29 NOTE — PROGRESS NOTES
[facility] Lab Visit:    Donna Metcalf  1970  [id number]    1000 Pt arrived ambulatory w/o assist. Labs drawn per order via Left  AC venipuncture x1 attempt. Pt tolerated well without complaints. Gauze/ coban to site. Pt departed hospitals ambulatory and in no distress at 1010.      Visit Vitals  /78 (BP 1 Location: Right upper arm, BP Patient Position: Sitting)   Pulse 64   Temp 97.3 °F (36.3 °C)   Resp 16   SpO2 97%

## 2022-07-08 ENCOUNTER — HOSPITAL ENCOUNTER (EMERGENCY)
Age: 52
Discharge: HOME | End: 2022-07-08
Attending: STUDENT IN AN ORGANIZED HEALTH CARE EDUCATION/TRAINING PROGRAM
Payer: COMMERCIAL

## 2022-07-08 VITALS
DIASTOLIC BLOOD PRESSURE: 82 MMHG | HEART RATE: 78 BPM | RESPIRATION RATE: 16 BRPM | TEMPERATURE: 98.4 F | SYSTOLIC BLOOD PRESSURE: 144 MMHG | OXYGEN SATURATION: 98 %

## 2022-07-08 DIAGNOSIS — K04.7 DENTAL INFECTION: Primary | ICD-10-CM

## 2022-07-08 PROCEDURE — 99283 EMERGENCY DEPT VISIT LOW MDM: CPT

## 2022-07-08 RX ORDER — PENICILLIN V POTASSIUM 500 MG/1
500 TABLET, FILM COATED ORAL 4 TIMES DAILY
Qty: 28 TABLET | Refills: 0 | Status: SHIPPED | OUTPATIENT
Start: 2022-07-08 | End: 2022-07-15

## 2022-07-08 RX ORDER — NAPROXEN 500 MG/1
500 TABLET ORAL 2 TIMES DAILY WITH MEALS
Qty: 20 TABLET | Refills: 0 | Status: SHIPPED | OUTPATIENT
Start: 2022-07-08 | End: 2022-09-06

## 2022-07-08 NOTE — ED PROVIDER NOTES
EMERGENCY DEPARTMENT HISTORY AND PHYSICAL EXAM      Date: 7/8/2022  Patient Name: Lucille Sharma    History of Presenting Illness     Chief Complaint   Patient presents with    Dental Pain       History Provided By: Patient    HPI: Lucille Sharma, 47 y.o. female PMHx significant for hypertension, EtOH abuse, vitamin D deficiency, bipolar disorder, HPV, polycythemia, CHF, schatzki's ring, asthma, GERD presents ambulatory to the ED. Patient reports right upper dental pain x 4 days. Denies known trauma or injury. Denies fever/chills, drainage, trouble swallowing. Patient reports her dental appointment is scheduled for late August. Patient been taking OTC medication without relief of symptoms. There are no other complaints, changes, or physical findings at this time. PCP: Sarai Rios MD    No current facility-administered medications on file prior to encounter. Current Outpatient Medications on File Prior to Encounter   Medication Sig Dispense Refill    albuterol (PROVENTIL HFA, VENTOLIN HFA, PROAIR HFA) 90 mcg/actuation inhaler INHALE ONE TO TWO PUFFS BY MOUTH EVERY 4 HOURS AS NEEDED 18 g 2    losartan-hydroCHLOROthiazide (HYZAAR) 100-12.5 mg per tablet Take 1 Tablet by mouth daily. 90 Tablet 0    albuterol (PROVENTIL HFA, VENTOLIN HFA, PROAIR HFA) 90 mcg/actuation inhaler Take 2 Puffs by inhalation every four (4) hours as needed for Wheezing. Indications: chronic obstructive pulmonary disease 18 g 2    fenofibrate nanocrystallized (TRICOR) 145 mg tablet Take 1 Tablet by mouth daily. 90 Tablet 0    amLODIPine (NORVASC) 10 mg tablet Take 1 Tablet by mouth daily. 90 Tablet 0    omega-3 acid ethyl esters (LOVAZA) 1 gram capsule TAKE TWO CAPSULES BY MOUTH TWICE A  Capsule 3    fluticasone propion-salmeteroL (Advair Diskus) 100-50 mcg/dose diskus inhaler Take 1 Puff by inhalation every twelve (12) hours.  Indications: bronchospasm prevention with COPD 1 Each 2    Methylcellulose, with Sugar, (Citrucel, sucrose,) powd Take 1 Dose by mouth daily. 255 g 3    Blood Pressure Test Kit-Large kit Please provide BP cuff for hypertension diagnosis 1 Kit 0    atorvastatin (LIPITOR) 40 mg tablet Take 1 Tablet by mouth daily. 90 Tablet 0    nystatin (MYCOSTATIN) topical cream Apply  to affected area two (2) times a day. 15 g 1    ergocalciferol (ERGOCALCIFEROL) 1,250 mcg (50,000 unit) capsule Take 1 Capsule by mouth every seven (7) days. 12 Capsule 1    omeprazole (PRILOSEC) 20 mg capsule Take 1 Capsule by mouth daily. 30 Capsule 2    clonazePAM (KlonoPIN) 1 mg tablet Take 1 mg by mouth daily.  desvenlafaxine succinate (Pristiq) 50 mg ER tablet Take 50 mg by mouth daily.  traZODone (DESYREL) 100 mg tablet Take 100 mg by mouth nightly.  aspirin delayed-release 81 mg tablet Take 1 Tab by mouth daily. 100 Tab 3    lamoTRIgine (LAMICTAL) 100 mg tablet Take 200 mg by mouth daily.          Past History     Past Medical History:  Past Medical History:   Diagnosis Date    Asthma     Bronchitis     [facility]     Cocaine abuse ([facility] 3/26/2015    Dental caries     Depression     bipolar    Depression 8/31/2016    Drug abuse ([facility]     Dysphasia 10/17/2018    Elevated hematocrit 8/31/2016    ETOH abuse     GERD (gastroesophageal reflux disease)     schatzis ring, hiatal hernia    H/O screening mammography 12/06/2016    No evidence of malignancy     HPV (human papilloma virus) infection 11/2016    The patient [facility] to [facility]     Hypertension     Hypertensive left ventricular hypertrophy, without heart failure 10/15/2018    mild to moderate lvh continue bp meds    Ill-defined condition     Marijuana use 10/17/2018    Mood swings     Polycythemia 2018    Polycythemia     Psychiatric disorder     depression, Bipolar    Schatzki's ring 08/30/2018    Found on Barium Swallow    Sliding hiatal hernia 10/17/2018    Tobacco abuse     Vitamin D deficiency        Past Surgical History:  Past Surgical History:   Procedure Laterality Date    HX APPENDECTOMY      HX COLPOSCOPY  12/06/2016    Low-grade courtney/mild dysplasia RICK I    HX HERNIA REPAIR      3x       Family History:  Family History   Problem Relation Age of Onset    Hypertension Mother     Cancer Mother         stomach    Heart Attack Father     Hypertension Father     Cancer Father         skin    Cancer Sister         skin    Bipolar Disorder Sister     Cancer Brother         skin    Cancer Maternal Aunt         colon       Social History:  Social History     Tobacco Use    Smoking status: Current Every Day Smoker     Packs/day: 0.50     Years: 20.00     Pack years: 10.00     Types: Cigarettes    Smokeless tobacco: Never Used   Vaping Use    Vaping Use: Never used   Substance Use Topics    Alcohol use: Yes     Comment: socially    Drug use: Yes     Types: Cocaine, Marijuana     Comment: smokes cocaine-current user       Allergies: Allergies   Allergen Reactions    Iodinated Contrast Media Shortness of Breath         Review of Systems   Review of Systems   Constitutional: Negative for chills and fever. HENT: Positive for dental problem. Respiratory: Negative for shortness of breath. Cardiovascular: Negative for chest pain. Gastrointestinal: Negative for abdominal pain, nausea and vomiting. Genitourinary: Negative for flank pain. Musculoskeletal: Negative for back pain and myalgias. Skin: Negative for color change, pallor, rash and wound. Neurological: Negative for dizziness, weakness and light-headedness. All other systems reviewed and are negative. Physical Exam   Physical Exam  Vitals and nursing note reviewed. Constitutional:       General: She is not in acute distress. Appearance: She is well-developed. Comments: Pt in NAD   HENT:      Head: Normocephalic and atraumatic.       Mouth/Throat:        Comments: No sublingual or submandibular swelling  No hard or soft palette swelling  Maintaining airway without difficulty  Eyes:      Conjunctiva/sclera: Conjunctivae normal.   Cardiovascular:      Rate and Rhythm: Normal rate and regular rhythm. Heart sounds: Normal heart sounds. Pulmonary:      Effort: Pulmonary effort is normal. No respiratory distress. Breath sounds: Normal breath sounds. Abdominal:      General: Bowel sounds are normal. There is no distension. Palpations: Abdomen is soft. Musculoskeletal:         General: Normal range of motion. Skin:     General: Skin is warm. Findings: No rash. Neurological:      Mental Status: She is alert and oriented to person, place, and time. Psychiatric:         Behavior: Behavior normal.         Diagnostic Study Results     Labs -   No results found for this or any previous visit (from the past 12 hour(s)). Radiologic Studies -   No orders to display     CT Results  (Last 48 hours)    None        CXR Results  (Last 48 hours)    None          Medical Decision Making   I am the first provider for this patient. I reviewed the vital signs, available nursing notes, past medical history, past surgical history, family history and social history. Vital Signs-Reviewed the patient's vital signs. Patient Vitals for the past 12 hrs:   Temp Pulse Resp BP SpO2   07/08/22 0936 98.4 °F (36.9 °C) 78 16 (!) 144/82 98 %       Records Reviewed: Nursing Notes, Old Medical Records, Previous Radiology Studies and Previous Laboratory Studies    Provider Notes (Medical Decision Making):   DDx: Dental pain vs infection vs abscess    46 yo F who presents with right upper dental pain x 4 day. On exam, TTP and gum swelling to right upper gum. No signs of abscess. No hard or soft palate swelling. No signs of Mike's. Maintaining airway without difficulty. At time of discharge, pt non-toxic appearing in NAD. Pt stable for prompt outpatient follow-up with PCP 1 to 2 days.   Patient given strict instructions to return if symptoms worsen. ED Course:   Initial assessment performed. The patients presenting problems have been discussed, and they are in agreement with the care plan formulated and outlined with them. I have encouraged them to ask questions as they arise throughout their visit. Disposition:  9:50 AM  Discussed dx and treatment plan. Discussed importance of PCP follow up. All questions answered. Pt voiced they understood. Return if sx worsen. PLAN:  1. Current Discharge Medication List      START taking these medications    Details   penicillin v potassium (VEETID) 500 mg tablet Take 1 Tablet by mouth four (4) times daily for 7 days. Qty: 28 Tablet, Refills: 0  Start date: 7/8/2022, End date: 7/15/2022           2. Follow-up Information     Follow up With Specialties Details Why Contact Info    Hunter Varma MD Internal Medicine Physician Schedule an appointment as soon as possible for a visit in 1 day  [address] [address]  [phone]      [facility] EMERGENCY DEPT Emergency Medicine  As needed, If symptoms worsen [address] [facility] [address]  [address]  Schedule an appointment as soon as possible for a visit in 1 day  [address]  Schedule an appointment as soon as possible for a visit in 1 day  [address]  [phone]        Return to ED if worse     Diagnosis     Clinical Impression:   1. Dental infection        Attestations:    JENNIFER Ortiz    Please note that this dictation was completed with [employer], the computer voice recognition software. Quite often unanticipated grammatical, syntax, homophones, and other interpretive errors are inadvertently transcribed by the computer software. Please disregard these errors. Please excuse any errors that have escaped final proofreading. Thank you.

## 2022-07-27 ENCOUNTER — APPOINTMENT (OUTPATIENT)
Dept: INFUSION THERAPY | Age: 52
End: 2022-07-27

## 2022-08-03 DIAGNOSIS — I10 ESSENTIAL HYPERTENSION: ICD-10-CM

## 2022-08-03 RX ORDER — LOSARTAN POTASSIUM AND HYDROCHLOROTHIAZIDE 12.5; 1 MG/1; MG/1
TABLET ORAL
Qty: 90 TABLET | Refills: 0 | Status: SHIPPED | OUTPATIENT
Start: 2022-08-03 | End: 2022-08-30

## 2022-08-29 DIAGNOSIS — I10 ESSENTIAL HYPERTENSION: ICD-10-CM

## 2022-08-30 RX ORDER — LOSARTAN POTASSIUM AND HYDROCHLOROTHIAZIDE 12.5; 1 MG/1; MG/1
TABLET ORAL
Qty: 90 TABLET | Refills: 0 | Status: SHIPPED | OUTPATIENT
Start: 2022-08-30 | End: 2022-09-06 | Stop reason: SDUPTHER

## 2022-08-30 RX ORDER — AMLODIPINE BESYLATE 10 MG/1
TABLET ORAL
Qty: 90 TABLET | Refills: 0 | Status: SHIPPED | OUTPATIENT
Start: 2022-08-30 | End: 2022-09-06 | Stop reason: SDUPTHER

## 2022-08-31 ENCOUNTER — HOSPITAL ENCOUNTER (OUTPATIENT)
Dept: INFUSION THERAPY | Age: 52
Discharge: HOME | End: 2022-08-31

## 2022-09-06 ENCOUNTER — OFFICE VISIT (OUTPATIENT)
Dept: FAMILY MEDICINE CLINIC | Age: 52
End: 2022-09-06
Payer: COMMERCIAL

## 2022-09-06 VITALS
BODY MASS INDEX: 35.68 KG/M2 | WEIGHT: 189 LBS | SYSTOLIC BLOOD PRESSURE: 122 MMHG | DIASTOLIC BLOOD PRESSURE: 82 MMHG | OXYGEN SATURATION: 95 % | RESPIRATION RATE: 16 BRPM | HEART RATE: 75 BPM | HEIGHT: 61 IN

## 2022-09-06 DIAGNOSIS — R73.03 PREDIABETES: ICD-10-CM

## 2022-09-06 DIAGNOSIS — F32.A DEPRESSION, UNSPECIFIED DEPRESSION TYPE: ICD-10-CM

## 2022-09-06 DIAGNOSIS — R06.02 SHORTNESS OF BREATH: ICD-10-CM

## 2022-09-06 DIAGNOSIS — F31.10 MANIC BIPOLAR I DISORDER (HCC): ICD-10-CM

## 2022-09-06 DIAGNOSIS — D75.1 SECONDARY POLYCYTHEMIA: ICD-10-CM

## 2022-09-06 DIAGNOSIS — I10 ESSENTIAL HYPERTENSION: Primary | ICD-10-CM

## 2022-09-06 DIAGNOSIS — E78.5 DYSLIPIDEMIA: ICD-10-CM

## 2022-09-06 PROCEDURE — 99214 OFFICE O/P EST MOD 30 MIN: CPT | Performed by: STUDENT IN AN ORGANIZED HEALTH CARE EDUCATION/TRAINING PROGRAM

## 2022-09-06 RX ORDER — FLUTICASONE PROPIONATE AND SALMETEROL 100; 50 UG/1; UG/1
1 POWDER RESPIRATORY (INHALATION) EVERY 12 HOURS
Qty: 1 EACH | Refills: 2 | Status: SHIPPED | OUTPATIENT
Start: 2022-09-06

## 2022-09-06 RX ORDER — LOSARTAN POTASSIUM AND HYDROCHLOROTHIAZIDE 12.5; 1 MG/1; MG/1
1 TABLET ORAL DAILY
Qty: 90 TABLET | Refills: 2 | Status: SHIPPED | OUTPATIENT
Start: 2022-09-06

## 2022-09-06 RX ORDER — ALBUTEROL SULFATE 90 UG/1
2 AEROSOL, METERED RESPIRATORY (INHALATION)
Qty: 18 G | Refills: 2 | Status: SHIPPED | OUTPATIENT
Start: 2022-09-06

## 2022-09-06 RX ORDER — AMLODIPINE BESYLATE 10 MG/1
10 TABLET ORAL DAILY
Qty: 90 TABLET | Refills: 2 | Status: SHIPPED | OUTPATIENT
Start: 2022-09-06

## 2022-09-06 NOTE — ED PROVIDER NOTES
EMERGENCY DEPARTMENT HISTORY AND PHYSICAL EXAM    Date: 10/16/2020  Patient Name: Anoop Niño    History of Presenting Illness     Chief Complaint   Patient presents with    Blood in Urine         History Provided By: patient        Additional History (Context): Anoop Niño is a 53-year-old female with  past medical history of hypertension, dyslipidemia, & polycythemia who presents to the ED with concerns of blood in her urine. She states that she has had blood work and urinalysis done elsewhere, which came back positive for WBC and RBCs. She believes that the RBCs are due to menstrual bleeding. PCP: Theodore Neely NP    Current Outpatient Medications   Medication Sig Dispense Refill    atorvastatin (LIPITOR) 40 mg tablet TAKE ONE TABLET BY MOUTH DAILY 90 Tab 1    ergocalciferol (ERGOCALCIFEROL) 1,250 mcg (50,000 unit) capsule Take 1 Cap by mouth every seven (7) days. 12 Cap 1    linaCLOtide (Linzess) 145 mcg cap capsule TAKE ONE CAPSULE BY MOUTH DAILY BEFORE BREAKFAST 30 Cap 0    albuterol (PROVENTIL HFA, VENTOLIN HFA, PROAIR HFA) 90 mcg/actuation inhaler INHALE ONE TO TWO PUFFS BY MOUTH EVERY 4 HOURS AS NEEDED FOR WHEEZING 18 g 1    fluticasone propion-salmeteroL (Advair Diskus) 100-50 mcg/dose diskus inhaler Take 1 Puff by inhalation every twelve (12) hours. Indications: bronchospasm prevention with COPD 3 Inhaler 3    amLODIPine (NORVASC) 10 mg tablet Take 1 Tab by mouth daily. 90 Tab 1    omeprazole (PRILOSEC) 20 mg capsule Take 1 Cap by mouth daily. 30 Cap 3    losartan-hydroCHLOROthiazide (HYZAAR) 100-12.5 mg per tablet TAKE ONE TABLET BY MOUTH DAILY 30 Tab 2    aspirin delayed-release 81 mg tablet Take 1 Tab by mouth daily. 100 Tab 3    omega-3 acid ethyl esters (Lovaza) 1 gram capsule Take 2 Caps by mouth two (2) times a day. 360 Cap 3    nystatin (MYCOSTATIN) topical cream Apply  to affected area two (2) times a day.  15 g 0    quetiapine fumarate (SEROQUEL PO) Take 300 Assessment: 73 y/o M with colon mass s/p partial colectomy (2017), appendectomy, hernia repair, aortic and iliac aneurysm surgery (2017) s/p stent placement, HTN, HLD, paroxysmal atrial fibrillation on Eliquis, blood clotting disorder s/p IVC filter, right knee ligament repair, pacemaker implant (2004) s/p recent ppm interrogation, herniated disc and related surgery in 9680-9997 complicated by spinal fusion, local hematoma, foot drop with chronic pain s/p failed intrathecal morphine pump, recently [facility] 08/07 with acute cholecystitis c/b cardiomyopathy s/p percutaneous cholecystostomy tube by IR. pt readmitted 8/20 for concerns of bleeding at the perc yonis tube site. s/p ERCP 8/26 with removal of several small and medium sized stones, transferred to SICU 8/27 for transient altered mental status, suspicious for narcotic intoxication (resolved) with negative work up. Now s/p RA cholecystectomy and intrathecal morphine pump 8/30/22. Will continue to monitor for acute delirium post-procedure whilst controlling his pain.     LFD soft/bite sized  Pain/nausea control, pain mangement onboard  Toprol XL 50 bid, Hydalizine 50 q8h, Zocor 5 qhs, Entresto  PPI/Folic Acid  Eliquis/SCDs   Home meds as appropriate  dispo: home w/ [facility]    Tabs by mouth nightly. 2 tablets      LORazepam (ATIVAN) 1 mg tablet Take  by mouth every four (4) hours as needed for Anxiety.  lamoTRIgine (LAMICTAL) 100 mg tablet Take 200 mg by mouth daily. Past History     Past Medical History:  Past Medical History:   Diagnosis Date    Asthma     Bronchitis     [facility]     [facility]    Dental caries     Depression     Depression 8/31/2016    Drug abuse ([facility]     Dysphasia 10/17/2018    Elevated hematocrit 8/31/2016    ETOH abuse     H/O screening mammography 12/06/2016    No evidence of malignancy     HPV (human papilloma virus) infection 11/2016    The patient referred to [facility]     Hypertension     Hypertensive left ventricular hypertrophy, without heart failure 10/15/2018    mild to moderate lvh continue bp meds    Ill-defined condition     Marijuana use 10/17/2018    Mood swings     Polycythemia 2018    Polycythemia     Psychiatric disorder     depression, Bipolar    Schatzki's ring 08/30/2018    Found on Barium Swallow    Sliding hiatal hernia 10/17/2018    Tobacco abuse     Vitamin D deficiency        Past Surgical History:  Past Surgical History:   Procedure Laterality Date    HX APPENDECTOMY      HX COLPOSCOPY  12/06/2016    Low-grade courtney/mild dysplasia RICK I    HX HERNIA REPAIR      3x       Family History:  Family History   Problem Relation Age of Onset    Hypertension Mother     Heart Attack Father     Hypertension Father     Cancer Father         skin    Cancer Sister         skin    Bipolar Disorder Sister     Cancer Brother         skin    Cancer Maternal Aunt         colon       Social History:  Social History     Tobacco Use    Smoking status: Current Every Day Smoker     Packs/day: 0.50     Years: 20.00     Pack years: 10.00     Types: Cigarettes    Smokeless tobacco: Never Used   Substance Use Topics    Alcohol use: Yes     Comment: socially    Drug use:  Yes Types: Cocaine, Marijuana     Comment: smokes cocaine-current user       Allergies: Allergies   Allergen Reactions    Iodinated Contrast Media Shortness of Breath         Review of Systems       Review of Systems   Constitutional: Negative for chills and fever. HENT: Negative for nasal congestion, sore throat, rhinorrhea  Eyes: Negative. Respiratory: Negative for cough and negative for shortness of breath. Cardiovascular: Negative for chest pain and palpitations. Gastrointestinal: Negative for abdominal pain, constipation, diarrhea, nausea and vomiting. Genitourinary: Positive for blood in urine. Negative for difficulty urinating and flank pain. Musculoskeletal: Negative for back pain. Negative for gait problem and neck pain. Skin: Negative. Allergic/Immunologic: Negative. Neurological: Negative for dizziness, weakness, numbness and headaches. Psychiatric/Behavioral: Negative. All other systems reviewed and are negative. All Other Systems Negative    Physical Exam     Vitals:    10/16/20 1530   BP: 128/83   Pulse: 97   Resp: 18   Temp: 98.2 °F (36.8 °C)   SpO2: 97%   Weight: 94.3 kg (208 lb)   Height: 5' 2\" (1.575 m)     Physical Exam  Vitals signs and nursing note reviewed. Constitutional:       General: She is not in acute distress. Appearance: She is well-developed. She is not diaphoretic. HENT:      Head: Normocephalic and atraumatic. Nose: Nose normal.   Eyes:      Conjunctiva/sclera: Conjunctivae normal.      Pupils: Pupils are equal, round, and reactive to light. Neck:      Musculoskeletal: Normal range of motion and neck supple. Cardiovascular:      Rate and Rhythm: Normal rate and regular rhythm. Pulmonary:      Effort: Pulmonary effort is normal. No respiratory distress. Breath sounds: Normal breath sounds. Abdominal:      Palpations: Abdomen is soft. Musculoskeletal: Normal range of motion. Skin:     General: Skin is warm.       Findings: No rash.   Neurological:      Mental Status: She is alert and oriented to person, place, and time. Cranial Nerves: No cranial nerve deficit. Coordination: Coordination normal.   Psychiatric:         Behavior: Behavior normal.           Diagnostic Study Results     Labs -   No results found for this or any previous visit (from the past 12 hour(s)). Radiologic Studies -   No orders to display     CT Results  (Last 48 hours)    None        CXR Results  (Last 48 hours)    None            Medical Decision Making   I am the first provider for this patient. I reviewed the vital signs, available nursing notes, past medical history, past surgical history, family history and social history. Vital Signs-Reviewed the patient's vital signs. Records Reviewed: Nursing notes, old medical records and any previous labs, imaging, visits, consultations pertinent to patient care    Procedures:  Procedures      ED Course: Progress Notes, Reevaluation, and Consults:      Provider Notes (Medical Decision Making):   Urinalysis was ordered in the emergency department. However, patient wants to leave the ED, will follow up on Urinalysis and call patient with results and send antibiotic if necessary. 4:56 pm - Pt called back asking for results. Relayed results of UA to pt. While unlikely, discussed possibility of UTI. Nothing to start at this time but will order urine culture to investigate any further bacteria and call patient back in a few days with abnormal results. MED RECONCILIATION:  No current facility-administered medications for this encounter. Current Outpatient Medications   Medication Sig    atorvastatin (LIPITOR) 40 mg tablet TAKE ONE TABLET BY MOUTH DAILY    ergocalciferol (ERGOCALCIFEROL) 1,250 mcg (50,000 unit) capsule Take 1 Cap by mouth every seven (7) days.     linaCLOtide (Linzess) 145 mcg cap capsule TAKE ONE CAPSULE BY MOUTH DAILY BEFORE BREAKFAST    albuterol (PROVENTIL HFA, VENTOLIN HFA, PROAIR HFA) 90 mcg/actuation inhaler INHALE ONE TO TWO PUFFS BY MOUTH EVERY 4 HOURS AS NEEDED FOR WHEEZING    fluticasone propion-salmeteroL (Advair Diskus) 100-50 mcg/dose diskus inhaler Take 1 Puff by inhalation every twelve (12) hours. Indications: bronchospasm prevention with COPD    amLODIPine (NORVASC) 10 mg tablet Take 1 Tab by mouth daily.  omeprazole (PRILOSEC) 20 mg capsule Take 1 Cap by mouth daily.  losartan-hydroCHLOROthiazide (HYZAAR) 100-12.5 mg per tablet TAKE ONE TABLET BY MOUTH DAILY    aspirin delayed-release 81 mg tablet Take 1 Tab by mouth daily.  omega-3 acid ethyl esters (Lovaza) 1 gram capsule Take 2 Caps by mouth two (2) times a day.  nystatin (MYCOSTATIN) topical cream Apply  to affected area two (2) times a day.  quetiapine fumarate (SEROQUEL PO) Take 300 Tabs by mouth nightly. 2 tablets    LORazepam (ATIVAN) 1 mg tablet Take  by mouth every four (4) hours as needed for Anxiety.  lamoTRIgine (LAMICTAL) 100 mg tablet Take 200 mg by mouth daily. Disposition:  home    DISCHARGE NOTE:     Pt has been reexamined. Patient has no new complaints, changes, or physical findings. Care plan outlined and precautions discussed. Discussed proper way to take medications. Discussed treatment plan, return precautions, symptomatic relief, and expected time to improvement. All questions answered. Patient is stable for discharge and outpatient management. Patient is ready to go home. Follow-up Information    None         Current Discharge Medication List                Diagnosis     Clinical Impression: No diagnosis found. Dictation disclaimer:  Please note that this dictation was completed with [employer], the [employer] voice recognition software. Quite often unanticipated grammatical, syntax, homophones, and other interpretive errors are inadvertently transcribed by the computer software. Please disregard these errors.   Please excuse any errors that have escaped final proofreading.

## 2022-09-06 NOTE — PROGRESS NOTES
1. \"Have you been to the ER, urgent care clinic since your last visit? Hospitalized since your last visit? \" No    2. \"Have you seen or consulted any other health care providers outside of the [address] [place] [address] since your last visit? \" No     3. For patients aged 39-70: Has the patient had a colonoscopy / FIT/ Cologuard? No      If the patient is female:    4. For patients aged 41-77: Has the patient had a mammogram within the past 2 years? No      5. For patients aged 21-65: Has the patient had a pap smear?  No
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
## 2022-09-07 ENCOUNTER — HOSPITAL ENCOUNTER (OUTPATIENT)
Dept: INFUSION THERAPY | Age: 52
Discharge: HOME | End: 2022-09-07
Payer: COMMERCIAL

## 2022-09-07 VITALS
OXYGEN SATURATION: 98 % | SYSTOLIC BLOOD PRESSURE: 125 MMHG | RESPIRATION RATE: 20 BRPM | TEMPERATURE: 98.2 F | HEART RATE: 69 BPM | DIASTOLIC BLOOD PRESSURE: 71 MMHG

## 2022-09-07 LAB
BASO+EOS+MONOS # BLD AUTO: 0.5 K/UL (ref 0–2.3)
BASO+EOS+MONOS NFR BLD AUTO: 8 % (ref 0.1–17)
DIFFERENTIAL METHOD BLD: ABNORMAL
ERYTHROCYTE [DISTWIDTH] IN BLOOD BY AUTOMATED COUNT: 15.8 % (ref 11.5–14.5)
HCT VFR BLD AUTO: 44.9 % (ref 36–48)
HGB BLD-MCNC: 13.7 G/DL (ref 12–16)
LYMPHOCYTES # BLD: 2.5 K/UL (ref 1.1–5.9)
LYMPHOCYTES NFR BLD: 39 % (ref 14–44)
MCH RBC QN AUTO: 27.5 PG (ref 25–35)
MCHC RBC AUTO-ENTMCNC: 30.5 G/DL (ref 31–37)
MCV RBC AUTO: 90.2 FL (ref 78–102)
NEUTS SEG # BLD: 3.5 K/UL (ref 1.8–9.5)
NEUTS SEG NFR BLD: 53 % (ref 40–70)
PLATELET # BLD AUTO: 232 K/UL (ref 140–440)
RBC # BLD AUTO: 4.98 M/UL (ref 4.1–5.1)
WBC # BLD AUTO: 6.5 K/UL (ref 4.5–13)

## 2022-09-07 PROCEDURE — 36415 COLL VENOUS BLD VENIPUNCTURE: CPT

## 2022-09-07 PROCEDURE — 85025 COMPLETE CBC W/AUTO DIFF WBC: CPT

## 2022-09-07 NOTE — PROGRESS NOTES
[facility] Progress Note    Date: 2022    Name: Ana Rosario    MRN: [id number]         : 1970    Ms. Ozuna arrived in the [place] today at 0910, in stable condition, here for CBC/Phlebotomy (Every 2 Month Status). She was assessed and education was provided. Ms. Ozuna's vitals were reviewed. Visit Vitals  /71 (BP 1 Location: Left upper arm, BP Patient Position: Sitting)   Pulse 69   Temp 98.2 °F (36.8 °C)   Resp 20   SpO2 98%   Breastfeeding No         Blood for the ordered CBC was drawn from her RIGHT AC at 0920 without incident, per [facility], and was processed on site. The CBC results from today were as follows:    Recent Results (from the past 12 hour(s))   CBC WITH 3 PART DIFF    Collection Time: 22  9:20 AM   Result Value Ref Range    WBC 6.5 4.5 - 13.0 K/uL    RBC 4.98 4.10 - 5.10 M/uL    HGB 13.7 12.0 - 16 g/dL    HCT 44.9 36 - 48 %    MCV 90.2 78 - 102 FL    MCH 27.5 25.0 - 35.0 PG    MCHC 30.5 (L) 31 - 37 g/dL    RDW 15.8 (H) 11.5 - 14.5 %    PLATELET 764 974 - 048 K/uL    NEUTROPHILS 53 40 - 70 %    Mixed cells 8 0.1 - 17 %    LYMPHOCYTES 39 14 - 44 %    ABS. NEUTROPHILS 3.5 1.8 - 9.5 K/UL    ABS. MIXED CELLS 0.5 0.0 - 2.3 K/uL    ABS. LYMPHOCYTES 2.5 1.1 - 5.9 K/UL    DF AUTOMATED             Phlebotomy was HELD today per order, for HCT < 45.0. (Every 2 Month Status Parameters). Ms. Octavio Salomon tolerated well and voiced no complaints. Ms. Octavio Salomon was discharged from [place] [address] in stable condition at [address] [place] [address]. She is to return in 2 months, on Monday, 22 at 1000, for her next appointment, for CBC/Phlebotomy (Every 2 Month Status).      Sumaya Leyva RN  2022  9:25 AM

## 2022-09-15 ENCOUNTER — OFFICE VISIT (OUTPATIENT)
Dept: CARDIOLOGY CLINIC | Age: 52
End: 2022-09-15
Payer: COMMERCIAL

## 2022-09-15 VITALS
DIASTOLIC BLOOD PRESSURE: 63 MMHG | HEART RATE: 84 BPM | OXYGEN SATURATION: 96 % | HEIGHT: 61 IN | WEIGHT: 187 LBS | BODY MASS INDEX: 35.3 KG/M2 | SYSTOLIC BLOOD PRESSURE: 135 MMHG

## 2022-09-15 DIAGNOSIS — I11.9 HYPERTENSIVE LEFT VENTRICULAR HYPERTROPHY, WITHOUT HEART FAILURE: Primary | ICD-10-CM

## 2022-09-15 DIAGNOSIS — R07.9 CHEST PAIN, UNSPECIFIED TYPE: ICD-10-CM

## 2022-09-15 DIAGNOSIS — R26.89 LIMPING: ICD-10-CM

## 2022-09-15 DIAGNOSIS — R06.02 SHORTNESS OF BREATH: ICD-10-CM

## 2022-09-15 DIAGNOSIS — E78.5 DYSLIPIDEMIA: ICD-10-CM

## 2022-09-15 DIAGNOSIS — F14.10 COCAINE ABUSE (HCC): ICD-10-CM

## 2022-09-15 PROCEDURE — 99214 OFFICE O/P EST MOD 30 MIN: CPT | Performed by: INTERNAL MEDICINE

## 2022-09-15 NOTE — PROGRESS NOTES
1. Have you been to the ER, urgent care clinic since your last visit? Hospitalized since your last visit? Yes When: 7/2022 Where: [facility] Reason for visit: Dental pain    2. Have you seen or consulted any other health care providers outside of the [address] [place] [address] since your last visit? Include any pap smears or colon screening. No     3. Since your last visit, have you had any of the following symptoms? chest pains.

## 2022-09-20 DIAGNOSIS — E78.5 DYSLIPIDEMIA: ICD-10-CM

## 2022-09-20 DIAGNOSIS — I10 ESSENTIAL HYPERTENSION: ICD-10-CM

## 2022-10-07 ENCOUNTER — APPOINTMENT (OUTPATIENT)
Dept: INFUSION THERAPY | Age: 52
End: 2022-10-07

## 2022-10-24 ENCOUNTER — OFFICE VISIT (OUTPATIENT)
Dept: CARDIOLOGY CLINIC | Age: 52
End: 2022-10-24
Payer: COMMERCIAL

## 2022-10-24 ENCOUNTER — TELEPHONE (OUTPATIENT)
Dept: ONCOLOGY | Age: 52
End: 2022-10-24

## 2022-10-24 VITALS
DIASTOLIC BLOOD PRESSURE: 76 MMHG | OXYGEN SATURATION: 95 % | HEIGHT: 61 IN | BODY MASS INDEX: 34.93 KG/M2 | SYSTOLIC BLOOD PRESSURE: 135 MMHG | HEART RATE: 79 BPM | WEIGHT: 185 LBS

## 2022-10-24 DIAGNOSIS — R06.02 SHORTNESS OF BREATH: ICD-10-CM

## 2022-10-24 DIAGNOSIS — I11.9 HYPERTENSIVE LEFT VENTRICULAR HYPERTROPHY, WITHOUT HEART FAILURE: ICD-10-CM

## 2022-10-24 DIAGNOSIS — F14.10 COCAINE ABUSE (HCC): ICD-10-CM

## 2022-10-24 DIAGNOSIS — D75.1 SECONDARY POLYCYTHEMIA: ICD-10-CM

## 2022-10-24 DIAGNOSIS — E78.5 DYSLIPIDEMIA: ICD-10-CM

## 2022-10-24 DIAGNOSIS — R07.9 CHEST PAIN, UNSPECIFIED TYPE: Primary | ICD-10-CM

## 2022-10-24 PROCEDURE — 99214 OFFICE O/P EST MOD 30 MIN: CPT | Performed by: NURSE PRACTITIONER

## 2022-10-24 NOTE — TELEPHONE ENCOUNTER
Patient contacted [facility] psr and informed them she was not feeling well she may be due for a phlebotomy. I attempted to contact patient but was unable to reach her. Voicemail was not set up. I did make her an lab appt for today if she is able to make it or she may need to go to [facility] depending on how she is feeling.

## 2022-10-24 NOTE — PROGRESS NOTES
HISTORY OF PRESENT ILLNESS  Valencia Amin is a 47 y.o. female. Recent er visit with atypical non anginal pain. Patient was in emergency room yesterday with atypical chest pain. 1/2019. ER notes and data reviewed  3/2020  Patient is seen in office for follow-up. She was in ER last 12/2019. Records reviewed  7/2020  Patient is seen today for follow-up. She has been having episode of new onset chest pain described as substernal pain uneasy feeling that occasionally in the right side of the neck. Happens at rest.  At times last for half an hour. Patient has had an episode of palpitation and she is getting short of breath on activity exertion  9/2022  Patient seen today for follow-up. Having episode of chest pain about twice a week substernal occasional radiation to neck. Happens both at rest and activity. No clear other precipitating or relieving factor has shortness of breath on exertion. No orthopnea PND denies any peripheral edema  10/2022  Patient seen in follow up for chest pain. She denies recurrence. She denies chest pain, shortness of breath, palpitations or edema. She c/o right side headache occasionally over past 2 days. Follow-up  Pertinent negatives include no chest pain and no shortness of breath. Hypertension  The history is provided by the Patient. This is a chronic problem. The problem occurs constantly. The problem has not changed since onset. Pertinent negatives include no chest pain and no shortness of breath. Chest Pain (Angina)   The history is provided by the Patient. This is a recurrent problem. The problem has not changed since onset. The problem occurs every several days. The pain is associated with exertion and rest. The pain is present in the substernal region. The pain is mild. The quality of the pain is described as dull.  Pertinent negatives include no claudication, no cough, no dizziness, no fever, no hemoptysis, no nausea, no orthopnea, no palpitations, no PND, no shortness of breath, no sputum production, no vomiting and no weakness. Palpitations   The history is provided by the Patient. This is a recurrent problem. The problem has not changed since onset. The problem occurs every several days. The problem is associated with nothing. Pertinent negatives include no fever, no chest pain, no claudication, no orthopnea, no PND, no nausea, no vomiting, no dizziness, no weakness, no cough, no hemoptysis, no shortness of breath and no sputum production. Her past medical history is significant for hypertension. Review of Systems   Constitutional:  Negative for chills and fever. HENT:  Negative for nosebleeds. Eyes:  Negative for blurred vision and double vision. Respiratory:  Negative for cough, hemoptysis, sputum production, shortness of breath and wheezing. Cardiovascular:  Negative for chest pain, palpitations, orthopnea, claudication, leg swelling and PND. Gastrointestinal:  Negative for heartburn, nausea and vomiting. Musculoskeletal:  Negative for myalgias. Skin:  Negative for rash. Neurological:  Negative for dizziness and weakness. Endo/Heme/Allergies:  Does not bruise/bleed easily.    Family History   Problem Relation Age of Onset    Hypertension Mother     Cancer Mother         stomach    Heart Attack Father     Hypertension Father     Cancer Father         skin    Cancer Sister         skin    Bipolar Disorder Sister     Cancer Brother         skin    Cancer Maternal Aunt         colon       Past Medical History:   Diagnosis Date    Asthma     Bronchitis     CHF (congestive heart failure) ([address]     Cocaine abuse ([facility] 3/26/2015    Dental caries     Depression     bipolar    Depression 8/31/2016    Drug abuse ([facility]     Dysphasia 10/17/2018    Elevated hematocrit 8/31/2016    ETOH abuse     GERD (gastroesophageal reflux disease)     schatzis ring, hiatal hernia    H/O screening mammography 12/06/2016    No evidence of malignancy     HPV (human papilloma virus) infection 11/2016    The patient referred to [facility]     Hypertension     Hypertensive left ventricular hypertrophy, without heart failure 10/15/2018    mild to moderate lvh continue bp meds    Ill-defined condition     Marijuana use 10/17/2018    Mood swings     Polycythemia 2018    Polycythemia     Psychiatric disorder     depression, Bipolar    Schatzki's ring 08/30/2018    Found on Barium Swallow    Sliding hiatal hernia 10/17/2018    Tobacco abuse     Vitamin D deficiency        Past Surgical History:   Procedure Laterality Date    HX APPENDECTOMY      HX COLPOSCOPY  12/06/2016    Low-grade courtney/mild dysplasia RICK I    HX HERNIA REPAIR      3x       Social History     Tobacco Use    Smoking status: Every Day     Packs/day: 0.50     Years: 20.00     Pack years: 10.00     Types: Cigarettes    Smokeless tobacco: Never   Substance Use Topics    Alcohol use: Yes     Comment: socially       Allergies   Allergen Reactions    Iodinated Contrast Media Shortness of Breath       Prior to Admission medications    Medication Sig Start Date End Date Taking? Authorizing Provider   albuterol (PROVENTIL HFA, VENTOLIN HFA, PROAIR HFA) 90 mcg/actuation inhaler Take 2 Puffs by inhalation every four (4) hours as needed for Wheezing. Indications: chronic obstructive pulmonary disease 9/6/22  Yes Betty Collado MD   fluticasone propion-salmeteroL (Advair Diskus) 100-50 mcg/dose diskus inhaler Take 1 Puff by inhalation every twelve (12) hours. Indications: bronchospasm prevention with COPD 9/6/22  Yes Betty Collado MD   amLODIPine (NORVASC) 10 mg tablet Take 1 Tablet by mouth daily. 9/6/22  Yes Kathleen Collado MD   losartan-hydroCHLOROthiazide (HYZAAR) 100-12.5 mg per tablet Take 1 Tablet by mouth daily.  9/6/22  Yes Betty Collado MD   albuterol (PROVENTIL HFA, VENTOLIN HFA, PROAIR HFA) 90 mcg/actuation inhaler INHALE ONE TO TWO PUFFS BY MOUTH EVERY 4 HOURS AS NEEDED 6/21/22  Yes Kathleen Collado MD   fenofibrate nanocrystallized (TRICOR) 145 mg tablet Take 1 Tablet by mouth daily. 6/3/22  Yes Kareem Yanes MD   omega-3 acid ethyl esters (LOVAZA) 1 gram capsule TAKE TWO CAPSULES BY MOUTH TWICE A DAY 6/3/22  Yes Betty Collado MD   Methylcellulose, with Sugar, (Citrucel, sucrose,) powd Take 1 Dose by mouth daily. 6/1/22  Yes Aristeo Samuels MD   Blood Pressure Test Kit-Large kit Please provide BP cuff for hypertension diagnosis 2/8/22  Yes Mckenzie Sarkar NP   atorvastatin (LIPITOR) 40 mg tablet Take 1 Tablet by mouth daily. 2/8/22  Yes Mckenzie Sarkar NP   nystatin (MYCOSTATIN) topical cream Apply  to affected area two (2) times a day. 2/8/22  Yes Mckenzie Sarkar NP   omeprazole (PRILOSEC) 20 mg capsule Take 1 Capsule by mouth daily. Patient taking differently: Take 40 mg by mouth daily. 12/17/21  Yes Mckenzie Sarkar NP   clonazePAM (KlonoPIN) 1 mg tablet Take 1 mg by mouth daily. 10/20/21  Yes Provider, Historical   desvenlafaxine succinate (PRISTIQ) 50 mg ER tablet Take 50 mg by mouth daily. Yes Other, MD Montrell   traZODone (DESYREL) 100 mg tablet Take 100 mg by mouth nightly. 1/15/21  Yes Provider, Historical   lamoTRIgine (LAMICTAL) 100 mg tablet Take 200 mg by mouth daily. Yes Provider, Historical         Visit Vitals  /76   Pulse 79   Ht 5' 1\" (1.549 m)   Wt 83.9 kg (185 lb)   SpO2 95%   BMI 34.96 kg/m²           Physical Exam  Constitutional:       Appearance: She is well-developed. HENT:      Head: Normocephalic and atraumatic. Eyes:      Conjunctiva/sclera: Conjunctivae normal.   Neck:      Thyroid: No thyromegaly. Vascular: No JVD. Trachea: No tracheal deviation. Cardiovascular:      Rate and Rhythm: Normal rate and regular rhythm. Chest Wall: PMI is not displaced. Pulses: No decreased pulses. Heart sounds: Murmur heard. Early systolic murmur is present at the upper right sternal border and lower left sternal border. No gallop.  No S3 sounds. Pulmonary:      Effort: No respiratory distress. Breath sounds: No wheezing or rales. Chest:      Chest wall: No tenderness. Abdominal:      Palpations: Abdomen is soft. Tenderness: There is no abdominal tenderness. Musculoskeletal:      Cervical back: Neck supple. Skin:     General: Skin is warm. Neurological:      Mental Status: She is alert and oriented to person, place, and time. Ms. Laura Wong has a reminder for a \"due or due soon\" health maintenance. I have asked that she contact her primary care provider for follow-up on this health maintenance. I have personally reviewed patients ekg done at other facility. SUMMARY:2017  Left ventricle: Systolic function was normal. Ejection fraction was  estimated in the range of 55 % to 60 %. No obvious wall motion  abnormalities identified in the views obtained. Wall thickness was mildly  to moderately increased. There was mild concentric hypertrophy. NUCLEAR IMAGIN2017    Findings:   1. Post-stress imaging in short axis, horizontal and vertical long axis views reveals normal isotope uptake in all areas. 2. Resting images also show normal isotope uptake in all areas. 3. Gated images show normal left ventricular size, wall motion and systolic function. Ejection fraction is 71%. Diagnosis:   1. Normal scan. 2. No evidence of significant fixed or reversible defect suggesting ischemia or myocardial infarction noted from this nuclear study. 3. Low risk scan. Interpretation Summary        Baseline ECG: Normal sinus rhythm, Minimal voltage criteria for LVH , maybe normal variant Septal infarct, age undetermined. Inconclusive stress electrocardiogram.  Low risk study. Submaximal stress test.  If clinically indicated, recommend nuclear stress test.      Stress Findings     ECG Baseline ECG: Normal sinus rhythm, Minimal voltage criteria for LVH , maybe normal variant  Septal infarct, age undetermined.   Arrhythmias during stress: rare PACs. There was no ST segment deviation noted during stress. Arrhythmias during recovery: rare PACs. Arrhythmias were not significant. Stress Findings A stress test was performed following a modified Sathish protocol. The test was stopped because the patient complained of shortness of breath. The patient reported no angina during stress. Leg pain   The patient's response to exercise was inadequate for diagnosis. Blood pressure demonstrated a normal response to exercise. Overall, the patient's exercise capacity was normal.   Angina Index is 0. Inconclusive stress electrocardiogram for inducible myocardial ischemia. Patient unable to reach target heart rate   Stress Measurements     Baseline Vitals   Baseline HR 67 BPM      Baseline BP 80/57 mmHg       Peak Stress Vitals   Post peak  BPM      Stress Base Systolic  mmHg      Stress Base Diastolic BP 70 mmHg      Estimated workload 7 METS      Stress Rate Pressure Product 12,420 BPM*mmHg       Exercise Data   Target  bpm      Percent HR 79 %      Exercise duration time           Interpretation Summary 9/2020       No evidence of acute deep vein thrombosis in the right common femoral, superficial femoral, popliteal, posterior tibial, and peroneal veins. The veins were imaged in the transverse and longitudinal planes. The vessels showed normal color filling and compressibility. Doppler interrogation showed phasic and spontaneous flow. No evidence of acute deep vein thrombosis in the left common femoral, superficial femoral, popliteal, posterior tibial, and peroneal veins. The veins were imaged in the transverse and longitudinal planes. The vessels showed normal color filling and compressibility. Doppler interrogation showed phasic and spontaneous flow. No DVT demonstrated bilateral lower extremity. Bilateral posterior tibial arteries are triphasic.        Nuclear stress test  10/2022  Interpretation Summary         Nuclear Findings: Normal left ventricular systolic function post-stress. Nuclear Findings: Normal pharmacological myocardial perfusion study. Nuclear Findings: LV perfusion is normal. There is no evidence of inducible ischemia. ECG: Resting ECG demonstrates normal sinus rhythm. ECG: Stress ECG was negative for ischemia. Stress Test: A pharmacological stress test was performed using lexiscan. Hemodynamics are adequate for diagnosis. Blood pressure demonstrated a normal response and heart rate demonstrated a blunted response to stress. Post-stress ejection fraction is 71%. Results for Cortney Toribio (MRN [id number]) as of 7/14/2021 10:49   Ref. Range 2/23/2021 13:29   Triglyceride Latest Ref Range: <150 MG/ (H)   Cholesterol, total Latest Ref Range: <200 MG/   HDL Cholesterol Latest Ref Range: 40 - 60 MG/DL 35 (L)   CHOL/HDL Ratio Latest Ref Range: 0 - 5.0   3.7   VLDL, calculated Latest Units: MG/DL 53   LDL, calculated Latest Ref Range: 0 - 100 MG/DL 40     Assessment         ICD-10-CM ICD-9-CM    1. Chest pain, unspecified type  R07.9 786.50     Recurrent atypical chest pain. Nuclear stress test negative for ischemia      2. Hypertensive left ventricular hypertrophy, without heart failure  I11.9 402.90     Stable symptom continue treatment monitor      3. Cocaine abuse ([address]  F14.10 305.60     Continue cessation efforts      4. Dyslipidemia  E78.5 272.4      Continue treatment follow-up with PCP          5. Shortness of breath  R06.02 786.05     Stable symptom monitor continue treatment      6. Secondary polycythemia  D75.1 289.0     Advise to f/u with hematology      2/2019  Continue medical management insurance had refused nuclear stress test and echocardiogram exam stress echo EKG part inconclusive exercise to 79% of maximum predicted heart rate at this level of stress no EKG changes and no echo abnormality.   Continue to monitor clinically and continue with medication including amlodipine aspirin and statin. Discussed cessation of cocaine use    3/2019  ACS with continued substernal chest pain with radiation to left arm and shoulder with SOB. She was unable to have NST and stress echo due to insurance denial.  Continue aspirin, statin and norvasc. Not on BB due to ongoing cocaine use (last used 2 weeks ago). Will proceed with cardiac cath due to ongoing symptoms and risk factors. Pre-medicate with prednisone and benadryl to begin 2 days prior to cardiac cath. Pre-procedure labs and chest xray ordered. Discussed cessation of tobacco and cocaine use  9/2019  Noncompliant with follow-up. Continues to use cocaine. Did not keep her cath appointments. Will try and continue medical management. Advised back on aspirin. Discussed smoking cessation and cocaine cessation  9/2020  Cardiac status stable continue to work with cocaine cessation. As leg swelling and pain will check venous Doppler. 11/2020  Cardiac status stable. Dyslipidemia requires treatment with low carbohydrate diet exercise weight loss. Continue lifestyle modification. Venous Doppler negative for DVT,  7/2021  New onset of chest pain or shortness of breath. Rule out ischemia or LV dysfunction. Set up for stress test and echo unable to exercise we will set up for Homar Villegas. Continue lifestyle modification. High triglyceride. Refill sent for Lovaza. Discussed compliance  9/2022  Seen for follow-up. Patient did not complete her recommended testing during last evaluation. Noncompliant with follow-up since then. Patient continues to limp on ambulation. Unable to do treadmill test we will set up for Lexiscan and echo. Discussed compliance and cessation of drug use  10/2022  Patient   Patient seen in follow-up for testing results. She denies further episodes of chest pain. Nuclear stress test negative for ischemia, echocardiogram with normal LV function trace    Discussed compliance with medication and cessation of drug use. Advised to follow-up with PCP for occasional headaches. There are no discontinued medications. No orders of the defined types were placed in this encounter. Follow-up and Dispositions    Return in about 6 months (around 4/24/2023), or if symptoms worsen or fail to improve, for Follow up with Dr. Christin Tavares

## 2022-10-24 NOTE — PROGRESS NOTES
1. Have you been to the ER, urgent care clinic since your last visit? Hospitalized since your last visit?     no    2. Have you seen or consulted any other health care providers outside of the [address] [place] [address] since your last visit? Include any pap smears or colon screening.       No

## 2022-10-24 NOTE — TELEPHONE ENCOUNTER
Patient contacted office back to report she can not come into the office today due to her transportation. She reports when she donated plasma they told her her blood was thick. I advised her she could go to [facility] to get her cbc done if that's easier  and once we see her levels we could do a phlebotomy earlier if needed. Patient stated we do not understand and she will see us in November and then hung up the phone.

## 2022-11-03 ENCOUNTER — TELEPHONE (OUTPATIENT)
Dept: ONCOLOGY | Age: 52
End: 2022-11-03

## 2022-11-03 NOTE — TELEPHONE ENCOUNTER
Patient inquiring on what labs are needed for appt in 12/29/2022 Wanted to get them drawn at [facility] appt 11/07/2022 but told pt too soon.

## 2022-11-07 ENCOUNTER — HOSPITAL ENCOUNTER (OUTPATIENT)
Dept: INFUSION THERAPY | Age: 52
Discharge: HOME | End: 2022-11-07
Payer: COMMERCIAL

## 2022-11-07 VITALS
SYSTOLIC BLOOD PRESSURE: 142 MMHG | DIASTOLIC BLOOD PRESSURE: 88 MMHG | OXYGEN SATURATION: 96 % | RESPIRATION RATE: 20 BRPM | TEMPERATURE: 98.2 F | HEART RATE: 72 BPM

## 2022-11-07 LAB
BASO+EOS+MONOS # BLD AUTO: 0.6 K/UL (ref 0–2.3)
BASO+EOS+MONOS NFR BLD AUTO: 8 % (ref 0.1–17)
DIFFERENTIAL METHOD BLD: ABNORMAL
ERYTHROCYTE [DISTWIDTH] IN BLOOD BY AUTOMATED COUNT: 16 % (ref 11.5–14.5)
HCT VFR BLD AUTO: 46.1 % (ref 36–48)
HGB BLD-MCNC: 14.3 G/DL (ref 12–16)
LYMPHOCYTES # BLD: 2.8 K/UL (ref 1.1–5.9)
LYMPHOCYTES NFR BLD: 38 % (ref 14–44)
MCH RBC QN AUTO: 27.7 PG (ref 25–35)
MCHC RBC AUTO-ENTMCNC: 31 G/DL (ref 31–37)
MCV RBC AUTO: 89.2 FL (ref 78–102)
NEUTS SEG # BLD: 3.9 K/UL (ref 1.8–9.5)
NEUTS SEG NFR BLD: 54 % (ref 40–70)
PLATELET # BLD AUTO: 220 K/UL (ref 140–440)
RBC # BLD AUTO: 5.17 M/UL (ref 4.1–5.1)
WBC # BLD AUTO: 7.3 K/UL (ref 4.5–13)

## 2022-11-07 PROCEDURE — 99195 PHLEBOTOMY: CPT

## 2022-11-07 PROCEDURE — 74011250636 HC RX REV CODE- 250/636: Performed by: INTERNAL MEDICINE

## 2022-11-07 PROCEDURE — 85025 COMPLETE CBC W/AUTO DIFF WBC: CPT

## 2022-11-07 PROCEDURE — 36415 COLL VENOUS BLD VENIPUNCTURE: CPT

## 2022-11-07 RX ORDER — SODIUM CHLORIDE 9 MG/ML
500 INJECTION, SOLUTION INTRAVENOUS ONCE
Status: COMPLETED | OUTPATIENT
Start: 2022-11-07 | End: 2022-11-07

## 2022-11-07 RX ADMIN — SODIUM CHLORIDE 500 ML: 0.9 INJECTION, SOLUTION INTRAVENOUS at 10:42

## 2022-11-07 NOTE — PROGRESS NOTES
[facility] Progress Note    Date: 2022    Name: [facility] Room    MRN: [id number]         : 1970    Therapeutic Phlebotomy (Monthly Status)    Ms. Trista Wiseman arrived at [facility] at 46. She was assessed and education was provided. Ms. Ozuna's vitals were reviewed and patient was observed for 5 minutes prior to treatment. Visit Vitals  BP (!) 142/88 (BP 1 Location: Right upper arm, BP Patient Position: Sitting)   Pulse 72   Temp 98.2 °F (36.8 °C)   Resp 20   SpO2 96%   Breastfeeding No       CBC drawn via right AC by [employer] and processed on site. Recent Results (from the past 12 hour(s))   CBC WITH 3 PART DIFF    Collection Time: 22  9:57 AM   Result Value Ref Range    WBC 7.3 4.5 - 13.0 K/uL    RBC 5.17 (H) 4.10 - 5.10 M/uL    HGB 14.3 12.0 - 16 g/dL    HCT 46.1 36 - 48 %    MCV 89.2 78 - 102 FL    MCH 27.7 25.0 - 35.0 PG    MCHC 31.0 31 - 37 g/dL    RDW 16.0 (H) 11.5 - 14.5 %    PLATELET 890 581 - 960 K/uL    NEUTROPHILS 54 40 - 70 %    Mixed cells 8 0.1 - 17 %    LYMPHOCYTES 38 14 - 44 %    ABS. NEUTROPHILS 3.9 1.8 - 9.5 K/UL    ABS. MIXED CELLS 0.6 0.0 - 2.3 K/uL    ABS. LYMPHOCYTES 2.8 1.1 - 5.9 K/UL    DF AUTOMATED         HCT 46.1    PIV 18G started LAC x1 attempt without difficulty. Brisk blood return received. Therapeutic phlebotomy started at 1020 and stopped at 1040  Approx 500 ml blood removed from patient. 500 ml NS IV given over 30 minutes as ordered. Ms. Trista Wiseman tolerated the treatment and had no complaints. VS remain stable. Patient Vitals for the past 12 hrs:   Temp Pulse Resp BP SpO2   22 1117 -- -- -- (!) 142/88 --   22 0955 98.2 °F (36.8 °C) 72 20 135/87 96 %     Patient armband removed and shredded. Ms. Trista Wiseman was discharged from [place] [address] in stable condition at 1120. She is to return on 22 at 900 for her next Therapeutic Phlebotomy Weekly appointment.     Salome Hart RN  2022

## 2022-11-07 NOTE — TELEPHONE ENCOUNTER
Spoke w/patient will go 1-2 weeks prior to [facility] to have CBC drawn.

## 2022-11-11 ENCOUNTER — HOSPITAL ENCOUNTER (OUTPATIENT)
Dept: LAB | Age: 52
Discharge: HOME | End: 2022-11-11
Payer: COMMERCIAL

## 2022-11-11 ENCOUNTER — OFFICE VISIT (OUTPATIENT)
Dept: FAMILY MEDICINE CLINIC | Age: 52
End: 2022-11-11
Payer: COMMERCIAL

## 2022-11-11 VITALS
HEART RATE: 76 BPM | BODY MASS INDEX: 36.44 KG/M2 | SYSTOLIC BLOOD PRESSURE: 122 MMHG | WEIGHT: 193 LBS | HEIGHT: 61 IN | DIASTOLIC BLOOD PRESSURE: 78 MMHG | RESPIRATION RATE: 18 BRPM | OXYGEN SATURATION: 93 %

## 2022-11-11 DIAGNOSIS — R30.0 DYSURIA: ICD-10-CM

## 2022-11-11 DIAGNOSIS — L30.4 INTERTRIGO: ICD-10-CM

## 2022-11-11 DIAGNOSIS — E78.5 DYSLIPIDEMIA: ICD-10-CM

## 2022-11-11 DIAGNOSIS — L98.9 SKIN LESION: Primary | ICD-10-CM

## 2022-11-11 DIAGNOSIS — I10 ESSENTIAL HYPERTENSION: ICD-10-CM

## 2022-11-11 DIAGNOSIS — R06.02 SHORTNESS OF BREATH: ICD-10-CM

## 2022-11-11 DIAGNOSIS — K21.9 GASTROESOPHAGEAL REFLUX DISEASE: ICD-10-CM

## 2022-11-11 DIAGNOSIS — E78.1 HYPERTRIGLYCERIDEMIA: ICD-10-CM

## 2022-11-11 LAB
APPEARANCE UR: CLEAR
BACTERIA URNS QL MICRO: ABNORMAL /HPF
BILIRUB UR QL: NEGATIVE
COLOR UR: YELLOW
EPITH CASTS URNS QL MICRO: ABNORMAL /LPF (ref 0–5)
GLUCOSE UR STRIP.AUTO-MCNC: NEGATIVE MG/DL
HGB UR QL STRIP: NEGATIVE
KETONES UR QL STRIP.AUTO: NEGATIVE MG/DL
LEUKOCYTE ESTERASE UR QL STRIP.AUTO: ABNORMAL
NITRITE UR QL STRIP.AUTO: NEGATIVE
PH UR STRIP: 6.5 [PH] (ref 5–8)
PROT UR STRIP-MCNC: NEGATIVE MG/DL
RBC #/AREA URNS HPF: NEGATIVE /HPF (ref 0–5)
SP GR UR REFRACTOMETRY: 1.01 (ref 1–1.03)
UROBILINOGEN UR QL STRIP.AUTO: 0.2 EU/DL (ref 0.2–1)
WBC URNS QL MICRO: ABNORMAL /HPF (ref 0–4)

## 2022-11-11 PROCEDURE — 99214 OFFICE O/P EST MOD 30 MIN: CPT | Performed by: STUDENT IN AN ORGANIZED HEALTH CARE EDUCATION/TRAINING PROGRAM

## 2022-11-11 PROCEDURE — 3074F SYST BP LT 130 MM HG: CPT | Performed by: STUDENT IN AN ORGANIZED HEALTH CARE EDUCATION/TRAINING PROGRAM

## 2022-11-11 PROCEDURE — 81001 URINALYSIS AUTO W/SCOPE: CPT

## 2022-11-11 PROCEDURE — 3078F DIAST BP <80 MM HG: CPT | Performed by: STUDENT IN AN ORGANIZED HEALTH CARE EDUCATION/TRAINING PROGRAM

## 2022-11-11 RX ORDER — NYSTATIN 100000 U/G
CREAM TOPICAL 2 TIMES DAILY
Qty: 15 G | Refills: 1 | Status: SHIPPED | OUTPATIENT
Start: 2022-11-11

## 2022-11-11 RX ORDER — DESVENLAFAXINE SUCCINATE 50 MG/1
50 TABLET, EXTENDED RELEASE ORAL DAILY
Status: CANCELLED | OUTPATIENT
Start: 2022-11-11

## 2022-11-11 RX ORDER — CLONAZEPAM 1 MG/1
1 TABLET ORAL DAILY
Status: CANCELLED | OUTPATIENT
Start: 2022-11-11

## 2022-11-11 RX ORDER — ATORVASTATIN CALCIUM 40 MG/1
40 TABLET, FILM COATED ORAL DAILY
Qty: 90 TABLET | Refills: 2 | Status: SHIPPED | OUTPATIENT
Start: 2022-11-11

## 2022-11-11 RX ORDER — ALBUTEROL SULFATE 90 UG/1
AEROSOL, METERED RESPIRATORY (INHALATION)
Qty: 18 G | Refills: 2 | Status: CANCELLED | OUTPATIENT
Start: 2022-11-11

## 2022-11-11 RX ORDER — LOSARTAN POTASSIUM AND HYDROCHLOROTHIAZIDE 12.5; 1 MG/1; MG/1
1 TABLET ORAL DAILY
Qty: 90 TABLET | Refills: 2 | Status: SHIPPED | OUTPATIENT
Start: 2022-11-11

## 2022-11-11 RX ORDER — FLUTICASONE PROPIONATE AND SALMETEROL 100; 50 UG/1; UG/1
1 POWDER RESPIRATORY (INHALATION) EVERY 12 HOURS
Qty: 180 EACH | Refills: 1 | Status: CANCELLED | OUTPATIENT
Start: 2022-11-11

## 2022-11-11 RX ORDER — FENOFIBRATE 145 MG/1
145 TABLET, COATED ORAL DAILY
Qty: 30 TABLET | Refills: 2 | Status: SHIPPED | OUTPATIENT
Start: 2022-11-11

## 2022-11-11 RX ORDER — AMLODIPINE BESYLATE 10 MG/1
10 TABLET ORAL DAILY
Qty: 90 TABLET | Refills: 2 | Status: SHIPPED | OUTPATIENT
Start: 2022-11-11

## 2022-11-11 RX ORDER — OMEPRAZOLE 20 MG/1
40 CAPSULE, DELAYED RELEASE ORAL DAILY
Qty: 60 CAPSULE | Refills: 3 | Status: SHIPPED | OUTPATIENT
Start: 2022-11-11

## 2022-11-11 RX ORDER — TRAZODONE HYDROCHLORIDE 100 MG/1
100 TABLET ORAL
Status: CANCELLED | OUTPATIENT
Start: 2022-11-11
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
## 2022-11-22 ENCOUNTER — PATIENT MESSAGE (OUTPATIENT)
Dept: FAMILY MEDICINE CLINIC | Age: 52
End: 2022-11-22

## 2022-12-01 ENCOUNTER — TELEPHONE (OUTPATIENT)
Dept: FAMILY MEDICINE CLINIC | Age: 52
End: 2022-12-01

## 2022-12-01 NOTE — TELEPHONE ENCOUNTER
----- Message from Jewels Quezada MD sent at 11/16/2022  4:58 PM EST -----  Can you please ask patient if she is still experiencing urinary symptoms?

## 2022-12-08 ENCOUNTER — HOSPITAL ENCOUNTER (OUTPATIENT)
Dept: INFUSION THERAPY | Age: 52
End: 2022-12-08
Payer: COMMERCIAL

## 2022-12-08 ENCOUNTER — TELEPHONE (OUTPATIENT)
Dept: FAMILY MEDICINE CLINIC | Age: 52
End: 2022-12-08

## 2022-12-08 VITALS
HEART RATE: 75 BPM | RESPIRATION RATE: 18 BRPM | OXYGEN SATURATION: 94 % | SYSTOLIC BLOOD PRESSURE: 129 MMHG | TEMPERATURE: 97 F | DIASTOLIC BLOOD PRESSURE: 76 MMHG

## 2022-12-08 DIAGNOSIS — E78.1 HYPERTRIGLYCERIDEMIA: ICD-10-CM

## 2022-12-08 DIAGNOSIS — I10 ESSENTIAL HYPERTENSION: Primary | ICD-10-CM

## 2022-12-08 LAB
BASO+EOS+MONOS # BLD AUTO: 0.8 K/UL (ref 0–2.3)
BASO+EOS+MONOS NFR BLD AUTO: 9 % (ref 0.1–17)
DIFFERENTIAL METHOD BLD: ABNORMAL
ERYTHROCYTE [DISTWIDTH] IN BLOOD BY AUTOMATED COUNT: 14.6 % (ref 11.5–14.5)
HCT VFR BLD AUTO: 40.9 % (ref 36–48)
HGB BLD-MCNC: 12.8 G/DL (ref 12–16)
LYMPHOCYTES # BLD: 3.3 K/UL (ref 1.1–5.9)
LYMPHOCYTES NFR BLD: 39 % (ref 14–44)
MCH RBC QN AUTO: 28.4 PG (ref 25–35)
MCHC RBC AUTO-ENTMCNC: 31.3 G/DL (ref 31–37)
MCV RBC AUTO: 90.9 FL (ref 78–102)
NEUTS SEG # BLD: 4.3 K/UL (ref 1.8–9.5)
NEUTS SEG NFR BLD: 52 % (ref 40–70)
PLATELET # BLD AUTO: 246 K/UL (ref 140–440)
RBC # BLD AUTO: 4.5 M/UL (ref 4.1–5.1)
WBC # BLD AUTO: 8.4 K/UL (ref 4.5–13)

## 2022-12-08 PROCEDURE — 85025 COMPLETE CBC W/AUTO DIFF WBC: CPT

## 2022-12-08 PROCEDURE — 36415 COLL VENOUS BLD VENIPUNCTURE: CPT

## 2022-12-08 NOTE — PROGRESS NOTES
[facility] Progress Note    Date: 2022    Name: Laisha Hartmann    MRN: [id number]         : 1970    Therapeutic Phlebotomy (Weekly Status)    Ms. Avelina Adna arrived at [facility] at 12. She was assessed and education was provided. Patient's last phlebotomy was on 22, was scheduled for WEEKLY appointments, however pt was a no show each time and then she rescheduled to today. Visit Vitals  /76 (BP 1 Location: Right upper arm, BP Patient Position: Sitting)   Pulse 75   Temp 97 °F (36.1 °C)   Resp 18   SpO2 94%     CBC drawn from [facility] x1 attempt. 2x2 applied with coban. Pt tolerated well. CBC ran in house. Recent Results (from the past 12 hour(s))   CBC WITH 3 PART DIFF    Collection Time: 22 10:15 AM   Result Value Ref Range    WBC 8.4 4.5 - 13.0 K/uL    RBC 4.50 4. 10 - 5.10 M/uL    HGB 12.8 12.0 - 16 g/dL    HCT 40.9 36 - 48 %    MCV 90.9 78 - 102 FL    MCH 28.4 25.0 - 35.0 PG    MCHC 31.3 31 - 37 g/dL    RDW 14.6 (H) 11.5 - 14.5 %    PLATELET 526 705 - 475 K/uL    NEUTROPHILS 52 40 - 70 %    Mixed cells 9 0.1 - 17 %    LYMPHOCYTES 39 14 - 44 %    ABS. NEUTROPHILS 4.3 1.8 - 9.5 K/UL    ABS. MIXED CELLS 0.8 0.0 - 2.3 K/uL    ABS. LYMPHOCYTES 3.3 1.1 - 5.9 K/UL    DF AUTOMATED         Phlebotomy HELD for HCT of 40.9 falling below her weekly and Q2 months parameter order. Patient armband removed and shredded. Ms. Avelina Adan was discharged from [place] [address] in stable condition at 1030. She is to return on 23 at 0800 for her next Therapeutic Phlebotomy Q2 month appointment.     Calista Gurrola RN  2022

## 2022-12-08 NOTE — TELEPHONE ENCOUNTER
Patient requesting lab orders before her next visit on 2/9. Requesting her glycerides and cholesterol be checked.

## 2022-12-14 ENCOUNTER — TELEPHONE (OUTPATIENT)
Dept: MAMMOGRAPHY | Age: 52
End: 2022-12-14

## 2023-01-12 ENCOUNTER — HOSPITAL ENCOUNTER (OUTPATIENT)
Dept: MAMMOGRAPHY | Age: 53
Discharge: HOME | End: 2023-01-12
Attending: STUDENT IN AN ORGANIZED HEALTH CARE EDUCATION/TRAINING PROGRAM
Payer: MEDICAID

## 2023-01-12 DIAGNOSIS — Z12.31 BREAST CANCER SCREENING BY MAMMOGRAM: ICD-10-CM

## 2023-01-12 PROCEDURE — 77063 BREAST TOMOSYNTHESIS BI: CPT

## 2023-01-16 ENCOUNTER — TELEPHONE (OUTPATIENT)
Dept: ONCOLOGY | Age: 53
End: 2023-01-16

## 2023-01-16 DIAGNOSIS — L30.4 INTERTRIGO: ICD-10-CM

## 2023-01-16 DIAGNOSIS — I10 ESSENTIAL HYPERTENSION: ICD-10-CM

## 2023-01-16 DIAGNOSIS — E78.5 DYSLIPIDEMIA: ICD-10-CM

## 2023-01-16 DIAGNOSIS — K21.9 GASTROESOPHAGEAL REFLUX DISEASE: ICD-10-CM

## 2023-01-16 DIAGNOSIS — E78.1 HYPERTRIGLYCERIDEMIA: ICD-10-CM

## 2023-01-16 DIAGNOSIS — R06.02 SHORTNESS OF BREATH: ICD-10-CM

## 2023-01-16 NOTE — TELEPHONE ENCOUNTER
Patient requesting 90/day supply          Requested Prescriptions     Pending Prescriptions Disp Refills    amLODIPine (NORVASC) 10 mg tablet 90 Tablet 2     Sig: Take 1 Tablet by mouth daily. atorvastatin (LIPITOR) 40 mg tablet 90 Tablet 2     Sig: Take 1 Tablet by mouth daily. fenofibrate nanocrystallized (TRICOR) 145 mg tablet 30 Tablet 2     Sig: Take 1 Tablet by mouth daily. nystatin (MYCOSTATIN) topical cream 15 g 1     Sig: Apply  to affected area two (2) times a day. omeprazole (PRILOSEC) 20 mg capsule 60 Capsule 3     Sig: Take 2 Capsules by mouth daily. albuterol (PROVENTIL HFA, VENTOLIN HFA, PROAIR HFA) 90 mcg/actuation inhaler 3 Each 2     Sig: Take 2 Puffs by inhalation every four (4) hours as needed for Wheezing. Indications: chronic obstructive pulmonary disease    fluticasone propion-salmeteroL (Advair Diskus) 100-50 mcg/dose diskus inhaler 180 Each 1     Sig: Take 1 Puff by inhalation every twelve (12) hours.  Indications: bronchospasm prevention with COPD    omega-3 acid ethyl esters (LOVAZA) 1 gram capsule 360 Capsule 3     Sig: TAKE TWO CAPSULES BY MOUTH TWICE A DAY

## 2023-01-16 NOTE — TELEPHONE ENCOUNTER
Levin Kehr w/[facility] received referral for gyne-oncologist but do not have one on staff. Please call [phone] option 1 ask for Levin Kehr.

## 2023-01-16 NOTE — TELEPHONE ENCOUNTER
Pt lvm needs to reschedule missed appt. Contacted patient to schedule. Patient's phone had been turned off. Per NP Ap[facility] labs from Dec 8th ok. Rescheduled [address] appt for 01/18/2023 at 11:45 am ok to contact earlier.

## 2023-01-17 RX ORDER — FLUTICASONE PROPIONATE AND SALMETEROL 100; 50 UG/1; UG/1
1 POWDER RESPIRATORY (INHALATION) EVERY 12 HOURS
Qty: 180 EACH | Refills: 1 | Status: SHIPPED | OUTPATIENT
Start: 2023-01-17

## 2023-01-17 RX ORDER — FENOFIBRATE 145 MG/1
145 TABLET, COATED ORAL DAILY
Qty: 30 TABLET | Refills: 2 | Status: SHIPPED | OUTPATIENT
Start: 2023-01-17

## 2023-01-17 RX ORDER — OMEGA-3-ACID ETHYL ESTERS 1 G/1
CAPSULE, LIQUID FILLED ORAL
Qty: 360 CAPSULE | Refills: 3 | Status: SHIPPED | OUTPATIENT
Start: 2023-01-17

## 2023-01-17 RX ORDER — AMLODIPINE BESYLATE 10 MG/1
10 TABLET ORAL DAILY
Qty: 90 TABLET | Refills: 2 | Status: SHIPPED | OUTPATIENT
Start: 2023-01-17

## 2023-01-17 RX ORDER — NYSTATIN 100000 U/G
CREAM TOPICAL 2 TIMES DAILY
Qty: 15 G | Refills: 1 | Status: SHIPPED | OUTPATIENT
Start: 2023-01-17

## 2023-01-17 RX ORDER — ATORVASTATIN CALCIUM 40 MG/1
40 TABLET, FILM COATED ORAL DAILY
Qty: 90 TABLET | Refills: 2 | Status: SHIPPED | OUTPATIENT
Start: 2023-01-17

## 2023-01-17 RX ORDER — OMEPRAZOLE 20 MG/1
40 CAPSULE, DELAYED RELEASE ORAL DAILY
Qty: 60 CAPSULE | Refills: 3 | Status: SHIPPED | OUTPATIENT
Start: 2023-01-17

## 2023-01-17 RX ORDER — ALBUTEROL SULFATE 90 UG/1
2 AEROSOL, METERED RESPIRATORY (INHALATION)
Qty: 3 EACH | Refills: 2 | Status: SHIPPED | OUTPATIENT
Start: 2023-01-17

## 2023-01-18 ENCOUNTER — VIRTUAL VISIT (OUTPATIENT)
Dept: ONCOLOGY | Age: 53
End: 2023-01-18
Payer: MEDICAID

## 2023-01-18 DIAGNOSIS — D75.1 POLYCYTHEMIA: ICD-10-CM

## 2023-01-18 DIAGNOSIS — F17.200 SMOKING: ICD-10-CM

## 2023-01-18 DIAGNOSIS — D75.1 POLYCYTHEMIA SECONDARY TO SMOKING: Primary | ICD-10-CM

## 2023-01-18 PROCEDURE — 99442 PR PHYS/QHP TELEPHONE EVALUATION 11-20 MIN: CPT | Performed by: NURSE PRACTITIONER

## 2023-01-18 NOTE — PROGRESS NOTES
Betty Rosado is a 47 y.o. female, evaluated via audio-only technology on 1/18/2023 for No chief complaint on file. .    Assessment & Plan:   Polycythemia secondary to smoking  --Patient was being followed by Dr. Misael Grover who retired,   She has hx significant of polycythemia, HTN, tobacco use,  depression, bipolar disorder. She is taking 81mg ASA daily. -- She has had phlebotomy for secondary polycythemia 2/2 smoking, last one was in March 2021. She did not keep her phlebotomy since then. She is trying to cut back on her tobacco use but continues to smoke at least a pack of cigarettes per day. --She denied any history of PE or DVT, no recent trauma, fall, severe headache, acute vision change, or symptomatic concerns.  --12/6/2021 CXR reported No acute findings. -- Negative JAK2/MPL/CALR mutations and BCR/ABL1. Carbon monoxide elevated at 7.9.   --Today I have reviewed with the patient about recent lab reports. 12/08/2022 CBC showed hemoglobin 12.8, hematocrit  40.9%, WBC 8.4, platelet [phone]. Plan:  --She agreed to resume phlebotomy if Hct >45%. She was encouraged to continue working on smoking cessation. --She was advised to go to ED if developing symptomatics or any concerns. --The patient thinks she probably has BRENTON. The patient may need a referral to Sleep specialist. She has been advise to follow up with her primary care physician. Patient states she will talk to her PCP. Subjective:   Mrs. Pat Eric is a 79-year-old woman who was seen for management of Secondary Polycythemia. She admits using tobacco  daily. She states she smokes 1/2ppd from 1ppd. She reports she is trying to cut back. Patient was being followed by Dr. Misael Grover who retired. Today she reports she is doing well so far. She denies fevers, chills, night sweats, unintentional weight loss, skin lumps or bumps, acute bleeding or bruising issues.  Denies headaches, acute vision changes, dizziness, chest pain, shortness of breath, palpitation, productive cough, nausea, vomiting, abdominal pain, altered bowel habits, dysuria, bone pain or back pain, focal numbness or weakness. She denies bleeding or bruising. She denies pain or any discomfort. Prior to Admission medications    Medication Sig Start Date End Date Taking? Authorizing Provider   amLODIPine (NORVASC) 10 mg tablet Take 1 Tablet by mouth daily. 1/17/23   Lavonne Sousa MD   atorvastatin (LIPITOR) 40 mg tablet Take 1 Tablet by mouth daily. 1/17/23   Lavonne Sousa MD   fenofibrate nanocrystallized (TRICOR) 145 mg tablet Take 1 Tablet by mouth daily. 1/17/23   Lavonne Sousa MD   nystatin (MYCOSTATIN) topical cream Apply  to affected area two (2) times a day. 1/17/23   Lavonne Sousa MD   omeprazole (PRILOSEC) 20 mg capsule Take 2 Capsules by mouth daily. 1/17/23   Jillian Collado MD   albuterol (PROVENTIL HFA, VENTOLIN HFA, PROAIR HFA) 90 mcg/actuation inhaler Take 2 Puffs by inhalation every four (4) hours as needed for Wheezing. Indications: chronic obstructive pulmonary disease 1/17/23   Lavonne Sousa MD   fluticasone propion-salmeteroL (Advair Diskus) 100-50 mcg/dose diskus inhaler Take 1 Puff by inhalation every twelve (12) hours. Indications: bronchospasm prevention with COPD 1/17/23   Lavonne Sousa MD   omega-3 acid ethyl esters (LOVAZA) 1 gram capsule TAKE TWO CAPSULES BY MOUTH TWICE A DAY 1/17/23   Jillian Collado MD   losartan-hydroCHLOROthiazide (HYZAAR) 100-12.5 mg per tablet Take 1 Tablet by mouth daily. 11/11/22   Jillian Collado MD   albuterol (PROVENTIL HFA, VENTOLIN HFA, PROAIR HFA) 90 mcg/actuation inhaler INHALE ONE TO TWO PUFFS BY MOUTH EVERY 4 HOURS AS NEEDED 6/21/22   Jillian Collado MD   Methylcellulose, with Sugar, (Citrucel, sucrose,) powd Take 1 Dose by mouth daily.  6/1/22   Geovanni Boyer MD   Blood Pressure Test Kit-Large kit Please provide BP cuff for hypertension diagnosis 2/8/22   Osito Brennan NP   clonazePAM (KlonoPIN) 1 mg tablet Take 1 mg by mouth daily. 10/20/21   Provider, Historical   desvenlafaxine succinate (PRISTIQ) 50 mg ER tablet Take 50 mg by mouth daily. Other, MD Montrell   traZODone (DESYREL) 100 mg tablet Take 100 mg by mouth nightly. 1/15/21   Provider, Historical   lamoTRIgine (LAMICTAL) 100 mg tablet Take 200 mg by mouth daily. Provider, Historical         Review of Systems   Constitutional: Negative. HENT: Negative. Eyes: Negative. Respiratory: Negative. Cardiovascular: Negative. Gastrointestinal: Negative. Genitourinary: Negative. Musculoskeletal: Negative. Skin: Negative. Neurological: Negative. Endo/Heme/Allergies: Negative. Psychiatric/Behavioral: Negative. Patient-Reported Weight: 190lb       Airam Schuler was evaluated through a patient-initiated, synchronous (real-time) audio only encounter. She (or guardian if applicable) is aware that it is a billable service, which includes applicable co-pays, with coverage as determined by her insurance carrier. This visit was conducted with the patient's (and/or Lee Ann Meneses guardian's) verbal consent. She has not had a related appointment within my department in the past 7 days or scheduled within the next 24 hours. The patient was located in a state where the provider was licensed to provide care.       Total Time: minutes: 11-20 minutes    Jonathan Castillo DNP

## 2023-02-05 DIAGNOSIS — D75.1 POLYCYTHEMIA: ICD-10-CM

## 2023-02-05 DIAGNOSIS — D75.1 POLYCYTHEMIA SECONDARY TO SMOKING: Primary | ICD-10-CM

## 2023-02-05 DIAGNOSIS — F17.200 SMOKING: ICD-10-CM

## 2023-02-08 ENCOUNTER — APPOINTMENT (OUTPATIENT)
Dept: INFUSION THERAPY | Age: 53
End: 2023-02-08
Payer: COMMERCIAL

## 2023-02-09 ENCOUNTER — OFFICE VISIT (OUTPATIENT)
Dept: FAMILY MEDICINE CLINIC | Age: 53
End: 2023-02-09
Payer: COMMERCIAL

## 2023-02-09 ENCOUNTER — APPOINTMENT (OUTPATIENT)
Dept: INFUSION THERAPY | Age: 53
End: 2023-02-09
Payer: COMMERCIAL

## 2023-02-09 VITALS
RESPIRATION RATE: 17 BRPM | HEART RATE: 68 BPM | HEIGHT: 61 IN | OXYGEN SATURATION: 97 % | DIASTOLIC BLOOD PRESSURE: 61 MMHG | WEIGHT: 196 LBS | BODY MASS INDEX: 37 KG/M2 | SYSTOLIC BLOOD PRESSURE: 109 MMHG

## 2023-02-09 DIAGNOSIS — E78.5 DYSLIPIDEMIA: ICD-10-CM

## 2023-02-09 DIAGNOSIS — L30.4 INTERTRIGO: Primary | ICD-10-CM

## 2023-02-09 DIAGNOSIS — I10 ESSENTIAL HYPERTENSION: ICD-10-CM

## 2023-02-09 DIAGNOSIS — Z12.4 SCREENING FOR CERVICAL CANCER: ICD-10-CM

## 2023-02-09 DIAGNOSIS — K21.9 GASTROESOPHAGEAL REFLUX DISEASE WITHOUT ESOPHAGITIS: ICD-10-CM

## 2023-02-09 DIAGNOSIS — E78.1 HYPERTRIGLYCERIDEMIA: ICD-10-CM

## 2023-02-09 DIAGNOSIS — R06.02 SHORTNESS OF BREATH: ICD-10-CM

## 2023-02-09 RX ORDER — OMEPRAZOLE 20 MG/1
40 CAPSULE, DELAYED RELEASE ORAL DAILY
Qty: 180 CAPSULE | Refills: 1 | Status: SHIPPED | OUTPATIENT
Start: 2023-02-09

## 2023-02-09 RX ORDER — TRIAMCINOLONE ACETONIDE 1 MG/G
CREAM TOPICAL 2 TIMES DAILY
Qty: 45 G | Refills: 2 | Status: SHIPPED | OUTPATIENT
Start: 2023-02-09

## 2023-02-09 RX ORDER — NYSTATIN 100000 U/G
CREAM TOPICAL 2 TIMES DAILY
Qty: 15 G | Refills: 1 | Status: CANCELLED | OUTPATIENT
Start: 2023-02-09

## 2023-02-09 RX ORDER — MIRTAZAPINE 45 MG/1
45 TABLET, FILM COATED ORAL
COMMUNITY
Start: 2023-01-23

## 2023-02-09 RX ORDER — AMLODIPINE BESYLATE 10 MG/1
10 TABLET ORAL DAILY
Qty: 90 TABLET | Refills: 2 | Status: SHIPPED | OUTPATIENT
Start: 2023-02-09

## 2023-02-09 RX ORDER — LOSARTAN POTASSIUM AND HYDROCHLOROTHIAZIDE 12.5; 1 MG/1; MG/1
1 TABLET ORAL DAILY
Qty: 90 TABLET | Refills: 2 | Status: SHIPPED | OUTPATIENT
Start: 2023-02-09

## 2023-02-09 RX ORDER — FENOFIBRATE 145 MG/1
145 TABLET, COATED ORAL DAILY
Qty: 90 TABLET | Refills: 1 | Status: SHIPPED | OUTPATIENT
Start: 2023-02-09

## 2023-02-09 RX ORDER — METHYLCELLULOSE (WITH SUGAR)
1 POWDER IN PACKET (EA) ORAL DAILY
Qty: 255 G | Refills: 3 | Status: SHIPPED | OUTPATIENT
Start: 2023-02-09

## 2023-02-09 RX ORDER — ATORVASTATIN CALCIUM 40 MG/1
40 TABLET, FILM COATED ORAL DAILY
Qty: 90 TABLET | Refills: 2 | Status: SHIPPED | OUTPATIENT
Start: 2023-02-09

## 2023-02-09 RX ORDER — FLUTICASONE PROPIONATE AND SALMETEROL 100; 50 UG/1; UG/1
1 POWDER RESPIRATORY (INHALATION) EVERY 12 HOURS
Qty: 180 EACH | Refills: 1 | Status: SHIPPED | OUTPATIENT
Start: 2023-02-09

## 2023-02-09 RX ORDER — ALBUTEROL SULFATE 90 UG/1
AEROSOL, METERED RESPIRATORY (INHALATION)
Qty: 18 G | Refills: 2 | Status: SHIPPED | OUTPATIENT
Start: 2023-02-09

## 2023-02-09 RX ORDER — CLOTRIMAZOLE 1 %
CREAM (GRAM) TOPICAL 2 TIMES DAILY
Qty: 45 G | Refills: 2 | Status: SHIPPED | OUTPATIENT
Start: 2023-02-09
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
## 2023-02-10 ENCOUNTER — HOSPITAL ENCOUNTER (OUTPATIENT)
Dept: INFUSION THERAPY | Age: 53
End: 2023-02-10
Payer: COMMERCIAL

## 2023-02-10 VITALS
OXYGEN SATURATION: 95 % | DIASTOLIC BLOOD PRESSURE: 69 MMHG | SYSTOLIC BLOOD PRESSURE: 118 MMHG | RESPIRATION RATE: 18 BRPM | TEMPERATURE: 98.6 F | HEART RATE: 80 BPM

## 2023-02-10 LAB
BASO+EOS+MONOS # BLD AUTO: 0.9 K/UL (ref 0–2.3)
BASO+EOS+MONOS NFR BLD AUTO: 13 % (ref 0.1–17)
DIFFERENTIAL METHOD BLD: ABNORMAL
ERYTHROCYTE [DISTWIDTH] IN BLOOD BY AUTOMATED COUNT: 13.3 % (ref 11.5–14.5)
HCT VFR BLD AUTO: 46.6 % (ref 36–48)
HGB BLD-MCNC: 14.9 G/DL (ref 12–16)
LYMPHOCYTES # BLD: 2.2 K/UL (ref 1.1–5.9)
LYMPHOCYTES NFR BLD: 31 % (ref 14–44)
MCH RBC QN AUTO: 28.4 PG (ref 25–35)
MCHC RBC AUTO-ENTMCNC: 32 G/DL (ref 31–37)
MCV RBC AUTO: 88.9 FL (ref 78–102)
NEUTS SEG # BLD: 3.8 K/UL (ref 1.8–9.5)
NEUTS SEG NFR BLD: 55 % (ref 40–70)
PLATELET # BLD AUTO: 238 K/UL (ref 140–440)
RBC # BLD AUTO: 5.24 M/UL (ref 4.1–5.1)
WBC # BLD AUTO: 6.9 K/UL (ref 4.5–13)

## 2023-02-10 PROCEDURE — 85025 COMPLETE CBC W/AUTO DIFF WBC: CPT

## 2023-02-10 PROCEDURE — 36415 COLL VENOUS BLD VENIPUNCTURE: CPT

## 2023-02-10 PROCEDURE — 99195 PHLEBOTOMY: CPT

## 2023-02-10 NOTE — PROGRESS NOTES
[facility] Progress Note    Date: February 10, 2023    Name: Sana Henriquez    MRN: [id number]         : 1970    Therapeutic Phlebotomy (Monthly Status)    Ms. Rubi Dugan arrived at [facility] at 56. She was assessed and education was provided. Ms. Ozuna's vitals were reviewed and patient was observed for 5 minutes prior to treatment. Visit Vitals  /69 (BP 1 Location: Right lower arm, BP Patient Position: Sitting)   Pulse 72   Temp 98 °F (36.7 °C)   Resp 18   SpO2 93%   Breastfeeding No       CBC drawn via left AC by [employer] and processed on site. Recent Results (from the past 12 hour(s))   CBC WITH 3 PART DIFF    Collection Time: 02/10/23  8:08 AM   Result Value Ref Range    WBC 6.9 4.5 - 13.0 K/uL    RBC 5.24 (H) 4.10 - 5.10 M/uL    HGB 14.9 12.0 - 16 g/dL    HCT 46.6 36 - 48 %    MCV 88.9 78 - 102 FL    MCH 28.4 25.0 - 35.0 PG    MCHC 32.0 31 - 37 g/dL    RDW 13.3 11.5 - 14.5 %    PLATELET 142 051 - 850 K/uL    NEUTROPHILS 55 40 - 70 %    Mixed cells 13 0.1 - 17 %    LYMPHOCYTES 31 14 - 44 %    ABS. NEUTROPHILS 3.8 1.8 - 9.5 K/UL    ABS. MIXED CELLS 0.9 0.0 - 2.3 K/uL    ABS. LYMPHOCYTES 2.2 1.1 - 5.9 K/UL    DF AUTOMATED         HCT 46.6    PIV 20G started LAC x1 attempt by Tracie Rojas after 1 failed attempt by this writer. Brisk blood return received. Therapeutic phlebotomy started at 0835 and stopped at 0852 Approx 500 ml blood removed from patient. 500 ml NS IV given over 30 minutes as ordered. Patient given water and snacks during treatment. Ms. Rubi Dugan tolerated the treatment and had no complaints. VS remain stable. Patient Vitals for the past 12 hrs:   Temp Pulse Resp BP SpO2   02/10/23 0925 98.6 °F (37 °C) 80 18 118/69 95 %   02/10/23 0855 -- 72 18 103/69 93 %   02/10/23 0805 98 °F (36.7 °C) 72 18 117/73 100 %       Patient armband removed and shredded. Ms. Rubi Dugan was discharged from [place] [address] in stable condition at 1025.  She is to return on 02/17/23 at 0800 for her next Therapeutic Phlebotomy Weekly appointment.       Tita Blanchard  February 10, 2023

## 2023-02-14 ENCOUNTER — TELEPHONE (OUTPATIENT)
Facility: CLINIC | Age: 53
End: 2023-02-14

## 2023-02-14 NOTE — TELEPHONE ENCOUNTER
PCP confirmed results. Spoke to patient and advised of negative HPV and normal PAP results. Recommended to repeat in 5 years. Patient stated that she understood and had no other concerns.

## 2023-02-14 NOTE — TELEPHONE ENCOUNTER
----- Message from Olivia Marino sent at 2/14/2023  2:14 PM EST -----  Regarding: Papsmear   Looking for results

## 2023-02-17 ENCOUNTER — HOSPITAL ENCOUNTER (OUTPATIENT)
Facility: HOSPITAL | Age: 53
Setting detail: INFUSION SERIES
End: 2023-02-17
Payer: COMMERCIAL

## 2023-02-17 VITALS
HEART RATE: 65 BPM | TEMPERATURE: 98.2 F | SYSTOLIC BLOOD PRESSURE: 132 MMHG | OXYGEN SATURATION: 97 % | DIASTOLIC BLOOD PRESSURE: 80 MMHG

## 2023-02-17 LAB
BASO+EOS+MONOS # BLD AUTO: 0.5 K/UL (ref 0–2.3)
BASO+EOS+MONOS NFR BLD AUTO: 8 % (ref 0.1–17)
DIFFERENTIAL METHOD BLD: ABNORMAL
ERYTHROCYTE [DISTWIDTH] IN BLOOD BY AUTOMATED COUNT: 13.3 % (ref 11.5–14.5)
HCT VFR BLD AUTO: 36.7 % (ref 36–48)
HGB BLD-MCNC: 11.8 G/DL (ref 12–16)
LYMPHOCYTES # BLD: 2.3 K/UL (ref 1.1–5.9)
LYMPHOCYTES NFR BLD: 36 % (ref 14–44)
MCH RBC QN AUTO: 28.8 PG (ref 25–35)
MCHC RBC AUTO-ENTMCNC: 32.2 G/DL (ref 31–37)
MCV RBC AUTO: 89.5 FL (ref 78–102)
NEUTS SEG # BLD: 3.5 K/UL (ref 1.8–9.5)
NEUTS SEG NFR BLD: 56 % (ref 40–70)
PLATELET # BLD AUTO: 255 K/UL (ref 140–440)
RBC # BLD AUTO: 4.1 M/UL (ref 4.1–5.1)
WBC # BLD AUTO: 6.3 K/UL (ref 4.5–13)

## 2023-02-17 PROCEDURE — 85025 COMPLETE CBC W/AUTO DIFF WBC: CPT

## 2023-02-17 PROCEDURE — 36415 COLL VENOUS BLD VENIPUNCTURE: CPT

## 2023-02-21 ENCOUNTER — HOSPITAL ENCOUNTER (OUTPATIENT)
Facility: HOSPITAL | Age: 53
Setting detail: SPECIMEN
Discharge: HOME | End: 2023-02-24
Payer: COMMERCIAL

## 2023-02-21 LAB
ALBUMIN SERPL-MCNC: 3.4 G/DL (ref 3.4–5)
ALBUMIN/GLOB SERPL: 1.4 (ref 0.8–1.7)
ALP SERPL-CCNC: 70 U/L (ref 45–117)
ALT SERPL-CCNC: 26 U/L (ref 13–56)
ANION GAP SERPL CALC-SCNC: 3 MMOL/L (ref 3–18)
AST SERPL-CCNC: 23 U/L (ref 10–38)
BASOPHILS # BLD: 0.1 K/UL (ref 0–0.1)
BASOPHILS NFR BLD: 1 % (ref 0–2)
BILIRUB SERPL-MCNC: 0.5 MG/DL (ref 0.2–1)
BUN SERPL-MCNC: 14 MG/DL (ref 7–18)
BUN/CREAT SERPL: 23 (ref 12–20)
CALCIUM SERPL-MCNC: 8.7 MG/DL (ref 8.5–10.1)
CHLORIDE SERPL-SCNC: 109 MMOL/L (ref 100–111)
CHOLEST SERPL-MCNC: 115 MG/DL
CO2 SERPL-SCNC: 28 MMOL/L (ref 21–32)
CREAT SERPL-MCNC: 0.6 MG/DL (ref 0.6–1.3)
DIFFERENTIAL METHOD BLD: ABNORMAL
EOSINOPHIL # BLD: 0.1 K/UL (ref 0–0.4)
EOSINOPHIL NFR BLD: 2 % (ref 0–5)
ERYTHROCYTE [DISTWIDTH] IN BLOOD BY AUTOMATED COUNT: 14.4 % (ref 11.6–14.5)
GLOBULIN SER CALC-MCNC: 2.4 G/DL (ref 2–4)
GLUCOSE SERPL-MCNC: 111 MG/DL (ref 74–99)
HCT VFR BLD AUTO: 45.4 % (ref 35–45)
HDLC SERPL-MCNC: 43 MG/DL (ref 40–60)
HDLC SERPL: 2.7 (ref 0–5)
HGB BLD-MCNC: 13.9 G/DL (ref 12–16)
IMM GRANULOCYTES # BLD AUTO: 0 K/UL (ref 0–0.04)
IMM GRANULOCYTES NFR BLD AUTO: 0 % (ref 0–0.5)
LDLC SERPL CALC-MCNC: 46.2 MG/DL (ref 0–100)
LIPID PANEL: NORMAL
LYMPHOCYTES # BLD: 2.7 K/UL (ref 0.9–3.6)
LYMPHOCYTES NFR BLD: 35 % (ref 21–52)
MCH RBC QN AUTO: 27.3 PG (ref 24–34)
MCHC RBC AUTO-ENTMCNC: 30.6 G/DL (ref 31–37)
MCV RBC AUTO: 89 FL (ref 78–100)
MONOCYTES # BLD: 0.7 K/UL (ref 0.05–1.2)
MONOCYTES NFR BLD: 8 % (ref 3–10)
NEUTS SEG # BLD: 4.1 K/UL (ref 1.8–8)
NEUTS SEG NFR BLD: 54 % (ref 40–73)
NRBC # BLD: 0 K/UL (ref 0–0.01)
NRBC BLD-RTO: 0 PER 100 WBC
PLATELET # BLD AUTO: 264 K/UL (ref 135–420)
PMV BLD AUTO: 9.4 FL (ref 9.2–11.8)
POTASSIUM SERPL-SCNC: 4 MMOL/L (ref 3.5–5.5)
PROT SERPL-MCNC: 5.8 G/DL (ref 6.4–8.2)
RBC # BLD AUTO: 5.1 M/UL (ref 4.2–5.3)
SODIUM SERPL-SCNC: 140 MMOL/L (ref 136–145)
TRIGL SERPL-MCNC: 129 MG/DL
VLDLC SERPL CALC-MCNC: 25.8 MG/DL
WBC # BLD AUTO: 7.7 K/UL (ref 4.6–13.2)

## 2023-02-21 PROCEDURE — 80061 LIPID PANEL: CPT

## 2023-02-21 PROCEDURE — 80053 COMPREHEN METABOLIC PANEL: CPT

## 2023-02-21 PROCEDURE — 85025 COMPLETE CBC W/AUTO DIFF WBC: CPT

## 2023-02-21 PROCEDURE — 36415 COLL VENOUS BLD VENIPUNCTURE: CPT

## 2023-02-22 ENCOUNTER — TELEPHONE (OUTPATIENT)
Facility: CLINIC | Age: 53
End: 2023-02-22

## 2023-02-22 NOTE — TELEPHONE ENCOUNTER
----- Message from Zaida Monroe MD sent at 2/22/2023 10:15 AM EST -----  Please inform patient that her labs are all stable.  Cholesterol levels are good.

## 2023-02-22 NOTE — TELEPHONE ENCOUNTER
Spoke to patient and advised of lab results. Spoke to her about diet, exercise and water intake. She advised that she has made an appt for 3/1/23 for possible hernia. She had no other concerns.
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
## 2023-04-21 ENCOUNTER — HOSPITAL ENCOUNTER (OUTPATIENT)
Facility: HOSPITAL | Age: 53
Setting detail: INFUSION SERIES
End: 2023-04-21
Payer: COMMERCIAL

## 2023-04-21 VITALS
DIASTOLIC BLOOD PRESSURE: 69 MMHG | OXYGEN SATURATION: 94 % | SYSTOLIC BLOOD PRESSURE: 115 MMHG | RESPIRATION RATE: 22 BRPM | HEART RATE: 62 BPM | TEMPERATURE: 97.3 F

## 2023-04-21 LAB
BASO+EOS+MONOS # BLD AUTO: 1 K/UL (ref 0–2.3)
BASO+EOS+MONOS NFR BLD AUTO: 12 % (ref 0.1–17)
DIFFERENTIAL METHOD BLD: ABNORMAL
ERYTHROCYTE [DISTWIDTH] IN BLOOD BY AUTOMATED COUNT: 15.1 % (ref 11.5–14.5)
HCT VFR BLD AUTO: 40.9 % (ref 36–48)
HGB BLD-MCNC: 12.5 G/DL (ref 12–16)
LYMPHOCYTES # BLD: 2.4 K/UL (ref 1.1–5.9)
LYMPHOCYTES NFR BLD: 27 % (ref 14–44)
MCH RBC QN AUTO: 26.4 PG (ref 25–35)
MCHC RBC AUTO-ENTMCNC: 30.6 G/DL (ref 31–37)
MCV RBC AUTO: 86.3 FL (ref 78–102)
NEUTS SEG # BLD: 5.4 K/UL (ref 1.8–9.5)
NEUTS SEG NFR BLD: 61 % (ref 40–70)
PLATELET # BLD AUTO: 282 K/UL (ref 140–440)
RBC # BLD AUTO: 4.74 M/UL (ref 4.1–5.1)
WBC # BLD AUTO: 8.8 K/UL (ref 4.5–13)

## 2023-04-21 PROCEDURE — 36415 COLL VENOUS BLD VENIPUNCTURE: CPT

## 2023-04-21 PROCEDURE — 85025 COMPLETE CBC W/AUTO DIFF WBC: CPT

## 2023-04-21 NOTE — PROGRESS NOTES
[facility] Progress Note    Date: 2023    Name: Carri Maldonado    MRN: [id number]         : 1970    CBC/Phlebotomy Q2 months     Ms. Marino to [place], ambulatory at 0800. Pt was assessed and education was provided. Ms. Marino's vitals were reviewed. Vitals:    23 0803   BP: 115/69   Pulse: 62   Resp: 22   Temp: 97.3 °F (36.3 °C)   SpO2: 94%       CBC drawn from [facility] via venipunture x1 attempt per order. 2x2 applied with coban. CBC ran in house. Results for orders placed or performed during the hospital encounter of 23   CBC with Partial Differential   Result Value Ref Range    WBC 8.8 4.5 - 13.0 K/uL    RBC 4.74 4.10 - 5.10 M/uL    Hemoglobin 12.5 12.0 - 16 g/dL    Hematocrit 40.9 36 - 48 %    MCV 86.3 78 - 102 FL    MCH 26.4 25.0 - 35.0 PG    MCHC 30.6 (L) 31 - 37 g/dL    RDW 15.1 (H) 11.5 - 14.5 %    Platelets 731 496 - 266 K/uL    Seg Neutrophils 61 40 - 70 %    Mixed Cells 12 0.1 - 17 %    Lymphocytes 27 14 - 44 %    Segs Absolute 5.4 1.8 - 9.5 K/UL    ABSOLUTE MIXED CELLS 1.0 0.0 - 2.3 K/uL    Absolute Lymph # 2.4 1.1 - 5.9 K/UL    Differential Type AUTOMATED          Phlebotomy NOT indicated for HCT 40.9 per Q2 month phlebotomy parameter order. Ms. Natividad Morfin tolerated venipuntur and had no complaints at this time. Patient armband removed and shredded. Ms. Natividad Morfin was discharged from [place] [address] in stable condition at [address] [place] [address]. She is to return on 23 at 0800 for her next CBC/Phlebotomy appointment.     Katrin Chan RN  2023  10:57 AM

## 2023-04-27 ENCOUNTER — TELEPHONE (OUTPATIENT)
Facility: CLINIC | Age: 53
End: 2023-04-27

## 2023-04-27 DIAGNOSIS — R06.02 SHORTNESS OF BREATH: Primary | ICD-10-CM

## 2023-04-27 RX ORDER — FLUTICASONE PROPIONATE AND SALMETEROL 100; 50 UG/1; UG/1
1 POWDER RESPIRATORY (INHALATION) EVERY 12 HOURS
Qty: 180 EACH | Refills: 1 | Status: SHIPPED | OUTPATIENT
Start: 2023-04-27 | End: 2023-07-26

## 2023-04-27 NOTE — TELEPHONE ENCOUNTER
----- Message from Leila Berg sent at 4/27/2023  8:48 AM EDT -----  Regarding: FW: Prescription   Contact: [phone]    ----- Message -----  From: Akhil Flynn  Sent: 4/26/2023   4:47 PM EDT  To: [address] [facility] Clinical Staff  Subject: Prescription                                     Hello my name is Fritz Fish I want to know that can Dr Fritz Garcia do a prescription refill for advair but I want to refill for a 90-day supply I tried to call the office a few times but nobody picked up so again can you send it to [facility] a 90-day supply of advair

## 2023-05-12 DIAGNOSIS — E78.1 PURE HYPERGLYCERIDEMIA: ICD-10-CM

## 2023-05-12 RX ORDER — FENOFIBRATE 145 MG/1
TABLET, COATED ORAL
Qty: 30 TABLET | Refills: 2 | Status: SHIPPED | OUTPATIENT
Start: 2023-05-12

## 2023-05-16 ENCOUNTER — TELEPHONE (OUTPATIENT)
Facility: CLINIC | Age: 53
End: 2023-05-16

## 2023-05-16 NOTE — TELEPHONE ENCOUNTER
She has an appt with me on 5/23. Let's keep that. I will examine her and decide if and what blood work she needs.

## 2023-05-16 NOTE — TELEPHONE ENCOUNTER
Patient states she has had recent weight gain of about 25 lbs. She states she is having swelling in her feet. She also states her stools have been yellow. She is scheduled for routine follow up on June 8. She is requesting labs prior to appointment. Please advise if she should be seen before her appointment in June. Please advise.

## 2023-05-23 ENCOUNTER — OFFICE VISIT (OUTPATIENT)
Facility: CLINIC | Age: 53
End: 2023-05-23
Payer: COMMERCIAL

## 2023-05-23 ENCOUNTER — HOSPITAL ENCOUNTER (OUTPATIENT)
Facility: HOSPITAL | Age: 53
Setting detail: SPECIMEN
Discharge: HOME | End: 2023-05-26
Payer: COMMERCIAL

## 2023-05-23 VITALS
OXYGEN SATURATION: 96 % | HEIGHT: 61 IN | HEART RATE: 93 BPM | WEIGHT: 206 LBS | DIASTOLIC BLOOD PRESSURE: 82 MMHG | BODY MASS INDEX: 38.89 KG/M2 | SYSTOLIC BLOOD PRESSURE: 139 MMHG | TEMPERATURE: 98.8 F | RESPIRATION RATE: 16 BRPM

## 2023-05-23 DIAGNOSIS — I10 ESSENTIAL (PRIMARY) HYPERTENSION: ICD-10-CM

## 2023-05-23 DIAGNOSIS — R19.5 PALE STOOL: Primary | ICD-10-CM

## 2023-05-23 DIAGNOSIS — R19.5 PALE STOOL: ICD-10-CM

## 2023-05-23 LAB
ALBUMIN SERPL-MCNC: 4 G/DL (ref 3.4–5)
ALBUMIN/GLOB SERPL: 1.5 (ref 0.8–1.7)
ALP SERPL-CCNC: 66 U/L (ref 45–117)
ALT SERPL-CCNC: 36 U/L (ref 13–56)
ANION GAP SERPL CALC-SCNC: 7 MMOL/L (ref 3–18)
AST SERPL-CCNC: 22 U/L (ref 10–38)
BASOPHILS # BLD: 0.1 K/UL (ref 0–0.1)
BASOPHILS NFR BLD: 1 % (ref 0–2)
BILIRUB SERPL-MCNC: 0.3 MG/DL (ref 0.2–1)
BUN SERPL-MCNC: 22 MG/DL (ref 7–18)
BUN/CREAT SERPL: 33 (ref 12–20)
CALCIUM SERPL-MCNC: 9.2 MG/DL (ref 8.5–10.1)
CHLORIDE SERPL-SCNC: 107 MMOL/L (ref 100–111)
CO2 SERPL-SCNC: 26 MMOL/L (ref 21–32)
CREAT SERPL-MCNC: 0.66 MG/DL (ref 0.6–1.3)
DIFFERENTIAL METHOD BLD: ABNORMAL
EOSINOPHIL # BLD: 0.1 K/UL (ref 0–0.4)
EOSINOPHIL NFR BLD: 1 % (ref 0–5)
ERYTHROCYTE [DISTWIDTH] IN BLOOD BY AUTOMATED COUNT: 15.8 % (ref 11.6–14.5)
GLOBULIN SER CALC-MCNC: 2.6 G/DL (ref 2–4)
GLUCOSE SERPL-MCNC: 103 MG/DL (ref 74–99)
HCT VFR BLD AUTO: 43.9 % (ref 35–45)
HGB BLD-MCNC: 13.7 G/DL (ref 12–16)
IMM GRANULOCYTES # BLD AUTO: 0 K/UL (ref 0–0.04)
IMM GRANULOCYTES NFR BLD AUTO: 0 % (ref 0–0.5)
LYMPHOCYTES # BLD: 2.2 K/UL (ref 0.9–3.6)
LYMPHOCYTES NFR BLD: 20 % (ref 21–52)
MCH RBC QN AUTO: 26.2 PG (ref 24–34)
MCHC RBC AUTO-ENTMCNC: 31.2 G/DL (ref 31–37)
MCV RBC AUTO: 83.9 FL (ref 78–100)
MONOCYTES # BLD: 0.8 K/UL (ref 0.05–1.2)
MONOCYTES NFR BLD: 7 % (ref 3–10)
NEUTS SEG # BLD: 7.8 K/UL (ref 1.8–8)
NEUTS SEG NFR BLD: 71 % (ref 40–73)
NRBC # BLD: 0 K/UL (ref 0–0.01)
NRBC BLD-RTO: 0 PER 100 WBC
PLATELET # BLD AUTO: 312 K/UL (ref 135–420)
PMV BLD AUTO: 9.6 FL (ref 9.2–11.8)
POTASSIUM SERPL-SCNC: 3.9 MMOL/L (ref 3.5–5.5)
PROT SERPL-MCNC: 6.6 G/DL (ref 6.4–8.2)
RBC # BLD AUTO: 5.23 M/UL (ref 4.2–5.3)
SODIUM SERPL-SCNC: 140 MMOL/L (ref 136–145)
WBC # BLD AUTO: 11 K/UL (ref 4.6–13.2)

## 2023-05-23 PROCEDURE — 80053 COMPREHEN METABOLIC PANEL: CPT

## 2023-05-23 PROCEDURE — 99213 OFFICE O/P EST LOW 20 MIN: CPT | Performed by: STUDENT IN AN ORGANIZED HEALTH CARE EDUCATION/TRAINING PROGRAM

## 2023-05-23 PROCEDURE — 85025 COMPLETE CBC W/AUTO DIFF WBC: CPT

## 2023-05-23 PROCEDURE — 3075F SYST BP GE 130 - 139MM HG: CPT | Performed by: STUDENT IN AN ORGANIZED HEALTH CARE EDUCATION/TRAINING PROGRAM

## 2023-05-23 PROCEDURE — 36415 COLL VENOUS BLD VENIPUNCTURE: CPT

## 2023-05-23 PROCEDURE — 3079F DIAST BP 80-89 MM HG: CPT | Performed by: STUDENT IN AN ORGANIZED HEALTH CARE EDUCATION/TRAINING PROGRAM

## 2023-05-23 PROCEDURE — 80074 ACUTE HEPATITIS PANEL: CPT

## 2023-05-23 PROCEDURE — 86803 HEPATITIS C AB TEST: CPT

## 2023-05-23 RX ORDER — DESVENLAFAXINE 100 MG/1
TABLET, EXTENDED RELEASE ORAL
COMMUNITY
Start: 2023-05-15

## 2023-05-23 SDOH — ECONOMIC STABILITY: HOUSING INSECURITY
IN THE LAST 12 MONTHS, WAS THERE A TIME WHEN YOU DID NOT HAVE A STEADY PLACE TO SLEEP OR SLEPT IN A SHELTER (INCLUDING NOW)?: NO

## 2023-05-23 SDOH — ECONOMIC STABILITY: INCOME INSECURITY: HOW HARD IS IT FOR YOU TO PAY FOR THE VERY BASICS LIKE FOOD, HOUSING, MEDICAL CARE, AND HEATING?: NOT HARD AT ALL

## 2023-05-23 SDOH — ECONOMIC STABILITY: FOOD INSECURITY: WITHIN THE PAST 12 MONTHS, YOU WORRIED THAT YOUR FOOD WOULD RUN OUT BEFORE YOU GOT MONEY TO BUY MORE.: NEVER TRUE

## 2023-05-23 SDOH — ECONOMIC STABILITY: FOOD INSECURITY: WITHIN THE PAST 12 MONTHS, THE FOOD YOU BOUGHT JUST DIDN'T LAST AND YOU DIDN'T HAVE MONEY TO GET MORE.: NEVER TRUE

## 2023-05-23 ASSESSMENT — PATIENT HEALTH QUESTIONNAIRE - PHQ9
SUM OF ALL RESPONSES TO PHQ QUESTIONS 1-9: 6
2. FEELING DOWN, DEPRESSED OR HOPELESS: 1
5. POOR APPETITE OR OVEREATING: 1
SUM OF ALL RESPONSES TO PHQ QUESTIONS 1-9: 2
SUM OF ALL RESPONSES TO PHQ QUESTIONS 1-9: 2
SUM OF ALL RESPONSES TO PHQ QUESTIONS 1-9: 6
SUM OF ALL RESPONSES TO PHQ QUESTIONS 1-9: 2
SUM OF ALL RESPONSES TO PHQ QUESTIONS 1-9: 2
6. FEELING BAD ABOUT YOURSELF - OR THAT YOU ARE A FAILURE OR HAVE LET YOURSELF OR YOUR FAMILY DOWN: 0
2. FEELING DOWN, DEPRESSED OR HOPELESS: 1
4. FEELING TIRED OR HAVING LITTLE ENERGY: 1
10. IF YOU CHECKED OFF ANY PROBLEMS, HOW DIFFICULT HAVE THESE PROBLEMS MADE IT FOR YOU TO DO YOUR WORK, TAKE CARE OF THINGS AT HOME, OR GET ALONG WITH OTHER PEOPLE: 1
1. LITTLE INTEREST OR PLEASURE IN DOING THINGS: 1
SUM OF ALL RESPONSES TO PHQ9 QUESTIONS 1 & 2: 2
1. LITTLE INTEREST OR PLEASURE IN DOING THINGS: 1
SUM OF ALL RESPONSES TO PHQ QUESTIONS 1-9: 6
3. TROUBLE FALLING OR STAYING ASLEEP: 1
9. THOUGHTS THAT YOU WOULD BE BETTER OFF DEAD, OR OF HURTING YOURSELF: 0
SUM OF ALL RESPONSES TO PHQ QUESTIONS 1-9: 6
8. MOVING OR SPEAKING SO SLOWLY THAT OTHER PEOPLE COULD HAVE NOTICED. OR THE OPPOSITE, BEING SO FIGETY OR RESTLESS THAT YOU HAVE BEEN MOVING AROUND A LOT MORE THAN USUAL: 0
7. TROUBLE CONCENTRATING ON THINGS, SUCH AS READING THE NEWSPAPER OR WATCHING TELEVISION: 1
SUM OF ALL RESPONSES TO PHQ9 QUESTIONS 1 & 2: 2

## 2023-05-23 ASSESSMENT — ENCOUNTER SYMPTOMS
ABDOMINAL PAIN: 0
DIARRHEA: 0
WHEEZING: 0
SHORTNESS OF BREATH: 0
BACK PAIN: 1
NAUSEA: 0
RHINORRHEA: 0
CHEST TIGHTNESS: 0
VOMITING: 0
BLOOD IN STOOL: 0
COUGH: 0

## 2023-05-23 ASSESSMENT — ANXIETY QUESTIONNAIRES
5. BEING SO RESTLESS THAT IT IS HARD TO SIT STILL: 0
7. FEELING AFRAID AS IF SOMETHING AWFUL MIGHT HAPPEN: 0
1. FEELING NERVOUS, ANXIOUS, OR ON EDGE: 0
2. NOT BEING ABLE TO STOP OR CONTROL WORRYING: 0
6. BECOMING EASILY ANNOYED OR IRRITABLE: 0
GAD7 TOTAL SCORE: 0
4. TROUBLE RELAXING: 0
3. WORRYING TOO MUCH ABOUT DIFFERENT THINGS: 0

## 2023-05-23 NOTE — PROGRESS NOTES
Sadaf Griffin presents today for   Chief Complaint   Patient presents with    Follow-up     Pt states stool has been yellow for 3 weeks     Weight Management       Vitals:    05/23/23 1541   BP: 139/82   Site: Right Upper Arm   Position: Sitting   Pulse: 93   Resp: 16   Temp: 98.8 °F (37.1 °C)   TempSrc: Oral   SpO2: 96%   Weight: 206 lb (93.4 kg)   Height: 5' 1\" (1.549 m)        Is someone accompanying this pt? no    Is the patient using any DME equipment during OV? no    Depression Screening:  PHQ-9 Questionaire 5/23/2023   Little interest or pleasure in doing things 1   Feeling down, depressed, or hopeless 1   Trouble falling or staying asleep, or sleeping too much 1   Feeling tired or having little energy 1   Poor appetite or overeating 1   Feeling bad about yourself - or that you are a failure or have let yourself or your family down 0   Trouble concentrating on things, such as reading the newspaper or watching television 1   Moving or speaking so slowly that other people could have noticed. Or the opposite - being so fidgety or restless that you have been moving around a lot more than usual 0   Thoughts that you would be better off dead, or of hurting yourself in some way 0   PHQ-9 Total Score 6   If you checked off any problems, how difficult have these problems made it for you to do your work, take care of things at home, or get along with other people? 1       Abuse Screening: AMB Abuse Screening 5/23/2023   Do you ever feel afraid of your partner? N   Are you in a relationship with someone who physically or mentally threatens you? N   Is it safe for you to go home? Y       Fall Screening  No flowsheet data found.     Generalized Anxiety  BRADEN-7 SCREENING 5/23/2023   Feeling nervous, anxious, or on edge Not at all   Not being able to stop or control worrying Not at all   Worrying too much about different things Not at all   Trouble relaxing Not at all   Being so restless that it is hard to sit still Not at
mmol/L Final    Glucose 02/21/2023 111 (H)  74 - 99 mg/dL Final    BUN 02/21/2023 14  7.0 - 18 MG/DL Final    Creatinine 02/21/2023 0.60  0.6 - 1.3 MG/DL Final    Bun/Cre Ratio 02/21/2023 23 (H)  12 - 20   Final    Est, Glom Filt Rate 02/21/2023 >60  >60 ml/min/1.73m2 Final    Comment:      Pediatric calculator link: Rosalie.at. org/professionals/kdoqi/gfr_calculatorped       These results are not intended for use in patients <25years of age. eGFR results are calculated without a race factor using  the 2021 CKD-EPI equation. Careful clinical correlation is recommended, particularly when comparing to results calculated using previous equations. The CKD-EPI equation is less accurate in patients with extremes of muscle mass, extra-renal metabolism of creatinine, excessive creatine ingestion, or following therapy that affects renal tubular secretion. Calcium 02/21/2023 8.7  8.5 - 10.1 MG/DL Final    Total Bilirubin 02/21/2023 0.5  0.2 - 1.0 MG/DL Final    ALT 02/21/2023 26  13 - 56 U/L Final    AST 02/21/2023 23  10 - 38 U/L Final    Alk Phosphatase 02/21/2023 70  45 - 117 U/L Final    Total Protein 02/21/2023 5.8 (L)  6.4 - 8.2 g/dL Final    Albumin 02/21/2023 3.4  3.4 - 5.0 g/dL Final    Globulin 02/21/2023 2.4  2.0 - 4.0 g/dL Final    Albumin/Globulin Ratio 02/21/2023 1.4  0.8 - 1.7   Final       No results found for any visits on 05/23/23. Patient Care Team:  Patient Care Team:  Bill Cedeno MD as PCP - Usman Salazar MD as PCP - Empaneled Provider  Ruthie Wooten MD as Consulting Physician      Assessment / Plan:     Diagnosis Orders   1. Pale stool  Comprehensive Metabolic Panel    Hepatitis C Antibody    Hepatitis Panel, Acute      2. Essential (primary) hypertension  CBC with Auto Differential          Complaining of yellow stool. Will check CMP and hepatitis panel. No jaundice noted on exam.  Her colonoscopy appointment was rescheduled to October.     BP is

## 2023-05-25 LAB
-: NORMAL
HAV IGM SER QL: NEGATIVE
HBV CORE IGM SER QL: NEGATIVE
HBV SURFACE AG SER QL: <0.1 INDEX
HBV SURFACE AG SER QL: NEGATIVE
HCV AB SER IA-ACNC: 0.07 INDEX
HCV AB SER IA-ACNC: 0.07 INDEX
HCV AB SERPL QL IA: NEGATIVE
HCV AB SERPL QL IA: NEGATIVE
Lab: NORMAL
Lab: NORMAL

## 2023-05-26 ENCOUNTER — TELEPHONE (OUTPATIENT)
Facility: CLINIC | Age: 53
End: 2023-05-26

## 2023-07-10 ENCOUNTER — APPOINTMENT (OUTPATIENT)
Facility: HOSPITAL | Age: 53
End: 2023-07-10
Payer: COMMERCIAL

## 2023-07-12 ENCOUNTER — HOSPITAL ENCOUNTER (OUTPATIENT)
Facility: HOSPITAL | Age: 53
Setting detail: INFUSION SERIES
End: 2023-07-12
Payer: COMMERCIAL

## 2023-07-12 VITALS
OXYGEN SATURATION: 96 % | SYSTOLIC BLOOD PRESSURE: 117 MMHG | HEART RATE: 63 BPM | DIASTOLIC BLOOD PRESSURE: 79 MMHG | RESPIRATION RATE: 20 BRPM | TEMPERATURE: 97.1 F

## 2023-07-12 LAB
BASO+EOS+MONOS # BLD AUTO: 0.6 K/UL (ref 0–2.3)
BASO+EOS+MONOS NFR BLD AUTO: 9 % (ref 0.1–17)
DIFFERENTIAL METHOD BLD: ABNORMAL
ERYTHROCYTE [DISTWIDTH] IN BLOOD BY AUTOMATED COUNT: 15.3 % (ref 11.5–14.5)
HCT VFR BLD AUTO: 45.4 % (ref 36–48)
HGB BLD-MCNC: 13.8 G/DL (ref 12–16)
LYMPHOCYTES # BLD: 2.2 K/UL (ref 1.1–5.9)
LYMPHOCYTES NFR BLD: 33 % (ref 14–44)
MCH RBC QN AUTO: 25.7 PG (ref 25–35)
MCHC RBC AUTO-ENTMCNC: 30.4 G/DL (ref 31–37)
MCV RBC AUTO: 84.7 FL (ref 78–102)
NEUTS SEG # BLD: 4.1 K/UL (ref 1.8–9.5)
NEUTS SEG NFR BLD: 58 % (ref 40–70)
PLATELET # BLD AUTO: 218 K/UL (ref 140–440)
RBC # BLD AUTO: 5.36 M/UL (ref 4.1–5.1)
WBC # BLD AUTO: 6.9 K/UL (ref 4.5–13)

## 2023-07-12 PROCEDURE — 85025 COMPLETE CBC W/AUTO DIFF WBC: CPT

## 2023-07-12 PROCEDURE — 36415 COLL VENOUS BLD VENIPUNCTURE: CPT

## 2023-07-12 NOTE — PROGRESS NOTES
[facility] Progress Note    Date: 2023    Name: Esther Pate    MRN: [id number]         : 1970    CBC/Phlebotomy Q2 months     Ms. Marino to [place], ambulatory at 5695. Patient was assessed and education was provided. Patient states she is not feeling well this morning. States she takes trazadone and \"another sleeping medicine\" every night and this morning she \"feels off\". States she has felt weak for 2 days and \"I almost feel like I can't speak\". Did not notice any slurred or difficulty speaking. Patient states her b/p is lower than normal but did not take her b/p medication. Ms. Marino's vitals were reviewed. Vitals:    23 0835   BP: 117/79   Pulse:    Resp:    Temp:    SpO2:        CBC drawn from [facility] via venipunture x1 attempt per order. 2x2 applied with coban. CBC ran in house. Results for orders placed or performed during the hospital encounter of 23   CBC with Partial Differential   Result Value Ref Range    WBC 6.9 4.5 - 13.0 K/uL    RBC 5.36 (H) 4.10 - 5.10 M/uL    Hemoglobin 13.8 12.0 - 16.0 g/dL    Hematocrit 45.4 36 - 48 %    MCV 84.7 78 - 102 FL    MCH 25.7 25.0 - 35.0 PG    MCHC 30.4 (L) 31 - 37 g/dL    RDW 15.3 (H) 11.5 - 14.5 %    Platelets 429 082 - 271 K/uL    Neutrophils % 58 40 - 70 %    Mixed Cells 9 0.1 - 17 %    Lymphocytes % 33 14 - 44 %    Neutrophils Absolute 4.1 1.8 - 9.5 K/UL    ABSOLUTE MIXED CELLS 0.6 0.0 - 2.3 K/uL    Lymphocytes Absolute 2.2 1.1 - 5.9 K/UL    Differential Type AUTOMATED          Phlebotomy indicated for HCT 45.4 per Q2 month phlebotomy parameter order. However, patient states she would like to come back next week to have phlebotomy done due to not feeling well. Patient advised that she should go to ED if she is feeling confused and feels that she cannot speak. She states she will eat something and see if she feels better. Patient advised to check b/p before taking b/p medication.   She states she sees her cardiologist next

## 2023-07-18 ENCOUNTER — OFFICE VISIT (OUTPATIENT)
Age: 53
End: 2023-07-18
Payer: COMMERCIAL

## 2023-07-18 VITALS
WEIGHT: 207 LBS | HEIGHT: 61 IN | DIASTOLIC BLOOD PRESSURE: 68 MMHG | SYSTOLIC BLOOD PRESSURE: 117 MMHG | HEART RATE: 78 BPM | BODY MASS INDEX: 39.08 KG/M2

## 2023-07-18 DIAGNOSIS — R06.02 SHORTNESS OF BREATH: ICD-10-CM

## 2023-07-18 DIAGNOSIS — E78.2 MIXED HYPERLIPIDEMIA: ICD-10-CM

## 2023-07-18 DIAGNOSIS — F14.10 COCAINE ABUSE, UNCOMPLICATED (HCC): ICD-10-CM

## 2023-07-18 DIAGNOSIS — D75.1 POLYCYTHEMIA SECONDARY TO SMOKING: ICD-10-CM

## 2023-07-18 DIAGNOSIS — F17.200 SMOKING: ICD-10-CM

## 2023-07-18 DIAGNOSIS — R07.89 OTHER CHEST PAIN: Primary | ICD-10-CM

## 2023-07-18 DIAGNOSIS — I11.9 HYPERTENSIVE HEART DISEASE WITHOUT HEART FAILURE: ICD-10-CM

## 2023-07-18 DIAGNOSIS — D75.1 POLYCYTHEMIA: ICD-10-CM

## 2023-07-18 DIAGNOSIS — E66.01 SEVERE OBESITY (HCC): ICD-10-CM

## 2023-07-18 PROCEDURE — 99214 OFFICE O/P EST MOD 30 MIN: CPT | Performed by: NURSE PRACTITIONER

## 2023-07-18 PROCEDURE — 3078F DIAST BP <80 MM HG: CPT | Performed by: NURSE PRACTITIONER

## 2023-07-18 PROCEDURE — 3074F SYST BP LT 130 MM HG: CPT | Performed by: NURSE PRACTITIONER

## 2023-07-18 RX ORDER — LOSARTAN POTASSIUM AND HYDROCHLOROTHIAZIDE 12.5; 1 MG/1; MG/1
1 TABLET ORAL DAILY
Qty: 90 TABLET | Refills: 3 | Status: SHIPPED | OUTPATIENT
Start: 2023-07-18

## 2023-07-18 RX ORDER — AMLODIPINE BESYLATE 10 MG/1
10 TABLET ORAL DAILY
Qty: 90 TABLET | Refills: 3 | Status: SHIPPED | OUTPATIENT
Start: 2023-07-18

## 2023-07-18 NOTE — PROGRESS NOTES
HISTORY OF PRESENT ILLNESS  Patric Up is a 47 y.o. female. Recent er visit with atypical non anginal pain. Patient was in emergency room yesterday with atypical chest pain. 1/2019. ER notes and data reviewed  3/2020  Patient is seen in office for follow-up. She was in ER last 12/2019. Records reviewed  7/2020  Patient is seen today for follow-up. She has been having episode of new onset chest pain described as substernal pain uneasy feeling that occasionally in the right side of the neck. Happens at rest.  At times last for half an hour. Patient has had an episode of palpitation and she is getting short of breath on activity exertion  9/2022  Patient seen today for follow-up. Having episode of chest pain about twice a week substernal occasional radiation to neck. Happens both at rest and activity. No clear other precipitating or relieving factor has shortness of breath on exertion. No orthopnea PND denies any peripheral edema  10/2022  Patient seen in follow up for chest pain. She denies recurrence. She denies chest pain, shortness of breath, palpitations or edema. She c/o right side headache occasionally over past 2 days. 7/18/2023  Patient seen for routine f/u. She reports continued cocaine, marijuana, tobacco use. She has occasional shortness of breath denies chest pain palpitations or edema. She continues to suffer from depression and follows with psych      Follow-up  Pertinent negatives include no chest pain and no shortness of breath. Hypertension  The history is provided by the Patient. This is a chronic problem. The problem occurs constantly. The problem has not changed since onset. Pertinent negatives include no chest pain and no shortness of breath. Chest Pain (Angina)   The history is provided by the Patient. This is a recurrent problem. The problem has not changed since onset. The problem occurs every several days.  The pain is associated with exertion and rest. The pain is

## 2023-07-18 NOTE — PROGRESS NOTES
1. Have you been to the ER, urgent care clinic since your last visit? Hospitalized since your last visit? No    2. Have you seen or consulted any other health care providers outside of the [address] [place] [address] since your last visit? Include any pap smears or colon screening. No     3. Do you need any refills today?    no

## 2023-07-19 ENCOUNTER — HOSPITAL ENCOUNTER (OUTPATIENT)
Facility: HOSPITAL | Age: 53
Setting detail: INFUSION SERIES
End: 2023-07-19
Payer: COMMERCIAL

## 2023-07-19 ENCOUNTER — OFFICE VISIT (OUTPATIENT)
Age: 53
End: 2023-07-19
Payer: COMMERCIAL

## 2023-07-19 VITALS
SYSTOLIC BLOOD PRESSURE: 118 MMHG | HEART RATE: 76 BPM | RESPIRATION RATE: 18 BRPM | OXYGEN SATURATION: 96 % | TEMPERATURE: 98.2 F | DIASTOLIC BLOOD PRESSURE: 82 MMHG

## 2023-07-19 VITALS
DIASTOLIC BLOOD PRESSURE: 80 MMHG | TEMPERATURE: 97 F | HEIGHT: 61 IN | BODY MASS INDEX: 39.23 KG/M2 | HEART RATE: 78 BPM | SYSTOLIC BLOOD PRESSURE: 133 MMHG | OXYGEN SATURATION: 97 % | WEIGHT: 207.8 LBS

## 2023-07-19 DIAGNOSIS — D75.1 SECONDARY POLYCYTHEMIA: Primary | ICD-10-CM

## 2023-07-19 DIAGNOSIS — F17.200 NICOTINE DEPENDENCE, UNCOMPLICATED, UNSPECIFIED NICOTINE PRODUCT TYPE: ICD-10-CM

## 2023-07-19 LAB
BASO+EOS+MONOS # BLD AUTO: 0.8 K/UL (ref 0–2.3)
BASO+EOS+MONOS NFR BLD AUTO: 11 % (ref 0.1–17)
DIFFERENTIAL METHOD BLD: ABNORMAL
ERYTHROCYTE [DISTWIDTH] IN BLOOD BY AUTOMATED COUNT: 15 % (ref 11.5–14.5)
HCT VFR BLD AUTO: 48.3 % (ref 36–48)
HGB BLD-MCNC: 14.7 G/DL (ref 12–16)
LYMPHOCYTES # BLD: 2.4 K/UL (ref 1.1–5.9)
LYMPHOCYTES NFR BLD: 30 % (ref 14–44)
MCH RBC QN AUTO: 25.9 PG (ref 25–35)
MCHC RBC AUTO-ENTMCNC: 30.4 G/DL (ref 31–37)
MCV RBC AUTO: 85.2 FL (ref 78–102)
NEUTS SEG # BLD: 4.7 K/UL (ref 1.8–9.5)
NEUTS SEG NFR BLD: 60 % (ref 40–70)
PLATELET # BLD AUTO: 235 K/UL (ref 140–440)
RBC # BLD AUTO: 5.67 M/UL (ref 4.1–5.1)
WBC # BLD AUTO: 7.9 K/UL (ref 4.5–13)

## 2023-07-19 PROCEDURE — 3074F SYST BP LT 130 MM HG: CPT | Performed by: INTERNAL MEDICINE

## 2023-07-19 PROCEDURE — 99195 PHLEBOTOMY: CPT

## 2023-07-19 PROCEDURE — 2580000003 HC RX 258: Performed by: INTERNAL MEDICINE

## 2023-07-19 PROCEDURE — 85025 COMPLETE CBC W/AUTO DIFF WBC: CPT

## 2023-07-19 PROCEDURE — 3078F DIAST BP <80 MM HG: CPT | Performed by: INTERNAL MEDICINE

## 2023-07-19 PROCEDURE — 99213 OFFICE O/P EST LOW 20 MIN: CPT | Performed by: INTERNAL MEDICINE

## 2023-07-19 RX ORDER — SODIUM CHLORIDE 9 MG/ML
INJECTION, SOLUTION INTRAVENOUS CONTINUOUS
Status: DISCONTINUED | OUTPATIENT
Start: 2023-07-19 | End: 2023-10-27 | Stop reason: HOSPADM

## 2023-07-19 RX ADMIN — SODIUM CHLORIDE: 9 INJECTION, SOLUTION INTRAVENOUS at 10:19

## 2023-07-19 ASSESSMENT — ENCOUNTER SYMPTOMS
COUGH: 0
BACK PAIN: 0
NAUSEA: 0
DIARRHEA: 0
VOMITING: 0
SHORTNESS OF BREATH: 0
ABDOMINAL PAIN: 0

## 2023-07-19 NOTE — PROGRESS NOTES
Hematology/Oncology Note      Date: 2023     Name: Esperanza Avila  : 1970     Agustin Rosario MD      Subjective:     Chief complaint: Secondary Polycythemia     History of Present Illness:   Ms. Lowell Esqueda is a most pleasant 46y.o. year old female who was seen for Secondary Polycythemia. She admits using tobacco  daily. She states she smokes 1/2ppd from 1ppd. She reports she is trying to cut back. Patient was being followed by Dr. Dunia Alaniz who retired. Today she reports she is doing well so far. She denies fevers, chills, night sweats, unintentional weight loss, skin lumps or bumps, acute bleeding or bruising issues. Denies headaches, acute vision changes, dizziness, chest pain, shortness of breath, palpitation, productive cough, nausea, vomiting, abdominal pain, altered bowel habits, dysuria, bone pain or back pain, focal numbness or weakness. She denies bleeding or bruising. She denies pain or any discomfort.  .       Past Medical History, Family History, and Social History:    Past Medical History:   Diagnosis Date    Asthma     Bronchitis     CHF (congestive heart failure) ([address])     Cocaine abuse ([address] [facility]) 3/26/2015    Dental caries     Depression     bipolar    Depression 2016    Drug abuse ([address] [facility])     Dysphasia 10/17/2018    Elevated hematocrit 2016    ETOH abuse     GERD (gastroesophageal reflux disease)     schatzis ring, hiatal hernia    H/O screening mammography 2016    No evidence of malignancy     HPV (human papilloma virus) infection 2016    The patient referred to [facility]     Hypertension     Hypertensive left ventricular hypertrophy, without heart failure 10/15/2018    mild to moderate lvh continue bp meds    Ill-defined condition     Marijuana use 10/17/2018    Mood swings     Polycythemia 2018    Polycythemia     Psychiatric disorder     depression, Bipolar    Schatzki's ring 2018    Found on Barium Swallow    Sliding hiatal hernia 10/17/2018    Tobacco abuse     Vitamin D

## 2023-07-20 ENCOUNTER — TELEPHONE (OUTPATIENT)
Age: 53
End: 2023-07-20

## 2023-07-20 NOTE — TELEPHONE ENCOUNTER
Patient contacted our office asking if we accepted her insurance. I told her yes. She then asked me to check to see if we had her in our system. I told her yes. She said she is feeling bad and has family history of cancer. She said her Dr. could not get her in until October. There was no appt. scheduled in the system. I advised the patient to contact her PCP and ask if they would call Dr. Lashon Khanna office who is closer to her and who she has been scheduled with for procedures previously to see if he would see her. It seems she has been scheduled for procedures and cancelled them.

## 2023-07-27 ENCOUNTER — TELEPHONE (OUTPATIENT)
Facility: CLINIC | Age: 53
End: 2023-07-27

## 2023-07-27 ENCOUNTER — OFFICE VISIT (OUTPATIENT)
Facility: CLINIC | Age: 53
End: 2023-07-27
Payer: COMMERCIAL

## 2023-07-27 VITALS
WEIGHT: 206.8 LBS | DIASTOLIC BLOOD PRESSURE: 76 MMHG | TEMPERATURE: 98 F | BODY MASS INDEX: 39.04 KG/M2 | HEART RATE: 85 BPM | OXYGEN SATURATION: 98 % | HEIGHT: 61 IN | SYSTOLIC BLOOD PRESSURE: 139 MMHG | RESPIRATION RATE: 16 BRPM

## 2023-07-27 DIAGNOSIS — K21.9 GASTROESOPHAGEAL REFLUX DISEASE, UNSPECIFIED WHETHER ESOPHAGITIS PRESENT: ICD-10-CM

## 2023-07-27 DIAGNOSIS — D75.1 SECONDARY POLYCYTHEMIA: ICD-10-CM

## 2023-07-27 DIAGNOSIS — N30.00 ACUTE CYSTITIS WITHOUT HEMATURIA: Primary | ICD-10-CM

## 2023-07-27 DIAGNOSIS — I10 ESSENTIAL (PRIMARY) HYPERTENSION: ICD-10-CM

## 2023-07-27 DIAGNOSIS — R13.10 DYSPHAGIA, UNSPECIFIED TYPE: ICD-10-CM

## 2023-07-27 DIAGNOSIS — E78.5 HYPERLIPIDEMIA, UNSPECIFIED HYPERLIPIDEMIA TYPE: ICD-10-CM

## 2023-07-27 LAB
BILIRUBIN, URINE, POC: NEGATIVE
BLOOD URINE, POC: NEGATIVE
GLUCOSE URINE, POC: NEGATIVE
KETONES, URINE, POC: NEGATIVE
LEUKOCYTE ESTERASE, URINE, POC: ABNORMAL
NITRITE, URINE, POC: NEGATIVE
PH, URINE, POC: 6.5 (ref 4.6–8)
PROTEIN,URINE, POC: NEGATIVE
SPECIFIC GRAVITY, URINE, POC: 1.02 (ref 1–1.03)
URINALYSIS CLARITY, POC: CLEAR
URINALYSIS COLOR, POC: YELLOW
UROBILINOGEN, POC: ABNORMAL

## 2023-07-27 PROCEDURE — 3078F DIAST BP <80 MM HG: CPT | Performed by: STUDENT IN AN ORGANIZED HEALTH CARE EDUCATION/TRAINING PROGRAM

## 2023-07-27 PROCEDURE — 99214 OFFICE O/P EST MOD 30 MIN: CPT | Performed by: STUDENT IN AN ORGANIZED HEALTH CARE EDUCATION/TRAINING PROGRAM

## 2023-07-27 PROCEDURE — 81001 URINALYSIS AUTO W/SCOPE: CPT | Performed by: STUDENT IN AN ORGANIZED HEALTH CARE EDUCATION/TRAINING PROGRAM

## 2023-07-27 PROCEDURE — 3075F SYST BP GE 130 - 139MM HG: CPT | Performed by: STUDENT IN AN ORGANIZED HEALTH CARE EDUCATION/TRAINING PROGRAM

## 2023-07-27 RX ORDER — NITROFURANTOIN 25; 75 MG/1; MG/1
100 CAPSULE ORAL 2 TIMES DAILY
Qty: 10 CAPSULE | Refills: 0 | Status: SHIPPED | OUTPATIENT
Start: 2023-07-27 | End: 2023-08-01

## 2023-07-27 SDOH — ECONOMIC STABILITY: FOOD INSECURITY: WITHIN THE PAST 12 MONTHS, YOU WORRIED THAT YOUR FOOD WOULD RUN OUT BEFORE YOU GOT MONEY TO BUY MORE.: NEVER TRUE

## 2023-07-27 SDOH — ECONOMIC STABILITY: INCOME INSECURITY: HOW HARD IS IT FOR YOU TO PAY FOR THE VERY BASICS LIKE FOOD, HOUSING, MEDICAL CARE, AND HEATING?: NOT HARD AT ALL

## 2023-07-27 SDOH — ECONOMIC STABILITY: FOOD INSECURITY: WITHIN THE PAST 12 MONTHS, THE FOOD YOU BOUGHT JUST DIDN'T LAST AND YOU DIDN'T HAVE MONEY TO GET MORE.: NEVER TRUE

## 2023-07-27 ASSESSMENT — PATIENT HEALTH QUESTIONNAIRE - PHQ9
SUM OF ALL RESPONSES TO PHQ QUESTIONS 1-9: 18
3. TROUBLE FALLING OR STAYING ASLEEP: 2
SUM OF ALL RESPONSES TO PHQ QUESTIONS 1-9: 18
10. IF YOU CHECKED OFF ANY PROBLEMS, HOW DIFFICULT HAVE THESE PROBLEMS MADE IT FOR YOU TO DO YOUR WORK, TAKE CARE OF THINGS AT HOME, OR GET ALONG WITH OTHER PEOPLE: 2
SUM OF ALL RESPONSES TO PHQ QUESTIONS 1-9: 18
4. FEELING TIRED OR HAVING LITTLE ENERGY: 2
7. TROUBLE CONCENTRATING ON THINGS, SUCH AS READING THE NEWSPAPER OR WATCHING TELEVISION: 2
9. THOUGHTS THAT YOU WOULD BE BETTER OFF DEAD, OR OF HURTING YOURSELF: 2
SUM OF ALL RESPONSES TO PHQ QUESTIONS 1-9: 16
SUM OF ALL RESPONSES TO PHQ9 QUESTIONS 1 & 2: 4
1. LITTLE INTEREST OR PLEASURE IN DOING THINGS: 2
2. FEELING DOWN, DEPRESSED OR HOPELESS: 2
5. POOR APPETITE OR OVEREATING: 2
8. MOVING OR SPEAKING SO SLOWLY THAT OTHER PEOPLE COULD HAVE NOTICED. OR THE OPPOSITE, BEING SO FIGETY OR RESTLESS THAT YOU HAVE BEEN MOVING AROUND A LOT MORE THAN USUAL: 2
6. FEELING BAD ABOUT YOURSELF - OR THAT YOU ARE A FAILURE OR HAVE LET YOURSELF OR YOUR FAMILY DOWN: 2

## 2023-07-27 ASSESSMENT — ENCOUNTER SYMPTOMS
SHORTNESS OF BREATH: 0
BLOOD IN STOOL: 0
BACK PAIN: 1
WHEEZING: 0
COUGH: 0
VOMITING: 0
CHEST TIGHTNESS: 0
ABDOMINAL PAIN: 1
DIARRHEA: 0
RHINORRHEA: 0
NAUSEA: 0

## 2023-07-27 NOTE — TELEPHONE ENCOUNTER
Patient is asking that  the referral to be sent to [facility]  for Saroj Cardenas doctor  fax# [phone]

## 2023-07-31 ENCOUNTER — HOSPITAL ENCOUNTER (OUTPATIENT)
Facility: HOSPITAL | Age: 53
Setting detail: INFUSION SERIES
End: 2023-07-31
Payer: COMMERCIAL

## 2023-07-31 VITALS
HEART RATE: 77 BPM | DIASTOLIC BLOOD PRESSURE: 82 MMHG | RESPIRATION RATE: 16 BRPM | TEMPERATURE: 97.5 F | SYSTOLIC BLOOD PRESSURE: 132 MMHG | OXYGEN SATURATION: 96 %

## 2023-07-31 LAB
BASO+EOS+MONOS # BLD AUTO: 0.7 K/UL (ref 0–2.3)
BASO+EOS+MONOS NFR BLD AUTO: 11 % (ref 0.1–17)
DIFFERENTIAL METHOD BLD: ABNORMAL
ERYTHROCYTE [DISTWIDTH] IN BLOOD BY AUTOMATED COUNT: 15 % (ref 11.5–14.5)
HCT VFR BLD AUTO: 38.6 % (ref 36–48)
HGB BLD-MCNC: 11.7 G/DL (ref 12–16)
LYMPHOCYTES # BLD: 2 K/UL (ref 1.1–5.9)
LYMPHOCYTES NFR BLD: 32 % (ref 14–44)
MCH RBC QN AUTO: 25.9 PG (ref 25–35)
MCHC RBC AUTO-ENTMCNC: 30.3 G/DL (ref 31–37)
MCV RBC AUTO: 85.6 FL (ref 78–102)
NEUTS SEG # BLD: 3.5 K/UL (ref 1.8–9.5)
NEUTS SEG NFR BLD: 58 % (ref 40–70)
PLATELET # BLD AUTO: 260 K/UL (ref 140–440)
RBC # BLD AUTO: 4.51 M/UL (ref 4.1–5.1)
WBC # BLD AUTO: 6.2 K/UL (ref 4.5–13)

## 2023-07-31 PROCEDURE — 85025 COMPLETE CBC W/AUTO DIFF WBC: CPT

## 2023-07-31 PROCEDURE — 36415 COLL VENOUS BLD VENIPUNCTURE: CPT

## 2023-08-22 NOTE — TELEPHONE ENCOUNTER
Patient called requesting refill fluticasone-salmeterol (ADVAIR DISKUS) 100-50 MCG/ACT AEPB diskus inhaler   albuterol sulfate HFA (PROVENTIL;VENTOLIN;PROAIR) 108 (90 Base) MCG/ACT inhaler     Sent Tima Laws [id number] Lo [id number] Manas Iyer -    Requesting 90/day supply

## 2023-08-24 RX ORDER — ALBUTEROL SULFATE 90 UG/1
AEROSOL, METERED RESPIRATORY (INHALATION)
Qty: 36 G | Refills: 1 | Status: SHIPPED | OUTPATIENT
Start: 2023-08-24

## 2023-10-04 ENCOUNTER — OFFICE VISIT (OUTPATIENT)
Facility: CLINIC | Age: 53
End: 2023-10-04
Payer: COMMERCIAL

## 2023-10-04 ENCOUNTER — HOSPITAL ENCOUNTER (OUTPATIENT)
Facility: HOSPITAL | Age: 53
Setting detail: SPECIMEN
Discharge: HOME | End: 2023-10-07
Payer: COMMERCIAL

## 2023-10-04 VITALS
SYSTOLIC BLOOD PRESSURE: 110 MMHG | DIASTOLIC BLOOD PRESSURE: 62 MMHG | OXYGEN SATURATION: 96 % | RESPIRATION RATE: 16 BRPM | BODY MASS INDEX: 37.95 KG/M2 | HEIGHT: 61 IN | WEIGHT: 201 LBS | HEART RATE: 71 BPM | TEMPERATURE: 95.3 F

## 2023-10-04 DIAGNOSIS — R26.81 GAIT INSTABILITY: ICD-10-CM

## 2023-10-04 DIAGNOSIS — L30.4 INTERTRIGO: ICD-10-CM

## 2023-10-04 DIAGNOSIS — N30.01 ACUTE CYSTITIS WITH HEMATURIA: ICD-10-CM

## 2023-10-04 DIAGNOSIS — N30.01 ACUTE CYSTITIS WITH HEMATURIA: Primary | ICD-10-CM

## 2023-10-04 DIAGNOSIS — I10 ESSENTIAL (PRIMARY) HYPERTENSION: ICD-10-CM

## 2023-10-04 DIAGNOSIS — R06.02 SHORTNESS OF BREATH: ICD-10-CM

## 2023-10-04 DIAGNOSIS — G89.29 CHRONIC MIDLINE LOW BACK PAIN WITHOUT SCIATICA: ICD-10-CM

## 2023-10-04 DIAGNOSIS — E55.9 VITAMIN D DEFICIENCY: ICD-10-CM

## 2023-10-04 DIAGNOSIS — E78.5 HYPERLIPIDEMIA, UNSPECIFIED HYPERLIPIDEMIA TYPE: ICD-10-CM

## 2023-10-04 DIAGNOSIS — D75.1 SECONDARY POLYCYTHEMIA: ICD-10-CM

## 2023-10-04 DIAGNOSIS — M54.50 CHRONIC MIDLINE LOW BACK PAIN WITHOUT SCIATICA: ICD-10-CM

## 2023-10-04 DIAGNOSIS — E78.1 PURE HYPERGLYCERIDEMIA: ICD-10-CM

## 2023-10-04 DIAGNOSIS — K21.9 GASTROESOPHAGEAL REFLUX DISEASE, UNSPECIFIED WHETHER ESOPHAGITIS PRESENT: ICD-10-CM

## 2023-10-04 DIAGNOSIS — R73.03 PREDIABETES: ICD-10-CM

## 2023-10-04 DIAGNOSIS — J45.30 MILD PERSISTENT ASTHMA WITHOUT COMPLICATION: ICD-10-CM

## 2023-10-04 LAB
APPEARANCE UR: CLEAR
BACTERIA URNS QL MICRO: NEGATIVE /HPF
BILIRUB UR QL: NEGATIVE
BILIRUBIN, URINE, POC: NEGATIVE
BLOOD URINE, POC: NORMAL
COLOR UR: YELLOW
EPITH CASTS URNS QL MICRO: ABNORMAL /LPF (ref 0–5)
GLUCOSE UR STRIP.AUTO-MCNC: NEGATIVE MG/DL
GLUCOSE URINE, POC: NEGATIVE
HGB UR QL STRIP: NEGATIVE
KETONES UR QL STRIP.AUTO: NEGATIVE MG/DL
KETONES, URINE, POC: NEGATIVE
LEUKOCYTE ESTERASE UR QL STRIP.AUTO: ABNORMAL
LEUKOCYTE ESTERASE, URINE, POC: NORMAL
NITRITE UR QL STRIP.AUTO: NEGATIVE
NITRITE, URINE, POC: NEGATIVE
PH UR STRIP: 5.5 (ref 5–8)
PH, URINE, POC: 5.5 (ref 4.6–8)
PROT UR STRIP-MCNC: NEGATIVE MG/DL
PROTEIN,URINE, POC: NEGATIVE
RBC #/AREA URNS HPF: NEGATIVE /HPF (ref 0–5)
SP GR UR REFRACTOMETRY: 1.02 (ref 1–1.03)
SPECIFIC GRAVITY, URINE, POC: 1.02 (ref 1–1.03)
URINALYSIS CLARITY, POC: CLEAR
URINALYSIS COLOR, POC: YELLOW
UROBILINOGEN UR QL STRIP.AUTO: 0.2 EU/DL (ref 0.2–1)
UROBILINOGEN, POC: NORMAL
WBC URNS QL MICRO: ABNORMAL /HPF (ref 0–4)

## 2023-10-04 PROCEDURE — 99214 OFFICE O/P EST MOD 30 MIN: CPT | Performed by: STUDENT IN AN ORGANIZED HEALTH CARE EDUCATION/TRAINING PROGRAM

## 2023-10-04 PROCEDURE — 81001 URINALYSIS AUTO W/SCOPE: CPT

## 2023-10-04 PROCEDURE — 3074F SYST BP LT 130 MM HG: CPT | Performed by: STUDENT IN AN ORGANIZED HEALTH CARE EDUCATION/TRAINING PROGRAM

## 2023-10-04 PROCEDURE — 3078F DIAST BP <80 MM HG: CPT | Performed by: STUDENT IN AN ORGANIZED HEALTH CARE EDUCATION/TRAINING PROGRAM

## 2023-10-04 PROCEDURE — 81003 URINALYSIS AUTO W/O SCOPE: CPT | Performed by: STUDENT IN AN ORGANIZED HEALTH CARE EDUCATION/TRAINING PROGRAM

## 2023-10-04 RX ORDER — ALBUTEROL SULFATE 90 UG/1
AEROSOL, METERED RESPIRATORY (INHALATION)
Qty: 36 G | Refills: 2 | Status: SHIPPED | OUTPATIENT
Start: 2023-10-04

## 2023-10-04 RX ORDER — NITROFURANTOIN 25; 75 MG/1; MG/1
100 CAPSULE ORAL 2 TIMES DAILY
Qty: 10 CAPSULE | Refills: 0 | Status: SHIPPED | OUTPATIENT
Start: 2023-10-04 | End: 2023-10-09

## 2023-10-04 RX ORDER — LIDOCAINE 50 MG/G
1 PATCH TOPICAL DAILY
Qty: 30 PATCH | Refills: 0 | Status: SHIPPED | OUTPATIENT
Start: 2023-10-04 | End: 2023-11-03

## 2023-10-04 RX ORDER — CLOTRIMAZOLE AND BETAMETHASONE DIPROPIONATE 10; .64 MG/G; MG/G
CREAM TOPICAL
Qty: 45 G | Refills: 1 | Status: SHIPPED | OUTPATIENT
Start: 2023-10-04

## 2023-10-04 SDOH — ECONOMIC STABILITY: FOOD INSECURITY: WITHIN THE PAST 12 MONTHS, THE FOOD YOU BOUGHT JUST DIDN'T LAST AND YOU DIDN'T HAVE MONEY TO GET MORE.: NEVER TRUE

## 2023-10-04 SDOH — ECONOMIC STABILITY: INCOME INSECURITY: HOW HARD IS IT FOR YOU TO PAY FOR THE VERY BASICS LIKE FOOD, HOUSING, MEDICAL CARE, AND HEATING?: NOT HARD AT ALL

## 2023-10-04 SDOH — ECONOMIC STABILITY: FOOD INSECURITY: WITHIN THE PAST 12 MONTHS, YOU WORRIED THAT YOUR FOOD WOULD RUN OUT BEFORE YOU GOT MONEY TO BUY MORE.: NEVER TRUE

## 2023-10-04 ASSESSMENT — ENCOUNTER SYMPTOMS
COUGH: 0
RHINORRHEA: 0
NAUSEA: 0
SHORTNESS OF BREATH: 0
BLOOD IN STOOL: 0
BACK PAIN: 1
DIARRHEA: 0
VOMITING: 0
WHEEZING: 0
CHEST TIGHTNESS: 0
ABDOMINAL PAIN: 1

## 2023-10-04 ASSESSMENT — PATIENT HEALTH QUESTIONNAIRE - PHQ9
4. FEELING TIRED OR HAVING LITTLE ENERGY: 2
10. IF YOU CHECKED OFF ANY PROBLEMS, HOW DIFFICULT HAVE THESE PROBLEMS MADE IT FOR YOU TO DO YOUR WORK, TAKE CARE OF THINGS AT HOME, OR GET ALONG WITH OTHER PEOPLE: 1
5. POOR APPETITE OR OVEREATING: 1
7. TROUBLE CONCENTRATING ON THINGS, SUCH AS READING THE NEWSPAPER OR WATCHING TELEVISION: 1
3. TROUBLE FALLING OR STAYING ASLEEP: 0
2. FEELING DOWN, DEPRESSED OR HOPELESS: 3
SUM OF ALL RESPONSES TO PHQ9 QUESTIONS 1 & 2: 6
SUM OF ALL RESPONSES TO PHQ QUESTIONS 1-9: 11
SUM OF ALL RESPONSES TO PHQ QUESTIONS 1-9: 11
8. MOVING OR SPEAKING SO SLOWLY THAT OTHER PEOPLE COULD HAVE NOTICED. OR THE OPPOSITE, BEING SO FIGETY OR RESTLESS THAT YOU HAVE BEEN MOVING AROUND A LOT MORE THAN USUAL: 1
6. FEELING BAD ABOUT YOURSELF - OR THAT YOU ARE A FAILURE OR HAVE LET YOURSELF OR YOUR FAMILY DOWN: 0
SUM OF ALL RESPONSES TO PHQ QUESTIONS 1-9: 11
9. THOUGHTS THAT YOU WOULD BE BETTER OFF DEAD, OR OF HURTING YOURSELF: 0
SUM OF ALL RESPONSES TO PHQ QUESTIONS 1-9: 11
1. LITTLE INTEREST OR PLEASURE IN DOING THINGS: 3

## 2023-10-04 ASSESSMENT — ANXIETY QUESTIONNAIRES
3. WORRYING TOO MUCH ABOUT DIFFERENT THINGS: 3
4. TROUBLE RELAXING: 2
GAD7 TOTAL SCORE: 15
5. BEING SO RESTLESS THAT IT IS HARD TO SIT STILL: 2
IF YOU CHECKED OFF ANY PROBLEMS ON THIS QUESTIONNAIRE, HOW DIFFICULT HAVE THESE PROBLEMS MADE IT FOR YOU TO DO YOUR WORK, TAKE CARE OF THINGS AT HOME, OR GET ALONG WITH OTHER PEOPLE: NOT DIFFICULT AT ALL
1. FEELING NERVOUS, ANXIOUS, OR ON EDGE: 3
6. BECOMING EASILY ANNOYED OR IRRITABLE: 1
2. NOT BEING ABLE TO STOP OR CONTROL WORRYING: 3
7. FEELING AFRAID AS IF SOMETHING AWFUL MIGHT HAPPEN: 1

## 2023-10-10 ENCOUNTER — HOSPITAL ENCOUNTER (OUTPATIENT)
Facility: HOSPITAL | Age: 53
Setting detail: SPECIMEN
Discharge: HOME | End: 2023-10-13
Payer: COMMERCIAL

## 2023-10-10 DIAGNOSIS — R26.81 GAIT INSTABILITY: ICD-10-CM

## 2023-10-10 DIAGNOSIS — I10 ESSENTIAL (PRIMARY) HYPERTENSION: ICD-10-CM

## 2023-10-10 DIAGNOSIS — E55.9 VITAMIN D DEFICIENCY: ICD-10-CM

## 2023-10-10 DIAGNOSIS — E78.5 HYPERLIPIDEMIA, UNSPECIFIED HYPERLIPIDEMIA TYPE: ICD-10-CM

## 2023-10-10 DIAGNOSIS — D75.1 SECONDARY POLYCYTHEMIA: ICD-10-CM

## 2023-10-10 DIAGNOSIS — R73.03 PREDIABETES: ICD-10-CM

## 2023-10-10 LAB
25(OH)D3 SERPL-MCNC: 28.7 NG/ML (ref 30–100)
ALBUMIN SERPL-MCNC: 3.6 G/DL (ref 3.4–5)
ALBUMIN/GLOB SERPL: 1.3 (ref 0.8–1.7)
ALP SERPL-CCNC: 73 U/L (ref 45–117)
ALT SERPL-CCNC: 23 U/L (ref 13–56)
ANION GAP SERPL CALC-SCNC: 2 MMOL/L (ref 3–18)
AST SERPL-CCNC: 16 U/L (ref 10–38)
BASOPHILS # BLD: 0 K/UL (ref 0–0.1)
BASOPHILS NFR BLD: 1 % (ref 0–2)
BILIRUB SERPL-MCNC: 0.2 MG/DL (ref 0.2–1)
BUN SERPL-MCNC: 9 MG/DL (ref 7–18)
BUN/CREAT SERPL: 16 (ref 12–20)
CALCIUM SERPL-MCNC: 8.7 MG/DL (ref 8.5–10.1)
CHLORIDE SERPL-SCNC: 108 MMOL/L (ref 100–111)
CHOLEST SERPL-MCNC: 101 MG/DL
CO2 SERPL-SCNC: 32 MMOL/L (ref 21–32)
CREAT SERPL-MCNC: 0.57 MG/DL (ref 0.6–1.3)
DIFFERENTIAL METHOD BLD: ABNORMAL
EOSINOPHIL # BLD: 0.1 K/UL (ref 0–0.4)
EOSINOPHIL NFR BLD: 2 % (ref 0–5)
ERYTHROCYTE [DISTWIDTH] IN BLOOD BY AUTOMATED COUNT: 15.8 % (ref 11.6–14.5)
EST. AVERAGE GLUCOSE BLD GHB EST-MCNC: 117 MG/DL
FOLATE SERPL-MCNC: 9.7 NG/ML (ref 3.1–17.5)
GLOBULIN SER CALC-MCNC: 2.8 G/DL (ref 2–4)
GLUCOSE SERPL-MCNC: 82 MG/DL (ref 74–99)
HBA1C MFR BLD: 5.7 % (ref 4.2–5.6)
HCT VFR BLD AUTO: 41.5 % (ref 35–45)
HDLC SERPL-MCNC: 42 MG/DL (ref 40–60)
HDLC SERPL: 2.4 (ref 0–5)
HGB BLD-MCNC: 12.3 G/DL (ref 12–16)
IMM GRANULOCYTES # BLD AUTO: 0 K/UL (ref 0–0.04)
IMM GRANULOCYTES NFR BLD AUTO: 0 % (ref 0–0.5)
LDLC SERPL CALC-MCNC: 34.2 MG/DL (ref 0–100)
LIPID PANEL: NORMAL
LYMPHOCYTES # BLD: 2.1 K/UL (ref 0.9–3.6)
LYMPHOCYTES NFR BLD: 33 % (ref 21–52)
MCH RBC QN AUTO: 25.3 PG (ref 24–34)
MCHC RBC AUTO-ENTMCNC: 29.6 G/DL (ref 31–37)
MCV RBC AUTO: 85.4 FL (ref 78–100)
MONOCYTES # BLD: 0.6 K/UL (ref 0.05–1.2)
MONOCYTES NFR BLD: 9 % (ref 3–10)
NEUTS SEG # BLD: 3.5 K/UL (ref 1.8–8)
NEUTS SEG NFR BLD: 55 % (ref 40–73)
NRBC # BLD: 0 K/UL (ref 0–0.01)
NRBC BLD-RTO: 0 PER 100 WBC
PLATELET # BLD AUTO: 274 K/UL (ref 135–420)
PMV BLD AUTO: 10 FL (ref 9.2–11.8)
POTASSIUM SERPL-SCNC: 3.9 MMOL/L (ref 3.5–5.5)
PROT SERPL-MCNC: 6.4 G/DL (ref 6.4–8.2)
RBC # BLD AUTO: 4.86 M/UL (ref 4.2–5.3)
SODIUM SERPL-SCNC: 142 MMOL/L (ref 136–145)
TRIGL SERPL-MCNC: 124 MG/DL
TSH SERPL DL<=0.05 MIU/L-ACNC: 0.28 UIU/ML (ref 0.36–3.74)
VIT B12 SERPL-MCNC: 518 PG/ML (ref 211–911)
VLDLC SERPL CALC-MCNC: 24.8 MG/DL
WBC # BLD AUTO: 6.3 K/UL (ref 4.6–13.2)

## 2023-10-10 PROCEDURE — 85025 COMPLETE CBC W/AUTO DIFF WBC: CPT

## 2023-10-10 PROCEDURE — 82306 VITAMIN D 25 HYDROXY: CPT

## 2023-10-10 PROCEDURE — 36415 COLL VENOUS BLD VENIPUNCTURE: CPT

## 2023-10-10 PROCEDURE — 80053 COMPREHEN METABOLIC PANEL: CPT

## 2023-10-10 PROCEDURE — 82746 ASSAY OF FOLIC ACID SERUM: CPT

## 2023-10-10 PROCEDURE — 84443 ASSAY THYROID STIM HORMONE: CPT

## 2023-10-10 PROCEDURE — 82607 VITAMIN B-12: CPT

## 2023-10-10 PROCEDURE — 83036 HEMOGLOBIN GLYCOSYLATED A1C: CPT

## 2023-10-10 PROCEDURE — 80061 LIPID PANEL: CPT

## 2023-10-11 ENCOUNTER — HOSPITAL ENCOUNTER (OUTPATIENT)
Facility: HOSPITAL | Age: 53
Discharge: HOME | End: 2023-10-14
Payer: COMMERCIAL

## 2023-10-11 ENCOUNTER — HOSPITAL ENCOUNTER (EMERGENCY)
Facility: HOSPITAL | Age: 53
Discharge: LEFT BEFORE DISCHARGE | End: 2023-10-11
Attending: EMERGENCY MEDICINE
Payer: COMMERCIAL

## 2023-10-11 ENCOUNTER — APPOINTMENT (OUTPATIENT)
Facility: HOSPITAL | Age: 53
End: 2023-10-11
Payer: COMMERCIAL

## 2023-10-11 VITALS
BODY MASS INDEX: 34.96 KG/M2 | WEIGHT: 190 LBS | DIASTOLIC BLOOD PRESSURE: 58 MMHG | SYSTOLIC BLOOD PRESSURE: 154 MMHG | RESPIRATION RATE: 20 BRPM | TEMPERATURE: 98.1 F | HEIGHT: 62 IN | HEART RATE: 100 BPM | OXYGEN SATURATION: 100 %

## 2023-10-11 DIAGNOSIS — G89.29 CHRONIC MIDLINE LOW BACK PAIN WITHOUT SCIATICA: ICD-10-CM

## 2023-10-11 DIAGNOSIS — R47.89 SLOW RATE OF SPEECH: Primary | ICD-10-CM

## 2023-10-11 DIAGNOSIS — I10 ESSENTIAL HYPERTENSION: ICD-10-CM

## 2023-10-11 DIAGNOSIS — M54.50 CHRONIC MIDLINE LOW BACK PAIN WITHOUT SCIATICA: ICD-10-CM

## 2023-10-11 DIAGNOSIS — D75.1 POLYCYTHEMIA: ICD-10-CM

## 2023-10-11 LAB
ANION GAP SERPL CALC-SCNC: 2 MMOL/L (ref 3–18)
BASOPHILS # BLD: 0 K/UL (ref 0–0.1)
BASOPHILS NFR BLD: 1 % (ref 0–2)
BUN SERPL-MCNC: 13 MG/DL (ref 7–18)
BUN/CREAT SERPL: 20 (ref 12–20)
CALCIUM SERPL-MCNC: 8.7 MG/DL (ref 8.5–10.1)
CHLORIDE SERPL-SCNC: 110 MMOL/L (ref 100–111)
CO2 SERPL-SCNC: 30 MMOL/L (ref 21–32)
CREAT SERPL-MCNC: 0.66 MG/DL (ref 0.6–1.3)
DIFFERENTIAL METHOD BLD: ABNORMAL
EOSINOPHIL # BLD: 0.2 K/UL (ref 0–0.4)
EOSINOPHIL NFR BLD: 2 % (ref 0–5)
ERYTHROCYTE [DISTWIDTH] IN BLOOD BY AUTOMATED COUNT: 15.8 % (ref 11.6–14.5)
GLUCOSE SERPL-MCNC: 119 MG/DL (ref 74–99)
HCT VFR BLD AUTO: 41 % (ref 35–45)
HGB BLD-MCNC: 12.6 G/DL (ref 12–16)
IMM GRANULOCYTES # BLD AUTO: 0 K/UL (ref 0–0.04)
IMM GRANULOCYTES NFR BLD AUTO: 0 % (ref 0–0.5)
LYMPHOCYTES # BLD: 2.4 K/UL (ref 0.9–3.6)
LYMPHOCYTES NFR BLD: 33 % (ref 21–52)
MCH RBC QN AUTO: 25.7 PG (ref 24–34)
MCHC RBC AUTO-ENTMCNC: 30.7 G/DL (ref 31–37)
MCV RBC AUTO: 83.7 FL (ref 78–100)
MONOCYTES # BLD: 0.6 K/UL (ref 0.05–1.2)
MONOCYTES NFR BLD: 8 % (ref 3–10)
NEUTS SEG # BLD: 4.2 K/UL (ref 1.8–8)
NEUTS SEG NFR BLD: 56 % (ref 40–73)
NRBC # BLD: 0 K/UL (ref 0–0.01)
NRBC BLD-RTO: 0 PER 100 WBC
PLATELET # BLD AUTO: 274 K/UL (ref 135–420)
PMV BLD AUTO: 10.2 FL (ref 9.2–11.8)
POTASSIUM SERPL-SCNC: 3.5 MMOL/L (ref 3.5–5.5)
RBC # BLD AUTO: 4.9 M/UL (ref 4.2–5.3)
SODIUM SERPL-SCNC: 142 MMOL/L (ref 136–145)
WBC # BLD AUTO: 7.5 K/UL (ref 4.6–13.2)

## 2023-10-11 PROCEDURE — 85025 COMPLETE CBC W/AUTO DIFF WBC: CPT

## 2023-10-11 PROCEDURE — 80048 BASIC METABOLIC PNL TOTAL CA: CPT

## 2023-10-11 PROCEDURE — 72100 X-RAY EXAM L-S SPINE 2/3 VWS: CPT

## 2023-10-11 PROCEDURE — 93005 ELECTROCARDIOGRAM TRACING: CPT | Performed by: STUDENT IN AN ORGANIZED HEALTH CARE EDUCATION/TRAINING PROGRAM

## 2023-10-11 PROCEDURE — 99284 EMERGENCY DEPT VISIT MOD MDM: CPT

## 2023-10-11 ASSESSMENT — ENCOUNTER SYMPTOMS
VOMITING: 0
CHEST TIGHTNESS: 0
VOICE CHANGE: 0
DIARRHEA: 0
TROUBLE SWALLOWING: 0
NAUSEA: 0
ABDOMINAL PAIN: 0

## 2023-10-11 ASSESSMENT — PAIN - FUNCTIONAL ASSESSMENT: PAIN_FUNCTIONAL_ASSESSMENT: NONE - DENIES PAIN

## 2023-10-11 NOTE — ED PROVIDER NOTES
Emergency Physician Note  [facility]  [facility] EMERGENCY DEPT  [address] [facility] [address]  Dept: [phone]  Loc: [phone]  Open Note Time:  7:06 PM EDT    Chief Complaint  Extremity Weakness       History of Present Illness  Cherelle Flores is a 46 y.o. female  has a past medical history of Asthma, Bronchitis, CHF (congestive heart failure) ([address]), Cocaine abuse ([address]), Dental caries, Depression, Depression, Drug abuse ([address]), Dysphasia, Elevated hematocrit, ETOH abuse, GERD (gastroesophageal reflux disease), H/O screening mammography, HPV (human papilloma virus) infection, Hypertension, Hypertensive left ventricular hypertrophy, without heart failure, Ill-defined condition, Marijuana use, Mood swings, Polycythemia, Polycythemia, Psychiatric disorder, Schatzki's ring, Sliding hiatal hernia, Tobacco abuse, and Vitamin D deficiency. who presents to emergency department by EMS at direction of her psychiatrist for concern over stroke. Patient states that she was at her psychiatrist office when she states that her doctor told her that she had right-sided facial droop and was slurring her speech. Patient states that she feels as though her speech is in fact slower but that this will sometimes happen in states that she does not feel any focal weakness or sensory abnormalities to include facial weakness. She offers no acute complaints. Review of Systems   Constitutional:  Negative for chills and fever. HENT:  Negative for hearing loss, trouble swallowing and voice change. Eyes:  Negative for visual disturbance. Respiratory:  Negative for chest tightness. Cardiovascular:  Negative for chest pain and palpitations. Gastrointestinal:  Negative for abdominal pain, diarrhea, nausea and vomiting. Musculoskeletal:  Negative for gait problem. Neurological:  Negative for dizziness, tremors, syncope, facial asymmetry, speech difficulty, weakness, light-headedness, numbness and headaches.

## 2023-10-12 ENCOUNTER — TELEPHONE (OUTPATIENT)
Facility: CLINIC | Age: 53
End: 2023-10-12

## 2023-10-12 DIAGNOSIS — E61.1 IRON DEFICIENCY: Primary | ICD-10-CM

## 2023-10-12 DIAGNOSIS — E55.9 VITAMIN D DEFICIENCY: ICD-10-CM

## 2023-10-12 DIAGNOSIS — R79.89 ABNORMAL TSH: ICD-10-CM

## 2023-10-12 LAB
EKG ATRIAL RATE: 72 BPM
EKG DIAGNOSIS: NORMAL
EKG P AXIS: 55 DEGREES
EKG P-R INTERVAL: 178 MS
EKG Q-T INTERVAL: 424 MS
EKG QRS DURATION: 126 MS
EKG QTC CALCULATION (BAZETT): 464 MS
EKG R AXIS: -38 DEGREES
EKG T AXIS: 43 DEGREES
EKG VENTRICULAR RATE: 72 BPM

## 2023-10-12 RX ORDER — FERROUS SULFATE 325(65) MG
325 TABLET ORAL
Qty: 90 TABLET | Refills: 1 | Status: SHIPPED | OUTPATIENT
Start: 2023-10-12

## 2023-10-12 RX ORDER — ERGOCALCIFEROL 1.25 MG/1
50000 CAPSULE ORAL WEEKLY
Qty: 10 CAPSULE | Refills: 1 | Status: SHIPPED | OUTPATIENT
Start: 2023-10-12

## 2023-10-12 NOTE — TELEPHONE ENCOUNTER
Patient requesting a return phone call regarding concerns with lab and imaging test.Please advise and follow up.

## 2023-10-12 NOTE — TELEPHONE ENCOUNTER
Discussed results with patient over the phone. CBC shows signs of iron deficiency-we will prescribe iron supplements. Noted to have low vitamin D. Will prescribe weekly vitamin D supplements. Lumbar spine x-ray showed  degenerative changes.     Abnormal TSH-we will repeat TSH, check T4

## 2023-10-17 ENCOUNTER — HOSPITAL ENCOUNTER (OUTPATIENT)
Facility: HOSPITAL | Age: 53
Setting detail: RECURRING SERIES
Discharge: HOME | End: 2023-10-20
Payer: COMMERCIAL

## 2023-10-17 PROCEDURE — 97162 PT EVAL MOD COMPLEX 30 MIN: CPT

## 2023-10-17 NOTE — PROGRESS NOTES
[address] [facility]  [address], [facility], [address]  Phone: [phone] Fax [phone]  Plan of Care / Statement of Necessity for Physical Therapy Services     Patient Name: Agnes Juan : 1970   Treatment   Diagnosis: M54.59  OTHER LOWER BACK PAIN and R26.81  Unsteadiness on feet Medical Diagnosis: Unsteadiness on feet [R26.81]  Low back pain, unspecified [M54.50]   Onset Date: 10/4/23 - referral date Payor Source: Payor: [id number] [employer] 1 / Plan: [id number] [employer] 1 / Product Type: *No Product type* /    Referral Source: Renetta Rowan MD [facility]): 10/17/2023   Prior Hospitalization: See medical history Provider #: [id number]   Prior Level of Function: PLOF: Previously independent with ADLs, able to go shopping without increased pain, Living Situation: Lives with her son in a 1 story home with 2 steps to enter without rails, Work Hx: long term disability    Comorbidities: hx of depression, hx of bipolar, GERD, CHF, asthma, glaucoma, PTSD, anxiety, hernia repairs, hx of alcohol abuse     Assessment / key information:  Pt is a 46 y.o. female who presents with c/o recent exacerbation of chronic low back pain that has been accompanied with LE weakness and increased falls. Symptoms increased with no apparent cause about 1 month ago. Reported functional deficits include: reaching for furniture when walking, difficulty getting out of bed, leg weakness, increased stiffness after laying down for prolonged, difficulty going up/down stairs going 1 step at a time, decreased ability to drive, reduced walking tolerance, decreased standing tolerance, difficulty getting up/down from seats.  Upon exam, pt exhibited decreased transfer performance, lumbar AROM, and bilateral hip flexibility, impaired static balance, limited trunk endurance, gait deviations with even surface and stair negotiation, and TTP at bilateral
pain at night  [] Yes [x] No Abdominal pain or problems  [x] Yes [] No Rectal bleeding - consistent with straining secondary to constipation  [] Yes [x] No Feet more cold or painful in cold weather  [] Yes [x] No Menstrual irregularities  [] Yes [x] No Blood or pain with urination  [x] Yes [] No Dysfunction of bowel or bladder - history precedes the back pain, since the last hernia repair  [] Yes [x] No Recent illness within past 3 weeks (i.e, cold, flu)  [] Yes [x] No Numbness/tingling in buttock/genitalia region    OBJECTIVE/EXAMINATION    37 min [x]Eval        - untimed        [x]  Patient Education billed concurrently with other procedures       Physical Therapy Evaluation - Lumbar Spine ([facility])    Posture:  Lateral Shift: [] R    [] L     [] +  [x] -  Kyphosis: [] Increased [] Decreased   [x]  WNL  Lordosis:  [] Increased [x] Decreased   [] WNL  Pelvic symmetry: [] WNL    [] Other:    Palpation: TTP bilateral SIJ, gluteus mm., lumbar paraspinals, Sacrum/ L/S / T/S with PA mobs    Lumbar AROM:                                           AROM (% of full)                 Right Left Effect   Flexion 75% P! Extension 50% P!    Side Bend 50% 50% P! left   Rotation 50% 50%                                               -  Strength: comprehensive strength NT secondary to time constraint    Trunk Flexor Endurance: <1 seconds  Trunk Extensor Endurance: 8 seconds    Neuro Screen [x] WNL  Myotome/Dermatome/Reflexes:  Comments:    Gait:  [] Normal     [x] Abnormal: Decreased mariela, decreased step/stride length, increased right lateral trunk lean with right stance       Special Tests       Right Left   Hamstring 90/90 pos   pos   Ely pos pos   Piriformis pos pos         Optional Tests:       Timed Up and Go test (< 20 seconds): NT       30 sec Sit to Stand test (12-16): 14 repetitions without UE support with bilateral valgus collapse, increased pain, and fatigue at terminal reps    Balance/ Equilibrium:   SLS: left = 11

## 2023-10-24 ENCOUNTER — APPOINTMENT (OUTPATIENT)
Facility: HOSPITAL | Age: 53
End: 2023-10-24
Payer: COMMERCIAL

## 2023-11-07 ENCOUNTER — HOSPITAL ENCOUNTER (OUTPATIENT)
Facility: HOSPITAL | Age: 53
Setting detail: SPECIMEN
Discharge: HOME | End: 2023-11-10
Payer: COMMERCIAL

## 2023-11-07 ENCOUNTER — OFFICE VISIT (OUTPATIENT)
Facility: CLINIC | Age: 53
End: 2023-11-07
Payer: COMMERCIAL

## 2023-11-07 VITALS
HEIGHT: 61 IN | DIASTOLIC BLOOD PRESSURE: 82 MMHG | RESPIRATION RATE: 16 BRPM | OXYGEN SATURATION: 93 % | SYSTOLIC BLOOD PRESSURE: 129 MMHG | HEART RATE: 65 BPM | BODY MASS INDEX: 36.44 KG/M2 | WEIGHT: 193 LBS | TEMPERATURE: 98.2 F

## 2023-11-07 DIAGNOSIS — M15.9 PRIMARY OSTEOARTHRITIS INVOLVING MULTIPLE JOINTS: ICD-10-CM

## 2023-11-07 DIAGNOSIS — E61.1 IRON DEFICIENCY: ICD-10-CM

## 2023-11-07 DIAGNOSIS — K21.9 GASTROESOPHAGEAL REFLUX DISEASE, UNSPECIFIED WHETHER ESOPHAGITIS PRESENT: ICD-10-CM

## 2023-11-07 DIAGNOSIS — G89.29 CHRONIC MIDLINE LOW BACK PAIN WITHOUT SCIATICA: ICD-10-CM

## 2023-11-07 DIAGNOSIS — R79.89 ABNORMAL TSH: ICD-10-CM

## 2023-11-07 DIAGNOSIS — E55.9 VITAMIN D DEFICIENCY: ICD-10-CM

## 2023-11-07 DIAGNOSIS — I10 ESSENTIAL (PRIMARY) HYPERTENSION: ICD-10-CM

## 2023-11-07 DIAGNOSIS — Z23 ENCOUNTER FOR IMMUNIZATION: Primary | ICD-10-CM

## 2023-11-07 DIAGNOSIS — Z12.31 BREAST CANCER SCREENING BY MAMMOGRAM: ICD-10-CM

## 2023-11-07 DIAGNOSIS — M54.50 CHRONIC MIDLINE LOW BACK PAIN WITHOUT SCIATICA: ICD-10-CM

## 2023-11-07 DIAGNOSIS — D75.1 SECONDARY POLYCYTHEMIA: ICD-10-CM

## 2023-11-07 DIAGNOSIS — Z86.73 HISTORY OF TIA (TRANSIENT ISCHEMIC ATTACK): ICD-10-CM

## 2023-11-07 DIAGNOSIS — M54.12 CERVICAL RADICULOPATHY: ICD-10-CM

## 2023-11-07 LAB
BASOPHILS # BLD: 0.1 K/UL (ref 0–0.1)
BASOPHILS NFR BLD: 1 % (ref 0–2)
DIFFERENTIAL METHOD BLD: ABNORMAL
EOSINOPHIL # BLD: 0.1 K/UL (ref 0–0.4)
EOSINOPHIL NFR BLD: 1 % (ref 0–5)
ERYTHROCYTE [DISTWIDTH] IN BLOOD BY AUTOMATED COUNT: 20 % (ref 11.6–14.5)
FERRITIN SERPL-MCNC: 22 NG/ML (ref 8–388)
HCT VFR BLD AUTO: 52.8 % (ref 35–45)
HGB BLD-MCNC: 15.9 G/DL (ref 12–16)
IMM GRANULOCYTES # BLD AUTO: 0 K/UL (ref 0–0.04)
IMM GRANULOCYTES NFR BLD AUTO: 0 % (ref 0–0.5)
IRON SATN MFR SERPL: 13 % (ref 20–50)
IRON SERPL-MCNC: 51 UG/DL (ref 50–175)
LYMPHOCYTES # BLD: 2.1 K/UL (ref 0.9–3.6)
LYMPHOCYTES NFR BLD: 28 % (ref 21–52)
MCH RBC QN AUTO: 26.9 PG (ref 24–34)
MCHC RBC AUTO-ENTMCNC: 30.1 G/DL (ref 31–37)
MCV RBC AUTO: 89.5 FL (ref 78–100)
MONOCYTES # BLD: 0.4 K/UL (ref 0.05–1.2)
MONOCYTES NFR BLD: 6 % (ref 3–10)
NEUTS SEG # BLD: 4.7 K/UL (ref 1.8–8)
NEUTS SEG NFR BLD: 64 % (ref 40–73)
NRBC # BLD: 0 K/UL (ref 0–0.01)
NRBC BLD-RTO: 0 PER 100 WBC
PLATELET # BLD AUTO: 204 K/UL (ref 135–420)
PMV BLD AUTO: 10.5 FL (ref 9.2–11.8)
RBC # BLD AUTO: 5.9 M/UL (ref 4.2–5.3)
T4 FREE SERPL-MCNC: 1 NG/DL (ref 0.7–1.5)
TIBC SERPL-MCNC: 405 UG/DL (ref 250–450)
TSH SERPL DL<=0.05 MIU/L-ACNC: 0.65 UIU/ML (ref 0.36–3.74)
WBC # BLD AUTO: 7.4 K/UL (ref 4.6–13.2)

## 2023-11-07 PROCEDURE — 90471 IMMUNIZATION ADMIN: CPT | Performed by: STUDENT IN AN ORGANIZED HEALTH CARE EDUCATION/TRAINING PROGRAM

## 2023-11-07 PROCEDURE — 86376 MICROSOMAL ANTIBODY EACH: CPT

## 2023-11-07 PROCEDURE — 83540 ASSAY OF IRON: CPT

## 2023-11-07 PROCEDURE — 3079F DIAST BP 80-89 MM HG: CPT | Performed by: STUDENT IN AN ORGANIZED HEALTH CARE EDUCATION/TRAINING PROGRAM

## 2023-11-07 PROCEDURE — 3074F SYST BP LT 130 MM HG: CPT | Performed by: STUDENT IN AN ORGANIZED HEALTH CARE EDUCATION/TRAINING PROGRAM

## 2023-11-07 PROCEDURE — 83550 IRON BINDING TEST: CPT

## 2023-11-07 PROCEDURE — 84439 ASSAY OF FREE THYROXINE: CPT

## 2023-11-07 PROCEDURE — 82728 ASSAY OF FERRITIN: CPT

## 2023-11-07 PROCEDURE — 36415 COLL VENOUS BLD VENIPUNCTURE: CPT

## 2023-11-07 PROCEDURE — 90674 CCIIV4 VAC NO PRSV 0.5 ML IM: CPT | Performed by: STUDENT IN AN ORGANIZED HEALTH CARE EDUCATION/TRAINING PROGRAM

## 2023-11-07 PROCEDURE — 86800 THYROGLOBULIN ANTIBODY: CPT

## 2023-11-07 PROCEDURE — 85025 COMPLETE CBC W/AUTO DIFF WBC: CPT

## 2023-11-07 PROCEDURE — 84443 ASSAY THYROID STIM HORMONE: CPT

## 2023-11-07 PROCEDURE — 99214 OFFICE O/P EST MOD 30 MIN: CPT | Performed by: STUDENT IN AN ORGANIZED HEALTH CARE EDUCATION/TRAINING PROGRAM

## 2023-11-07 RX ORDER — MELOXICAM 15 MG/1
15 TABLET ORAL DAILY
Qty: 30 TABLET | Refills: 2 | Status: SHIPPED | OUTPATIENT
Start: 2023-11-07

## 2023-11-07 RX ORDER — OMEPRAZOLE 40 MG/1
40 CAPSULE, DELAYED RELEASE ORAL DAILY
Qty: 90 CAPSULE | Refills: 0 | Status: SHIPPED | OUTPATIENT
Start: 2023-11-07

## 2023-11-07 SDOH — ECONOMIC STABILITY: INCOME INSECURITY: HOW HARD IS IT FOR YOU TO PAY FOR THE VERY BASICS LIKE FOOD, HOUSING, MEDICAL CARE, AND HEATING?: NOT HARD AT ALL

## 2023-11-07 SDOH — ECONOMIC STABILITY: FOOD INSECURITY: WITHIN THE PAST 12 MONTHS, THE FOOD YOU BOUGHT JUST DIDN'T LAST AND YOU DIDN'T HAVE MONEY TO GET MORE.: NEVER TRUE

## 2023-11-07 SDOH — ECONOMIC STABILITY: FOOD INSECURITY: WITHIN THE PAST 12 MONTHS, YOU WORRIED THAT YOUR FOOD WOULD RUN OUT BEFORE YOU GOT MONEY TO BUY MORE.: NEVER TRUE

## 2023-11-07 ASSESSMENT — ANXIETY QUESTIONNAIRES
4. TROUBLE RELAXING: 3
1. FEELING NERVOUS, ANXIOUS, OR ON EDGE: 3
GAD7 TOTAL SCORE: 18
IF YOU CHECKED OFF ANY PROBLEMS ON THIS QUESTIONNAIRE, HOW DIFFICULT HAVE THESE PROBLEMS MADE IT FOR YOU TO DO YOUR WORK, TAKE CARE OF THINGS AT HOME, OR GET ALONG WITH OTHER PEOPLE: VERY DIFFICULT
7. FEELING AFRAID AS IF SOMETHING AWFUL MIGHT HAPPEN: 1
5. BEING SO RESTLESS THAT IT IS HARD TO SIT STILL: 2
6. BECOMING EASILY ANNOYED OR IRRITABLE: 3
3. WORRYING TOO MUCH ABOUT DIFFERENT THINGS: 3
2. NOT BEING ABLE TO STOP OR CONTROL WORRYING: 3

## 2023-11-07 ASSESSMENT — PATIENT HEALTH QUESTIONNAIRE - PHQ9
SUM OF ALL RESPONSES TO PHQ9 QUESTIONS 1 & 2: 6
5. POOR APPETITE OR OVEREATING: 1
3. TROUBLE FALLING OR STAYING ASLEEP: 3
SUM OF ALL RESPONSES TO PHQ QUESTIONS 1-9: 19
4. FEELING TIRED OR HAVING LITTLE ENERGY: 2
SUM OF ALL RESPONSES TO PHQ QUESTIONS 1-9: 19
9. THOUGHTS THAT YOU WOULD BE BETTER OFF DEAD, OR OF HURTING YOURSELF: 0
2. FEELING DOWN, DEPRESSED OR HOPELESS: 3
SUM OF ALL RESPONSES TO PHQ QUESTIONS 1-9: 19
1. LITTLE INTEREST OR PLEASURE IN DOING THINGS: 3
8. MOVING OR SPEAKING SO SLOWLY THAT OTHER PEOPLE COULD HAVE NOTICED. OR THE OPPOSITE, BEING SO FIGETY OR RESTLESS THAT YOU HAVE BEEN MOVING AROUND A LOT MORE THAN USUAL: 1
10. IF YOU CHECKED OFF ANY PROBLEMS, HOW DIFFICULT HAVE THESE PROBLEMS MADE IT FOR YOU TO DO YOUR WORK, TAKE CARE OF THINGS AT HOME, OR GET ALONG WITH OTHER PEOPLE: 2
7. TROUBLE CONCENTRATING ON THINGS, SUCH AS READING THE NEWSPAPER OR WATCHING TELEVISION: 3
SUM OF ALL RESPONSES TO PHQ QUESTIONS 1-9: 19
6. FEELING BAD ABOUT YOURSELF - OR THAT YOU ARE A FAILURE OR HAVE LET YOURSELF OR YOUR FAMILY DOWN: 3

## 2023-11-07 ASSESSMENT — ENCOUNTER SYMPTOMS
DIARRHEA: 0
VOMITING: 0
WHEEZING: 0
COUGH: 0
NAUSEA: 0
ABDOMINAL PAIN: 1
SHORTNESS OF BREATH: 0
BACK PAIN: 1
CHEST TIGHTNESS: 0
BLOOD IN STOOL: 0
RHINORRHEA: 0

## 2023-11-07 ASSESSMENT — COLUMBIA-SUICIDE SEVERITY RATING SCALE - C-SSRS
3. HAVE YOU BEEN THINKING ABOUT HOW YOU MIGHT KILL YOURSELF?: NO
4. HAVE YOU HAD THESE THOUGHTS AND HAD SOME INTENTION OF ACTING ON THEM?: NO
7. DID THIS OCCUR IN THE LAST THREE MONTHS: NO
5. HAVE YOU STARTED TO WORK OUT OR WORKED OUT THE DETAILS OF HOW TO KILL YOURSELF? DO YOU INTEND TO CARRY OUT THIS PLAN?: NO

## 2023-11-07 NOTE — PROGRESS NOTES
Sonali Casanova is a 46 y.o. female presenting today for Follow-up (C/o left arm pain and tingling finger tips pt also states back throat drips that make you cough)  . Chief Complaint   Patient presents with    Follow-up     C/o left arm pain and tingling finger tips pt also states back throat drips that make you cough       HPI:  Sonali Casanova presents to the office today for follow up. Patient has a past medical history of hypertension, depression, hypertriglyceridemia, polycythemia. Patient presented to the ED on 10/19/2023 with episode of slurred speech and facial droop that resolved. In the ED she was noticed to pronounce words appropriately with no slurring of speech. No facial symmetry or focal neurological deficits were noted. Patient left AMA. Further work-up was not completed. 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 She was following with hematology - Dr. Loy Khalil.  CBC in 10/23 showed hemoglobin 12.6. CBC recently showed signs of iron deficiency-further work-up has been ordered. Hypertriglyceridemia: Lipid panel in 10/23 showed LDL 34.2, HDL 42, triglycerides 124. Patient is on Tricor and lipitor. Denies any myalgias. Hypertension: BP is well controlled on losartan-HCTZ and amlodipine. Depression: Patient follows with Dr Leona Riley. She is on Klonopin, Trazodone, Lamictal.  Denies any feelings of self harm or suicidal ideation. Patient reports episodes of anger followed by crying-she is closely following with psychiatry and has an appointment on Monday. PHQ-9 was elevated today. Patient reported intermittent chest pain. Stress test was low risk. Echo showed LVEF 60-65% with mild concentric hypertrophy. She is following with cardiology. Patient was previously complaining of abdominal pain, pale feces and constipation. Liver enzymes were normal acute hepatitis panel was negative.   Patient is reporting worsening GERD

## 2023-11-09 LAB
THYROGLOB AB SERPL-ACNC: <1 IU/ML (ref 0–0.9)
THYROPEROXIDASE AB SERPL-ACNC: <9 IU/ML (ref 0–34)

## 2023-11-13 ENCOUNTER — TELEPHONE (OUTPATIENT)
Facility: CLINIC | Age: 53
End: 2023-11-13

## 2023-11-13 NOTE — TELEPHONE ENCOUNTER
----- Message from Paige Nieves sent at 11/9/2023  7:35 AM EST -----  Regarding: Test results   Contact: [phone]  I was just wanted to know what you think of my test results

## 2023-11-15 NOTE — TELEPHONE ENCOUNTER
Results discussed with patient over the phone. Her hematocrit has increased 52. Patient reports she has scheduled an appointment with hematology.   Thyroid work-up was normal.

## 2023-11-24 DIAGNOSIS — M54.50 CHRONIC MIDLINE LOW BACK PAIN WITHOUT SCIATICA: ICD-10-CM

## 2023-11-24 DIAGNOSIS — G89.29 CHRONIC MIDLINE LOW BACK PAIN WITHOUT SCIATICA: ICD-10-CM

## 2023-11-24 DIAGNOSIS — N30.01 ACUTE CYSTITIS WITH HEMATURIA: ICD-10-CM

## 2023-12-11 RX ORDER — NITROFURANTOIN 25; 75 MG/1; MG/1
CAPSULE ORAL
Qty: 10 CAPSULE | Refills: 0 | OUTPATIENT
Start: 2023-12-11

## 2023-12-11 RX ORDER — LIDOCAINE 50 MG/G
PATCH TOPICAL
Qty: 30 PATCH | Refills: 0 | Status: SHIPPED | OUTPATIENT
Start: 2023-12-11 | End: 2023-12-17 | Stop reason: SDUPTHER

## 2024-01-08 ENCOUNTER — TELEPHONE (OUTPATIENT)
Facility: CLINIC | Age: 54
End: 2024-01-08

## 2024-01-08 DIAGNOSIS — J45.30 MILD PERSISTENT ASTHMA WITHOUT COMPLICATION: ICD-10-CM

## 2024-01-09 RX ORDER — ALBUTEROL SULFATE 90 UG/1
AEROSOL, METERED RESPIRATORY (INHALATION)
Qty: 36 G | Refills: 2 | Status: SHIPPED | OUTPATIENT
Start: 2024-01-09

## 2024-01-18 DIAGNOSIS — N30.01 ACUTE CYSTITIS WITH HEMATURIA: ICD-10-CM

## 2024-01-31 RX ORDER — OMEGA-3-ACID ETHYL ESTERS 1 G/1
2 CAPSULE, LIQUID FILLED ORAL 2 TIMES DAILY
Qty: 120 CAPSULE | Refills: 1 | Status: SHIPPED | OUTPATIENT
Start: 2024-01-31

## 2024-01-31 RX ORDER — NITROFURANTOIN 25; 75 MG/1; MG/1
CAPSULE ORAL
Qty: 10 CAPSULE | Refills: 0 | OUTPATIENT
Start: 2024-01-31

## 2024-02-12 DIAGNOSIS — M54.50 CHRONIC MIDLINE LOW BACK PAIN WITHOUT SCIATICA: ICD-10-CM

## 2024-02-12 DIAGNOSIS — G89.29 CHRONIC MIDLINE LOW BACK PAIN WITHOUT SCIATICA: ICD-10-CM

## 2024-02-12 DIAGNOSIS — N30.01 ACUTE CYSTITIS WITH HEMATURIA: ICD-10-CM

## 2024-02-29 ENCOUNTER — TELEPHONE (OUTPATIENT)
Facility: CLINIC | Age: 54
End: 2024-02-29

## 2024-02-29 ENCOUNTER — OFFICE VISIT (OUTPATIENT)
Facility: CLINIC | Age: 54
End: 2024-02-29
Payer: COMMERCIAL

## 2024-02-29 VITALS
BODY MASS INDEX: 35.33 KG/M2 | TEMPERATURE: 99.6 F | HEIGHT: 62 IN | SYSTOLIC BLOOD PRESSURE: 134 MMHG | HEART RATE: 63 BPM | DIASTOLIC BLOOD PRESSURE: 68 MMHG | WEIGHT: 192 LBS | RESPIRATION RATE: 16 BRPM | OXYGEN SATURATION: 90 %

## 2024-02-29 DIAGNOSIS — K21.9 GASTROESOPHAGEAL REFLUX DISEASE, UNSPECIFIED WHETHER ESOPHAGITIS PRESENT: Primary | ICD-10-CM

## 2024-02-29 DIAGNOSIS — K21.9 GASTROESOPHAGEAL REFLUX DISEASE, UNSPECIFIED WHETHER ESOPHAGITIS PRESENT: ICD-10-CM

## 2024-02-29 DIAGNOSIS — B07.9 VIRAL WARTS, UNSPECIFIED TYPE: Primary | ICD-10-CM

## 2024-02-29 DIAGNOSIS — Z12.11 SCREENING FOR COLON CANCER: ICD-10-CM

## 2024-02-29 DIAGNOSIS — R13.10 DYSPHAGIA, UNSPECIFIED TYPE: ICD-10-CM

## 2024-02-29 DIAGNOSIS — E78.1 PURE HYPERGLYCERIDEMIA: ICD-10-CM

## 2024-02-29 DIAGNOSIS — R73.03 PREDIABETES: ICD-10-CM

## 2024-02-29 DIAGNOSIS — I10 ESSENTIAL (PRIMARY) HYPERTENSION: ICD-10-CM

## 2024-02-29 DIAGNOSIS — R06.02 SHORTNESS OF BREATH: ICD-10-CM

## 2024-02-29 DIAGNOSIS — E55.9 VITAMIN D DEFICIENCY: ICD-10-CM

## 2024-02-29 DIAGNOSIS — J45.30 MILD PERSISTENT ASTHMA WITHOUT COMPLICATION: ICD-10-CM

## 2024-02-29 DIAGNOSIS — D75.1 SECONDARY POLYCYTHEMIA: ICD-10-CM

## 2024-02-29 PROCEDURE — 99214 OFFICE O/P EST MOD 30 MIN: CPT | Performed by: STUDENT IN AN ORGANIZED HEALTH CARE EDUCATION/TRAINING PROGRAM

## 2024-02-29 PROCEDURE — 3075F SYST BP GE 130 - 139MM HG: CPT | Performed by: STUDENT IN AN ORGANIZED HEALTH CARE EDUCATION/TRAINING PROGRAM

## 2024-02-29 PROCEDURE — 3078F DIAST BP <80 MM HG: CPT | Performed by: STUDENT IN AN ORGANIZED HEALTH CARE EDUCATION/TRAINING PROGRAM

## 2024-02-29 RX ORDER — ATORVASTATIN CALCIUM 40 MG/1
40 TABLET, FILM COATED ORAL DAILY
Qty: 30 TABLET | OUTPATIENT
Start: 2024-02-29

## 2024-02-29 RX ORDER — ERGOCALCIFEROL 1.25 MG/1
50000 CAPSULE ORAL WEEKLY
Qty: 10 CAPSULE | Refills: 1 | Status: SHIPPED | OUTPATIENT
Start: 2024-02-29

## 2024-02-29 RX ORDER — ATORVASTATIN CALCIUM 40 MG/1
40 TABLET, FILM COATED ORAL DAILY
Qty: 90 TABLET | Refills: 2 | Status: SHIPPED | OUTPATIENT
Start: 2024-02-29

## 2024-02-29 RX ORDER — FENOFIBRATE 145 MG/1
145 TABLET, COATED ORAL DAILY
Qty: 30 TABLET | Refills: 2 | Status: SHIPPED | OUTPATIENT
Start: 2024-02-29

## 2024-02-29 RX ORDER — LOSARTAN POTASSIUM AND HYDROCHLOROTHIAZIDE 12.5; 1 MG/1; MG/1
1 TABLET ORAL DAILY
Qty: 90 TABLET | Refills: 3 | Status: SHIPPED | OUTPATIENT
Start: 2024-02-29

## 2024-02-29 RX ORDER — CHLORAL HYDRATE 500 MG
2000 CAPSULE ORAL 2 TIMES DAILY
Qty: 180 CAPSULE | Refills: 2 | Status: SHIPPED | OUTPATIENT
Start: 2024-02-29

## 2024-02-29 RX ORDER — ALBUTEROL SULFATE 90 UG/1
AEROSOL, METERED RESPIRATORY (INHALATION)
Qty: 36 G | Refills: 2 | Status: SHIPPED | OUTPATIENT
Start: 2024-02-29

## 2024-02-29 RX ORDER — AMLODIPINE BESYLATE 10 MG/1
10 TABLET ORAL DAILY
Qty: 90 TABLET | Refills: 3 | Status: SHIPPED | OUTPATIENT
Start: 2024-02-29

## 2024-02-29 RX ORDER — OMEPRAZOLE 40 MG/1
40 CAPSULE, DELAYED RELEASE ORAL DAILY
Qty: 90 CAPSULE | Refills: 0 | Status: SHIPPED | OUTPATIENT
Start: 2024-02-29

## 2024-02-29 RX ORDER — FLUTICASONE PROPIONATE AND SALMETEROL 100; 50 UG/1; UG/1
1 POWDER RESPIRATORY (INHALATION) EVERY 12 HOURS
Qty: 180 EACH | Refills: 1 | Status: SHIPPED | OUTPATIENT
Start: 2024-02-29 | End: 2024-05-29

## 2024-02-29 SDOH — ECONOMIC STABILITY: INCOME INSECURITY: HOW HARD IS IT FOR YOU TO PAY FOR THE VERY BASICS LIKE FOOD, HOUSING, MEDICAL CARE, AND HEATING?: NOT HARD AT ALL

## 2024-02-29 SDOH — ECONOMIC STABILITY: FOOD INSECURITY: WITHIN THE PAST 12 MONTHS, YOU WORRIED THAT YOUR FOOD WOULD RUN OUT BEFORE YOU GOT MONEY TO BUY MORE.: NEVER TRUE

## 2024-02-29 SDOH — ECONOMIC STABILITY: FOOD INSECURITY: WITHIN THE PAST 12 MONTHS, THE FOOD YOU BOUGHT JUST DIDN'T LAST AND YOU DIDN'T HAVE MONEY TO GET MORE.: NEVER TRUE

## 2024-02-29 ASSESSMENT — ENCOUNTER SYMPTOMS
COUGH: 0
DIARRHEA: 0
ABDOMINAL PAIN: 1
VOMITING: 0
SHORTNESS OF BREATH: 0
WHEEZING: 0
CHEST TIGHTNESS: 0
RHINORRHEA: 0
BLOOD IN STOOL: 0
NAUSEA: 0
BACK PAIN: 1

## 2024-02-29 ASSESSMENT — PATIENT HEALTH QUESTIONNAIRE - PHQ9
1. LITTLE INTEREST OR PLEASURE IN DOING THINGS: 0
5. POOR APPETITE OR OVEREATING: 0
SUM OF ALL RESPONSES TO PHQ QUESTIONS 1-9: 0
4. FEELING TIRED OR HAVING LITTLE ENERGY: 0
10. IF YOU CHECKED OFF ANY PROBLEMS, HOW DIFFICULT HAVE THESE PROBLEMS MADE IT FOR YOU TO DO YOUR WORK, TAKE CARE OF THINGS AT HOME, OR GET ALONG WITH OTHER PEOPLE: 0
9. THOUGHTS THAT YOU WOULD BE BETTER OFF DEAD, OR OF HURTING YOURSELF: 0
7. TROUBLE CONCENTRATING ON THINGS, SUCH AS READING THE NEWSPAPER OR WATCHING TELEVISION: 0
SUM OF ALL RESPONSES TO PHQ QUESTIONS 1-9: 0
2. FEELING DOWN, DEPRESSED OR HOPELESS: 0
SUM OF ALL RESPONSES TO PHQ QUESTIONS 1-9: 0
6. FEELING BAD ABOUT YOURSELF - OR THAT YOU ARE A FAILURE OR HAVE LET YOURSELF OR YOUR FAMILY DOWN: 0
SUM OF ALL RESPONSES TO PHQ9 QUESTIONS 1 & 2: 0
3. TROUBLE FALLING OR STAYING ASLEEP: 0
SUM OF ALL RESPONSES TO PHQ QUESTIONS 1-9: 0

## 2024-02-29 ASSESSMENT — ANXIETY QUESTIONNAIRES
7. FEELING AFRAID AS IF SOMETHING AWFUL MIGHT HAPPEN: 0
3. WORRYING TOO MUCH ABOUT DIFFERENT THINGS: 0
6. BECOMING EASILY ANNOYED OR IRRITABLE: 0
4. TROUBLE RELAXING: 0
5. BEING SO RESTLESS THAT IT IS HARD TO SIT STILL: 0
2. NOT BEING ABLE TO STOP OR CONTROL WORRYING: 0
GAD7 TOTAL SCORE: 0
IF YOU CHECKED OFF ANY PROBLEMS ON THIS QUESTIONNAIRE, HOW DIFFICULT HAVE THESE PROBLEMS MADE IT FOR YOU TO DO YOUR WORK, TAKE CARE OF THINGS AT HOME, OR GET ALONG WITH OTHER PEOPLE: NOT DIFFICULT AT ALL
1. FEELING NERVOUS, ANXIOUS, OR ON EDGE: 0

## 2024-02-29 NOTE — PROGRESS NOTES
\"Have you been to the ER, urgent care clinic since your last visit?  Hospitalized since your last visit?\"    NO    “Have you seen or consulted any other health care providers outside of [facility] since your last visit?”    NO    “Have you had a colorectal cancer screening such as a colonoscopy/FIT/Cologuard?    NO        
1.2 K/UL Final    Eosinophils Absolute 11/07/2023 0.1  0.0 - 0.4 K/UL Final    Basophils Absolute 11/07/2023 0.1  0.0 - 0.1 K/UL Final    Absolute Immature Granulocyte 11/07/2023 0.0  0.00 - 0.04 K/UL Final    Differential Type 11/07/2023 AUTOMATED    Final    Thyroid Peroxidase (TPO) Abs 11/07/2023 <9  0 - 34 IU/mL Final    Comment: (NOTE)  Performed At:  [facility] [id number]  Brain Houston MD Ph:[phone]      Thyroglobulin Ab 11/07/2023 <1.0  0.0 - 0.9 IU/mL Final    Comment: (NOTE)  Thyroglobulin Antibody measured by [facility]  Performed At: [address] [id number]  Brain Houston MD Ph:[phone]      Ferritin 11/07/2023 22  8 - 388 NG/ML Final    Iron 11/07/2023 51  50 - 175 ug/dL Final    Patients receiving metal-binding drugs (e.g. deferoxamine) may show spuriously depressed iron values, as chelated iron may not properly react in the iron assay.    TIBC 11/07/2023 405  250 - 450 ug/dL Final    Iron % Saturation 11/07/2023 13 (L)  20 - 50 % Final       No results found for any visits on 02/29/24.    Patient Care Team:  Patient Care Team:  Zaida Monroe MD as PCP - General  [facility] Monroe [facility]  Delilah Brantley MD as Consulting Physician      Assessment / Plan:     Diagnosis Orders   1. Viral warts, unspecified type  salicylic acid 17 % gel      2. Pure hyperglyceridemia  Comprehensive Metabolic Panel    fenofibrate (TRICOR) 145 MG tablet    atorvastatin (LIPITOR) 40 MG tablet      3. Mild persistent asthma without complication  albuterol sulfate HFA (PROVENTIL;VENTOLIN;PROAIR) 108 (90 Base) MCG/ACT inhaler      4. Gastroesophageal reflux disease, unspecified whether esophagitis present  omeprazole (PRILOSEC) 40 MG delayed release capsule      5. Dysphagia, unspecified type        6. Prediabetes  Hemoglobin A1C      7. Essential (primary) hypertension  amLODIPine (NORVASC) 10

## 2024-03-01 RX ORDER — LIDOCAINE 50 MG/G
PATCH TOPICAL
Qty: 30 PATCH | Refills: 0 | OUTPATIENT
Start: 2024-03-01

## 2024-03-01 RX ORDER — NITROFURANTOIN 25; 75 MG/1; MG/1
CAPSULE ORAL
Qty: 10 CAPSULE | Refills: 0 | OUTPATIENT
Start: 2024-03-01

## 2024-03-01 RX ORDER — METHYLCELLULOSE 2 G/19G
POWDER, FOR SOLUTION ORAL
Qty: 454 G | OUTPATIENT
Start: 2024-03-01

## 2024-03-11 ENCOUNTER — TELEPHONE (OUTPATIENT)
Age: 54
End: 2024-03-11

## 2024-03-11 NOTE — TELEPHONE ENCOUNTER
Referral for K21.9ICD-10-CMGastroesophageal reflux disease, unspecified whether esophagitis present  R13.10ICD-10-CMDysphagia, unspecified type  Z12.11ICD-10-CMScreening for colon cancer. Please review and advise.      Referral  Referral # [id number]  Referral Information    Referral # Creation Date Referral Status Status Update    [id number] 02/29/2024 In Progress 03/11/2024: Status History     Status Reason Referral Type Referral Reasons Referral Class   none Eval and Treat Specialty Services Required Internal     To Specialty To Provider To Location/Place of Service To Department   Gastroenterology Doe Degroot MD [facility] - GASTROENTEROLOGY     To Vendor Referred By By Location/Place of Service By Department   Zaida Morgan MD [facility]     Priority Start Date Expiration Date Referral Entered By   Routine 02/29/2024 02/28/2025 Zaida Monroe MD     Visits Requested Visits Authorized Visits Completed Visits Scheduled   2 2       Coverages    Payor Plan Auth. Required? Covered? Member # Authorized From Expires Auth # Precert. # Comment   [employer] [facility] [place] [employer] [facility] [place] [id number] 2/1/2023 -- -- -- --     Referral Information    Referral # Creation Date Referral Status Status Update    [id number] 02/29/2024 In Progress 03/11/2024: Status History     Status Reason Referral Type Referral Reasons Referral Class   none Eval and Treat Specialty Services Required Internal     To Specialty To Provider To Location/Place of Service To Department   Gastroenterology Doe Degroot MD [facility] - GASTROENTEROLOGY     To Vendor Referred By By Location/Place of Service By Department   Zaida Morgan MD [facility]     Priority Start Date Expiration Date Referral Entered By   Routine 02/29/2024 02/28/2025 Zaida Monroe

## 2024-03-15 ENCOUNTER — TELEPHONE (OUTPATIENT)
Facility: CLINIC | Age: 54
End: 2024-03-15

## 2024-03-15 NOTE — TELEPHONE ENCOUNTER
Patient called, she wants to know if you can include a chest xray when she goes to get her labs on Wednesday at [facility].  Patient said she coughed up blood this morning.    Pt\"# is [phone]

## 2024-03-26 ENCOUNTER — TELEPHONE (OUTPATIENT)
Age: 54
End: 2024-03-26

## 2024-03-26 NOTE — TELEPHONE ENCOUNTER
Spoke with SRI Walker and he has cleared her from an anesthesia standpoint to undergo EGD and colonoscopy.  Therefore go ahead and schedule her for open access EGD and colonoscopy as previously discussed and the reasons stated.  Thank you..

## 2024-03-28 ENCOUNTER — TELEPHONE (OUTPATIENT)
Facility: CLINIC | Age: 54
End: 2024-03-28

## 2024-03-28 NOTE — TELEPHONE ENCOUNTER
Pt request medication for    Linzess    Location    [facility]        Stated insurance may require a Prior Authorization, or Pharmacy.    Advised if they do request it takes 2 weeks for PA to be completed. If insurance request we just get some sort of fax or call         Pt request speak with nurse returning call, can call this number back due to have trouble with phone. [phone] PT stated you can leave detailed message, if no one answer's    Pt request medication refill for Linzess again

## 2024-03-29 ENCOUNTER — TELEPHONE (OUTPATIENT)
Facility: CLINIC | Age: 54
End: 2024-03-29

## 2024-03-29 NOTE — TELEPHONE ENCOUNTER
TC-call was made. VM was left for pt to call office back today and ask for Maddison. (Skylar)If I am with another pt just ask pt if I could leave a message on current phone number on file.

## 2024-03-29 NOTE — TELEPHONE ENCOUNTER
Can you reach out to patient about this.  I do not see it on her medication list or the dispense report.

## 2024-04-01 ENCOUNTER — SCHEDULED TELEPHONE ENCOUNTER (OUTPATIENT)
Age: 54
End: 2024-04-01

## 2024-04-01 RX ORDER — POLYETHYLENE GLYCOL 3350, SODIUM SULFATE ANHYDROUS, SODIUM BICARBONATE, SODIUM CHLORIDE, POTASSIUM CHLORIDE 236; 22.74; 6.74; 5.86; 2.97 G/4L; G/4L; G/4L; G/4L; G/4L
4 POWDER, FOR SOLUTION ORAL ONCE
Qty: 4000 ML | Refills: 0 | Status: SHIPPED | OUTPATIENT
Start: 2024-04-01 | End: 2024-04-02

## 2024-04-01 RX ORDER — LAMOTRIGINE 25 MG/1
75 TABLET ORAL DAILY
COMMUNITY
Start: 2024-03-18

## 2024-04-01 RX ORDER — TRAZODONE HYDROCHLORIDE 150 MG/1
300 TABLET ORAL NIGHTLY
COMMUNITY
Start: 2024-02-07

## 2024-04-01 RX ORDER — OMEPRAZOLE 20 MG/1
20 CAPSULE, DELAYED RELEASE ORAL DAILY
COMMUNITY
Start: 2024-02-07

## 2024-04-01 RX ORDER — OMEGA-3-ACID ETHYL ESTERS 1 G/1
2 CAPSULE, LIQUID FILLED ORAL 2 TIMES DAILY
COMMUNITY
Start: 2024-03-12

## 2024-04-01 NOTE — PROGRESS NOTES
Peg prep given to the patient 04/01/2024  Surgery Date given: 04/23/2024  Procedure Colonoscopy / EGD, Dx Code K21.9, R13.110, Z12.11  Patient verbalized understanding.

## 2024-04-02 RX ORDER — POLYETHYLENE GLYCOL 3350, SODIUM SULFATE ANHYDROUS, SODIUM BICARBONATE, SODIUM CHLORIDE, POTASSIUM CHLORIDE 236; 22.74; 6.74; 5.86; 2.97 G/4L; G/4L; G/4L; G/4L; G/4L
4000 POWDER, FOR SOLUTION ORAL DAILY
Qty: 4000 ML | Refills: 0 | Status: SHIPPED | OUTPATIENT
Start: 2024-04-02 | End: 2024-04-03

## 2024-04-02 NOTE — TELEPHONE ENCOUNTER
TC-call was made. VM was left stating Per Dr. Monroe that she never prescribed this medication. Therefore she will not be able to refill it. Per  advise to reach out to GI doctor they are the ones that prescribed it. I advise pt to give the office a call back and ask for Maddison if she has further concerns.

## 2024-04-11 ENCOUNTER — APPOINTMENT (OUTPATIENT)
Facility: HOSPITAL | Age: 54
End: 2024-04-11
Payer: COMMERCIAL

## 2024-04-11 ENCOUNTER — HOSPITAL ENCOUNTER (EMERGENCY)
Facility: HOSPITAL | Age: 54
Discharge: HOME | End: 2024-04-11
Payer: COMMERCIAL

## 2024-04-11 VITALS
TEMPERATURE: 97.7 F | WEIGHT: 190 LBS | OXYGEN SATURATION: 94 % | DIASTOLIC BLOOD PRESSURE: 79 MMHG | BODY MASS INDEX: 34.96 KG/M2 | RESPIRATION RATE: 16 BRPM | SYSTOLIC BLOOD PRESSURE: 145 MMHG | HEART RATE: 63 BPM | HEIGHT: 62 IN

## 2024-04-11 DIAGNOSIS — K57.90 DIVERTICULOSIS: ICD-10-CM

## 2024-04-11 DIAGNOSIS — K59.00 CONSTIPATION, UNSPECIFIED CONSTIPATION TYPE: Primary | ICD-10-CM

## 2024-04-11 LAB
ALBUMIN SERPL-MCNC: 3.7 G/DL (ref 3.4–5)
ALBUMIN/GLOB SERPL: 1.2 (ref 0.8–1.7)
ALP SERPL-CCNC: 69 U/L (ref 45–117)
ALT SERPL-CCNC: 26 U/L (ref 13–56)
ANION GAP SERPL CALC-SCNC: 2 MMOL/L (ref 3–18)
APPEARANCE UR: CLEAR
AST SERPL-CCNC: 16 U/L (ref 10–38)
BASOPHILS # BLD: 0.1 K/UL (ref 0–0.1)
BASOPHILS NFR BLD: 1 % (ref 0–2)
BILIRUB SERPL-MCNC: 0.6 MG/DL (ref 0.2–1)
BILIRUB UR QL: NEGATIVE
BUN SERPL-MCNC: 12 MG/DL (ref 7–18)
BUN/CREAT SERPL: 22 (ref 12–20)
CALCIUM SERPL-MCNC: 9.5 MG/DL (ref 8.5–10.1)
CHLORIDE SERPL-SCNC: 107 MMOL/L (ref 100–111)
CO2 SERPL-SCNC: 32 MMOL/L (ref 21–32)
COLOR UR: YELLOW
CREAT SERPL-MCNC: 0.54 MG/DL (ref 0.6–1.3)
DIFFERENTIAL METHOD BLD: ABNORMAL
EOSINOPHIL # BLD: 0.3 K/UL (ref 0–0.4)
EOSINOPHIL NFR BLD: 4 % (ref 0–5)
ERYTHROCYTE [DISTWIDTH] IN BLOOD BY AUTOMATED COUNT: 13.3 % (ref 11.6–14.5)
GLOBULIN SER CALC-MCNC: 3.1 G/DL (ref 2–4)
GLUCOSE SERPL-MCNC: 108 MG/DL (ref 74–99)
GLUCOSE UR STRIP.AUTO-MCNC: NEGATIVE MG/DL
HCT VFR BLD AUTO: 49.3 % (ref 35–45)
HGB BLD-MCNC: 16.2 G/DL (ref 12–16)
HGB UR QL STRIP: NEGATIVE
IMM GRANULOCYTES # BLD AUTO: 0 K/UL (ref 0–0.04)
IMM GRANULOCYTES NFR BLD AUTO: 0 % (ref 0–0.5)
KETONES UR QL STRIP.AUTO: NEGATIVE MG/DL
LEUKOCYTE ESTERASE UR QL STRIP.AUTO: NEGATIVE
LYMPHOCYTES # BLD: 2 K/UL (ref 0.9–3.6)
LYMPHOCYTES NFR BLD: 30 % (ref 21–52)
MCH RBC QN AUTO: 30.6 PG (ref 24–34)
MCHC RBC AUTO-ENTMCNC: 32.9 G/DL (ref 31–37)
MCV RBC AUTO: 93 FL (ref 78–100)
MONOCYTES # BLD: 0.5 K/UL (ref 0.05–1.2)
MONOCYTES NFR BLD: 8 % (ref 3–10)
NEUTS SEG # BLD: 3.9 K/UL (ref 1.8–8)
NEUTS SEG NFR BLD: 58 % (ref 40–73)
NITRITE UR QL STRIP.AUTO: NEGATIVE
NRBC # BLD: 0 K/UL (ref 0–0.01)
NRBC BLD-RTO: 0 PER 100 WBC
PH UR STRIP: 8.5 (ref 5–8)
PLATELET # BLD AUTO: 234 K/UL (ref 135–420)
PMV BLD AUTO: 10 FL (ref 9.2–11.8)
POTASSIUM SERPL-SCNC: 4.3 MMOL/L (ref 3.5–5.5)
PROT SERPL-MCNC: 6.8 G/DL (ref 6.4–8.2)
PROT UR STRIP-MCNC: NEGATIVE MG/DL
RBC # BLD AUTO: 5.3 M/UL (ref 4.2–5.3)
SODIUM SERPL-SCNC: 141 MMOL/L (ref 136–145)
SP GR UR REFRACTOMETRY: 1.01 (ref 1–1.03)
TROPONIN I SERPL HS-MCNC: 12 NG/L (ref 0–54)
UROBILINOGEN UR QL STRIP.AUTO: 0.2 EU/DL (ref 0.2–1)
WBC # BLD AUTO: 6.7 K/UL (ref 4.6–13.2)

## 2024-04-11 PROCEDURE — 84484 ASSAY OF TROPONIN QUANT: CPT

## 2024-04-11 PROCEDURE — 93005 ELECTROCARDIOGRAM TRACING: CPT | Performed by: EMERGENCY MEDICINE

## 2024-04-11 PROCEDURE — 80053 COMPREHEN METABOLIC PANEL: CPT

## 2024-04-11 PROCEDURE — 71045 X-RAY EXAM CHEST 1 VIEW: CPT

## 2024-04-11 PROCEDURE — 99285 EMERGENCY DEPT VISIT HI MDM: CPT

## 2024-04-11 PROCEDURE — 74176 CT ABD & PELVIS W/O CONTRAST: CPT

## 2024-04-11 PROCEDURE — 81003 URINALYSIS AUTO W/O SCOPE: CPT

## 2024-04-11 PROCEDURE — 85025 COMPLETE CBC W/AUTO DIFF WBC: CPT

## 2024-04-11 RX ORDER — SENNOSIDES 8.6 MG
1 CAPSULE ORAL DAILY
Qty: 30 CAPSULE | Refills: 0 | Status: SHIPPED | OUTPATIENT
Start: 2024-04-11

## 2024-04-11 RX ORDER — POLYETHYLENE GLYCOL 3350 17 G/17G
17 POWDER, FOR SOLUTION ORAL DAILY
Qty: 116 G | Refills: 0 | Status: SHIPPED | OUTPATIENT
Start: 2024-04-11

## 2024-04-11 ASSESSMENT — ENCOUNTER SYMPTOMS
VOMITING: 0
DIARRHEA: 0
SORE THROAT: 0
SHORTNESS OF BREATH: 0
ABDOMINAL PAIN: 1

## 2024-04-11 ASSESSMENT — PAIN - FUNCTIONAL ASSESSMENT: PAIN_FUNCTIONAL_ASSESSMENT: 0-10

## 2024-04-11 ASSESSMENT — PAIN SCALES - GENERAL: PAINLEVEL_OUTOF10: 5

## 2024-04-11 NOTE — ED TRIAGE NOTES
Patient arrives to ED ambulatory with report of upper abdominal pain and cheat pain the last few nights. States she feels like something is \"swollen\" in her abdomen.

## 2024-04-11 NOTE — ED PROVIDER NOTES
the computer software.  Please disregard these errors.  Please excuse any errors that have escaped final proofreading.      Wendi Spencer PA-C, PA  04/11/24 2011

## 2024-04-12 LAB
EKG ATRIAL RATE: 61 BPM
EKG DIAGNOSIS: NORMAL
EKG P AXIS: 49 DEGREES
EKG P-R INTERVAL: 160 MS
EKG Q-T INTERVAL: 444 MS
EKG QRS DURATION: 122 MS
EKG QTC CALCULATION (BAZETT): 446 MS
EKG R AXIS: -42 DEGREES
EKG T AXIS: 73 DEGREES
EKG VENTRICULAR RATE: 61 BPM

## 2024-04-12 PROCEDURE — 93010 ELECTROCARDIOGRAM REPORT: CPT | Performed by: INTERNAL MEDICINE

## 2024-04-15 ENCOUNTER — TELEPHONE (OUTPATIENT)
Age: 54
End: 2024-04-15

## 2024-04-15 ENCOUNTER — TELEPHONE (OUTPATIENT)
Facility: CLINIC | Age: 54
End: 2024-04-15

## 2024-04-15 NOTE — TELEPHONE ENCOUNTER
Patient states she has Roscea, request mediation to [facility]. She states she has had it for years, having a flare up now.  Can't remember the name of medication previously prescribed.  Please advise.

## 2024-04-15 NOTE — TELEPHONE ENCOUNTER
Patient needs to arrange transportation for procedure is it possible to get her a time so she can make arrangements being this has to be done days prior

## 2024-04-16 ENCOUNTER — PREP FOR PROCEDURE (OUTPATIENT)
Age: 54
End: 2024-04-16

## 2024-04-16 DIAGNOSIS — R13.19 OTHER DYSPHAGIA: ICD-10-CM

## 2024-04-16 DIAGNOSIS — Z12.11 COLON CANCER SCREENING: ICD-10-CM

## 2024-04-16 DIAGNOSIS — K21.9 GASTROESOPHAGEAL REFLUX DISEASE WITHOUT ESOPHAGITIS: Primary | ICD-10-CM

## 2024-04-16 RX ORDER — SODIUM CHLORIDE, SODIUM LACTATE, POTASSIUM CHLORIDE, CALCIUM CHLORIDE 600; 310; 30; 20 MG/100ML; MG/100ML; MG/100ML; MG/100ML
INJECTION, SOLUTION INTRAVENOUS CONTINUOUS
Status: CANCELLED | OUTPATIENT
Start: 2024-04-16

## 2024-04-18 ENCOUNTER — TELEPHONE (OUTPATIENT)
Facility: CLINIC | Age: 54
End: 2024-04-18

## 2024-04-18 DIAGNOSIS — R30.0 DYSURIA: Primary | ICD-10-CM

## 2024-04-18 DIAGNOSIS — F19.10 SUBSTANCE ABUSE (HCC): ICD-10-CM

## 2024-04-18 NOTE — TELEPHONE ENCOUNTER
Patient called she is having a procedure done next Tuesday, I could not understand what??  She says she will be going to [facility] for labs and is asking that a Urinalysis be included .    Patient also is asking for a prescription for Hemmoriods be sent to her pharmacy     [facility] [id number] - [place], [place] - [address] - P [phone] - F [phone]     Patients phone# is [phone]

## 2024-04-18 NOTE — TELEPHONE ENCOUNTER
Patient called and is asking that you give her a call regarding her lab results.  She said she looked on mychart and it says something about the liver, she is concerned and would appreciate a call back    [phone]

## 2024-04-19 NOTE — TELEPHONE ENCOUNTER
Pt called back regarding lab results. Request please call she wanted to make sure results are good    Pt request a prescription for Hemmoriods be sent to her pharmacy    Pt asked for medication that she had years ago, didn't know name, advised will need name of medication before we can put it in, request due to we do not know what she is asking for.    Pt request once, Prescription is put in please let her know.    Pt stated having chest pain, maybe water around her heart. Stated wanted PCP to check advised we can set up appointment, also advised, PT stated may need to speak with her heart dr.     Pt Stated things going on with her liver, believes she has done some damage, stated does she need more test to be added, to her labs to check her liver    Request call back stated in pain

## 2024-04-19 NOTE — TELEPHONE ENCOUNTER
Please advise patient that she should go for upcoming appointment for the colonoscopy.  She still has not gotten the labs done that were ordered by me.  She can schedule an appointment to discuss her concerns.

## 2024-04-22 ENCOUNTER — TELEPHONE (OUTPATIENT)
Age: 54
End: 2024-04-22

## 2024-04-23 ENCOUNTER — TELEPHONE (OUTPATIENT)
Facility: CLINIC | Age: 54
End: 2024-04-23

## 2024-04-23 NOTE — TELEPHONE ENCOUNTER
Patient states she had labs on Monday that showed abnormal kidney functions.  She is concerned about these labs.  She is scheduled first available appointment, May 7.  Please advise.

## 2024-04-24 NOTE — TELEPHONE ENCOUNTER
Please advise patient that her recent labs showed normal vitamin D, A1c improved to 5.3%, normal kidney and renal function.  Her hemoglobin was slightly elevated at 16.2-which is consistent with her history of polycythemia

## 2024-04-25 ENCOUNTER — TELEPHONE (OUTPATIENT)
Age: 54
End: 2024-04-25

## 2024-04-25 NOTE — TELEPHONE ENCOUNTER
Called patient and left voicemail for her to call office back. She needs to have colonoscopy and EGD rescheduled to either May 7 or 8. Her registration form is on my desk. I reprinted it. FYI -  in case she calls.

## 2024-04-30 ENCOUNTER — TELEPHONE (OUTPATIENT)
Facility: CLINIC | Age: 54
End: 2024-04-30

## 2024-05-03 ENCOUNTER — PATIENT MESSAGE (OUTPATIENT)
Facility: CLINIC | Age: 54
End: 2024-05-03

## 2024-05-07 ENCOUNTER — TELEPHONE (OUTPATIENT)
Facility: CLINIC | Age: 54
End: 2024-05-07

## 2024-05-07 NOTE — TELEPHONE ENCOUNTER
TC-attempt to call pt back from returned call. Cell number was busy. Home number no answer. Message was not left. Due to pt not hearing last message clear.

## 2024-05-07 NOTE — TELEPHONE ENCOUNTER
Patient called and needs to speak with Barbra regarding her labs, she said the voice mail that was left was not clear.     Her number to be reached at is [phone]

## 2024-05-20 RX ORDER — SODIUM CHLORIDE, SODIUM LACTATE, POTASSIUM CHLORIDE, CALCIUM CHLORIDE 600; 310; 30; 20 MG/100ML; MG/100ML; MG/100ML; MG/100ML
INJECTION, SOLUTION INTRAVENOUS CONTINUOUS
Status: CANCELLED | OUTPATIENT
Start: 2024-05-20

## 2024-05-21 ENCOUNTER — TELEPHONE (OUTPATIENT)
Age: 54
End: 2024-05-21

## 2024-05-21 NOTE — TELEPHONE ENCOUNTER
This patient did not show up for her procedures yet again.  Staff unable to reach her.  She clearly has no interest in having her procedures done.  Therefore dismiss her from the practice and do not reschedule her.

## 2024-05-28 DIAGNOSIS — E55.9 VITAMIN D DEFICIENCY: ICD-10-CM

## 2024-05-28 DIAGNOSIS — R06.02 SHORTNESS OF BREATH: ICD-10-CM

## 2024-05-28 DIAGNOSIS — J45.30 MILD PERSISTENT ASTHMA WITHOUT COMPLICATION: ICD-10-CM

## 2024-05-28 DIAGNOSIS — E78.1 PURE HYPERGLYCERIDEMIA: ICD-10-CM

## 2024-05-28 DIAGNOSIS — I10 ESSENTIAL (PRIMARY) HYPERTENSION: ICD-10-CM

## 2024-05-28 DIAGNOSIS — L30.4 INTERTRIGO: ICD-10-CM

## 2024-05-28 RX ORDER — CLOTRIMAZOLE AND BETAMETHASONE DIPROPIONATE 10; .64 MG/G; MG/G
CREAM TOPICAL
Qty: 45 G | Refills: 1 | Status: CANCELLED | OUTPATIENT
Start: 2024-05-28

## 2024-05-28 RX ORDER — AMLODIPINE BESYLATE 10 MG/1
10 TABLET ORAL DAILY
Qty: 90 TABLET | Refills: 3 | Status: CANCELLED | OUTPATIENT
Start: 2024-05-28

## 2024-05-28 NOTE — TELEPHONE ENCOUNTER
Refills have been requested for the following medications:         clotrimazole-betamethasone (LOTRISONE) 1-0.05 % cream [Dr. Zaida Monroe MD]         amLODIPine (NORVASC) 10 MG tablet [Dr. Zaida Monroe MD]         albuterol sulfate HFA (PROVENTIL;VENTOLIN;PROAIR) 108 (90 Base) MCG/ACT inhaler [Dr. Zaida Monroe MD]         fenofibrate (TRICOR) 145 MG tablet [Dr. Zaida Monroe MD]         fluticasone-salmeterol (ADVAIR DISKUS) 100-50 MCG/ACT AEPB diskus inhaler [Dr. Zaida Monroe MD]         losartan-hydroCHLOROthiazide (HYZAAR) 100-12.5 MG per tablet [Dr. Zaida Monroe MD]         vitamin D (ERGOCALCIFEROL) 1.25 MG (20620 UT) CAPS capsule [Dr. Zaida Monroe MD]         atorvastatin (LIPITOR) 40 MG tablet [Dr. Zaida Monroe MD]     Preferred pharmacy: [facility] [id number] - [place], [place] - [address] - P [phone] - F [phone]      2nd time calling for same refills.

## 2024-05-30 ENCOUNTER — TELEPHONE (OUTPATIENT)
Facility: CLINIC | Age: 54
End: 2024-05-30

## 2024-05-30 DIAGNOSIS — I10 ESSENTIAL (PRIMARY) HYPERTENSION: ICD-10-CM

## 2024-05-30 DIAGNOSIS — L30.4 INTERTRIGO: ICD-10-CM

## 2024-05-30 RX ORDER — FLUTICASONE PROPIONATE AND SALMETEROL 100; 50 UG/1; UG/1
1 POWDER RESPIRATORY (INHALATION) EVERY 12 HOURS
Qty: 180 EACH | Refills: 1 | Status: SHIPPED | OUTPATIENT
Start: 2024-05-30 | End: 2024-08-28

## 2024-05-30 RX ORDER — ALBUTEROL SULFATE 90 UG/1
AEROSOL, METERED RESPIRATORY (INHALATION)
Qty: 36 G | Refills: 2 | Status: SHIPPED | OUTPATIENT
Start: 2024-05-30

## 2024-05-30 RX ORDER — FENOFIBRATE 145 MG/1
145 TABLET, COATED ORAL DAILY
Qty: 30 TABLET | Refills: 2 | Status: SHIPPED | OUTPATIENT
Start: 2024-05-30

## 2024-05-30 RX ORDER — ERGOCALCIFEROL 1.25 MG/1
50000 CAPSULE ORAL WEEKLY
Qty: 10 CAPSULE | Refills: 1 | Status: SHIPPED | OUTPATIENT
Start: 2024-05-30

## 2024-05-30 RX ORDER — LOSARTAN POTASSIUM AND HYDROCHLOROTHIAZIDE 12.5; 1 MG/1; MG/1
1 TABLET ORAL DAILY
Qty: 90 TABLET | Refills: 3 | Status: SHIPPED | OUTPATIENT
Start: 2024-05-30

## 2024-05-30 RX ORDER — ATORVASTATIN CALCIUM 40 MG/1
40 TABLET, FILM COATED ORAL DAILY
Qty: 90 TABLET | Refills: 2 | Status: SHIPPED | OUTPATIENT
Start: 2024-05-30

## 2024-05-30 NOTE — TELEPHONE ENCOUNTER
Pt called requesting refills on the following medications asap because she is losing her medical insurance tomorrow.    Pt stated that she has been trying to get the medications for a few days so that she will have enough until she is able to get a new insurance plan.      clotrimazole-betamethasone (LOTRISONE) 1-0.05 % cream     amLODIPine (NORVASC) 10 MG tablet     albuterol sulfate HFA (PROVENTIL;VENTOLIN;PROAIR) 108 (90 Base) MCG/ACT inhaler     fenofibrate (TRICOR) 145 MG tablet     fluticasone-salmeterol (ADVAIR DISKUS) 100-50 MCG/ACT AEPB diskus inhaler     losartan-hydroCHLOROthiazide (HYZAAR) 100-12.5 MG per tablet      vitamin D (ERGOCALCIFEROL) 1.25 MG (16986 UT) CAPS capsule     atorvastatin (LIPITOR) 40 MG tablet     Preferred pharmacy: [facility] [id number] - [place] [address] [address] - P [phone] - F [phone]

## 2024-09-16 DIAGNOSIS — J45.30 MILD PERSISTENT ASTHMA WITHOUT COMPLICATION: ICD-10-CM

## 2024-09-18 RX ORDER — ALBUTEROL SULFATE 90 UG/1
INHALANT RESPIRATORY (INHALATION)
Qty: 36 G | Refills: 2 | Status: SHIPPED | OUTPATIENT
Start: 2024-09-18

## 2024-09-29 ENCOUNTER — HOSPITAL ENCOUNTER (EMERGENCY)
Facility: HOSPITAL | Age: 54
Discharge: HOME | End: 2024-09-29

## 2024-09-29 VITALS
HEART RATE: 84 BPM | OXYGEN SATURATION: 96 % | RESPIRATION RATE: 18 BRPM | DIASTOLIC BLOOD PRESSURE: 79 MMHG | TEMPERATURE: 98.9 F | HEIGHT: 62 IN | WEIGHT: 192 LBS | SYSTOLIC BLOOD PRESSURE: 120 MMHG | BODY MASS INDEX: 35.33 KG/M2

## 2024-09-29 DIAGNOSIS — R35.0 URINARY FREQUENCY: ICD-10-CM

## 2024-09-29 DIAGNOSIS — R21 RASH AND OTHER NONSPECIFIC SKIN ERUPTION: Primary | ICD-10-CM

## 2024-09-29 DIAGNOSIS — F14.10 COCAINE ABUSE (HCC): ICD-10-CM

## 2024-09-29 LAB
ALBUMIN SERPL-MCNC: 3.7 G/DL (ref 3.4–5)
ALBUMIN/GLOB SERPL: 1.1 (ref 0.8–1.7)
ALP SERPL-CCNC: 66 U/L (ref 45–117)
ALT SERPL-CCNC: 15 U/L (ref 13–56)
ANION GAP SERPL CALC-SCNC: 3 MMOL/L (ref 3–18)
APPEARANCE UR: ABNORMAL
AST SERPL-CCNC: 11 U/L (ref 10–38)
BACTERIA URNS QL MICRO: NEGATIVE /HPF
BASOPHILS # BLD: 0 K/UL (ref 0–0.1)
BASOPHILS NFR BLD: 0 % (ref 0–2)
BILIRUB SERPL-MCNC: 0.6 MG/DL (ref 0.2–1)
BILIRUB UR QL: NEGATIVE
BUN SERPL-MCNC: 13 MG/DL (ref 7–18)
BUN/CREAT SERPL: 26 (ref 12–20)
CALCIUM SERPL-MCNC: 8.7 MG/DL (ref 8.5–10.1)
CHLORIDE SERPL-SCNC: 102 MMOL/L (ref 100–111)
CO2 SERPL-SCNC: 30 MMOL/L (ref 21–32)
COLOR UR: YELLOW
CREAT SERPL-MCNC: 0.5 MG/DL (ref 0.6–1.3)
DIFFERENTIAL METHOD BLD: ABNORMAL
EOSINOPHIL # BLD: 0.2 K/UL (ref 0–0.4)
EOSINOPHIL NFR BLD: 2 % (ref 0–5)
EPITH CASTS URNS QL MICRO: NORMAL /LPF (ref 0–5)
ERYTHROCYTE [DISTWIDTH] IN BLOOD BY AUTOMATED COUNT: 13.2 % (ref 11.6–14.5)
GLOBULIN SER CALC-MCNC: 3.3 G/DL (ref 2–4)
GLUCOSE SERPL-MCNC: 106 MG/DL (ref 74–99)
GLUCOSE UR STRIP.AUTO-MCNC: NEGATIVE MG/DL
HCT VFR BLD AUTO: 51.4 % (ref 35–45)
HGB BLD-MCNC: 16.9 G/DL (ref 12–16)
HGB UR QL STRIP: NEGATIVE
IMM GRANULOCYTES # BLD AUTO: 0 K/UL (ref 0–0.04)
IMM GRANULOCYTES NFR BLD AUTO: 0 % (ref 0–0.5)
KETONES UR QL STRIP.AUTO: NEGATIVE MG/DL
LEUKOCYTE ESTERASE UR QL STRIP.AUTO: ABNORMAL
LYMPHOCYTES # BLD: 1.4 K/UL (ref 0.9–3.6)
LYMPHOCYTES NFR BLD: 15 % (ref 21–52)
MCH RBC QN AUTO: 31.4 PG (ref 24–34)
MCHC RBC AUTO-ENTMCNC: 32.9 G/DL (ref 31–37)
MCV RBC AUTO: 95.4 FL (ref 78–100)
MONOCYTES # BLD: 1 K/UL (ref 0.05–1.2)
MONOCYTES NFR BLD: 11 % (ref 3–10)
NEUTS SEG # BLD: 7 K/UL (ref 1.8–8)
NEUTS SEG NFR BLD: 72 % (ref 40–73)
NITRITE UR QL STRIP.AUTO: NEGATIVE
NRBC # BLD: 0 K/UL (ref 0–0.01)
NRBC BLD-RTO: 0 PER 100 WBC
PH UR STRIP: 6 (ref 5–8)
PLATELET # BLD AUTO: 208 K/UL (ref 135–420)
PMV BLD AUTO: 9.5 FL (ref 9.2–11.8)
POTASSIUM SERPL-SCNC: 3.4 MMOL/L (ref 3.5–5.5)
PROT SERPL-MCNC: 7 G/DL (ref 6.4–8.2)
PROT UR STRIP-MCNC: NEGATIVE MG/DL
RBC # BLD AUTO: 5.39 M/UL (ref 4.2–5.3)
RBC #/AREA URNS HPF: NEGATIVE /HPF (ref 0–5)
SODIUM SERPL-SCNC: 135 MMOL/L (ref 136–145)
SP GR UR REFRACTOMETRY: 1.02 (ref 1–1.03)
UROBILINOGEN UR QL STRIP.AUTO: 1 EU/DL (ref 0.2–1)
WBC # BLD AUTO: 9.7 K/UL (ref 4.6–13.2)
WBC URNS QL MICRO: NORMAL /HPF (ref 0–4)

## 2024-09-29 PROCEDURE — 99283 EMERGENCY DEPT VISIT LOW MDM: CPT

## 2024-09-29 PROCEDURE — 81001 URINALYSIS AUTO W/SCOPE: CPT

## 2024-09-29 PROCEDURE — 80053 COMPREHEN METABOLIC PANEL: CPT

## 2024-09-29 PROCEDURE — 85025 COMPLETE CBC W/AUTO DIFF WBC: CPT

## 2024-09-29 RX ORDER — NYSTATIN 100000 [USP'U]/G
POWDER TOPICAL
Qty: 15 G | Refills: 0 | Status: SHIPPED | OUTPATIENT
Start: 2024-09-29 | End: 2025-09-07

## 2024-09-29 ASSESSMENT — PAIN - FUNCTIONAL ASSESSMENT: PAIN_FUNCTIONAL_ASSESSMENT: 0-10

## 2024-09-29 ASSESSMENT — PAIN DESCRIPTION - LOCATION: LOCATION: ABDOMEN

## 2024-09-29 ASSESSMENT — PAIN SCALES - GENERAL: PAINLEVEL_OUTOF10: 7

## 2024-09-29 NOTE — ED TRIAGE NOTES
Pt presents to triage for fungal infection in abdominal folds. Pt was using cream, but ran out and started using corn starch. States abdominal fold is malodorous and raw.     Pt also states she has been bruising, has polycythemia.     Pt also states she has been experiencing urinary incontinence x a few weeks.     Pt also states she has crack cocaine addiction and eventually wants to seek help for this.

## 2024-09-29 NOTE — BSMART NOTE
Crisis note:    Went to the ER to meet with patient and give outpatient referrals related to cocaine abuse. Patient stated she see's Dr. Wiley at [facility] and she plans to speak to her about her issues. Patient declined outpatient referrals except packet for [facility].

## 2024-09-29 NOTE — ED PROVIDER NOTES
fluticasone-salmeterol (ADVAIR DISKUS) 100-50 MCG/ACT AEPB diskus inhaler Inhale 1 puff into the lungs in the morning and 1 puff in the evening. 180 each 1    losartan-hydroCHLOROthiazide (HYZAAR) 100-12.5 MG per tablet Take 1 tablet by mouth daily 90 tablet 3    vitamin D (ERGOCALCIFEROL) 1.25 MG (76457 UT) CAPS capsule Take 1 capsule by mouth once a week 10 capsule 1    atorvastatin (LIPITOR) 40 MG tablet Take 1 tablet by mouth daily 90 tablet 2    polyethylene glycol (MIRALAX) 17 GM/SCOOP powder Take 17 g by mouth daily 116 g 0    Sennosides (SENNA) 8.6 MG CAPS Take 1 capsule by mouth daily 30 capsule 0    lamoTRIgine (LAMICTAL) 25 MG tablet Take 3 tablets by mouth daily      omega-3 acid ethyl esters (LOVAZA) 1 g capsule Take 2 capsules by mouth 2 times daily      omeprazole (PRILOSEC) 20 MG delayed release capsule Take 1 capsule by mouth Daily      traZODone (DESYREL) 150 MG tablet Take 2 tablets by mouth nightly      brexpiprazole (REXULTI) 0.5 MG TABS tablet Take 1 tablet by mouth daily      amLODIPine (NORVASC) 10 MG tablet Take 1 tablet by mouth daily 90 tablet 3    lidocaine (LIDODERM) 5 % APPLY 1 PATCH TO AFFECTED AREA FOR 12 HOURS IN A 24 HOUR PERIOD 12 HOURS ON 12 HOURS OFF) 30 patch 0    clotrimazole-betamethasone (LOTRISONE) 1-0.05 % cream Apply topically 2 times daily. 45 g 1    desvenlafaxine succinate (PRISTIQ) 100 MG TB24 extended release tablet       clonazePAM (KLONOPIN) 1 MG tablet Take 1 tablet by mouth daily.         Allergies:  Allergies   Allergen Reactions    Iodinated Contrast Media Shortness Of Breath       Social Determinants of Health:  Social Determinants of Health     Tobacco Use: High Risk (5/15/2024)    Patient History     Smoking Tobacco Use: Every Day     Smokeless Tobacco Use: Never     Passive Exposure: Not on file   Alcohol Use: Not At Risk (11/18/2020)    Received from [facility]  (prior to 6/17/2023), [facility]  (prior to 6/17/2023)

## 2024-09-29 NOTE — ED NOTES
Pt states \"can I speak to the people from behavioral\"     Pt denies SI/HI, denies AH/VH; Reports she just wants to speak with crisis. Pt states \"I'm not staying days, you are not keeping me, I just want to talk to them\"

## 2024-10-15 ENCOUNTER — HOSPITAL ENCOUNTER (EMERGENCY)
Facility: HOSPITAL | Age: 54
Discharge: HOME | End: 2024-10-15
Attending: EMERGENCY MEDICINE

## 2024-10-15 ENCOUNTER — APPOINTMENT (OUTPATIENT)
Facility: HOSPITAL | Age: 54
End: 2024-10-15

## 2024-10-15 VITALS
RESPIRATION RATE: 18 BRPM | HEIGHT: 62 IN | DIASTOLIC BLOOD PRESSURE: 75 MMHG | WEIGHT: 183 LBS | HEART RATE: 66 BPM | SYSTOLIC BLOOD PRESSURE: 133 MMHG | TEMPERATURE: 98.6 F | OXYGEN SATURATION: 95 % | BODY MASS INDEX: 33.68 KG/M2

## 2024-10-15 DIAGNOSIS — F17.200 TOBACCO USE DISORDER: ICD-10-CM

## 2024-10-15 DIAGNOSIS — R20.2 NUMBNESS AND TINGLING OF RIGHT UPPER EXTREMITY: ICD-10-CM

## 2024-10-15 DIAGNOSIS — F14.90 COCAINE USE: ICD-10-CM

## 2024-10-15 DIAGNOSIS — R07.9 CHEST PAIN, UNSPECIFIED TYPE: Primary | ICD-10-CM

## 2024-10-15 DIAGNOSIS — R20.0 NUMBNESS AND TINGLING OF RIGHT FACE: ICD-10-CM

## 2024-10-15 DIAGNOSIS — R20.2 NUMBNESS AND TINGLING OF RIGHT FACE: ICD-10-CM

## 2024-10-15 DIAGNOSIS — R20.0 NUMBNESS AND TINGLING OF RIGHT UPPER EXTREMITY: ICD-10-CM

## 2024-10-15 LAB
ALBUMIN SERPL-MCNC: 3.7 G/DL (ref 3.4–5)
ALBUMIN/GLOB SERPL: 1.4 (ref 0.8–1.7)
ALP SERPL-CCNC: 55 U/L (ref 45–117)
ALT SERPL-CCNC: 21 U/L (ref 13–56)
ANION GAP SERPL CALC-SCNC: 4 MMOL/L (ref 3–18)
AST SERPL-CCNC: 14 U/L (ref 10–38)
BASOPHILS # BLD: 0.1 K/UL (ref 0–0.1)
BASOPHILS NFR BLD: 1 % (ref 0–2)
BILIRUB SERPL-MCNC: 0.6 MG/DL (ref 0.2–1)
BUN SERPL-MCNC: 10 MG/DL (ref 7–18)
BUN/CREAT SERPL: 17 (ref 12–20)
CALCIUM SERPL-MCNC: 8.6 MG/DL (ref 8.5–10.1)
CHLORIDE SERPL-SCNC: 109 MMOL/L (ref 100–111)
CO2 SERPL-SCNC: 28 MMOL/L (ref 21–32)
CREAT SERPL-MCNC: 0.59 MG/DL (ref 0.6–1.3)
DIFFERENTIAL METHOD BLD: ABNORMAL
EKG ATRIAL RATE: 59 BPM
EKG DIAGNOSIS: NORMAL
EKG P AXIS: 48 DEGREES
EKG P-R INTERVAL: 158 MS
EKG Q-T INTERVAL: 440 MS
EKG QRS DURATION: 120 MS
EKG QTC CALCULATION (BAZETT): 435 MS
EKG R AXIS: -44 DEGREES
EKG T AXIS: 56 DEGREES
EKG VENTRICULAR RATE: 59 BPM
EOSINOPHIL # BLD: 0.2 K/UL (ref 0–0.4)
EOSINOPHIL NFR BLD: 2 % (ref 0–5)
ERYTHROCYTE [DISTWIDTH] IN BLOOD BY AUTOMATED COUNT: 12.8 % (ref 11.6–14.5)
GLOBULIN SER CALC-MCNC: 2.6 G/DL (ref 2–4)
GLUCOSE SERPL-MCNC: 140 MG/DL (ref 74–99)
HCT VFR BLD AUTO: 50.4 % (ref 35–45)
HGB BLD-MCNC: 17.3 G/DL (ref 12–16)
IMM GRANULOCYTES # BLD AUTO: 0 K/UL (ref 0–0.04)
IMM GRANULOCYTES NFR BLD AUTO: 0 % (ref 0–0.5)
LYMPHOCYTES # BLD: 2.3 K/UL (ref 0.9–3.6)
LYMPHOCYTES NFR BLD: 31 % (ref 21–52)
MCH RBC QN AUTO: 32.8 PG (ref 24–34)
MCHC RBC AUTO-ENTMCNC: 34.3 G/DL (ref 31–37)
MCV RBC AUTO: 95.5 FL (ref 78–100)
MONOCYTES # BLD: 0.6 K/UL (ref 0.05–1.2)
MONOCYTES NFR BLD: 8 % (ref 3–10)
NEUTS SEG # BLD: 4.2 K/UL (ref 1.8–8)
NEUTS SEG NFR BLD: 58 % (ref 40–73)
NRBC # BLD: 0 K/UL (ref 0–0.01)
NRBC BLD-RTO: 0 PER 100 WBC
NT PRO BNP: 84 PG/ML (ref 0–900)
PLATELET # BLD AUTO: 224 K/UL (ref 135–420)
PMV BLD AUTO: 9.4 FL (ref 9.2–11.8)
POTASSIUM SERPL-SCNC: 3.6 MMOL/L (ref 3.5–5.5)
PROT SERPL-MCNC: 6.3 G/DL (ref 6.4–8.2)
RBC # BLD AUTO: 5.28 M/UL (ref 4.2–5.3)
SODIUM SERPL-SCNC: 141 MMOL/L (ref 136–145)
TROPONIN I SERPL HS-MCNC: 10 NG/L (ref 0–54)
TROPONIN I SERPL HS-MCNC: 8 NG/L (ref 0–54)
WBC # BLD AUTO: 7.3 K/UL (ref 4.6–13.2)

## 2024-10-15 PROCEDURE — 80053 COMPREHEN METABOLIC PANEL: CPT

## 2024-10-15 PROCEDURE — 93010 ELECTROCARDIOGRAM REPORT: CPT | Performed by: INTERNAL MEDICINE

## 2024-10-15 PROCEDURE — 93005 ELECTROCARDIOGRAM TRACING: CPT | Performed by: EMERGENCY MEDICINE

## 2024-10-15 PROCEDURE — 71045 X-RAY EXAM CHEST 1 VIEW: CPT

## 2024-10-15 PROCEDURE — 85025 COMPLETE CBC W/AUTO DIFF WBC: CPT

## 2024-10-15 PROCEDURE — 99285 EMERGENCY DEPT VISIT HI MDM: CPT

## 2024-10-15 PROCEDURE — 84484 ASSAY OF TROPONIN QUANT: CPT

## 2024-10-15 PROCEDURE — 83880 ASSAY OF NATRIURETIC PEPTIDE: CPT

## 2024-10-15 RX ORDER — GABAPENTIN 100 MG/1
100 CAPSULE ORAL 3 TIMES DAILY
Qty: 90 CAPSULE | Refills: 0 | Status: SHIPPED | OUTPATIENT
Start: 2024-10-15 | End: 2025-01-13

## 2024-10-15 ASSESSMENT — PAIN - FUNCTIONAL ASSESSMENT: PAIN_FUNCTIONAL_ASSESSMENT: NONE - DENIES PAIN

## 2024-10-15 NOTE — DISCHARGE INSTRUCTIONS
Return to the ER for any new or worsening symptoms.  Follow your primary care physician for further evaluation and treatment.  Take all medications as prescribed.  Please try to abstain from tobacco use and illicit drug use as it can worsen your symptoms and could lead to heart disease and heart attacks.

## 2024-10-15 NOTE — ED TRIAGE NOTES
Patient presented to the Emergency Dept with a complaint of numbness to right side of face for the past 5 days , tingling to feet and hands and intermittent chest pain for the past 3 days.    Patient denies having active chest pain and unable to rates same    Patient alert and oriented x 4, patient breathes freely on room air in nil cardiopulmonary distress

## 2024-10-15 NOTE — ED NOTES
Pt aox4 at discharge. NAD noted. VSS. Patient provided with discharge paperwork and verbalized understanding. Patient ambulatory at discharge.

## 2024-10-15 NOTE — ED PROVIDER NOTES
(H) 12.0 - 16.0 g/dL    Hematocrit 50.4 (H) 35.0 - 45.0 %    MCV 95.5 78.0 - 100.0 FL    MCH 32.8 24.0 - 34.0 PG    MCHC 34.3 31.0 - 37.0 g/dL    RDW 12.8 11.6 - 14.5 %    Platelets 224 135 - 420 K/uL    MPV 9.4 9.2 - 11.8 FL    Nucleated RBCs 0.0 0  WBC    nRBC 0.00 0.00 - 0.01 K/uL    Neutrophils % 58 40 - 73 %    Lymphocytes % 31 21 - 52 %    Monocytes % 8 3 - 10 %    Eosinophils % 2 0 - 5 %    Basophils % 1 0 - 2 %    Immature Granulocytes % 0 0.0 - 0.5 %    Neutrophils Absolute 4.2 1.8 - 8.0 K/UL    Lymphocytes Absolute 2.3 0.9 - 3.6 K/UL    Monocytes Absolute 0.6 0.05 - 1.2 K/UL    Eosinophils Absolute 0.2 0.0 - 0.4 K/UL    Basophils Absolute 0.1 0.0 - 0.1 K/UL    Immature Granulocytes Absolute 0.0 0.00 - 0.04 K/UL    Differential Type AUTOMATED     CMP    Collection Time: 10/15/24  8:20 AM   Result Value Ref Range    Sodium 141 136 - 145 mmol/L    Potassium 3.6 3.5 - 5.5 mmol/L    Chloride 109 100 - 111 mmol/L    CO2 28 21 - 32 mmol/L    Anion Gap 4 3.0 - 18 mmol/L    Glucose 140 (H) 74 - 99 mg/dL    BUN 10 7.0 - 18 MG/DL    Creatinine 0.59 (L) 0.6 - 1.3 MG/DL    BUN/Creatinine Ratio 17 12 - 20      Est, Glom Filt Rate >90 >60 ml/min/1.73m2    Calcium 8.6 8.5 - 10.1 MG/DL    Total Bilirubin 0.6 0.2 - 1.0 MG/DL    ALT 21 13 - 56 U/L    AST 14 10 - 38 U/L    Alk Phosphatase 55 45 - 117 U/L    Total Protein 6.3 (L) 6.4 - 8.2 g/dL    Albumin 3.7 3.4 - 5.0 g/dL    Globulin 2.6 2.0 - 4.0 g/dL    Albumin/Globulin Ratio 1.4 0.8 - 1.7     Troponin    Collection Time: 10/15/24  8:20 AM   Result Value Ref Range    Troponin, High Sensitivity 10 0 - 54 ng/L   Brain Natriuretic Peptide    Collection Time: 10/15/24  8:20 AM   Result Value Ref Range    NT Pro-BNP 84 0 - 900 PG/ML   Troponin    Collection Time: 10/15/24 10:11 AM   Result Value Ref Range    Troponin, High Sensitivity 8 0 - 54 ng/L       Radiologic Studies ordered to better evaluate patient's complaints and exam findings-   XR CHEST PORTABLE   Final

## 2024-10-21 ENCOUNTER — HOSPITAL ENCOUNTER (EMERGENCY)
Facility: HOSPITAL | Age: 54
Discharge: HOME | End: 2024-10-21
Attending: EMERGENCY MEDICINE

## 2024-10-21 ENCOUNTER — APPOINTMENT (OUTPATIENT)
Facility: HOSPITAL | Age: 54
End: 2024-10-21

## 2024-10-21 VITALS
DIASTOLIC BLOOD PRESSURE: 70 MMHG | OXYGEN SATURATION: 97 % | RESPIRATION RATE: 16 BRPM | BODY MASS INDEX: 34.04 KG/M2 | TEMPERATURE: 96.7 F | WEIGHT: 185 LBS | HEIGHT: 62 IN | SYSTOLIC BLOOD PRESSURE: 133 MMHG | HEART RATE: 54 BPM

## 2024-10-21 DIAGNOSIS — R07.9 CHEST PAIN, UNSPECIFIED TYPE: ICD-10-CM

## 2024-10-21 DIAGNOSIS — J44.1 COPD EXACERBATION (HCC): Primary | ICD-10-CM

## 2024-10-21 LAB
ALBUMIN SERPL-MCNC: 3.7 G/DL (ref 3.4–5)
ALBUMIN/GLOB SERPL: 1.5 (ref 0.8–1.7)
ALP SERPL-CCNC: 53 U/L (ref 45–117)
ALT SERPL-CCNC: 19 U/L (ref 13–56)
AMPHET UR QL SCN: NEGATIVE
ANION GAP SERPL CALC-SCNC: 5 MMOL/L (ref 3–18)
AST SERPL-CCNC: 15 U/L (ref 10–38)
BARBITURATES UR QL SCN: NEGATIVE
BASOPHILS # BLD: 0.1 K/UL (ref 0–0.1)
BASOPHILS NFR BLD: 1 % (ref 0–2)
BENZODIAZ UR QL: NEGATIVE
BILIRUB SERPL-MCNC: 0.3 MG/DL (ref 0.2–1)
BUN SERPL-MCNC: 8 MG/DL (ref 7–18)
BUN/CREAT SERPL: 18 (ref 12–20)
CALCIUM SERPL-MCNC: 9 MG/DL (ref 8.5–10.1)
CANNABINOIDS UR QL SCN: NEGATIVE
CHLORIDE SERPL-SCNC: 108 MMOL/L (ref 100–111)
CO2 SERPL-SCNC: 27 MMOL/L (ref 21–32)
COCAINE UR QL SCN: POSITIVE
CREAT SERPL-MCNC: 0.45 MG/DL (ref 0.6–1.3)
DIFFERENTIAL METHOD BLD: ABNORMAL
EOSINOPHIL # BLD: 0.2 K/UL (ref 0–0.4)
EOSINOPHIL NFR BLD: 2 % (ref 0–5)
ERYTHROCYTE [DISTWIDTH] IN BLOOD BY AUTOMATED COUNT: 12.7 % (ref 11.6–14.5)
ETHANOL SERPL-MCNC: <3 MG/DL (ref 0–3)
FLUAV RNA SPEC QL NAA+PROBE: NOT DETECTED
FLUBV RNA SPEC QL NAA+PROBE: NOT DETECTED
GLOBULIN SER CALC-MCNC: 2.5 G/DL (ref 2–4)
GLUCOSE SERPL-MCNC: 105 MG/DL (ref 74–99)
HCT VFR BLD AUTO: 48.3 % (ref 35–45)
HGB BLD-MCNC: 16.3 G/DL (ref 12–16)
IMM GRANULOCYTES # BLD AUTO: 0 K/UL (ref 0–0.04)
IMM GRANULOCYTES NFR BLD AUTO: 0 % (ref 0–0.5)
LYMPHOCYTES # BLD: 3 K/UL (ref 0.9–3.6)
LYMPHOCYTES NFR BLD: 31 % (ref 21–52)
Lab: ABNORMAL
MCH RBC QN AUTO: 32.1 PG (ref 24–34)
MCHC RBC AUTO-ENTMCNC: 33.7 G/DL (ref 31–37)
MCV RBC AUTO: 95.1 FL (ref 78–100)
METHADONE UR QL: NEGATIVE
MONOCYTES # BLD: 0.8 K/UL (ref 0.05–1.2)
MONOCYTES NFR BLD: 9 % (ref 3–10)
NEUTS SEG # BLD: 5.4 K/UL (ref 1.8–8)
NEUTS SEG NFR BLD: 57 % (ref 40–73)
NRBC # BLD: 0 K/UL (ref 0–0.01)
NRBC BLD-RTO: 0 PER 100 WBC
NT PRO BNP: 145 PG/ML (ref 0–900)
OPIATES UR QL: NEGATIVE
PCP UR QL: NEGATIVE
PLATELET # BLD AUTO: 215 K/UL (ref 135–420)
PMV BLD AUTO: 9.6 FL (ref 9.2–11.8)
POTASSIUM SERPL-SCNC: 3.8 MMOL/L (ref 3.5–5.5)
PROT SERPL-MCNC: 6.2 G/DL (ref 6.4–8.2)
RBC # BLD AUTO: 5.08 M/UL (ref 4.2–5.3)
SARS-COV-2 RNA RESP QL NAA+PROBE: NOT DETECTED
SODIUM SERPL-SCNC: 140 MMOL/L (ref 136–145)
SOURCE: NORMAL
TROPONIN I SERPL HS-MCNC: 12 NG/L (ref 0–54)
WBC # BLD AUTO: 9.5 K/UL (ref 4.6–13.2)

## 2024-10-21 PROCEDURE — 2580000003 HC RX 258: Performed by: EMERGENCY MEDICINE

## 2024-10-21 PROCEDURE — 83880 ASSAY OF NATRIURETIC PEPTIDE: CPT

## 2024-10-21 PROCEDURE — 80307 DRUG TEST PRSMV CHEM ANLYZR: CPT

## 2024-10-21 PROCEDURE — 71045 X-RAY EXAM CHEST 1 VIEW: CPT

## 2024-10-21 PROCEDURE — 99285 EMERGENCY DEPT VISIT HI MDM: CPT

## 2024-10-21 PROCEDURE — 85025 COMPLETE CBC W/AUTO DIFF WBC: CPT

## 2024-10-21 PROCEDURE — 96374 THER/PROPH/DIAG INJ IV PUSH: CPT

## 2024-10-21 PROCEDURE — 87636 SARSCOV2 & INF A&B AMP PRB: CPT

## 2024-10-21 PROCEDURE — 6370000000 HC RX 637 (ALT 250 FOR IP): Performed by: EMERGENCY MEDICINE

## 2024-10-21 PROCEDURE — 80053 COMPREHEN METABOLIC PANEL: CPT

## 2024-10-21 PROCEDURE — 82077 ASSAY SPEC XCP UR&BREATH IA: CPT

## 2024-10-21 PROCEDURE — 6360000002 HC RX W HCPCS: Performed by: EMERGENCY MEDICINE

## 2024-10-21 PROCEDURE — 84484 ASSAY OF TROPONIN QUANT: CPT

## 2024-10-21 PROCEDURE — 93005 ELECTROCARDIOGRAM TRACING: CPT | Performed by: EMERGENCY MEDICINE

## 2024-10-21 RX ORDER — PREDNISONE 20 MG/1
60 TABLET ORAL DAILY
Qty: 15 TABLET | Refills: 0 | Status: SHIPPED | OUTPATIENT
Start: 2024-10-21 | End: 2024-10-26

## 2024-10-21 RX ORDER — IPRATROPIUM BROMIDE AND ALBUTEROL SULFATE 2.5; .5 MG/3ML; MG/3ML
1 SOLUTION RESPIRATORY (INHALATION) ONCE
Status: COMPLETED | OUTPATIENT
Start: 2024-10-21 | End: 2024-10-21

## 2024-10-21 RX ADMIN — IPRATROPIUM BROMIDE AND ALBUTEROL SULFATE 1 DOSE: .5; 3 SOLUTION RESPIRATORY (INHALATION) at 16:26

## 2024-10-21 RX ADMIN — WATER 125 MG: 1 INJECTION INTRAMUSCULAR; INTRAVENOUS; SUBCUTANEOUS at 16:24

## 2024-10-21 NOTE — ED PROVIDER NOTES
EMERGENCY DEPARTMENT HISTORY AND PHYSICAL EXAM      Patient Name: Jammie Marino  MRN: [id number]  YOB: 1970  Provider: Kari Childers MD  PCP: Zaida Monroe MD   Time/Date of evaluation: 3:38 PM EDT on 10/21/24    History of Presenting Illness     Chief Complaint   Patient presents with    Nasal Congestion    Chest Pain       History Provided By: EMS and Patient     History (Narative):   Jammie Marino is a 54 y.o. female with a PMHX of CHF, cocaine abuse, bipolar disorder, alcohol abuse, tobacco use, polycythemia, and all of the other medical problems listed below  who presents to the emergency department  by EMS C/O pressure in her chest and numbness and tingling in her arms and her legs.  She is also complaining of shortness of breath and feels like she has mucus stuck in her lungs but she cannot cough it up.  She states that she does smoke and has increased her smoking lately.  She also states that she has a funny taste in her throat.  Additionally, she used cocaine today and then began having chest pain.        Past History     Past Medical History:  Past Medical History:   Diagnosis Date    Asthma     Bronchitis     CHF (congestive heart failure) ([facility])     Cocaine abuse ([facility]) 3/26/2015    Dental caries     Depression     bipolar    Depression 8/31/2016    Drug abuse ([facility])     Dysphasia 10/17/2018    Elevated hematocrit 8/31/2016    ETOH abuse     GERD (gastroesophageal reflux disease)     schatzis ring, hiatal hernia    H/O screening mammography 12/06/2016    No evidence of malignancy     HPV (human papilloma virus) 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

## 2024-10-21 NOTE — ED TRIAGE NOTES
Pt complaining of chest pressure and congestion for about 2 weeks. Pt requesting inhaler. Doesn't want IV and threatening to leave. Behavioral Hx and numerous family issues/complaints at this time. Blood drawn but no IV esablished based on flight risk.

## 2024-10-22 LAB
EKG ATRIAL RATE: 57 BPM
EKG DIAGNOSIS: NORMAL
EKG P AXIS: 49 DEGREES
EKG P-R INTERVAL: 172 MS
EKG Q-T INTERVAL: 454 MS
EKG QRS DURATION: 110 MS
EKG QTC CALCULATION (BAZETT): 441 MS
EKG R AXIS: -40 DEGREES
EKG T AXIS: 28 DEGREES
EKG VENTRICULAR RATE: 57 BPM

## 2024-10-30 ENCOUNTER — OFFICE VISIT (OUTPATIENT)
Facility: CLINIC | Age: 54
End: 2024-10-30

## 2024-10-30 VITALS
SYSTOLIC BLOOD PRESSURE: 120 MMHG | BODY MASS INDEX: 34.78 KG/M2 | OXYGEN SATURATION: 96 % | HEART RATE: 74 BPM | WEIGHT: 189 LBS | DIASTOLIC BLOOD PRESSURE: 75 MMHG | HEIGHT: 62 IN

## 2024-10-30 DIAGNOSIS — I10 ESSENTIAL (PRIMARY) HYPERTENSION: ICD-10-CM

## 2024-10-30 DIAGNOSIS — F14.10 COCAINE ABUSE (HCC): ICD-10-CM

## 2024-10-30 DIAGNOSIS — E55.9 VITAMIN D DEFICIENCY: ICD-10-CM

## 2024-10-30 DIAGNOSIS — E78.1 PURE HYPERGLYCERIDEMIA: ICD-10-CM

## 2024-10-30 DIAGNOSIS — R07.9 CHEST PAIN, UNSPECIFIED TYPE: Primary | ICD-10-CM

## 2024-10-30 DIAGNOSIS — R06.02 SHORTNESS OF BREATH: ICD-10-CM

## 2024-10-30 DIAGNOSIS — R73.03 PREDIABETES: ICD-10-CM

## 2024-10-30 DIAGNOSIS — J44.9 CHRONIC OBSTRUCTIVE PULMONARY DISEASE, UNSPECIFIED COPD TYPE (HCC): ICD-10-CM

## 2024-10-30 DIAGNOSIS — F19.10 SUBSTANCE ABUSE (HCC): ICD-10-CM

## 2024-10-30 DIAGNOSIS — J45.30 MILD PERSISTENT ASTHMA WITHOUT COMPLICATION: ICD-10-CM

## 2024-10-30 DIAGNOSIS — D75.1 SECONDARY POLYCYTHEMIA: ICD-10-CM

## 2024-10-30 PROCEDURE — 3074F SYST BP LT 130 MM HG: CPT | Performed by: STUDENT IN AN ORGANIZED HEALTH CARE EDUCATION/TRAINING PROGRAM

## 2024-10-30 PROCEDURE — 99214 OFFICE O/P EST MOD 30 MIN: CPT | Performed by: STUDENT IN AN ORGANIZED HEALTH CARE EDUCATION/TRAINING PROGRAM

## 2024-10-30 PROCEDURE — 3078F DIAST BP <80 MM HG: CPT | Performed by: STUDENT IN AN ORGANIZED HEALTH CARE EDUCATION/TRAINING PROGRAM

## 2024-10-30 RX ORDER — RISPERIDONE 0.5 MG/1
0.5 TABLET ORAL 2 TIMES DAILY
COMMUNITY

## 2024-10-30 RX ORDER — FENOFIBRATE 145 MG/1
145 TABLET, COATED ORAL DAILY
Qty: 30 TABLET | Refills: 2 | Status: SHIPPED | OUTPATIENT
Start: 2024-10-30

## 2024-10-30 RX ORDER — ATORVASTATIN CALCIUM 40 MG/1
40 TABLET, FILM COATED ORAL DAILY
Qty: 90 TABLET | Refills: 2 | Status: SHIPPED | OUTPATIENT
Start: 2024-10-30

## 2024-10-30 RX ORDER — FLUTICASONE PROPIONATE AND SALMETEROL 250; 50 UG/1; UG/1
1 POWDER RESPIRATORY (INHALATION) EVERY 12 HOURS
Qty: 60 EACH | Refills: 3 | Status: SHIPPED | OUTPATIENT
Start: 2024-10-30

## 2024-10-30 RX ORDER — ALBUTEROL SULFATE 90 UG/1
INHALANT RESPIRATORY (INHALATION)
Qty: 36 G | Refills: 2 | Status: SHIPPED | OUTPATIENT
Start: 2024-10-30

## 2024-10-30 SDOH — ECONOMIC STABILITY: FOOD INSECURITY: WITHIN THE PAST 12 MONTHS, YOU WORRIED THAT YOUR FOOD WOULD RUN OUT BEFORE YOU GOT MONEY TO BUY MORE.: NEVER TRUE

## 2024-10-30 SDOH — ECONOMIC STABILITY: FOOD INSECURITY: WITHIN THE PAST 12 MONTHS, THE FOOD YOU BOUGHT JUST DIDN'T LAST AND YOU DIDN'T HAVE MONEY TO GET MORE.: NEVER TRUE

## 2024-10-30 SDOH — ECONOMIC STABILITY: INCOME INSECURITY: HOW HARD IS IT FOR YOU TO PAY FOR THE VERY BASICS LIKE FOOD, HOUSING, MEDICAL CARE, AND HEATING?: NOT VERY HARD

## 2024-10-30 ASSESSMENT — PATIENT HEALTH QUESTIONNAIRE - PHQ9
9. THOUGHTS THAT YOU WOULD BE BETTER OFF DEAD, OR OF HURTING YOURSELF: NOT AT ALL
10. IF YOU CHECKED OFF ANY PROBLEMS, HOW DIFFICULT HAVE THESE PROBLEMS MADE IT FOR YOU TO DO YOUR WORK, TAKE CARE OF THINGS AT HOME, OR GET ALONG WITH OTHER PEOPLE: SOMEWHAT DIFFICULT
6. FEELING BAD ABOUT YOURSELF - OR THAT YOU ARE A FAILURE OR HAVE LET YOURSELF OR YOUR FAMILY DOWN: NOT AT ALL
SUM OF ALL RESPONSES TO PHQ QUESTIONS 1-9: 19
1. LITTLE INTEREST OR PLEASURE IN DOING THINGS: NEARLY EVERY DAY
7. TROUBLE CONCENTRATING ON THINGS, SUCH AS READING THE NEWSPAPER OR WATCHING TELEVISION: NEARLY EVERY DAY
5. POOR APPETITE OR OVEREATING: SEVERAL DAYS
3. TROUBLE FALLING OR STAYING ASLEEP: NEARLY EVERY DAY
SUM OF ALL RESPONSES TO PHQ QUESTIONS 1-9: 19
SUM OF ALL RESPONSES TO PHQ QUESTIONS 1-9: 19
SUM OF ALL RESPONSES TO PHQ9 QUESTIONS 1 & 2: 6
SUM OF ALL RESPONSES TO PHQ QUESTIONS 1-9: 19
4. FEELING TIRED OR HAVING LITTLE ENERGY: NEARLY EVERY DAY
2. FEELING DOWN, DEPRESSED OR HOPELESS: NEARLY EVERY DAY
8. MOVING OR SPEAKING SO SLOWLY THAT OTHER PEOPLE COULD HAVE NOTICED. OR THE OPPOSITE, BEING SO FIGETY OR RESTLESS THAT YOU HAVE BEEN MOVING AROUND A LOT MORE THAN USUAL: NEARLY EVERY DAY

## 2024-10-30 ASSESSMENT — ENCOUNTER SYMPTOMS
NAUSEA: 0
BACK PAIN: 1
ABDOMINAL PAIN: 1
VOMITING: 0
BLOOD IN STOOL: 0
COUGH: 0
SHORTNESS OF BREATH: 0
RHINORRHEA: 0
WHEEZING: 0
CHEST TIGHTNESS: 0
DIARRHEA: 0

## 2024-10-30 NOTE — PROGRESS NOTES
Jammie Marino is a 54 y.o. year old female who presents today for No chief complaint on file.      Is someone accompanying this pt? no    Is the patient using any DME equipment during OV? no    Depression Screenin/29/2024    11:03 AM 2023     8:43 AM 10/4/2023     7:33 AM 2023    10:27 AM 2023     3:46 PM 2023     3:45 PM 2022    11:18 AM   PHQ-9 Questionaire   Little interest or pleasure in doing things 0 3 3 2 1 1 1   Feeling down, depressed, or hopeless 0 3 3 2 1 1 1   Trouble falling or staying asleep, or sleeping too much 0 3 0 2 1     Feeling tired or having little energy 0 2 2 2 1     Poor appetite or overeating 0 1 1 2 1     Feeling bad about yourself - or that you are a failure or have let yourself or your family down 0 3 0 2 0     Trouble concentrating on things, such as reading the newspaper or watching television 0 3 1 2 1     Moving or speaking so slowly that other people could have noticed. Or the opposite - being so fidgety or restless that you have been moving around a lot more than usual 0 1 1 2 0     Thoughts that you would be better off dead, or of hurting yourself in some way 0 0 0 2 0     PHQ-9 Total Score 0 19 11 18 6 2 2   If you checked off any problems, how difficult have these problems made it for you to do your work, take care of things at home, or get along with other people? 0 2 1 2 1         Abuse Screenin/23/2023     3:00 PM   AMB Abuse Screening   Do you ever feel afraid of your partner? N   Are you in a relationship with someone who physically or mentally threatens you? N   Is it safe for you to go home? Y       Learning Assessment:  No question data found.    Fall Risk:       No data to display                    Coordination of Care:   1. \"Have you been to the ER, urgent care clinic since your last visit?  Hospitalized since your last visit?\" yes    2. \"Have you seen or consulted any other health care providers outside of the [facility] 
transcription errors.

## 2024-11-05 ENCOUNTER — APPOINTMENT (OUTPATIENT)
Facility: HOSPITAL | Age: 54
End: 2024-11-05

## 2024-11-05 ENCOUNTER — HOSPITAL ENCOUNTER (EMERGENCY)
Facility: HOSPITAL | Age: 54
Discharge: HOME | End: 2024-11-05

## 2024-11-05 VITALS
BODY MASS INDEX: 34.78 KG/M2 | OXYGEN SATURATION: 96 % | RESPIRATION RATE: 20 BRPM | HEART RATE: 74 BPM | TEMPERATURE: 97.2 F | DIASTOLIC BLOOD PRESSURE: 76 MMHG | WEIGHT: 189 LBS | SYSTOLIC BLOOD PRESSURE: 118 MMHG | HEIGHT: 62 IN

## 2024-11-05 DIAGNOSIS — F19.10 POLYSUBSTANCE ABUSE (HCC): ICD-10-CM

## 2024-11-05 DIAGNOSIS — R07.9 CHEST PAIN, UNSPECIFIED TYPE: Primary | ICD-10-CM

## 2024-11-05 LAB
ALBUMIN SERPL-MCNC: 3.5 G/DL (ref 3.4–5)
ALBUMIN/GLOB SERPL: 1.3 (ref 0.8–1.7)
ALP SERPL-CCNC: 49 U/L (ref 45–117)
ALT SERPL-CCNC: 19 U/L (ref 13–56)
AMPHET UR QL SCN: NEGATIVE
ANION GAP SERPL CALC-SCNC: 4 MMOL/L (ref 3–18)
APPEARANCE UR: CLEAR
AST SERPL-CCNC: 13 U/L (ref 10–38)
BARBITURATES UR QL SCN: NEGATIVE
BASOPHILS # BLD: 0 K/UL (ref 0–0.1)
BASOPHILS NFR BLD: 0 % (ref 0–2)
BENZODIAZ UR QL: NEGATIVE
BILIRUB SERPL-MCNC: 0.3 MG/DL (ref 0.2–1)
BILIRUB UR QL: NEGATIVE
BUN SERPL-MCNC: 12 MG/DL (ref 7–18)
BUN/CREAT SERPL: 26 (ref 12–20)
CALCIUM SERPL-MCNC: 9 MG/DL (ref 8.5–10.1)
CANNABINOIDS UR QL SCN: POSITIVE
CHLORIDE SERPL-SCNC: 105 MMOL/L (ref 100–111)
CO2 SERPL-SCNC: 28 MMOL/L (ref 21–32)
COCAINE UR QL SCN: POSITIVE
COLOR UR: YELLOW
CREAT SERPL-MCNC: 0.47 MG/DL (ref 0.6–1.3)
DIFFERENTIAL METHOD BLD: ABNORMAL
EOSINOPHIL # BLD: 0.2 K/UL (ref 0–0.4)
EOSINOPHIL NFR BLD: 2 % (ref 0–5)
ERYTHROCYTE [DISTWIDTH] IN BLOOD BY AUTOMATED COUNT: 12.6 % (ref 11.6–14.5)
ETHANOL SERPL-MCNC: <3 MG/DL (ref 0–3)
GLOBULIN SER CALC-MCNC: 2.6 G/DL (ref 2–4)
GLUCOSE SERPL-MCNC: 105 MG/DL (ref 74–99)
GLUCOSE UR STRIP.AUTO-MCNC: NEGATIVE MG/DL
HCT VFR BLD AUTO: 48.1 % (ref 35–45)
HGB BLD-MCNC: 15.8 G/DL (ref 12–16)
HGB UR QL STRIP: NEGATIVE
IMM GRANULOCYTES # BLD AUTO: 0 K/UL (ref 0–0.04)
IMM GRANULOCYTES NFR BLD AUTO: 0 % (ref 0–0.5)
KETONES UR QL STRIP.AUTO: NEGATIVE MG/DL
LEUKOCYTE ESTERASE UR QL STRIP.AUTO: NEGATIVE
LYMPHOCYTES # BLD: 2 K/UL (ref 0.9–3.6)
LYMPHOCYTES NFR BLD: 22 % (ref 21–52)
Lab: ABNORMAL
MAGNESIUM SERPL-MCNC: 2.3 MG/DL (ref 1.6–2.6)
MCH RBC QN AUTO: 32 PG (ref 24–34)
MCHC RBC AUTO-ENTMCNC: 32.8 G/DL (ref 31–37)
MCV RBC AUTO: 97.4 FL (ref 78–100)
METHADONE UR QL: NEGATIVE
MONOCYTES # BLD: 0.7 K/UL (ref 0.05–1.2)
MONOCYTES NFR BLD: 8 % (ref 3–10)
NEUTS SEG # BLD: 6.1 K/UL (ref 1.8–8)
NEUTS SEG NFR BLD: 67 % (ref 40–73)
NITRITE UR QL STRIP.AUTO: NEGATIVE
NRBC # BLD: 0 K/UL (ref 0–0.01)
NRBC BLD-RTO: 0 PER 100 WBC
OPIATES UR QL: NEGATIVE
PCP UR QL: NEGATIVE
PH UR STRIP: 6.5 (ref 5–8)
PLATELET # BLD AUTO: 228 K/UL (ref 135–420)
PMV BLD AUTO: 9.5 FL (ref 9.2–11.8)
POTASSIUM SERPL-SCNC: 3.6 MMOL/L (ref 3.5–5.5)
PROT SERPL-MCNC: 6.1 G/DL (ref 6.4–8.2)
PROT UR STRIP-MCNC: NEGATIVE MG/DL
RBC # BLD AUTO: 4.94 M/UL (ref 4.2–5.3)
SODIUM SERPL-SCNC: 137 MMOL/L (ref 136–145)
SP GR UR REFRACTOMETRY: 1.01 (ref 1–1.03)
TROPONIN I SERPL HS-MCNC: 11 NG/L (ref 0–54)
TROPONIN I SERPL HS-MCNC: 9 NG/L (ref 0–54)
UROBILINOGEN UR QL STRIP.AUTO: 0.2 EU/DL (ref 0.2–1)
WBC # BLD AUTO: 9.1 K/UL (ref 4.6–13.2)

## 2024-11-05 PROCEDURE — 99285 EMERGENCY DEPT VISIT HI MDM: CPT

## 2024-11-05 PROCEDURE — 6370000000 HC RX 637 (ALT 250 FOR IP)

## 2024-11-05 PROCEDURE — 84484 ASSAY OF TROPONIN QUANT: CPT

## 2024-11-05 PROCEDURE — 82077 ASSAY SPEC XCP UR&BREATH IA: CPT

## 2024-11-05 PROCEDURE — 71046 X-RAY EXAM CHEST 2 VIEWS: CPT

## 2024-11-05 PROCEDURE — 93005 ELECTROCARDIOGRAM TRACING: CPT | Performed by: STUDENT IN AN ORGANIZED HEALTH CARE EDUCATION/TRAINING PROGRAM

## 2024-11-05 PROCEDURE — 80053 COMPREHEN METABOLIC PANEL: CPT

## 2024-11-05 PROCEDURE — 81003 URINALYSIS AUTO W/O SCOPE: CPT

## 2024-11-05 PROCEDURE — 80307 DRUG TEST PRSMV CHEM ANLYZR: CPT

## 2024-11-05 PROCEDURE — 85025 COMPLETE CBC W/AUTO DIFF WBC: CPT

## 2024-11-05 PROCEDURE — 83735 ASSAY OF MAGNESIUM: CPT

## 2024-11-05 RX ORDER — IPRATROPIUM BROMIDE AND ALBUTEROL SULFATE 2.5; .5 MG/3ML; MG/3ML
1 SOLUTION RESPIRATORY (INHALATION)
Status: DISCONTINUED | OUTPATIENT
Start: 2024-11-05 | End: 2024-11-05 | Stop reason: HOSPADM

## 2024-11-05 RX ADMIN — IPRATROPIUM BROMIDE AND ALBUTEROL SULFATE 1 DOSE: .5; 3 SOLUTION RESPIRATORY (INHALATION) at 15:05

## 2024-11-05 ASSESSMENT — PAIN DESCRIPTION - LOCATION: LOCATION: CHEST;BACK

## 2024-11-05 ASSESSMENT — HEART SCORE: ECG: NORMAL

## 2024-11-05 ASSESSMENT — PAIN DESCRIPTION - DESCRIPTORS: DESCRIPTORS: BURNING

## 2024-11-05 ASSESSMENT — PAIN SCALES - GENERAL: PAINLEVEL_OUTOF10: 8

## 2024-11-05 ASSESSMENT — PAIN DESCRIPTION - ORIENTATION: ORIENTATION: MID

## 2024-11-05 ASSESSMENT — PAIN - FUNCTIONAL ASSESSMENT: PAIN_FUNCTIONAL_ASSESSMENT: 0-10

## 2024-11-05 NOTE — ED TRIAGE NOTES
Pt ambulatory to fast track reporting chest pain, with recent crack use. Reports she has \"been on a binge lately\" and endorses daily crack use.   EKG showing NSR

## 2024-11-05 NOTE — ED PROVIDER NOTES
EMERGENCY DEPARTMENT HISTORY AND PHYSICAL EXAM      Patient Name: Jammie Marino  MRN: [id number]  YOB: 1970  Provider: Alessia Singer PA-C  PCP: Zaida Monroe MD   Time/Date of evaluation: 2:28 PM EST on 11/5/24    History of Presenting Illness     Chief Complaint   Patient presents with    Chest Pain       History Provided By: Patient     History (Narative):   Jammie Marino is a 54 y.o. female with a PMHX of arthritis, asthma, CHF, dental caries, drug abuse, alcohol abuse, polycythemia, bipolar disorder,  who presents to the emergency department  by POV C/O chest pain.  Patient states that she has been having persistent chest pain for the past 3 weeks.  She states it is substernal and it radiates all through her chest.  She states that at times it is burning other times it is tingling and other times it sharp.  She denies any exacerbating factors or any alleviating factors.  Patient does endorse that she has been using crack almost daily.  She denies any hemoptysis, history of blood clot, any recent long trips or travel, calf pain or calf swelling, fever, or chills.      Past History     Past Medical History:  Past Medical History:   Diagnosis Date    Asthma     Bronchitis     CHF (congestive heart failure) ([facility])     Cocaine abuse ([facility]) 3/26/2015    Dental caries     Depression     bipolar    Depression 8/31/2016    Drug abuse ([facility])     Dysphasia 10/17/2018    Elevated hematocrit 8/31/2016    ETOH abuse     GERD (gastroesophageal reflux disease)     schatzis ring, hiatal hernia    H/O screening mammography 12/06/2016    No evidence of malignancy     HPV (human papilloma virus) infection 11/2016    The patient referred to [facility]     Hypertension     Hypertensive left ventricular hypertrophy, without heart failure 10/15/2018    mild to moderate lvh continue bp meds    Ill-defined condition     Marijuana use 10/17/2018    Mood swings     Polycythemia 2018    Polycythemia     Psychiatric disorder

## 2024-11-05 NOTE — ED NOTES
Pt provided AVS and outpt resources for mental health and substance abuse.   Pt v/u and had no further concerns. Discharged in no distress.

## 2024-11-06 LAB
EKG ATRIAL RATE: 70 BPM
EKG DIAGNOSIS: NORMAL
EKG P AXIS: 40 DEGREES
EKG P-R INTERVAL: 168 MS
EKG Q-T INTERVAL: 420 MS
EKG QRS DURATION: 126 MS
EKG QTC CALCULATION (BAZETT): 453 MS
EKG R AXIS: -45 DEGREES
EKG T AXIS: 47 DEGREES
EKG VENTRICULAR RATE: 70 BPM

## 2024-11-06 PROCEDURE — 93010 ELECTROCARDIOGRAM REPORT: CPT | Performed by: INTERNAL MEDICINE

## 2024-11-11 ENCOUNTER — TELEPHONE (OUTPATIENT)
Facility: CLINIC | Age: 54
End: 2024-11-11

## 2024-11-11 NOTE — TELEPHONE ENCOUNTER
Pt reporting fugues on tongue or a yeast infection, sore is getting bigger, Pt stated getting reaction from the powered, medication     Confirmed appointment

## 2024-11-12 ENCOUNTER — TELEPHONE (OUTPATIENT)
Facility: CLINIC | Age: 54
End: 2024-11-12

## 2024-11-12 ENCOUNTER — OFFICE VISIT (OUTPATIENT)
Facility: CLINIC | Age: 54
End: 2024-11-12

## 2024-11-12 VITALS
OXYGEN SATURATION: 93 % | WEIGHT: 180 LBS | BODY MASS INDEX: 33.13 KG/M2 | HEIGHT: 62 IN | HEART RATE: 80 BPM | DIASTOLIC BLOOD PRESSURE: 68 MMHG | SYSTOLIC BLOOD PRESSURE: 123 MMHG

## 2024-11-12 DIAGNOSIS — K13.79 MOUTH SORE: ICD-10-CM

## 2024-11-12 DIAGNOSIS — K13.79 MOUTH SORE: Primary | ICD-10-CM

## 2024-11-12 PROCEDURE — 3074F SYST BP LT 130 MM HG: CPT | Performed by: STUDENT IN AN ORGANIZED HEALTH CARE EDUCATION/TRAINING PROGRAM

## 2024-11-12 PROCEDURE — 3078F DIAST BP <80 MM HG: CPT | Performed by: STUDENT IN AN ORGANIZED HEALTH CARE EDUCATION/TRAINING PROGRAM

## 2024-11-12 PROCEDURE — 99213 OFFICE O/P EST LOW 20 MIN: CPT | Performed by: STUDENT IN AN ORGANIZED HEALTH CARE EDUCATION/TRAINING PROGRAM

## 2024-11-12 SDOH — ECONOMIC STABILITY: INCOME INSECURITY: HOW HARD IS IT FOR YOU TO PAY FOR THE VERY BASICS LIKE FOOD, HOUSING, MEDICAL CARE, AND HEATING?: NOT HARD AT ALL

## 2024-11-12 SDOH — ECONOMIC STABILITY: FOOD INSECURITY: WITHIN THE PAST 12 MONTHS, THE FOOD YOU BOUGHT JUST DIDN'T LAST AND YOU DIDN'T HAVE MONEY TO GET MORE.: NEVER TRUE

## 2024-11-12 SDOH — ECONOMIC STABILITY: FOOD INSECURITY: WITHIN THE PAST 12 MONTHS, YOU WORRIED THAT YOUR FOOD WOULD RUN OUT BEFORE YOU GOT MONEY TO BUY MORE.: NEVER TRUE

## 2024-11-12 ASSESSMENT — ENCOUNTER SYMPTOMS
ABDOMINAL PAIN: 1
VOMITING: 0
BLOOD IN STOOL: 0
NAUSEA: 0
RHINORRHEA: 0
BACK PAIN: 1
SHORTNESS OF BREATH: 0
DIARRHEA: 0
CHEST TIGHTNESS: 0
WHEEZING: 0
COUGH: 0

## 2024-11-12 NOTE — PROGRESS NOTES
Jammie Marino is a 54 y.o. year old female who presents today for   Chief Complaint   Patient presents with    Other     PT presents with complaints of possible infection on tongue       Is someone accompanying this pt? no    Is the patient using any DME equipment during OV? no    Depression Screening:       10/30/2024     8:23 AM 2024    11:03 AM 2023     8:43 AM 10/4/2023     7:33 AM 2023    10:27 AM 2023     3:46 PM 2023     3:45 PM   PHQ-9 Questionaire   Little interest or pleasure in doing things 3 0 3 3 2 1 1   Feeling down, depressed, or hopeless 3 0 3 3 2 1 1   Trouble falling or staying asleep, or sleeping too much 3 0 3 0 2 1    Feeling tired or having little energy 3 0 2 2 2 1    Poor appetite or overeating 1 0 1 1 2 1    Feeling bad about yourself - or that you are a failure or have let yourself or your family down 0 0 3 0 2 0    Trouble concentrating on things, such as reading the newspaper or watching television 3 0 3 1 2 1    Moving or speaking so slowly that other people could have noticed. Or the opposite - being so fidgety or restless that you have been moving around a lot more than usual 3 0 1 1 2 0    Thoughts that you would be better off dead, or of hurting yourself in some way 0 0 0 0 2 0    PHQ-9 Total Score 19 0 19 11 18 6 2   If you checked off any problems, how difficult have these problems made it for you to do your work, take care of things at home, or get along with other people? 1 0 2 1 2 1        Abuse Screenin/23/2023     3:00 PM   AMB Abuse Screening   Do you ever feel afraid of your partner? N   Are you in a relationship with someone who physically or mentally threatens you? N   Is it safe for you to go home? Y       Learning Assessment:  No question data found.    Fall Risk:       No data to display                    Coordination of Care:   1. \"Have you been to the ER, urgent care clinic since your last visit?  Hospitalized since your last 
diphenhydramine, nystatin and lidocaine.    Advised to hold off using Advair for now          No follow-ups on file.     I asked the patient if she  had any questions and answered her  questions.  The patient stated that she understands the treatment plan and agrees with the treatment plan    This document was created with a voice activated dictation system and may contain transcription errors.

## 2024-11-12 NOTE — TELEPHONE ENCOUNTER
The patient came back in and states that the prescription for the Magic  Mouthwash  needs to be sent to     [facility]-   [address] [place] [address]  [phone]    Hilary said the prescription was compound and they don't do Compound prescription

## 2024-12-03 ENCOUNTER — HOSPITAL ENCOUNTER (EMERGENCY)
Facility: HOSPITAL | Age: 54
Discharge: HOME | End: 2024-12-03
Attending: EMERGENCY MEDICINE

## 2024-12-03 ENCOUNTER — APPOINTMENT (OUTPATIENT)
Facility: HOSPITAL | Age: 54
End: 2024-12-03

## 2024-12-03 VITALS
HEIGHT: 62 IN | SYSTOLIC BLOOD PRESSURE: 177 MMHG | RESPIRATION RATE: 18 BRPM | HEART RATE: 85 BPM | OXYGEN SATURATION: 97 % | WEIGHT: 185 LBS | BODY MASS INDEX: 34.04 KG/M2 | TEMPERATURE: 98.3 F | DIASTOLIC BLOOD PRESSURE: 107 MMHG

## 2024-12-03 DIAGNOSIS — R07.9 CHEST PAIN, UNSPECIFIED TYPE: Primary | ICD-10-CM

## 2024-12-03 DIAGNOSIS — F14.91 HISTORY OF COCAINE USE: ICD-10-CM

## 2024-12-03 LAB
ALBUMIN SERPL-MCNC: 3.8 G/DL (ref 3.4–5)
ALBUMIN/GLOB SERPL: 1.3 (ref 0.8–1.7)
ALP SERPL-CCNC: 51 U/L (ref 45–117)
ALT SERPL-CCNC: 22 U/L (ref 13–56)
ANION GAP SERPL CALC-SCNC: 4 MMOL/L (ref 3–18)
AST SERPL-CCNC: 15 U/L (ref 10–38)
BASOPHILS # BLD: 0.1 K/UL (ref 0–0.1)
BASOPHILS NFR BLD: 1 % (ref 0–2)
BILIRUB SERPL-MCNC: 0.3 MG/DL (ref 0.2–1)
BUN SERPL-MCNC: 10 MG/DL (ref 7–18)
BUN/CREAT SERPL: 16 (ref 12–20)
CALCIUM SERPL-MCNC: 9.3 MG/DL (ref 8.5–10.1)
CHLORIDE SERPL-SCNC: 107 MMOL/L (ref 100–111)
CO2 SERPL-SCNC: 29 MMOL/L (ref 21–32)
CREAT SERPL-MCNC: 0.62 MG/DL (ref 0.6–1.3)
D DIMER PPP FEU-MCNC: <0.27 UG/ML(FEU)
DIFFERENTIAL METHOD BLD: ABNORMAL
EOSINOPHIL # BLD: 0.1 K/UL (ref 0–0.4)
EOSINOPHIL NFR BLD: 1 % (ref 0–5)
ERYTHROCYTE [DISTWIDTH] IN BLOOD BY AUTOMATED COUNT: 12.2 % (ref 11.6–14.5)
GLOBULIN SER CALC-MCNC: 2.9 G/DL (ref 2–4)
GLUCOSE SERPL-MCNC: 135 MG/DL (ref 74–99)
HCT VFR BLD AUTO: 50 % (ref 35–45)
HGB BLD-MCNC: 16.6 G/DL (ref 12–16)
IMM GRANULOCYTES # BLD AUTO: 0 K/UL (ref 0–0.04)
IMM GRANULOCYTES NFR BLD AUTO: 0 % (ref 0–0.5)
LYMPHOCYTES # BLD: 2.7 K/UL (ref 0.9–3.6)
LYMPHOCYTES NFR BLD: 24 % (ref 21–52)
MCH RBC QN AUTO: 31.8 PG (ref 24–34)
MCHC RBC AUTO-ENTMCNC: 33.2 G/DL (ref 31–37)
MCV RBC AUTO: 95.8 FL (ref 78–100)
MONOCYTES # BLD: 0.8 K/UL (ref 0.05–1.2)
MONOCYTES NFR BLD: 7 % (ref 3–10)
NEUTS SEG # BLD: 7.3 K/UL (ref 1.8–8)
NEUTS SEG NFR BLD: 67 % (ref 40–73)
NRBC # BLD: 0 K/UL (ref 0–0.01)
NRBC BLD-RTO: 0 PER 100 WBC
PLATELET # BLD AUTO: 278 K/UL (ref 135–420)
PMV BLD AUTO: 9.1 FL (ref 9.2–11.8)
POTASSIUM SERPL-SCNC: 3.7 MMOL/L (ref 3.5–5.5)
PROT SERPL-MCNC: 6.7 G/DL (ref 6.4–8.2)
RBC # BLD AUTO: 5.22 M/UL (ref 4.2–5.3)
SODIUM SERPL-SCNC: 140 MMOL/L (ref 136–145)
TROPONIN I SERPL HS-MCNC: 12 NG/L (ref 0–54)
TROPONIN I SERPL HS-MCNC: 13 NG/L (ref 0–54)
WBC # BLD AUTO: 10.9 K/UL (ref 4.6–13.2)

## 2024-12-03 PROCEDURE — 6370000000 HC RX 637 (ALT 250 FOR IP): Performed by: STUDENT IN AN ORGANIZED HEALTH CARE EDUCATION/TRAINING PROGRAM

## 2024-12-03 PROCEDURE — 80053 COMPREHEN METABOLIC PANEL: CPT

## 2024-12-03 PROCEDURE — 93005 ELECTROCARDIOGRAM TRACING: CPT | Performed by: STUDENT IN AN ORGANIZED HEALTH CARE EDUCATION/TRAINING PROGRAM

## 2024-12-03 PROCEDURE — 85379 FIBRIN DEGRADATION QUANT: CPT

## 2024-12-03 PROCEDURE — 96374 THER/PROPH/DIAG INJ IV PUSH: CPT

## 2024-12-03 PROCEDURE — 96375 TX/PRO/DX INJ NEW DRUG ADDON: CPT

## 2024-12-03 PROCEDURE — 85025 COMPLETE CBC W/AUTO DIFF WBC: CPT

## 2024-12-03 PROCEDURE — 6360000002 HC RX W HCPCS: Performed by: STUDENT IN AN ORGANIZED HEALTH CARE EDUCATION/TRAINING PROGRAM

## 2024-12-03 PROCEDURE — 99285 EMERGENCY DEPT VISIT HI MDM: CPT

## 2024-12-03 PROCEDURE — 84484 ASSAY OF TROPONIN QUANT: CPT

## 2024-12-03 PROCEDURE — 71046 X-RAY EXAM CHEST 2 VIEWS: CPT

## 2024-12-03 RX ORDER — ACETAMINOPHEN 325 MG/1
650 TABLET ORAL
Status: COMPLETED | OUTPATIENT
Start: 2024-12-03 | End: 2024-12-03

## 2024-12-03 RX ORDER — LIDOCAINE 4 G/G
1 PATCH TOPICAL
Status: DISCONTINUED | OUTPATIENT
Start: 2024-12-03 | End: 2024-12-03 | Stop reason: HOSPADM

## 2024-12-03 RX ORDER — LORAZEPAM 2 MG/ML
1 INJECTION INTRAMUSCULAR ONCE
Status: COMPLETED | OUTPATIENT
Start: 2024-12-03 | End: 2024-12-03

## 2024-12-03 RX ORDER — KETOROLAC TROMETHAMINE 15 MG/ML
15 INJECTION, SOLUTION INTRAMUSCULAR; INTRAVENOUS ONCE
Status: COMPLETED | OUTPATIENT
Start: 2024-12-03 | End: 2024-12-03

## 2024-12-03 RX ADMIN — KETOROLAC TROMETHAMINE 15 MG: 15 INJECTION, SOLUTION INTRAMUSCULAR; INTRAVENOUS at 16:03

## 2024-12-03 RX ADMIN — ACETAMINOPHEN 325MG 650 MG: 325 TABLET ORAL at 16:04

## 2024-12-03 RX ADMIN — LORAZEPAM 1 MG: 2 INJECTION INTRAMUSCULAR; INTRAVENOUS at 16:03

## 2024-12-03 ASSESSMENT — HEART SCORE: ECG: NORMAL

## 2024-12-03 ASSESSMENT — PAIN DESCRIPTION - PAIN TYPE: TYPE: CHRONIC PAIN

## 2024-12-03 ASSESSMENT — PAIN DESCRIPTION - ORIENTATION
ORIENTATION: RIGHT;LEFT
ORIENTATION: RIGHT

## 2024-12-03 ASSESSMENT — PAIN SCALES - GENERAL
PAINLEVEL_OUTOF10: 5
PAINLEVEL_OUTOF10: 8

## 2024-12-03 ASSESSMENT — PAIN DESCRIPTION - LOCATION
LOCATION: CHEST
LOCATION: CHEST

## 2024-12-03 ASSESSMENT — PAIN - FUNCTIONAL ASSESSMENT: PAIN_FUNCTIONAL_ASSESSMENT: 0-10

## 2024-12-03 ASSESSMENT — PAIN DESCRIPTION - FREQUENCY: FREQUENCY: INTERMITTENT

## 2024-12-03 NOTE — ED TRIAGE NOTES
Pt came ambulatory to triage c/o intermittent chest pain x 1month. Pt states pain has worsened in the past week.    Hx of CHF, HTN.

## 2024-12-03 NOTE — DISCHARGE INSTRUCTIONS
Thank you for allowing us to care for you today.  You have been evaluated in the emergency department today for Chest Pain     Your evaluation including exam and other diagnostics was reassuring and not suggestive of any emergent condition requiring additional medical intervention at this time.  However, some problems may take more time to develop or appear.  Therefore, it is important for you to watch for any new or worsening symptoms of your current condition.    Please return immediately for any new or worsening symptoms, specifically return of chest pain or development of difficulty breathing, numbness, sensation changes, fall, back pain, or for new or concerning symptoms.  It is extremely important for you to return to the emergency department if you experience any of these or have any general concerns that arise.  We are open 24/7 and look forward to continuing to participate as part of your care team.    It is also important for you to follow-up with your primary care physician for Emergency Department follow-up, management of consults, and continuity of care.    Labs Reviewed   CBC WITH AUTO DIFFERENTIAL - Abnormal; Notable for the following components:       Result Value    Hemoglobin 16.6 (*)     Hematocrit 50.0 (*)     MPV 9.1 (*)     All other components within normal limits   COMPREHENSIVE METABOLIC PANEL - Abnormal; Notable for the following components:    Glucose 135 (*)     All other components within normal limits   D-DIMER, QUANTITATIVE   TROPONIN   TROPONIN   TROPONIN        XR CHEST (2 VW)    (Results Pending)

## 2024-12-03 NOTE — ED PROVIDER NOTES
1.0 MG/DL    ALT 22 13 - 56 U/L    AST 15 10 - 38 U/L    Alk Phosphatase 51 45 - 117 U/L    Total Protein 6.7 6.4 - 8.2 g/dL    Albumin 3.8 3.4 - 5.0 g/dL    Globulin 2.9 2.0 - 4.0 g/dL    Albumin/Globulin Ratio 1.3 0.8 - 1.7     D-Dimer, Quantitative    Collection Time: 12/03/24  3:37 PM   Result Value Ref Range    D-Dimer, Quant <0.27 <0.46 ug/ml(FEU)   Troponin    Collection Time: 12/03/24  3:37 PM   Result Value Ref Range    Troponin, High Sensitivity 12 0 - 54 ng/L   Troponin    Collection Time: 12/03/24  4:42 PM   Result Value Ref Range    Troponin, High Sensitivity 13 0 - 54 ng/L        Radiologic Studies ordered to better evaluate patient's complaints and exam findings, independently reviewed, and noted in ED course:   XR CHEST (2 VW)   Final Result    IMPRESSION:      1. No acute cardiopulmonary process. No change..         Electronically signed by Luis A Jean          In order to address these pertinent findings, the following medications are considered:  Medications   lidocaine 4 % external patch 1 patch (1 patch TransDERmal Patch Applied 12/3/24 1604)   acetaminophen (TYLENOL) tablet 650 mg (650 mg Oral Given 12/3/24 1604)   ketorolac (TORADOL) injection 15 mg (15 mg IntraVENous Given 12/3/24 1603)   LORazepam (ATIVAN) injection 1 mg (1 mg IntraVENous Given 12/3/24 1603)       MDM:   DDX includes but is not limited to: ACS, dysrhythmia, pulmonary embolism, aortic dissection, pneumothorax, pneumonia, esophageal etiologies, chest wall pain, coronary vasospasm, among others.    On initial evaluation, anxious appearing female in no acute distress with normal cardiopulmonary assessment, no lower extremity edema, no JVD.    Record review of prior cardiac testing: Patient has a nuclear stress test performed from 10/5/2022 which was normal, EF 71%. Echo from 2022 with EF 60-65%, mild concentric left ventricle hypertrophy.    With history of cocaine use prior to intensification of symptoms, benzo trialed to

## 2024-12-04 ENCOUNTER — TELEPHONE (OUTPATIENT)
Facility: CLINIC | Age: 54
End: 2024-12-04

## 2024-12-04 NOTE — TELEPHONE ENCOUNTER
Spoke to patient and informed her of information.      PT states she is interested in rehab, states that she is nervous.

## 2024-12-04 NOTE — TELEPHONE ENCOUNTER
Patient called trying to get an appointment=she was seen in the ED last night says her heart feels like it is being squeezed.  I scheduled her for 1/09/25    She is asking if you can order her something like Ativan, she says she took that years ago and it really helps.     Pharmacy: [facility] [id number] - [place], [place] - [address] - P [phone] - F [phone]     Pt's phone# [phone]

## 2024-12-04 NOTE — TELEPHONE ENCOUNTER
Please advise patient that I cannot prescribe her Ativan or other controlled substances while she is still abusing cocaine.  The chest pain and anxiety is likely due to the drug abuse.  I will discuss this further with her at upcoming appointment.  We can always refer her to substance rehab.

## 2024-12-05 LAB
EKG ATRIAL RATE: 74 BPM
EKG DIAGNOSIS: NORMAL
EKG P AXIS: 46 DEGREES
EKG P-R INTERVAL: 164 MS
EKG Q-T INTERVAL: 420 MS
EKG QRS DURATION: 124 MS
EKG QTC CALCULATION (BAZETT): 466 MS
EKG R AXIS: -49 DEGREES
EKG T AXIS: 66 DEGREES
EKG VENTRICULAR RATE: 74 BPM

## 2024-12-05 PROCEDURE — 93010 ELECTROCARDIOGRAM REPORT: CPT | Performed by: INTERNAL MEDICINE

## 2025-01-05 ENCOUNTER — HOSPITAL ENCOUNTER (EMERGENCY)
Facility: HOSPITAL | Age: 55
Discharge: LEFT BEFORE DISCHARGE | End: 2025-01-05

## 2025-01-05 VITALS
SYSTOLIC BLOOD PRESSURE: 133 MMHG | TEMPERATURE: 97.8 F | DIASTOLIC BLOOD PRESSURE: 75 MMHG | HEART RATE: 70 BPM | RESPIRATION RATE: 16 BRPM | OXYGEN SATURATION: 98 %

## 2025-01-05 PROCEDURE — 99281 EMR DPT VST MAYX REQ PHY/QHP: CPT

## 2025-01-05 PROCEDURE — 93005 ELECTROCARDIOGRAM TRACING: CPT | Performed by: PHYSICIAN ASSISTANT

## 2025-01-05 ASSESSMENT — PAIN - FUNCTIONAL ASSESSMENT
PAIN_FUNCTIONAL_ASSESSMENT: 0-10
PAIN_FUNCTIONAL_ASSESSMENT: ACTIVITIES ARE NOT PREVENTED

## 2025-01-05 ASSESSMENT — PAIN SCALES - GENERAL: PAINLEVEL_OUTOF10: 8

## 2025-01-05 ASSESSMENT — PAIN DESCRIPTION - LOCATION: LOCATION: CHEST

## 2025-01-05 NOTE — ED TRIAGE NOTES
Pt arrived in ER complaining of chest pain and anxiety. Pt decided to leave during triage and refused to stay despite being educated on danger of leaving. Pt advised to come back if chest pain gets worse.

## 2025-01-05 NOTE — ED NOTES
2:15 PM  Patient approached me in the waiting room, notes she is having chest pain.  Patient alert, oriented, no distress.  No evidence of diaphoresis.  I immediately pulled patient back for an EKG by triage tech.    Notified by triage nurse Avelina that patient eloped after vital signs and prior to further assessment. I was unable to speak to patient before she left.      Keren Hooper PA  01/05/25 1180

## 2025-01-06 LAB
EKG ATRIAL RATE: 65 BPM
EKG DIAGNOSIS: NORMAL
EKG P AXIS: 46 DEGREES
EKG P-R INTERVAL: 176 MS
EKG Q-T INTERVAL: 446 MS
EKG QRS DURATION: 124 MS
EKG QTC CALCULATION (BAZETT): 463 MS
EKG R AXIS: -39 DEGREES
EKG T AXIS: 54 DEGREES
EKG VENTRICULAR RATE: 65 BPM

## 2025-01-06 PROCEDURE — 93010 ELECTROCARDIOGRAM REPORT: CPT | Performed by: INTERNAL MEDICINE

## 2025-01-09 ENCOUNTER — TELEPHONE (OUTPATIENT)
Facility: CLINIC | Age: 55
End: 2025-01-09

## 2025-02-04 ENCOUNTER — HOSPITAL ENCOUNTER (EMERGENCY)
Facility: HOSPITAL | Age: 55
Discharge: HOME | End: 2025-02-05

## 2025-02-04 ENCOUNTER — APPOINTMENT (OUTPATIENT)
Facility: HOSPITAL | Age: 55
End: 2025-02-04

## 2025-02-04 VITALS
SYSTOLIC BLOOD PRESSURE: 168 MMHG | WEIGHT: 185 LBS | BODY MASS INDEX: 34.04 KG/M2 | RESPIRATION RATE: 15 BRPM | HEIGHT: 62 IN | DIASTOLIC BLOOD PRESSURE: 82 MMHG | OXYGEN SATURATION: 99 % | TEMPERATURE: 98 F | HEART RATE: 68 BPM

## 2025-02-04 DIAGNOSIS — R07.9 CHEST PAIN, UNSPECIFIED TYPE: Primary | ICD-10-CM

## 2025-02-04 DIAGNOSIS — E87.6 HYPOKALEMIA: ICD-10-CM

## 2025-02-04 LAB
ALBUMIN SERPL-MCNC: 4 G/DL (ref 3.4–5)
ALBUMIN/GLOB SERPL: 1.2 (ref 0.8–1.7)
ALP SERPL-CCNC: 51 U/L (ref 45–117)
ALT SERPL-CCNC: 22 U/L (ref 13–56)
ANION GAP SERPL CALC-SCNC: 4 MMOL/L (ref 3–18)
AST SERPL-CCNC: 14 U/L (ref 10–38)
BASOPHILS # BLD: 0.06 K/UL (ref 0–0.1)
BASOPHILS NFR BLD: 0.6 % (ref 0–2)
BILIRUB SERPL-MCNC: 0.6 MG/DL (ref 0.2–1)
BUN SERPL-MCNC: 15 MG/DL (ref 7–18)
BUN/CREAT SERPL: 20 (ref 12–20)
CALCIUM SERPL-MCNC: 9.1 MG/DL (ref 8.5–10.1)
CHLORIDE SERPL-SCNC: 107 MMOL/L (ref 100–111)
CO2 SERPL-SCNC: 27 MMOL/L (ref 21–32)
CREAT SERPL-MCNC: 0.74 MG/DL (ref 0.6–1.3)
DIFFERENTIAL METHOD BLD: ABNORMAL
EOSINOPHIL # BLD: 0.13 K/UL (ref 0–0.4)
EOSINOPHIL NFR BLD: 1.4 % (ref 0–5)
ERYTHROCYTE [DISTWIDTH] IN BLOOD BY AUTOMATED COUNT: 12 % (ref 11.6–14.5)
GLOBULIN SER CALC-MCNC: 3.3 G/DL (ref 2–4)
GLUCOSE SERPL-MCNC: 129 MG/DL (ref 74–99)
HCT VFR BLD AUTO: 48.7 % (ref 35–45)
HGB BLD-MCNC: 16.3 G/DL (ref 12–16)
IMM GRANULOCYTES # BLD AUTO: 0.01 K/UL (ref 0–0.04)
IMM GRANULOCYTES NFR BLD AUTO: 0.1 % (ref 0–0.5)
LYMPHOCYTES # BLD: 3.4 K/UL (ref 0.9–3.6)
LYMPHOCYTES NFR BLD: 36.5 % (ref 21–52)
MCH RBC QN AUTO: 31.8 PG (ref 24–34)
MCHC RBC AUTO-ENTMCNC: 33.5 G/DL (ref 31–37)
MCV RBC AUTO: 94.9 FL (ref 78–100)
MONOCYTES # BLD: 0.91 K/UL (ref 0.05–1.2)
MONOCYTES NFR BLD: 9.8 % (ref 3–10)
NEUTS SEG # BLD: 4.8 K/UL (ref 1.8–8)
NEUTS SEG NFR BLD: 51.6 % (ref 40–73)
NRBC # BLD: 0 K/UL (ref 0–0.01)
NRBC BLD-RTO: 0 PER 100 WBC
NT PRO BNP: 121 PG/ML (ref 0–900)
PLATELET # BLD AUTO: 236 K/UL (ref 135–420)
PMV BLD AUTO: 9.6 FL (ref 9.2–11.8)
POTASSIUM SERPL-SCNC: 3.4 MMOL/L (ref 3.5–5.5)
PROT SERPL-MCNC: 7.3 G/DL (ref 6.4–8.2)
RBC # BLD AUTO: 5.13 M/UL (ref 4.2–5.3)
SODIUM SERPL-SCNC: 138 MMOL/L (ref 136–145)
TROPONIN I SERPL HS-MCNC: 11 NG/L (ref 0–54)
TROPONIN I SERPL HS-MCNC: 9 NG/L (ref 0–54)
WBC # BLD AUTO: 9.3 K/UL (ref 4.6–13.2)

## 2025-02-04 PROCEDURE — 83880 ASSAY OF NATRIURETIC PEPTIDE: CPT

## 2025-02-04 PROCEDURE — 80053 COMPREHEN METABOLIC PANEL: CPT

## 2025-02-04 PROCEDURE — 71046 X-RAY EXAM CHEST 2 VIEWS: CPT

## 2025-02-04 PROCEDURE — 84484 ASSAY OF TROPONIN QUANT: CPT

## 2025-02-04 PROCEDURE — 93005 ELECTROCARDIOGRAM TRACING: CPT | Performed by: EMERGENCY MEDICINE

## 2025-02-04 PROCEDURE — 96374 THER/PROPH/DIAG INJ IV PUSH: CPT

## 2025-02-04 PROCEDURE — 85025 COMPLETE CBC W/AUTO DIFF WBC: CPT

## 2025-02-04 PROCEDURE — 6360000002 HC RX W HCPCS: Performed by: PHYSICIAN ASSISTANT

## 2025-02-04 PROCEDURE — 99285 EMERGENCY DEPT VISIT HI MDM: CPT

## 2025-02-04 RX ORDER — KETOROLAC TROMETHAMINE 15 MG/ML
15 INJECTION, SOLUTION INTRAMUSCULAR; INTRAVENOUS
Status: COMPLETED | OUTPATIENT
Start: 2025-02-04 | End: 2025-02-04

## 2025-02-04 RX ADMIN — KETOROLAC TROMETHAMINE 15 MG: 15 INJECTION, SOLUTION INTRAMUSCULAR; INTRAVENOUS at 22:20

## 2025-02-04 ASSESSMENT — PAIN DESCRIPTION - ORIENTATION: ORIENTATION: MID

## 2025-02-04 ASSESSMENT — PAIN SCALES - GENERAL: PAINLEVEL_OUTOF10: 4

## 2025-02-04 ASSESSMENT — PAIN DESCRIPTION - LOCATION: LOCATION: CHEST

## 2025-02-04 ASSESSMENT — PAIN DESCRIPTION - DESCRIPTORS: DESCRIPTORS: ACHING

## 2025-02-04 ASSESSMENT — PAIN - FUNCTIONAL ASSESSMENT: PAIN_FUNCTIONAL_ASSESSMENT: ACTIVITIES ARE NOT PREVENTED

## 2025-02-05 LAB
EKG ATRIAL RATE: 77 BPM
EKG DIAGNOSIS: NORMAL
EKG P AXIS: 52 DEGREES
EKG P-R INTERVAL: 178 MS
EKG Q-T INTERVAL: 412 MS
EKG QRS DURATION: 124 MS
EKG QTC CALCULATION (BAZETT): 466 MS
EKG R AXIS: -39 DEGREES
EKG T AXIS: 69 DEGREES
EKG VENTRICULAR RATE: 77 BPM

## 2025-02-05 PROCEDURE — 6370000000 HC RX 637 (ALT 250 FOR IP): Performed by: PHYSICIAN ASSISTANT

## 2025-02-05 PROCEDURE — 93010 ELECTROCARDIOGRAM REPORT: CPT | Performed by: INTERNAL MEDICINE

## 2025-02-05 RX ORDER — POTASSIUM CHLORIDE 1500 MG/1
40 TABLET, EXTENDED RELEASE ORAL
Status: COMPLETED | OUTPATIENT
Start: 2025-02-05 | End: 2025-02-05

## 2025-02-05 RX ADMIN — POTASSIUM CHLORIDE 40 MEQ: 1500 TABLET, EXTENDED RELEASE ORAL at 01:01

## 2025-02-05 ASSESSMENT — ENCOUNTER SYMPTOMS
ABDOMINAL PAIN: 0
VOMITING: 0
NAUSEA: 0
SHORTNESS OF BREATH: 0

## 2025-02-05 NOTE — DISCHARGE INSTRUCTIONS
Take medication as prescribed. Follow-up with your primary care physician and  cardiologist within 2 days for reassessment. Bring the results from this visit with you for their review. Return to the ED immediately for any new, worsening, or persistent symptoms.

## 2025-02-05 NOTE — ED NOTES
Patient laying in chair, eyes closed, softly snoring.  IV saline locked.  No respiratory distress observed. Breathing is slow and even; unlabored. Vital signs monitored by bedside monitor. Call bell within reach.

## 2025-02-05 NOTE — ED PROVIDER NOTES
EMERGENCY DEPARTMENT HISTORY AND PHYSICAL EXAM    2:46 AM      Date: 2/4/2025  Patient Name: Jammie Marino    History of Presenting Illness     Chief Complaint   Patient presents with    Chest Pain    Pruritis         History Provided By: Patient.     Additional History (Context): Jammie Marino is a 54 y.o. female with   Past Medical History:   Diagnosis Date    Asthma     Bronchitis     CHF (congestive heart failure) ([facility])     Cocaine abuse ([facility]) 3/26/2015    Dental caries     Depression     bipolar    Depression 8/31/2016    Drug abuse ([facility])     Dysphasia 10/17/2018    Elevated hematocrit 8/31/2016    ETOH abuse     GERD (gastroesophageal reflux disease)     schatzis ring, hiatal hernia    H/O screening mammography 12/06/2016    No evidence of malignancy     HPV (human papilloma virus) infection 11/2016    The patient referred to L     Hypertension     Hypertensive left ventricular hypertrophy, without heart failure 10/15/2018    mild to moderate lvh continue bp meds    Ill-defined condition     Marijuana use 10/17/2018    Mood swings     Polycythemia 2018    Polycythemia     Psychiatric disorder     depression, Bipolar    Schatzki's ring 08/30/2018    Found on Barium Swallow    Sliding hiatal hernia 10/17/2018    Tobacco abuse     Vitamin D deficiency     who presents with complaint of intermittent central chest pain x 3 weeks.  Patient's the pain is sharp, aching, and last for a few seconds.  Patient notes symptoms resolved without intervention.  Patient notes she did not take medication for the symptoms prior to arrival.  Patient admits to crack/cocaine use.  Patient denies fever or chills, diaphoresis, dyspnea, palpitations, orthopnea, pleuritic or exertional symptoms, nausea or vomiting.  Patient denies history of DVT or PE, hemoptysis, recent surgery or travel, leg swelling, CAD.  Patient does not note history of CHF and smoking.    PCP: Zaida Monroe MD    No current facility-administered

## 2025-02-05 NOTE — ED NOTES
PIV placed in RIGHT forearm. Minor trauma on insertion. Flushes well. When asked pt states she does not know if it is painful or not. Relayed to care team.

## 2025-02-05 NOTE — ED TRIAGE NOTES
Arrived ambulatory to triage c/o chest pain x 3 weeks. States she has been seen here previously for the same. States she came in [facility] \"my veins were protruding out my arm\" States she does not f/u w anyone.   Also endorses in arm itching

## 2025-02-24 ENCOUNTER — TELEPHONE (OUTPATIENT)
Facility: CLINIC | Age: 55
End: 2025-02-24

## 2025-03-04 ENCOUNTER — APPOINTMENT (OUTPATIENT)
Facility: HOSPITAL | Age: 55
End: 2025-03-04
Payer: COMMERCIAL

## 2025-03-04 ENCOUNTER — HOSPITAL ENCOUNTER (EMERGENCY)
Facility: HOSPITAL | Age: 55
Discharge: HOME | End: 2025-03-04
Attending: STUDENT IN AN ORGANIZED HEALTH CARE EDUCATION/TRAINING PROGRAM
Payer: COMMERCIAL

## 2025-03-04 VITALS
OXYGEN SATURATION: 98 % | SYSTOLIC BLOOD PRESSURE: 187 MMHG | WEIGHT: 182 LBS | DIASTOLIC BLOOD PRESSURE: 86 MMHG | TEMPERATURE: 98.3 F | HEART RATE: 62 BPM | RESPIRATION RATE: 17 BRPM | HEIGHT: 62 IN | BODY MASS INDEX: 33.49 KG/M2

## 2025-03-04 DIAGNOSIS — R10.13 ABDOMINAL PAIN, EPIGASTRIC: Primary | ICD-10-CM

## 2025-03-04 DIAGNOSIS — R07.89 OTHER CHEST PAIN: ICD-10-CM

## 2025-03-04 LAB
ALBUMIN SERPL-MCNC: 4.1 G/DL (ref 3.4–5)
ALBUMIN/GLOB SERPL: 1.4 (ref 0.8–1.7)
ALP SERPL-CCNC: 56 U/L (ref 45–117)
ALT SERPL-CCNC: 20 U/L (ref 13–56)
ANION GAP SERPL CALC-SCNC: 7 MMOL/L (ref 3–18)
AST SERPL-CCNC: 12 U/L (ref 10–38)
BASOPHILS # BLD: 0.06 K/UL (ref 0–0.1)
BASOPHILS NFR BLD: 0.7 % (ref 0–2)
BILIRUB SERPL-MCNC: 0.4 MG/DL (ref 0.2–1)
BUN SERPL-MCNC: 11 MG/DL (ref 7–18)
BUN/CREAT SERPL: 17 (ref 12–20)
CALCIUM SERPL-MCNC: 9 MG/DL (ref 8.5–10.1)
CHLORIDE SERPL-SCNC: 107 MMOL/L (ref 100–111)
CO2 SERPL-SCNC: 27 MMOL/L (ref 21–32)
CREAT SERPL-MCNC: 0.63 MG/DL (ref 0.6–1.3)
DIFFERENTIAL METHOD BLD: ABNORMAL
EKG ATRIAL RATE: 56 BPM
EKG DIAGNOSIS: NORMAL
EKG P-R INTERVAL: 162 MS
EKG Q-T INTERVAL: 458 MS
EKG QRS DURATION: 118 MS
EKG QTC CALCULATION (BAZETT): 441 MS
EKG R AXIS: 223 DEGREES
EKG T AXIS: 144 DEGREES
EKG VENTRICULAR RATE: 56 BPM
EOSINOPHIL # BLD: 0.16 K/UL (ref 0–0.4)
EOSINOPHIL NFR BLD: 1.9 % (ref 0–5)
ERYTHROCYTE [DISTWIDTH] IN BLOOD BY AUTOMATED COUNT: 12 % (ref 11.6–14.5)
GLOBULIN SER CALC-MCNC: 2.9 G/DL (ref 2–4)
GLUCOSE SERPL-MCNC: 120 MG/DL (ref 74–99)
HCT VFR BLD AUTO: 52 % (ref 35–45)
HGB BLD-MCNC: 17.6 G/DL (ref 12–16)
IMM GRANULOCYTES # BLD AUTO: 0.03 K/UL (ref 0–0.04)
IMM GRANULOCYTES NFR BLD AUTO: 0.3 % (ref 0–0.5)
LYMPHOCYTES # BLD: 2.73 K/UL (ref 0.9–3.6)
LYMPHOCYTES NFR BLD: 31.7 % (ref 21–52)
MCH RBC QN AUTO: 32.1 PG (ref 24–34)
MCHC RBC AUTO-ENTMCNC: 33.8 G/DL (ref 31–37)
MCV RBC AUTO: 94.9 FL (ref 78–100)
MONOCYTES # BLD: 0.76 K/UL (ref 0.05–1.2)
MONOCYTES NFR BLD: 8.8 % (ref 3–10)
NEUTS SEG # BLD: 4.87 K/UL (ref 1.8–8)
NEUTS SEG NFR BLD: 56.6 % (ref 40–73)
NRBC # BLD: 0 K/UL (ref 0–0.01)
NRBC BLD-RTO: 0 PER 100 WBC
PLATELET # BLD AUTO: 245 K/UL (ref 135–420)
PMV BLD AUTO: 9.4 FL (ref 9.2–11.8)
POTASSIUM SERPL-SCNC: 3.8 MMOL/L (ref 3.5–5.5)
PROT SERPL-MCNC: 7 G/DL (ref 6.4–8.2)
RBC # BLD AUTO: 5.48 M/UL (ref 4.2–5.3)
SODIUM SERPL-SCNC: 141 MMOL/L (ref 136–145)
TROPONIN I SERPL HS-MCNC: 10 NG/L (ref 0–54)
WBC # BLD AUTO: 8.6 K/UL (ref 4.6–13.2)

## 2025-03-04 PROCEDURE — 99285 EMERGENCY DEPT VISIT HI MDM: CPT

## 2025-03-04 PROCEDURE — 71275 CT ANGIOGRAPHY CHEST: CPT

## 2025-03-04 PROCEDURE — 6360000002 HC RX W HCPCS: Performed by: STUDENT IN AN ORGANIZED HEALTH CARE EDUCATION/TRAINING PROGRAM

## 2025-03-04 PROCEDURE — 6360000004 HC RX CONTRAST MEDICATION: Performed by: STUDENT IN AN ORGANIZED HEALTH CARE EDUCATION/TRAINING PROGRAM

## 2025-03-04 PROCEDURE — 93005 ELECTROCARDIOGRAM TRACING: CPT | Performed by: STUDENT IN AN ORGANIZED HEALTH CARE EDUCATION/TRAINING PROGRAM

## 2025-03-04 PROCEDURE — 85025 COMPLETE CBC W/AUTO DIFF WBC: CPT

## 2025-03-04 PROCEDURE — 96375 TX/PRO/DX INJ NEW DRUG ADDON: CPT

## 2025-03-04 PROCEDURE — 93010 ELECTROCARDIOGRAM REPORT: CPT | Performed by: INTERNAL MEDICINE

## 2025-03-04 PROCEDURE — 80053 COMPREHEN METABOLIC PANEL: CPT

## 2025-03-04 PROCEDURE — 94761 N-INVAS EAR/PLS OXIMETRY MLT: CPT

## 2025-03-04 PROCEDURE — 84484 ASSAY OF TROPONIN QUANT: CPT

## 2025-03-04 PROCEDURE — 96374 THER/PROPH/DIAG INJ IV PUSH: CPT

## 2025-03-04 RX ORDER — IOPAMIDOL 755 MG/ML
100 INJECTION, SOLUTION INTRAVASCULAR
Status: COMPLETED | OUTPATIENT
Start: 2025-03-04 | End: 2025-03-04

## 2025-03-04 RX ORDER — ACETAMINOPHEN 500 MG
1000 TABLET ORAL 3 TIMES DAILY PRN
Qty: 180 TABLET | Refills: 0 | Status: SHIPPED | OUTPATIENT
Start: 2025-03-04

## 2025-03-04 RX ORDER — DIPHENHYDRAMINE HYDROCHLORIDE 50 MG/ML
50 INJECTION INTRAMUSCULAR; INTRAVENOUS ONCE
Status: COMPLETED | OUTPATIENT
Start: 2025-03-04 | End: 2025-03-04

## 2025-03-04 RX ORDER — PANTOPRAZOLE SODIUM 40 MG/1
40 TABLET, DELAYED RELEASE ORAL
Qty: 180 TABLET | Refills: 0 | Status: SHIPPED | OUTPATIENT
Start: 2025-03-04 | End: 2025-06-02

## 2025-03-04 RX ORDER — HYDROCORTISONE SODIUM SUCCINATE 100 MG/2ML
200 INJECTION INTRAMUSCULAR; INTRAVENOUS ONCE
Status: COMPLETED | OUTPATIENT
Start: 2025-03-04 | End: 2025-03-04

## 2025-03-04 RX ORDER — IBUPROFEN 400 MG/1
400 TABLET, FILM COATED ORAL EVERY 6 HOURS PRN
Qty: 120 TABLET | Refills: 0 | Status: SHIPPED | OUTPATIENT
Start: 2025-03-04

## 2025-03-04 RX ADMIN — DIPHENHYDRAMINE HYDROCHLORIDE 50 MG: 50 INJECTION INTRAMUSCULAR; INTRAVENOUS at 12:35

## 2025-03-04 RX ADMIN — IOPAMIDOL 100 ML: 755 INJECTION, SOLUTION INTRAVENOUS at 13:56

## 2025-03-04 RX ADMIN — HYDROCORTISONE SODIUM SUCCINATE 200 MG: 100 INJECTION, POWDER, FOR SOLUTION INTRAMUSCULAR; INTRAVENOUS at 09:45

## 2025-03-04 ASSESSMENT — ENCOUNTER SYMPTOMS
VOMITING: 0
COUGH: 0
SHORTNESS OF BREATH: 1
CHEST TIGHTNESS: 1
PHOTOPHOBIA: 0
NAUSEA: 0
RHINORRHEA: 0

## 2025-03-04 ASSESSMENT — PAIN DESCRIPTION - PAIN TYPE: TYPE: ACUTE PAIN

## 2025-03-04 ASSESSMENT — PAIN SCALES - GENERAL: PAINLEVEL_OUTOF10: 3

## 2025-03-04 ASSESSMENT — PAIN DESCRIPTION - LOCATION: LOCATION: CHEST

## 2025-03-04 ASSESSMENT — PAIN DESCRIPTION - ORIENTATION: ORIENTATION: LEFT

## 2025-03-04 ASSESSMENT — PAIN - FUNCTIONAL ASSESSMENT: PAIN_FUNCTIONAL_ASSESSMENT: 0-10

## 2025-03-04 NOTE — ED TRIAGE NOTES
Pt came via EMS stretcher from home c/o chest pain for months. Pt also reports abdominal pain.    Pt has hx of CHF, COPD.  Pt also uses cocaine regularly, last used yesterday.

## 2025-03-04 NOTE — ED NOTES
Pt able to ambulate to the restroom without difficulty.    Placed back on continuous cardiac monitor.    Pt denies chest pain at this moment, states \"I just have a pull.\"   The patient is a 88y Male complaining of lower leg pain/injury.

## 2025-03-04 NOTE — ED NOTES
Note entered at the request of CT, I am aware patient has a listed allergy to iodinated contrast media, feel that this study is still medically warranted and patient has been pretreated per hospital protocol.    MD Nate Marshall III, Clyde D III, MD  03/04/25 [id number]

## 2025-03-04 NOTE — ED NOTES
Diego from CT Scan called, and stated pt will need to be transported to CT Scan with MD who can intubate if needed due to contrast allergy, Dr Sullivan made aware

## 2025-03-04 NOTE — ED PROVIDER NOTES
CONSULTS:  None    PROCEDURES:  Unless otherwise noted below, none     Procedures      FINAL IMPRESSION      1. Abdominal pain, epigastric    2. Other chest pain          DISPOSITION/PLAN   DISPOSITION Decision To Discharge 03/04/2025 02:53:43 PM      PATIENT REFERRED TO:  Zaida Monroe MD  [address]  [address]  [place] [address]  [phone]    In 1 week      [facility]  [address] [id number] [phone]    As needed      DISCHARGE MEDICATIONS:  New Prescriptions    ACETAMINOPHEN (TYLENOL) 500 MG TABLET    Take 2 tablets by mouth 3 times daily as needed for Pain    IBUPROFEN (IBU) 400 MG TABLET    Take 1 tablet by mouth every 6 hours as needed for Pain    PANTOPRAZOLE (PROTONIX) 40 MG TABLET    Take 1 tablet by mouth 2 times daily (before meals)     Controlled Substances Monitoring:          No data to display                (Please note that portions of this note were completed with a voice recognition program.  Efforts were made to edit the dictations but occasionally words are mis-transcribed.)    Claus Sullivan III, MD (electronically signed)  Attending Emergency Physician            Claus Sullivan III, MD  03/04/25 [id number]

## 2025-03-04 NOTE — ED NOTES
RN called CT regarding orders for contrast allergy.    CT tech stated to given Solu-Cortef now then given Benadryl after 3hrs. Pt will go to CT 1hr after the Benadryl.

## 2025-03-07 ENCOUNTER — OFFICE VISIT (OUTPATIENT)
Facility: CLINIC | Age: 55
End: 2025-03-07
Payer: COMMERCIAL

## 2025-03-07 VITALS
SYSTOLIC BLOOD PRESSURE: 136 MMHG | WEIGHT: 187 LBS | DIASTOLIC BLOOD PRESSURE: 64 MMHG | BODY MASS INDEX: 34.41 KG/M2 | HEART RATE: 63 BPM | OXYGEN SATURATION: 96 % | HEIGHT: 62 IN

## 2025-03-07 DIAGNOSIS — K04.7 DENTAL INFECTION: ICD-10-CM

## 2025-03-07 DIAGNOSIS — E55.9 VITAMIN D DEFICIENCY: ICD-10-CM

## 2025-03-07 DIAGNOSIS — R07.9 CHEST PAIN, UNSPECIFIED TYPE: ICD-10-CM

## 2025-03-07 DIAGNOSIS — R07.9 CHEST PAIN, UNSPECIFIED TYPE: Primary | ICD-10-CM

## 2025-03-07 DIAGNOSIS — R73.03 PREDIABETES: ICD-10-CM

## 2025-03-07 DIAGNOSIS — D75.1 SECONDARY POLYCYTHEMIA: ICD-10-CM

## 2025-03-07 DIAGNOSIS — F19.10 SUBSTANCE ABUSE (HCC): ICD-10-CM

## 2025-03-07 DIAGNOSIS — F14.10 COCAINE ABUSE (HCC): ICD-10-CM

## 2025-03-07 DIAGNOSIS — I10 ESSENTIAL (PRIMARY) HYPERTENSION: ICD-10-CM

## 2025-03-07 DIAGNOSIS — E78.1 PURE HYPERGLYCERIDEMIA: ICD-10-CM

## 2025-03-07 DIAGNOSIS — Z12.31 BREAST CANCER SCREENING BY MAMMOGRAM: ICD-10-CM

## 2025-03-07 PROCEDURE — 99214 OFFICE O/P EST MOD 30 MIN: CPT | Performed by: STUDENT IN AN ORGANIZED HEALTH CARE EDUCATION/TRAINING PROGRAM

## 2025-03-07 PROCEDURE — 3075F SYST BP GE 130 - 139MM HG: CPT | Performed by: STUDENT IN AN ORGANIZED HEALTH CARE EDUCATION/TRAINING PROGRAM

## 2025-03-07 PROCEDURE — 3078F DIAST BP <80 MM HG: CPT | Performed by: STUDENT IN AN ORGANIZED HEALTH CARE EDUCATION/TRAINING PROGRAM

## 2025-03-07 SDOH — ECONOMIC STABILITY: FOOD INSECURITY: WITHIN THE PAST 12 MONTHS, THE FOOD YOU BOUGHT JUST DIDN'T LAST AND YOU DIDN'T HAVE MONEY TO GET MORE.: NEVER TRUE

## 2025-03-07 SDOH — ECONOMIC STABILITY: FOOD INSECURITY: WITHIN THE PAST 12 MONTHS, YOU WORRIED THAT YOUR FOOD WOULD RUN OUT BEFORE YOU GOT MONEY TO BUY MORE.: NEVER TRUE

## 2025-03-07 ASSESSMENT — ENCOUNTER SYMPTOMS
VOMITING: 0
WHEEZING: 0
NAUSEA: 0
CHEST TIGHTNESS: 0
RHINORRHEA: 0
ABDOMINAL PAIN: 1
BLOOD IN STOOL: 0
BACK PAIN: 1
SHORTNESS OF BREATH: 0
DIARRHEA: 0
COUGH: 0

## 2025-03-07 ASSESSMENT — PATIENT HEALTH QUESTIONNAIRE - PHQ9
2. FEELING DOWN, DEPRESSED OR HOPELESS: NEARLY EVERY DAY
1. LITTLE INTEREST OR PLEASURE IN DOING THINGS: NEARLY EVERY DAY
SUM OF ALL RESPONSES TO PHQ QUESTIONS 1-9: 6

## 2025-03-07 NOTE — PATIENT INSTRUCTIONS
[facility]  4.4(58) · Addiction treatment center  [address] · [phone]    [facility]  Address: [address], [place] [address]  Phone: [phone]    [facility]  Address: [address] [place] [address]  Phone: [phone]    [facility]

## 2025-03-07 NOTE — PROGRESS NOTES
followed up with GI.    HTN: Controlled on amlodipine, losartan and HCTZ.    HLD: Check lipid panel.    Asthma: Continue albuterol. Stable    Depression/bipolar disorder: Patient reports she is following with psychiatry    Prediabetes: Check HbA1c          Return in about 3 months (around 6/7/2025).     I asked the patient if she  had any questions and answered her  questions.  The patient stated that she understands the treatment plan and agrees with the treatment plan    This document was created with a voice activated dictation system and may contain transcription errors.

## 2025-03-10 ENCOUNTER — TELEPHONE (OUTPATIENT)
Facility: CLINIC | Age: 55
End: 2025-03-10

## 2025-03-10 NOTE — TELEPHONE ENCOUNTER
Pt stated she can not breathe   Informed pt to do to ED   Pt stated they will not do anything to help  Pt is crying and stated she is requesting a order to get a pint of blood out of her as soon as possible  Pt requesting a call back as soon as possible

## 2025-03-11 NOTE — TELEPHONE ENCOUNTER
Spoke to the patient over the phone.  Discussed that she needs phlebotomy and needs to reach out to her hematologist-provided number to schedule appointment.

## 2025-03-13 ENCOUNTER — TELEPHONE (OUTPATIENT)
Facility: CLINIC | Age: 55
End: 2025-03-13

## 2025-03-13 NOTE — TELEPHONE ENCOUNTER
Pt requesting call back to discuss finding assistance in finding someone who is willing to take a pint of blood out.  Pt stated she is in pain.  Pt stated she was just in for an appt 03/07/2025

## 2025-03-13 NOTE — TELEPHONE ENCOUNTER
Patient has been advised repeatedly that she needs to reach out to her hematologist.  Meanwhile-have reached out to infusion center to see how I can order for phlebotomy

## 2025-03-18 ENCOUNTER — HOSPITAL ENCOUNTER (OUTPATIENT)
Facility: HOSPITAL | Age: 55
Setting detail: INFUSION SERIES
End: 2025-03-18

## 2025-03-18 ENCOUNTER — HOSPITAL ENCOUNTER (OUTPATIENT)
Facility: HOSPITAL | Age: 55
Setting detail: INFUSION SERIES
Discharge: HOME | End: 2025-03-21
Payer: COMMERCIAL

## 2025-03-18 VITALS
TEMPERATURE: 98.2 F | RESPIRATION RATE: 18 BRPM | HEART RATE: 80 BPM | SYSTOLIC BLOOD PRESSURE: 170 MMHG | OXYGEN SATURATION: 96 % | DIASTOLIC BLOOD PRESSURE: 80 MMHG

## 2025-03-18 LAB
BASO+EOS+MONOS # BLD AUTO: 0.6 K/UL (ref 0–2.3)
BASO+EOS+MONOS NFR BLD AUTO: 8 % (ref 0.1–17)
DIFFERENTIAL METHOD BLD: NORMAL
ERYTHROCYTE [DISTWIDTH] IN BLOOD BY AUTOMATED COUNT: 12.1 % (ref 11.5–14.5)
HCT VFR BLD AUTO: 46.8 % (ref 36–48)
HGB BLD-MCNC: 15.9 G/DL (ref 12–16)
LYMPHOCYTES # BLD: 2.1 K/UL (ref 1.1–5.9)
LYMPHOCYTES NFR BLD: 26.9 % (ref 14–44)
MCH RBC QN AUTO: 32.4 PG (ref 25–35)
MCHC RBC AUTO-ENTMCNC: 34 G/DL (ref 31–37)
MCV RBC AUTO: 95.5 FL (ref 78–102)
NEUTS SEG # BLD: 5 K/UL (ref 1.8–9.5)
NEUTS SEG NFR BLD: 64.7 % (ref 40–70)
PLATELET # BLD AUTO: 225 K/UL (ref 140–440)
RBC # BLD AUTO: 4.9 M/UL (ref 4.1–5.1)
WBC # BLD AUTO: 7.7 K/UL (ref 4.5–13)

## 2025-03-18 PROCEDURE — 99195 PHLEBOTOMY: CPT

## 2025-03-18 PROCEDURE — 85025 COMPLETE CBC W/AUTO DIFF WBC: CPT

## 2025-03-18 PROCEDURE — 2580000003 HC RX 258: Performed by: STUDENT IN AN ORGANIZED HEALTH CARE EDUCATION/TRAINING PROGRAM

## 2025-03-18 PROCEDURE — 36415 COLL VENOUS BLD VENIPUNCTURE: CPT

## 2025-03-18 RX ORDER — SODIUM CHLORIDE 0.9 % (FLUSH) 0.9 %
5-40 SYRINGE (ML) INJECTION PRN
Status: DISCONTINUED | OUTPATIENT
Start: 2025-03-18 | End: 2025-03-22 | Stop reason: HOSPADM

## 2025-03-18 RX ORDER — SODIUM CHLORIDE 9 MG/ML
INJECTION, SOLUTION INTRAVENOUS CONTINUOUS
Status: DISPENSED | OUTPATIENT
Start: 2025-03-18 | End: 2025-03-18

## 2025-03-18 RX ADMIN — SODIUM CHLORIDE 500 ML: 9 INJECTION, SOLUTION INTRAVENOUS at 15:25

## 2025-03-18 ASSESSMENT — PAIN DESCRIPTION - LOCATION: LOCATION: GENERALIZED

## 2025-03-18 ASSESSMENT — PAIN SCALES - GENERAL: PAINLEVEL_OUTOF10: 3

## 2025-03-18 ASSESSMENT — PAIN DESCRIPTION - PAIN TYPE: TYPE: CHRONIC PAIN

## 2025-03-18 ASSESSMENT — PAIN DESCRIPTION - DESCRIPTORS: DESCRIPTORS: ACHING

## 2025-03-18 NOTE — PROGRESS NOTES
[facility] Progress Note    Date: 2025    Name: Jammie Marino    MRN: [id number]         : 1970    CBC/THERAPEUTIC PHLEBOTOMY      Ms. Marino was assessed and education was provided.     Ms. Marino's vitals were reviewed and patient was observed for 5 minutes prior to treatment.   Vitals:    25 1559   BP: (!) 170/80   Pulse: 80   Resp: 18   Temp: 98.2 °F (36.8 °C)   SpO2: 96%   Venipuncture x 1 attempt to left hand.  CBC drawn and processed on site.    Lab results were obtained and reviewed.  Recent Results (from the past 12 hours)   CBC with Partial Differential    Collection Time: 25  3:00 PM   Result Value Ref Range    WBC 7.7 4.5 - 13.0 K/uL    RBC 4.90 4.10 - 5.10 M/uL    Hemoglobin 15.9 12.0 - 16.0 g/dL    Hematocrit 46.8 36 - 48 %    MCV 95.5 78 - 102 FL    MCH 32.4 25.0 - 35.0 PG    MCHC 34.0 31 - 37 g/dL    RDW 12.1 11.5 - 14.5 %    Platelets 225 140 - 440 K/uL    Neutrophils % 64.7 40 - 70 %    Mixed Cells 8 0.1 - 17 %    Lymphocytes % 26.9 14 - 44 %    Neutrophils Absolute 5.00 1.8 - 9.5 K/UL    ABSOLUTE MIXED CELLS 0.6 0.0 - 2.3 K/uL    Lymphocytes Absolute 2.10 1.1 - 5.9 K/UL    Differential Type AUTOMATED         Per today's HGB 46.8 phlebotomy needed. (Hgb >45)    #18 PIV started in left AC x 1 attempt.  Phlebotomy started at 1513 and ended at 1527.  500 cc whole blood removed.  Vitals completed. Upon completion 500 cc NS given over 30 minutes. Vitals taken and within normal limits.    Ms. Marino tolerated the infusion, and had no complaints.  Patient armband removed and shredded.    [facility]. Mar[facility] in stable condition at 1600. She is to return on 2025 at 0900 for her next CBC/PHLEBOTOMY appointment.    ALEN GONSALES RN  2025  4:13 PM

## 2025-03-19 ENCOUNTER — PATIENT MESSAGE (OUTPATIENT)
Facility: CLINIC | Age: 55
End: 2025-03-19

## 2025-03-19 ENCOUNTER — TELEPHONE (OUTPATIENT)
Facility: CLINIC | Age: 55
End: 2025-03-19

## 2025-03-19 NOTE — TELEPHONE ENCOUNTER
URGENT    Pt stated tingling in arms and swelling in arm and legs  Offered pt an appointment  Pt refused appointment  Pt stated Dr. Monroe has called her in the past for these issues and is requesting a call back today please

## 2025-03-20 ENCOUNTER — OFFICE VISIT (OUTPATIENT)
Facility: CLINIC | Age: 55
End: 2025-03-20
Payer: COMMERCIAL

## 2025-03-20 VITALS
DIASTOLIC BLOOD PRESSURE: 92 MMHG | HEIGHT: 62 IN | SYSTOLIC BLOOD PRESSURE: 139 MMHG | OXYGEN SATURATION: 91 % | HEART RATE: 85 BPM | BODY MASS INDEX: 34.52 KG/M2 | WEIGHT: 187.6 LBS

## 2025-03-20 DIAGNOSIS — D75.1 SECONDARY POLYCYTHEMIA: ICD-10-CM

## 2025-03-20 DIAGNOSIS — S60.222S: ICD-10-CM

## 2025-03-20 DIAGNOSIS — F32.A ANXIETY AND DEPRESSION: ICD-10-CM

## 2025-03-20 DIAGNOSIS — F41.9 ANXIETY AND DEPRESSION: ICD-10-CM

## 2025-03-20 DIAGNOSIS — F14.10 COCAINE ABUSE: ICD-10-CM

## 2025-03-20 DIAGNOSIS — R07.9 CHEST PAIN, UNSPECIFIED TYPE: Primary | ICD-10-CM

## 2025-03-20 PROCEDURE — 3075F SYST BP GE 130 - 139MM HG: CPT | Performed by: STUDENT IN AN ORGANIZED HEALTH CARE EDUCATION/TRAINING PROGRAM

## 2025-03-20 PROCEDURE — 3080F DIAST BP >= 90 MM HG: CPT | Performed by: STUDENT IN AN ORGANIZED HEALTH CARE EDUCATION/TRAINING PROGRAM

## 2025-03-20 PROCEDURE — 99213 OFFICE O/P EST LOW 20 MIN: CPT | Performed by: STUDENT IN AN ORGANIZED HEALTH CARE EDUCATION/TRAINING PROGRAM

## 2025-03-20 SDOH — ECONOMIC STABILITY: FOOD INSECURITY: WITHIN THE PAST 12 MONTHS, THE FOOD YOU BOUGHT JUST DIDN'T LAST AND YOU DIDN'T HAVE MONEY TO GET MORE.: NEVER TRUE

## 2025-03-20 SDOH — ECONOMIC STABILITY: FOOD INSECURITY: WITHIN THE PAST 12 MONTHS, YOU WORRIED THAT YOUR FOOD WOULD RUN OUT BEFORE YOU GOT MONEY TO BUY MORE.: NEVER TRUE

## 2025-03-20 ASSESSMENT — ENCOUNTER SYMPTOMS
DIARRHEA: 0
NAUSEA: 0
VOMITING: 0
BLOOD IN STOOL: 0
SHORTNESS OF BREATH: 0
ABDOMINAL PAIN: 1
WHEEZING: 0
BACK PAIN: 1
CHEST TIGHTNESS: 0
COUGH: 0
RHINORRHEA: 0

## 2025-03-20 ASSESSMENT — PATIENT HEALTH QUESTIONNAIRE - PHQ9
1. LITTLE INTEREST OR PLEASURE IN DOING THINGS: NEARLY EVERY DAY
SUM OF ALL RESPONSES TO PHQ QUESTIONS 1-9: 6
2. FEELING DOWN, DEPRESSED OR HOPELESS: NEARLY EVERY DAY
SUM OF ALL RESPONSES TO PHQ QUESTIONS 1-9: 6

## 2025-03-20 NOTE — PROGRESS NOTES
highly sensitive for detecting acute congestive heart failure. Also, an NT proBNP <300 pg/mL effectively rules out acute congestive heart failure, with 99% negative predictive value. Our reference ranges are for acute dyspnea.      Troponin, High Sensitivity 02/04/2025 9  0 - 54 ng/L Final    Comment: A HS troponin value change of (+ or -) 50% or more below the 99th percentile, in a 1/2/3 hr interval represents a significant change. Clinical correlation is recommended.  A HS troponin value change of (+ or -) 20% or above the 99th percentile, in a 1/2/3 hr interval represents a significant change. Clinical correlation is recommended.  99th Percentile:    Women:  0-54 ng/L                                                               Men:  0-78 ng/L     Admission on 01/05/2025, Discharged on 01/05/2025   Component Date Value Ref Range Status    Ventricular Rate 01/05/2025 65  BPM Final-Edited    Atrial Rate 01/05/2025 65  BPM Final-Edited    P-R Interval 01/05/2025 176  ms Final-Edited    QRS Duration 01/05/2025 124  ms Final-Edited    Q-T Interval 01/05/2025 446  ms Final-Edited    QTc Calculation (Bazett) 01/05/2025 463  ms Final-Edited    P Axis 01/05/2025 46  degrees Final-Edited    R Axis 01/05/2025 -39  degrees Final-Edited    T Axis 01/05/2025 54  degrees Final-Edited    Diagnosis 01/05/2025    Final-Edited                    Value:Normal sinus rhythm  Left axis deviation  Left ventricular hypertrophy with QRS widening  Abnormal ECG  When compared with ECG of 03-DEC-2024 15:20,  No significant change was found  Confirmed by Ashia Murray MD, ----- ([id number]) on 1/6/2025 2:53:37 PM         No results found for any visits on 03/20/25.    Patient Care Team:  Patient Care Team:  Zaida Monroe MD as PCP - General  Zaida Monroe MD as PCP - Empaneled Provider  Delilah Brantley MD as Consulting Physician      Assessment / Plan:     Diagnosis Orders   1. Chest pain, unspecified type        2. Cocaine abuse (HCC)

## 2025-03-25 ENCOUNTER — HOSPITAL ENCOUNTER (OUTPATIENT)
Facility: HOSPITAL | Age: 55
Setting detail: INFUSION SERIES
End: 2025-03-25

## 2025-04-07 DIAGNOSIS — I10 ESSENTIAL (PRIMARY) HYPERTENSION: ICD-10-CM

## 2025-04-10 RX ORDER — AMLODIPINE BESYLATE 10 MG/1
10 TABLET ORAL DAILY
Qty: 90 TABLET | Refills: 3 | Status: SHIPPED | OUTPATIENT
Start: 2025-04-10

## 2025-04-23 ENCOUNTER — TELEPHONE (OUTPATIENT)
Dept: CARDIOTHORACIC SURGERY | Age: 55
End: 2025-04-23

## 2025-04-29 ENCOUNTER — HOSPITAL ENCOUNTER (EMERGENCY)
Facility: HOSPITAL | Age: 55
Discharge: HOME | End: 2025-04-29
Attending: EMERGENCY MEDICINE
Payer: COMMERCIAL

## 2025-04-29 VITALS
OXYGEN SATURATION: 98 % | SYSTOLIC BLOOD PRESSURE: 132 MMHG | DIASTOLIC BLOOD PRESSURE: 79 MMHG | TEMPERATURE: 97.7 F | RESPIRATION RATE: 15 BRPM | HEIGHT: 62 IN | HEART RATE: 63 BPM | WEIGHT: 190 LBS | BODY MASS INDEX: 34.96 KG/M2

## 2025-04-29 DIAGNOSIS — D75.839 THROMBOCYTHEMIA: ICD-10-CM

## 2025-04-29 DIAGNOSIS — M79.18 MUSCULOSKELETAL PAIN: ICD-10-CM

## 2025-04-29 DIAGNOSIS — F43.9 STRESS: ICD-10-CM

## 2025-04-29 DIAGNOSIS — R20.2 PARESTHESIAS: Primary | ICD-10-CM

## 2025-04-29 LAB
ALBUMIN SERPL-MCNC: 3.8 G/DL (ref 3.4–5)
ALBUMIN/GLOB SERPL: 1.2 (ref 0.8–1.7)
ALP SERPL-CCNC: 67 U/L (ref 45–117)
ALT SERPL-CCNC: 21 U/L (ref 13–56)
ANION GAP SERPL CALC-SCNC: 2 MMOL/L (ref 3–18)
AST SERPL-CCNC: 13 U/L (ref 10–38)
BASOPHILS # BLD: 0.05 K/UL (ref 0–0.1)
BASOPHILS NFR BLD: 0.7 % (ref 0–2)
BILIRUB SERPL-MCNC: 0.5 MG/DL (ref 0.2–1)
BUN SERPL-MCNC: 13 MG/DL (ref 7–18)
BUN/CREAT SERPL: 24 (ref 12–20)
CALCIUM SERPL-MCNC: 9.2 MG/DL (ref 8.5–10.1)
CHLORIDE SERPL-SCNC: 107 MMOL/L (ref 100–111)
CO2 SERPL-SCNC: 27 MMOL/L (ref 21–32)
CREAT SERPL-MCNC: 0.55 MG/DL (ref 0.6–1.3)
DIFFERENTIAL METHOD BLD: ABNORMAL
EOSINOPHIL # BLD: 0.09 K/UL (ref 0–0.4)
EOSINOPHIL NFR BLD: 1.3 % (ref 0–5)
ERYTHROCYTE [DISTWIDTH] IN BLOOD BY AUTOMATED COUNT: 12 % (ref 11.6–14.5)
GLOBULIN SER CALC-MCNC: 3.1 G/DL (ref 2–4)
GLUCOSE SERPL-MCNC: 125 MG/DL (ref 74–99)
HCT VFR BLD AUTO: 47.1 % (ref 35–45)
HGB BLD-MCNC: 16 G/DL (ref 12–16)
IMM GRANULOCYTES # BLD AUTO: 0.04 K/UL (ref 0–0.04)
IMM GRANULOCYTES NFR BLD AUTO: 0.6 % (ref 0–0.5)
INR PPP: 0.9 (ref 0.9–1.1)
LYMPHOCYTES # BLD: 1.76 K/UL (ref 0.9–3.6)
LYMPHOCYTES NFR BLD: 25.5 % (ref 21–52)
MAGNESIUM SERPL-MCNC: 1.9 MG/DL (ref 1.6–2.6)
MCH RBC QN AUTO: 31.3 PG (ref 24–34)
MCHC RBC AUTO-ENTMCNC: 34 G/DL (ref 31–37)
MCV RBC AUTO: 92.2 FL (ref 78–100)
MONOCYTES # BLD: 0.64 K/UL (ref 0.05–1.2)
MONOCYTES NFR BLD: 9.3 % (ref 3–10)
NEUTS SEG # BLD: 4.31 K/UL (ref 1.8–8)
NEUTS SEG NFR BLD: 62.6 % (ref 40–73)
NRBC # BLD: 0 K/UL (ref 0–0.01)
NRBC BLD-RTO: 0 PER 100 WBC
PLATELET # BLD AUTO: 227 K/UL (ref 135–420)
PMV BLD AUTO: 9.6 FL (ref 9.2–11.8)
POTASSIUM SERPL-SCNC: 3.7 MMOL/L (ref 3.5–5.5)
PROT SERPL-MCNC: 6.9 G/DL (ref 6.4–8.2)
PROTHROMBIN TIME: 12.7 SEC (ref 11.9–14.9)
RBC # BLD AUTO: 5.11 M/UL (ref 4.2–5.3)
SODIUM SERPL-SCNC: 136 MMOL/L (ref 136–145)
WBC # BLD AUTO: 6.9 K/UL (ref 4.6–13.2)

## 2025-04-29 PROCEDURE — 94761 N-INVAS EAR/PLS OXIMETRY MLT: CPT

## 2025-04-29 PROCEDURE — 93005 ELECTROCARDIOGRAM TRACING: CPT

## 2025-04-29 PROCEDURE — 80053 COMPREHEN METABOLIC PANEL: CPT

## 2025-04-29 PROCEDURE — 6370000000 HC RX 637 (ALT 250 FOR IP)

## 2025-04-29 PROCEDURE — 85025 COMPLETE CBC W/AUTO DIFF WBC: CPT

## 2025-04-29 PROCEDURE — 83735 ASSAY OF MAGNESIUM: CPT

## 2025-04-29 PROCEDURE — 99284 EMERGENCY DEPT VISIT MOD MDM: CPT

## 2025-04-29 PROCEDURE — 85610 PROTHROMBIN TIME: CPT

## 2025-04-29 RX ORDER — ACETAMINOPHEN 500 MG
1000 TABLET ORAL
Status: COMPLETED | OUTPATIENT
Start: 2025-04-29 | End: 2025-04-29

## 2025-04-29 RX ORDER — LIDOCAINE 4 G/G
1 PATCH TOPICAL
Status: DISCONTINUED | OUTPATIENT
Start: 2025-04-29 | End: 2025-04-29 | Stop reason: HOSPADM

## 2025-04-29 RX ADMIN — ACETAMINOPHEN 1000 MG: 500 TABLET ORAL at 07:53

## 2025-04-29 ASSESSMENT — PAIN SCALES - GENERAL
PAINLEVEL_OUTOF10: 5
PAINLEVEL_OUTOF10: 8

## 2025-04-29 ASSESSMENT — PAIN DESCRIPTION - ORIENTATION: ORIENTATION: RIGHT

## 2025-04-29 ASSESSMENT — PAIN DESCRIPTION - LOCATION: LOCATION: FACE;NECK;SHOULDER

## 2025-04-29 ASSESSMENT — PAIN DESCRIPTION - DESCRIPTORS: DESCRIPTORS: ACHING

## 2025-04-29 NOTE — DISCHARGE INSTRUCTIONS
It was a pleasure to meet you today. You came to the Emergency Room for several complaints. The results of your history, physical exam, labs, and imaging were not concerning for an emergent process requiring immediate intervention. Please return to the Emergency Room if you notice persistent of worsening symptoms.

## 2025-04-29 NOTE — ED PROVIDER NOTES
lower concern for LVO.     She has history of polycythemia, supposed to go to appointment at 9 PM today for removal of 1 unit of blood.  Wanted to get her hematocrit checked before, mildly elevated hematocrit at 47.1, hemoglobin 16.    Patient has a lot of stress lately, has not taken her lamotrigine in 2 days, had lengthy discussion with her about importance of taking medications daily as prescribed.  She already follows with a counselor for mental health, she will follow-up with them sooner given recent stressors.  Denies any SI or HI.    She has no midline spinal tenderness, mild right upper trapezius tenderness over muscle body without overlying skin changes, no trauma.  Treated musculoskeletal pain with Tylenol and lidocaine patch.  Lower concern for fracture vs dislocation vs osteo, among others.     The patient verbalized agreement and understanding of the treatment plan and feels safe to go home at this time. ER return precautions provided for persistent or worsening symptoms and additional details as listed in patient discharge instructions. The patient will follow up with their primary care doctor in the next few days for reassessment of symptoms and to review all of the findings from today's ED visit. All questions answered.     Matt Mendoza MD  Emergency Medicine PGY-2  [facility]     REASSESSMENT          CRITICAL CARE TIME   Total Critical Care time was 0 minutes, excluding separately reportable procedures.  There was a high probability of clinically significant/life threatening deterioration in the patient's condition which required my urgent intervention.      CONSULTS:  None    PROCEDURES:  Unless otherwise noted below, none     Procedures      FINAL IMPRESSION      1. Paresthesias    2. Thrombocythemia    3. Musculoskeletal pain    4. Stress          DISPOSITION/PLAN   DISPOSITION Decision To Discharge 04/29/2025 08:23:09 AM   DISPOSITION CONDITION Stable           PATIENT

## 2025-04-29 NOTE — ED TRIAGE NOTES
Pt ambulatory to triage c/o bilateral blurry vision when trying to read, intermittent numbness in face and arms, and muscle cramps that started 2 days ago.     Smoker and drug user.

## 2025-04-30 LAB
EKG ATRIAL RATE: 75 BPM
EKG DIAGNOSIS: NORMAL
EKG P AXIS: 45 DEGREES
EKG P-R INTERVAL: 168 MS
EKG Q-T INTERVAL: 426 MS
EKG QRS DURATION: 116 MS
EKG QTC CALCULATION (BAZETT): 475 MS
EKG R AXIS: -45 DEGREES
EKG T AXIS: 62 DEGREES
EKG VENTRICULAR RATE: 75 BPM

## 2025-04-30 PROCEDURE — 93010 ELECTROCARDIOGRAM REPORT: CPT | Performed by: INTERNAL MEDICINE

## 2025-05-05 DIAGNOSIS — E78.1 PURE HYPERGLYCERIDEMIA: ICD-10-CM

## 2025-05-06 RX ORDER — FENOFIBRATE 145 MG/1
145 TABLET, FILM COATED ORAL DAILY
Qty: 90 TABLET | Refills: 1 | Status: SHIPPED | OUTPATIENT
Start: 2025-05-06

## 2025-05-07 ENCOUNTER — OFFICE VISIT (OUTPATIENT)
Facility: CLINIC | Age: 55
End: 2025-05-07
Payer: COMMERCIAL

## 2025-05-07 VITALS
BODY MASS INDEX: 34.71 KG/M2 | DIASTOLIC BLOOD PRESSURE: 85 MMHG | HEART RATE: 78 BPM | HEIGHT: 62 IN | SYSTOLIC BLOOD PRESSURE: 124 MMHG | WEIGHT: 188.6 LBS | OXYGEN SATURATION: 95 %

## 2025-05-07 DIAGNOSIS — F41.9 ANXIETY AND DEPRESSION: ICD-10-CM

## 2025-05-07 DIAGNOSIS — F14.10 COCAINE ABUSE (HCC): ICD-10-CM

## 2025-05-07 DIAGNOSIS — R21 RASH: Primary | ICD-10-CM

## 2025-05-07 DIAGNOSIS — I10 ESSENTIAL (PRIMARY) HYPERTENSION: ICD-10-CM

## 2025-05-07 DIAGNOSIS — D75.1 SECONDARY POLYCYTHEMIA: ICD-10-CM

## 2025-05-07 DIAGNOSIS — F32.A ANXIETY AND DEPRESSION: ICD-10-CM

## 2025-05-07 DIAGNOSIS — F19.10 SUBSTANCE ABUSE (HCC): ICD-10-CM

## 2025-05-07 PROCEDURE — 99214 OFFICE O/P EST MOD 30 MIN: CPT | Performed by: STUDENT IN AN ORGANIZED HEALTH CARE EDUCATION/TRAINING PROGRAM

## 2025-05-07 PROCEDURE — 3074F SYST BP LT 130 MM HG: CPT | Performed by: STUDENT IN AN ORGANIZED HEALTH CARE EDUCATION/TRAINING PROGRAM

## 2025-05-07 PROCEDURE — 3079F DIAST BP 80-89 MM HG: CPT | Performed by: STUDENT IN AN ORGANIZED HEALTH CARE EDUCATION/TRAINING PROGRAM

## 2025-05-07 RX ORDER — TRIAMCINOLONE ACETONIDE 1 MG/G
OINTMENT TOPICAL 2 TIMES DAILY
Qty: 80 G | Refills: 0 | Status: SHIPPED | OUTPATIENT
Start: 2025-05-07 | End: 2025-05-14

## 2025-05-07 SDOH — ECONOMIC STABILITY: FOOD INSECURITY: WITHIN THE PAST 12 MONTHS, THE FOOD YOU BOUGHT JUST DIDN'T LAST AND YOU DIDN'T HAVE MONEY TO GET MORE.: NEVER TRUE

## 2025-05-07 SDOH — ECONOMIC STABILITY: FOOD INSECURITY: WITHIN THE PAST 12 MONTHS, YOU WORRIED THAT YOUR FOOD WOULD RUN OUT BEFORE YOU GOT MONEY TO BUY MORE.: NEVER TRUE

## 2025-05-07 ASSESSMENT — ENCOUNTER SYMPTOMS
RHINORRHEA: 0
VOMITING: 0
BACK PAIN: 1
COUGH: 0
CHEST TIGHTNESS: 0
SHORTNESS OF BREATH: 0
ABDOMINAL PAIN: 1
NAUSEA: 0
DIARRHEA: 0
BLOOD IN STOOL: 0
WHEEZING: 0

## 2025-05-07 ASSESSMENT — PATIENT HEALTH QUESTIONNAIRE - PHQ9
2. FEELING DOWN, DEPRESSED OR HOPELESS: NEARLY EVERY DAY
SUM OF ALL RESPONSES TO PHQ QUESTIONS 1-9: 6
1. LITTLE INTEREST OR PLEASURE IN DOING THINGS: NEARLY EVERY DAY
SUM OF ALL RESPONSES TO PHQ QUESTIONS 1-9: 6

## 2025-05-07 NOTE — PROGRESS NOTES
0.3  0.0 - 0.5 % Final    Neutrophils Absolute 03/04/2025 4.87  1.80 - 8.00 K/UL Final    Lymphocytes Absolute 03/04/2025 2.73  0.90 - 3.60 K/UL Final    Monocytes Absolute 03/04/2025 0.76  0.05 - 1.20 K/UL Final    Eosinophils Absolute 03/04/2025 0.16  0.00 - 0.40 K/UL Final    Basophils Absolute 03/04/2025 0.06  0.00 - 0.10 K/UL Final    Immature Granulocytes Absolute 03/04/2025 0.03  0.00 - 0.04 K/UL Final    Differential Type 03/04/2025 AUTOMATED    Final    Troponin, High Sensitivity 03/04/2025 10  0 - 54 ng/L Final    Comment: A HS troponin value change of (+ or -) 50% or more below the 99th percentile, in a 1/2/3 hr interval represents a significant change. Clinical correlation is recommended.  A HS troponin value change of (+ or -) 20% or above the 99th percentile, in a 1/2/3 hr interval represents a significant change. Clinical correlation is recommended.  99th Percentile:    Women:  0-54 ng/L                                                               Men:  0-78 ng/L      Sodium 03/04/2025 141  136 - 145 mmol/L Final    Potassium 03/04/2025 3.8  3.5 - 5.5 mmol/L Final    Chloride 03/04/2025 107  100 - 111 mmol/L Final    CO2 03/04/2025 27  21 - 32 mmol/L Final    Anion Gap 03/04/2025 7  3.0 - 18 mmol/L Final    Glucose 03/04/2025 120 (H)  74 - 99 mg/dL Final    BUN 03/04/2025 11  7.0 - 18 MG/DL Final    Creatinine 03/04/2025 0.63  0.6 - 1.3 MG/DL Final    BUN/Creatinine Ratio 03/04/2025 17  12 - 20   Final    Est, Glom Filt Rate 03/04/2025 >90  >60 ml/min/1.73m2 Final    Comment:    Pediatric calculator link: https://www.kidney.org/professionals/kdoqi/gfr_calculatorped     These results are not intended for use in patients <18 years of age.     eGFR results are calculated without a race factor using  the 2021 CKD-EPI equation. Careful clinical correlation is recommended, particularly when comparing to results calculated using previous equations.  The CKD-EPI equation is less accurate in patients with

## 2025-05-20 ENCOUNTER — TELEPHONE (OUTPATIENT)
Facility: CLINIC | Age: 55
End: 2025-05-20

## 2025-05-20 ENCOUNTER — APPOINTMENT (OUTPATIENT)
Facility: HOSPITAL | Age: 55
End: 2025-05-20
Payer: COMMERCIAL

## 2025-05-20 ENCOUNTER — HOSPITAL ENCOUNTER (EMERGENCY)
Facility: HOSPITAL | Age: 55
Discharge: LEFT BEFORE DISCHARGE | End: 2025-05-20
Payer: COMMERCIAL

## 2025-05-20 ENCOUNTER — HOSPITAL ENCOUNTER (EMERGENCY)
Facility: HOSPITAL | Age: 55
Discharge: HOME | End: 2025-05-23
Payer: COMMERCIAL

## 2025-05-20 VITALS
BODY MASS INDEX: 34.6 KG/M2 | TEMPERATURE: 98.8 F | SYSTOLIC BLOOD PRESSURE: 169 MMHG | HEART RATE: 77 BPM | DIASTOLIC BLOOD PRESSURE: 93 MMHG | HEIGHT: 62 IN | OXYGEN SATURATION: 97 % | RESPIRATION RATE: 18 BRPM | WEIGHT: 188 LBS

## 2025-05-20 VITALS
TEMPERATURE: 98.6 F | OXYGEN SATURATION: 95 % | SYSTOLIC BLOOD PRESSURE: 140 MMHG | HEART RATE: 85 BPM | DIASTOLIC BLOOD PRESSURE: 85 MMHG | RESPIRATION RATE: 18 BRPM

## 2025-05-20 DIAGNOSIS — K02.9 DENTAL CARIES: ICD-10-CM

## 2025-05-20 DIAGNOSIS — R07.9 CHEST PAIN, UNSPECIFIED TYPE: Primary | ICD-10-CM

## 2025-05-20 LAB
ANION GAP SERPL CALC-SCNC: 14 MMOL/L (ref 3–18)
BASOPHILS # BLD: 0.07 K/UL (ref 0–0.1)
BASOPHILS NFR BLD: 1 % (ref 0–2)
BUN SERPL-MCNC: 19 MG/DL (ref 6–23)
BUN/CREAT SERPL: 37 (ref 12–20)
CALCIUM SERPL-MCNC: 10.2 MG/DL (ref 8.5–10.1)
CHLORIDE SERPL-SCNC: 104 MMOL/L (ref 98–107)
CO2 SERPL-SCNC: 24 MMOL/L (ref 21–32)
CREAT SERPL-MCNC: 0.5 MG/DL (ref 0.6–1.3)
DIFFERENTIAL METHOD BLD: ABNORMAL
EKG ATRIAL RATE: 72 BPM
EKG DIAGNOSIS: NORMAL
EKG P AXIS: 41 DEGREES
EKG P-R INTERVAL: 162 MS
EKG Q-T INTERVAL: 414 MS
EKG QRS DURATION: 122 MS
EKG QTC CALCULATION (BAZETT): 453 MS
EKG R AXIS: -50 DEGREES
EKG T AXIS: 68 DEGREES
EKG VENTRICULAR RATE: 72 BPM
EOSINOPHIL # BLD: 0.19 K/UL (ref 0–0.4)
EOSINOPHIL NFR BLD: 2.7 % (ref 0–5)
ERYTHROCYTE [DISTWIDTH] IN BLOOD BY AUTOMATED COUNT: 12.1 % (ref 11.6–14.5)
GLUCOSE SERPL-MCNC: 103 MG/DL (ref 74–108)
HCT VFR BLD AUTO: 51.4 % (ref 35–45)
HGB BLD-MCNC: 16.8 G/DL (ref 12–16)
IMM GRANULOCYTES # BLD AUTO: 0.02 K/UL (ref 0–0.04)
IMM GRANULOCYTES NFR BLD AUTO: 0.3 % (ref 0–0.5)
LYMPHOCYTES # BLD: 2 K/UL (ref 0.9–3.6)
LYMPHOCYTES NFR BLD: 28.5 % (ref 21–52)
MAGNESIUM SERPL-MCNC: 2.2 MG/DL (ref 1.6–2.6)
MCH RBC QN AUTO: 30.2 PG (ref 24–34)
MCHC RBC AUTO-ENTMCNC: 32.7 G/DL (ref 31–37)
MCV RBC AUTO: 92.3 FL (ref 78–100)
MONOCYTES # BLD: 0.57 K/UL (ref 0.05–1.2)
MONOCYTES NFR BLD: 8.1 % (ref 3–10)
NEUTS SEG # BLD: 4.16 K/UL (ref 1.8–8)
NEUTS SEG NFR BLD: 59.4 % (ref 40–73)
NRBC # BLD: 0 K/UL (ref 0–0.01)
NRBC BLD-RTO: 0 PER 100 WBC
NT PRO BNP: 120 PG/ML (ref 36–900)
PLATELET # BLD AUTO: 240 K/UL (ref 135–420)
PMV BLD AUTO: 9.5 FL (ref 9.2–11.8)
POTASSIUM SERPL-SCNC: 4.3 MMOL/L (ref 3.5–5.5)
RBC # BLD AUTO: 5.57 M/UL (ref 4.2–5.3)
SODIUM SERPL-SCNC: 142 MMOL/L (ref 136–145)
TROPONIN T SERPL HS-MCNC: 7 NG/L (ref 0–14)
WBC # BLD AUTO: 7 K/UL (ref 4.6–13.2)

## 2025-05-20 PROCEDURE — 93005 ELECTROCARDIOGRAM TRACING: CPT | Performed by: PHYSICIAN ASSISTANT

## 2025-05-20 PROCEDURE — 85025 COMPLETE CBC W/AUTO DIFF WBC: CPT

## 2025-05-20 PROCEDURE — 2580000003 HC RX 258: Performed by: STUDENT IN AN ORGANIZED HEALTH CARE EDUCATION/TRAINING PROGRAM

## 2025-05-20 PROCEDURE — 83735 ASSAY OF MAGNESIUM: CPT

## 2025-05-20 PROCEDURE — 99285 EMERGENCY DEPT VISIT HI MDM: CPT

## 2025-05-20 PROCEDURE — 2500000003 HC RX 250 WO HCPCS: Performed by: STUDENT IN AN ORGANIZED HEALTH CARE EDUCATION/TRAINING PROGRAM

## 2025-05-20 PROCEDURE — 80048 BASIC METABOLIC PNL TOTAL CA: CPT

## 2025-05-20 PROCEDURE — 84484 ASSAY OF TROPONIN QUANT: CPT

## 2025-05-20 PROCEDURE — 6370000000 HC RX 637 (ALT 250 FOR IP): Performed by: PHYSICIAN ASSISTANT

## 2025-05-20 PROCEDURE — 93010 ELECTROCARDIOGRAM REPORT: CPT | Performed by: INTERNAL MEDICINE

## 2025-05-20 PROCEDURE — 71046 X-RAY EXAM CHEST 2 VIEWS: CPT

## 2025-05-20 PROCEDURE — 83880 ASSAY OF NATRIURETIC PEPTIDE: CPT

## 2025-05-20 PROCEDURE — 99195 PHLEBOTOMY: CPT

## 2025-05-20 RX ORDER — SODIUM CHLORIDE 0.9 % (FLUSH) 0.9 %
5-40 SYRINGE (ML) INJECTION PRN
Status: DISCONTINUED | OUTPATIENT
Start: 2025-05-20 | End: 2025-05-24 | Stop reason: HOSPADM

## 2025-05-20 RX ORDER — LIDOCAINE HYDROCHLORIDE 20 MG/ML
15 SOLUTION OROPHARYNGEAL EVERY 6 HOURS
Qty: 100 ML | Refills: 0 | Status: SHIPPED | OUTPATIENT
Start: 2025-05-20

## 2025-05-20 RX ORDER — LIDOCAINE HYDROCHLORIDE 20 MG/ML
15 SOLUTION OROPHARYNGEAL
Status: COMPLETED | OUTPATIENT
Start: 2025-05-20 | End: 2025-05-20

## 2025-05-20 RX ORDER — 0.9 % SODIUM CHLORIDE 0.9 %
500 INTRAVENOUS SOLUTION INTRAVENOUS ONCE
Status: COMPLETED | OUTPATIENT
Start: 2025-05-20 | End: 2025-05-20

## 2025-05-20 RX ADMIN — AMOXICILLIN AND CLAVULANATE POTASSIUM 1 TABLET: 875; 125 TABLET, FILM COATED ORAL at 10:38

## 2025-05-20 RX ADMIN — SODIUM CHLORIDE, PRESERVATIVE FREE 10 ML: 5 INJECTION INTRAVENOUS at 13:38

## 2025-05-20 RX ADMIN — SODIUM CHLORIDE, PRESERVATIVE FREE 10 ML: 5 INJECTION INTRAVENOUS at 13:18

## 2025-05-20 RX ADMIN — SODIUM CHLORIDE 500 ML: 0.9 INJECTION, SOLUTION INTRAVENOUS at 14:35

## 2025-05-20 RX ADMIN — LIDOCAINE HYDROCHLORIDE 15 ML: 20 SOLUTION ORAL at 10:38

## 2025-05-20 ASSESSMENT — PAIN SCALES - GENERAL
PAINLEVEL_OUTOF10: 3
PAINLEVEL_OUTOF10: 2

## 2025-05-20 ASSESSMENT — PAIN DESCRIPTION - PAIN TYPE: TYPE: ACUTE PAIN

## 2025-05-20 ASSESSMENT — PAIN DESCRIPTION - ONSET: ONSET: GRADUAL

## 2025-05-20 ASSESSMENT — PAIN DESCRIPTION - LOCATION: LOCATION: CHEST

## 2025-05-20 ASSESSMENT — PAIN DESCRIPTION - ORIENTATION: ORIENTATION: MID

## 2025-05-20 ASSESSMENT — PAIN DESCRIPTION - DESCRIPTORS: DESCRIPTORS: DULL

## 2025-05-20 ASSESSMENT — PAIN - FUNCTIONAL ASSESSMENT: PAIN_FUNCTIONAL_ASSESSMENT: ACTIVITIES ARE NOT PREVENTED

## 2025-05-20 ASSESSMENT — PAIN DESCRIPTION - FREQUENCY: FREQUENCY: INTERMITTENT

## 2025-05-20 NOTE — DISCHARGE INSTRUCTIONS
Follow-up with one of the provided dental clinics below:    [facility] [phone]  [facility] -Extraction [phone]  [facility] [phone], [phone]  [facility] [phone]  [facility] [phone]  [facility] [phone]  [facility] [phone], [phone]      Call to schedule an appointment.

## 2025-05-20 NOTE — ED PROVIDER NOTES
29-APR-2025 07:58,  No significant change was found  Confirmed by MD Saurabh, Tristan ([id number]) on 5/20/2025 4:17:31 PM     CBC with Auto Differential    Collection Time: 05/20/25 10:30 AM   Result Value Ref Range    WBC 7.0 4.6 - 13.2 K/uL    RBC 5.57 (H) 4.20 - 5.30 M/uL    Hemoglobin 16.8 (H) 12.0 - 16.0 g/dL    Hematocrit 51.4 (H) 35.0 - 45.0 %    MCV 92.3 78.0 - 100.0 FL    MCH 30.2 24.0 - 34.0 PG    MCHC 32.7 31.0 - 37.0 g/dL    RDW 12.1 11.6 - 14.5 %    Platelets 240 135 - 420 K/uL    MPV 9.5 9.2 - 11.8 FL    Nucleated RBCs 0.0 0  WBC    nRBC 0.00 0.00 - 0.01 K/uL    Neutrophils % 59.4 40.0 - 73.0 %    Lymphocytes % 28.5 21.0 - 52.0 %    Monocytes % 8.1 3.0 - 10.0 %    Eosinophils % 2.7 0.0 - 5.0 %    Basophils % 1.0 0.0 - 2.0 %    Immature Granulocytes % 0.3 0.0 - 0.5 %    Neutrophils Absolute 4.16 1.80 - 8.00 K/UL    Lymphocytes Absolute 2.00 0.90 - 3.60 K/UL    Monocytes Absolute 0.57 0.05 - 1.20 K/UL    Eosinophils Absolute 0.19 0.00 - 0.40 K/UL    Basophils Absolute 0.07 0.00 - 0.10 K/UL    Immature Granulocytes Absolute 0.02 0.00 - 0.04 K/UL    Differential Type AUTOMATED     Basic Metabolic Panel    Collection Time: 05/20/25 10:30 AM   Result Value Ref Range    Sodium 142 136 - 145 mmol/L    Potassium 4.3 3.5 - 5.5 mmol/L    Chloride 104 98 - 107 mmol/L    CO2 24 21 - 32 mmol/L    Anion Gap 14 3.0 - 18.0 mmol/L    Glucose 103 74 - 108 mg/dL    BUN 19 6 - 23 MG/DL    Creatinine 0.50 (L) 0.6 - 1.3 MG/DL    BUN/Creatinine Ratio 37 (H) 12 - 20      Est, Glom Filt Rate >90 >60 ml/min/1.73m2    Calcium 10.2 (H) 8.5 - 10.1 MG/DL   Troponin    Collection Time: 05/20/25 10:30 AM   Result Value Ref Range    Troponin T 7.0 0 - 14 ng/L   Magnesium    Collection Time: 05/20/25 10:30 AM   Result Value Ref Range    Magnesium 2.2 1.6 - 2.6 mg/dL   Brain Natriuretic Peptide    Collection Time: 05/20/25 10:30 AM   Result Value Ref Range    NT Pro- 36 - 900 PG/ML      Labs Reviewed   CBC WITH AUTO DIFFERENTIAL

## 2025-05-20 NOTE — PROGRESS NOTES
[facility] Progress Note    Date: May 20, 2025    Name: Jammie Marino    MRN: [id number]         : 1970      Ms. Marino arrived to [facility] at 1300.    Ms. Marino was assessed and education was provided.     Ms. Marino's vitals were reviewed.  Vitals:    25 1507   BP: (!) 140/85   Pulse: 85   Resp: 18   Temp: 98.6 °F (37 °C)   SpO2: 95%       Labs results that were drawn in emergency room today were reviewed.     Lab results were obtained and reviewed.   Latest Reference Range & Units 25 10:30   WBC 4.6 - 13.2 K/uL 7.0   RBC 4.20 - 5.30 M/uL 5.57 (H)   Hemoglobin Quant 12.0 - 16.0 g/dL 16.8 (H)   Hematocrit 35.0 - 45.0 % 51.4 (H)   MCV 78.0 - 100.0 FL 92.3   MCH 24.0 - 34.0 PG 30.2   MCHC 31.0 - 37.0 g/dL 32.7   MPV 9.2 - 11.8 FL 9.5   RDW 11.6 - 14.5 % 12.1   Platelet Count 135 - 420 K/uL 240   Neutrophils % 40.0 - 73.0 % 59.4   Lymphocyte % 21.0 - 52.0 % 28.5   Monocytes % 3.0 - 10.0 % 8.1   Eosinophils % 0.0 - 5.0 % 2.7   Basophils % 0.0 - 2.0 % 1.0   Neutrophils Absolute 1.80 - 8.00 K/UL 4.16   Lymphocytes Absolute 0.90 - 3.60 K/UL 2.00   Monocytes Absolute 0.05 - 1.20 K/UL 0.57   Eosinophils Absolute 0.00 - 0.40 K/UL 0.19   Basophils Absolute 0.00 - 0.10 K/UL 0.07   Differential Type -   AUTOMATED   Immature Granulocytes % 0.0 - 0.5 % 0.3   Nucleated Red Blood Cells 0  WBC  0.00 - 0.01 K/uL 0.0  0.00   Immature Granulocytes Absolute 0.00 - 0.04 K/UL 0.02   (H): Data is abnormally high      Per today's HCT 51.4 phlebotomy needed, per orders.     Therapeutic phlebotomy initiated at 1321 via 20G PIV inserted in patient's RIGHT AC  x1 attempt by another nurse. Therapeutic phlebotomy ended at 1433 with 500 mL of blood obtained followed by 500 mL of NS administered as post-phlebotomy hydration per orders.    Pt offered snack/ drink throughout her visit.    Ms. Marino tolerated well without complaints. VSS    PIV removed/ intact.  Gauze/ coban to site.    Ms. Marino was discharged from

## 2025-05-20 NOTE — TELEPHONE ENCOUNTER
Pt called stated that someone call her from the office she is unsure who it was she just wanted to pass the message along then she said it could of been someone else

## 2025-05-21 ENCOUNTER — TELEPHONE (OUTPATIENT)
Facility: CLINIC | Age: 55
End: 2025-05-21

## 2025-05-21 ENCOUNTER — HOSPITAL ENCOUNTER (EMERGENCY)
Facility: HOSPITAL | Age: 55
Discharge: HOME | End: 2025-05-21
Attending: EMERGENCY MEDICINE
Payer: COMMERCIAL

## 2025-05-21 VITALS
DIASTOLIC BLOOD PRESSURE: 64 MMHG | SYSTOLIC BLOOD PRESSURE: 119 MMHG | RESPIRATION RATE: 16 BRPM | WEIGHT: 188 LBS | HEIGHT: 62 IN | TEMPERATURE: 97.5 F | BODY MASS INDEX: 34.6 KG/M2 | HEART RATE: 70 BPM | OXYGEN SATURATION: 98 %

## 2025-05-21 DIAGNOSIS — R40.0 SLEEPINESS: Primary | ICD-10-CM

## 2025-05-21 DIAGNOSIS — R53.83 OTHER FATIGUE: ICD-10-CM

## 2025-05-21 PROCEDURE — 99283 EMERGENCY DEPT VISIT LOW MDM: CPT

## 2025-05-21 ASSESSMENT — ENCOUNTER SYMPTOMS
VOMITING: 0
NAUSEA: 0
CONSTIPATION: 0
ABDOMINAL DISTENTION: 0
WHEEZING: 0
DIARRHEA: 0
SHORTNESS OF BREATH: 0
COUGH: 0

## 2025-05-21 ASSESSMENT — PAIN SCALES - GENERAL: PAINLEVEL_OUTOF10: 0

## 2025-05-21 ASSESSMENT — PAIN - FUNCTIONAL ASSESSMENT: PAIN_FUNCTIONAL_ASSESSMENT: 0-10

## 2025-05-21 NOTE — TELEPHONE ENCOUNTER
Pt stated she is having chest discomfort  Tried reaching clinical but with pts   not allowed to tell pts to go to ED  Pt requesting call back to discuss her EKG  Pt had two scheduled appts that she called and cancelled this morning (05/21/2025) but stated to keep the June appt  Left message in Teams with nurse

## 2025-05-22 ENCOUNTER — COMMUNITY OUTREACH (OUTPATIENT)
Facility: CLINIC | Age: 55
End: 2025-05-22

## 2025-05-22 NOTE — ED TRIAGE NOTES
EMS reports 911 was called d/t fatigue that began today. Pt states she accidentally took an extra dose of trazodone prior to onset of sxs; she states she is rx'd to take x2 tablets of 150 mg, but she usually takes x1 tablet. Today, she took a total of 300 mg. She ambulated to the ambulance with x1 assistance.

## 2025-05-22 NOTE — ED PROVIDER NOTES
[facility] EMERGENCY DEPARTMENT  EMERGENCY DEPARTMENT ENCOUNTER      Pt Name: Jammie Marino  MRN: [id number]  Birthdate 1970  Date of evaluation: 5/21/2025  Provider: Aurelia Jimenez MD  10:18 PM    CHIEF COMPLAINT       Chief Complaint   Patient presents with    Fatigue     HISTORY OF PRESENT ILLNESS    Jammie Marino is a 54 y.o. female with a history of CHF, depression, hypertension who presents to the emergency department after taking 2 tablets of trazodone.  Patient states that she normally takes 1 tablet which is 150 mg.  Today, she was more stressed than normal so she decided to take 2.  Shortly after, she felt \"off\" and sleepy.  She told her son this and he called 911.  Patient now feels normal but still sleepy.  Not weak.  Otherwise in her normal state of health.  No fevers, chills, abdominal pain, pain with urination, diarrhea, constipation, swelling in feet or legs.    HPI    Nursing Notes were reviewed.    REVIEW OF SYSTEMS       Review of Systems   Constitutional:  Negative for chills and fever.   Respiratory:  Negative for cough, shortness of breath and wheezing.    Cardiovascular:  Negative for chest pain and leg swelling.   Gastrointestinal:  Negative for abdominal distention, constipation, diarrhea, nausea and vomiting.   Genitourinary:  Negative for dysuria.   Neurological:  Negative for weakness.       Except as noted above the remainder of the review of systems was reviewed and negative.     PAST MEDICAL HISTORY     Past Medical History:   Diagnosis Date    Asthma     Bronchitis     CHF (congestive heart failure) ([facility])     Cocaine abuse ([facility]) 3/26/2015    Dental caries     Depression     bipolar    Depression 8/31/2016    Drug abuse ([facility])     Dysphasia 10/17/2018    Elevated hematocrit 8/31/2016    ETOH abuse     GERD (gastroesophageal reflux disease)     schatzis ring, hiatal hernia    H/O screening mammography 12/06/2016    No evidence of malignancy     HPV (human papilloma

## 2025-05-22 NOTE — DISCHARGE INSTRUCTIONS
Today you were seen for sleepiness after taking trazodone.  Your exam was reassuring.  You declined further laboratory evaluation.  You should follow-up with your primary care provider and your cardiologist for your symptoms and your chronic chest pain.  Please return to the emergency department with any new or worsening concerning symptoms to include, but not limited to: Chest pain, shortness of breath, inability to eat or drink, episodes of passing out, seizures.

## 2025-05-29 ENCOUNTER — HOSPITAL ENCOUNTER (OUTPATIENT)
Facility: HOSPITAL | Age: 55
Setting detail: INFUSION SERIES
End: 2025-05-29
Payer: COMMERCIAL

## 2025-05-29 VITALS
DIASTOLIC BLOOD PRESSURE: 82 MMHG | RESPIRATION RATE: 16 BRPM | TEMPERATURE: 98.4 F | HEART RATE: 77 BPM | OXYGEN SATURATION: 99 % | SYSTOLIC BLOOD PRESSURE: 130 MMHG

## 2025-05-29 LAB
BASO+EOS+MONOS # BLD AUTO: 0.5 K/UL (ref 0–2.3)
BASO+EOS+MONOS NFR BLD AUTO: 7 % (ref 0.1–17)
DIFFERENTIAL METHOD BLD: NORMAL
ERYTHROCYTE [DISTWIDTH] IN BLOOD BY AUTOMATED COUNT: 12.5 % (ref 11.5–14.5)
HCT VFR BLD AUTO: 41.3 % (ref 36–48)
HGB BLD-MCNC: 13.6 G/DL (ref 12–16)
LYMPHOCYTES # BLD: 2.4 K/UL (ref 1.1–5.9)
LYMPHOCYTES NFR BLD: 34 % (ref 14–44)
MCH RBC QN AUTO: 30.8 PG (ref 25–35)
MCHC RBC AUTO-ENTMCNC: 32.9 G/DL (ref 31–37)
MCV RBC AUTO: 93.4 FL (ref 78–102)
NEUTS SEG # BLD: 4.1 K/UL (ref 1.8–9.5)
NEUTS SEG NFR BLD: 58.6 % (ref 40–70)
PLATELET # BLD AUTO: 262 K/UL (ref 140–440)
RBC # BLD AUTO: 4.42 M/UL (ref 4.1–5.1)
WBC # BLD AUTO: 7 K/UL (ref 4.5–13)

## 2025-05-29 PROCEDURE — 85025 COMPLETE CBC W/AUTO DIFF WBC: CPT

## 2025-05-29 PROCEDURE — 36415 COLL VENOUS BLD VENIPUNCTURE: CPT

## 2025-05-29 PROCEDURE — 2500000003 HC RX 250 WO HCPCS: Performed by: STUDENT IN AN ORGANIZED HEALTH CARE EDUCATION/TRAINING PROGRAM

## 2025-05-29 RX ORDER — SODIUM CHLORIDE 0.9 % (FLUSH) 0.9 %
5-40 SYRINGE (ML) INJECTION PRN
Status: ACTIVE | OUTPATIENT
Start: 2025-05-29

## 2025-05-29 RX ADMIN — SODIUM CHLORIDE, PRESERVATIVE FREE 10 ML: 5 INJECTION INTRAVENOUS at 09:35

## 2025-05-29 ASSESSMENT — PAIN - FUNCTIONAL ASSESSMENT: PAIN_FUNCTIONAL_ASSESSMENT: NONE - DENIES PAIN

## 2025-05-29 NOTE — PROGRESS NOTES
[facility] Progress Note    Date: May 29, 2025    Name: Jammie Marino    MRN: [id number]         : 1970    Ms. Marino arrived in the [facility] today at 0915, ambulatory and in stable condition and accompanied by her boyfriend, here for CBC + PHLEBOTOMY (WEEKLY STATUS). She was assessed and education was provided.     Ms. Marino's vitals were reviewed.  Vitals:    25 0915   BP: 130/82   Pulse: 77   Resp: 16   Temp: 98.4 °F (36.9 °C)   SpO2: 99%       PIV # 18 G was established in her RIGHT AC at 0935 without incident, and blood for the ordered CBC was drawn and processed on site, with results listed below:      Results for orders placed or performed during the hospital encounter of 25   CBC with Partial Differential   Result Value Ref Range    WBC 7.0 4.5 - 13.0 K/uL    RBC 4.42 4.10 - 5.10 M/uL    Hemoglobin 13.6 12.0 - 16.0 g/dL    Hematocrit 41.3 36 - 48 %    MCV 93.4 78 - 102 FL    MCH 30.8 25.0 - 35.0 PG    MCHC 32.9 31 - 37 g/dL    RDW 12.5 11.5 - 14.5 %    Platelets 262 140 - 440 K/uL    Neutrophils % 58.6 40 - 70 %    Mixed Cells 7 0.1 - 17 %    Lymphocytes % 34.0 14 - 44 %    Neutrophils Absolute 4.10 1.8 - 9.5 K/UL    ABSOLUTE MIXED CELLS 0.5 0.0 - 2.3 K/uL    Lymphocytes Absolute 2.40 1.1 - 5.9 K/UL    Differential Type AUTOMATED             Based on her current written order dated 3-13-25, once HCT is > or = to 45, weekly phlebotomy is to be done until HCT is < or = 42.       And since her HCT today is 41.3, which is < 42, Phlebotomy was HELD today per order.         Her PIV was removed and gauze//coban was applied.           Ms. Marino tolerated well and voiced no complaints.     [facility] at 0950.     She is to return in one month, on Thursday, 25 at 0900, for her next appointment, for CBC + PHLEBOTOMY (MONTHLY STATUS).     Donald Pappas RN  May 29, 2025  9:29 AM

## 2025-07-27 ENCOUNTER — HOSPITAL ENCOUNTER (EMERGENCY)
Facility: HOSPITAL | Age: 55
Discharge: HOME | End: 2025-07-27

## 2025-07-27 ENCOUNTER — APPOINTMENT (OUTPATIENT)
Facility: HOSPITAL | Age: 55
End: 2025-07-27

## 2025-07-27 VITALS
HEART RATE: 97 BPM | HEIGHT: 62 IN | DIASTOLIC BLOOD PRESSURE: 91 MMHG | RESPIRATION RATE: 16 BRPM | WEIGHT: 188 LBS | SYSTOLIC BLOOD PRESSURE: 155 MMHG | BODY MASS INDEX: 34.6 KG/M2 | TEMPERATURE: 97.8 F | OXYGEN SATURATION: 94 %

## 2025-07-27 DIAGNOSIS — R07.9 ACUTE CHEST PAIN: Primary | ICD-10-CM

## 2025-07-27 DIAGNOSIS — F14.10 COCAINE ABUSE (HCC): ICD-10-CM

## 2025-07-27 LAB
ALBUMIN SERPL-MCNC: 3.6 G/DL (ref 3.4–5)
ALBUMIN/GLOB SERPL: 1.3 (ref 0.8–1.7)
ALP SERPL-CCNC: 63 U/L (ref 45–117)
ALT SERPL-CCNC: 17 U/L (ref 10–35)
AMPHET UR QL SCN: NEGATIVE
ANION GAP SERPL CALC-SCNC: 14 MMOL/L (ref 3–18)
AST SERPL-CCNC: 21 U/L (ref 10–38)
BARBITURATES UR QL SCN: NEGATIVE
BASOPHILS # BLD: 0.11 K/UL (ref 0–0.1)
BASOPHILS NFR BLD: 0.9 % (ref 0–2)
BENZODIAZ UR QL: NEGATIVE
BILIRUB SERPL-MCNC: 0.4 MG/DL (ref 0.2–1)
BUN SERPL-MCNC: 18 MG/DL (ref 6–23)
BUN/CREAT SERPL: 20 (ref 12–20)
CALCIUM SERPL-MCNC: 9.6 MG/DL (ref 8.5–10.1)
CANNABINOIDS UR QL SCN: NEGATIVE
CHLORIDE SERPL-SCNC: 100 MMOL/L (ref 98–107)
CO2 SERPL-SCNC: 22 MMOL/L (ref 21–32)
COCAINE UR QL SCN: POSITIVE
CREAT SERPL-MCNC: 0.87 MG/DL (ref 0.6–1.3)
DIFFERENTIAL METHOD BLD: ABNORMAL
EOSINOPHIL # BLD: 0.14 K/UL (ref 0–0.4)
EOSINOPHIL NFR BLD: 1.1 % (ref 0–5)
ERYTHROCYTE [DISTWIDTH] IN BLOOD BY AUTOMATED COUNT: 13.3 % (ref 11.6–14.5)
FENTANYL: NEGATIVE
GLOBULIN SER CALC-MCNC: 2.7 G/DL (ref 2–4)
GLUCOSE SERPL-MCNC: 151 MG/DL (ref 74–108)
HCT VFR BLD AUTO: 55.3 % (ref 35–45)
HGB BLD-MCNC: 18.5 G/DL (ref 12–16)
IMM GRANULOCYTES # BLD AUTO: 0.04 K/UL (ref 0–0.04)
IMM GRANULOCYTES NFR BLD AUTO: 0.3 % (ref 0–0.5)
LYMPHOCYTES # BLD: 3.63 K/UL (ref 0.9–3.6)
LYMPHOCYTES NFR BLD: 28.7 % (ref 21–52)
Lab: ABNORMAL
MCH RBC QN AUTO: 30.1 PG (ref 24–34)
MCHC RBC AUTO-ENTMCNC: 33.5 G/DL (ref 31–37)
MCV RBC AUTO: 90.1 FL (ref 78–100)
METHADONE UR QL: NEGATIVE
MONOCYTES # BLD: 1.1 K/UL (ref 0.05–1.2)
MONOCYTES NFR BLD: 8.7 % (ref 3–10)
NEUTS SEG # BLD: 7.65 K/UL (ref 1.8–8)
NEUTS SEG NFR BLD: 60.3 % (ref 40–73)
NRBC # BLD: 0 K/UL (ref 0–0.01)
NRBC BLD-RTO: 0 PER 100 WBC
OPIATES UR QL: NEGATIVE
OXYCODONE UR QL SCN: NEGATIVE
PLATELET # BLD AUTO: 356 K/UL (ref 135–420)
PMV BLD AUTO: 9.7 FL (ref 9.2–11.8)
POTASSIUM SERPL-SCNC: 3.4 MMOL/L (ref 3.5–5.5)
PROT SERPL-MCNC: 6.3 G/DL (ref 6.4–8.2)
RBC # BLD AUTO: 6.14 M/UL (ref 4.2–5.3)
SODIUM SERPL-SCNC: 136 MMOL/L (ref 136–145)
TROPONIN T SERPL HS-MCNC: 12 NG/L (ref 0–14)
WBC # BLD AUTO: 12.7 K/UL (ref 4.6–13.2)

## 2025-07-27 PROCEDURE — 96374 THER/PROPH/DIAG INJ IV PUSH: CPT

## 2025-07-27 PROCEDURE — 93005 ELECTROCARDIOGRAM TRACING: CPT | Performed by: EMERGENCY MEDICINE

## 2025-07-27 PROCEDURE — 84484 ASSAY OF TROPONIN QUANT: CPT

## 2025-07-27 PROCEDURE — 71046 X-RAY EXAM CHEST 2 VIEWS: CPT

## 2025-07-27 PROCEDURE — 85025 COMPLETE CBC W/AUTO DIFF WBC: CPT

## 2025-07-27 PROCEDURE — 2580000003 HC RX 258: Performed by: EMERGENCY MEDICINE

## 2025-07-27 PROCEDURE — 80053 COMPREHEN METABOLIC PANEL: CPT

## 2025-07-27 PROCEDURE — 80307 DRUG TEST PRSMV CHEM ANLYZR: CPT

## 2025-07-27 PROCEDURE — 99285 EMERGENCY DEPT VISIT HI MDM: CPT

## 2025-07-27 PROCEDURE — 6360000002 HC RX W HCPCS: Performed by: EMERGENCY MEDICINE

## 2025-07-27 PROCEDURE — 6370000000 HC RX 637 (ALT 250 FOR IP): Performed by: EMERGENCY MEDICINE

## 2025-07-27 RX ORDER — LORAZEPAM 0.5 MG/1
0.5 TABLET ORAL EVERY 4 HOURS PRN
Status: DISCONTINUED | OUTPATIENT
Start: 2025-07-27 | End: 2025-07-27

## 2025-07-27 RX ORDER — IPRATROPIUM BROMIDE AND ALBUTEROL SULFATE 2.5; .5 MG/3ML; MG/3ML
1 SOLUTION RESPIRATORY (INHALATION)
Status: DISCONTINUED | OUTPATIENT
Start: 2025-07-27 | End: 2025-07-27 | Stop reason: HOSPADM

## 2025-07-27 RX ORDER — ALBUTEROL SULFATE 0.83 MG/ML
2.5 SOLUTION RESPIRATORY (INHALATION)
Status: DISCONTINUED | OUTPATIENT
Start: 2025-07-27 | End: 2025-07-27 | Stop reason: HOSPADM

## 2025-07-27 RX ORDER — 0.9 % SODIUM CHLORIDE 0.9 %
1000 INTRAVENOUS SOLUTION INTRAVENOUS ONCE
Status: COMPLETED | OUTPATIENT
Start: 2025-07-27 | End: 2025-07-27

## 2025-07-27 RX ORDER — ASPIRIN 81 MG/1
324 TABLET, CHEWABLE ORAL
Status: COMPLETED | OUTPATIENT
Start: 2025-07-27 | End: 2025-07-27

## 2025-07-27 RX ORDER — LORAZEPAM 1 MG/1
1 TABLET ORAL
Status: COMPLETED | OUTPATIENT
Start: 2025-07-27 | End: 2025-07-27

## 2025-07-27 RX ORDER — ONDANSETRON 2 MG/ML
4 INJECTION INTRAMUSCULAR; INTRAVENOUS
Status: COMPLETED | OUTPATIENT
Start: 2025-07-27 | End: 2025-07-27

## 2025-07-27 RX ADMIN — LORAZEPAM 1 MG: 1 TABLET ORAL at 18:51

## 2025-07-27 RX ADMIN — ONDANSETRON 4 MG: 2 INJECTION, SOLUTION INTRAMUSCULAR; INTRAVENOUS at 18:51

## 2025-07-27 RX ADMIN — ASPIRIN 81 MG CHEWABLE TABLET 324 MG: 81 TABLET CHEWABLE at 18:51

## 2025-07-27 RX ADMIN — SODIUM CHLORIDE 1000 ML: 0.9 INJECTION, SOLUTION INTRAVENOUS at 18:50

## 2025-07-27 ASSESSMENT — PAIN - FUNCTIONAL ASSESSMENT: PAIN_FUNCTIONAL_ASSESSMENT: 0-10

## 2025-07-27 ASSESSMENT — PAIN SCALES - GENERAL: PAINLEVEL_OUTOF10: 6

## 2025-07-27 NOTE — ED TRIAGE NOTES
Patient arrives to ED with report of chest pian, states she smoked some crack PTA and is having chest pain ,anxiety and diaphoresis.

## 2025-07-27 NOTE — ED PROVIDER NOTES
EMERGENCY DEPARTMENT HISTORY AND PHYSICAL EXAM      Date: 7/27/2025  Patient Name: Jammie Marino    History of Presenting Illness     Chief Complaint   Patient presents with    Chest Pain       History (Context): Jammie Marino is a 55 y.o. female  presents to the ED today with chief complaint of chest pain anxiety and diaphoresis.  She smoked crack cocaine about 2 hours ago.  Has not followed by cardiology.  Has a history of hypertension and hypercholesterolemia.  No recent stress testing or echocardiogram.  Denies alcohol.  Denies abdominal pain.  Denies nausea vomiting or fever but states she has had moderate thick mucus with coughing recently.    PCP: Zaida Monroe MD    No current facility-administered medications for this encounter.     Current Outpatient Medications   Medication Sig Dispense Refill    lidocaine viscous hcl (XYLOCAINE) 2 % SOLN solution Take 15 mLs by mouth every 6 hours 100 mL 0    fenofibrate (TRICOR) 145 MG tablet TAKE 1 TABLET BY MOUTH DAILY 90 tablet 1    amLODIPine (NORVASC) 10 MG tablet TAKE 1 TABLET BY MOUTH DAILY 90 tablet 3    ibuprofen (IBU) 400 MG tablet Take 1 tablet by mouth every 6 hours as needed for Pain 120 tablet 0    acetaminophen (TYLENOL) 500 MG tablet Take 2 tablets by mouth 3 times daily as needed for Pain 180 tablet 0    pantoprazole (PROTONIX) 40 MG tablet Take 1 tablet by mouth 2 times daily (before meals) 180 tablet 0    risperiDONE (RISPERDAL) 0.5 MG tablet Take 1 tablet by mouth 2 times daily      albuterol sulfate HFA (PROVENTIL;VENTOLIN;PROAIR) 108 (90 Base) MCG/ACT inhaler INHALE 2 PUFFS BY MOUTH EVERY 4 HOURS AS NEEDED FOR WHEEZING 36 g 2    atorvastatin (LIPITOR) 40 MG tablet Take 1 tablet by mouth daily 90 tablet 2    fluticasone-salmeterol (ADVAIR) 250-50 MCG/ACT AEPB diskus inhaler Inhale 1 puff into the lungs in the morning and 1 puff in the evening. 60 each 3    gabapentin (NEURONTIN) 100 MG capsule Take 1 capsule by mouth 3 times daily for 90

## 2025-07-28 DIAGNOSIS — E78.1 PURE HYPERGLYCERIDEMIA: ICD-10-CM

## 2025-07-28 DIAGNOSIS — I10 ESSENTIAL (PRIMARY) HYPERTENSION: ICD-10-CM

## 2025-07-28 NOTE — TELEPHONE ENCOUNTER
Pt requesting refill:    Losartan-hydrochlorothiazide 100-12.5 MG per tablet      [facility]  [facility]    [phone]

## 2025-07-29 LAB
EKG ATRIAL RATE: 104 BPM
EKG DIAGNOSIS: NORMAL
EKG P AXIS: 53 DEGREES
EKG P-R INTERVAL: 156 MS
EKG Q-T INTERVAL: 376 MS
EKG QRS DURATION: 112 MS
EKG QTC CALCULATION (BAZETT): 494 MS
EKG R AXIS: -40 DEGREES
EKG T AXIS: 89 DEGREES
EKG VENTRICULAR RATE: 104 BPM

## 2025-07-29 PROCEDURE — 93010 ELECTROCARDIOGRAM REPORT: CPT | Performed by: INTERNAL MEDICINE

## 2025-07-29 RX ORDER — LOSARTAN POTASSIUM AND HYDROCHLOROTHIAZIDE 12.5; 1 MG/1; MG/1
1 TABLET ORAL DAILY
Qty: 90 TABLET | Refills: 1 | Status: SHIPPED | OUTPATIENT
Start: 2025-07-29 | End: 2025-07-31

## 2025-07-31 ENCOUNTER — HOSPITAL ENCOUNTER (OUTPATIENT)
Facility: HOSPITAL | Age: 55
Setting detail: INFUSION SERIES
End: 2025-07-31

## 2025-07-31 RX ORDER — FENOFIBRATE 145 MG/1
145 TABLET, FILM COATED ORAL DAILY
Qty: 90 TABLET | Refills: 1 | Status: SHIPPED | OUTPATIENT
Start: 2025-07-31

## 2025-07-31 RX ORDER — AMLODIPINE BESYLATE 10 MG/1
10 TABLET ORAL DAILY
Qty: 90 TABLET | Refills: 3 | Status: SHIPPED | OUTPATIENT
Start: 2025-07-31

## 2025-07-31 RX ORDER — ATORVASTATIN CALCIUM 40 MG/1
40 TABLET, FILM COATED ORAL DAILY
Qty: 90 TABLET | Refills: 2 | Status: SHIPPED | OUTPATIENT
Start: 2025-07-31

## 2025-07-31 RX ORDER — LOSARTAN POTASSIUM AND HYDROCHLOROTHIAZIDE 12.5; 1 MG/1; MG/1
1 TABLET ORAL DAILY
Qty: 90 TABLET | Refills: 3 | Status: SHIPPED | OUTPATIENT
Start: 2025-07-31

## 2025-08-27 ENCOUNTER — HOSPITAL ENCOUNTER (OUTPATIENT)
Facility: HOSPITAL | Age: 55
Setting detail: INFUSION SERIES
End: 2025-08-27